# Patient Record
Sex: MALE | Race: WHITE | NOT HISPANIC OR LATINO | Employment: OTHER | URBAN - METROPOLITAN AREA
[De-identification: names, ages, dates, MRNs, and addresses within clinical notes are randomized per-mention and may not be internally consistent; named-entity substitution may affect disease eponyms.]

---

## 2017-07-31 DIAGNOSIS — N40.1 ENLARGED PROSTATE WITH LOWER URINARY TRACT SYMPTOMS (LUTS): ICD-10-CM

## 2017-08-03 ENCOUNTER — ALLSCRIPTS OFFICE VISIT (OUTPATIENT)
Dept: OTHER | Facility: OTHER | Age: 72
End: 2017-08-03

## 2017-08-03 ENCOUNTER — TRANSCRIBE ORDERS (OUTPATIENT)
Dept: ADMINISTRATIVE | Facility: HOSPITAL | Age: 72
End: 2017-08-03

## 2017-08-03 DIAGNOSIS — N40.1 ENLARGED PROSTATE WITH URINARY OBSTRUCTION: Primary | ICD-10-CM

## 2017-08-03 DIAGNOSIS — N13.8 ENLARGED PROSTATE WITH URINARY OBSTRUCTION: Primary | ICD-10-CM

## 2017-08-03 LAB
BILIRUB UR QL STRIP: NEGATIVE
CLARITY UR: NORMAL
COLOR UR: YELLOW
GLUCOSE (HISTORICAL): NEGATIVE
HGB UR QL STRIP.AUTO: NORMAL
KETONES UR STRIP-MCNC: NEGATIVE MG/DL
LEUKOCYTE ESTERASE UR QL STRIP: NEGATIVE
NITRITE UR QL STRIP: NEGATIVE
PH UR STRIP.AUTO: 7.5 [PH]
PROT UR STRIP-MCNC: NEGATIVE MG/DL
SP GR UR STRIP.AUTO: 1.01
UROBILINOGEN UR QL STRIP.AUTO: 0.2

## 2017-12-15 ENCOUNTER — ALLSCRIPTS OFFICE VISIT (OUTPATIENT)
Dept: OTHER | Facility: OTHER | Age: 72
End: 2017-12-15

## 2017-12-15 ENCOUNTER — TRANSCRIBE ORDERS (OUTPATIENT)
Dept: ADMINISTRATIVE | Facility: HOSPITAL | Age: 72
End: 2017-12-15

## 2017-12-15 DIAGNOSIS — N20.0 KIDNEY STONE: Primary | ICD-10-CM

## 2017-12-15 LAB
BILIRUB UR QL STRIP: NORMAL
CLARITY UR: NORMAL
COLOR UR: YELLOW
GLUCOSE (HISTORICAL): NORMAL
HGB UR QL STRIP.AUTO: NORMAL
KETONES UR STRIP-MCNC: NORMAL MG/DL
LEUKOCYTE ESTERASE UR QL STRIP: NORMAL
NITRITE UR QL STRIP: NORMAL
PH UR STRIP.AUTO: 7 [PH]
PROT UR STRIP-MCNC: NORMAL MG/DL
SP GR UR STRIP.AUTO: 1.01
UROBILINOGEN UR QL STRIP.AUTO: 0.2

## 2017-12-16 ENCOUNTER — HOSPITAL ENCOUNTER (OUTPATIENT)
Dept: CT IMAGING | Facility: HOSPITAL | Age: 72
Discharge: HOME | End: 2017-12-16
Attending: UROLOGY
Payer: MEDICARE

## 2017-12-16 ENCOUNTER — GENERIC CONVERSION - ENCOUNTER (OUTPATIENT)
Dept: OTHER | Facility: OTHER | Age: 72
End: 2017-12-16

## 2017-12-16 DIAGNOSIS — N20.0 KIDNEY STONE: ICD-10-CM

## 2017-12-16 PROCEDURE — 74176 CT ABD & PELVIS W/O CONTRAST: CPT

## 2017-12-18 ENCOUNTER — GENERIC CONVERSION - ENCOUNTER (OUTPATIENT)
Dept: OTHER | Facility: OTHER | Age: 72
End: 2017-12-18

## 2017-12-21 ENCOUNTER — ALLSCRIPTS OFFICE VISIT (OUTPATIENT)
Dept: OTHER | Facility: OTHER | Age: 72
End: 2017-12-21

## 2017-12-21 LAB
BILIRUB UR QL STRIP: NORMAL
GLUCOSE (HISTORICAL): NORMAL
HGB UR QL STRIP.AUTO: NORMAL
KETONES UR STRIP-MCNC: NORMAL MG/DL
LEUKOCYTE ESTERASE UR QL STRIP: NORMAL
NITRITE UR QL STRIP: NORMAL
PH UR STRIP.AUTO: 7 [PH]
PROT UR STRIP-MCNC: NORMAL MG/DL
SP GR UR STRIP.AUTO: 1.01
UROBILINOGEN UR QL STRIP.AUTO: 0.2

## 2017-12-28 ENCOUNTER — ANESTHESIA EVENT (OUTPATIENT)
Dept: PERIOP | Facility: HOSPITAL | Age: 72
End: 2017-12-28
Payer: MEDICARE

## 2017-12-28 RX ORDER — SODIUM CHLORIDE 9 MG/ML
125 INJECTION, SOLUTION INTRAVENOUS CONTINUOUS
Status: CANCELLED | OUTPATIENT
Start: 2018-01-03

## 2017-12-29 ENCOUNTER — APPOINTMENT (OUTPATIENT)
Dept: PREADMISSION TESTING | Facility: HOSPITAL | Age: 72
End: 2017-12-29
Payer: MEDICARE

## 2017-12-29 ENCOUNTER — TRANSCRIBE ORDERS (OUTPATIENT)
Dept: ADMINISTRATIVE | Facility: HOSPITAL | Age: 72
End: 2017-12-29

## 2017-12-29 ENCOUNTER — HOSPITAL ENCOUNTER (OUTPATIENT)
Dept: NON INVASIVE DIAGNOSTICS | Facility: HOSPITAL | Age: 72
Discharge: HOME | End: 2017-12-29
Attending: UROLOGY
Payer: MEDICARE

## 2017-12-29 ENCOUNTER — GENERIC CONVERSION - ENCOUNTER (OUTPATIENT)
Dept: UROLOGY | Facility: MEDICAL CENTER | Age: 72
End: 2017-12-29

## 2017-12-29 ENCOUNTER — APPOINTMENT (OUTPATIENT)
Dept: LAB | Facility: HOSPITAL | Age: 72
End: 2017-12-29
Attending: UROLOGY
Payer: MEDICARE

## 2017-12-29 VITALS
HEART RATE: 97 BPM | SYSTOLIC BLOOD PRESSURE: 135 MMHG | HEIGHT: 72 IN | RESPIRATION RATE: 18 BRPM | DIASTOLIC BLOOD PRESSURE: 91 MMHG | BODY MASS INDEX: 31.15 KG/M2 | TEMPERATURE: 98.9 F | WEIGHT: 230 LBS

## 2017-12-29 DIAGNOSIS — N13.1 HYDRONEPHROSIS WITH URETERAL STRICTURE: Primary | ICD-10-CM

## 2017-12-29 DIAGNOSIS — Z01.818 PREOP EXAMINATION: ICD-10-CM

## 2017-12-29 DIAGNOSIS — N20.1 CALCULUS OF URETER: ICD-10-CM

## 2017-12-29 DIAGNOSIS — N13.1 HYDRONEPHROSIS WITH URETERAL STRICTURE: ICD-10-CM

## 2017-12-29 LAB
ALBUMIN SERPL BCP-MCNC: 3.3 G/DL (ref 3.5–5)
ALP SERPL-CCNC: 67 U/L (ref 46–116)
ALT SERPL W P-5'-P-CCNC: 22 U/L (ref 12–78)
ANION GAP SERPL CALCULATED.3IONS-SCNC: 9 MMOL/L (ref 4–13)
AST SERPL W P-5'-P-CCNC: 17 U/L (ref 5–45)
ATRIAL RATE: 88 BPM
BASOPHILS # BLD AUTO: 0.03 THOUSANDS/ΜL (ref 0–0.1)
BASOPHILS NFR BLD AUTO: 1 % (ref 0–1)
BILIRUB SERPL-MCNC: 0.35 MG/DL (ref 0.2–1)
BUN SERPL-MCNC: 11 MG/DL (ref 5–25)
CALCIUM SERPL-MCNC: 8.8 MG/DL (ref 8.3–10.1)
CHLORIDE SERPL-SCNC: 105 MMOL/L (ref 100–108)
CO2 SERPL-SCNC: 28 MMOL/L (ref 21–32)
CREAT SERPL-MCNC: 1.3 MG/DL (ref 0.6–1.3)
EOSINOPHIL # BLD AUTO: 0.04 THOUSAND/ΜL (ref 0–0.61)
EOSINOPHIL NFR BLD AUTO: 1 % (ref 0–6)
ERYTHROCYTE [DISTWIDTH] IN BLOOD BY AUTOMATED COUNT: 13.7 % (ref 11.6–15.1)
GFR SERPL CREATININE-BSD FRML MDRD: 55 ML/MIN/1.73SQ M
GLUCOSE SERPL-MCNC: 93 MG/DL (ref 65–140)
HCT VFR BLD AUTO: 42.6 % (ref 36.5–49.3)
HGB BLD-MCNC: 14.5 G/DL (ref 12–17)
LYMPHOCYTES # BLD AUTO: 1.48 THOUSANDS/ΜL (ref 0.6–4.47)
LYMPHOCYTES NFR BLD AUTO: 33 % (ref 14–44)
MCH RBC QN AUTO: 30.9 PG (ref 26.8–34.3)
MCHC RBC AUTO-ENTMCNC: 34 G/DL (ref 31.4–37.4)
MCV RBC AUTO: 91 FL (ref 82–98)
MONOCYTES # BLD AUTO: 0.47 THOUSAND/ΜL (ref 0.17–1.22)
MONOCYTES NFR BLD AUTO: 10 % (ref 4–12)
NEUTROPHILS # BLD AUTO: 2.54 THOUSANDS/ΜL (ref 1.85–7.62)
NEUTS SEG NFR BLD AUTO: 55 % (ref 43–75)
NRBC BLD AUTO-RTO: 0 /100 WBCS
P AXIS: 44 DEGREES
PLATELET # BLD AUTO: 194 THOUSANDS/UL (ref 149–390)
PMV BLD AUTO: 9.9 FL (ref 8.9–12.7)
POTASSIUM SERPL-SCNC: 3.7 MMOL/L (ref 3.5–5.3)
PR INTERVAL: 172 MS
PROT SERPL-MCNC: 6.9 G/DL (ref 6.4–8.2)
QRS AXIS: -58 DEGREES
QRSD INTERVAL: 132 MS
QT INTERVAL: 378 MS
QTC INTERVAL: 457 MS
RBC # BLD AUTO: 4.69 MILLION/UL (ref 3.88–5.62)
SODIUM SERPL-SCNC: 142 MMOL/L (ref 136–145)
T WAVE AXIS: 10 DEGREES
VENTRICULAR RATE: 88 BPM
WBC # BLD AUTO: 4.56 THOUSAND/UL (ref 4.31–10.16)

## 2017-12-29 PROCEDURE — 80053 COMPREHEN METABOLIC PANEL: CPT

## 2017-12-29 PROCEDURE — 36415 COLL VENOUS BLD VENIPUNCTURE: CPT

## 2017-12-29 PROCEDURE — 85025 COMPLETE CBC W/AUTO DIFF WBC: CPT

## 2017-12-29 PROCEDURE — 87086 URINE CULTURE/COLONY COUNT: CPT

## 2017-12-29 PROCEDURE — 93005 ELECTROCARDIOGRAM TRACING: CPT

## 2017-12-29 RX ORDER — LOSARTAN POTASSIUM AND HYDROCHLOROTHIAZIDE 25; 100 MG/1; MG/1
1 TABLET ORAL DAILY
COMMUNITY
End: 2018-02-05

## 2017-12-29 RX ORDER — DIVALPROEX SODIUM 500 MG/1
500 TABLET, DELAYED RELEASE ORAL 2 TIMES DAILY
COMMUNITY
End: 2019-09-03 | Stop reason: ALTCHOICE

## 2017-12-29 RX ORDER — MOMETASONE FUROATE 50 UG/1
2 SPRAY, METERED NASAL AS NEEDED
COMMUNITY

## 2017-12-29 RX ORDER — TAMSULOSIN HYDROCHLORIDE 0.4 MG/1
0.4 CAPSULE ORAL
COMMUNITY
End: 2018-03-06 | Stop reason: SDUPTHER

## 2017-12-29 RX ORDER — PENTOXIFYLLINE 400 MG/1
400 TABLET, EXTENDED RELEASE ORAL
COMMUNITY
End: 2018-03-15 | Stop reason: HOSPADM

## 2017-12-29 RX ORDER — RANITIDINE 300 MG/1
300 CAPSULE ORAL EVERY EVENING
COMMUNITY
End: 2018-11-19 | Stop reason: SDUPTHER

## 2017-12-29 RX ORDER — AMLODIPINE BESYLATE 10 MG/1
10 TABLET ORAL EVERY MORNING
COMMUNITY
End: 2018-12-01 | Stop reason: HOSPADM

## 2017-12-29 RX ORDER — IBUPROFEN 200 MG
400 TABLET ORAL EVERY 6 HOURS PRN
COMMUNITY
End: 2018-08-10 | Stop reason: ALTCHOICE

## 2017-12-29 NOTE — PRE-PROCEDURE INSTRUCTIONS
Pre-Surgery Instructions:   Medication Instructions    amLODIPine (NORVASC) 10 mg tablet Patient was instructed by Physician and understands   divalproex sodium (DEPAKOTE) 500 mg EC tablet Patient was instructed by Physician and understands   ibuprofen (MOTRIN) 200 mg tablet Patient was instructed by Physician and understands   losartan-hydrochlorothiazide (HYZAAR) 100-25 MG per tablet Patient was instructed by Physician and understands   mometasone (NASONEX) 50 mcg/act nasal spray Patient was instructed by Physician and understands   pentoxifylline (TRENtal) 400 mg ER tablet Patient was instructed by Physician and understands   ranitidine (ZANTAC) 300 MG capsule Patient was instructed by Physician and understands   tamsulosin (FLOMAX) 0 4 mg Patient was instructed by Physician and understands  Seen by Dr Cecil Parra and was told to call his Dr to see if Trental should be stopped  Dr instructed him to take Depakote on morning of surgery with sip of water and was told to stop NSAIDS and supplements

## 2017-12-29 NOTE — ANESTHESIA PREPROCEDURE EVALUATION
Review of Systems/Medical History  Patient summary reviewed  Chart reviewed  History of anesthetic complications PONV    Cardiovascular  Hyperlipidemia, Hypertension on > 1 medication, PVD,    Pulmonary       GI/Hepatic    GERD well controlled,        Kidney stones, Prostatic disorder, benign prostatic hyperplasia       Endo/Other  Arthritis     GYN       Hematology   Musculoskeletal  Obesity ,        Neurology   Psychology   Anxiety, Depression , being treated for depression,            Physical Exam    Airway    Mallampati score: II  TM Distance: >3 FB  Neck ROM: full     Dental   Comment: partial, upper dentures,     Cardiovascular  Rhythm: regular, Rate: normal, Cardiovascular exam normal    Pulmonary  Pulmonary exam normal Breath sounds clear to auscultation,     Other Findings        Anesthesia Plan  ASA Score- 2     Anesthesia Type- general with ASA Monitors  Additional Monitors:   Airway Plan:         Plan Factors-Patient not instructed to abstain from smoking on day of procedure  Patient did not smoke on day of surgery  Induction- intravenous  Postoperative Plan- Plan for postoperative opioid use  Informed Consent- Anesthetic plan and risks discussed with patient and spouse

## 2017-12-30 LAB — BACTERIA UR CULT: NORMAL

## 2018-01-03 ENCOUNTER — GENERIC CONVERSION - ENCOUNTER (OUTPATIENT)
Dept: OTHER | Facility: OTHER | Age: 73
End: 2018-01-03

## 2018-01-03 ENCOUNTER — HOSPITAL ENCOUNTER (OUTPATIENT)
Facility: HOSPITAL | Age: 73
Setting detail: OUTPATIENT SURGERY
Discharge: HOME | End: 2018-01-03
Attending: UROLOGY | Admitting: UROLOGY
Payer: MEDICARE

## 2018-01-03 ENCOUNTER — ANESTHESIA (OUTPATIENT)
Dept: PERIOP | Facility: HOSPITAL | Age: 73
End: 2018-01-03
Payer: MEDICARE

## 2018-01-03 ENCOUNTER — HOSPITAL ENCOUNTER (OUTPATIENT)
Dept: RADIOLOGY | Facility: HOSPITAL | Age: 73
Setting detail: OUTPATIENT SURGERY
Discharge: HOME | End: 2018-01-03
Payer: MEDICARE

## 2018-01-03 VITALS
HEART RATE: 75 BPM | TEMPERATURE: 97.7 F | BODY MASS INDEX: 31.15 KG/M2 | DIASTOLIC BLOOD PRESSURE: 71 MMHG | RESPIRATION RATE: 20 BRPM | HEIGHT: 72 IN | OXYGEN SATURATION: 96 % | WEIGHT: 230 LBS | SYSTOLIC BLOOD PRESSURE: 131 MMHG

## 2018-01-03 DIAGNOSIS — N20.1 CALCULUS OF URETER: ICD-10-CM

## 2018-01-03 PROCEDURE — C2617 STENT, NON-COR, TEM W/O DEL: HCPCS | Performed by: UROLOGY

## 2018-01-03 PROCEDURE — 74450 X-RAY URETHRA/BLADDER: CPT

## 2018-01-03 PROCEDURE — C1769 GUIDE WIRE: HCPCS | Performed by: UROLOGY

## 2018-01-03 DEVICE — STENT URET DBL PIGTAIL MULTI 7FR 22-32CML SOFT: Type: IMPLANTABLE DEVICE | Site: URETER | Status: FUNCTIONAL

## 2018-01-03 RX ORDER — HYDROCODONE BITARTRATE AND ACETAMINOPHEN 5; 325 MG/1; MG/1
1 TABLET ORAL EVERY 6 HOURS PRN
Qty: 30 TABLET | Refills: 0 | Status: SHIPPED | OUTPATIENT
Start: 2018-01-03 | End: 2018-03-13

## 2018-01-03 RX ORDER — HYDROCODONE BITARTRATE AND ACETAMINOPHEN 5; 325 MG/1; MG/1
1 TABLET ORAL EVERY 6 HOURS PRN
Status: DISCONTINUED | OUTPATIENT
Start: 2018-01-03 | End: 2018-01-03 | Stop reason: HOSPADM

## 2018-01-03 RX ORDER — PROPOFOL 10 MG/ML
INJECTION, EMULSION INTRAVENOUS AS NEEDED
Status: DISCONTINUED | OUTPATIENT
Start: 2018-01-03 | End: 2018-01-03 | Stop reason: SURG

## 2018-01-03 RX ORDER — SODIUM CHLORIDE 9 MG/ML
125 INJECTION, SOLUTION INTRAVENOUS CONTINUOUS
Status: DISCONTINUED | OUTPATIENT
Start: 2018-01-03 | End: 2018-01-03 | Stop reason: HOSPADM

## 2018-01-03 RX ORDER — DEXAMETHASONE SODIUM PHOSPHATE 4 MG/ML
INJECTION, SOLUTION INTRA-ARTICULAR; INTRALESIONAL; INTRAMUSCULAR; INTRAVENOUS; SOFT TISSUE AS NEEDED
Status: DISCONTINUED | OUTPATIENT
Start: 2018-01-03 | End: 2018-01-03 | Stop reason: SURG

## 2018-01-03 RX ORDER — MIDAZOLAM HYDROCHLORIDE 1 MG/ML
INJECTION INTRAMUSCULAR; INTRAVENOUS AS NEEDED
Status: DISCONTINUED | OUTPATIENT
Start: 2018-01-03 | End: 2018-01-03 | Stop reason: SURG

## 2018-01-03 RX ORDER — FENTANYL CITRATE 50 UG/ML
INJECTION, SOLUTION INTRAMUSCULAR; INTRAVENOUS AS NEEDED
Status: DISCONTINUED | OUTPATIENT
Start: 2018-01-03 | End: 2018-01-03 | Stop reason: SURG

## 2018-01-03 RX ORDER — FENTANYL CITRATE/PF 50 MCG/ML
25 SYRINGE (ML) INJECTION
Status: DISCONTINUED | OUTPATIENT
Start: 2018-01-03 | End: 2018-01-03 | Stop reason: HOSPADM

## 2018-01-03 RX ORDER — ONDANSETRON 2 MG/ML
4 INJECTION INTRAMUSCULAR; INTRAVENOUS ONCE AS NEEDED
Status: DISCONTINUED | OUTPATIENT
Start: 2018-01-03 | End: 2018-01-03 | Stop reason: HOSPADM

## 2018-01-03 RX ORDER — ONDANSETRON 2 MG/ML
INJECTION INTRAMUSCULAR; INTRAVENOUS AS NEEDED
Status: DISCONTINUED | OUTPATIENT
Start: 2018-01-03 | End: 2018-01-03 | Stop reason: SURG

## 2018-01-03 RX ORDER — LEVOFLOXACIN 5 MG/ML
750 INJECTION, SOLUTION INTRAVENOUS ONCE
Status: COMPLETED | OUTPATIENT
Start: 2018-01-03 | End: 2018-01-03

## 2018-01-03 RX ORDER — CIPROFLOXACIN 500 MG/1
500 TABLET, FILM COATED ORAL EVERY 12 HOURS SCHEDULED
Qty: 4 TABLET | Refills: 0 | Status: SHIPPED | OUTPATIENT
Start: 2018-01-03 | End: 2018-01-05

## 2018-01-03 RX ORDER — PHENAZOPYRIDINE HYDROCHLORIDE 200 MG/1
200 TABLET, FILM COATED ORAL
Status: DISCONTINUED | OUTPATIENT
Start: 2018-01-03 | End: 2018-01-03 | Stop reason: HOSPADM

## 2018-01-03 RX ADMIN — LEVOFLOXACIN: 5 INJECTION, SOLUTION INTRAVENOUS at 09:39

## 2018-01-03 RX ADMIN — SODIUM CHLORIDE 125 ML/HR: 0.9 INJECTION, SOLUTION INTRAVENOUS at 08:08

## 2018-01-03 RX ADMIN — SODIUM CHLORIDE: 0.9 INJECTION, SOLUTION INTRAVENOUS at 10:06

## 2018-01-03 RX ADMIN — PROPOFOL 200 MG: 10 INJECTION, EMULSION INTRAVENOUS at 09:46

## 2018-01-03 RX ADMIN — ONDANSETRON HYDROCHLORIDE 4 MG: 2 INJECTION, SOLUTION INTRAVENOUS at 10:02

## 2018-01-03 RX ADMIN — LIDOCAINE HYDROCHLORIDE 100 MG: 20 INJECTION, SOLUTION INTRAVENOUS at 09:46

## 2018-01-03 RX ADMIN — FENTANYL CITRATE 50 MCG: 50 INJECTION INTRAMUSCULAR; INTRAVENOUS at 09:57

## 2018-01-03 RX ADMIN — DEXAMETHASONE SODIUM PHOSPHATE 6 MG: 4 INJECTION, SOLUTION INTRAMUSCULAR; INTRAVENOUS at 10:06

## 2018-01-03 RX ADMIN — SODIUM CHLORIDE: 0.9 INJECTION, SOLUTION INTRAVENOUS at 10:10

## 2018-01-03 RX ADMIN — PHENAZOPYRIDINE HYDROCHLORIDE 200 MG: 200 TABLET ORAL at 11:42

## 2018-01-03 RX ADMIN — MIDAZOLAM HYDROCHLORIDE 2 MG: 1 INJECTION, SOLUTION INTRAMUSCULAR; INTRAVENOUS at 09:41

## 2018-01-03 RX ADMIN — SODIUM CHLORIDE 125 ML/HR: 0.9 INJECTION, SOLUTION INTRAVENOUS at 12:44

## 2018-01-03 RX ADMIN — FENTANYL CITRATE 50 MCG: 50 INJECTION INTRAMUSCULAR; INTRAVENOUS at 09:45

## 2018-01-03 NOTE — DISCHARGE INSTRUCTIONS
Expect to see blood in the urine, and to experience urgency/frequency/burning with urination and dribbling  Call for fever greater that 101 5, inability to urinate, or severe pain not relieved by pain meds    No driving/operating machinery for 24 hours, and while taking narcotics  Take over the counter remedy of choice to avoid constipation  Drink plenty of fluids  Hydrocodone/Acetaminophen (By mouth)   Acetaminophen (k-zhou-e-MIN-oh-fen), Hydrocodone Bitartrate (qfj-pabx-IGR-done bye-TAR-trate)  Treats pain  This medicine contains a narcotic pain reliever  Brand Name(s): Hycet, Lorcet, Lorcet HD, Lorcet Plus, Lortab 10/325, Lortab 5/325, Lortab 7 5/325, Lortab Elixir, Norco, Verdrocet, Vicodin, Vicodin ES, Vicodin HP, Xodol, Xodol 5/300   There may be other brand names for this medicine  When This Medicine Should Not Be Used: This medicine is not right for everyone  Do not use it if you had an allergic reaction to acetaminophen, hydrocodone, or other narcotic medicines, or stomach or bowel blockage (including paralytic ileus)  How to Use This Medicine:   Capsule, Liquid, Tablet  · Your doctor will tell you how much medicine to use  Do not use more than directed  · An overdose can be dangerous  Follow directions carefully so you do not get too much medicine at one time  · Oral liquid: Measure the oral liquid medicine with a marked measuring spoon, oral syringe, or medicine cup  · Drink plenty of liquids to help avoid constipation  · This medicine should come with a Medication Guide  Ask your pharmacist for a copy if you do not have one  · Missed dose: Take a dose as soon as you remember  If it is almost time for your next dose, wait until then and take a regular dose  Do not take extra medicine to make up for a missed dose  · Store the medicine in a closed container at room temperature, away from heat, moisture, and direct light  Flush any unused Norco® tablets down the toilet    Drugs and Foods to Avoid:   Ask your doctor or pharmacist before using any other medicine, including over-the-counter medicines, vitamins, and herbal products  · Do not use this medicine if you are using or have used an MAO inhibitor within the past 14 days  · Some medicines can affect how hydrocodone/acetaminophen works  Tell your doctor if you are using any of the following:   ¨ Carbamazepine, erythromycin, ketoconazole, mirtazapine, phenytoin, rifampin, ritonavir, tramadol, trazodone  ¨ Diuretic (water pill)  ¨ Medicine to treat depression or mental health problems  ¨ Medicine to treat migraine headaches  ¨ Phenothiazine medicine  · Tell your doctor if you use anything else that makes you sleepy  Some examples are allergy medicine, narcotic pain medicine, and alcohol  Tell your doctor if you are using buprenorphine, butorphanol, nalbuphine, pentazocine, or a muscle relaxer  · Do not drink alcohol while you are using this medicine  Acetaminophen can damage your liver, and your risk is higher if you also drink alcohol  Warnings While Using This Medicine:   · Tell your doctor if you are pregnant or breastfeeding, or if you have kidney disease, liver disease, lung or breathing problems, gallbladder or pancreas problems, an underactive thyroid, [place] disease, prostate problems, trouble urinating, stomach problems, or a history of head injury or brain tumor, seizures, alcohol or drug addiction  · This medicine may cause the following problems:   ¨ High risk of overdose, which can lead to death  ¨ Respiratory depression (serious breathing problem that can be life-threatening)  ¨ Liver problems  ¨ Serious skin reactions  ¨ Serotonin syndrome (when used with certain medicines)  · This medicine can be habit-forming  Do not use more than your prescribed dose  Call your doctor if you think your medicine is not working  · This medicine may make you dizzy or drowsy   Do not drive or doing anything else that could be dangerous until you know how this medicine affects you  · This medicine contains acetaminophen  Read the labels of all other medicines you are using to see if they also contain acetaminophen, or ask your doctor or pharmacist  Mk Fernández not use more than 4 grams (4,000 milligrams) total of acetaminophen in one day  · Tell any doctor or dentist who treats you that you are using this medicine  This medicine may affect certain medical test results  · This medicine may cause constipation, especially with long-term use  Ask your doctor if you should use a laxative to prevent and treat constipation  · This medicine could cause infertility  Talk with your doctor before using this medicine if you plan to have children  · Keep all medicine out of the reach of children  Never share your medicine with anyone  Possible Side Effects While Using This Medicine:   Call your doctor right away if you notice any of these side effects:  · Allergic reaction: Itching or hives, swelling in your face or hands, swelling or tingling in your mouth or throat, chest tightness, trouble breathing  · Anxiety, restlessness, fast heartbeat, fever, sweating, muscle spasms, twitching, diarrhea, seeing or hearing things that are not there  · Blistering, peeling, red skin rash  · Blue lips, fingernails, or skin  · Dark urine or pale stools, loss of appetite, nausea or vomiting, stomach pain, yellow skin or eyes  · Extreme weakness, shallow breathing, slow heartbeat, sweating, seizures, cold or clammy skin  · Lightheadedness, dizziness, fainting  If you notice these less serious side effects, talk with your doctor:   · Constipation, nausea, vomiting  · Tiredness or sleepiness  If you notice other side effects that you think are caused by this medicine, tell your doctor  Call your doctor for medical advice about side effects   You may report side effects to FDA at 0-158-FDA-9625  © 2017 [employer] Information is for End User's use only and may not be sold, redistributed or otherwise used for commercial purposes  The above information is an  only  It is not intended as medical advice for individual conditions or treatments  Talk to your doctor, nurse or pharmacist before following any medical regimen to see if it is safe and effective for you  Ciprofloxacin (By mouth)   Ciprofloxacin (rnq-kcn-SPBU-a-sin)  Treats infections and plague  This medicine is a quinolone antibiotic  Brand Name(s): Cipro   There may be other brand names for this medicine  When This Medicine Should Not Be Used: This medicine is not right for everyone  Do not use it if you had an allergic reaction to ciprofloxacin or to similar medicines  How to Use This Medicine:   Liquid, Tablet, Long Acting Tablet  · Your doctor will tell you how much medicine to use  Do not use more than directed  Take this medicine at the same time each day  · You may take this medicine with or without food  Do not take this medicine with only a source of calcium, such as milk, yogurt, or juice that contains added calcium  You may have foods or drinks that contain calcium as part of a larger meal   · Swallow the extended-release tablet whole  Do not crush, break, or chew it  · Oral liquid: Shake for at least 15 seconds just before each use  The liquid has small beads floating in it  Do not chew the beads when you drink the liquid  Measure the oral liquid medicine with a marked measuring spoon, oral syringe, or medicine cup  · Tablet: Swallow whole  Do not break, crush, or chew it  · Drink extra fluids so you will urinate more often and help prevent kidney problems  · Take all of the medicine in your prescription to clear up your infection, even if you feel better after the first few doses  · This medicine should come with a Medication Guide  Ask your pharmacist for a copy if you do not have one  · Missed dose: Take a dose as soon as you remember   If it is almost time for your next dose, wait until then and take a regular dose  Do not take extra medicine to make up for a missed dose  · Store the medicine in a closed container at room temperature, away from heat, moisture, and direct light  Throw away any leftover liquid medicine after 14 days  Drugs and Foods to Avoid:   Ask your doctor or pharmacist before using any other medicine, including over-the-counter medicines, vitamins, and herbal products  · Do not use this medicine together with tizanidine  · Some foods and medicines can affect how ciprofloxacin works  Tell your doctor if you are using any of the following:  ¨ Clozapine, cyclosporine, duloxetine, lidocaine, methotrexate, olanzapine, pentoxifylline, phenytoin, probenecid, ropinirole, sildenafil, theophylline  ¨ Antibiotic (including azithromycin, clarithromycin, erythromycin)  ¨ Blood thinner (including warfarin)  ¨ Diabetes medicine (including glimepiride, glyburide)  ¨ Medicine for depression or mental illness  ¨ Medicine for heart rhythm problems (including amiodarone, procainamide, quinidine, sotalol)  ¨ NSAID pain medicine (including aspirin, celecoxib, diclofenac, ibuprofen, naproxen)  ¨ Steroid medicine (including hydrocortisone, methylprednisolone, prednisone)  · Take ciprofloxacin at least 2 hours before or 6 hours after you take antacids containing aluminum or magnesium, calcium, zinc, iron, lanthanum, sevelamer, sucralfate, and didanosine  This includes vitamin/mineral supplements  · This medicine slows the digestion of caffeine, so it might affect you for longer than normal   Warnings While Using This Medicine:   · Tell your doctor if you are pregnant or breastfeeding, or if you have kidney disease, liver disease, diabetes, heart disease, myasthenia gravis, or a history of heart rhythm problems (such as prolonged QT interval) or seizures   Tell your doctor if you have ever had tendon or joint problems, including rheumatoid arthritis, or if you have received a transplant  · This medicine may cause the following problems:  ¨ Tendinitis and tendon rupture (may happen after treatment ends)  ¨ Liver damage  ¨ Nerve damage in the arms or legs  ¨ Heart rhythm changes  ¨ Changes in blood sugar levels  · This medicine may make you dizzy, drowsy, or lightheaded  Do not drive or do anything that could be dangerous until you know how this medicine affects you  · This medicine can cause diarrhea  Call your doctor if the diarrhea becomes severe, does not stop, or is bloody  Do not take any medicine to stop diarrhea until you have talked to your doctor  Diarrhea can occur 2 months or more after you stop taking this medicine  · This medicine may make your skin more sensitive to sunlight  Wear sunscreen  Do not use sunlamps or tanning beds  · Call your doctor if your symptoms do not improve or if they get worse  · Keep all medicine out of the reach of children  Never share your medicine with anyone    Possible Side Effects While Using This Medicine:   Call your doctor right away if you notice any of these side effects:  · Allergic reaction: Itching or hives, swelling in your face or hands, swelling or tingling in your mouth or throat, chest tightness, trouble breathing  · Blistering, peeling, red skin rash  · Dark-colored urine or pale stools, nausea, vomiting, loss of appetite, pain in your upper stomach, yellow skin or eyes  · Diarrhea that may contain blood  · Fainting, dizziness, or lightheadedness  · Fast, slow, or uneven heartbeat  · Numbness, tingling, weakness, or burning pain in your hands, arms, legs, or feet  · Pain, stiffness, swelling, or bruises around your ankle, leg, shoulder, or other joint  · Seizures, severe headache, unusual thoughts or behaviors, trouble sleeping, feeling anxious, confused, or depressed, seeing, hearing, or feeling things that are not there  · Unusual bleeding, bruising, or weakness  If you notice other side effects that you think are caused by this medicine, tell your doctor  Call your doctor for medical advice about side effects  You may report side effects to FDA at 9-379-FDA-0018  © 2017 [address] [employer] Information is for End User's use only and may not be sold, redistributed or otherwise used for commercial purposes  The above information is an  only  It is not intended as medical advice for individual conditions or treatments  Talk to your doctor, nurse or pharmacist before following any medical regimen to see if it is safe and effective for you

## 2018-01-03 NOTE — OP NOTE
OPERATIVE REPORT  PATIENT NAME: Jaspreet Harrison    :  1945  MRN: [id number]  Pt Location: [facility] OR ROOM 07    SURGERY DATE: 1/3/2018    Surgeon(s) and Role:     * Brenda Mendenhall MD - Primary    Preop Diagnosis:  Hydronephrosis with ureteral stricture, not elsewhere classified [N13 1] left  Calculus of ureter [N20 1] left  Bladder diverticulum left posterior wall  Post-Op Diagnosis Codes: * Hydronephrosis with ureteral stricture, not elsewhere classified [N13 1] due to the left bladder wall diverticula   BPH with obstruction  Left posterior wall bladder diverticulum    Procedure(s) (LRB):  CYSTOSCOPY URETEROSCOPY, RETROGRADE PYELOGRAM AND INSERTION STENT URETERAL (Left)    Specimen(s):      Estimated Blood Loss:   Minimal    Drains:  None       Anesthesia Type:   General    Operative Indications:  Hydronephrosis with ureteral stricture, not elsewhere classified [N13 1]  Calculus of ureter [N20 1]      Operative Findings:  Distal deviation of left ureter due to large bladder diverticulum and compression of the ureter due to same  This is causing hydronephrosis of the left kidney  No other form of obstruction found  Highly obstructive prostate  Complications:   None    Procedure and Technique:  The patient is a 80-year-old man with a history of BPH obstruction and a left bladder diverticulum as well as left renal calculi who was found on ultrasound which was done due to worsening of his creatinine to have left hydronephrosis  CT scan was done and this showed an abnormality of his distal left ureter that the radiologist was concerned about and I thought he actually might have dropped 1 of the stones  I offered him cystoscopy, left retrograde pyelography left ureteroscopy to sort this out and place a stent to increase his renal function  Risks of bleeding infection damage to the urinary tract inability to access the proximal ureter were explained and he gives informed consent      Patient was brought to the operating room and identified properly  LMA was induced the patient was prepped and draped in the dorsal lithotomy position in the usual fashion  A time-out was performed and cystoscopy was carried out with a 22 Qatari cystoscopy sheath with 30 and 70 degree lenses  The urethra was normal without stricture  The prostatic urethra showed a highly obstructive prostate with a very high bladder neck  His bladder was very large in capacity, with 3+ trabeculation but I was able see both orifices  He has a large bladder diverticulum, approximately 2-300 cc in the posterior left inferior wall  I shot a retrograde with an open-ended catheter with 50% Conray and this showed no filling defects but a narrowing of the ureter where it curved around the diverticulum, then a dilated and tortuous ureter all the way up to a dilated renal pelvis  I then placed another wire midway up the ureter and attempted to do rigid ureteroscopy  I was only able to get the ureteral scope in a couple of cm and I saw no evidence of malignancy or stone  The ureter simply appeared to be compressed and narrowed from the adjacent diverticulum  I then removed the scope and I placed a 7 Qatari 32 cm stent over the wire and coils were established in the renal pelvis and bladder  This appeared to drain the collecting system quite well as contrast started coming out quite readily  The patient will ultimately need bladder diverticulectomy and open prostatectomy, if not trans urethral resection of the prostate  We will keep the stent in until he undergoes definitive repair    The bladder was drained the patient was transferred recovery after the patient   I was present for the entire procedure and A qualified resident physician was not available    Patient Disposition:  PACU  and extubated and stable    SIGNATURE: Alexis Santoro MD  DATE: January 3, 2018  TIME: 10:22 AM

## 2018-01-09 ENCOUNTER — ALLSCRIPTS OFFICE VISIT (OUTPATIENT)
Dept: OTHER | Facility: OTHER | Age: 73
End: 2018-01-09

## 2018-01-13 VITALS
BODY MASS INDEX: 36.73 KG/M2 | SYSTOLIC BLOOD PRESSURE: 126 MMHG | WEIGHT: 248 LBS | HEIGHT: 69 IN | DIASTOLIC BLOOD PRESSURE: 78 MMHG

## 2018-01-22 VITALS
HEIGHT: 72 IN | BODY MASS INDEX: 30.88 KG/M2 | WEIGHT: 228 LBS | SYSTOLIC BLOOD PRESSURE: 141 MMHG | DIASTOLIC BLOOD PRESSURE: 80 MMHG

## 2018-01-22 VITALS — WEIGHT: 228 LBS | BODY MASS INDEX: 30.88 KG/M2 | HEIGHT: 72 IN | RESPIRATION RATE: 16 BRPM | TEMPERATURE: 98 F

## 2018-01-23 ENCOUNTER — ALLSCRIPTS OFFICE VISIT (OUTPATIENT)
Dept: OTHER | Facility: OTHER | Age: 73
End: 2018-01-23

## 2018-01-23 VITALS — BODY MASS INDEX: 30.88 KG/M2 | RESPIRATION RATE: 16 BRPM | TEMPERATURE: 99 F | WEIGHT: 228 LBS | HEIGHT: 72 IN

## 2018-01-23 NOTE — MISCELLANEOUS
Message  I spoke with patient regarding his CT scan  He has a distal left ureteral obstruction which I think is probably a stone but the radiologist is calling it possibly inflammation or even neoplasm  In any event he has had a decrease in his GFR and he has left hydronephrosis so he needs cystoscopy, left ureteroscopy stone basket extraction or fixing whatever is blocking him and the stent  I told him this and I told him we would contact him to get him in for quick history and physical and then we will get him on the operating schedule as soon as possible        Signatures   Electronically signed by : Eduardo Moya MD; Dec 19 2017  4:51PM EST                       (Author)

## 2018-01-23 NOTE — PROGRESS NOTES
Chief Complaint  kidney stone ,has stent in now  But he is having problems urinating,only small amounts when he goes  He does not have a string for the stent  Will talk with Dr Valentin Bhardwaj now  Active Problems    1  Benign prostatic hyperplasia with lower urinary tract symptoms (600 01) (N40 1)   2  Kidney stone (592 0) (N20 0)    Current Meds   1  AmLODIPine Besylate 10 MG Oral Tablet; TAKE 1 TABLET TWICE DAILY; Therapy: (Recorded:03Aug2017) to Recorded   2  Claritin 10 MG Oral Capsule; Therapy: (Recorded:01Aug2017) to Recorded   3  Depakote 500 MG Oral Tablet Delayed Release; TAKE 1 TABLET TWICE DAILY WITH   MEALS; Therapy: (Hortensia Thibodeaux) to Recorded   4  Losartan Potassium 100 MG Oral Tablet; Therapy: (Recorded:01Aug2017) to Recorded   5  Multivitamins Oral Capsule; Therapy: (Recorded:01Aug2017) to Recorded   6  Nasonex 50 MCG/ACT Nasal Suspension; Therapy: (Recorded:01Aug2017) to Recorded   7  Pantoprazole Sodium 40 MG Oral Tablet Delayed Release; Therapy: (Recorded:01Aug2017) to Recorded   8  Pentoxifylline  MG Oral Tablet Extended Release; Therapy: (Recorded:01Aug2017) to Recorded   9  Tamsulosin HCl - 0 4 MG Oral Capsule; Take 1 capsule by mouth at  bedtime    Requested for: [id number]; Last Rx:[id number] Ordered    Allergies    1  Augmentin   2  Penicillins   3  Iodinated Contrast Media   4  Lidocaine PTCH   5  Mercury POWD    6  IVP Dye   7  Shellfish    Vitals  Signs    Temperature: 99 F  Respiration: 16  Height: 6 ft   Weight: 228 lb   BMI Calculated: 30 92  BSA Calculated: 2 25    Results/Data  Measure Post Void Residual - POC [id number] 11:33AM      Test Name Result Flag Reference   Post Void Residual          Procedure    Procedure:   16 fr coude catheter inserted with 10cc balloon for urine retention per Dr Valentin Bhardwaj  Patient done with a sterile procedure,he did well,instructions given and how to empty bag  He is to call if any problems   900cc of urine was obtained ,dark yellow in color  Assessment    1  Benign prostatic hyperplasia with lower urinary tract symptoms (600 01) (N40 1)    Discussion/Summary    Will talk with Dr Barbi Clay and send him this note today and see what he wants to do next  Patient does not have a string for the stent also  Patient is able to Self-Care        Future Appointments    Date/Time Provider Specialty Site   01/23/2018 09:00 AM Shalonda Nance MD Urology [address]   01/29/2018 01:00 PM Shalonda Nance MD Urology [address]   08/06/2018 09:15 AM Shalonda Nance MD Urology [address]     Signatures   Electronically signed by : Rachel Lepe LPN; Jan 9 7386 47:59ZV EST                       (Author)    Electronically signed by : Sis Mckee MD; Jan 11 2018  4:49PM EST                       (Author)

## 2018-01-26 RX ORDER — LORATADINE 10 MG/1
CAPSULE, LIQUID FILLED ORAL
COMMUNITY
End: 2018-02-05

## 2018-01-26 RX ORDER — LOSARTAN POTASSIUM 100 MG/1
TABLET ORAL
COMMUNITY
End: 2018-02-05

## 2018-01-26 RX ORDER — PANTOPRAZOLE SODIUM 40 MG/1
TABLET, DELAYED RELEASE ORAL
COMMUNITY
End: 2018-02-05

## 2018-01-30 ENCOUNTER — OFFICE VISIT (OUTPATIENT)
Dept: CARDIOLOGY CLINIC | Facility: CLINIC | Age: 73
End: 2018-01-30
Payer: MEDICARE

## 2018-01-30 VITALS
RESPIRATION RATE: 16 BRPM | HEART RATE: 103 BPM | HEIGHT: 69 IN | DIASTOLIC BLOOD PRESSURE: 78 MMHG | WEIGHT: 230 LBS | SYSTOLIC BLOOD PRESSURE: 136 MMHG | BODY MASS INDEX: 34.07 KG/M2

## 2018-01-30 DIAGNOSIS — Z01.818 PRE-OPERATIVE CLEARANCE: Primary | ICD-10-CM

## 2018-01-30 DIAGNOSIS — I35.1 AORTIC VALVE INSUFFICIENCY, ETIOLOGY OF CARDIAC VALVE DISEASE UNSPECIFIED: ICD-10-CM

## 2018-01-30 DIAGNOSIS — I45.3 RIGHT BUNDLE BLANCH BLOCK, ANTERIOR FASCICULAR BLOCK AND INCOMPLETE POSTERIOR FASCICULAR BLOCK: ICD-10-CM

## 2018-01-30 PROCEDURE — 99203 OFFICE O/P NEW LOW 30 MIN: CPT | Performed by: INTERNAL MEDICINE

## 2018-01-30 PROCEDURE — 93000 ELECTROCARDIOGRAM COMPLETE: CPT | Performed by: INTERNAL MEDICINE

## 2018-01-30 RX ORDER — MAGNESIUM HYDROXIDE 400 MG/5ML
SUSPENSION, ORAL (FINAL DOSE FORM) ORAL
COMMUNITY
End: 2018-02-05

## 2018-01-30 RX ORDER — MULTIVIT WITH MINERALS/LUTEIN
TABLET ORAL
COMMUNITY
End: 2018-02-05

## 2018-01-30 RX ORDER — SIMVASTATIN 10 MG
1 TABLET ORAL
COMMUNITY
Start: 2016-06-03 | End: 2018-02-05

## 2018-01-30 RX ORDER — CHLORAL HYDRATE 500 MG
CAPSULE ORAL
COMMUNITY
End: 2018-02-05

## 2018-01-30 RX ORDER — ERGOCALCIFEROL (VITAMIN D2) 10 MCG
TABLET ORAL
COMMUNITY
End: 2018-02-05

## 2018-01-30 RX ORDER — RANITIDINE 300 MG/1
TABLET ORAL
Refills: 5 | COMMUNITY
Start: 2018-01-03 | End: 2018-02-05

## 2018-01-30 RX ORDER — TRAMADOL HYDROCHLORIDE 50 MG/1
50 TABLET ORAL EVERY 6 HOURS
COMMUNITY
Start: 2016-07-21 | End: 2018-02-05

## 2018-01-30 NOTE — LETTER
January 30, 2018     Liane Ny MD  [facility]  [address]  [facility] [address]  [address]  [address]    Patient: Zachariah Elmore   YOB: 1945   Date of Visit: 1/30/2018       Dear Dr Jaja Ovalle: Thank you for referring Gladys Viramontes to me for evaluation  Below are my notes for this consultation  If you have questions, please do not hesitate to call me  I look forward to following your patient along with you  Sincerely,        Renzo Siegel MD        CC: No Recipients  Renzo Siegel MD  1/30/2018 10:48 AM  Sign at close encounter                                             Cardiology Consultation     Zachariah Elmore  [id number]  1945  [address]  [address]  [address]    Mr Matute   A 68-year-old man with past medical history of hypertension, dyslipidemia, rheumatic fever as a child, right bundle branch block, kidney stones and bladder diverticula status post urethral stent placement and currently with catheter,  benign prostatic hypertrophy, and Buerger's disease status post toe amputations in the past presents for preoperative evaluation prior to noncardiac surgery  He notes occasional dyspnea on exertion as been going on for some time - it has proceeded his lung resection for abscess  I understand he had a cardiac catheterization from 2007 that reportedly showed "no blockages"  He notes no chest pain, palpitations, dizziness, or syncope      1  Pre-operative clearance  POCT ECG   2  Right bundle melissa block, 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     3   Aortic valve insufficiency, etiology of cardiac valve disease unspecified  Echo complete with contrast if indicated     Patient Active Problem List   Diagnosis    Right bundle melissa block, 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    Aortic regurgitation     Past Medical History:   Diagnosis Date    Anxiety  Back pain     Ceron disease     BPH (benign prostatic hyperplasia)     Cataract     Right eye    DDD (degenerative disc disease), lumbar     Depression     Diverticulosis     GERD (gastroesophageal reflux disease)     History of cardiac murmur     Past    History of melanoma     Was on back in early 1990's    History of rheumatic fever     Childhood    [employer] (hard of hearing)     Hydronephrosis     Hyperlipidemia     Hypertension     Kidney stone     Multiple times    Neck pain     Nervous breakdown     History of due to reaction to psych med which he was on for anxiety/depression and became suicidal    PONV (postoperative nausea and vomiting)     Scoliosis     Seasonal allergies     Tinnitus     Wears partial dentures     Upper     Social History     Social History    Marital status: Single     Spouse name: N/A    Number of children: N/A    Years of education: N/A     Occupational History    Not on file  Social History Main Topics    Smoking status: Former Smoker     Packs/day: 1 00     Years: 15 00     Types: Cigarettes    Smokeless tobacco: Never Used      Comment: Quit 50 years    Alcohol use Yes      Comment: Very rare   Drug use: No    Sexual activity: Not on file     Other Topics Concern    Not on file     Social History Narrative    No narrative on file      No family history on file    Past Surgical History:   Procedure Laterality Date    ABDOMINAL SURGERY      When young had procedure for improviing circulation to left lower extremety    BACK SURGERY      Lumbar- not sure what was done   Valorie Laura CARDIAC CATHETERIZATION      Approximately 2007- no blockages    CARPAL TUNNEL RELEASE Bilateral     CATARACT EXTRACTION Left     COLONOSCOPY      CYST REMOVAL      Robotic procedure to remove benign cyst from lower left lung    ESOPHAGOGASTRODUODENOSCOPY      MS CYSTO/URETERO W/LITHOTRIPSY &INDWELL STENT INSRT Left 1/3/2018    Procedure: CYSTOSCOPY URETEROSCOPY, RETROGRADE PYELOGRAM AND INSERTION STENT URETERAL;  Surgeon: Sarai Bauman MD;  Location: [facility];  Service: Urology    SKIN CANCER EXCISION      Melanoma removal on back in early 1990's    TOE AMPUTATION Left     All 5 toes left foot were amputated due to poor circulation     WRIST SURGERY Bilateral     Ulnar nerve on right arm and both wrists are fused       Current Outpatient Prescriptions:     amLODIPine (NORVASC) 10 mg tablet, Take 10 mg by mouth daily, Disp: , Rfl:     divalproex sodium (DEPAKOTE) 500 mg EC tablet, Take 250 mg by mouth 2 (two) times a day, Disp: , Rfl:     pentoxifylline (TRENtal) 400 mg ER tablet, Take 400 mg by mouth 3 (three) times a day with meals, Disp: , Rfl:     ranitidine (ZANTAC) 300 MG capsule, Take 300 mg by mouth every evening, Disp: , Rfl:     tamsulosin (FLOMAX) 0 4 mg, Take 0 4 mg by mouth daily with dinner, Disp: , Rfl:     beta carotene 58180 UNIT capsule, 1 TABLET DAILY, Disp: , Rfl:     Glucosamine-Chondroit-Vit C-Mn (GLUCOSAMINE CHONDR 500 COMPLEX) CAPS, 2x daily, Disp: , Rfl:     HYDROcodone-acetaminophen (NORCO) 5-325 mg per tablet, Take 1 tablet by mouth every 6 (six) hours as needed for pain for up to 30 doses Max Daily Amount: 4 tablets, Disp: 30 tablet, Rfl: 0    ibuprofen (MOTRIN) 200 mg tablet, Take 400 mg by mouth every 6 (six) hours as needed for mild pain, Disp: , Rfl:     Loratadine (CLARITIN) 10 MG CAPS, Take by mouth, Disp: , Rfl:     losartan (COZAAR) 100 MG tablet, Take by mouth, Disp: , Rfl:     losartan-hydrochlorothiazide (HYZAAR) 100-25 MG per tablet, Take 1 tablet by mouth daily, Disp: , Rfl:     mometasone (NASONEX) 50 mcg/act nasal spray, 2 sprays into each nostril as needed, Disp: , Rfl:     Multiple Vitamin (MULTIVITAMINS PO), Take by mouth, Disp: , Rfl:     Omega-3 1000 MG CAPS, 1 tablet daily, Disp: , Rfl:     pantoprazole (PROTONIX) 40 mg tablet, Take by mouth, Disp: , Rfl:     ranitidine (ZANTAC) 300 MG tablet, TAKE 1 TABLET BY ORAL ROUTE EVERY DAY AT BEDTIME, Disp: , Rfl: 5    simvastatin (ZOCOR) 10 mg tablet, Take 1 tablet by mouth, Disp: , Rfl:     traMADol (ULTRAM) 50 mg tablet, Take 50 mg by mouth every 6 (six) hours, Disp: , Rfl:     Vitamin D, Cholecalciferol, 400 units TABS, 1 tablet daily, Disp: , Rfl:     vitamin E, tocopherol, 1,000 units capsule, 1 tablet daily, Disp: , Rfl:   Allergies   Allergen Reactions    Iodine Shortness Of Breath, Swelling and Hives     Contrast dye causes respiratory distress   Iodine causes skin rash    Mercury Hives and Swelling    Shellfish-Derived Products Hives, Shortness Of Breath and Anaphylaxis     Anaphylaxis    Augmentin [Amoxicillin-Pot Clavulanate] GI Intolerance, Rash and Diarrhea     Diarrhea    Lidoderm [Lidocaine] Rash     Lidoderm patch caused rash    Penicillins Rash, Swelling and Hives    Shellfish Allergy     Sulfa Antibiotics Hives and Swelling     Vitals:    01/30/18 0936   BP: 136/78   BP Location: Left arm   Patient Position: Sitting   Cuff Size: Large   Pulse: 103   Resp: 16   Weight: 104 kg (230 lb)   Height: 5' 9" (1 753 m)       Labs:  Hospital Outpatient Visit on 12/29/2017   Component Date Value    Ventricular Rate 12/29/2017 88     Atrial Rate 12/29/2017 88     GA Interval 12/29/2017 172     QRSD Interval 12/29/2017 132     QT Interval 12/29/2017 378     QTC Interval 12/29/2017 457     P Axis 12/29/2017 44     QRS Axis 12/29/2017 -62     T Wave [place] 12/29/2017 10    Appointment on 12/29/2017   Component Date Value    WBC 12/29/2017 4 56     RBC 12/29/2017 4 69     Hemoglobin 12/29/2017 14 5     Hematocrit 12/29/2017 42 6     MCV 12/29/2017 91     MCH 12/29/2017 30 9     MCHC 12/29/2017 34 0     RDW 12/29/2017 13 7     MPV 12/29/2017 9 9     Platelets 67/04/7303 194     nRBC 12/29/2017 0     Neutrophils Relative 12/29/2017 55     Lymphocytes Relative 12/29/2017 33     Monocytes Relative 12/29/2017 10     Eosinophils Relative 12/29/2017 1     Basophils Relative 12/29/2017 1     Neutrophils Absolute 12/29/2017 2 54     Lymphocytes Absolute 12/29/2017 1 48     Monocytes Absolute 12/29/2017 0 47     Eosinophils Absolute 12/29/2017 0 04     Basophils Absolute 12/29/2017 0 03     Urine Culture 12/29/2017 No Growth <1000 cfu/mL     Sodium 12/29/2017 142     Potassium 12/29/2017 3 7     Chloride 12/29/2017 105     CO2 12/29/2017 28     Anion Gap 12/29/2017 9     BUN 12/29/2017 11     Creatinine 12/29/2017 1 30     Glucose 12/29/2017 93     Calcium 12/29/2017 8 8     AST 12/29/2017 17     ALT 12/29/2017 22     Alkaline Phosphatase 12/29/2017 67     Total Protein 12/29/2017 6 9     Albumin 12/29/2017 3 3*    Total Bilirubin 12/29/2017 0 35     eGFR 12/29/2017 55    [employer] Office Visit on 12/21/2017   Component Date Value    Leukocytes, UA 12/21/2017 neg     Nitrite, UA 12/21/2017 neg     Blood, UA 12/21/2017 small     Bilirubin, UA 12/21/2017 neg     Urobilinogen, UA 12/21/2017 0 2     Protein, UA 12/21/2017 neg     pH, UA 12/21/2017 7 0     Specific Gravity, UA 12/21/2017 1 015     Ketones, UA 12/21/2017 neg     Glucose 12/21/2017 neg    [employer] Office Visit on 12/15/2017   Component Date Value    Color, UA 12/15/2017 Yellow     Clarity, UA 12/15/2017 Transparent     Leukocytes, UA 12/15/2017 NEG     Nitrite, UA 12/15/2017 NEG     Blood, UA 12/15/2017 SMALL     Bilirubin, UA 12/15/2017 NEG     Urobilinogen, UA 12/15/2017 0 2     Protein, UA 12/15/2017 NEG     pH, UA 12/15/2017 7 0     Specific [place], UA 12/15/2017 1 015     Ketones, UA 12/15/2017 NEG     Glucose 12/15/2017 NEG      Imaging: Xr Retrograde Pyelogram    Result Date: 1/4/2018  Narrative: LEFT RETROGRADE PYELOGRAM INDICATION: Left-sided stent placement  COMPARISON: None IMAGES:  8 FLUOROSCOPY TIME:   32 seconds FINDINGS: Left-sided hydronephrosis is noted  Left ureteral stent was placed   Osseous and soft tissue detail limited by technique  Impression:     Please see procedure report for further details  Workstation performed: [id number]       Review of Systems:  Review of Systems   Constitutional: Positive for appetite change  Negative for chills and fever  HENT: Negative for hearing loss and trouble swallowing  Eyes: Negative for pain  Respiratory: Negative for cough, chest tightness and shortness of breath  Cardiovascular: Negative for chest pain, palpitations and leg swelling  Gastrointestinal: Negative for abdominal pain, blood in stool, nausea and vomiting  Endocrine: Negative for cold intolerance and heat intolerance  Genitourinary: Positive for hematuria  Negative for difficulty urinating and frequency  Musculoskeletal: Negative for arthralgias and neck pain  Skin: Negative for rash  Allergic/Immunologic: Negative for environmental allergies  Neurological: Negative for dizziness, weakness and headaches  Hematological: Does not bruise/bleed easily  Psychiatric/Behavioral: Negative for decreased concentration and sleep disturbance  The patient is not nervous/anxious  Physical Exam:  Physical Exam   Constitutional: He is oriented to person, place, and time  He appears well-developed and well-nourished  HENT:   Head: Normocephalic  Right Ear: External ear normal    Left Ear: External ear normal    Mouth/Throat: Oropharynx is clear and moist    Eyes: Pupils are equal, round, and reactive to light  Neck: No JVD present  Carotid bruit is not present  Cardiovascular: Regular rhythm and intact distal pulses  Exam reveals no gallop and no friction rub  No murmur heard  Tachycardic rate   Pulmonary/Chest: Effort normal and breath sounds normal  No tachypnea  No respiratory distress  He has no wheezes  He has no rales  He exhibits no tenderness  Abdominal: Soft  He exhibits no distension  There is no tenderness  There is no rebound and no guarding  Musculoskeletal: He exhibits no edema  Neurological: He is alert and oriented to person, place, and time  Skin: Skin is warm and dry  Psychiatric: He has a normal mood and affect  His behavior is normal  Judgment and thought content normal    Nursing note and vitals reviewed  Discussion/Summary:     I would like to get a echocardiogram as it has been about 6 years since he has had this- he did have some mild aortic regurgitation and I would like to see that this has not progressed  I suspect it will be perfectly fine for him to undergo his planned bladder surgery as well as prostate surgery  I would like to see him back in about 2 months

## 2018-01-30 NOTE — LETTER
January 30, 2018     Karyn Tilley MD  [facility]  [address]  [place]  [address]  [address]    Patient: Bj Gutierrez   YOB: 1945   Date of Visit: 1/30/2018       Dear Dr Noelle Perry: Thank you for referring Roxie Woo to me for evaluation  Below are my notes for this consultation  If you have questions, please do not hesitate to call me  I look forward to following your patient along with you  Sincerely,        Jesus Ellington MD        CC: No Recipients  Jesus Ellington MD  1/30/2018 10:46 AM  Incomplete                                             Cardiology Consultation     Bj Gutierrez  [id number]  1945  [address]  [address]  [address]    Mr Matute   A 49-year-old man with past medical history of hypertension, dyslipidemia, rheumatic fever as a child, right bundle branch block, kidney stones and bladder diverticula status post urethral stent placement and currently with catheter,  benign prostatic hypertrophy, and Buerger's disease status post toe amputations in the past presents for preoperative evaluation prior to noncardiac surgery  He notes occasional dyspnea on exertion as been going on for some time - it has proceeded his lung resection for abscess  I understand he had a cardiac catheterization from 2007 that reportedly showed "no blockages"  He notes no chest pain, palpitations, dizziness, or syncope      1  Pre-operative clearance  POCT ECG   2  Right bundle melissa block, 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     3   Aortic valve insufficiency, etiology of cardiac valve disease unspecified  Echo complete with contrast if indicated     Patient Active Problem List   Diagnosis    Right bundle melissa block, 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    Aortic regurgitation     Past Medical History:   Diagnosis Date    Anxiety     Back pain     Ceron disease     BPH (benign prostatic hyperplasia)     Cataract     Right eye    DDD (degenerative disc disease), lumbar     Depression     Diverticulosis     GERD (gastroesophageal reflux disease)     History of cardiac murmur     Past    History of melanoma     Was on back in early 1990's    History of rheumatic fever     Childhood    [place] (hard [place]     Hydronephrosis     Hyperlipidemia     Hypertension     Kidney stone     Multiple times    Neck pain     Nervous breakdown     History of due to reaction to psych med which he was on for anxiety/depression and became suicidal    PONV (postoperative nausea and vomiting)     Scoliosis     Seasonal allergies     Tinnitus     Wears partial dentures     Upper     Social History     Social History    Marital status: Single     Spouse name: N/A    Number of children: N/A    Years of education: N/A     Occupational History    Not on file  Social History Main Topics    Smoking status: Former Smoker     Packs/day: 1 00     Years: 15 00     Types: Cigarettes    Smokeless tobacco: Never Used      Comment: Quit 50 years    Alcohol use Yes      Comment: Very rare   Drug use: No    Sexual activity: Not on file     Other Topics Concern    Not on file     Social History Narrative    No narrative on file      No family history on file    Past Surgical History:   Procedure Laterality Date    ABDOMINAL SURGERY      When young had procedure for improviing circulation to left lower extremety    BACK SURGERY      Lumbar- not sure what was done   Darlene Rush CARDIAC CATHETERIZATION      Approximately 2007- no blockages    CARPAL TUNNEL RELEASE Bilateral     CATARACT EXTRACTION Left     COLONOSCOPY      CYST REMOVAL      Robotic procedure to remove benign cyst from lower left lung    ESOPHAGOGASTRODUODENOSCOPY      FL CYSTO/URETERO W/LITHOTRIPSY &INDWELL STENT INSRT Left 1/3/2018    Procedure: CYSTOSCOPY URETEROSCOPY, RETROGRADE PYELOGRAM AND INSERTION STENT URETERAL;  Surgeon: Hong Echevarria MD;  Location[facility];  Service: Urology    SKIN CANCER EXCISION      Melanoma removal on back in early 1990's    TOE AMPUTATION Left     All 5 toes left foot were amputated due to poor circulation     WRIST SURGERY Bilateral     Ulnar nerve on right arm and both wrists are fused       Current Outpatient Prescriptions:     amLODIPine (NORVASC) 10 mg tablet, Take 10 mg by mouth daily, Disp: , Rfl:     divalproex sodium (DEPAKOTE) 500 mg EC tablet, Take 250 mg by mouth 2 (two) times a day, Disp: , Rfl:     pentoxifylline (TRENtal) 400 mg ER tablet, Take 400 mg by mouth 3 (three) times a day with meals, Disp: , Rfl:     ranitidine (ZANTAC) 300 MG capsule, Take 300 mg by mouth every evening, Disp: , Rfl:     tamsulosin (FLOMAX) 0 4 mg, Take 0 4 mg by mouth daily with dinner, Disp: , Rfl:     beta carotene 51537 UNIT capsule, 1 TABLET DAILY, Disp: , Rfl:     Glucosamine-Chondroit-Vit C-Mn (GLUCOSAMINE CHONDR 500 COMPLEX) CAPS, 2x daily, Disp: , Rfl:     HYDROcodone-acetaminophen (NORCO) 5-325 mg per tablet, Take 1 tablet by mouth every 6 (six) hours as needed for pain for up to 30 doses Max Daily Amount: 4 tablets, Disp: 30 tablet, Rfl: 0    ibuprofen (MOTRIN) 200 mg tablet, Take 400 mg by mouth every 6 (six) hours as needed for mild pain, Disp: , Rfl:     Loratadine (CLARITIN) 10 MG CAPS, Take by mouth, Disp: , Rfl:     losartan (COZAAR) 100 MG tablet, Take by mouth, Disp: , Rfl:     losartan-hydrochlorothiazide (HYZAAR) 100-25 MG per tablet, Take 1 tablet by mouth daily, Disp: , Rfl:     mometasone (NASONEX) 50 mcg/act nasal spray, 2 sprays into each nostril as needed, Disp: , Rfl:     Multiple Vitamin (MULTIVITAMINS PO), Take by mouth, Disp: , Rfl:     Omega-3 1000 MG CAPS, 1 tablet daily, Disp: , Rfl:     pantoprazole (PROTONIX) 40 mg tablet, Take by mouth, Disp: , Rfl:     ranitidine (ZANTAC) 300 MG tablet, TAKE 1 TABLET BY ORAL ROUTE EVERY DAY AT BEDTIME, Disp: , Rfl: 5    simvastatin (ZOCOR) 10 mg tablet, Take 1 tablet by mouth, Disp: , Rfl:     traMADol (ULTRAM) 50 mg tablet, Take 50 mg by mouth every 6 (six) hours, Disp: , Rfl:     Vitamin D, Cholecalciferol, 400 units TABS, 1 tablet daily, Disp: , Rfl:     vitamin E, tocopherol, 1,000 units capsule, 1 tablet daily, Disp: , Rfl:   Allergies   Allergen Reactions    Iodine Shortness Of Breath, Swelling and Hives     Contrast dye causes respiratory distress  Iodine causes skin rash    Mercury Hives and Swelling    Shellfish-Derived Products Hives, Shortness Of Breath and Anaphylaxis     Anaphylaxis    Augmentin [Amoxicillin-Pot Clavulanate] GI Intolerance, Rash and Diarrhea     Diarrhea    Lidoderm [Lidocaine] Rash     Lidoderm patch caused rash    Penicillins Rash, Swelling and Hives    Shellfish Allergy     Sulfa Antibiotics Hives and Swelling     Vitals:    01/30/18 0936   BP: 136/78   BP Location: Left arm   Patient Position: Sitting   Cuff Size: Large   Pulse: 103   Resp: 16   Weight: 104 kg (230 lb)   Height: 5' 9" (1 753 m)       Labs:{Recent [id number]::"ENC applicable"}  Imaging: Xr Retrograde Pyelogram    Result Date: 1/4/2018  Narrative: LEFT RETROGRADE PYELOGRAM INDICATION: Left-sided stent placement  COMPARISON: None IMAGES:  8 FLUOROSCOPY TIME:   32 seconds FINDINGS: Left-sided hydronephrosis is noted  Left ureteral stent was placed  Osseous and soft tissue detail limited by technique  Impression:     Please see procedure report for further details  Workstation performed: [id number]       Review of Systems:  Review of Systems   Constitutional: Positive for appetite change  Negative for chills and fever  HENT: Negative for hearing loss and trouble swallowing  Eyes: Negative for pain  Respiratory: Negative for cough, chest tightness and shortness of breath  Cardiovascular: Negative for chest pain, palpitations and leg swelling     Gastrointestinal: Negative for abdominal pain, blood in stool, nausea and vomiting  Endocrine: Negative for cold intolerance and heat intolerance  Genitourinary: Positive for hematuria  Negative for difficulty urinating and frequency  Musculoskeletal: Negative for arthralgias and neck pain  Skin: Negative for rash  Allergic/Immunologic: Negative for environmental allergies  Neurological: Negative for dizziness, weakness and headaches  Hematological: Does not bruise/bleed easily  Psychiatric/Behavioral: Negative for decreased concentration and sleep disturbance  The patient is not nervous/anxious  Physical Exam:  Physical Exam   Constitutional: He is oriented to person, place, and time  He appears well-developed and well-nourished  HENT:   Head: Normocephalic  Right Ear: External ear normal    Left Ear: External ear normal    Mouth/Throat: Oropharynx is clear and moist    Eyes: Pupils are equal, round, and reactive to light  Neck: No JVD present  Carotid bruit is not present  Cardiovascular: Regular rhythm and intact distal pulses  Exam reveals no gallop and no friction rub  No murmur heard  Tachycardic rate   Pulmonary/Chest: Effort normal and breath sounds normal  No tachypnea  No respiratory distress  He has no wheezes  He has no rales  He exhibits no tenderness  Abdominal: Soft  He exhibits no distension  There is no tenderness  There is no rebound and no guarding  Musculoskeletal: He exhibits no edema  Neurological: He is alert and oriented to person, place, and time  Skin: Skin is warm and dry  Psychiatric: He has a normal mood and affect  His behavior is normal  Judgment and thought content normal    Nursing note and vitals reviewed  Discussion/Summary:     I would like to get a echocardiogram as it has been about 6 years since he has had this- he did have some mild aortic regurgitation and I would like to see that this has not progressed    I suspect it will be perfectly fine for him to undergo his planned bladder surgery as well as prostate surgery  I would like to see him back in about 2 months

## 2018-01-30 NOTE — PROGRESS NOTES
Cardiology Consultation     Shalom Collins  [id number]  1945  [address]  [address] [place] [address]    Mr Matute   A 77-year-old man with past medical history of hypertension, dyslipidemia, rheumatic fever as a child, right bundle branch block, kidney stones and bladder diverticula status post urethral stent placement and currently with catheter,  benign prostatic hypertrophy, and Buerger's disease status post toe amputations in the past presents for preoperative evaluation prior to noncardiac surgery  He notes occasional dyspnea on exertion as been going on for some time - it has proceeded his lung resection for abscess  I understand he had a cardiac catheterization from 2007 that reportedly showed "no blockages"  He notes no chest pain, palpitations, dizziness, or syncope      1  Pre-operative clearance  POCT ECG   2  Right bundle melissa block, 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     3   Aortic valve insufficiency, etiology of cardiac valve disease unspecified  Echo complete with contrast if indicated     Patient Active Problem List   Diagnosis    Right bundle melissa block, 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    Aortic regurgitation     Past Medical History:   Diagnosis Date    Anxiety     Back pain     Ceron disease     BPH (benign prostatic hyperplasia)     Cataract     Right eye    DDD (degenerative disc disease), lumbar     Depression     Diverticulosis     GERD (gastroesophageal reflux disease)     History of cardiac murmur     Past    History of melanoma     Was on back in early 1990's    History of rheumatic fever     Childhood    [place] (hard of hearing)     Hydronephrosis     Hyperlipidemia     Hypertension     Kidney stone     Multiple times    Neck pain     Nervous breakdown     History of due to reaction to psych med which he was on for anxiety/depression and became suicidal    PONV (postoperative nausea and vomiting)     Scoliosis     Seasonal allergies     Tinnitus     Wears partial dentures     Upper     Social History     Social History    Marital status: Single     Spouse name: N/A    Number of children: N/A    Years of education: N/A     Occupational History    Not on file  Social History Main Topics    Smoking status: Former Smoker     Packs/day: 1 00     Years: 15 00     Types: Cigarettes    Smokeless tobacco: Never Used      Comment: Quit 50 years    Alcohol use Yes      Comment: Very rare   Drug use: No    Sexual activity: Not on file     Other Topics Concern    Not on file     Social History Narrative    No narrative on file      No family history on file    Past Surgical History:   Procedure Laterality Date    ABDOMINAL SURGERY      When young had procedure for improviing circulation to left lower extremety    BACK SURGERY      Lumbar- not sure what was done   Ardeth Needs CARDIAC CATHETERIZATION      Approximately 2007- no blockages    CARPAL TUNNEL RELEASE Bilateral     CATARACT EXTRACTION Left     COLONOSCOPY      CYST REMOVAL      Robotic procedure to remove benign cyst from lower left lung    ESOPHAGOGASTRODUODENOSCOPY      OK CYSTO/URETERO W/LITHOTRIPSY &INDWELL STENT INSRT Left 1/3/2018    Procedure: CYSTOSCOPY URETEROSCOPY, RETROGRADE PYELOGRAM AND INSERTION STENT URETERAL;  Surgeon: Vamshi Dougherty MD;  Location: [facility];  Service: Urology    SKIN CANCER EXCISION      Melanoma removal on back in early 1990's    TOE AMPUTATION Left     All 5 toes left foot were amputated due to poor circulation     WRIST SURGERY Bilateral     Ulnar nerve on right arm and both wrists are fused       Current Outpatient Prescriptions:     amLODIPine (NORVASC) 10 mg tablet, Take 10 mg by mouth daily, Disp: , Rfl:     divalproex sodium (DEPAKOTE) 500 mg EC tablet, Take 250 mg by mouth 2 (two) times a day, Disp: , Rfl:     pentoxifylline (TRENtal) 400 mg ER tablet, Take 400 mg by mouth 3 (three) times a day with meals, Disp: , Rfl:     ranitidine (ZANTAC) 300 MG capsule, Take 300 mg by mouth every evening, Disp: , Rfl:     tamsulosin (FLOMAX) 0 4 mg, Take 0 4 mg by mouth daily with dinner, Disp: , Rfl:     beta carotene 95107 UNIT capsule, 1 TABLET DAILY, Disp: , Rfl:     Glucosamine-Chondroit-Vit C-Mn (GLUCOSAMINE CHONDR 500 COMPLEX) CAPS, 2x daily, Disp: , Rfl:     HYDROcodone-acetaminophen (NORCO) 5-325 mg per tablet, Take 1 tablet by mouth every 6 (six) hours as needed for pain for up to 30 doses Max Daily Amount: 4 tablets, Disp: 30 tablet, Rfl: 0    ibuprofen (MOTRIN) 200 mg tablet, Take 400 mg by mouth every 6 (six) hours as needed for mild pain, Disp: , Rfl:     Loratadine (CLARITIN) 10 MG CAPS, Take by mouth, Disp: , Rfl:     losartan (COZAAR) 100 MG tablet, Take by mouth, Disp: , Rfl:     losartan-hydrochlorothiazide (HYZAAR) 100-25 MG per tablet, Take 1 tablet by mouth daily, Disp: , Rfl:     mometasone (NASONEX) 50 mcg/act nasal spray, 2 sprays into each nostril as needed, Disp: , Rfl:     Multiple Vitamin (MULTIVITAMINS PO), Take by mouth, Disp: , Rfl:     Omega-3 1000 MG CAPS, 1 tablet daily, Disp: , Rfl:     pantoprazole (PROTONIX) 40 mg tablet, Take by mouth, Disp: , Rfl:     ranitidine (ZANTAC) 300 MG tablet, TAKE 1 TABLET BY ORAL ROUTE EVERY DAY AT BEDTIME, Disp: , Rfl: 5    simvastatin (ZOCOR) 10 mg tablet, Take 1 tablet by mouth, Disp: , Rfl:     traMADol (ULTRAM) 50 mg tablet, Take 50 mg by mouth every 6 (six) hours, Disp: , Rfl:     Vitamin D, Cholecalciferol, 400 units TABS, 1 tablet daily, Disp: , Rfl:     vitamin E, tocopherol, 1,000 units capsule, 1 tablet daily, Disp: , Rfl:   Allergies   Allergen Reactions    Iodine Shortness Of Breath, Swelling and Hives     Contrast dye causes respiratory distress   Iodine causes skin rash    Mercury Hives and Swelling    Shellfish-Derived Products Hives, Shortness Of Breath and Anaphylaxis     Anaphylaxis    Augmentin [Amoxicillin-Pot Clavulanate] GI Intolerance, Rash and Diarrhea     Diarrhea    Lidoderm [Lidocaine] Rash     Lidoderm patch caused rash    Penicillins Rash, Swelling and Hives    Shellfish Allergy     Sulfa Antibiotics Hives and Swelling     Vitals:    01/30/18 0936   BP: 136/78   BP Location: Left arm   Patient Position: Sitting   Cuff Size: Large   Pulse: 103   Resp: 16   Weight: 104 kg (230 lb)   Height: 5' 9" (1 753 m)       Labs:  Hospital Outpatient Visit on 12/29/2017   Component Date Value    Ventricular Rate 12/29/2017 88     Atrial Rate 12/29/2017 88     CA Interval 12/29/2017 172     QRSD Interval 12/29/2017 132     QT Interval 12/29/2017 378     QTC Interval 12/29/2017 457     P Axis 12/29/2017 44     QRS Axis 12/29/2017 -62     T Wave [place] 12/29/2017 10    Appointment on 12/29/2017   Component Date Value    WBC 12/29/2017 4 56     RBC 12/29/2017 4 69     Hemoglobin 12/29/2017 14 5     Hematocrit 12/29/2017 42 6     MCV 12/29/2017 91     MCH 12/29/2017 30 9     MCHC 12/29/2017 34 0     RDW 12/29/2017 13 7     MPV 12/29/2017 9 9     Platelets 26/35/7001 194     nRBC 12/29/2017 0     Neutrophils Relative 12/29/2017 55     Lymphocytes Relative 12/29/2017 33     Monocytes Relative 12/29/2017 10     Eosinophils Relative 12/29/2017 1     Basophils Relative 12/29/2017 1     Neutrophils Absolute 12/29/2017 2 54     Lymphocytes Absolute 12/29/2017 1 48     Monocytes Absolute 12/29/2017 0 47     Eosinophils Absolute 12/29/2017 0 04     Basophils Absolute 12/29/2017 0 03     Urine Culture 12/29/2017 No Growth <1000 cfu/mL     Sodium 12/29/2017 142     Potassium 12/29/2017 3 7     Chloride 12/29/2017 105     CO2 12/29/2017 28     Anion Gap 12/29/2017 9     BUN 12/29/2017 11     Creatinine 12/29/2017 1 30     Glucose 12/29/2017 93     Calcium 12/29/2017 8 8     AST 12/29/2017 17     ALT 12/29/2017 22     Alkaline Phosphatase 12/29/2017 67     Total Protein 12/29/2017 6 9     Albumin 12/29/2017 3 3*    Total Bilirubin 12/29/2017 0 35     eGFR 12/29/2017 55    [facility] Office Visit on 12/21/2017   Component Date Value    Leukocytes, UA 12/21/2017 neg     Nitrite, UA 12/21/2017 neg     Blood, UA 12/21/2017 small     Bilirubin, UA 12/21/2017 neg     Urobilinogen, UA 12/21/2017 0 2     Protein, UA 12/21/2017 neg     pH, UA 12/21/2017 7 0     Specific Gravity, UA 12/21/2017 1 015     Ketones, UA 12/21/2017 neg     Glucose 12/21/2017 neg    [facility] Office Visit on 12/15/2017   Component Date Value    Color, UA 12/15/2017 Yellow     Clarity, UA 12/15/2017 Transparent     Leukocytes, UA 12/15/2017 NEG     Nitrite, UA 12/15/2017 NEG     Blood, UA 12/15/2017 SMALL     Bilirubin, UA 12/15/2017 NEG     Urobilinogen, UA 12/15/2017 0 2     Protein, UA 12/15/2017 NEG     pH, UA 12/15/2017 7 0     Specific [place], UA 12/15/2017 1 015     Ketones, UA 12/15/2017 NEG     Glucose 12/15/2017 NEG      Imaging: Xr Retrograde Pyelogram    Result Date: 1/4/2018  Narrative: LEFT RETROGRADE PYELOGRAM INDICATION: Left-sided stent placement  COMPARISON: None IMAGES:  8 FLUOROSCOPY TIME:   32 seconds FINDINGS: Left-sided hydronephrosis is noted  Left ureteral stent was placed  Osseous and soft tissue detail limited by technique  Impression:     Please see procedure report for further details  Workstation performed: [id number]       Review of Systems:  Review of Systems   Constitutional: Positive for appetite change  Negative for chills and fever  HENT: Negative for hearing loss and trouble swallowing  Eyes: Negative for pain  Respiratory: Negative for cough, chest tightness and shortness of breath  Cardiovascular: Negative for chest pain, palpitations and leg swelling  Gastrointestinal: Negative for abdominal pain, blood in stool, nausea and vomiting  Endocrine: Negative for cold intolerance and heat intolerance  Genitourinary: Positive for hematuria  Negative for difficulty urinating and frequency  Musculoskeletal: Negative for arthralgias and neck pain  Skin: Negative for rash  Allergic/Immunologic: Negative for environmental allergies  Neurological: Negative for dizziness, weakness and headaches  Hematological: Does not bruise/bleed easily  Psychiatric/Behavioral: Negative for decreased concentration and sleep disturbance  The patient is not nervous/anxious  Physical Exam:  Physical Exam   Constitutional: He is oriented to person, place, and time  He appears well-developed and well-nourished  HENT:   Head: Normocephalic  Right Ear: External ear normal    Left Ear: External ear normal    Mouth/Throat: Oropharynx is clear and moist    Eyes: Pupils are equal, round, and reactive to light  Neck: No JVD present  Carotid bruit is not present  Cardiovascular: Regular rhythm and intact distal pulses  Exam reveals no gallop and no friction rub  No murmur heard  Tachycardic rate   Pulmonary/Chest: Effort normal and breath sounds normal  No tachypnea  No respiratory distress  He has no wheezes  He has no rales  He exhibits no tenderness  Abdominal: Soft  He exhibits no distension  There is no tenderness  There is no rebound and no guarding  Musculoskeletal: He exhibits no edema  Neurological: He is alert and oriented to person, place, and time  Skin: Skin is warm and dry  Psychiatric: He has a normal mood and affect  His behavior is normal  Judgment and thought content normal    Nursing note and vitals reviewed  Discussion/Summary:     I would like to get a echocardiogram as it has been about 6 years since he has had this- he did have some mild aortic regurgitation and I would like to see that this has not progressed    I suspect it will be perfectly fine for him to undergo his planned bladder surgery as well as prostate surgery  I would like to see him back in about 2 months

## 2018-02-04 ENCOUNTER — HOSPITAL ENCOUNTER (EMERGENCY)
Facility: HOSPITAL | Age: 73
Discharge: HOME | End: 2018-02-05
Attending: EMERGENCY MEDICINE | Admitting: EMERGENCY MEDICINE
Payer: MEDICARE

## 2018-02-04 ENCOUNTER — APPOINTMENT (EMERGENCY)
Dept: CT IMAGING | Facility: HOSPITAL | Age: 73
End: 2018-02-04
Payer: MEDICARE

## 2018-02-04 VITALS
BODY MASS INDEX: 32.49 KG/M2 | TEMPERATURE: 98.9 F | HEART RATE: 89 BPM | RESPIRATION RATE: 18 BRPM | WEIGHT: 220 LBS | SYSTOLIC BLOOD PRESSURE: 158 MMHG | DIASTOLIC BLOOD PRESSURE: 78 MMHG | OXYGEN SATURATION: 95 %

## 2018-02-04 DIAGNOSIS — N39.0 UTI (URINARY TRACT INFECTION): Primary | ICD-10-CM

## 2018-02-04 DIAGNOSIS — M54.50 LOW BACK PAIN: ICD-10-CM

## 2018-02-04 LAB
BASOPHILS # BLD AUTO: 0.03 THOUSANDS/ΜL (ref 0–0.1)
BASOPHILS NFR BLD AUTO: 1 % (ref 0–1)
EOSINOPHIL # BLD AUTO: 0.11 THOUSAND/ΜL (ref 0–0.61)
EOSINOPHIL NFR BLD AUTO: 2 % (ref 0–6)
ERYTHROCYTE [DISTWIDTH] IN BLOOD BY AUTOMATED COUNT: 13.5 % (ref 11.6–15.1)
HCT VFR BLD AUTO: 42 % (ref 36.5–49.3)
HGB BLD-MCNC: 14.5 G/DL (ref 12–17)
LYMPHOCYTES # BLD AUTO: 2.09 THOUSANDS/ΜL (ref 0.6–4.47)
LYMPHOCYTES NFR BLD AUTO: 36 % (ref 14–44)
MCH RBC QN AUTO: 31.1 PG (ref 26.8–34.3)
MCHC RBC AUTO-ENTMCNC: 34.5 G/DL (ref 31.4–37.4)
MCV RBC AUTO: 90 FL (ref 82–98)
MONOCYTES # BLD AUTO: 0.68 THOUSAND/ΜL (ref 0.17–1.22)
MONOCYTES NFR BLD AUTO: 12 % (ref 4–12)
NEUTROPHILS # BLD AUTO: 2.98 THOUSANDS/ΜL (ref 1.85–7.62)
NEUTS SEG NFR BLD AUTO: 49 % (ref 43–75)
NRBC BLD AUTO-RTO: 0 /100 WBCS
PLATELET # BLD AUTO: 264 THOUSANDS/UL (ref 149–390)
PMV BLD AUTO: 9.6 FL (ref 8.9–12.7)
RBC # BLD AUTO: 4.66 MILLION/UL (ref 3.88–5.62)
WBC # BLD AUTO: 5.89 THOUSAND/UL (ref 4.31–10.16)

## 2018-02-04 PROCEDURE — 80048 BASIC METABOLIC PNL TOTAL CA: CPT | Performed by: EMERGENCY MEDICINE

## 2018-02-04 PROCEDURE — 36415 COLL VENOUS BLD VENIPUNCTURE: CPT | Performed by: EMERGENCY MEDICINE

## 2018-02-04 PROCEDURE — 85025 COMPLETE CBC W/AUTO DIFF WBC: CPT | Performed by: EMERGENCY MEDICINE

## 2018-02-04 PROCEDURE — 74176 CT ABD & PELVIS W/O CONTRAST: CPT

## 2018-02-04 PROCEDURE — 81002 URINALYSIS NONAUTO W/O SCOPE: CPT | Performed by: EMERGENCY MEDICINE

## 2018-02-05 ENCOUNTER — ANESTHESIA EVENT (OUTPATIENT)
Dept: PERIOP | Facility: HOSPITAL | Age: 73
DRG: 654 | End: 2018-02-05
Payer: MEDICARE

## 2018-02-05 ENCOUNTER — TRANSCRIBE ORDERS (OUTPATIENT)
Dept: ADMINISTRATIVE | Facility: HOSPITAL | Age: 73
End: 2018-02-05

## 2018-02-05 ENCOUNTER — APPOINTMENT (OUTPATIENT)
Dept: PREADMISSION TESTING | Facility: HOSPITAL | Age: 73
End: 2018-02-05
Payer: MEDICARE

## 2018-02-05 ENCOUNTER — APPOINTMENT (OUTPATIENT)
Dept: LAB | Facility: HOSPITAL | Age: 73
End: 2018-02-05
Attending: UROLOGY
Payer: MEDICARE

## 2018-02-05 DIAGNOSIS — Z01.818 PREOP EXAMINATION: ICD-10-CM

## 2018-02-05 DIAGNOSIS — N32.3 DIVERTICULUM OF BLADDER: ICD-10-CM

## 2018-02-05 DIAGNOSIS — N32.3 DIVERTICULUM OF BLADDER: Primary | ICD-10-CM

## 2018-02-05 DIAGNOSIS — N13.1 HYDRONEPHROSIS WITH URETERAL STRICTURE: ICD-10-CM

## 2018-02-05 LAB
ABO GROUP BLD: NORMAL
ANION GAP SERPL CALCULATED.3IONS-SCNC: 9 MMOL/L (ref 4–13)
BACTERIA UR QL AUTO: ABNORMAL /HPF
BILIRUB UR QL STRIP: NEGATIVE
BLD GP AB SCN SERPL QL: NEGATIVE
BUN SERPL-MCNC: 14 MG/DL (ref 5–25)
CALCIUM SERPL-MCNC: 8.6 MG/DL (ref 8.3–10.1)
CHLORIDE SERPL-SCNC: 103 MMOL/L (ref 100–108)
CLARITY UR: CLEAR
CO2 SERPL-SCNC: 29 MMOL/L (ref 21–32)
COLOR UR: YELLOW
CREAT SERPL-MCNC: 1.32 MG/DL (ref 0.6–1.3)
GFR SERPL CREATININE-BSD FRML MDRD: 54 ML/MIN/1.73SQ M
GLUCOSE SERPL-MCNC: 102 MG/DL (ref 65–140)
GLUCOSE UR STRIP-MCNC: NEGATIVE MG/DL
HGB UR QL STRIP.AUTO: ABNORMAL
KETONES UR STRIP-MCNC: NEGATIVE MG/DL
LEUKOCYTE ESTERASE UR QL STRIP: ABNORMAL
NITRITE UR QL STRIP: POSITIVE
NON-SQ EPI CELLS URNS QL MICRO: ABNORMAL /HPF
PH UR STRIP.AUTO: 6.5 [PH] (ref 4.5–8)
POTASSIUM SERPL-SCNC: 3.7 MMOL/L (ref 3.5–5.3)
PROT UR STRIP-MCNC: >=300 MG/DL
RBC #/AREA URNS AUTO: ABNORMAL /HPF
RH BLD: POSITIVE
SODIUM SERPL-SCNC: 141 MMOL/L (ref 136–145)
SP GR UR STRIP.AUTO: 1.02 (ref 1–1.03)
SPECIMEN EXPIRATION DATE: NORMAL
UROBILINOGEN UR QL STRIP.AUTO: 0.2 E.U./DL
WBC #/AREA URNS AUTO: ABNORMAL /HPF

## 2018-02-05 PROCEDURE — 36415 COLL VENOUS BLD VENIPUNCTURE: CPT

## 2018-02-05 PROCEDURE — 87077 CULTURE AEROBIC IDENTIFY: CPT

## 2018-02-05 PROCEDURE — 87147 CULTURE TYPE IMMUNOLOGIC: CPT

## 2018-02-05 PROCEDURE — 81001 URINALYSIS AUTO W/SCOPE: CPT

## 2018-02-05 PROCEDURE — 86900 BLOOD TYPING SEROLOGIC ABO: CPT

## 2018-02-05 PROCEDURE — 86901 BLOOD TYPING SEROLOGIC RH(D): CPT

## 2018-02-05 PROCEDURE — 96374 THER/PROPH/DIAG INJ IV PUSH: CPT

## 2018-02-05 PROCEDURE — 99284 EMERGENCY DEPT VISIT MOD MDM: CPT

## 2018-02-05 PROCEDURE — 87186 SC STD MICRODIL/AGAR DIL: CPT

## 2018-02-05 PROCEDURE — 87086 URINE CULTURE/COLONY COUNT: CPT

## 2018-02-05 PROCEDURE — 86850 RBC ANTIBODY SCREEN: CPT

## 2018-02-05 RX ORDER — LEVOFLOXACIN 500 MG/1
500 TABLET, FILM COATED ORAL DAILY
Qty: 7 TABLET | Refills: 0 | Status: SHIPPED | OUTPATIENT
Start: 2018-02-05 | End: 2018-02-12

## 2018-02-05 RX ORDER — SODIUM CHLORIDE 9 MG/ML
125 INJECTION, SOLUTION INTRAVENOUS CONTINUOUS
Status: CANCELLED | OUTPATIENT
Start: 2018-02-14

## 2018-02-05 RX ORDER — KETOROLAC TROMETHAMINE 30 MG/ML
15 INJECTION, SOLUTION INTRAMUSCULAR; INTRAVENOUS ONCE
Status: COMPLETED | OUTPATIENT
Start: 2018-02-05 | End: 2018-02-05

## 2018-02-05 RX ADMIN — LEVOFLOXACIN 750 MG: 500 TABLET, FILM COATED ORAL at 00:38

## 2018-02-05 RX ADMIN — KETOROLAC TROMETHAMINE 15 MG: 30 INJECTION, SOLUTION INTRAMUSCULAR at 00:57

## 2018-02-05 NOTE — ED PROVIDER NOTES
History  Chief Complaint   Patient presents with    Flank Pain     Complaining of right sided back pain, worsening yesterday  Reports hx of "small stones", has both stent and urethral catheter in place  Reports intermittent hematuria  Shares that catheter is draining adequately, but reports that bag is also leaking   Urinary Catheter Problem       History provided by:  Patient   used: No    Flank Pain   Pain location:  R flank (and right lower back)  Pain quality: aching    Pain radiates to:  Does not radiate  Pain severity:  Moderate  Onset quality:  Gradual  Duration:  1 day  Timing:  Intermittent  Progression:  Waxing and waning  Chronicity:  New  Relieved by: Rest   Worsened by: Movement  Ineffective treatments:  None tried  Associated symptoms: hematuria    Associated symptoms: no chest pain, no chills, no constipation, no cough, no diarrhea, no dysuria, no fever, no nausea, no shortness of breath, no sore throat and no vomiting    Urinary Catheter Problem   Associated symptoms: no abdominal pain, no chest pain, no congestion, no cough, no diarrhea, no fever, no headaches, no nausea, no shortness of breath, no sore throat and no vomiting    H/O left ureteral stent secondary to obstruction by bladder diverticulum, enlarged prostate with moreira that has been draining but the bag has been leaking  Prior to Admission Medications   Prescriptions Last Dose Informant Patient Reported? Taking?    HYDROcodone-acetaminophen (NORCO) 5-325 mg per tablet 2/4/2018 at Unknown time Self No Yes   Sig: Take 1 tablet by mouth every 6 (six) hours as needed for pain for up to 30 doses Max Daily Amount: 4 tablets   amLODIPine (NORVASC) 10 mg tablet 2/4/2018 at Unknown time Self Yes Yes   Sig: Take 10 mg by mouth daily   divalproex sodium (DEPAKOTE) 500 mg EC tablet 2/4/2018 at Unknown time Self Yes Yes   Sig: Take 250 mg by mouth 2 (two) times a day   ibuprofen (MOTRIN) 200 mg tablet Past Week at Unknown time Self Yes Yes   Sig: Take 400 mg by mouth every 6 (six) hours as needed for mild pain   mometasone (NASONEX) 50 mcg/act nasal spray Past Month at Unknown time Self Yes Yes   Si sprays into each nostril as needed   pentoxifylline (TRENtal) 400 mg ER tablet Past Week at Unknown time Self Yes Yes   Sig: Take 400 mg by mouth 3 (three) times a day with meals   ranitidine (ZANTAC) 300 MG capsule 2018 at Unknown time Self Yes Yes   Sig: Take 300 mg by mouth every evening   tamsulosin (FLOMAX) 0 4 mg 2018 at Unknown time Self Yes Yes   Sig: Take 0 4 mg by mouth daily with dinner      Facility-Administered Medications: None       Past Medical History:   Diagnosis Date    Anxiety     Back pain     Ceron disease     BPH (benign prostatic hyperplasia)     Cataract     Right eye    DDD (degenerative disc disease), lumbar     Depression     Diverticulosis     GERD (gastroesophageal reflux disease)     History of cardiac murmur     Past    History of melanoma     Was on back in early     History of rheumatic fever     Childhood    Q[place] (hard of hearing)     Hydronephrosis     Hyperlipidemia     Hypertension     Kidney stone     Multiple times    Neck pain     Nervous breakdown     History of due to reaction to psych med which he was on for anxiety/depression and became suicidal    PONV (postoperative nausea and vomiting)     Scoliosis     Seasonal allergies     Tinnitus     Wears partial dentures     Upper       Past Surgical History:   Procedure Laterality Date    ABDOMINAL SURGERY      When young had procedure for improviing circulation to left lower extremety    BACK SURGERY      Lumbar- not sure what was done   Darlene Rush CARDIAC CATHETERIZATION      Approximately - no blockages    CARPAL TUNNEL RELEASE Bilateral     CATARACT EXTRACTION Left     COLONOSCOPY      CYST REMOVAL      Robotic procedure to remove benign cyst from lower left lung    ESOPHAGOGASTRODUODENOSCOPY      IA CYSTO/URETERO W/LITHOTRIPSY &INDWELL STENT INSRT Left 1/3/2018    Procedure: CYSTOSCOPY URETEROSCOPY, RETROGRADE PYELOGRAM AND INSERTION STENT URETERAL;  Surgeon: Hong Echevarria MD;  Location: [facility];  Service: Urology    SKIN CANCER EXCISION      Melanoma removal on back in early 1990's    TOE AMPUTATION Left     All 5 toes left foot were amputated due to poor circulation     WRIST SURGERY Bilateral     Ulnar nerve on right arm and both wrists are fused       History reviewed  No pertinent family history  I have reviewed and agree with the history as documented  Social History   Substance Use Topics    Smoking status: Former Smoker     Packs/day: 1 00     Years: 15 00     Types: Cigarettes    Smokeless tobacco: Never Used      Comment: Quit 50 years    Alcohol use Yes      Comment: Very rare  Review of Systems   Constitutional: Negative for chills and fever  HENT: Negative for congestion and sore throat  Respiratory: Negative for cough, chest tightness and shortness of breath  Cardiovascular: Negative for chest pain  Gastrointestinal: Negative for abdominal pain, constipation, diarrhea, nausea and vomiting  Genitourinary: Positive for flank pain and hematuria  Negative for decreased urine volume, dysuria and testicular pain  Musculoskeletal: Positive for back pain  Negative for arthralgias, gait problem and neck pain  Neurological: Negative for weakness and headaches  All other systems reviewed and are negative        Physical Exam  ED Triage Vitals [02/04/18 2312]   Temperature Pulse Respirations Blood Pressure SpO2   98 9 °F (37 2 °C) 89 18 158/78 95 %      Temp Source Heart Rate Source Patient Position - Orthostatic VS BP Location FiO2 (%)   Temporal Monitor Sitting Right arm --      Pain Score       6           Orthostatic Vital Signs  Vitals:    02/04/18 2312   BP: 158/78   Pulse: 89   Patient Position - Orthostatic VS: Sitting       Physical Exam   Constitutional: He appears well-developed and well-nourished  He is cooperative  Non-toxic appearance  He does not have a sickly appearance  He does not appear ill  No distress  HENT:   Head: Normocephalic and atraumatic  Right Ear: Hearing normal  No drainage or swelling  Left Ear: Hearing normal  No drainage or swelling  Mouth/Throat: Mucous membranes are normal    Eyes: Conjunctivae and lids are normal  Right eye exhibits no discharge  Left eye exhibits no discharge  Neck: Trachea normal and normal range of motion  No JVD present  Cardiovascular: Normal rate, regular rhythm, normal heart sounds, intact distal pulses and normal pulses  Exam reveals no gallop and no friction rub  No murmur heard  Pulmonary/Chest: Effort normal and breath sounds normal  No stridor  No respiratory distress  He has no wheezes  He has no rales  Abdominal: Soft  Normal appearance  He exhibits no distension, no ascites and no mass  There is no hepatosplenomegaly  There is no tenderness  There is no rebound, no guarding, no CVA tenderness, no tenderness at McBurney's point and negative Doty's sign  Musculoskeletal: Normal range of motion  He exhibits no edema or deformity  Lumbar back: He exhibits tenderness and pain  He exhibits normal range of motion, no bony tenderness, no swelling, no edema and no deformity  Back:    Lymphadenopathy:        Right: No inguinal adenopathy present  Left: No inguinal adenopathy present  Neurological: He is alert  He has normal strength  No sensory deficit  He exhibits normal muscle tone  Gait normal  GCS eye subscore is 4  GCS verbal subscore is 5  GCS motor subscore is 6  Skin: Skin is warm, dry and intact  No rash noted  He is not diaphoretic  No pallor  Psychiatric: He has a normal mood and affect  His speech is normal  Cognition and memory are normal    Nursing note and vitals reviewed        ED Medications  Medications   levofloxacin (LEVAQUIN) tablet 750 mg (750 mg Oral Given 2/5/18 0038)   ketorolac (TORADOL) injection 15 mg (15 mg Intravenous Given 2/5/18 0057)       Diagnostic Studies  Results Reviewed     Procedure Component Value Units Date/Time    Urine Microscopic [[id number]]  (Abnormal) Collected:  02/05/18 0019    Lab Status:  Final result Specimen:  Urine from Urine, Clean Catch Updated:  02/05/18 0030     RBC, UA Innumerable (A) /hpf      WBC, UA Innumerable (A) /hpf      Epithelial Cells None Seen /hpf      Bacteria, UA Occasional /hpf     Urine culture [[id number]] Collected:  02/05/18 0019    Lab Status:   In process Specimen:  Urine from Urine, Clean Catch Updated:  02/05/18 0030    POCT urinalysis dipstick [[id number]]  (Abnormal) Resulted:  02/05/18 0019    Lab Status:  Final result Specimen:  Urine Updated:  02/05/18 0019    ED Urine Macroscopic [[id number]]  (Abnormal) Collected:  02/05/18 0019    Lab Status:  Final result Specimen:  Urine Updated:  02/05/18 0019     Color, UA Yellow     Clarity, UA Clear     pH, UA 6 5     Leukocytes, UA Large (A)     Nitrite, UA Positive (A)     Protein, UA >=300 (A) mg/dl      Glucose, UA Negative mg/dl      Ketones, UA Negative mg/dl      Urobilinogen, UA 0 2 E U /dl      Bilirubin, UA Negative     Blood, UA Large (A)     Specific Gilliam, UA 1 025    Narrative:       CLINITEK RESULT    Basic metabolic panel [[id number]]  (Abnormal) Collected:  02/04/18 2350    Lab Status:  Final result Specimen:  Blood from Arm, Right Updated:  02/05/18 0007     Sodium 141 mmol/L      Potassium 3 7 mmol/L      Chloride 103 mmol/L      CO2 29 mmol/L      Anion Gap 9 mmol/L      BUN 14 mg/dL      Creatinine 1 32 (H) mg/dL      Glucose 102 mg/dL      Calcium 8 6 mg/dL      eGFR 54 ml/min/1 73sq m     Narrative:         National Kidney Disease Education Program recommendations are as follows:  GFR calculation is accurate only with a steady state creatinine  Chronic Kidney disease less than 60 ml/min/1 73 sq  meters  Kidney failure less than 15 ml/min/1 73 sq  meters  CBC and differential [[id number]]  (Normal) Collected:  02/04/18 2351    Lab Status:  Final result Specimen:  Blood from Arm, Right Updated:  02/04/18 2358     WBC 5 89 Thousand/uL      RBC 4 66 Million/uL      Hemoglobin 14 5 g/dL      Hematocrit 42 0 %      MCV 90 fL      MCH 31 1 pg      MCHC 34 5 g/dL      RDW 13 5 %      MPV 9 6 fL      Platelets 488 Thousands/uL      nRBC 0 /100 WBCs      Neutrophils Relative 49 %      Lymphocytes Relative 36 %      Monocytes Relative 12 %      Eosinophils Relative 2 %      Basophils Relative 1 %      Neutrophils Absolute 2 98 Thousands/µL      Lymphocytes Absolute 2 09 Thousands/µL      Monocytes Absolute 0 68 Thousand/µL      Eosinophils Absolute 0 11 Thousand/µL      Basophils Absolute 0 03 Thousands/µL                  CT renal stone study abdomen pelvis without contrast    (Results Pending)          CT reading per [facility] Radiology impression:  Urinary bladder wall thickening and surrounding inflammatory changes as well as inflammatory changes surrounding the left ureter, possibly on the basis of cystitis for/urethritis  Correlation with urinalysis for/urine culture recommended as indicated  Nonobstructing right nephrolithiasis  Cholelithiasis  Colonic diverticulosis without radiographic evidence of acute diverticulitis  Left ureteral stent in good position  Procedures  Procedures       Phone Contacts  ED Phone Contact    ED Course  ED Course                                MDM  Number of Diagnoses or Management Options  Low back pain:   UTI (urinary tract infection):   Diagnosis management comments: Nitrite positive urine so will treat with antibiotics  No evidence of pyelonephritis on CT scan any is no fever, normal white count and no CVA tenderness  This may be musculoskeletal pain as hurts more with movement  Antibiotics ordered the patient will follow up with his urologist   Catheter is flowing         Amount and/or Complexity of Data Reviewed  Clinical lab tests: ordered and reviewed  Tests in the radiology section of CPT®: ordered and reviewed    Patient Progress  Patient progress: stable    CritCare Time    Disposition  Final diagnoses:   UTI (urinary tract infection)   Low back pain     Time reflects when diagnosis was documented in both MDM as applicable and the Disposition within this note     Time User Action Codes Description Comment    2/5/2018 12:32 AM Anna Orozco Add [N39 0] UTI (urinary tract infection)     2/5/2018 12:57 AM Anna Hickey [M54 5] Low back pain       ED Disposition     ED Disposition Condition Comment    Discharge  Reta Lombard discharge to home/self care  Condition at discharge: Good        Follow-up Information     Follow up With Specialties Details Why Franklin Pascal MD Urology Call in 1 day  [facility]   [phone]          Patient's Medications   Discharge Prescriptions    LEVOFLOXACIN (LEVAQUIN) 500 MG TABLET    Take 1 tablet (500 mg total) by mouth daily for 7 days       Start Date: 2/5/2018  End Date: 2/12/2018       Order Dose: 500 mg       Quantity: 7 tablet    Refills: 0     No discharge procedures on file      ED Provider  Electronically Signed by           Dipti Chavez MD  02/05/18 7236

## 2018-02-05 NOTE — ANESTHESIA PREPROCEDURE EVALUATION
Review of Systems/Medical History  Patient summary reviewed  Chart reviewed  History of anesthetic complications PONV    Cardiovascular  Exercise tolerance: poor,  Hypertension , Dysrhythmias, ,    Pulmonary  Negative pulmonary ROS        GI/Hepatic    GERD well controlled,        Kidney stones,        Endo/Other  Negative endo/other ROS      GYN  Negative gynecology ROS          Hematology  Negative hematology ROS      Musculoskeletal    Arthritis     Neurology  Negative neurology ROS      Psychology   Depression , depressed and being treated for depression,              Physical Exam    Airway    Mallampati score: II  TM Distance: <3 FB  Neck ROM: full     Dental   upper dentures,     Cardiovascular  Rhythm: regular, Rate: normal,     Pulmonary  Breath sounds clear to auscultation,     Other Findings        Anesthesia Plan  ASA Score- 3     Anesthesia Type- general with ASA Monitors  Additional Monitors:   Airway Plan: ETT  Plan Factors- Patient instructed to abstain from smoking on day of procedure  Patient did not smoke on day of surgery  Induction- intravenous  Postoperative Plan- Plan for postoperative opioid use  Informed Consent- Anesthetic plan and risks discussed with patient

## 2018-02-05 NOTE — DISCHARGE INSTRUCTIONS
Acute Low Back Pain, Ambulatory Care   GENERAL INFORMATION:   Acute low back pain  is discomfort in your lower back area that lasts for less than 12 weeks  The word acute is used to describe pain that starts suddenly, worsens quickly, and lasts for a short time  Common symptoms include the following:   · Back stiffness or spasms    · Pain down the back or side of one leg    · Holding yourself in an unusual position or posture to decrease your back pain    · Not being able to find a sitting position that is comfortable    · Slow increase in your pain for 24 to 48 hours after you stress your back    · Tenderness on your lower back or severe pain when you move your back  Seek immediate care for the following symptoms:   · Severe pain    · Sudden stiffness and heaviness in both buttocks down to both legs    · Numbness or weakness in one leg, or pain in both legs    · Numbness in your genital area or across your lower back    · Unable to control your urine or bowel movements  Treatment for acute low back pain  may include any of the following:  · Medicines:      ¨ NSAIDs  help decrease swelling and pain or fever  This medicine is available with or without a doctor's order  NSAIDs can cause stomach bleeding or kidney problems in certain people  If you take blood thinner medicine, always ask your healthcare provider if NSAIDs are safe for you  Always read the medicine label and follow directions  ¨ Muscle relaxers  help decrease muscle spasms pain  ¨ Prescription pain medicine  may be given  Ask how to take this medicine safely  · Surgery  may be needed if your pain is severe and other treatments do not work  Surgery may be needed for conditions of the lumbar spine, such as herniated disc or spinal stenosis  Manage your symptoms:   · Sleep on a firm mattress  If you do not have a firm mattress, have someone move your mattress to the floor for a few days   A piece of plywood under your mattress can also help make it firmer  · Apply ice  on your lower back for 15 to 20 minutes every hour or as directed  Use an ice pack, or put crushed ice in a plastic bag  Cover it with a towel  Ice helps prevent tissue damage and decreases swelling and pain  You can alternate ice and heat  · Apply heat  on your lower back for 20 to 30 minutes every 2 hours for as many days as directed  Heat helps decrease pain and muscle spasms  · Go to physical therapy  A physical therapist teaches you exercises to help improve movement and strength, and to decrease pain  Prevent acute low back pain:   · Use proper body mechanics  ¨ Bend at the hips and knees when you  objects  Do not bend from the waist  Use your leg muscles as you lift the load  Do not use your back  Keep the object close to your chest as you lift it  Try not to twist or lift anything above your waist     ¨ Change your position often when you stand for long periods of time  Rest one foot on a small box or footrest, and then switch to the other foot often  ¨ Try not to sit for long periods of time  When you do, sit in a straight-backed chair with your feet flat on the floor  Never reach, pull, or push while you are sitting  · Exercise regularly  Warm up before you exercise  Do exercises that strengthen your back muscles  Ask about the best exercise plan for you  · Maintain a healthy weight  Ask your healthcare provider how much you should weigh  Ask him to help you create a weight loss plan if you are overweight  Follow up with your healthcare provider as directed:  Return for a follow-up visit if you still have pain after 1 to 3 weeks of treatment  You may need to visit an orthopedist if your back pain lasts more than 6 to 12 weeks  Write down your questions so you remember to ask them during your visits  CARE AGREEMENT:   You have the right to help plan your care  Learn about your health condition and how it may be treated   Discuss treatment options with your caregivers to decide what care you want to receive  You always have the right to refuse treatment  The above information is an  only  It is not intended as medical advice for individual conditions or treatments  Talk to your doctor, nurse or pharmacist before following any medical regimen to see if it is safe and effective for you  © 2014 [address] is for End User's use only and may not be sold, redistributed or otherwise used for commercial purposes  All illustrations and images included in CareNotes® are the copyrighted property of [employer]  or [employer]  Catheter-associated Urinary Tract Infection   WHAT YOU NEED TO KNOW:   A catheter-associated urinary tract infection (CAUTI) is an infection caused by an indwelling urinary catheter  An indwelling urinary catheter is a thin, flexible tube that is inserted into the bladder  It is left in place to drain urine  The infection may travel along the catheter and into the bladder or kidneys  DISCHARGE INSTRUCTIONS:   Return to the emergency department if:   · You have severe pain in your lower back or abdomen  · You have blood in your urine  · You stop urinating, or you urinate much less than usual   Contact your healthcare provider if:   · You have a fever  · Your symptoms do not improve or get worse  · You have questions or concerns about your condition or care  Medicines: You may need any of the following:  · Antibiotics  help treat an infection caused by bacteria  · Antifungals  help treat an infection caused by fungus  · Acetaminophen  decreases pain and fever  It is available without a doctor's order  Ask how much to take and how often to take it  Follow directions  Read the labels of all other medicines you are using to see if they also contain acetaminophen, or ask your doctor or pharmacist  Acetaminophen can cause liver damage if not taken correctly   Do not use more than 4 grams (4,000 milligrams) total of acetaminophen in one day  · NSAIDs , such as ibuprofen, help decrease swelling, pain, and fever  This medicine is available with or without a doctor's order  NSAIDs can cause stomach bleeding or kidney problems in certain people  If you take blood thinner medicine, always ask your healthcare provider if NSAIDs are safe for you  Always read the medicine label and follow directions  · Take your medicine as directed  Contact your healthcare provider if you think your medicine is not helping or if you have side effects  Tell him of her if you are allergic to any medicine  Keep a list of the medicines, vitamins, and herbs you take  Include the amounts, and when and why you take them  Bring the list or the pill bottles to follow-up visits  Carry your medicine list with you in case of an emergency  Self-care:   · Drink fluids as directed  Fluids may help your kidneys and bladder get rid of the infection  · Keep the catheter area clean  Clean your skin around the catheter as directed  Shower once a day  Do not take baths or go in hot tubs until your infection is gone  · Do not have sex  until your healthcare provider says it is okay  Sex may delay healing or cause another UTI  Prevent another CAUTI:   · Wash your hands before and after you use the bathroom or touch the catheter  Wash your hands to prevent the spread of infection to your urinary tract  · Clean all parts of your catheter as directed  Keep your catheter tubing clean  Do not place the catheter on the ground  Do not allow the drainage spout to touch the toilet  Use an alcohol swab to clean the end of drainage spout as directed  · Keep the drainage bag below your waist   This may prevent urine from moving back into your bladder, which can cause an infection  · Empty the urine bag as directed  This may prevent urine from flowing back into your bladder       · Women should wipe front to back  after a bowel movement  This may prevent germs from getting into the urinary tract  · Keep the catheter secured to your leg as directed  Use tape or a special catheter guzman to prevent your catheter from being pulled  This may also prevent kinks that could cause the urine to move back into the bladder  Follow up with your healthcare provider as directed:  Write down your questions so you remember to ask them during your visits  © 2017 [facility] Information is for End User's use only and may not be sold, redistributed or otherwise used for commercial purposes  All illustrations and images included in CareNotes® are the copyrighted property of [employer]  or [employer]  The above information is an  only  It is not intended as medical advice for individual conditions or treatments  Talk to your doctor, nurse or pharmacist before following any medical regimen to see if it is safe and effective for you

## 2018-02-05 NOTE — PRE-PROCEDURE INSTRUCTIONS
Pre-Surgery Instructions:   Medication Instructions    amLODIPine (NORVASC) 10 mg tablet Patient was instructed by Physician and understands   divalproex sodium (DEPAKOTE) 500 mg EC tablet Patient was instructed by Physician and understands   HYDROcodone-acetaminophen (NORCO) 5-325 mg per tablet Patient was instructed by Physician and understands   ibuprofen (MOTRIN) 200 mg tablet Patient was instructed by Physician and understands   levofloxacin (LEVAQUIN) 500 mg tablet Patient was instructed by Physician and understands   mometasone (NASONEX) 50 mcg/act nasal spray Patient was instructed by Physician and understands   pentoxifylline (TRENtal) 400 mg ER tablet Patient was instructed by Physician and understands   ranitidine (ZANTAC) 300 MG capsule Patient was instructed by Physician and understands   tamsulosin (FLOMAX) 0 4 mg Patient was instructed by Physician and understands  Pt instructed to take amlodipine and depakote with a small sip of water the morning of surgery  Pt given [facility] preop instructions and reviewed with pt  Pt given Chlorhexidine

## 2018-02-07 LAB
BACTERIA UR CULT: ABNORMAL
BACTERIA UR CULT: ABNORMAL

## 2018-02-09 ENCOUNTER — HOSPITAL ENCOUNTER (OUTPATIENT)
Dept: NON INVASIVE DIAGNOSTICS | Facility: CLINIC | Age: 73
Discharge: HOME | End: 2018-02-09
Payer: MEDICARE

## 2018-02-09 DIAGNOSIS — I35.1 AORTIC VALVE INSUFFICIENCY, ETIOLOGY OF CARDIAC VALVE DISEASE UNSPECIFIED: ICD-10-CM

## 2018-02-09 PROCEDURE — 93306 TTE W/DOPPLER COMPLETE: CPT | Performed by: INTERNAL MEDICINE

## 2018-02-09 PROCEDURE — 93306 TTE W/DOPPLER COMPLETE: CPT

## 2018-02-14 ENCOUNTER — HOSPITAL ENCOUNTER (INPATIENT)
Facility: HOSPITAL | Age: 73
LOS: 4 days | Discharge: HOME | DRG: 654 | End: 2018-02-18
Attending: UROLOGY | Admitting: UROLOGY
Payer: MEDICARE

## 2018-02-14 ENCOUNTER — ANESTHESIA (OUTPATIENT)
Dept: PERIOP | Facility: HOSPITAL | Age: 73
DRG: 654 | End: 2018-02-14
Payer: MEDICARE

## 2018-02-14 DIAGNOSIS — N40.1 BPH WITH OBSTRUCTION/LOWER URINARY TRACT SYMPTOMS: ICD-10-CM

## 2018-02-14 DIAGNOSIS — I35.1 AORTIC VALVE INSUFFICIENCY, ETIOLOGY OF CARDIAC VALVE DISEASE UNSPECIFIED: ICD-10-CM

## 2018-02-14 DIAGNOSIS — N13.8 BPH WITH OBSTRUCTION/LOWER URINARY TRACT SYMPTOMS: ICD-10-CM

## 2018-02-14 DIAGNOSIS — N40.0 BPH WITHOUT OBSTRUCTION/LOWER URINARY TRACT SYMPTOMS: ICD-10-CM

## 2018-02-14 DIAGNOSIS — N32.3 BLADDER DIVERTICULUM: ICD-10-CM

## 2018-02-14 DIAGNOSIS — I45.3 RIGHT BUNDLE BLANCH BLOCK, ANTERIOR FASCICULAR BLOCK AND INCOMPLETE POSTERIOR FASCICULAR BLOCK: Primary | ICD-10-CM

## 2018-02-14 LAB
ANION GAP SERPL CALCULATED.3IONS-SCNC: 8 MMOL/L (ref 4–13)
BUN SERPL-MCNC: 17 MG/DL (ref 5–25)
CALCIUM SERPL-MCNC: 8.2 MG/DL (ref 8.3–10.1)
CHLORIDE SERPL-SCNC: 109 MMOL/L (ref 100–108)
CO2 SERPL-SCNC: 25 MMOL/L (ref 21–32)
CREAT SERPL-MCNC: 1.27 MG/DL (ref 0.6–1.3)
ERYTHROCYTE [DISTWIDTH] IN BLOOD BY AUTOMATED COUNT: 13.6 % (ref 11.6–15.1)
GFR SERPL CREATININE-BSD FRML MDRD: 56 ML/MIN/1.73SQ M
GLUCOSE SERPL-MCNC: 123 MG/DL (ref 65–140)
HCT VFR BLD AUTO: 40.7 % (ref 36.5–49.3)
HGB BLD-MCNC: 13.7 G/DL (ref 12–17)
MCH RBC QN AUTO: 30 PG (ref 26.8–34.3)
MCHC RBC AUTO-ENTMCNC: 33.7 G/DL (ref 31.4–37.4)
MCV RBC AUTO: 89 FL (ref 82–98)
PLATELET # BLD AUTO: 303 THOUSANDS/UL (ref 149–390)
PMV BLD AUTO: 8.9 FL (ref 8.9–12.7)
POTASSIUM SERPL-SCNC: 4 MMOL/L (ref 3.5–5.3)
RBC # BLD AUTO: 4.57 MILLION/UL (ref 3.88–5.62)
SODIUM SERPL-SCNC: 142 MMOL/L (ref 136–145)
WBC # BLD AUTO: 10.6 THOUSAND/UL (ref 4.31–10.16)

## 2018-02-14 PROCEDURE — 0TQB0ZZ REPAIR BLADDER, OPEN APPROACH: ICD-10-PCS | Performed by: UROLOGY

## 2018-02-14 PROCEDURE — 0TBC0ZZ EXCISION OF BLADDER NECK, OPEN APPROACH: ICD-10-PCS | Performed by: UROLOGY

## 2018-02-14 PROCEDURE — 0T9B00Z DRAINAGE OF BLADDER WITH DRAINAGE DEVICE, OPEN APPROACH: ICD-10-PCS | Performed by: UROLOGY

## 2018-02-14 PROCEDURE — 80048 BASIC METABOLIC PNL TOTAL CA: CPT | Performed by: PHYSICIAN ASSISTANT

## 2018-02-14 PROCEDURE — 51525 REMOVAL OF BLADDER LESION: CPT | Performed by: UROLOGY

## 2018-02-14 PROCEDURE — 44005 FREEING OF BOWEL ADHESION: CPT | Performed by: PHYSICIAN ASSISTANT

## 2018-02-14 PROCEDURE — 44005 FREEING OF BOWEL ADHESION: CPT | Performed by: UROLOGY

## 2018-02-14 PROCEDURE — 85027 COMPLETE CBC AUTOMATED: CPT | Performed by: PHYSICIAN ASSISTANT

## 2018-02-14 PROCEDURE — 51525 REMOVAL OF BLADDER LESION: CPT | Performed by: PHYSICIAN ASSISTANT

## 2018-02-14 RX ORDER — FENTANYL CITRATE 50 UG/ML
INJECTION, SOLUTION INTRAMUSCULAR; INTRAVENOUS AS NEEDED
Status: DISCONTINUED | OUTPATIENT
Start: 2018-02-14 | End: 2018-02-14 | Stop reason: SURG

## 2018-02-14 RX ORDER — ONDANSETRON 2 MG/ML
INJECTION INTRAMUSCULAR; INTRAVENOUS AS NEEDED
Status: DISCONTINUED | OUTPATIENT
Start: 2018-02-14 | End: 2018-02-14 | Stop reason: SURG

## 2018-02-14 RX ORDER — DEXAMETHASONE SODIUM PHOSPHATE 4 MG/ML
INJECTION, SOLUTION INTRA-ARTICULAR; INTRALESIONAL; INTRAMUSCULAR; INTRAVENOUS; SOFT TISSUE AS NEEDED
Status: DISCONTINUED | OUTPATIENT
Start: 2018-02-14 | End: 2018-02-14 | Stop reason: SURG

## 2018-02-14 RX ORDER — PENTOXIFYLLINE 400 MG/1
400 TABLET, EXTENDED RELEASE ORAL
Status: DISCONTINUED | OUTPATIENT
Start: 2018-02-14 | End: 2018-02-18 | Stop reason: HOSPADM

## 2018-02-14 RX ORDER — AMLODIPINE BESYLATE 10 MG/1
10 TABLET ORAL DAILY
Status: DISCONTINUED | OUTPATIENT
Start: 2018-02-15 | End: 2018-02-18 | Stop reason: HOSPADM

## 2018-02-14 RX ORDER — SODIUM CHLORIDE 9 MG/ML
125 INJECTION, SOLUTION INTRAVENOUS CONTINUOUS
Status: DISCONTINUED | OUTPATIENT
Start: 2018-02-14 | End: 2018-02-16

## 2018-02-14 RX ORDER — TAMSULOSIN HYDROCHLORIDE 0.4 MG/1
0.4 CAPSULE ORAL
Status: DISCONTINUED | OUTPATIENT
Start: 2018-02-14 | End: 2018-02-16

## 2018-02-14 RX ORDER — ONDANSETRON 2 MG/ML
4 INJECTION INTRAMUSCULAR; INTRAVENOUS EVERY 4 HOURS PRN
Status: DISCONTINUED | OUTPATIENT
Start: 2018-02-14 | End: 2018-02-18 | Stop reason: HOSPADM

## 2018-02-14 RX ORDER — HYDROMORPHONE HYDROCHLORIDE 2 MG/ML
INJECTION, SOLUTION INTRAMUSCULAR; INTRAVENOUS; SUBCUTANEOUS AS NEEDED
Status: DISCONTINUED | OUTPATIENT
Start: 2018-02-14 | End: 2018-02-14 | Stop reason: SURG

## 2018-02-14 RX ORDER — FAMOTIDINE 20 MG/1
20 TABLET, FILM COATED ORAL DAILY
Status: DISCONTINUED | OUTPATIENT
Start: 2018-02-15 | End: 2018-02-15

## 2018-02-14 RX ORDER — PROPOFOL 10 MG/ML
INJECTION, EMULSION INTRAVENOUS AS NEEDED
Status: DISCONTINUED | OUTPATIENT
Start: 2018-02-14 | End: 2018-02-14 | Stop reason: SURG

## 2018-02-14 RX ORDER — ACETAMINOPHEN 325 MG/1
650 TABLET ORAL EVERY 4 HOURS PRN
Status: DISCONTINUED | OUTPATIENT
Start: 2018-02-14 | End: 2018-02-18 | Stop reason: HOSPADM

## 2018-02-14 RX ORDER — FENTANYL CITRATE/PF 50 MCG/ML
25 SYRINGE (ML) INJECTION
Status: COMPLETED | OUTPATIENT
Start: 2018-02-14 | End: 2018-02-14

## 2018-02-14 RX ORDER — FLUTICASONE PROPIONATE 50 MCG
2 SPRAY, SUSPENSION (ML) NASAL DAILY
Status: DISCONTINUED | OUTPATIENT
Start: 2018-02-15 | End: 2018-02-18 | Stop reason: HOSPADM

## 2018-02-14 RX ORDER — ROCURONIUM BROMIDE 10 MG/ML
INJECTION, SOLUTION INTRAVENOUS AS NEEDED
Status: DISCONTINUED | OUTPATIENT
Start: 2018-02-14 | End: 2018-02-14 | Stop reason: SURG

## 2018-02-14 RX ORDER — HYDROCODONE BITARTRATE AND ACETAMINOPHEN 5; 325 MG/1; MG/1
1 TABLET ORAL EVERY 4 HOURS PRN
Qty: 30 TABLET | Refills: 0 | Status: SHIPPED | OUTPATIENT
Start: 2018-02-14 | End: 2018-03-13

## 2018-02-14 RX ORDER — DIVALPROEX SODIUM 500 MG/1
500 TABLET, DELAYED RELEASE ORAL 2 TIMES DAILY
Status: DISCONTINUED | OUTPATIENT
Start: 2018-02-14 | End: 2018-02-18 | Stop reason: HOSPADM

## 2018-02-14 RX ORDER — DOCUSATE SODIUM 100 MG/1
100 CAPSULE, LIQUID FILLED ORAL 2 TIMES DAILY
Status: DISCONTINUED | OUTPATIENT
Start: 2018-02-14 | End: 2018-02-15

## 2018-02-14 RX ORDER — BUPIVACAINE HYDROCHLORIDE 5 MG/ML
INJECTION, SOLUTION EPIDURAL; INTRACAUDAL AS NEEDED
Status: DISCONTINUED | OUTPATIENT
Start: 2018-02-14 | End: 2018-02-14 | Stop reason: HOSPADM

## 2018-02-14 RX ORDER — ONDANSETRON 2 MG/ML
4 INJECTION INTRAMUSCULAR; INTRAVENOUS ONCE AS NEEDED
Status: DISCONTINUED | OUTPATIENT
Start: 2018-02-14 | End: 2018-02-14 | Stop reason: HOSPADM

## 2018-02-14 RX ORDER — OXYBUTYNIN CHLORIDE 5 MG/1
10 TABLET, EXTENDED RELEASE ORAL DAILY
Status: DISCONTINUED | OUTPATIENT
Start: 2018-02-15 | End: 2018-02-16

## 2018-02-14 RX ORDER — EPHEDRINE SULFATE 50 MG/ML
INJECTION, SOLUTION INTRAVENOUS AS NEEDED
Status: DISCONTINUED | OUTPATIENT
Start: 2018-02-14 | End: 2018-02-14 | Stop reason: SURG

## 2018-02-14 RX ORDER — HYDROCODONE BITARTRATE AND ACETAMINOPHEN 5; 325 MG/1; MG/1
1 TABLET ORAL EVERY 4 HOURS PRN
Status: DISCONTINUED | OUTPATIENT
Start: 2018-02-14 | End: 2018-02-15

## 2018-02-14 RX ORDER — GLYCOPYRROLATE 0.2 MG/ML
INJECTION INTRAMUSCULAR; INTRAVENOUS AS NEEDED
Status: DISCONTINUED | OUTPATIENT
Start: 2018-02-14 | End: 2018-02-14 | Stop reason: SURG

## 2018-02-14 RX ORDER — MIDAZOLAM HYDROCHLORIDE 1 MG/ML
INJECTION INTRAMUSCULAR; INTRAVENOUS AS NEEDED
Status: DISCONTINUED | OUTPATIENT
Start: 2018-02-14 | End: 2018-02-14 | Stop reason: SURG

## 2018-02-14 RX ADMIN — HYDROMORPHONE HYDROCHLORIDE 0.25 MG: 2 INJECTION, SOLUTION INTRAMUSCULAR; INTRAVENOUS; SUBCUTANEOUS at 09:58

## 2018-02-14 RX ADMIN — DEXAMETHASONE SODIUM PHOSPHATE 4 MG: 4 INJECTION, SOLUTION INTRAMUSCULAR; INTRAVENOUS at 08:00

## 2018-02-14 RX ADMIN — DIVALPROEX SODIUM 500 MG: 500 TABLET, DELAYED RELEASE ORAL at 18:42

## 2018-02-14 RX ADMIN — TAMSULOSIN HYDROCHLORIDE 0.4 MG: 0.4 CAPSULE ORAL at 18:42

## 2018-02-14 RX ADMIN — PENTOXIFYLLINE 400 MG: 400 TABLET, EXTENDED RELEASE ORAL at 20:33

## 2018-02-14 RX ADMIN — CEFTRIAXONE 1000 MG: 1 INJECTION, SOLUTION INTRAVENOUS at 07:48

## 2018-02-14 RX ADMIN — LIDOCAINE HYDROCHLORIDE 60 MG: 20 INJECTION, SOLUTION INTRAVENOUS at 07:32

## 2018-02-14 RX ADMIN — MIDAZOLAM HYDROCHLORIDE 2 MG: 1 INJECTION, SOLUTION INTRAMUSCULAR; INTRAVENOUS at 07:25

## 2018-02-14 RX ADMIN — FENTANYL CITRATE 25 MCG: 50 INJECTION INTRAMUSCULAR; INTRAVENOUS at 11:21

## 2018-02-14 RX ADMIN — ONDANSETRON HYDROCHLORIDE 4 MG: 2 INJECTION, SOLUTION INTRAVENOUS at 07:25

## 2018-02-14 RX ADMIN — CEFTRIAXONE 1000 MG: 1 INJECTION, SOLUTION INTRAVENOUS at 20:35

## 2018-02-14 RX ADMIN — SODIUM CHLORIDE 125 ML/HR: 0.9 INJECTION, SOLUTION INTRAVENOUS at 06:06

## 2018-02-14 RX ADMIN — HYDROMORPHONE HYDROCHLORIDE 1 MG: 1 INJECTION, SOLUTION INTRAMUSCULAR; INTRAVENOUS; SUBCUTANEOUS at 14:32

## 2018-02-14 RX ADMIN — SODIUM CHLORIDE: 0.9 INJECTION, SOLUTION INTRAVENOUS at 08:05

## 2018-02-14 RX ADMIN — HYDROMORPHONE HYDROCHLORIDE 0.5 MG: 1 INJECTION, SOLUTION INTRAMUSCULAR; INTRAVENOUS; SUBCUTANEOUS at 17:07

## 2018-02-14 RX ADMIN — EPHEDRINE SULFATE 5 MG: 50 INJECTION, SOLUTION INTRAMUSCULAR; INTRAVENOUS; SUBCUTANEOUS at 09:47

## 2018-02-14 RX ADMIN — HYDROCODONE BITARTRATE AND ACETAMINOPHEN 1 TABLET: 5; 325 TABLET ORAL at 20:09

## 2018-02-14 RX ADMIN — ROCURONIUM BROMIDE 50 MG: 10 INJECTION INTRAVENOUS at 07:32

## 2018-02-14 RX ADMIN — HYDROMORPHONE HYDROCHLORIDE 0.5 MG: 2 INJECTION, SOLUTION INTRAMUSCULAR; INTRAVENOUS; SUBCUTANEOUS at 08:02

## 2018-02-14 RX ADMIN — HYDROMORPHONE HYDROCHLORIDE 1 MG: 1 INJECTION, SOLUTION INTRAMUSCULAR; INTRAVENOUS; SUBCUTANEOUS at 23:24

## 2018-02-14 RX ADMIN — HYDROMORPHONE HYDROCHLORIDE 0.25 MG: 2 INJECTION, SOLUTION INTRAMUSCULAR; INTRAVENOUS; SUBCUTANEOUS at 10:17

## 2018-02-14 RX ADMIN — SODIUM CHLORIDE 125 ML/HR: 0.9 INJECTION, SOLUTION INTRAVENOUS at 11:56

## 2018-02-14 RX ADMIN — PROPOFOL 180 MG: 10 INJECTION, EMULSION INTRAVENOUS at 07:32

## 2018-02-14 RX ADMIN — FENTANYL CITRATE 25 MCG: 50 INJECTION INTRAMUSCULAR; INTRAVENOUS at 11:26

## 2018-02-14 RX ADMIN — FENTANYL CITRATE 25 MCG: 50 INJECTION INTRAMUSCULAR; INTRAVENOUS at 11:41

## 2018-02-14 RX ADMIN — EPHEDRINE SULFATE 10 MG: 50 INJECTION, SOLUTION INTRAMUSCULAR; INTRAVENOUS; SUBCUTANEOUS at 08:22

## 2018-02-14 RX ADMIN — FENTANYL CITRATE 50 MCG: 50 INJECTION INTRAMUSCULAR; INTRAVENOUS at 08:34

## 2018-02-14 RX ADMIN — FENTANYL CITRATE 100 MCG: 50 INJECTION INTRAMUSCULAR; INTRAVENOUS at 07:32

## 2018-02-14 RX ADMIN — FENTANYL CITRATE 25 MCG: 50 INJECTION INTRAMUSCULAR; INTRAVENOUS at 11:36

## 2018-02-14 RX ADMIN — GLYCOPYRROLATE 0.4 MG: 0.2 INJECTION, SOLUTION INTRAMUSCULAR; INTRAVENOUS at 08:13

## 2018-02-14 NOTE — ASSESSMENT & PLAN NOTE
Procedure(s):  ABDOMINAL EXPLORATION; CLOSURE OF BLADDER DIVERTICULITIS  LYSIS OF ADHESIONS; URETEROLYSIS   OPEN SUPRAPUBIC TUBE PLACEMENT  Surgeon(s):  MD Zackery Babcock PA-C  2/14/2018    Suprapubic tube and Bolden catheter both draining clear pink urine  LENNOX drain with serosanguineous drainage

## 2018-02-14 NOTE — ANESTHESIA POSTPROCEDURE EVALUATION
Post-Op Assessment Note      CV Status:  Stable    Mental Status:  Alert and awake    Hydration Status:  Euvolemic    PONV Controlled:  Controlled    Airway Patency:  Patent    Post Op Vitals Reviewed:  Yes              BP      Temp      Pulse     Resp     SpO2

## 2018-02-14 NOTE — OP NOTE
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]     * Hydronephrosis with ureteral stricture, not elsewhere classified [N13 1]    Procedure:  Abdominal exploration, lysis of adhesions, left ureteral lysis, closure of large bladder wall diverticulum, open suprapubic tube placement    Specimen(s):  None    Estimated Blood Loss:   200 mL    Drains:  Closed/Suction Drain Right RLQ Bulb 10 Fr  (Active)   Site Description Unable to view 2018 11:00 AM   Dressing Status Clean;Dry; Intact 2018 11:00 AM   Drainage Appearance Serosanguineous 2018 11:00 AM   Status To bulb suction 2018 11:00 AM   Number of days: 0       Urethral Catheter Latex 16 Fr  (Active)   Site Assessment Clean;Skin intact 2018 11:00 AM   Collection Container Standard drainage bag 2018 11:00 AM   Securement Method Securing device (Describe) 2018 11:00 AM   Number of days: 0       Suprapubic Catheter Latex 16 Fr  (Active)   Site Assessment LENIN 2018 11:00 AM   Dressing Status Clean;Dry; Intact 2018 11:00 AM   Dressing Type Dry dressing 2018 11:00 AM   Collection Container Standard drainage bag 2018 11:00 AM   Securement Method Taped 2018 11:00 AM   Number of days: 0       Anesthesia Type:   General    Operative Indications:  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]  Hydronephrosis with ureteral stricture, not elsewhere classified [N13 1]      Operative Findings:  Large posterior wall left-sided diverticulum with lot of fibrosis and extended back to sacrum    Mild intestinal adhesions    Complications:   None    Procedure and Technique:  Patient is a 70-year-old man in 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 and has a very large bladder diverticulum that causes large postvoid residuals  He currently has a Bolden catheter in place and will need work done on his prostate a former trans urethral resection of the prostate, but he also needs a bladder diverticulum taking care because it is causing deviation medially of the left ureter and stricturing leading to left hydronephrosis and decreased renal function  A stent has been placed in the left ureter which has normalized his creatinine  I now wish to excise the diverticulum, free up the left ureter from the diverticulum, and then at a later date we will do a trans urethral resection of the prostate  He understands he will need a catheter in for at least several weeks prior to his transurethral section of prostate  The risks of bleeding, infection and damage to the urinary tract and adjacent organs was explained and he gives informed consent  The patient was brought to the operating room and identified properly  General endotracheal anesthesia was induced the patient was prepped and draped in the supine position  His lower abdomen was shaved  This was done prior to prepping  The Bolden catheter was placed say in a sterile fashion on the field, and I made an incision after the time-out was performed from the pubic symphysis to just below the umbilicus  This was carried down through the subcutaneous tissues and through the fascia  I first developed space of Retzius and then I entered the peritoneal cavity  The Omni retractor was used to create excellent exposure  There were some intestinal adhesions to the bladder which were lysed with using a right angle clamp and electrocautery  Once these were freed up by scored the peritoneum to free up as much of the bladder as I could  I then distended the bladder by filling the catheter to define my margins    I then opened the bladder in the center longitudinally and identified the Bolden catheter, the stent in the left ureteral orifice and the diverticulum opening  Placed my finger to the diverticulum opening this actually went back straight posteriorly to probably the sacrum  It was is least 4- 5 inches  I then freed up the bladder more posteriorly until I actually located the diverticulum in the posterior portion of the bladder externally and using a combination of sponges, Kitner clamp, Bolden catheter in the diverticulum and then making an incision in the diverticulum to put vessel loops, I was able to dissect out the diverticulum enough to free up the left ureter from being adhesed to it  I did not free up the ureter from its surrounding tissues  I was able to dissect out approximately 2-3 inches of the diverticulum and then I amputated at the bladder neck  This is all very difficult due to taking care not to damage adjacent structures or cut the ureter  I then left the diverticulum mouth open to the peritoneum, and I closed the defect from the diverticulum in the bladder with running interlocking 2 0 Vicryl sutures  This was done in a 2 layer closure  I then inspected the bladder and the wound was completely closed  The stent and the Bolden catheter in good position and I decided placed suprapubic tube  This is done by placing a stab wound in the left lower quadrant and bring the 16 [place] Bolden catheter through the wound and then into a separate stab wound in the bladder  The outer portion of bladder of the suprapubic tube was cinched with a 2 O Vicryl suture  I then closed the bladder with a running 2 0 Vicryl interlocking suture, then inflated the bladder with 300 cc  I closed any leaks with figure-of-eight 2-0 Vicryl sutures  The posterior wound was dry  Surgicel was placed in the bed were mostly dissection took place and there was only mild oozing    A Lexa-Guerrero drain was placed into the patient's right lower quadrant and then placed down behind the bladder  This was secured with 2 0 nylon stitch as was the Bolden catheter at the level of the skin  Bolden catheter balloon was inflated  I then irrigated, and closed the wound with running 1  PDS suture from bottom to mid and top to mid  Subcutaneous tissue was irrigated and then skin was closed with staples  All sponge and needle counts were correct at the end of procedure  The patient tolerated the procedure well     I was present for the entire procedure and A physician assistant was required during the procedure for retraction tissue handling,dissection and suturing    Patient Disposition:  PACU  and extubated and stable    SIGNATURE: Viona Peabody, MD  DATE: February 14, 2018  TIME: 11:19 AM

## 2018-02-15 ENCOUNTER — APPOINTMENT (INPATIENT)
Dept: RADIOLOGY | Facility: HOSPITAL | Age: 73
DRG: 654 | End: 2018-02-15
Payer: MEDICARE

## 2018-02-15 LAB
ANION GAP SERPL CALCULATED.3IONS-SCNC: 9 MMOL/L (ref 4–13)
BUN SERPL-MCNC: 16 MG/DL (ref 5–25)
CALCIUM SERPL-MCNC: 8.5 MG/DL (ref 8.3–10.1)
CHLORIDE SERPL-SCNC: 104 MMOL/L (ref 100–108)
CO2 SERPL-SCNC: 25 MMOL/L (ref 21–32)
CREAT SERPL-MCNC: 1.21 MG/DL (ref 0.6–1.3)
ERYTHROCYTE [DISTWIDTH] IN BLOOD BY AUTOMATED COUNT: 13.9 % (ref 11.6–15.1)
GFR SERPL CREATININE-BSD FRML MDRD: 59 ML/MIN/1.73SQ M
GLUCOSE SERPL-MCNC: 116 MG/DL (ref 65–140)
HCT VFR BLD AUTO: 38.9 % (ref 36.5–49.3)
HGB BLD-MCNC: 13.1 G/DL (ref 12–17)
MCH RBC QN AUTO: 30.9 PG (ref 26.8–34.3)
MCHC RBC AUTO-ENTMCNC: 33.7 G/DL (ref 31.4–37.4)
MCV RBC AUTO: 92 FL (ref 82–98)
PLATELET # BLD AUTO: 262 THOUSANDS/UL (ref 149–390)
PMV BLD AUTO: 9.9 FL (ref 8.9–12.7)
POTASSIUM SERPL-SCNC: 4 MMOL/L (ref 3.5–5.3)
RBC # BLD AUTO: 4.24 MILLION/UL (ref 3.88–5.62)
SODIUM SERPL-SCNC: 138 MMOL/L (ref 136–145)
WBC # BLD AUTO: 9.77 THOUSAND/UL (ref 4.31–10.16)

## 2018-02-15 PROCEDURE — 71045 X-RAY EXAM CHEST 1 VIEW: CPT

## 2018-02-15 PROCEDURE — 99024 POSTOP FOLLOW-UP VISIT: CPT | Performed by: UROLOGY

## 2018-02-15 PROCEDURE — 80048 BASIC METABOLIC PNL TOTAL CA: CPT | Performed by: PHYSICIAN ASSISTANT

## 2018-02-15 PROCEDURE — 85027 COMPLETE CBC AUTOMATED: CPT | Performed by: PHYSICIAN ASSISTANT

## 2018-02-15 RX ORDER — DEXTROSE AND SODIUM CHLORIDE 5; .45 G/100ML; G/100ML
50 INJECTION, SOLUTION INTRAVENOUS CONTINUOUS
Status: DISCONTINUED | OUTPATIENT
Start: 2018-02-15 | End: 2018-02-18 | Stop reason: HOSPADM

## 2018-02-15 RX ADMIN — HYDROCODONE BITARTRATE AND ACETAMINOPHEN 1 TABLET: 5; 325 TABLET ORAL at 16:11

## 2018-02-15 RX ADMIN — DOCUSATE SODIUM 100 MG: 100 CAPSULE, LIQUID FILLED ORAL at 09:06

## 2018-02-15 RX ADMIN — ONDANSETRON 4 MG: 2 INJECTION INTRAMUSCULAR; INTRAVENOUS at 02:44

## 2018-02-15 RX ADMIN — ENOXAPARIN SODIUM 40 MG: 40 INJECTION SUBCUTANEOUS at 09:06

## 2018-02-15 RX ADMIN — SODIUM CHLORIDE 125 ML/HR: 0.9 INJECTION, SOLUTION INTRAVENOUS at 03:09

## 2018-02-15 RX ADMIN — DIVALPROEX SODIUM 500 MG: 500 TABLET, DELAYED RELEASE ORAL at 09:07

## 2018-02-15 RX ADMIN — CEFTRIAXONE 1000 MG: 1 INJECTION, SOLUTION INTRAVENOUS at 18:03

## 2018-02-15 RX ADMIN — PENTOXIFYLLINE 400 MG: 400 TABLET, EXTENDED RELEASE ORAL at 09:10

## 2018-02-15 RX ADMIN — FAMOTIDINE 20 MG: 20 TABLET, FILM COATED ORAL at 09:07

## 2018-02-15 RX ADMIN — FAMOTIDINE 20 MG: 10 INJECTION, SOLUTION INTRAVENOUS at 20:24

## 2018-02-15 RX ADMIN — FLUTICASONE PROPIONATE 2 SPRAY: 50 SPRAY, METERED NASAL at 09:11

## 2018-02-15 RX ADMIN — HYDROMORPHONE HYDROCHLORIDE 1 MG: 1 INJECTION, SOLUTION INTRAMUSCULAR; INTRAVENOUS; SUBCUTANEOUS at 20:29

## 2018-02-15 RX ADMIN — TAMSULOSIN HYDROCHLORIDE 0.4 MG: 0.4 CAPSULE ORAL at 16:11

## 2018-02-15 RX ADMIN — DEXTROSE AND SODIUM CHLORIDE 100 ML/HR: 5; 450 INJECTION, SOLUTION INTRAVENOUS at 18:01

## 2018-02-15 RX ADMIN — AMLODIPINE BESYLATE 10 MG: 10 TABLET ORAL at 09:06

## 2018-02-15 RX ADMIN — OXYBUTYNIN CHLORIDE 10 MG: 5 TABLET, FILM COATED, EXTENDED RELEASE ORAL at 09:06

## 2018-02-15 NOTE — PROGRESS NOTES
Postop day 1  Status post exploratory laparotomy, lysis of abdominal adhesions ureteral lysis of the left ureter and closure of bladder diverticulum  His labs look good this morning but he had a rough night in terms of nausea and vomiting  His abdomen is distended and he has an apparent ileus with hyperactive bowel sounds and hiccups  He is awake alert and oriented  His abdomen is soft but distended  No tenderness  I will have the nurses place an NG tube to suction to decompress him  He is passing flatus

## 2018-02-15 NOTE — PLAN OF CARE
DISCHARGE PLANNING     Discharge to home or other facility with appropriate resources Progressing        GENITOURINARY - ADULT     Urinary catheter remains patent Progressing        INFECTION - ADULT     Absence or prevention of progression during hospitalization Progressing     Absence of fever/infection during neutropenic period Progressing        Knowledge Deficit     Patient/family/caregiver demonstrates understanding of disease process, treatment plan, medications, and discharge instructions Progressing        PAIN - ADULT     Verbalizes/displays adequate comfort level or baseline comfort level Progressing        SAFETY ADULT     Patient will remain free of falls Progressing     Maintain or return to baseline ADL function Progressing     Maintain or return mobility status to optimal level Progressing

## 2018-02-15 NOTE — CASE MANAGEMENT
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]     * Hydronephrosis with ureteral stricture, not elsewhere classified [N13 1]     Procedure:  Abdominal exploration, lysis of adhesions, left ureteral lysis, closure of large bladder wall diverticulum, open suprapubic tube placement  Operative Findings:  Large posterior wall left-sided diverticulum with lot of fibrosis and extended back to sacrum    Mild intestinal adhesions       Anesthesia:    general    Admission Orders: Date/Time/Statement: 2/14/18 @ 1051     Orders Placed This Encounter   Procedures    Inpatient Admission     Standing Status:   Standing     Number of Occurrences:   1     Order Specific Question:   Admitting Physician     Answer:   Sophia Flanagan [[id number]]     Order Specific Question:   Level of Care     Answer:   Med Surg [16]     Order Specific Question:   Estimated length of stay     Answer:   Inpatient Only Surgery       Vital Signs: /86 (BP Location: Right arm)   Pulse (!) 107   Temp 99 4 °F (37 4 °C) (Temporal)   Resp 18   Ht 6' (1 829 m)   Wt 104 kg (230 lb)   SpO2 94%   BMI 31 19 kg/m²     Diet:        Diet Orders            Start     Ordered    02/15/18 1024  Room Service  Once     Question:  Type of Service  Answer:  Room Service-Appropriate    02/15/18 1023    02/15/18 0000  Diet Clear Liquid  Diet effective midnight     Question:  Diet Type  Answer:  Clear Liquid    02/14/18 1704          Mobility:    As  stephany    DVT Prophylaxis:   SCD'S    Pain Control:   Pain Medications             divalproex sodium (DEPAKOTE) 500 mg EC tablet Take 500 mg by mouth 2 (two) times a day      HYDROcodone-acetaminophen (NORCO) 5-325 mg per tablet Take 1 tablet by mouth every 6 (six) hours as needed for pain for up to 30 doses Max Daily Amount: 4 tablets    ibuprofen (MOTRIN) 200 mg tablet Take 400 mg by mouth every 6 (six) hours as needed for mild pain    HYDROcodone-acetaminophen (NORCO) 5-325 mg per tablet Take 1 tablet by mouth every 4 (four) hours as needed for pain for up to 30 doses Max Daily Amount: 6 tablets        LENNOX drain  Cl liq  Diet -  Now  NPO  Cons  IM    PROGRESS  NOTE   2/15  Postop day 1  Status post exploratory laparotomy, lysis of abdominal adhesions ureteral lysis of the left ureter and closure of bladder diverticulum  His labs look good this morning but he had a rough night in terms of nausea and vomiting  His abdomen is distended and he has an apparent ileus with hyperactive bowel sounds and hiccups  He is awake alert and oriented  His abdomen is soft but distended  No tenderness  I will have the nurses place an NG tube to suction to decompress him  He is passing flatus

## 2018-02-15 NOTE — PROGRESS NOTES
Examined patient and he feels better  Abdomen is less distended  Chest x-ray shows proper placement of nasogastric tube  This mostly just sucking out air  I cannot see the entire abdomen to look for air-fluid levels, but I see a lot of gas in the intestines  He is passing gas from below  Continue with the NG tube drainage for now

## 2018-02-16 ENCOUNTER — APPOINTMENT (INPATIENT)
Dept: RADIOLOGY | Facility: HOSPITAL | Age: 73
DRG: 654 | End: 2018-02-16
Payer: MEDICARE

## 2018-02-16 PROBLEM — K91.89 POSTOPERATIVE ILEUS (HCC): Status: ACTIVE | Noted: 2018-02-16

## 2018-02-16 PROBLEM — K56.7 POSTOPERATIVE ILEUS (HCC): Status: ACTIVE | Noted: 2018-02-16

## 2018-02-16 PROCEDURE — 99024 POSTOP FOLLOW-UP VISIT: CPT | Performed by: UROLOGY

## 2018-02-16 PROCEDURE — 74022 RADEX COMPL AQT ABD SERIES: CPT

## 2018-02-16 RX ORDER — AMOXICILLIN 250 MG
1 CAPSULE ORAL
Status: DISCONTINUED | OUTPATIENT
Start: 2018-02-16 | End: 2018-02-18 | Stop reason: HOSPADM

## 2018-02-16 RX ORDER — HYDROCODONE BITARTRATE AND ACETAMINOPHEN 5; 325 MG/1; MG/1
1 TABLET ORAL EVERY 6 HOURS PRN
Status: DISCONTINUED | OUTPATIENT
Start: 2018-02-16 | End: 2018-02-18 | Stop reason: HOSPADM

## 2018-02-16 RX ADMIN — DEXTROSE AND SODIUM CHLORIDE 100 ML/HR: 5; 450 INJECTION, SOLUTION INTRAVENOUS at 16:03

## 2018-02-16 RX ADMIN — AMLODIPINE BESYLATE 10 MG: 10 TABLET ORAL at 09:16

## 2018-02-16 RX ADMIN — FAMOTIDINE 20 MG: 10 INJECTION, SOLUTION INTRAVENOUS at 09:19

## 2018-02-16 RX ADMIN — HYDROMORPHONE HYDROCHLORIDE 1 MG: 1 INJECTION, SOLUTION INTRAMUSCULAR; INTRAVENOUS; SUBCUTANEOUS at 04:59

## 2018-02-16 RX ADMIN — CEFTRIAXONE 1000 MG: 1 INJECTION, SOLUTION INTRAVENOUS at 17:35

## 2018-02-16 RX ADMIN — HYDROMORPHONE HYDROCHLORIDE 1 MG: 1 INJECTION, SOLUTION INTRAMUSCULAR; INTRAVENOUS; SUBCUTANEOUS at 00:56

## 2018-02-16 RX ADMIN — FAMOTIDINE 20 MG: 10 INJECTION, SOLUTION INTRAVENOUS at 21:58

## 2018-02-16 RX ADMIN — HYDROMORPHONE HYDROCHLORIDE 1 MG: 1 INJECTION, SOLUTION INTRAMUSCULAR; INTRAVENOUS; SUBCUTANEOUS at 21:55

## 2018-02-16 RX ADMIN — HYDROMORPHONE HYDROCHLORIDE 1 MG: 1 INJECTION, SOLUTION INTRAMUSCULAR; INTRAVENOUS; SUBCUTANEOUS at 17:31

## 2018-02-16 RX ADMIN — Medication 1 TABLET: at 21:58

## 2018-02-16 RX ADMIN — ENOXAPARIN SODIUM 40 MG: 40 INJECTION SUBCUTANEOUS at 09:16

## 2018-02-16 RX ADMIN — FLUTICASONE PROPIONATE 2 SPRAY: 50 SPRAY, METERED NASAL at 09:16

## 2018-02-16 NOTE — PROGRESS NOTES
Doing much better  NG tube is been clamped and he is comfortable  I removed his dressing and his wound is clean dry and intact  I removed his NG tube  He he will continue clear liquid diet and advance his diet over the weekend  LENNOX drain can come out if consistently low volume over the weekend  Likely discharge Sunday or Monday  [place] prescription is on the chart  When he goes home, he has to go home with suprapubic tube open to bag drainage, then see me next week  Ambulate  He has not been out of bed yet

## 2018-02-16 NOTE — ASSESSMENT & PLAN NOTE
NG tube with 1300 cc out, but patient consuming the majority of a clear liquid tray x2 during that time frame  Passing flatus  Persistent hyper active bowel sounds on auscultation  NG tube in place with red drainage but patient recently consumed red jello  Plan:  Clamp NG tube  Continue clear liquids

## 2018-02-16 NOTE — CONSULTS
Inpatient Medical Consultation - [facility]    Patient Information: Ginger Almonte 67 y o  male MRN: [id number]  Unit/Bed#: E5 -01 Encounter: [id number]  PCP: Melani Reyes MD  Date of Admission:  2/14/2018  Date of Consultation: 02/16/18  Requesting Physician: Eduardo Fernandez MD    Reason For Consultation:        Medical management    Assessment/Plan:  Very pleasant 80-year-old gentleman with history of hypertension, hyperlipidemia, history of bladder diverticulum and recent acute kidney injury due to obstruction is Status post exploratory laparotomy, lysis of abdominal adhesions ureteral lysis of the left ureter and closure of bladder diverticulum  Postoperatively he has had increasing abdominal distention NG tube was placed  VTE Prophylaxis: Heparin  / sequential compression device     Recommendations for Discharge:    #1History of HTN:       We will continue patient home medications  including Norvasc  Due to recent acute kidney injury would continue with permissive hypertension unless absolutely necessary at BP of 180s  Although initially his blood pressure was higher in emergency department, it later stabilized  Well also initiate IV hydralazine and IV metoprolol for SBP greater than 180 mmHg  We will advise patient to follow-up with next PCP in regards to further better management of ongoing HTN  #2Hyperlipidemia:    Currently on statin therapy for elevated lipids  Previously not at goal of LDL < 100 as indicated in PMHx  Will order fasting lipid panel to assess status of hyperlipidemia  Will restart Zocor 40 mg po QHS  Patient was counseled in regards to Appropriate nutritional and lifestyle modifications  #3 small bowel obstruction?/Ileus?   NG tube placed  Clinically patient appears to be slightly improving  As per primary care    #4 UTI  Continue Rocephin as per primary    #5 DVT prophylaxis  On Lovenox        Counseling / Coordination of Care Time: 39 minutes  Greater than 50% of total time spent on patient counseling and coordination of care  Collaboration of Care: Were Recommendations Directly Discussed with Primary Treatment Team? - No     History of Present Illness:    Kristan Katz is a 67 y o  male who is originally admitted to the Dr Baltazar Stuart  service on 2/14/2018 due to  Hydronephrosis  Patient has extensive urologic history and is Status post day II exploratory laparotomy, lysis of abdominal adhesions ureteral lysis of the left ureter and closure of bladder diverticulum  patient is extremely poor historian and unable to give me a clear details as to why he is currently in hospital, reports right after laparotomy he had rough night in terms of nausea and vomiting  NG tube was placed reports "feeling better"  His abdomen is distended and he has an apparent ileus with hyperactive bowel sounds and hiccups  He is awake alert and oriented  His abdomen is soft but distended  No tenderness  We are consulted for medical management?      Review of Systems:    Review of Systems  Does report abdominal distention and abdominal discomfort no sore vomiting "yesterday but I feel better today since they put the tube and enclosed  Past Medical and Surgical History:     Past Medical History:   Diagnosis Date    Back pain     Ceron disease     BPH (benign prostatic hyperplasia)     Cancer (HCC)     melanoma    Cataract     Right eye    Chronic pain disorder     DDD (degenerative disc disease), lumbar     Depression     Diverticulosis     GERD (gastroesophageal reflux disease)     History of cardiac murmur     Past    History of melanoma     Was on back in early 1990's    History of rheumatic fever     Childhood    [place] (hard of hearing)     Hydronephrosis     Hypertension     Kidney stone     Multiple times    Neck pain     Nervous breakdown     History of due to reaction to psych med which he was on for anxiety/depression and became suicidal  Numbness and tingling in both hands     Numbness and tingling of both feet     PONV (postoperative nausea and vomiting)     PVD (peripheral vascular disease) (HCC)     RBBB     Scoliosis     Seasonal allergies     Tinnitus     Wears glasses     Wears partial dentures     Upper       Past Surgical History:   Procedure Laterality Date    ABDOMINAL SURGERY      When young had procedure for improviing circulation to left lower extremety    BACK SURGERY      Lumbar- not sure what was done   [facility] CARDIAC CATHETERIZATION      Approximately 2007- no blockages    CARPAL TUNNEL RELEASE Bilateral     CATARACT EXTRACTION Left     COLONOSCOPY      CYST REMOVAL      Robotic procedure to remove benign cyst from lower left lung    CYSTOSCOPY      ESOPHAGOGASTRODUODENOSCOPY      NE CYSTO/URETERO W/LITHOTRIPSY &INDWELL STENT INSRT Left 1/3/2018    Procedure: CYSTOSCOPY URETEROSCOPY, RETROGRADE PYELOGRAM AND INSERTION STENT URETERAL;  Surgeon: Noemi Cogan, MD;  [facility];  Service: Urology    NE CYSTOTOMY,EXCIS BLADDER TIC N/A 2/14/2018    Procedure: ABDOMINAL EXPLORATION; CLOSURE OF BLADDER DIVERTICULITIS;  Surgeon: Noemi Cogan, MD;  [facility];  Service: Urology    NE INCISE/DRAIN BLADDER N/A 2/14/2018    Procedure: OPEN SUPRAPUBIC TUBE PLACEMENT;  Surgeon: Noemi Cogan, MD;  [facility];  Service: Urology    NE RELEASE Thomasena Mini FIBROSIS Left 2/14/2018    Procedure: LYSIS OF ADHESIONS; URETEROLYSIS ;  Surgeon: Noemi Cogan, MD;  [facility];  Service: Urology    SKIN CANCER EXCISION      Melanoma removal on back in early 1990's    TOE AMPUTATION Left     All 5 toes left foot were amputated due to poor circulation     WRIST SURGERY Bilateral     Ulnar nerve on right arm and both wrists are fused       Meds/Allergies:    all medications and allergies reviewed    Allergies:    Allergies   Allergen Reactions    Iodine Shortness Of Breath, Swelling and Hives Contrast dye causes respiratory distress  Iodine causes skin rash    Mercury Hives and Swelling    Shellfish-Derived Products Hives, Shortness Of Breath and Anaphylaxis     Anaphylaxis    Augmentin [Amoxicillin-Pot Clavulanate] GI Intolerance, Rash and Diarrhea     Diarrhea    Lidoderm [Lidocaine] Rash     Lidoderm patch caused rash    Penicillins Rash, Swelling and Hives    Sulfa Antibiotics Hives and Swelling       Social History:     Marital Status: Single    Substance Use History:   History   Alcohol Use    Yes     Comment: Very rare  History   Smoking Status    Former Smoker    Packs/day: 1 00    Years: 15 00    Types: Cigarettes    Quit date: 2/5/1970   Smokeless Tobacco    Never Used     Comment: Quit 50 years     History   Drug Use No       Family History:    non-contributory    Physical Exam:     Vitals:   Blood Pressure: 132/63 (02/16/18 0916)  Pulse: 81 (02/16/18 0705)  Temperature: 99 6 °F (37 6 °C) (02/16/18 0705)  Temp Source: Temporal (02/16/18 0705)  Respirations: 20 (02/16/18 0705)  Height: 6' (182 9 cm) (02/14/18 0557)  Weight - Scale: 104 kg (230 lb) (02/14/18 0557)  SpO2: 90 % (02/16/18 0705)    Physical Exam    Constitutional: He is oriented to person, place, and time  He appears well-developed and well-nourished  No distress  Obese, pleasant, supine in bed  NG tube in place  Red drainage in NG suction canister  The patient recently consumed a red jello  Eyes: EOM are normal    Neck: Normal range of motion  Abdominal:   Abdomen distended with tympany to percussion  Scattered for bowel sounds with occasional  Hyperactive bowel sounds present  Midline incision with Mepilex dressing in place, clean, SP tube in place with clear pink urine  Bolden catheter draining similar clear pink urine with no clot in either drainage bag   LENNOX drain with scant serosanguineous output  Expected tenderness around the incision   no guarding is appreciated no rebound tenderness appreciated  Musculoskeletal: He exhibits no edema  Neurological: He is alert and oriented to person, place, and time  Skin: Skin is warm and dry  He is not diaphoretic  Psychiatric: He has a normal mood and affect  His behavior is normal  Judgment and thought content normal    No neurologic deficits identified         Additional Data:     Lab Results: I have personally reviewed pertinent reports  Results from last 7 days  Lab Units 02/15/18  0505   WBC Thousand/uL 9 77   HEMOGLOBIN g/dL 13 1   HEMATOCRIT % 38 9   PLATELETS Thousands/uL 262       Results from last 7 days  Lab Units 02/15/18  0505   SODIUM mmol/L 138   POTASSIUM mmol/L 4 0   CHLORIDE mmol/L 104   CO2 mmol/L 25   BUN mg/dL 16   CREATININE mg/dL 1 21   CALCIUM mg/dL 8 5   GLUCOSE RANDOM mg/dL 116           Imaging: I have personally reviewed pertinent reports  Xr Chest Portable    Result Date: 2/15/2018  Narrative: CHEST INDICATION: confirm ngt placement COMPARISON:  8/14/2010  EXAM PERFORMED/VIEWS:  XR CHEST PORTABLE IMAGES:  1 FINDINGS:  Nasogastric tube tip overlies the stomach  Cardiomediastinal silhouette appears unremarkable  Bibasilar subsegmental atelectasis is present  Visualized osseous structures appear within normal limits for the patient's age  Impression: Bibasilar subsegmental atelectasis  Workstation performed: [id number]     Xr Abdomen Obstruction Series    Result Date: 2/16/2018  Narrative: OBSTRUCTION SERIES INDICATION:  NG tube  Postop ileus  COMPARISON: Chest x-ray from 2/15/2018  VIEWS:  (Supine, erect abdomen and upright chest) IMAGES:  4 FINDINGS: There is a nonobstructive bowel gas pattern  No free air beneath the hemidiaphragms  No pathologic calcifications or soft tissue masses evident  Osseous structures are unremarkable  Examination of the chest reveals a normal cardiomediastinal silhouette  Lungs are clear  Again seen is nasogastric tube in the stomach  A left ureteral stent is again noted   Skin staples are seen in the left pelvis  Impression: Unremarkable bowel gas pattern  Again seen is nasogastric tube in the stomach  Workstation performed: [id number]     Ct Renal Stone Study Abdomen Pelvis Without Contrast    Result Date: 2/5/2018  Narrative: CT ABDOMEN AND PELVIS WITHOUT IV CONTRAST - LOW DOSE RENAL STONE INDICATION: Right flank pain COMPARISON: CT abdomen pelvis 12/16/2017 TECHNIQUE:  Low dose thin section CT examination of the abdomen and pelvis was performed without intravenous or oral contrast according to a protocol specifically designed to evaluate for urinary tract calculus  Reformatted images were created in axial,  sagittal, and coronal planes  Evaluation for pathology in the abdomen and pelvis that is unrelated to urinary tract calculi is limited  Radiation dose length product (DLP) for this visit:  789 mGy-cm   This examination, like all CT scans performed in the [facility], was performed utilizing techniques to minimize radiation dose exposure, including the use of iterative reconstruction and automated exposure control  FINDINGS: RIGHT KIDNEY AND URETER: 1 mm punctate nonobstructing right midpole renal calculus  No hydronephrosis or hydroureter  No perinephric collection  LEFT KIDNEY AND URETER: No urinary tract calculi  There is a left ureteral stent in place  There is soft tissue thickening along the lateral aspect of the left ureteral stent along the mid to distal 3rd of the left ureter (series 601 image 84)  No hydronephrosis or hydroureter  No perinephric collection  URINARY BLADDER: There is a catheter within the bladder  There is diffuse bladder wall thickening and adjacent fat stranding  Redemonstration of a posterior bladder diverticulum  Bibasilar dependent atelectasis  Trace left pleural effusion  Trace pericardial effusion   Limited low radiation dose noncontrast CT evaluation demonstrates no clinically significant abnormality of liver, spleen, pancreas, or adrenal glands  Multiple hepatic cysts are present  There are gallstone(s) within the gallbladder, without pericholecystic inflammatory changes  No ascites or lymphadenopathy  Colonic diverticula are noted, without evidence to suggest acute diverticulitis  Visualized bowel appears otherwise unremarkable  Limited evaluation demonstrates no evidence to suggest acute appendicitis  No acute fracture or destructive osseous lesion is identified  Small fat-containing ventral abdominal wall hernia  Impression: 1  Urinary bladder wall thickening and surrounding inflammatory changes as well as inflammatory changes surrounding the left ureter suggestive of cystitis/ureteritis  Correlation with urinalysis/urine culture recommended  2   Nonobstructing right nephrolithiasis  3   Cholelithiasis  4   Diverticulosis without evidence of diverticulitis  Findings are consistent with the preliminary report from [facility] which was provided shortly after completion of the exam  Workstation performed: [id number]     Radiology Results    Result Date: 2/5/2018  Narrative: Ordered by an unspecified provider  EKG, Pathology, and Other Studies Reviewed on Admission:   · EKG: noncontributory    ** Please Note: This note has been constructed using a voice recognition system   **

## 2018-02-16 NOTE — PROGRESS NOTES
Progress Note - Urology  Matt Basilio 1945, 67 y o  male MRN: [id number]    Unit/Bed#: E5 -01 Encounter: [id number]    Postoperative ileus ([address] )   Assessment & Plan    NG tube with 1300 cc out, but patient consuming the majority of a clear liquid tray x2 during that time frame  Passing flatus  Persistent hyper active bowel sounds on auscultation  NG tube in place with red drainage but patient recently consumed red jello  Plan:  Clamp NG tube  Continue clear liquids  * Bladder diverticulum   Assessment & Plan    Procedure(s):  ABDOMINAL EXPLORATION; CLOSURE OF BLADDER DIVERTICULITIS  LYSIS OF ADHESIONS; URETEROLYSIS   OPEN SUPRAPUBIC TUBE PLACEMENT  Surgeon(s):  MD Latonia Bardales PA-C  2/14/2018    Suprapubic tube and Bolden catheter both draining clear pink urine  LENNOX drain with serosanguineous drainage  Subjective/Objective     Subjective:   Patient reports tolerating clear liquids with NG tube to suction  No nausea or vomiting  Reports pain medications are helping to control his abdominal soreness surrounding his incision  No acute events overnight  Patient with his wife at the bedside  Objective:  Vitals: Blood pressure 132/63, pulse 81, temperature 99 6 °F (37 6 °C), temperature source Temporal, resp  rate 20, height 6' (1 829 m), weight 104 kg (230 lb), SpO2 90 %  ,Body mass index is 31 19 kg/m²        Intake/Output Summary (Last 24 hours) at 02/16/18 1441  Last data filed at 02/16/18 1038   Gross per 24 hour   Intake                0 ml   Output             4475 ml   Net            -4475 ml       Invasive Devices     Peripheral Intravenous Line            Peripheral IV 02/14/18 Left Hand 2 days          Drain            Closed/Suction Drain Right RLQ Bulb 10 Fr  2 days    Suprapubic Catheter Latex 16 Fr  2 days    Urethral Catheter Latex 16 Fr  2 days    NG/OG/Enteral Tube Nasogastric 12 Fr Right nares 1 day                Physical Exam Constitutional: He is oriented to person, place, and time  He appears well-developed and well-nourished  No distress  Obese, pleasant, supine in bed  NG tube in place  Red drainage in NG suction canister  The patient recently consumed a red jello  Eyes: EOM are normal    Neck: Normal range of motion  Abdominal:   Abdomen distended with tympany to percussion  Hyperactive bowel sounds present  Midline incision with Mepilex dressing in place, clean, SP tube in place with clear pink urine  Bolden catheter draining similar clear pink urine with no clot in either drainage bag   LENNOX drain with scant serosanguineous output  Expected tenderness around the incision  Musculoskeletal: He exhibits no edema  Neurological: He is alert and oriented to person, place, and time  Skin: Skin is warm and dry  He is not diaphoretic  Psychiatric: He has a normal mood and affect   His behavior is normal  Judgment and thought content normal            Labs:  Recent Labs      02/14/18   1131  02/15/18   0505   WBC  10 60*  9 77     Recent Labs      02/14/18   1131  02/15/18   0505   HGB  13 7  13 1       Recent Labs      02/14/18   1131  02/15/18   0505   CREATININE  1 27  1 21   Eleanor Najjar, PA-C  Date: 2/16/2018 Time: 2:41 PM

## 2018-02-17 PROBLEM — M25.511 ACUTE PAIN OF RIGHT SHOULDER: Status: ACTIVE | Noted: 2018-02-17

## 2018-02-17 PROBLEM — N20.0 KIDNEY STONE: Status: ACTIVE | Noted: 2017-12-15

## 2018-02-17 PROBLEM — I10 ESSENTIAL HYPERTENSION: Status: ACTIVE | Noted: 2018-02-17

## 2018-02-17 PROCEDURE — 99222 1ST HOSP IP/OBS MODERATE 55: CPT | Performed by: INTERNAL MEDICINE

## 2018-02-17 PROCEDURE — 99024 POSTOP FOLLOW-UP VISIT: CPT | Performed by: PHYSICAL MEDICINE & REHABILITATION

## 2018-02-17 RX ORDER — CYCLOBENZAPRINE HCL 10 MG
10 TABLET ORAL 3 TIMES DAILY PRN
Status: DISCONTINUED | OUTPATIENT
Start: 2018-02-17 | End: 2018-02-18 | Stop reason: HOSPADM

## 2018-02-17 RX ADMIN — AMLODIPINE BESYLATE 10 MG: 10 TABLET ORAL at 08:15

## 2018-02-17 RX ADMIN — PENTOXIFYLLINE 400 MG: 400 TABLET, EXTENDED RELEASE ORAL at 11:20

## 2018-02-17 RX ADMIN — HYDROCODONE BITARTRATE AND ACETAMINOPHEN 1 TABLET: 5; 325 TABLET ORAL at 08:32

## 2018-02-17 RX ADMIN — DIVALPROEX SODIUM 500 MG: 500 TABLET, DELAYED RELEASE ORAL at 17:00

## 2018-02-17 RX ADMIN — CYCLOBENZAPRINE HYDROCHLORIDE 10 MG: 10 TABLET, FILM COATED ORAL at 11:02

## 2018-02-17 RX ADMIN — HYDROMORPHONE HYDROCHLORIDE 1 MG: 1 INJECTION, SOLUTION INTRAMUSCULAR; INTRAVENOUS; SUBCUTANEOUS at 18:18

## 2018-02-17 RX ADMIN — FAMOTIDINE 20 MG: 10 INJECTION, SOLUTION INTRAVENOUS at 21:18

## 2018-02-17 RX ADMIN — DEXTROSE AND SODIUM CHLORIDE 50 ML/HR: 5; 450 INJECTION, SOLUTION INTRAVENOUS at 14:09

## 2018-02-17 RX ADMIN — PENTOXIFYLLINE 400 MG: 400 TABLET, EXTENDED RELEASE ORAL at 08:15

## 2018-02-17 RX ADMIN — DIVALPROEX SODIUM 500 MG: 500 TABLET, DELAYED RELEASE ORAL at 08:15

## 2018-02-17 RX ADMIN — PENTOXIFYLLINE 400 MG: 400 TABLET, EXTENDED RELEASE ORAL at 17:00

## 2018-02-17 RX ADMIN — ENOXAPARIN SODIUM 40 MG: 40 INJECTION SUBCUTANEOUS at 08:15

## 2018-02-17 RX ADMIN — CEFTRIAXONE 1000 MG: 1 INJECTION, SOLUTION INTRAVENOUS at 19:34

## 2018-02-17 RX ADMIN — DEXTROSE AND SODIUM CHLORIDE 100 ML/HR: 5; 450 INJECTION, SOLUTION INTRAVENOUS at 02:42

## 2018-02-17 RX ADMIN — HYDROMORPHONE HYDROCHLORIDE 0.5 MG: 1 INJECTION, SOLUTION INTRAMUSCULAR; INTRAVENOUS; SUBCUTANEOUS at 13:11

## 2018-02-17 RX ADMIN — Medication 1 TABLET: at 21:18

## 2018-02-17 RX ADMIN — HYDROMORPHONE HYDROCHLORIDE 1 MG: 1 INJECTION, SOLUTION INTRAMUSCULAR; INTRAVENOUS; SUBCUTANEOUS at 23:14

## 2018-02-17 RX ADMIN — FLUTICASONE PROPIONATE 2 SPRAY: 50 SPRAY, METERED NASAL at 08:15

## 2018-02-17 RX ADMIN — HYDROMORPHONE HYDROCHLORIDE 1 MG: 1 INJECTION, SOLUTION INTRAMUSCULAR; INTRAVENOUS; SUBCUTANEOUS at 06:08

## 2018-02-17 RX ADMIN — HYDROMORPHONE HYDROCHLORIDE 1 MG: 1 INJECTION, SOLUTION INTRAMUSCULAR; INTRAVENOUS; SUBCUTANEOUS at 02:12

## 2018-02-17 RX ADMIN — FAMOTIDINE 20 MG: 10 INJECTION, SOLUTION INTRAVENOUS at 08:15

## 2018-02-17 RX ADMIN — ACETAMINOPHEN 650 MG: 325 TABLET, FILM COATED ORAL at 17:00

## 2018-02-17 NOTE — PROGRESS NOTES
Mild abd pain  Rt shoulder sore, chronic  Afeb, VSS    Abd soft  Urine clear   Minimal LENNOX output     Plan - OOB walking  Probably home tomorrow, SP x 2 wk

## 2018-02-17 NOTE — ASSESSMENT & PLAN NOTE
- continue with amlodipine, blood pressure is in fair control, no further recommendation from medicine  Will sign off  Feel free to call with any questions or concerns

## 2018-02-17 NOTE — PROGRESS NOTES
Progress Note - Tres Butts 1945, 67 y o  male MRN: [id number]    Unit/Bed#: E5 -01 Encounter: [id number]    Primary Care Provider: Ramona Lance MD   Date and time admitted to hospital: 2018  5:31 AM        Acute pain of right shoulder   Assessment & Plan    - muscular Skeletal in nature, patient has pain with positioning in bed and deep breath  - continue with opiates as done so far, added low dose of Flexeril p r n   - patient allergic to lidocaine patch  - apply warm compress        [address]operative ileus [address] [address] )   Assessment & Plan    - NG tube has been removed, diet has been advanced, care as per primary team        Bladder diverticulum   Assessment & Plan    - care as per primary        * Essential hypertension   Assessment & Plan    - continue with amlodipine, blood pressure is in fair control, no further recommendation from medicine  Will sign off  Feel free to call with any questions or concerns  VTE Pharmacologic Prophylaxis: yes  Pharmacologic: Enoxaparin (Lovenox)  Mechanical: Mechanical VTE prophylaxis in place  Was care plan discussed with the Primary service today?: Yes    Education and Discussions with Family / Patient:  Patient and wife    Time Spent for Care: 45 minutes  More than 50% of total time spent on counseling and coordination of care as described above  Is patient acceptable for discharge from medicine standpoint?: Yes  Discharge Recommendations:  Continue Norvasc as per home dose, discharged with Flexeril if patient continues to have shoulder pain  If shoulder pain continues might benefit for outpatient PT    Subjective:   Patient complaining of right shoulder pain, he would like to be out of the bed  No other complaints    Objective:     Vitals:   Temp (24hrs), Av 2 °F (37 3 °C), Min:98 °F (36 7 °C), Max:100 3 °F (37 9 °C)    HR:  [76-88] 76  Resp:  [18-20] 20  BP: (128-158)/(68-80) 158/71  SpO2:  [92 %-93 %] 93 %  Body mass index is 31 19 kg/m²  Input and Output Summary (last 24 hours): Intake/Output Summary (Last 24 hours) at 02/17/18 1535  Last data filed at 02/17/18 1408   Gross per 24 hour   Intake             3065 ml   Output             4125 ml   Net            -1060 ml       Physical Exam:     Physical Exam   Constitutional: He is oriented to person, place, and time  He appears well-developed  Cardiovascular: Normal rate, regular rhythm and normal heart sounds  Exam reveals no friction rub  No murmur heard  Pulmonary/Chest: Effort normal  No respiratory distress  He has no wheezes  He has no rales  Abdominal: Soft  He exhibits no distension  There is no tenderness  There is no rebound  Musculoskeletal: He exhibits no edema  Left TMA   Neurological: He is alert and oriented to person, place, and time  He exhibits normal muscle tone  Skin: Skin is warm  Psychiatric: He has a normal mood and affect  Additional Data:     Labs:      Results from last 7 days  Lab Units 02/15/18  0505   WBC Thousand/uL 9 77   HEMOGLOBIN g/dL 13 1   HEMATOCRIT % 38 9   PLATELETS Thousands/uL 262       Results from last 7 days  Lab Units 02/15/18  0505   SODIUM mmol/L 138   POTASSIUM mmol/L 4 0   CHLORIDE mmol/L 104   CO2 mmol/L 25   BUN mg/dL 16   CREATININE mg/dL 1 21   CALCIUM mg/dL 8 5   GLUCOSE RANDOM mg/dL 116           * I Have Reviewed All Lab Data Listed Above  * Additional Pertinent Lab Tests Reviewed: All Labs Within Last 24 Hours Reviewed    Imaging:    Imaging Reports Reviewed Today Include:  None  Imaging Personally Reviewed by Myself Includes:  None    Cultures:   Blood Culture: No results found for: BLOODCX  Urine Culture:   Lab Results   Component Value Date    URINECX (A) 02/05/2018     70,000-79,000 cfu/ml Staphylococcus coagulase negative    URINECX 20,000-29,000 cfu/ml Morganella morganii (A) 02/05/2018    URINECX No Growth <1000 cfu/mL 12/29/2017     Sputum Culture: No components found for: SPUTUMCX  Wound Culture:  No results found for: WOUNDCULT    Last 24 Hours Medication List:     Current Facility-Administered Medications:  acetaminophen 650 mg Oral Q4H PRN Lorrie England, RUBENS    amLODIPine 10 mg Oral Daily Lorrie England, PA-C    cefTRIAXone 1,000 mg Intravenous Q24H Lorrie England, PA-WERNER Last Rate: 1,000 mg (02/16/18 3935)   cyclobenzaprine 10 mg Oral TID PRN Elayne Nguyen MD    dextrose 5 % and sodium chloride 0 45 % 50 mL/hr Intravenous Continuous Elayne Nguyen MD Last Rate: 50 mL/hr (02/17/18 1409)   divalproex sodium 500 mg Oral BID Lorrie England, PA-WERNER    enoxaparin 40 mg Subcutaneous Daily Lorrie Tomás, PA-WERNER    famotidine 20 mg Intravenous Q12H [facility] Chery Dumont MD    fluticasone 2 spray Each Nare Daily Lorrie England, PA-C    HYDROcodone-acetaminophen 1 tablet Oral Q6H PRN Chery Dumont MD    HYDROmorphone 0 5 mg Intravenous Q1H PRN Lorrie England, PA-C    HYDROmorphone 1 mg Intravenous Q4H PRN Lorrie England, PA-C    ondansetron 4 mg Intravenous Q4H PRN Lorrie England, PA-C    pentoxifylline 400 mg Oral TID With Meals Lorrie England, PA-C    senna-docusate sodium 1 tablet Oral HS Chery Dumont MD         Today, Patient Was Seen By: Elayne Nguyen MD    ** Please Note: Dragon 360 Dictation voice to text software may have been used in the creation of this document   **

## 2018-02-17 NOTE — ASSESSMENT & PLAN NOTE
- muscular Skeletal in nature, patient has pain with positioning in bed and deep breath  - continue with opiates as done so far, added low dose of Flexeril p r n   - patient allergic to lidocaine patch  - apply warm compress

## 2018-02-18 VITALS
DIASTOLIC BLOOD PRESSURE: 72 MMHG | OXYGEN SATURATION: 94 % | HEART RATE: 82 BPM | BODY MASS INDEX: 31.15 KG/M2 | SYSTOLIC BLOOD PRESSURE: 134 MMHG | WEIGHT: 230 LBS | HEIGHT: 72 IN | RESPIRATION RATE: 20 BRPM | TEMPERATURE: 99.5 F

## 2018-02-18 PROCEDURE — 99024 POSTOP FOLLOW-UP VISIT: CPT | Performed by: UROLOGY

## 2018-02-18 RX ORDER — HYDROCODONE BITARTRATE AND ACETAMINOPHEN 5; 325 MG/1; MG/1
1 TABLET ORAL EVERY 4 HOURS PRN
Qty: 25 TABLET | Refills: 0 | Status: SHIPPED | OUTPATIENT
Start: 2018-02-18 | End: 2018-02-28

## 2018-02-18 RX ADMIN — AMLODIPINE BESYLATE 10 MG: 10 TABLET ORAL at 09:30

## 2018-02-18 RX ADMIN — HYDROMORPHONE HYDROCHLORIDE 1 MG: 1 INJECTION, SOLUTION INTRAMUSCULAR; INTRAVENOUS; SUBCUTANEOUS at 05:22

## 2018-02-18 RX ADMIN — CYCLOBENZAPRINE HYDROCHLORIDE 10 MG: 10 TABLET, FILM COATED ORAL at 00:21

## 2018-02-18 RX ADMIN — FLUTICASONE PROPIONATE 2 SPRAY: 50 SPRAY, METERED NASAL at 09:31

## 2018-02-18 RX ADMIN — DEXTROSE AND SODIUM CHLORIDE 50 ML/HR: 5; 450 INJECTION, SOLUTION INTRAVENOUS at 00:15

## 2018-02-18 RX ADMIN — PENTOXIFYLLINE 400 MG: 400 TABLET, EXTENDED RELEASE ORAL at 09:30

## 2018-02-18 RX ADMIN — FAMOTIDINE 20 MG: 10 INJECTION, SOLUTION INTRAVENOUS at 09:31

## 2018-02-18 RX ADMIN — DIVALPROEX SODIUM 500 MG: 500 TABLET, DELAYED RELEASE ORAL at 09:31

## 2018-02-18 RX ADMIN — ENOXAPARIN SODIUM 40 MG: 40 INJECTION SUBCUTANEOUS at 09:30

## 2018-02-18 RX ADMIN — HYDROCODONE BITARTRATE AND ACETAMINOPHEN 1 TABLET: 5; 325 TABLET ORAL at 09:31

## 2018-02-18 NOTE — NURSING NOTE
Went over d/c instructions with patient and Wife, they also showed evidence of learning when taught how to switch out large moreira bag to leg bag  Prescription given to patient/wife

## 2018-02-27 ENCOUNTER — APPOINTMENT (OUTPATIENT)
Dept: LAB | Facility: MEDICAL CENTER | Age: 73
End: 2018-02-27
Attending: UROLOGY
Payer: MEDICARE

## 2018-02-27 ENCOUNTER — OFFICE VISIT (OUTPATIENT)
Dept: UROLOGY | Facility: MEDICAL CENTER | Age: 73
End: 2018-02-27
Payer: MEDICARE

## 2018-02-27 VITALS
WEIGHT: 227 LBS | HEIGHT: 72 IN | DIASTOLIC BLOOD PRESSURE: 76 MMHG | BODY MASS INDEX: 30.75 KG/M2 | SYSTOLIC BLOOD PRESSURE: 146 MMHG

## 2018-02-27 DIAGNOSIS — N40.1 URINARY RETENTION DUE TO BENIGN PROSTATIC HYPERPLASIA: ICD-10-CM

## 2018-02-27 DIAGNOSIS — R33.8 URINARY RETENTION DUE TO BENIGN PROSTATIC HYPERPLASIA: ICD-10-CM

## 2018-02-27 DIAGNOSIS — N40.0 BENIGN LOCALIZED HYPERPLASIA OF PROSTATE WITHOUT URINARY OBSTRUCTION: Primary | ICD-10-CM

## 2018-02-27 LAB
ALBUMIN SERPL BCP-MCNC: 2.7 G/DL (ref 3.5–5)
ALP SERPL-CCNC: 64 U/L (ref 46–116)
ALT SERPL W P-5'-P-CCNC: 12 U/L (ref 12–78)
ANION GAP SERPL CALCULATED.3IONS-SCNC: 9 MMOL/L (ref 4–13)
AST SERPL W P-5'-P-CCNC: 11 U/L (ref 5–45)
BILIRUB SERPL-MCNC: 0.65 MG/DL (ref 0.2–1)
BUN SERPL-MCNC: 8 MG/DL (ref 5–25)
CALCIUM SERPL-MCNC: 8.6 MG/DL (ref 8.3–10.1)
CHLORIDE SERPL-SCNC: 106 MMOL/L (ref 100–108)
CO2 SERPL-SCNC: 26 MMOL/L (ref 21–32)
CREAT SERPL-MCNC: 1.18 MG/DL (ref 0.6–1.3)
GFR SERPL CREATININE-BSD FRML MDRD: 61 ML/MIN/1.73SQ M
GLUCOSE P FAST SERPL-MCNC: 92 MG/DL (ref 65–99)
POTASSIUM SERPL-SCNC: 3.6 MMOL/L (ref 3.5–5.3)
PROT SERPL-MCNC: 6.8 G/DL (ref 6.4–8.2)
SODIUM SERPL-SCNC: 141 MMOL/L (ref 136–145)

## 2018-02-27 PROCEDURE — 87086 URINE CULTURE/COLONY COUNT: CPT

## 2018-02-27 PROCEDURE — 36415 COLL VENOUS BLD VENIPUNCTURE: CPT

## 2018-02-27 PROCEDURE — 80053 COMPREHEN METABOLIC PANEL: CPT

## 2018-02-27 PROCEDURE — 99024 POSTOP FOLLOW-UP VISIT: CPT | Performed by: UROLOGY

## 2018-02-27 RX ORDER — LEVOFLOXACIN 5 MG/ML
750 INJECTION, SOLUTION INTRAVENOUS ONCE
Status: CANCELLED | OUTPATIENT
Start: 2018-03-14 | End: 2018-03-14

## 2018-02-27 NOTE — H&P
H&P Exam - Urology   Shalom Collins 67 y o  male MRN: [id number]  Unit/Bed#:  Encounter: [id number]    Assessment/Plan     Assessment:  Urinary retention secondary to BPH with obstruction, status post open bladder diverticulectomy  He has suprapubic tube in is now ready for trans urethral resection of the prostate as a staged procedure along with removal of left ureteral stent   Plan:  Cystoscopy trans urethral resection of the prostate, left ureteral stent removal    History of Present Illness   HPI:  Shalom Collins is a 67 y o  male who underwent exploratory laparotomy with open bladder diverticulectomy 2/14/2018  He has done well with this and his staples are being removed today and he has a suprapubic tube  For the second part of this stage procedure, he will undergo trans urethral resection of the prostate to relieve his outlet obstruction  I have explained procedure to him, along with the risks of bleeding infection retrograde ejaculation and incontinence  He understands and wishes to proceed      Review of Systems    Historical Information   Past Medical History:   Diagnosis Date    Back pain     Ceron disease     BPH (benign prostatic hyperplasia)     Cancer (HCC)     melanoma    Cataract     Right eye    Chronic pain disorder     DDD (degenerative disc disease), lumbar     Depression     Diverticulosis     GERD (gastroesophageal reflux disease)     History of cardiac murmur     Past    History of melanoma     Was on back in early 1990's    History of rheumatic fever     Childhood    [place] (hard of hearing)     Hydronephrosis     Hypertension     Kidney stone     Multiple times    Neck pain     Nervous breakdown     History of due to reaction to psych med which he was on for anxiety/depression and became suicidal    Numbness and tingling in both hands     Numbness and tingling of both feet     PONV (postoperative nausea and vomiting)     PVD (peripheral vascular disease) ([facility] [address] )  RBBB     Scoliosis     Seasonal allergies     Tinnitus     Wears glasses     Wears partial dentures     Upper     Past Surgical History:   Procedure Laterality Date    ABDOMINAL SURGERY      When young had procedure for improviing circulation to left lower extremety    BACK SURGERY      Lumbar- not sure what was done   Rickey Dupont CARDIAC CATHETERIZATION      Approximately 2007- no blockages    CARPAL TUNNEL RELEASE Bilateral     CATARACT EXTRACTION Left     COLONOSCOPY      CYST REMOVAL      Robotic procedure to remove benign cyst from lower left lung    CYSTOSCOPY      ESOPHAGOGASTRODUODENOSCOPY      SD CYSTO/URETERO W/LITHOTRIPSY &INDWELL STENT INSRT Left 1/3/2018    Procedure: CYSTOSCOPY URETEROSCOPY, RETROGRADE PYELOGRAM AND INSERTION STENT URETERAL;  Surgeon: Wood Patel MD;  [facility];  Service: Urology    SD CYSTOTOMY,EXCIS BLADDER TIC N/A 2/14/2018    Procedure: ABDOMINAL EXPLORATION; CLOSURE OF BLADDER DIVERTICULITIS;  Surgeon: Wood Patel MD;  [facility];  Service: Urology    SD INCISE/DRAIN BLADDER N/A 2/14/2018    Procedure: OPEN SUPRAPUBIC TUBE PLACEMENT;  Surgeon: Wood Patel MD;  [facility];  Service: Urology    SD RELEASE Eris Furl FIBROSIS Left 2/14/2018    Procedure: LYSIS OF ADHESIONS; URETEROLYSIS ;  Surgeon: Wood Patel MD;  [facility];  Service: Urology    SKIN CANCER EXCISION      Melanoma removal on back in early 1990's    TOE AMPUTATION Left     All 5 toes left foot were amputated due to poor circulation     WRIST SURGERY Bilateral     Ulnar nerve on right arm and both wrists are fused     Social History   History   Alcohol Use    Yes     Comment: Very rare       History   Drug Use No     History   Smoking Status    Former Smoker    Packs/day: 1 00    Years: 15 00    Types: Cigarettes    Quit date: 2/5/1970   Smokeless Tobacco    Never Used     Comment: Quit 50 years     Family History: non-contributory    Meds/Allergies all medications and allergies reviewed  Allergies   Allergen Reactions    Iodine Shortness Of Breath, Swelling and Hives     Contrast dye causes respiratory distress  Iodine causes skin rash    Mercury Hives and Swelling    Shellfish-Derived Products Hives, Shortness Of Breath and Anaphylaxis     Anaphylaxis    Augmentin [Amoxicillin-Pot Clavulanate] GI Intolerance, Rash and Diarrhea     Diarrhea    Lidoderm [Lidocaine] Rash     Lidoderm patch caused rash    Penicillins Rash, Swelling and Hives    Sulfa Antibiotics Hives and Swelling       Objective   Vitals: Blood pressure 146/76, height 6' (1 829 m), weight 103 kg (227 lb)  Invasive Devices     Drain            Closed/Suction Drain Right RLQ Bulb 10 Fr  12 days    Suprapubic Catheter Latex 16 Fr  12 days                Physical Exam HEENT:  Awake alert in no apparent distress  Lungs:  Clear to auscultation bilaterally  CVS:  Regular rhythm, S1-S2 are audible no murmurs rubs or gallops  Abdomen:  Soft nontender nondistended  Wound is healing well  Suprapubic tube is in place  Extremities:  No cyanosis or edema, normal range of motion    Lab Results: CBC: No results found for: WBC, HGB, HCT, MCV, PLT, ADJUSTEDWBC, MCH, MCHC, RDW, MPV, NRBC  CMP: No results found for: NA, CL, CO2, ANIONGAP, BUN, CREATININE, GLUCOSE, CALCIUM, AST, ALT, ALKPHOS, PROT, ALBUMIN, BILITOT, EGFR  Imaging: I have personally reviewed pertinent reports  EKG, Pathology, and Other Studies: I have personally reviewed pertinent reports      VTE Prophylaxis: Sequential compression device Juancho Chirinos)     Code Status:  Full

## 2018-02-27 NOTE — PROGRESS NOTES
[facility]  [facility]  [address]  [place], [address] [facility]  [phone]  www  [facility]  org      NAME: Falguni Wells  AGE: 67 y o  SEX: male  : 1945   MRN: [id number]    DATE: 2018  TIME: 8:23 AM    Assessment and Plan               Chief Complaint   No chief complaint on file  History of Present Illness     Status post exploratory laparotomy, with lysis of adhesions and excision of large posterior bladder diverticulum 2018     This is now been closed off and the diverticulum was causing left ureteral constriction and de formation  The left ureter is stented  He is now to be set up for trans urethral resection of the prostate  He has a suprapubic tube    See history and physical     The following portions of the patient's history were reviewed and updated as appropriate: allergies, current medications, past family history, past medical history, past social history, past surgical history and problem list     Review of Systems   Review of Systems    Active Problem List     Patient Active Problem List   Diagnosis    Right bundle melissa block, 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    Aortic regurgitation    BPH with obstruction/lower urinary tract symptoms    Bladder diverticulum    Postoperative ileus ([address] )    Kidney stone    Major depressive disorder, single episode, moderate ([address] )    Peripheral vascular disease ([address] )    Essential hypertension    Acute pain of right shoulder       Objective   /76 (BP Location: Right arm, Patient Position: Sitting)   Ht 6' (1 829 m)   Wt 103 kg (227 lb)   BMI 30 79 kg/m²     Physical Exam    Pertinent Laboratory/Diagnostic Studies:  CBC:   Lab Results   Component Value Date/Time    WBC 9 77 02/15/2018 05:05 AM    RBC 4 24 02/15/2018 05:05 AM    HGB 13 1 02/15/2018 05:05 AM    HCT 38 9 02/15/2018 05:05 AM    MCV 92 02/15/2018 05:05 AM    MCH 30 9 02/15/2018 05:05 AM    MCHC 33 7 02/15/2018 05:05 AM    RDW 13 9 02/15/2018 05:05 AM    MPV 9 9 02/15/2018 05:05 AM     02/15/2018 05:05 AM    NRBC 0 02/04/2018 11:51 PM    NEUTOPHILPCT 49 02/04/2018 11:51 PM    LYMPHOPCT 36 02/04/2018 11:51 PM    MONOPCT 12 02/04/2018 11:51 PM    EOSPCT 2 02/04/2018 11:51 PM    BASOPCT 1 02/04/2018 11:51 PM    NEUTROABS 2 98 02/04/2018 11:51 PM    LYMPHSABS 2 09 02/04/2018 11:51 PM    MONOSABS 0 68 02/04/2018 11:51 PM    EOSABS 0 11 02/04/2018 11:51 PM     Chemistry Profile:   Lab Results   Component Value Date/Time     02/15/2018 05:05 AM    K 4 0 02/15/2018 05:05 AM     02/15/2018 05:05 AM    CO2 25 02/15/2018 05:05 AM    ANIONGAP 9 02/15/2018 05:05 AM    BUN 16 02/15/2018 05:05 AM    CREATININE 1 21 02/15/2018 05:05 AM    GLUCOSE 116 02/15/2018 05:05 AM    CALCIUM 8 5 02/15/2018 05:05 AM    AST 17 12/29/2017 02:57 PM    ALT 22 12/29/2017 02:57 PM    ALKPHOS 67 12/29/2017 02:57 PM    PROT 6 9 12/29/2017 02:57 PM    BILITOT 0 35 12/29/2017 02:57 PM    EGFR 59 02/15/2018 05:05 AM       Current Medications     Current Outpatient Prescriptions:     amLODIPine (NORVASC) 10 mg tablet, Take 10 mg by mouth daily, Disp: , Rfl:     divalproex sodium (DEPAKOTE) 500 mg EC tablet, Take 500 mg by mouth 2 (two) times a day  , Disp: , Rfl:     HYDROcodone-acetaminophen (NORCO) 5-325 mg per tablet, Take 1 tablet by mouth every 4 (four) hours as needed for pain for up to 10 days Max Daily Amount: 6 tablets, Disp: 25 tablet, Rfl: 0    ibuprofen (MOTRIN) 200 mg tablet, Take 400 mg by mouth every 6 (six) hours as needed for mild pain, Disp: , Rfl:     mometasone (NASONEX) 50 mcg/act nasal spray, 2 sprays into each nostril as needed, Disp: , Rfl:     pentoxifylline (TRENtal) 400 mg ER tablet, Take 400 mg by mouth 3 (three) times a day with meals, Disp: , Rfl:     ranitidine (ZANTAC) 300 MG capsule, Take 300 mg by mouth every evening, Disp: , Rfl:     tamsulosin (FLOMAX) 0 4 mg, Take 0 4 mg by mouth daily with dinner, Disp: , Rfl:     HYDROcodone-acetaminophen (NORCO) 5-325 mg per tablet, Take 1 tablet by mouth every 6 (six) hours as needed for pain for up to 30 doses Max Daily Amount: 4 tablets, Disp: 30 tablet, Rfl: 0    HYDROcodone-acetaminophen (NORCO) 5-325 mg per tablet, Take 1 tablet by mouth every 4 (four) hours as needed for pain for up to 30 doses Max Daily Amount: 6 tablets, Disp: 30 tablet, Rfl: 0        Terry Mane MD          Subjective

## 2018-02-28 LAB — BACTERIA UR CULT: NORMAL

## 2018-03-06 DIAGNOSIS — N13.9 BENIGN LOCALIZED HYPERPLASIA OF PROSTATE WITH URINARY OBSTRUCTION AND LOWER URINARY TRACT SYMPTOMS: Primary | ICD-10-CM

## 2018-03-06 DIAGNOSIS — N40.1 BENIGN LOCALIZED HYPERPLASIA OF PROSTATE WITH URINARY OBSTRUCTION AND LOWER URINARY TRACT SYMPTOMS: Primary | ICD-10-CM

## 2018-03-06 RX ORDER — TAMSULOSIN HYDROCHLORIDE 0.4 MG/1
0.4 CAPSULE ORAL
Qty: 90 CAPSULE | Refills: 3 | Status: SHIPPED | OUTPATIENT
Start: 2018-03-06 | End: 2018-06-29 | Stop reason: SDUPTHER

## 2018-03-06 NOTE — TELEPHONE ENCOUNTER
An Auto-fax Refill Request for Tamsulosin was received from [facility]/pharmacy #[id number] Patient was last seen in January, 2018 by Dr Tracy Posdaa; continuation of the medication was approved at that time  Please refill/E-scribe script  Thank you!

## 2018-03-08 ENCOUNTER — ANESTHESIA EVENT (OUTPATIENT)
Dept: PERIOP | Facility: HOSPITAL | Age: 73
End: 2018-03-08
Payer: MEDICARE

## 2018-03-13 NOTE — PRE-PROCEDURE INSTRUCTIONS
Pre-Surgery Instructions:   Medication Instructions    amLODIPine (NORVASC) 10 mg tablet Instructed patient per Anesthesia Guidelines   divalproex sodium (DEPAKOTE) 500 mg EC tablet Instructed patient per Anesthesia Guidelines   ibuprofen (MOTRIN) 200 mg tablet Instructed patient per Anesthesia Guidelines   mometasone (NASONEX) 50 mcg/act nasal spray Instructed patient per Anesthesia Guidelines   pentoxifylline (TRENtal) 400 mg ER tablet Instructed patient per Anesthesia Guidelines   ranitidine (ZANTAC) 300 MG capsule Instructed patient per Anesthesia Guidelines   tamsulosin (FLOMAX) 0 4 mg Instructed patient per Anesthesia Guidelines  Spoke to patient via telephone  Patient was instructed to take amlodipine and depakote am of surgery with a sip of water as per anesthesia guidelines  Patient was instructed to avoid NSAIDS, Aspirin, Vitamins, and supplements today and prior to surgery tomorrow  [facility] pre-op instructions reviewed  Pre-op bathing reviewed with patient

## 2018-03-14 ENCOUNTER — HOSPITAL ENCOUNTER (OUTPATIENT)
Facility: HOSPITAL | Age: 73
Setting detail: OUTPATIENT SURGERY
Discharge: HOME | End: 2018-03-15
Attending: UROLOGY | Admitting: STUDENT IN AN ORGANIZED HEALTH CARE EDUCATION/TRAINING PROGRAM
Payer: MEDICARE

## 2018-03-14 ENCOUNTER — ANESTHESIA (OUTPATIENT)
Dept: PERIOP | Facility: HOSPITAL | Age: 73
End: 2018-03-14
Payer: MEDICARE

## 2018-03-14 ENCOUNTER — HOSPITAL ENCOUNTER (OUTPATIENT)
Dept: RADIOLOGY | Facility: HOSPITAL | Age: 73
Setting detail: OUTPATIENT SURGERY
Discharge: HOME | End: 2018-03-14
Payer: MEDICARE

## 2018-03-14 DIAGNOSIS — N40.1 URINARY RETENTION DUE TO BENIGN PROSTATIC HYPERPLASIA: ICD-10-CM

## 2018-03-14 DIAGNOSIS — R33.8 URINARY RETENTION DUE TO BENIGN PROSTATIC HYPERPLASIA: ICD-10-CM

## 2018-03-14 DIAGNOSIS — N40.0 BENIGN LOCALIZED HYPERPLASIA OF PROSTATE WITHOUT URINARY OBSTRUCTION: ICD-10-CM

## 2018-03-14 PROCEDURE — 51600 INJECTION FOR BLADDER X-RAY: CPT | Performed by: UROLOGY

## 2018-03-14 PROCEDURE — 52601 PROSTATECTOMY (TURP): CPT | Performed by: UROLOGY

## 2018-03-14 PROCEDURE — 88342 IMHCHEM/IMCYTCHM 1ST ANTB: CPT | Performed by: PATHOLOGY

## 2018-03-14 PROCEDURE — 88305 TISSUE EXAM BY PATHOLOGIST: CPT | Performed by: PATHOLOGY

## 2018-03-14 PROCEDURE — C1769 GUIDE WIRE: HCPCS | Performed by: UROLOGY

## 2018-03-14 PROCEDURE — 88341 IMHCHEM/IMCYTCHM EA ADD ANTB: CPT | Performed by: PATHOLOGY

## 2018-03-14 PROCEDURE — 74430 CONTRAST X-RAY BLADDER: CPT

## 2018-03-14 RX ORDER — EPHEDRINE SULFATE 50 MG/ML
INJECTION, SOLUTION INTRAVENOUS AS NEEDED
Status: DISCONTINUED | OUTPATIENT
Start: 2018-03-14 | End: 2018-03-14 | Stop reason: SURG

## 2018-03-14 RX ORDER — LEVOFLOXACIN 5 MG/ML
750 INJECTION, SOLUTION INTRAVENOUS ONCE
Status: COMPLETED | OUTPATIENT
Start: 2018-03-14 | End: 2018-03-14

## 2018-03-14 RX ORDER — MAGNESIUM HYDROXIDE 1200 MG/15ML
LIQUID ORAL AS NEEDED
Status: DISCONTINUED | OUTPATIENT
Start: 2018-03-14 | End: 2018-03-14 | Stop reason: HOSPADM

## 2018-03-14 RX ORDER — AMOXICILLIN 250 MG
1 CAPSULE ORAL
Status: DISCONTINUED | OUTPATIENT
Start: 2018-03-14 | End: 2018-03-15 | Stop reason: HOSPADM

## 2018-03-14 RX ORDER — SENNOSIDES 8.6 MG
650 CAPSULE ORAL EVERY 8 HOURS PRN
COMMUNITY
End: 2018-09-14

## 2018-03-14 RX ORDER — SODIUM CHLORIDE 450 MG/100ML
100 INJECTION, SOLUTION INTRAVENOUS CONTINUOUS
Status: DISCONTINUED | OUTPATIENT
Start: 2018-03-14 | End: 2018-03-15 | Stop reason: HOSPADM

## 2018-03-14 RX ORDER — MORPHINE SULFATE 4 MG/ML
4 INJECTION, SOLUTION INTRAMUSCULAR; INTRAVENOUS
Status: DISCONTINUED | OUTPATIENT
Start: 2018-03-14 | End: 2018-03-15 | Stop reason: HOSPADM

## 2018-03-14 RX ORDER — SODIUM CHLORIDE 9 MG/ML
125 INJECTION, SOLUTION INTRAVENOUS CONTINUOUS
Status: DISCONTINUED | OUTPATIENT
Start: 2018-03-14 | End: 2018-03-15 | Stop reason: HOSPADM

## 2018-03-14 RX ORDER — SORBITOL 30 G/1000ML
IRRIGANT IRRIGATION AS NEEDED
Status: DISCONTINUED | OUTPATIENT
Start: 2018-03-14 | End: 2018-03-14 | Stop reason: HOSPADM

## 2018-03-14 RX ORDER — FENTANYL CITRATE 50 UG/ML
INJECTION, SOLUTION INTRAMUSCULAR; INTRAVENOUS AS NEEDED
Status: DISCONTINUED | OUTPATIENT
Start: 2018-03-14 | End: 2018-03-14 | Stop reason: SURG

## 2018-03-14 RX ORDER — PROPOFOL 10 MG/ML
INJECTION, EMULSION INTRAVENOUS AS NEEDED
Status: DISCONTINUED | OUTPATIENT
Start: 2018-03-14 | End: 2018-03-14 | Stop reason: SURG

## 2018-03-14 RX ORDER — ALBUTEROL SULFATE 90 UG/1
AEROSOL, METERED RESPIRATORY (INHALATION) AS NEEDED
Status: DISCONTINUED | OUTPATIENT
Start: 2018-03-14 | End: 2018-03-14 | Stop reason: SURG

## 2018-03-14 RX ORDER — ONDANSETRON 2 MG/ML
INJECTION INTRAMUSCULAR; INTRAVENOUS AS NEEDED
Status: DISCONTINUED | OUTPATIENT
Start: 2018-03-14 | End: 2018-03-14 | Stop reason: SURG

## 2018-03-14 RX ORDER — MIDAZOLAM HYDROCHLORIDE 1 MG/ML
INJECTION INTRAMUSCULAR; INTRAVENOUS AS NEEDED
Status: DISCONTINUED | OUTPATIENT
Start: 2018-03-14 | End: 2018-03-14 | Stop reason: SURG

## 2018-03-14 RX ORDER — ROCURONIUM BROMIDE 10 MG/ML
INJECTION, SOLUTION INTRAVENOUS AS NEEDED
Status: DISCONTINUED | OUTPATIENT
Start: 2018-03-14 | End: 2018-03-14 | Stop reason: SURG

## 2018-03-14 RX ORDER — ONDANSETRON 2 MG/ML
4 INJECTION INTRAMUSCULAR; INTRAVENOUS EVERY 6 HOURS PRN
Status: DISCONTINUED | OUTPATIENT
Start: 2018-03-14 | End: 2018-03-14 | Stop reason: HOSPADM

## 2018-03-14 RX ORDER — ATROPA BELLADONNA AND OPIUM 16.2; 6 MG/1; MG/1
1 SUPPOSITORY RECTAL EVERY 6 HOURS PRN
Status: DISCONTINUED | OUTPATIENT
Start: 2018-03-14 | End: 2018-03-15 | Stop reason: HOSPADM

## 2018-03-14 RX ORDER — LEVOFLOXACIN 500 MG/1
500 TABLET, FILM COATED ORAL EVERY 24 HOURS
Qty: 4 TABLET | Refills: 0 | Status: SHIPPED | OUTPATIENT
Start: 2018-03-14 | End: 2018-03-18

## 2018-03-14 RX ORDER — LEVOFLOXACIN 5 MG/ML
500 INJECTION, SOLUTION INTRAVENOUS EVERY 24 HOURS
Status: DISCONTINUED | OUTPATIENT
Start: 2018-03-15 | End: 2018-03-15 | Stop reason: HOSPADM

## 2018-03-14 RX ORDER — GLYCOPYRROLATE 0.2 MG/ML
INJECTION INTRAMUSCULAR; INTRAVENOUS AS NEEDED
Status: DISCONTINUED | OUTPATIENT
Start: 2018-03-14 | End: 2018-03-14 | Stop reason: SURG

## 2018-03-14 RX ORDER — HYDROCODONE BITARTRATE AND ACETAMINOPHEN 5; 325 MG/1; MG/1
1 TABLET ORAL EVERY 6 HOURS PRN
Status: DISCONTINUED | OUTPATIENT
Start: 2018-03-14 | End: 2018-03-15 | Stop reason: HOSPADM

## 2018-03-14 RX ORDER — DIVALPROEX SODIUM 500 MG/1
500 TABLET, DELAYED RELEASE ORAL EVERY 12 HOURS SCHEDULED
Status: DISCONTINUED | OUTPATIENT
Start: 2018-03-14 | End: 2018-03-15 | Stop reason: HOSPADM

## 2018-03-14 RX ORDER — HYDROCODONE BITARTRATE AND ACETAMINOPHEN 5; 325 MG/1; MG/1
1-2 TABLET ORAL EVERY 6 HOURS PRN
Qty: 20 TABLET | Refills: 0 | Status: SHIPPED | OUTPATIENT
Start: 2018-03-14 | End: 2018-03-24

## 2018-03-14 RX ORDER — ACETAMINOPHEN 325 MG/1
650 TABLET ORAL EVERY 6 HOURS PRN
Status: DISCONTINUED | OUTPATIENT
Start: 2018-03-14 | End: 2018-03-15 | Stop reason: HOSPADM

## 2018-03-14 RX ORDER — OXYBUTYNIN CHLORIDE 5 MG/1
5 TABLET ORAL 3 TIMES DAILY PRN
Status: DISCONTINUED | OUTPATIENT
Start: 2018-03-14 | End: 2018-03-15 | Stop reason: HOSPADM

## 2018-03-14 RX ORDER — FENTANYL CITRATE 50 UG/ML
25 INJECTION, SOLUTION INTRAMUSCULAR; INTRAVENOUS
Status: DISCONTINUED | OUTPATIENT
Start: 2018-03-14 | End: 2018-03-14 | Stop reason: HOSPADM

## 2018-03-14 RX ADMIN — SODIUM CHLORIDE: 0.9 INJECTION, SOLUTION INTRAVENOUS at 12:27

## 2018-03-14 RX ADMIN — GLYCOPYRROLATE 0.4 MG: 0.2 INJECTION, SOLUTION INTRAMUSCULAR; INTRAVENOUS at 12:15

## 2018-03-14 RX ADMIN — ACETAMINOPHEN 650 MG: 325 TABLET, FILM COATED ORAL at 21:39

## 2018-03-14 RX ADMIN — EPHEDRINE SULFATE 10 MG: 50 INJECTION, SOLUTION INTRAMUSCULAR; INTRAVENOUS; SUBCUTANEOUS at 12:07

## 2018-03-14 RX ADMIN — ONDANSETRON HYDROCHLORIDE 4 MG: 2 INJECTION, SOLUTION INTRAVENOUS at 12:03

## 2018-03-14 RX ADMIN — NEOSTIGMINE METHYLSULFATE 3 MG: 1 INJECTION, SOLUTION INTRAMUSCULAR; INTRAVENOUS; SUBCUTANEOUS at 12:15

## 2018-03-14 RX ADMIN — PROPOFOL 160 MG: 10 INJECTION, EMULSION INTRAVENOUS at 11:25

## 2018-03-14 RX ADMIN — Medication 1 TABLET: at 21:39

## 2018-03-14 RX ADMIN — DIVALPROEX SODIUM 500 MG: 500 TABLET, DELAYED RELEASE ORAL at 21:39

## 2018-03-14 RX ADMIN — PROPOFOL 40 MG: 10 INJECTION, EMULSION INTRAVENOUS at 11:26

## 2018-03-14 RX ADMIN — IOTHALAMATE MEGLUMINE 50 ML: 172 INJECTION URETERAL at 12:55

## 2018-03-14 RX ADMIN — SODIUM CHLORIDE 100 ML/HR: 0.45 INJECTION, SOLUTION INTRAVENOUS at 14:13

## 2018-03-14 RX ADMIN — ALBUTEROL SULFATE 3 PUFF: 90 AEROSOL, METERED RESPIRATORY (INHALATION) at 12:35

## 2018-03-14 RX ADMIN — FENTANYL CITRATE 50 MCG: 50 INJECTION, SOLUTION INTRAMUSCULAR; INTRAVENOUS at 12:30

## 2018-03-14 RX ADMIN — SODIUM CHLORIDE 125 ML/HR: 0.9 INJECTION, SOLUTION INTRAVENOUS at 09:37

## 2018-03-14 RX ADMIN — MIDAZOLAM HYDROCHLORIDE 2 MG: 1 INJECTION, SOLUTION INTRAMUSCULAR; INTRAVENOUS at 11:15

## 2018-03-14 RX ADMIN — DEXAMETHASONE SODIUM PHOSPHATE 8 MG: 10 INJECTION INTRAMUSCULAR; INTRAVENOUS at 11:38

## 2018-03-14 RX ADMIN — FENTANYL CITRATE 100 MCG: 50 INJECTION, SOLUTION INTRAMUSCULAR; INTRAVENOUS at 11:25

## 2018-03-14 RX ADMIN — FENTANYL CITRATE 50 MCG: 50 INJECTION, SOLUTION INTRAMUSCULAR; INTRAVENOUS at 12:15

## 2018-03-14 RX ADMIN — LEVOFLOXACIN: 5 INJECTION, SOLUTION INTRAVENOUS at 11:28

## 2018-03-14 RX ADMIN — EPHEDRINE SULFATE 10 MG: 50 INJECTION, SOLUTION INTRAMUSCULAR; INTRAVENOUS; SUBCUTANEOUS at 12:10

## 2018-03-14 RX ADMIN — LIDOCAINE HYDROCHLORIDE 50 MG: 20 INJECTION, SOLUTION INTRAVENOUS at 11:25

## 2018-03-14 RX ADMIN — ROCURONIUM BROMIDE 50 MG: 10 INJECTION INTRAVENOUS at 11:26

## 2018-03-14 NOTE — ANESTHESIA PREPROCEDURE EVALUATION
Review of Systems/Medical History  Patient summary reviewed  Chart reviewed  History of anesthetic complications PONV    Cardiovascular  Hyperlipidemia, Hypertension on > 1 medication, Valvular heart disease , aortic insufficiency, Dysrhythmias, , PVD,    Pulmonary       GI/Hepatic    GERD well controlled,        Kidney stones, Prostatic disorder, benign prostatic hyperplasia       Endo/Other  Negative endo/other ROS   Obesity    GYN       Hematology   Musculoskeletal    Arthritis     Neurology  Negative neurology ROS      Psychology   Anxiety, Depression , being treated for depression,              Physical Exam    Airway    Mallampati score: II  TM Distance: >3 FB  Neck ROM: full     Dental   Comment: partial, upper dentures,     Cardiovascular  Rhythm: regular, Rate: normal, Cardiovascular exam normal    Pulmonary  Pulmonary exam normal Breath sounds clear to auscultation,     Other Findings        Anesthesia Plan  ASA Score- 2     Anesthesia Type- general with ASA Monitors  Additional Monitors:   Airway Plan:         Plan Factors-Patient not instructed to abstain from smoking on day of procedure  Patient did not smoke on day of surgery  Induction- intravenous  Postoperative Plan- Plan for postoperative opioid use  Informed Consent- Anesthetic plan and risks discussed with patient and spouse 
3

## 2018-03-14 NOTE — ANESTHESIA POSTPROCEDURE EVALUATION
Post-Op Assessment Note      CV Status:  Stable    Mental Status:  Alert and awake    Hydration Status:  Euvolemic    PONV Controlled:  Controlled    Airway Patency:  Patent  Airway: intubated    Post Op Vitals Reviewed: Yes          Staff: Anesthesiologist, CRNA           BP      Temp     Pulse     Resp      SpO2

## 2018-03-14 NOTE — H&P (VIEW-ONLY)
H&P Exam - Urology   Katarina Vaughn 67 y o  male MRN: [id number]  Unit/Bed#:  Encounter: [id number]    Assessment/Plan     Assessment:  Urinary retention secondary to BPH with obstruction, status post open bladder diverticulectomy  He has suprapubic tube in is now ready for trans urethral resection of the prostate as a staged procedure along with removal of left ureteral stent   Plan:  Cystoscopy trans urethral resection of the prostate, left ureteral stent removal    History of Present Illness   HPI:  Katarina Vaughn is a 67 y o  male who underwent exploratory laparotomy with open bladder diverticulectomy 2/14/2018  He has done well with this and his staples are being removed today and he has a suprapubic tube  For the second part of this stage procedure, he will undergo trans urethral resection of the prostate to relieve his outlet obstruction  I have explained procedure to him, along with the risks of bleeding infection retrograde ejaculation and incontinence  He understands and wishes to proceed      Review of Systems    Historical Information   Past Medical History:   Diagnosis Date    Back pain     Ceron disease     BPH (benign prostatic hyperplasia)     Cancer (HCC)     melanoma    Cataract     Right eye    Chronic pain disorder     DDD (degenerative disc disease), lumbar     Depression     Diverticulosis     GERD (gastroesophageal reflux disease)     History of cardiac murmur     Past    History of melanoma     Was on back in early 1990's    History of rheumatic fever     Childhood    [place] (hard of hearing)     Hydronephrosis     Hypertension     Kidney stone     Multiple times    Neck pain     Nervous breakdown     History of due to reaction to psych med which he was on for anxiety/depression and became suicidal    Numbness and tingling in both hands     Numbness and tingling of both feet     PONV (postoperative nausea and vomiting)     PVD (peripheral vascular disease) ([facility] [address] )  RBBB     Scoliosis     Seasonal allergies     Tinnitus     Wears glasses     Wears partial dentures     Upper     Past Surgical History:   Procedure Laterality Date    ABDOMINAL SURGERY      When young had procedure for improviing circulation to left lower extremety    BACK SURGERY      Lumbar- not sure what was done   Corey De La Cruz CARDIAC CATHETERIZATION      Approximately 2007- no blockages    CARPAL TUNNEL RELEASE Bilateral     CATARACT EXTRACTION Left     COLONOSCOPY      CYST REMOVAL      Robotic procedure to remove benign cyst from lower left lung    CYSTOSCOPY      ESOPHAGOGASTRODUODENOSCOPY      FL CYSTO/URETERO W/LITHOTRIPSY &INDWELL STENT INSRT Left 1/3/2018    Procedure: CYSTOSCOPY URETEROSCOPY, RETROGRADE PYELOGRAM AND INSERTION STENT URETERAL;  Surgeon: Graham Elkins MD;  [facility];  Service: Urology    FL CYSTOTOMY,EXCIS BLADDER TIC N/A 2/14/2018    Procedure: ABDOMINAL EXPLORATION; CLOSURE OF BLADDER DIVERTICULITIS;  Surgeon: Graham Elkins MD;  [facility];  Service: Urology    FL INCISE/DRAIN BLADDER N/A 2/14/2018    Procedure: OPEN SUPRAPUBIC TUBE PLACEMENT;  Surgeon: Graham Elkins MD;  [facility];  Service: Urology    FL RELEASE Stana Bandar FIBROSIS Left 2/14/2018    Procedure: LYSIS OF ADHESIONS; URETEROLYSIS ;  Surgeon: Graham Elkins MD;  [facility];  Service: Urology    SKIN CANCER EXCISION      Melanoma removal on back in early 1990's    TOE AMPUTATION Left     All 5 toes left foot were amputated due to poor circulation     WRIST SURGERY Bilateral     Ulnar nerve on right arm and both wrists are fused     Social History   History   Alcohol Use    Yes     Comment: Very rare       History   Drug Use No     History   Smoking Status    Former Smoker    Packs/day: 1 00    Years: 15 00    Types: Cigarettes    Quit date: 2/5/1970   Smokeless Tobacco    Never Used     Comment: Quit 50 years     Family History: non-contributory    Meds/Allergies all medications and allergies reviewed  Allergies   Allergen Reactions    Iodine Shortness Of Breath, Swelling and Hives     Contrast dye causes respiratory distress  Iodine causes skin rash    Mercury Hives and Swelling    Shellfish-Derived Products Hives, Shortness Of Breath and Anaphylaxis     Anaphylaxis    Augmentin [Amoxicillin-Pot Clavulanate] GI Intolerance, Rash and Diarrhea     Diarrhea    Lidoderm [Lidocaine] Rash     Lidoderm patch caused rash    Penicillins Rash, Swelling and Hives    Sulfa Antibiotics Hives and Swelling       Objective   Vitals: Blood pressure 146/76, height 6' (1 829 m), weight 103 kg (227 lb)  Invasive Devices     Drain            Closed/Suction Drain Right RLQ Bulb 10 Fr  12 days    Suprapubic Catheter Latex 16 Fr  12 days                Physical Exam HEENT:  Awake alert in no apparent distress  Lungs:  Clear to auscultation bilaterally  CVS:  Regular rhythm, S1-S2 are audible no murmurs rubs or gallops  Abdomen:  Soft nontender nondistended  Wound is healing well  Suprapubic tube is in place  Extremities:  No cyanosis or edema, normal range of motion    Lab Results: CBC: No results found for: WBC, HGB, HCT, MCV, PLT, ADJUSTEDWBC, MCH, MCHC, RDW, MPV, NRBC  CMP: No results found for: NA, CL, CO2, ANIONGAP, BUN, CREATININE, GLUCOSE, CALCIUM, AST, ALT, ALKPHOS, PROT, ALBUMIN, BILITOT, EGFR  Imaging: I have personally reviewed pertinent reports  EKG, Pathology, and Other Studies: I have personally reviewed pertinent reports      VTE Prophylaxis: Sequential compression device Noemi Woo)     Code Status:  Full

## 2018-03-14 NOTE — OP NOTE
OPERATIVE REPORT  PATIENT NAME: Jaspreet Harrison    :  1945  MRN: [id number]  Pt [facility]    SURGERY DATE: 3/14/2018    Surgeon(s) and Role:     Mira Bauer MD - Primary    Preop Diagnosis:  Benign localized hyperplasia of prostate without urinary obstruction [N40 0]  Urinary retention due to benign prostatic hyperplasia [N40 1, R33 8]    Post-Op Diagnosis Codes: * Benign localized hyperplasia of prostate without urinary obstruction [N40 0]     * Urinary retention due to benign prostatic hyperplasia [N40 1, R33 8]    Procedure(s) (LRB):  TRANSURETHRAL RESECTION OF PROSTATE (TURP), LEFT URETERAL STENT REMOVAL (N/A)  CYSTOGRAM (N/A)    Specimen(s):  ID Type Source Tests Collected by Time Destination   1 : prostate chips Tissue Prostate TISSUE EXAM Brenda Mendenhall MD 3/14/2018 1211        Estimated Blood Loss:   Minimal    Drains:  Suprapubic Catheter Latex 16 Fr  (Active)   Dressing Status Clean;Dry; Intact 3/14/2018 10:54 AM   Number of days: 28       Anesthesia Type:   General    Operative Indications:  Benign localized hyperplasia of prostate without urinary obstruction [N40 0]  Urinary retention due to benign prostatic hyperplasia [N40 1, R33 8]      Operative Findings:  Bladder healing well  Previous diverticulum closed off  False passage created when placing original Bolden catheter under the bladder neck  Cystogram had to be performed and I placed a 20 [place] Jocelyne [place] tip over a wire into the bladder and confirmed position in the bladder  Complications:   None    Procedure and Technique:  Patient is a 44-year-old man with BPH with obstruction, urinary retention and previous history of a large posterior bladder diverticulum which I excised almost a month ago  He has a suprapubic tube in place, and I decided to perform the relief of obstruction via trans urethral resection of the prostate in a staged fashion   Now that the bladder diverticulum is resolved, we can relieve the obstruction with TURP  The risks of bleeding infection damage to adjacent organs retrograde ejaculation were explained he gives informed consent  Also incontinence  The patient was brought to the operating room and identified properly  General endotracheal anesthesia was induced the patient was prepped and draped in the dorsal lithotomy position in the usual fashion  A time-out was performed, and cystoscopy was carried out with a 22 [place] cystoscopy sheath with 30 degree lens  The urethra was normal without stricture  The prostate was highly obstructive with trilobar hypertrophy and a large median lobe and high bladder neck  The bladder showed signs of healing from his previous diverticulectomy and the stent was in place  I grasped the stent with grasping forceps and removed intact  This was a left ureteral stent  I then inspected the bladder and saw no evidence of a diverticulum or other abnormalities  I then placed the 26 [place]  resectoscope sheath under direct vision with the resectoscope itself and 30 degree lens into the bladder  I performed trans urethral resection of the prostate starting at 6 o'clock and I performed resection circumferentially with my distal margins being the verumontanum  He had BPH going up underneath the bladder neck itself  Prostatic calculi were and countered during the resection indicating that I had reached the capsule  Hemostasis was achieved with electrocautery and all chips were evacuated  I then attempted to place a Bolden catheter which was a 25 Western Jocelyne hematuria catheter into the bladder  This did not seem to go all the way in and so I tried a stylet which also was unsuccessful  I then performed cystoscopy and saw false passage underneath the bladder neck  I then placed the cystoscope up into the bladder and placed a wire through this and then over the wire placed a 20 Western Jocelyne [place] tip catheter up into the bladder  The balloon was inflated with 20 cc    It must be noted that I was given sorbitol to inflate the balloon with and I had to irrigate out the balloon itself with sterile water and then refill it with 20 cc sterile water made I could not irrigate 20 Western Jocelyne [place] tip catheter very well but I could irrigate the suprapubic tube very well  This may be suspicious for I perform cystogram via the Bolden catheter in the urethra  50 percent [place] was used and this showed that the catheter was in good position in the bladder and  showed no extravasation  I then re-irrigated with a Ting syringe after draining the bladder this time I was able irrigate the [place] tip catheter without problems this time the procedure was terminated     I was present for the entire procedure and A qualified resident physician was not available    Patient Disposition:  PACU  and extubated and stable    SIGNATURE: Michael Esparza MD  DATE: March 14, 2018  TIME: 12:27 PM

## 2018-03-15 VITALS
OXYGEN SATURATION: 95 % | TEMPERATURE: 99.4 F | BODY MASS INDEX: 31.15 KG/M2 | RESPIRATION RATE: 19 BRPM | DIASTOLIC BLOOD PRESSURE: 73 MMHG | SYSTOLIC BLOOD PRESSURE: 157 MMHG | HEIGHT: 72 IN | HEART RATE: 86 BPM | WEIGHT: 230 LBS

## 2018-03-15 PROCEDURE — 99024 POSTOP FOLLOW-UP VISIT: CPT | Performed by: PHYSICIAN ASSISTANT

## 2018-03-15 RX ADMIN — DIVALPROEX SODIUM 500 MG: 500 TABLET, DELAYED RELEASE ORAL at 08:19

## 2018-03-15 RX ADMIN — SODIUM CHLORIDE 100 ML/HR: 0.45 INJECTION, SOLUTION INTRAVENOUS at 01:02

## 2018-03-15 RX ADMIN — ACETAMINOPHEN 650 MG: 325 TABLET, FILM COATED ORAL at 04:36

## 2018-03-15 NOTE — DISCHARGE SUMMARY
Discharge Summary - Storm Seek 67 y o  male MRN: [id number]    Unit/Bed#: [facility] 2 -01 Encounter: [id number]    Admission Date: 3/14/2018     Discharge Date:  03/15/18    HPI:  77-year-old male who recently when open resection of bladder diverticulum who presents for the 2nd stage of his operation, elective outpatient TURP with left ureteral stent removal and cystogram   He had suprapubic tube placed during his original operation  Procedure(s):  TRANSURETHRAL RESECTION OF PROSTATE (TURP), LEFT URETERAL STENT REMOVAL  CYSTOGRAM  Surgeon(s):  Atiya Villa MD  3/14/2018    Hospital Course:  Postoperatively he recovered from anesthesia without event  He was transferred to the medical-surgical floor  He began tolerating regular diet  His suprapubic tube and Bolden catheter were both draining well without issues  Bolden catheter urine was clear and pink  SP tube urine was clear yellow  He denies any pain or issues  He was eager to be discharged home on postoperative day 1  Postoperative restrictions were reviewed  All questions answered  He understands he will see Dr Jeyson Doyle in the office in 1 week  Discharge Diagnosis:  Urinary retention secondary to benign prostatic hypertrophy  Condition at Discharge: good     Discharge Medications:  See after visit summary for reconciled discharge medications provided to patient and family  Discharge instructions/Information to patient and family:   See after visit summary for information provided to patient and family  Provisions for Follow-Up Care:  See after visit summary for information related to follow-up care and any pertinent home health orders  Disposition: Home    Planned Readmission: No    Discharge Statement   I spent 20 minutes discharging the patient  This time was spent on the day of discharge  I had direct contact with the patient on the day of discharge   Additional documentation is required if more than 30 minutes were spent on discharge       Signature:   Madina Rose PA-C  Date: 3/15/2018 Time: 8:33 AM

## 2018-03-15 NOTE — PROGRESS NOTES
Progress Note - Urology  Colette Dacosta 1945, 67 y o  male MRN: [id number]    [facility] [address] [facility]-01 Encounter: [id number]    Urinary retention due to benign prostatic hyperplasia   Assessment & Plan    1 Day Post-Op  Procedure(s):  TRANSURETHRAL RESECTION OF PROSTATE (TURP), LEFT URETERAL STENT REMOVAL  CYSTOGRAM  Surgeon(s):  Vamshi Dougherty MD  3/14/2018    Doing great  Plan:  Discharge home today with suprapubic tube open to drainage bag and Bolden catheter open to leg bag  Outpatient follow-up with Dr Jarred Escudero in the office in 1 week  Bedside rounds performed with Carrillo Chacon RN  Subjective/Objective     Subjective:   Patient reports a restful night with no acute events  He had minimal abdominal discomfort overnight  He continues to have free drainage from his suprapubic tube and Bolden catheter  He tolerated his diet this morning without nausea or vomiting  No chest pain or shortness of breath is reported  He is eager to be discharged home  Objective:  Vitals: Blood pressure 157/73, pulse 86, temperature 99 4 °F (37 4 °C), temperature source Tympanic, resp  rate 19, height 6' (1 829 m), weight 104 kg (230 lb), SpO2 95 %  ,Body mass index is 31 19 kg/m²  Intake/Output Summary (Last 24 hours) at 03/15/18 0830  Last data filed at 03/15/18 6426   Gross per 24 hour   Intake          3213 34 ml   Output             3190 ml   Net            23 34 ml       Invasive Devices     Peripheral Intravenous Line            Peripheral IV 03/14/18 Left Arm less than 1 day          Drain            Suprapubic Catheter Latex 16 Fr  28 days    Urethral Catheter Non-latex; Other (Comment) 20 Fr  less than 1 day                Physical Exam   Constitutional: He is oriented to person, place, and time  He appears well-developed and well-nourished  Non-toxic appearance  He does not appear ill  HENT:   Head: Normocephalic and atraumatic  Eyes: EOM are normal    Neck: Neck supple   No tracheal deviation present  Cardiovascular: Normal rate and normal heart sounds  No murmur heard  Pulmonary/Chest: Effort normal and breath sounds normal  No respiratory distress  Abdominal: Soft  Bowel sounds are normal  He exhibits no distension  There is no hepatosplenomegaly  There is no tenderness  No hernia  Lower midline abdominal incision from several weeks ago healing well  Suprapubic tube in place with some redness and irritation around the site but no cellulitis noted  Genitourinary:   Genitourinary Comments: Bolden catheter in place draining clear pink urine  Musculoskeletal: Normal range of motion  Neurological: He is alert and oriented to person, place, and time  Skin: Skin is warm and dry  No lesion noted  He is not diaphoretic  No cyanosis or erythema  Nails show no clubbing  Psychiatric: He has a normal mood and affect  His behavior is normal  Judgment and thought content normal  His mood appears not anxious  He does not exhibit a depressed mood         Akira Nicholson PA-C  Date: 3/15/2018 Time: 8:30 AM

## 2018-03-15 NOTE — ASSESSMENT & PLAN NOTE
1 Day Post-Op  Procedure(s):  TRANSURETHRAL RESECTION OF PROSTATE (TURP), LEFT URETERAL STENT REMOVAL  CYSTOGRAM  Surgeon(s):  Terry Mane MD  3/14/2018    Doing great  Plan:  Discharge home today with suprapubic tube open to drainage bag and Bolden catheter open to leg bag  Outpatient follow-up with Dr Bettina Tomas in the office in 1 week  Bedside rounds performed with Serafin Mensah RN

## 2018-03-22 ENCOUNTER — OFFICE VISIT (OUTPATIENT)
Dept: UROLOGY | Facility: MEDICAL CENTER | Age: 73
End: 2018-03-22
Payer: MEDICARE

## 2018-03-22 ENCOUNTER — TELEPHONE (OUTPATIENT)
Dept: UROLOGY | Facility: AMBULATORY SURGERY CENTER | Age: 73
End: 2018-03-22

## 2018-03-22 VITALS
BODY MASS INDEX: 30.07 KG/M2 | SYSTOLIC BLOOD PRESSURE: 154 MMHG | HEIGHT: 72 IN | DIASTOLIC BLOOD PRESSURE: 76 MMHG | WEIGHT: 222 LBS

## 2018-03-22 DIAGNOSIS — R33.8 BENIGN PROSTATIC HYPERPLASIA WITH URINARY RETENTION: Primary | ICD-10-CM

## 2018-03-22 DIAGNOSIS — N13.5 URETERAL STRICTURE: ICD-10-CM

## 2018-03-22 DIAGNOSIS — C61 PROSTATE CANCER (HCC): ICD-10-CM

## 2018-03-22 DIAGNOSIS — N32.3 BLADDER DIVERTICULUM: ICD-10-CM

## 2018-03-22 DIAGNOSIS — N40.0 BENIGN LOCALIZED HYPERPLASIA OF PROSTATE WITHOUT URINARY OBSTRUCTION: ICD-10-CM

## 2018-03-22 DIAGNOSIS — N40.1 BENIGN PROSTATIC HYPERPLASIA WITH URINARY RETENTION: Primary | ICD-10-CM

## 2018-03-22 PROCEDURE — 99024 POSTOP FOLLOW-UP VISIT: CPT | Performed by: UROLOGY

## 2018-03-22 NOTE — PATIENT INSTRUCTIONS
un plug suprapubic tube as needed if unable to urinate, and drain the suprapubic tube into a cup after urinating to measure the residuals  When the residuals are  cc or less consistently, call us and we will remove the suprapubic tube in the office

## 2018-03-22 NOTE — TELEPHONE ENCOUNTER
Spoke with Venecia Sutton, patient is barely getting any urine out   Can you please call her back ASAP

## 2018-03-22 NOTE — PROGRESS NOTES
[address] [facility]  [facility]  [address] [facility], [address] [place] [address]  [phone]  www  [facility]  org      NAME: Shoaib Downs  AGE: 67 y o  SEX: male  : 1945   MRN: [id number]    DATE: 3/22/2018  TIME: 9:21 AM    Assessment and Plan:  Prostate cancer:  Incidentally found during trans urethral resection of the prostate, only a small percentage of the chips  We will check a PSA in 3 months  We will eventually consider peripheral zone biopsy transrectally, but right now he needs to heal   Urinary retention and BPH with obstruction:  Urethral catheter removed today, and capped suprapubic tube  Open the suprapubic tube to urinate as needed and to measure postvoid residuals  When the postvoid residuals are  cc or less, we can remove the suprapubic tube  Follow-up in 1 month to review that  Bladder diverticulum:  Status post open removal, is closed off and verified by cystogram   Left ureteral stricture--hopefully, the ureterolysis I performed and resection of bladder tic will help this  Will need reimaging in future  His stent was removed during TURP  Chief Complaint     Chief Complaint   Patient presents with    Post-op     Catheter check       History of Present Illness   Urinary retention and BPH with obstruction:  Status post trans urethral resection of the prostate by me 3/14/2018  However pathology shows adenocarcinoma of the prostate Angel 7-in  chips  There was only 6 8% involvement of the specimen by tumor  History of bladder diverticulum:  Status post open excision by me with suprapubic tube placement 1 month ago  This has healed nicely  Has a Bolden catheter and a suprapubic tube in        The following portions of the patient's history were reviewed and updated as appropriate: allergies, current medications, past family history, past medical history, past social history, past surgical history and problem list     Review of Systems   Review of Systems    Active Problem List     Patient Active Problem List   Diagnosis    Right bundle melissa block, 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    Aortic regurgitation    BPH with obstruction/lower urinary tract symptoms    Bladder diverticulum    [address] ileus ([facility] [address] )    Kidney stone    Major depressive disorder, single episode, moderate (HCC)    Peripheral vascular disease ([facility] [address] )    Essential hypertension    Acute pain of right shoulder    Benign localized hyperplasia of prostate without urinary obstruction    Urinary retention due to benign prostatic hyperplasia       Objective   /76 (BP Location: Right arm, Patient Position: Sitting)   Ht 6' (1 829 m)   Wt 101 kg (222 lb)   BMI 30 11 kg/m²     Physical Exam        Current Medications     Current Outpatient Prescriptions:     acetaminophen (TYLENOL) 650 mg CR tablet, Take 650 mg by mouth every 8 (eight) hours as needed for mild pain, Disp: , Rfl:     amLODIPine (NORVASC) 10 mg tablet, Take 10 mg by mouth daily, Disp: , Rfl:     divalproex sodium (DEPAKOTE) 500 mg EC tablet, Take 500 mg by mouth 2 (two) times a day  , Disp: , Rfl:     HYDROcodone-acetaminophen (NORCO) 5-325 mg per tablet, Take 1-2 tablets by mouth every 6 (six) hours as needed for pain for up to 10 days Max Daily Amount: 8 tablets, Disp: 20 tablet, Rfl: 0    ibuprofen (MOTRIN) 200 mg tablet, Take 400 mg by mouth every 6 (six) hours as needed for mild pain, Disp: , Rfl:     mometasone (NASONEX) 50 mcg/act nasal spray, 2 sprays into each nostril as needed, Disp: , Rfl:     ranitidine (ZANTAC) 300 MG capsule, Take 300 mg by mouth every evening, Disp: , Rfl:     tamsulosin (FLOMAX) 0 4 mg, Take 1 capsule (0 4 mg total) by mouth daily at bedtime, Disp: 90 capsule, Rfl: 3        Meg Roman MD

## 2018-03-27 ENCOUNTER — TELEPHONE (OUTPATIENT)
Dept: UROLOGY | Facility: MEDICAL CENTER | Age: 73
End: 2018-03-27

## 2018-03-27 NOTE — TELEPHONE ENCOUNTER
Keep the suprapubic tube for now, follow-up as scheduled  Think should get better over time  I I am encouraged by the numbers that I am hearing

## 2018-03-29 ENCOUNTER — TELEPHONE (OUTPATIENT)
Dept: UROLOGY | Facility: AMBULATORY SURGERY CENTER | Age: 73
End: 2018-03-29

## 2018-03-29 NOTE — TELEPHONE ENCOUNTER
Patient would like a call back from one of the nurses regarding super pubic tube  Please call   Thank you

## 2018-04-10 NOTE — TELEPHONE ENCOUNTER
Patient c/o burning all the time at the opening site,some time he has leaking around the tube also  He has no fever,he is moving his bowels  He has no other complaints  Per [place] he small amts of urine from his tube and seems to be urinating good amts also  Need orders  He is coming in on April 27 to remove tube possibly

## 2018-04-10 NOTE — TELEPHONE ENCOUNTER
If he has only having small amounts coming out of his suprapubic tube, the tube can be removed earlier by the nurses in our office before April 27th

## 2018-04-10 NOTE — TELEPHONE ENCOUNTER
Spoke with patient, he said the urine is cloudy from the penis but the urine from the catheter is "stingy"  No blood or fever  What would you to do for the cloudy urine? Would you like to see him sooner?

## 2018-04-27 ENCOUNTER — OFFICE VISIT (OUTPATIENT)
Dept: UROLOGY | Facility: MEDICAL CENTER | Age: 73
End: 2018-04-27
Payer: MEDICARE

## 2018-04-27 VITALS
HEIGHT: 72 IN | BODY MASS INDEX: 30.88 KG/M2 | WEIGHT: 228 LBS | DIASTOLIC BLOOD PRESSURE: 70 MMHG | SYSTOLIC BLOOD PRESSURE: 120 MMHG

## 2018-04-27 DIAGNOSIS — N13.30 HYDRONEPHROSIS OF LEFT KIDNEY: ICD-10-CM

## 2018-04-27 DIAGNOSIS — N40.0 BENIGN PROSTATIC HYPERPLASIA, UNSPECIFIED WHETHER LOWER URINARY TRACT SYMPTOMS PRESENT: Primary | ICD-10-CM

## 2018-04-27 DIAGNOSIS — C61 PROSTATE CANCER (HCC): ICD-10-CM

## 2018-04-27 DIAGNOSIS — N32.3 BLADDER DIVERTICULUM: ICD-10-CM

## 2018-04-27 LAB
SL AMB  POCT GLUCOSE, UA: ABNORMAL
SL AMB LEUKOCYTE ESTERASE,UA: ABNORMAL
SL AMB POCT BILIRUBIN,UA: ABNORMAL
SL AMB POCT BLOOD,UA: ABNORMAL
SL AMB POCT CLARITY,UA: ABNORMAL
SL AMB POCT COLOR,UA: ABNORMAL
SL AMB POCT KETONES,UA: ABNORMAL
SL AMB POCT NITRITE,UA: ABNORMAL
SL AMB POCT PH,UA: 7
SL AMB POCT SPECIFIC GRAVITY,UA: 1.02
SL AMB POCT URINE PROTEIN: 300
SL AMB POCT UROBILINOGEN: 0.2

## 2018-04-27 PROCEDURE — 99024 POSTOP FOLLOW-UP VISIT: CPT | Performed by: UROLOGY

## 2018-04-27 PROCEDURE — 87086 URINE CULTURE/COLONY COUNT: CPT | Performed by: UROLOGY

## 2018-04-27 PROCEDURE — 87077 CULTURE AEROBIC IDENTIFY: CPT | Performed by: UROLOGY

## 2018-04-27 PROCEDURE — 81003 URINALYSIS AUTO W/O SCOPE: CPT | Performed by: UROLOGY

## 2018-04-27 PROCEDURE — 87186 SC STD MICRODIL/AGAR DIL: CPT | Performed by: UROLOGY

## 2018-04-27 RX ORDER — PENTOXIFYLLINE 400 MG/1
400 TABLET, EXTENDED RELEASE ORAL
COMMUNITY
End: 2019-10-17 | Stop reason: HOSPADM

## 2018-04-27 RX ORDER — LEVOFLOXACIN 500 MG/1
500 TABLET, FILM COATED ORAL EVERY 24 HOURS
Qty: 7 TABLET | Refills: 0 | Status: SHIPPED | OUTPATIENT
Start: 2018-04-27 | End: 2018-05-04

## 2018-04-27 NOTE — PROGRESS NOTES
[address] [facility]  [facility]  [address] [facility]  [place], [address] [place] [address]  [phone]  www  [facility]  org      NAME: Tejinder Colón  AGE: 67 y o  SEX: male  : 1945   MRN: [id number]    DATE: 2018  TIME: 2:58 PM    Assessment and Plan:  Doing very well after open bladder diverticulectomy and ureteral lysis of the left ureter, and then trans urethral resection of the prostate in a staged fashion  Suprapubic tube has been removed  Urine culture has been sent, and we will start him empirically on Levaquin 500 mg daily for 7 days  I will see him in 2 months with a renal ultrasound and a PSA to follow-up on his previous left hydronephrosis and prostate cancer  Chief Complaint   No chief complaint on file  History of Present Illness   Urinary retention and BPH with obstruction:  Status post trans urethral resection of prostate by me 3/14/2018  His left ureteral stent was removed at that time  He is mostly voiding through his urethra, and his residuals are only 25-50 cc  Therefore it is time for us to remove the tube  Prostate cancer:  Angel 7 found in 4 out of 59  chips during TURP  We are observing this  History of bladder diverticulum causing left hydronephrosis:  Status post open excision of bladder diverticulum with suprapubic tube placement 2018 by me        The following portions of the patient's history were reviewed and updated as appropriate: allergies, current medications, past family history, past medical history, past social history, past surgical history and problem list     Review of Systems   Review of Systems    Active Problem List     Patient Active Problem List   Diagnosis    Right bundle melissa block, 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    Aortic regurgitation    BPH with obstruction/lower urinary tract symptoms    Bladder diverticulum    Postoperative ileus ([address] )  Kidney stone    Major depressive disorder, single episode, moderate (HCC)    Peripheral vascular disease (HCC)    Essential hypertension    Acute pain of right shoulder    Benign localized hyperplasia of prostate without urinary obstruction    Urinary retention due to benign prostatic hyperplasia       Objective   /70   Ht 6' (1 829 m)   Wt 103 kg (228 lb)   BMI 30 92 kg/m²     Physical Exam   Constitutional: He is oriented to person, place, and time  He appears well-developed and well-nourished  HENT:   Head: Normocephalic and atraumatic  Eyes: EOM are normal    Neck: Normal range of motion  Pulmonary/Chest: Effort normal    Abdominal: Soft  He exhibits no distension and no mass  There is no tenderness  There is no rebound and no guarding  The wound is clean dry and intact and well healing  The suprapubic tube site shows no sign of infection  The suprapubic tube was drained approximately 30 cc, and this was sent for culture  The tube was removed intact  Dressing was applied  Neurological: He is alert and oriented to person, place, and time  Skin: Skin is warm and dry  Psychiatric: He has a normal mood and affect   His behavior is normal  Judgment and thought content normal            Current Medications     Current Outpatient Prescriptions:     amLODIPine (NORVASC) 10 mg tablet, Take 10 mg by mouth daily, Disp: , Rfl:     divalproex sodium (DEPAKOTE) 500 mg EC tablet, Take 500 mg by mouth 2 (two) times a day  , Disp: , Rfl:     ibuprofen (MOTRIN) 200 mg tablet, Take 400 mg by mouth every 6 (six) hours as needed for mild pain, Disp: , Rfl:     mometasone (NASONEX) 50 mcg/act nasal spray, 2 sprays into each nostril as needed, Disp: , Rfl:     pentoxifylline (TRENtal) 400 mg ER tablet, Take 400 mg by mouth 3 (three) times a day with meals, Disp: , Rfl:     ranitidine (ZANTAC) 300 MG capsule, Take 300 mg by mouth every evening, Disp: , Rfl:     tamsulosin (FLOMAX) 0 4 mg, Take 1 capsule (0 4 mg total) by mouth daily at bedtime, Disp: 90 capsule, Rfl: 3    acetaminophen (TYLENOL) 650 mg CR tablet, Take 650 mg by mouth every 8 (eight) hours as needed for mild pain, Disp: , Rfl:         Tony Quesada MD

## 2018-04-27 NOTE — LETTER
2018     Gabriel Draper MD  [id number] [facility] [address]    Patient: Stephanie Lucas   YOB: 1945   Date of Visit: 2018       Dear Dr Tj Shipley: Thank you for referring Yoselyn Ch to me for evaluation  Below are my notes for this consultation  If you have questions, please do not hesitate to call me  I look forward to following your patient along with you  Sincerely,        Isaak Joyner MD        CC: No Recipients  Isaak Joyner MD  2018  3:18 PM  Sign at close encounter  [address] [facility]  [address], [address] [place] [phone]  www  [facility]  org      NAME: Stephanie Lucas  AGE: 67 y o  SEX: male  : 1945   MRN: [id number]    DATE: 2018  TIME: 2:58 PM    Assessment and Plan:  Doing very well after open bladder diverticulectomy and ureteral lysis of the left ureter, and then trans urethral resection of the prostate in a staged fashion  Suprapubic tube has been removed  Urine culture has been sent, and we will start him empirically on Levaquin 500 mg daily for 7 days  I will see him in 2 months with a renal ultrasound and a PSA to follow-up on his previous left hydronephrosis and prostate cancer  Chief Complaint   No chief complaint on file  History of Present Illness   Urinary retention and BPH with obstruction:  Status post trans urethral resection of prostate by me 3/14/2018  His left ureteral stent was removed at that time  He is mostly voiding through his urethra, and his residuals are only 25-50 cc  Therefore it is time for us to remove the tube  Prostate cancer:  [place] 7 found in 4 out of 59  chips during TURP  We are observing this  History of bladder diverticulum causing left hydronephrosis:  Status post open excision of bladder diverticulum with suprapubic tube placement 2018 by me        The following portions of the patient's history were reviewed and updated as appropriate: allergies, current medications, past family history, past medical history, past social history, past surgical history and problem list     Review of Systems   Review of Systems    Active Problem List     Patient Active Problem List   Diagnosis    Right bundle melissa block, 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    Aortic regurgitation    BPH with obstruction/lower urinary tract symptoms    Bladder diverticulum    Postoperative ileus ([address] )    Kidney stone    Major depressive disorder, single episode, moderate ([address] )    Peripheral vascular disease ([facility] )    Essential hypertension    Acute pain of right shoulder    Benign localized hyperplasia of prostate without urinary obstruction    Urinary retention due to benign prostatic hyperplasia       Objective   /70   Ht 6' (1 829 m)   Wt 103 kg (228 lb)   BMI 30 92 kg/m²      Physical Exam   Constitutional: He is oriented to person, place, and time  He appears well-developed and well-nourished  HENT:   Head: Normocephalic and atraumatic  Eyes: EOM are normal    Neck: Normal range of motion  Pulmonary/Chest: Effort normal    Abdominal: Soft  He exhibits no distension and no mass  There is no tenderness  There is no rebound and no guarding  The wound is clean dry and intact and well healing  The suprapubic tube site shows no sign of infection  The suprapubic tube was drained approximately 30 cc, and this was sent for culture  The tube was removed intact  Dressing was applied  Neurological: He is alert and oriented to person, place, and time  Skin: Skin is warm and dry  Psychiatric: He has a normal mood and affect   His behavior is normal  Judgment and thought content normal            Current Medications     Current Outpatient Prescriptions:     amLODIPine (NORVASC) 10 mg tablet, Take 10 mg by mouth daily, Disp: , Rfl:     divalproex sodium (DEPAKOTE) 500 mg EC tablet, Take 500 mg by mouth 2 (two) times a day  , Disp: , Rfl:     ibuprofen (MOTRIN) 200 mg tablet, Take 400 mg by mouth every 6 (six) hours as needed for mild pain, Disp: , Rfl:     mometasone (NASONEX) 50 mcg/act nasal spray, 2 sprays into each nostril as needed, Disp: , Rfl:     pentoxifylline (TRENtal) 400 mg ER tablet, Take 400 mg by mouth 3 (three) times a day with meals, Disp: , Rfl:     ranitidine (ZANTAC) 300 MG capsule, Take 300 mg by mouth every evening, Disp: , Rfl:     tamsulosin (FLOMAX) 0 4 mg, Take 1 capsule (0 4 mg total) by mouth daily at bedtime, Disp: 90 capsule, Rfl: 3    acetaminophen (TYLENOL) 650 mg CR tablet, Take 650 mg by mouth every 8 (eight) hours as needed for mild pain, Disp: , Rfl:         Tee Patel MD

## 2018-04-30 ENCOUNTER — TELEPHONE (OUTPATIENT)
Dept: UROLOGY | Facility: AMBULATORY SURGERY CENTER | Age: 73
End: 2018-04-30

## 2018-04-30 DIAGNOSIS — N30.00 ACUTE CYSTITIS WITHOUT HEMATURIA: Primary | ICD-10-CM

## 2018-04-30 LAB
BACTERIA UR CULT: ABNORMAL

## 2018-04-30 RX ORDER — NITROFURANTOIN 25; 75 MG/1; MG/1
100 CAPSULE ORAL 2 TIMES DAILY
Qty: 14 CAPSULE | Refills: 0 | Status: SHIPPED | OUTPATIENT
Start: 2018-04-30 | End: 2018-05-07

## 2018-05-09 ENCOUNTER — TELEPHONE (OUTPATIENT)
Dept: UROLOGY | Facility: MEDICAL CENTER | Age: 73
End: 2018-05-09

## 2018-05-09 ENCOUNTER — TELEPHONE (OUTPATIENT)
Dept: UROLOGY | Facility: AMBULATORY SURGERY CENTER | Age: 73
End: 2018-05-09

## 2018-05-09 DIAGNOSIS — N30.00 ACUTE CYSTITIS WITHOUT HEMATURIA: Primary | ICD-10-CM

## 2018-05-09 RX ORDER — NITROFURANTOIN 25; 75 MG/1; MG/1
100 CAPSULE ORAL 2 TIMES DAILY
Qty: 20 CAPSULE | Refills: 0 | Status: SHIPPED | OUTPATIENT
Start: 2018-05-09 | End: 2018-07-27 | Stop reason: ALTCHOICE

## 2018-05-09 NOTE — PROGRESS NOTES
Patient called complaining of urine still being cloudy and not feeling well  He was treated with Levaquin when I some in the office and I thought at switched to [facility] but I cannot find documentation of that  I therefore prescribed Macrobid to be sent to his pharmacy

## 2018-05-09 NOTE — TELEPHONE ENCOUNTER
Spoke with patient  Patient complains of cloudy urine, no odor or blood, no fevers or chills, nausea no vomiting  Has some discomfort in bladder and penis area  Patient stopped antibiotic on Monday  Told patient I would forward to Dr Leonie Black if he would like another culture done or antibiotic  I will call patient back after I hear back from Dr Leonie Black

## 2018-06-04 ENCOUNTER — TELEPHONE (OUTPATIENT)
Dept: UROLOGY | Facility: AMBULATORY SURGERY CENTER | Age: 73
End: 2018-06-04

## 2018-06-04 NOTE — TELEPHONE ENCOUNTER
Spoke with Felipe Garg  Patient is still having symptoms from bladder infection  Please call Felipe Garg back (patient is hard of hearing)   Thank you

## 2018-06-06 ENCOUNTER — OFFICE VISIT (OUTPATIENT)
Dept: UROLOGY | Facility: MEDICAL CENTER | Age: 73
End: 2018-06-06
Payer: MEDICARE

## 2018-06-06 VITALS
DIASTOLIC BLOOD PRESSURE: 80 MMHG | BODY MASS INDEX: 30.61 KG/M2 | SYSTOLIC BLOOD PRESSURE: 136 MMHG | WEIGHT: 226 LBS | HEIGHT: 72 IN

## 2018-06-06 DIAGNOSIS — N40.1 BPH WITH OBSTRUCTION/LOWER URINARY TRACT SYMPTOMS: ICD-10-CM

## 2018-06-06 DIAGNOSIS — N39.0 URINARY TRACT INFECTION WITHOUT HEMATURIA, SITE UNSPECIFIED: Primary | ICD-10-CM

## 2018-06-06 DIAGNOSIS — N13.8 BPH WITH OBSTRUCTION/LOWER URINARY TRACT SYMPTOMS: ICD-10-CM

## 2018-06-06 DIAGNOSIS — C61 PROSTATE CANCER (HCC): ICD-10-CM

## 2018-06-06 DIAGNOSIS — N32.3 BLADDER DIVERTICULUM: ICD-10-CM

## 2018-06-06 LAB
SL AMB  POCT GLUCOSE, UA: ABNORMAL
SL AMB LEUKOCYTE ESTERASE,UA: ABNORMAL
SL AMB POCT BILIRUBIN,UA: ABNORMAL
SL AMB POCT BLOOD,UA: ABNORMAL
SL AMB POCT CLARITY,UA: CLEAR
SL AMB POCT COLOR,UA: YELLOW
SL AMB POCT KETONES,UA: ABNORMAL
SL AMB POCT NITRITE,UA: ABNORMAL
SL AMB POCT PH,UA: 6
SL AMB POCT SPECIFIC GRAVITY,UA: 1.02
SL AMB POCT URINE PROTEIN: 300
SL AMB POCT UROBILINOGEN: 0.2

## 2018-06-06 PROCEDURE — 81003 URINALYSIS AUTO W/O SCOPE: CPT | Performed by: UROLOGY

## 2018-06-06 PROCEDURE — 99024 POSTOP FOLLOW-UP VISIT: CPT | Performed by: UROLOGY

## 2018-06-06 RX ORDER — TAMSULOSIN HYDROCHLORIDE 0.4 MG/1
0.4 CAPSULE ORAL
Qty: 90 CAPSULE | Refills: 3 | Status: SHIPPED | OUTPATIENT
Start: 2018-06-06 | End: 2019-03-08 | Stop reason: HOSPADM

## 2018-06-06 NOTE — LETTER
2018     Breanna Lanza MD  [address] [address]    Patient: Flavio Chavez   YOB: 1945   Date of Visit: 2018       Dear Dr Miles Hugo: Thank you for referring Rita Roberts to me for evaluation  Below are my notes for this consultation  If you have questions, please do not hesitate to call me  I look forward to following your patient along with you  Sincerely,        Janell Abebe MD        CC: No Recipients  Janell Abebe MD  2018 11:06 AM  Sign at close encounter  [address] [facility]  [address], [address] [place] [address]  [phone]  www  [facility]  org      NAME: Flavio Chavez  AGE: 67 y o  SEX: male  : 1945   MRN: [id number]    DATE: 2018  TIME: 10:40 AM    Assessment and Plan: Get CRUZ as ordered and PSA, and see me next month to go over results  Continue with Flomax  Chief Complaint   No chief complaint on file  History of Present Illness   Since the operation, he complains of passing white stringy material that occasionally blocks his stream, and then he has pain across his lower back  It was getting better, then on Saturday he had a more severe episode  U/Atoday is essentially negative  Is drinking a lot of water  Chronic cystitis:  Had Staph epidermidis UTI 2018 which was treated appropriately with Macrobid  Prostate cancer:  [place] 7 found in  TURP chips 2018  Bladder diverticulum with incomplete bladder emptying and left ureteral obstruction due to same:  Status post exploratory laparotomy, lysis of abdominal he has ends and ureteral lysis of the left ureter and closure bladder diverticulum 2018        The following portions of the patient's history were reviewed and updated as appropriate: allergies, current medications, past family history, past medical history, past social history, past surgical history and problem list     Review of Systems   Review of Systems   Constitutional: Positive for appetite change  Genitourinary: Positive for difficulty urinating and flank pain  Negative for decreased urine volume, discharge, dysuria, enuresis, frequency, genital sores, hematuria, penile pain, penile swelling, scrotal swelling, testicular pain and urgency         Active Problem List     Patient Active Problem List   Diagnosis    Right bundle melissa block, 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    Aortic regurgitation    BPH with obstruction/lower urinary tract symptoms    Bladder diverticulum    Postoperative ileus (HCC)    Kidney stone    Major depressive disorder, single episode, moderate (HCC)    Peripheral vascular disease ([facility] [address] )    Essential hypertension    Acute pain of right shoulder    Benign localized hyperplasia of prostate without urinary obstruction    Urinary retention due to benign prostatic hyperplasia       Objective   /80   Ht 6' (1 829 m)   Wt 103 kg (226 lb)   BMI 30 65 kg/m²      Physical Exam        Current Medications     Current Outpatient Prescriptions:     acetaminophen (TYLENOL) 650 mg CR tablet, Take 650 mg by mouth every 8 (eight) hours as needed for mild pain, Disp: , Rfl:     amLODIPine (NORVASC) 10 mg tablet, Take 10 mg by mouth daily, Disp: , Rfl:     divalproex sodium (DEPAKOTE) 500 mg EC tablet, Take 500 mg by mouth 2 (two) times a day  , Disp: , Rfl:     ibuprofen (MOTRIN) 200 mg tablet, Take 400 mg by mouth every 6 (six) hours as needed for mild pain, Disp: , Rfl:     mometasone (NASONEX) 50 mcg/act nasal spray, 2 sprays into each nostril as needed, Disp: , Rfl:     nitrofurantoin (MACROBID) 100 mg capsule, Take 1 capsule (100 mg total) by mouth 2 (two) times a day, Disp: 20 capsule, Rfl: 0    pentoxifylline (TRENtal) 400 mg ER tablet, Take 400 mg by mouth 3 (three) times a day with meals, Disp: , Rfl:     ranitidine (ZANTAC) 300 MG capsule, Take 300 mg by mouth every evening, Disp: , Rfl:     tamsulosin (FLOMAX) 0 4 mg, Take 1 capsule (0 4 mg total) by mouth daily at bedtime, Disp: 90 capsule, Rfl: 3        Conner Mtz MD

## 2018-06-06 NOTE — PROGRESS NOTES
[address] [facility]  [facility]  [address] [facility]  [address] [facility], [address] [place] [address]  [phone]  www  [facility]  org      NAME: Anahy Nickerson  AGE: 67 y o  SEX: male  : 1945   MRN: [id number]    DATE: 2018  TIME: 10:40 AM    Assessment and Plan: Get CRUZ as ordered and PSA, and see me next month to go over results  Continue with Flomax  Chief Complaint   No chief complaint on file  History of Present Illness   Since the operation, he complains of passing white stringy material that occasionally blocks his stream, and then he has pain across his lower back  It was getting better, then on Saturday he had a more severe episode  U/Atoday is essentially negative  Is drinking a lot of water  Chronic cystitis:  Had Staph epidermidis UTI 2018 which was treated appropriately with Macrobid  Prostate cancer:  [place] 7 found in  TURP chips 2018  Bladder diverticulum with incomplete bladder emptying and left ureteral obstruction due to same:  Status post exploratory laparotomy, lysis of abdominal he has ends and ureteral lysis of the left ureter and closure bladder diverticulum 2018  The following portions of the patient's history were reviewed and updated as appropriate: allergies, current medications, past family history, past medical history, past social history, past surgical history and problem list     Review of Systems   Review of Systems   Constitutional: Positive for appetite change  Genitourinary: Positive for difficulty urinating and flank pain  Negative for decreased urine volume, discharge, dysuria, enuresis, frequency, genital sores, hematuria, penile pain, penile swelling, scrotal swelling, testicular pain and urgency         Active Problem List     Patient Active Problem List   Diagnosis    Right bundle melissa block, 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 [address] )    Essential hypertension    Acute pain of right shoulder    Benign localized hyperplasia of prostate without urinary obstruction    Urinary retention due to benign prostatic hyperplasia       Objective   /80   Ht 6' (1 829 m)   Wt 103 kg (226 lb)   BMI 30 65 kg/m²     Physical Exam        Current Medications     Current Outpatient Prescriptions:     acetaminophen (TYLENOL) 650 mg CR tablet, Take 650 mg by mouth every 8 (eight) hours as needed for mild pain, Disp: , Rfl:     amLODIPine (NORVASC) 10 mg tablet, Take 10 mg by mouth daily, Disp: , Rfl:     divalproex sodium (DEPAKOTE) 500 mg EC tablet, Take 500 mg by mouth 2 (two) times a day  , Disp: , Rfl:     ibuprofen (MOTRIN) 200 mg tablet, Take 400 mg by mouth every 6 (six) hours as needed for mild pain, Disp: , Rfl:     mometasone (NASONEX) 50 mcg/act nasal spray, 2 sprays into each nostril as needed, Disp: , Rfl:     nitrofurantoin (MACROBID) 100 mg capsule, Take 1 capsule (100 mg total) by mouth 2 (two) times a day, Disp: 20 capsule, Rfl: 0    pentoxifylline (TRENtal) 400 mg ER tablet, Take 400 mg by mouth 3 (three) times a day with meals, Disp: , Rfl:     ranitidine (ZANTAC) 300 MG capsule, Take 300 mg by mouth every evening, Disp: , Rfl:     tamsulosin (FLOMAX) 0 4 mg, Take 1 capsule (0 4 mg total) by mouth daily at bedtime, Disp: 90 capsule, Rfl: 3        Javier Spear MD

## 2018-06-22 ENCOUNTER — APPOINTMENT (OUTPATIENT)
Dept: LAB | Facility: MEDICAL CENTER | Age: 73
End: 2018-06-22
Attending: UROLOGY
Payer: MEDICARE

## 2018-06-22 DIAGNOSIS — C61 PROSTATE CANCER (HCC): ICD-10-CM

## 2018-06-22 LAB — PSA SERPL-MCNC: 0.5 NG/ML (ref 0–4)

## 2018-06-22 PROCEDURE — 84153 ASSAY OF PSA TOTAL: CPT

## 2018-06-27 ENCOUNTER — HOSPITAL ENCOUNTER (OUTPATIENT)
Dept: ULTRASOUND IMAGING | Facility: MEDICAL CENTER | Age: 73
Discharge: HOME | End: 2018-06-27
Attending: UROLOGY
Payer: MEDICARE

## 2018-06-27 DIAGNOSIS — N13.30 HYDRONEPHROSIS OF LEFT KIDNEY: ICD-10-CM

## 2018-06-27 DIAGNOSIS — N32.3 BLADDER DIVERTICULUM: ICD-10-CM

## 2018-06-27 PROCEDURE — 76770 US EXAM ABDO BACK WALL COMP: CPT

## 2018-07-03 ENCOUNTER — OFFICE VISIT (OUTPATIENT)
Dept: UROLOGY | Facility: MEDICAL CENTER | Age: 73
End: 2018-07-03
Payer: MEDICARE

## 2018-07-03 VITALS
WEIGHT: 220.6 LBS | DIASTOLIC BLOOD PRESSURE: 84 MMHG | BODY MASS INDEX: 29.88 KG/M2 | HEIGHT: 72 IN | SYSTOLIC BLOOD PRESSURE: 138 MMHG

## 2018-07-03 DIAGNOSIS — N30.91 CYSTITIS WITH HEMATURIA: Primary | ICD-10-CM

## 2018-07-03 LAB
SL AMB  POCT GLUCOSE, UA: NEGATIVE
SL AMB LEUKOCYTE ESTERASE,UA: ABNORMAL
SL AMB POCT BILIRUBIN,UA: ABNORMAL
SL AMB POCT BLOOD,UA: ABNORMAL
SL AMB POCT CLARITY,UA: ABNORMAL
SL AMB POCT COLOR,UA: YELLOW
SL AMB POCT KETONES,UA: ABNORMAL
SL AMB POCT NITRITE,UA: NEGATIVE
SL AMB POCT PH,UA: 6.5
SL AMB POCT SPECIFIC GRAVITY,UA: 1.02
SL AMB POCT URINE PROTEIN: ABNORMAL
SL AMB POCT UROBILINOGEN: 0.2

## 2018-07-03 PROCEDURE — 81003 URINALYSIS AUTO W/O SCOPE: CPT | Performed by: UROLOGY

## 2018-07-03 PROCEDURE — 87086 URINE CULTURE/COLONY COUNT: CPT | Performed by: UROLOGY

## 2018-07-03 PROCEDURE — 99214 OFFICE O/P EST MOD 30 MIN: CPT | Performed by: UROLOGY

## 2018-07-03 NOTE — PROGRESS NOTES
[address] [facility]  [facility]  [address] [facility]  [place], [address] [place] [address]  [phone]  www  [facility]  org      NAME: Richard Chairez  AGE: 67 y o  SEX: male  : 1945   MRN: [id number]    DATE: 7/3/2018  TIME: 3:15 PM    Assessment and Plan: Will get a CT scan the abdomen and pelvis to assess for fluid collection in the old diverticulum remnants, I was physically unable to excise the entire diverticulum and had the base of it still left in the area near the sacrum  Because he is not feeling well overall, we are going to get a complete metabolic panel and because of the recurring stringy proteinaceous material in his bladder will check a urine culture today and treat any affect infection that we might see and do cystoscopy next office visit in about 3-4 weeks  Chief Complaint   No chief complaint on file  History of Present Illness   Here for follow-up of chronic cystitis, prostate cancer bladder diverticulum and BPH with obstruction  Also left hydronephrosis due to stenosis of the distal left ureter due to the bladder diverticulum which I perform lysis of and removed the diverticulum 2018     The stent was removed and he is here for follow-up of his renal ultrasound  This shows severe left hydronephrosis with no stones on either kidney, and bilateral ureteral jets are seen  Left hydronephrosis is chronic, as it has been seen for the past 2 years in his images  Prostate cancer: PSA 0 5 2018     It was 0 32 2017  Has [place] 7 prostate cancer found in  TURP chips 2018   "I'm just not urinating right(has to double void at night and day)"has poor appetite, and back pain bilaterally  Feels no better than he did last OV        The following portions of the patient's history were reviewed and updated as appropriate: allergies, current medications, past family history, past medical history, past social history, past surgical history and problem list     Review of Systems   Review of Systems   Genitourinary: Positive for difficulty urinating  Active Problem List     Patient Active Problem List   Diagnosis    Right bundle melissa block, 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    Aortic regurgitation    BPH with obstruction/lower urinary tract symptoms    Bladder diverticulum    Postoperative ileus (HCC)    Kidney stone    Major depressive disorder, single episode, moderate (HCC)    Peripheral vascular disease ([facility] [address] )    Essential hypertension    Acute pain of right shoulder    Benign localized hyperplasia of prostate without urinary obstruction    Urinary retention due to benign prostatic hyperplasia       Objective   There were no vitals taken for this visit      Physical Exam        Current Medications     Current Outpatient Prescriptions:     acetaminophen (TYLENOL) 650 mg CR tablet, Take 650 mg by mouth every 8 (eight) hours as needed for mild pain, Disp: , Rfl:     amLODIPine (NORVASC) 10 mg tablet, Take 10 mg by mouth daily, Disp: , Rfl:     divalproex sodium (DEPAKOTE) 500 mg EC tablet, Take 500 mg by mouth 2 (two) times a day  , Disp: , Rfl:     ibuprofen (MOTRIN) 200 mg tablet, Take 400 mg by mouth every 6 (six) hours as needed for mild pain, Disp: , Rfl:     mometasone (NASONEX) 50 mcg/act nasal spray, 2 sprays into each nostril as needed, Disp: , Rfl:     nitrofurantoin (MACROBID) 100 mg capsule, Take 1 capsule (100 mg total) by mouth 2 (two) times a day, Disp: 20 capsule, Rfl: 0    pentoxifylline (TRENtal) 400 mg ER tablet, Take 400 mg by mouth 3 (three) times a day with meals, Disp: , Rfl:     ranitidine (ZANTAC) 300 MG capsule, Take 300 mg by mouth every evening, Disp: , Rfl:     tamsulosin (FLOMAX) 0 4 mg, Take 1 capsule (0 4 mg total) by mouth daily with dinner, Disp: 90 capsule, Rfl: 3        Angelica Matthews MD

## 2018-07-04 LAB — BACTERIA UR CULT: NORMAL

## 2018-07-07 ENCOUNTER — HOSPITAL ENCOUNTER (OUTPATIENT)
Dept: CT IMAGING | Facility: HOSPITAL | Age: 73
Discharge: HOME | End: 2018-07-07
Attending: UROLOGY
Payer: MEDICARE

## 2018-07-07 DIAGNOSIS — N30.91 CYSTITIS WITH HEMATURIA: ICD-10-CM

## 2018-07-07 PROCEDURE — 74176 CT ABD & PELVIS W/O CONTRAST: CPT

## 2018-07-10 ENCOUNTER — TELEPHONE (OUTPATIENT)
Dept: UROLOGY | Facility: MEDICAL CENTER | Age: 73
End: 2018-07-10

## 2018-07-10 ENCOUNTER — APPOINTMENT (OUTPATIENT)
Dept: LAB | Facility: MEDICAL CENTER | Age: 73
End: 2018-07-10
Attending: UROLOGY
Payer: MEDICARE

## 2018-07-10 DIAGNOSIS — N30.91 CYSTITIS WITH HEMATURIA: ICD-10-CM

## 2018-07-10 DIAGNOSIS — R18.8 PELVIC FLUID COLLECTION: Primary | ICD-10-CM

## 2018-07-10 LAB
ALBUMIN SERPL BCP-MCNC: 2.9 G/DL (ref 3.5–5)
ALP SERPL-CCNC: 61 U/L (ref 46–116)
ALT SERPL W P-5'-P-CCNC: 18 U/L (ref 12–78)
ANION GAP SERPL CALCULATED.3IONS-SCNC: 7 MMOL/L (ref 4–13)
AST SERPL W P-5'-P-CCNC: 18 U/L (ref 5–45)
BILIRUB SERPL-MCNC: 0.55 MG/DL (ref 0.2–1)
BUN SERPL-MCNC: 12 MG/DL (ref 5–25)
CALCIUM SERPL-MCNC: 9.2 MG/DL (ref 8.3–10.1)
CHLORIDE SERPL-SCNC: 106 MMOL/L (ref 100–108)
CO2 SERPL-SCNC: 27 MMOL/L (ref 21–32)
CREAT SERPL-MCNC: 1.44 MG/DL (ref 0.6–1.3)
GFR SERPL CREATININE-BSD FRML MDRD: 48 ML/MIN/1.73SQ M
GLUCOSE P FAST SERPL-MCNC: 79 MG/DL (ref 65–99)
POTASSIUM SERPL-SCNC: 3.8 MMOL/L (ref 3.5–5.3)
PROT SERPL-MCNC: 7.3 G/DL (ref 6.4–8.2)
SODIUM SERPL-SCNC: 140 MMOL/L (ref 136–145)

## 2018-07-10 PROCEDURE — 36415 COLL VENOUS BLD VENIPUNCTURE: CPT

## 2018-07-10 PROCEDURE — 80053 COMPREHEN METABOLIC PANEL: CPT

## 2018-07-10 NOTE — PROGRESS NOTES
I spoke with patient's wife Raleigh Bryson  I reviewed his CT scan personally and shows a fluid collection behind the bladder  This is where I excised the diverticulum  What is probably happening is this is causing inflammatory reaction in the posterior wall the bladder and that would explain the stringy elements he is passing  Will make referral to interventional Radiology to see if they can drain that under CT guidance

## 2018-07-10 NOTE — TELEPHONE ENCOUNTER
Please call Jose Roberto Alvarado patient wife about the CT scan report  ,please  She is also cancelling the other US that was scheduled for next month,because it was just done also

## 2018-07-11 ENCOUNTER — TELEPHONE (OUTPATIENT)
Dept: UROLOGY | Facility: MEDICAL CENTER | Age: 73
End: 2018-07-11

## 2018-07-11 NOTE — TELEPHONE ENCOUNTER
I CALLED AND LEFT MESSAGE FOR INTERVENTIONAL RADIOLOGY TO CALL ME BACK TO SCHEDULE PATIENT  I CALLED PATIENTS WIFE YINKA AND LET HER KNOW THAT AS SOON AS I HEAR FROM IR I WILL CALL HER WITH ALL THE DETAILS

## 2018-07-12 NOTE — TELEPHONE ENCOUNTER
Voice mail message from patients wife 7/12/18 @ 3:45PM asking if there has been any progress scheduling appointment

## 2018-07-13 ENCOUNTER — TELEPHONE (OUTPATIENT)
Dept: UROLOGY | Facility: MEDICAL CENTER | Age: 73
End: 2018-07-13

## 2018-07-13 DIAGNOSIS — R11.0 NAUSEA: Primary | ICD-10-CM

## 2018-07-13 RX ORDER — ONDANSETRON 4 MG/1
4 TABLET, FILM COATED ORAL EVERY 8 HOURS PRN
Qty: 20 TABLET | Refills: 0 | Status: SHIPPED | OUTPATIENT
Start: 2018-07-13 | End: 2018-08-10 | Stop reason: ALTCHOICE

## 2018-07-13 NOTE — TELEPHONE ENCOUNTER
This patient had pelvic fluid,now is nauseated  Per his wife he is drinking ok,keeping fluids down,No appetite with the nausea  She also stated that he is not scheduled for IR yet

## 2018-07-13 NOTE — TELEPHONE ENCOUNTER
Spoke with IR and gave all the information for patient and they will be calling me back  I called wife Severa Crow and gave her an update and told her that I will call her back as soon as I hear from IR

## 2018-07-13 NOTE — PROGRESS NOTES
Sim Dietz called saying he is feeling nauseous  He is able to keep fluids down  His labs show a creatinine of 1 44 which is raised since the stent was removed  He has a fluid collection in his posterior pelvis and left hydronephrosis as before with the fluid collection actually deviating the left ureter medially  The nausea may be from obstruction  If he becomes worse, he will need percutaneous drainage of the kidney as well as the fluid collection  I left a message on the machine for them to call me back

## 2018-07-13 NOTE — TELEPHONE ENCOUNTER
Spoke with Royer Cutler have him see me early next week-work him in-so I can get him set up for cystoscopy left ureteral stent placement left retrograde pyelography in the OR soon as possible-please call patient to schedule office visit

## 2018-07-20 ENCOUNTER — HOSPITAL ENCOUNTER (OUTPATIENT)
Dept: RADIOLOGY | Facility: HOSPITAL | Age: 73
Discharge: HOME | End: 2018-07-20
Attending: UROLOGY | Admitting: INTERNAL MEDICINE
Payer: MEDICARE

## 2018-07-20 VITALS
HEART RATE: 76 BPM | BODY MASS INDEX: 29.8 KG/M2 | SYSTOLIC BLOOD PRESSURE: 138 MMHG | RESPIRATION RATE: 18 BRPM | TEMPERATURE: 98.9 F | DIASTOLIC BLOOD PRESSURE: 74 MMHG | HEIGHT: 72 IN | OXYGEN SATURATION: 93 % | WEIGHT: 220 LBS

## 2018-07-20 DIAGNOSIS — R18.8 PELVIC FLUID COLLECTION: ICD-10-CM

## 2018-07-20 PROCEDURE — 87185 SC STD ENZYME DETCJ PER NZM: CPT | Performed by: UROLOGY

## 2018-07-20 PROCEDURE — 49406 IMAGE CATH FLUID PERI/RETRO: CPT | Performed by: RADIOLOGY

## 2018-07-20 PROCEDURE — C1769 GUIDE WIRE: HCPCS

## 2018-07-20 PROCEDURE — 87070 CULTURE OTHR SPECIMN AEROBIC: CPT | Performed by: UROLOGY

## 2018-07-20 PROCEDURE — 99152 MOD SED SAME PHYS/QHP 5/>YRS: CPT | Performed by: RADIOLOGY

## 2018-07-20 PROCEDURE — 87076 CULTURE ANAEROBE IDENT EACH: CPT | Performed by: UROLOGY

## 2018-07-20 PROCEDURE — 10030 IMG GID FLU COLL DRG SFT TIS: CPT

## 2018-07-20 PROCEDURE — 99152 MOD SED SAME PHYS/QHP 5/>YRS: CPT

## 2018-07-20 PROCEDURE — 87205 SMEAR GRAM STAIN: CPT | Performed by: UROLOGY

## 2018-07-20 PROCEDURE — C1729 CATH, DRAINAGE: HCPCS

## 2018-07-20 RX ORDER — OXYCODONE HYDROCHLORIDE AND ACETAMINOPHEN 5; 325 MG/1; MG/1
1 TABLET ORAL EVERY 4 HOURS PRN
Status: DISCONTINUED | OUTPATIENT
Start: 2018-07-20 | End: 2018-07-20 | Stop reason: HOSPADM

## 2018-07-20 RX ORDER — FENTANYL CITRATE 50 UG/ML
INJECTION, SOLUTION INTRAMUSCULAR; INTRAVENOUS CODE/TRAUMA/SEDATION MEDICATION
Status: COMPLETED | OUTPATIENT
Start: 2018-07-20 | End: 2018-07-20

## 2018-07-20 RX ORDER — SODIUM CHLORIDE 9 MG/ML
75 INJECTION, SOLUTION INTRAVENOUS CONTINUOUS
Status: DISCONTINUED | OUTPATIENT
Start: 2018-07-20 | End: 2018-07-20 | Stop reason: HOSPADM

## 2018-07-20 RX ORDER — MIDAZOLAM HYDROCHLORIDE 1 MG/ML
INJECTION INTRAMUSCULAR; INTRAVENOUS CODE/TRAUMA/SEDATION MEDICATION
Status: COMPLETED | OUTPATIENT
Start: 2018-07-20 | End: 2018-07-20

## 2018-07-20 RX ADMIN — MIDAZOLAM 1 MG: 1 INJECTION INTRAMUSCULAR; INTRAVENOUS at 11:45

## 2018-07-20 RX ADMIN — SODIUM CHLORIDE 75 ML/HR: 0.9 INJECTION, SOLUTION INTRAVENOUS at 09:20

## 2018-07-20 RX ADMIN — FENTANYL CITRATE 50 MCG: 50 INJECTION, SOLUTION INTRAMUSCULAR; INTRAVENOUS at 11:45

## 2018-07-20 RX ADMIN — FENTANYL CITRATE 25 MCG: 50 INJECTION, SOLUTION INTRAMUSCULAR; INTRAVENOUS at 11:53

## 2018-07-20 RX ADMIN — FENTANYL CITRATE 50 MCG: 50 INJECTION, SOLUTION INTRAMUSCULAR; INTRAVENOUS at 11:39

## 2018-07-20 RX ADMIN — MIDAZOLAM 1 MG: 1 INJECTION INTRAMUSCULAR; INTRAVENOUS at 11:39

## 2018-07-20 RX ADMIN — MIDAZOLAM 0.5 MG: 1 INJECTION INTRAMUSCULAR; INTRAVENOUS at 11:53

## 2018-07-20 NOTE — BRIEF OP NOTE (RAD/CATH)
CT GUIDED [facility] DRAINAGE CATHETER PLACEMENT  Procedure Note    PATIENT NAME: Rula Minaya  : 1945  MRN: [id number]     Pre-op Diagnosis:   1  Pelvic fluid collection      Post-op Diagnosis:   1  Pelvic fluid collection        Surgeon:   Linnea Durbin MD    Estimated Blood Loss:  Minimal  Findings:  Pelvic collection drained with CT guidance  8 [place] drain was placed  60 cc of purulent appearing fluid was aspirated  He has multiple antibiotic allergies and was given script for clindamycin empirically and flushes  He is to follow-up with Dr Yao Morrison on Monday      Specimens:  Culture    Complications:  none    Anesthesia: Conscious sedation    Linnea Durbin MD     Date: 2018  Time: 12:41 PM

## 2018-07-20 NOTE — DISCHARGE INSTRUCTIONS
TUBE CARE INSTRUCTIONS    Care after your procedure:    Resume your normal diet  Small sips of flat soda will help with nausea  1  The properly functioning catheter should be forward flushed once (1x) daily with 10ml of normal saline using clean technique  You will be given a prescription for flushes  To flush the tube, clean both connections with alcohol swab  Twist off the drainage bag/ bulb  tubing and twist the saline syringe into the drainage tube and flush  Remove the syringe and twist the drainage bag / bulb tubing tubing back on     2  The drainage bag/bulb may be emptied as necessary  Keep a record of the amount of fluid you drain from your tube  This should be done with clean technique as well  3  A fresh dressing should be applied daily over the tube insertion site  4  As the tube is secured to the skin with only a suture,try not to pull on your tube  Tub baths are not permitted  Showers are permitted if the patient's skin entry site is prevented from getting wet  Similarly, washcloth "baths" are acceptable  Contact Interventional Radiology at [phone] [place] PATIENTS: Contact Interventional Radiology at [phone]) [place] PATIENTS: Contact Interventional Radiology at [phone]) if:    1  Leakage or large amounts of liquid around the catheter  2  Fever of 101 degrees lasting several hours without other obvious cause (such as sore throat, flu, etc)  3  Persistent nausea or vomiting  4  Diminished drainage, which may be associated with pressure or pain  Or when the     drainage from your tube is less than 10mls for 48 hours  5  Catheter pulled back or falls out  The following pharmacies carry the flush syringes         [facility]                     [facility]  [address] [facility]                         [address] [facility]  Phone [phone]            Phone [phone]   [place] [address]                                [phone]  [address] [facility] [address]                      [address] [facility]  Phone [phone]            Phone [phone]                      Carri Mares                                                                                                          [phone]  [facility]  [address]  Phone [phone]

## 2018-07-23 ENCOUNTER — OFFICE VISIT (OUTPATIENT)
Dept: UROLOGY | Facility: MEDICAL CENTER | Age: 73
End: 2018-07-23
Payer: MEDICARE

## 2018-07-23 VITALS
WEIGHT: 215 LBS | DIASTOLIC BLOOD PRESSURE: 88 MMHG | HEIGHT: 72 IN | BODY MASS INDEX: 29.12 KG/M2 | SYSTOLIC BLOOD PRESSURE: 158 MMHG

## 2018-07-23 DIAGNOSIS — K65.1 PELVIC ABSCESS IN MALE (HCC): Primary | ICD-10-CM

## 2018-07-23 DIAGNOSIS — N13.5 URETERAL STRICTURE, LEFT: ICD-10-CM

## 2018-07-23 DIAGNOSIS — N13.30 HYDRONEPHROSIS OF LEFT KIDNEY: ICD-10-CM

## 2018-07-23 LAB
SL AMB  POCT GLUCOSE, UA: NEGATIVE
SL AMB LEUKOCYTE ESTERASE,UA: ABNORMAL
SL AMB POCT BILIRUBIN,UA: NEGATIVE
SL AMB POCT BLOOD,UA: ABNORMAL
SL AMB POCT CLARITY,UA: ABNORMAL
SL AMB POCT COLOR,UA: YELLOW
SL AMB POCT KETONES,UA: NEGATIVE
SL AMB POCT NITRITE,UA: NEGATIVE
SL AMB POCT PH,UA: 7
SL AMB POCT SPECIFIC GRAVITY,UA: 1.01
SL AMB POCT URINE PROTEIN: ABNORMAL
SL AMB POCT UROBILINOGEN: 1

## 2018-07-23 PROCEDURE — 81003 URINALYSIS AUTO W/O SCOPE: CPT | Performed by: UROLOGY

## 2018-07-23 PROCEDURE — 99214 OFFICE O/P EST MOD 30 MIN: CPT | Performed by: UROLOGY

## 2018-07-23 RX ORDER — METRONIDAZOLE 500 MG/1
500 TABLET ORAL EVERY 8 HOURS SCHEDULED
Qty: 21 TABLET | Refills: 0 | Status: SHIPPED | OUTPATIENT
Start: 2018-07-23 | End: 2018-07-27 | Stop reason: ALTCHOICE

## 2018-07-23 NOTE — PROGRESS NOTES
[address] [facility]  [facility]  [address] [facility]  [place], [address] [place] [address]  [phone]  www  [facility]  org      NAME: Latonia Blum  AGE: 67 y o  SEX: male  : 1945   MRN: [id number]    DATE: 2018  TIME: 12:56 PM    Assessment and Plan-  Pelvic fluid collection, containing Bacteroides fragilis  He has been started on the clindamycin and will see how he does with this and he is not draining much from the drain that he has in presently  In the meantime, we will set him up for cystoscopy, cystogram and left retrograde pyelography with consideration of left ureteral stent because he does have hydronephrosis of the left kidney despite ureteral lysis during the original operation  The risks of bleeding infection and inability to place a stent were explained and he gives informed consent  Chief Complaint     Chief Complaint   Patient presents with    Post-op       History of Present Illness   CT scan showed a collection of fluid near the sacrum posterior to the bladder  Interventional Radiology placed a drain in this and a culture was sent which grew Bacteroides fragilis  I have started him on Flagyl today, but that I find out he has already been started on clindamycin  He has left hydronephrosis due to deviation of the ureter on the left with possible stricturing where I performed previous lysis of the ureter and bladder diverticulectomy  Since he had a collection drained, he initially had a better appetite but now he is returning back to his chronic anorexia  He has been afebrile but he is feeling some chills  He just took his first dose of Flagyl  He initially felt better when the collection was drained in terms of his pelvis  The drain is not putting out much for the past 48 hr   I told him to keep the Flagyl available in case he gets Clostridium difficile from clindamycin        The following portions of the patient's history were reviewed and updated as appropriate: allergies, current medications, past family history, past medical history, past social history, past surgical history and problem list     Review of Systems   Review of Systems   Constitutional: Positive for appetite change  Respiratory: Negative  Cardiovascular: Negative  Gastrointestinal: Positive for constipation  Active Problem List     Patient Active Problem List   Diagnosis    Right bundle melissa block, 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    Aortic regurgitation    BPH with obstruction/lower urinary tract symptoms    Bladder diverticulum    [facility] ([facility])    Kidney stone    Major depressive disorder, single episode, moderate (HCC)    Peripheral vascular disease ([facility] [address] )    Essential hypertension    Acute pain of right shoulder    Benign localized hyperplasia of prostate without urinary obstruction    Urinary retention due to benign prostatic hyperplasia       Objective   /88   Ht 6' (1 829 m)   Wt 97 5 kg (215 lb)   BMI 29 16 kg/m²     Physical Exam   Constitutional: He is oriented to person, place, and time  He appears well-developed and well-nourished  HENT:   Head: Normocephalic and atraumatic  Eyes: EOM are normal    Neck: Normal range of motion  Cardiovascular: Normal rate and regular rhythm  Pulmonary/Chest: Effort normal and breath sounds normal    Abdominal: He exhibits no distension  There is no tenderness  There is no rebound and no guarding  Musculoskeletal: Normal range of motion  Neurological: He is alert and oriented to person, place, and time  Skin: Skin is warm and dry  Psychiatric: He has a normal mood and affect   His behavior is normal  Judgment and thought content normal            Current Medications     Current Outpatient Prescriptions:     acetaminophen (TYLENOL) 650 mg CR tablet, Take 650 mg by mouth every 8 (eight) hours as needed for mild pain, Disp: , Rfl:     amLODIPine (NORVASC) 10 mg tablet, Take 10 mg by mouth daily, Disp: , Rfl:     divalproex sodium (DEPAKOTE) 500 mg EC tablet, Take 500 mg by mouth 2 (two) times a day  , Disp: , Rfl:     ibuprofen (MOTRIN) 200 mg tablet, Take 400 mg by mouth every 6 (six) hours as needed for mild pain, Disp: , Rfl:     metroNIDAZOLE (FLAGYL) 500 mg tablet, Take 1 tablet (500 mg total) by mouth every 8 (eight) hours for 21 days, Disp: 21 tablet, Rfl: 0    mometasone (NASONEX) 50 mcg/act nasal spray, 2 sprays into each nostril as needed, Disp: , Rfl:     nitrofurantoin (MACROBID) 100 mg capsule, Take 1 capsule (100 mg total) by mouth 2 (two) times a day, Disp: 20 capsule, Rfl: 0    ondansetron (ZOFRAN) 4 mg tablet, Take 1 tablet (4 mg total) by mouth every 8 (eight) hours as needed for nausea or vomiting, Disp: 20 tablet, Rfl: 0    pentoxifylline (TRENtal) 400 mg ER tablet, Take 400 mg by mouth 3 (three) times a day with meals, Disp: , Rfl:     ranitidine (ZANTAC) 300 MG capsule, Take 300 mg by mouth every evening, Disp: , Rfl:     tamsulosin (FLOMAX) 0 4 mg, Take 1 capsule (0 4 mg total) by mouth daily with dinner, Disp: 90 capsule, Rfl: 3        Gordy Hinojosa MD

## 2018-07-23 NOTE — H&P
[address] [facility]  [facility]  [address] [facility]  [place], [address] [place] [address]  [phone]  www  [facility]  org      NAME: Calvin Siemens  AGE: 67 y o  SEX: male  : 1945   MRN: [id number]    DATE: 2018  TIME: 12:56 PM    Assessment and Plan-  Pelvic fluid collection, containing Bacteroides fragilis  He has been started on the clindamycin and will see how he does with this and he is not draining much from the drain that he has in presently  In the meantime, we will set him up for cystoscopy, cystogram and left retrograde pyelography with consideration of left ureteral stent because he does have hydronephrosis of the left kidney despite ureteral lysis during the original operation  The risks of bleeding infection and inability to place a stent were explained and he gives informed consent  Chief Complaint     Chief Complaint   Patient presents with    Post-op       History of Present Illness   CT scan showed a collection of fluid near the sacrum posterior to the bladder  Interventional Radiology placed a drain in this and a culture was sent which grew Bacteroides fragilis  I have started him on Flagyl today, but that I find out he has already been started on clindamycin  He has left hydronephrosis due to deviation of the ureter on the left with possible stricturing where I performed previous lysis of the ureter and bladder diverticulectomy  Since he had a collection drained, he initially had a better appetite but now he is returning back to his chronic anorexia  He has been afebrile but he is feeling some chills  He just took his first dose of Flagyl  He initially felt better when the collection was drained in terms of his pelvis  The drain is not putting out much for the past 48 hr   I told him to keep the Flagyl available in case he gets Clostridium difficile from clindamycin        The following portions of the patient's history were reviewed and updated as appropriate: allergies, current medications, past family history, past medical history, past social history, past surgical history and problem list     Review of Systems   Review of Systems   Constitutional: Positive for appetite change  Respiratory: Negative  Cardiovascular: Negative  Gastrointestinal: Positive for constipation  Active Problem List     Patient Active Problem List   Diagnosis    Right bundle melissa block, 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    Aortic regurgitation    BPH with obstruction/lower urinary tract symptoms    Bladder diverticulum    [facility]operative ileus ([facility])    Kidney stone    Major depressive disorder, single episode, moderate (HCC)    Peripheral vascular disease ([facility] [address] )    Essential hypertension    Acute pain of right shoulder    Benign localized hyperplasia of prostate without urinary obstruction    Urinary retention due to benign prostatic hyperplasia       Objective   /88   Ht 6' (1 829 m)   Wt 97 5 kg (215 lb)   BMI 29 16 kg/m²      Physical Exam   Constitutional: He is oriented to person, place, and time  He appears well-developed and well-nourished  HENT:   Head: Normocephalic and atraumatic  Eyes: EOM are normal    Neck: Normal range of motion  Cardiovascular: Normal rate and regular rhythm  Pulmonary/Chest: Effort normal and breath sounds normal    Abdominal: He exhibits no distension  There is no tenderness  There is no rebound and no guarding  Musculoskeletal: Normal range of motion  Neurological: He is alert and oriented to person, place, and time  Skin: Skin is warm and dry  Psychiatric: He has a normal mood and affect   His behavior is normal  Judgment and thought content normal            Current Medications     Current Outpatient Prescriptions:     acetaminophen (TYLENOL) 650 mg CR tablet, Take 650 mg by mouth every 8 (eight) hours as needed for mild pain, Disp: , Rfl:     amLODIPine (NORVASC) 10 mg tablet, Take 10 mg by mouth daily, Disp: , Rfl:     divalproex sodium (DEPAKOTE) 500 mg EC tablet, Take 500 mg by mouth 2 (two) times a day  , Disp: , Rfl:     ibuprofen (MOTRIN) 200 mg tablet, Take 400 mg by mouth every 6 (six) hours as needed for mild pain, Disp: , Rfl:     metroNIDAZOLE (FLAGYL) 500 mg tablet, Take 1 tablet (500 mg total) by mouth every 8 (eight) hours for 21 days, Disp: 21 tablet, Rfl: 0    mometasone (NASONEX) 50 mcg/act nasal spray, 2 sprays into each nostril as needed, Disp: , Rfl:     nitrofurantoin (MACROBID) 100 mg capsule, Take 1 capsule (100 mg total) by mouth 2 (two) times a day, Disp: 20 capsule, Rfl: 0    ondansetron (ZOFRAN) 4 mg tablet, Take 1 tablet (4 mg total) by mouth every 8 (eight) hours as needed for nausea or vomiting, Disp: 20 tablet, Rfl: 0    pentoxifylline (TRENtal) 400 mg ER tablet, Take 400 mg by mouth 3 (three) times a day with meals, Disp: , Rfl:     ranitidine (ZANTAC) 300 MG capsule, Take 300 mg by mouth every evening, Disp: , Rfl:     tamsulosin (FLOMAX) 0 4 mg, Take 1 capsule (0 4 mg total) by mouth daily with dinner, Disp: 90 capsule, Rfl: 3        Conner Mtz MD

## 2018-07-24 ENCOUNTER — TELEPHONE (OUTPATIENT)
Dept: UROLOGY | Facility: AMBULATORY SURGERY CENTER | Age: 73
End: 2018-07-24

## 2018-07-24 LAB
BACTERIA SPEC BFLD CULT: ABNORMAL
BACTERIA SPEC BFLD CULT: ABNORMAL
GRAM STN SPEC: ABNORMAL

## 2018-07-24 NOTE — TELEPHONE ENCOUNTER
Spoke with Ramirez Rodriguez  Patient is in a lot of pain and needs a call back from the nurse  Please advise and call

## 2018-07-24 NOTE — TELEPHONE ENCOUNTER
Patient c/o pain at drainage site,on a scale of 10 it is a 6  There is a red [place] around the tube area and a little swollen per his girlfriend  The drain is only draining 20ml about every 12hrs she said  The drainage is pink with some sediment  Mikey Nolasco He is not sleeping well  The Advil and Vicodin is not working  He is urinating ok and no fever either  His eating and drinking is ok,but it had been better  Need Orders

## 2018-07-24 NOTE — PROGRESS NOTES
I spoke with Bladimir Pasha is having more pain at the drain site and internally  There is not a lot of drainage but there is some  No fevers and he is voiding well  I told her to have him come to the emergency room if the pain is so bad that the Vicodin is not relieving it  We will then have to reimage him and determine if there is another fluid collection or if the drain itself is causing pain  The drain is placed into fluid collection as posterior to the bladder where his previous giant diverticulum existed  He is on clindamycin for a Bacteroides infection of this

## 2018-07-25 ENCOUNTER — TELEPHONE (OUTPATIENT)
Dept: UROLOGY | Facility: MEDICAL CENTER | Age: 73
End: 2018-07-25

## 2018-07-25 DIAGNOSIS — K65.1 PELVIC ABSCESS IN MALE (HCC): Primary | ICD-10-CM

## 2018-07-25 RX ORDER — HYDROCODONE BITARTRATE AND ACETAMINOPHEN 5; 325 MG/1; MG/1
1-2 TABLET ORAL EVERY 4 HOURS PRN
Qty: 40 TABLET | Refills: 0 | Status: SHIPPED | OUTPATIENT
Start: 2018-07-25 | End: 2018-08-09 | Stop reason: SDUPTHER

## 2018-07-25 NOTE — TELEPHONE ENCOUNTER
Please let them know that I called in [address] [facility] to the pharmacy

## 2018-07-25 NOTE — TELEPHONE ENCOUNTER
Spoke with girlfriend who states pt is taking Vicodin since "he had so much pain he was shaking"  This is helping him sleep somewhat  They are asking for more Vicodin to take him up until surgery next week  He had chills on the weekend, not now  She saw some purulent discharge around tube and some blood in tube  Told her to have him drink qs water  Take temperature q 4 hours and report any changes  Sending to Dr Unruly Poe for orders

## 2018-07-25 NOTE — PROGRESS NOTES
Patient calling for pain medications to bridge him until I perform surgery next week and put a stent in  I electronically prescribed [place] to his pharmacy  He has been told to come to the emergency room this pain is not relieved by the Norco or he has fevers

## 2018-07-27 ENCOUNTER — TELEPHONE (OUTPATIENT)
Dept: UROLOGY | Facility: MEDICAL CENTER | Age: 73
End: 2018-07-27

## 2018-07-27 NOTE — PRE-PROCEDURE INSTRUCTIONS
Pre-Surgery Instructions:   Medication Instructions    acetaminophen (TYLENOL) 650 mg CR tablet Patient was instructed by Physician and understands   amLODIPine (NORVASC) 10 mg tablet Patient was instructed by Physician and understands   divalproex sodium (DEPAKOTE) 500 mg EC tablet Patient was instructed by Physician and understands   HYDROcodone-acetaminophen (NORCO) 5-325 mg per tablet Patient was instructed by Physician and understands   ibuprofen (MOTRIN) 200 mg tablet Patient was instructed by Physician and understands   mometasone (NASONEX) 50 mcg/act nasal spray Patient was instructed by Physician and understands   ondansetron (ZOFRAN) 4 mg tablet Patient was instructed by Physician and understands   pentoxifylline (TRENtal) 400 mg ER tablet Patient was instructed by Physician and understands   ranitidine (ZANTAC) 300 MG capsule Patient was instructed by Physician and understands   tamsulosin (FLOMAX) 0 4 mg Patient was instructed by Physician and understands  Assessment completed with pt's girlfriend, Aurelio Elkins  Girlfriend instructed to have pt take norvasc and depakote with a small sip of water the morning of surgery (and pain medicine and zofran if needed)  [facility] preop instructions reviewed with girlfriend  Pt will use dial antibacterial soap

## 2018-07-27 NOTE — TELEPHONE ENCOUNTER
Noelle Jimenez notified,his girlfriend ,she is taking care of him  She will call if worse or any questions

## 2018-07-27 NOTE — TELEPHONE ENCOUNTER
Mr Allyson Kingwood had some yellow drainage about the size of a quarter yesterday around his tube and area also looked yellow with a  Ring around it,they are using hot and cold soaks for this  No fever,eating and drinking a little bowels are moving ok with Miralax  His drain amt of 40cc looked yellow yesterday only not today  Need Orders

## 2018-07-31 ENCOUNTER — ANESTHESIA EVENT (OUTPATIENT)
Dept: PERIOP | Facility: HOSPITAL | Age: 73
End: 2018-07-31
Payer: MEDICARE

## 2018-08-01 ENCOUNTER — HOSPITAL ENCOUNTER (OUTPATIENT)
Facility: HOSPITAL | Age: 73
Setting detail: OUTPATIENT SURGERY
Discharge: HOME | End: 2018-08-01
Attending: UROLOGY | Admitting: UROLOGY
Payer: MEDICARE

## 2018-08-01 ENCOUNTER — ANESTHESIA (OUTPATIENT)
Dept: PERIOP | Facility: HOSPITAL | Age: 73
End: 2018-08-01
Payer: MEDICARE

## 2018-08-01 ENCOUNTER — HOSPITAL ENCOUNTER (OUTPATIENT)
Dept: RADIOLOGY | Facility: HOSPITAL | Age: 73
Setting detail: OUTPATIENT SURGERY
Discharge: HOME | End: 2018-08-01
Payer: MEDICARE

## 2018-08-01 VITALS
HEART RATE: 72 BPM | RESPIRATION RATE: 18 BRPM | HEIGHT: 72 IN | BODY MASS INDEX: 29.12 KG/M2 | WEIGHT: 215 LBS | DIASTOLIC BLOOD PRESSURE: 70 MMHG | OXYGEN SATURATION: 98 % | TEMPERATURE: 97.9 F | SYSTOLIC BLOOD PRESSURE: 110 MMHG

## 2018-08-01 DIAGNOSIS — N13.5 URETERAL STRICTURE, LEFT: ICD-10-CM

## 2018-08-01 DIAGNOSIS — K65.1 PELVIC ABSCESS IN MALE (HCC): ICD-10-CM

## 2018-08-01 DIAGNOSIS — N13.30 HYDRONEPHROSIS OF LEFT KIDNEY: ICD-10-CM

## 2018-08-01 PROCEDURE — 88342 IMHCHEM/IMCYTCHM 1ST ANTB: CPT | Performed by: PATHOLOGY

## 2018-08-01 PROCEDURE — 74450 X-RAY URETHRA/BLADDER: CPT

## 2018-08-01 PROCEDURE — 88305 TISSUE EXAM BY PATHOLOGIST: CPT | Performed by: PATHOLOGY

## 2018-08-01 PROCEDURE — 51600 INJECTION FOR BLADDER X-RAY: CPT | Performed by: UROLOGY

## 2018-08-01 PROCEDURE — C1769 GUIDE WIRE: HCPCS | Performed by: UROLOGY

## 2018-08-01 PROCEDURE — 88341 IMHCHEM/IMCYTCHM EA ADD ANTB: CPT | Performed by: PATHOLOGY

## 2018-08-01 PROCEDURE — 52214 CYSTOSCOPY AND TREATMENT: CPT | Performed by: UROLOGY

## 2018-08-01 RX ORDER — FENTANYL CITRATE/PF 50 MCG/ML
25 SYRINGE (ML) INJECTION
Status: DISCONTINUED | OUTPATIENT
Start: 2018-08-01 | End: 2018-08-01 | Stop reason: HOSPADM

## 2018-08-01 RX ORDER — MAGNESIUM HYDROXIDE 1200 MG/15ML
LIQUID ORAL AS NEEDED
Status: DISCONTINUED | OUTPATIENT
Start: 2018-08-01 | End: 2018-08-01 | Stop reason: HOSPADM

## 2018-08-01 RX ORDER — PROPOFOL 10 MG/ML
INJECTION, EMULSION INTRAVENOUS AS NEEDED
Status: DISCONTINUED | OUTPATIENT
Start: 2018-08-01 | End: 2018-08-01 | Stop reason: SURG

## 2018-08-01 RX ORDER — GLYCOPYRROLATE 0.2 MG/ML
INJECTION INTRAMUSCULAR; INTRAVENOUS AS NEEDED
Status: DISCONTINUED | OUTPATIENT
Start: 2018-08-01 | End: 2018-08-01 | Stop reason: SURG

## 2018-08-01 RX ORDER — CLINDAMYCIN PHOSPHATE 900 MG/50ML
900 INJECTION INTRAVENOUS ONCE
Status: COMPLETED | OUTPATIENT
Start: 2018-08-01 | End: 2018-08-01

## 2018-08-01 RX ORDER — GLYCINE 1.5 G/100ML
SOLUTION IRRIGATION AS NEEDED
Status: DISCONTINUED | OUTPATIENT
Start: 2018-08-01 | End: 2018-08-01 | Stop reason: HOSPADM

## 2018-08-01 RX ORDER — CLINDAMYCIN HYDROCHLORIDE 300 MG/1
300 CAPSULE ORAL 4 TIMES DAILY
Qty: 12 CAPSULE | Refills: 0 | Status: SHIPPED | OUTPATIENT
Start: 2018-08-01 | End: 2018-08-10 | Stop reason: ALTCHOICE

## 2018-08-01 RX ORDER — SODIUM CHLORIDE 9 MG/ML
125 INJECTION, SOLUTION INTRAVENOUS CONTINUOUS
Status: DISCONTINUED | OUTPATIENT
Start: 2018-08-01 | End: 2018-08-01 | Stop reason: HOSPADM

## 2018-08-01 RX ORDER — HYDROCODONE BITARTRATE AND ACETAMINOPHEN 5; 325 MG/1; MG/1
1 TABLET ORAL EVERY 4 HOURS PRN
Status: DISCONTINUED | OUTPATIENT
Start: 2018-08-01 | End: 2018-08-01 | Stop reason: HOSPADM

## 2018-08-01 RX ORDER — LIDOCAINE HYDROCHLORIDE 10 MG/ML
INJECTION, SOLUTION INFILTRATION; PERINEURAL AS NEEDED
Status: DISCONTINUED | OUTPATIENT
Start: 2018-08-01 | End: 2018-08-01 | Stop reason: SURG

## 2018-08-01 RX ORDER — HYDROCODONE BITARTRATE AND ACETAMINOPHEN 5; 325 MG/1; MG/1
1-2 TABLET ORAL EVERY 4 HOURS PRN
Qty: 20 TABLET | Refills: 0 | Status: SHIPPED | OUTPATIENT
Start: 2018-08-01 | End: 2018-08-11

## 2018-08-01 RX ORDER — ONDANSETRON 2 MG/ML
INJECTION INTRAMUSCULAR; INTRAVENOUS AS NEEDED
Status: DISCONTINUED | OUTPATIENT
Start: 2018-08-01 | End: 2018-08-01 | Stop reason: SURG

## 2018-08-01 RX ORDER — PROPOFOL 10 MG/ML
INJECTION, EMULSION INTRAVENOUS CONTINUOUS PRN
Status: DISCONTINUED | OUTPATIENT
Start: 2018-08-01 | End: 2018-08-01 | Stop reason: SURG

## 2018-08-01 RX ADMIN — HYDROCODONE BITARTRATE AND ACETAMINOPHEN 1 TABLET: 5; 325 TABLET ORAL at 14:57

## 2018-08-01 RX ADMIN — CLINDAMYCIN PHOSPHATE 900 MG: 900 INJECTION, SOLUTION INTRAVENOUS at 12:04

## 2018-08-01 RX ADMIN — AZTREONAM 1000 MG: 1 INJECTION, POWDER, LYOPHILIZED, FOR SOLUTION INTRAMUSCULAR; INTRAVENOUS at 12:18

## 2018-08-01 RX ADMIN — PROPOFOL 130 MCG/KG/MIN: 10 INJECTION, EMULSION INTRAVENOUS at 12:15

## 2018-08-01 RX ADMIN — PROPOFOL 100 MG: 10 INJECTION, EMULSION INTRAVENOUS at 12:08

## 2018-08-01 RX ADMIN — LIDOCAINE HYDROCHLORIDE 100 MG: 10 INJECTION, SOLUTION INFILTRATION; PERINEURAL at 12:08

## 2018-08-01 RX ADMIN — GLYCOPYRROLATE 0.1 MG: 0.2 INJECTION, SOLUTION INTRAMUSCULAR; INTRAVENOUS at 12:38

## 2018-08-01 RX ADMIN — SODIUM CHLORIDE 125 ML/HR: 0.9 INJECTION, SOLUTION INTRAVENOUS at 10:56

## 2018-08-01 RX ADMIN — ONDANSETRON HYDROCHLORIDE 4 MG: 2 INJECTION, SOLUTION INTRAVENOUS at 12:36

## 2018-08-01 RX ADMIN — CLINDAMYCIN PHOSPHATE 900 MG: 900 INJECTION, SOLUTION INTRAVENOUS at 12:18

## 2018-08-01 RX ADMIN — DEXAMETHASONE SODIUM PHOSPHATE 4 MG: 10 INJECTION INTRAMUSCULAR; INTRAVENOUS at 12:36

## 2018-08-01 RX ADMIN — SODIUM CHLORIDE 125 ML/HR: 0.9 INJECTION, SOLUTION INTRAVENOUS at 13:16

## 2018-08-01 NOTE — ANESTHESIA PREPROCEDURE EVALUATION
Review of Systems/Medical History  Patient summary reviewed  Chart reviewed  History of anesthetic complications PONV    Cardiovascular  Hypertension , Dysrhythmias ,   Comment: RBBB,  Pulmonary  Smoker ex-smoker  ,   Comment: S/p lung cyst removal       GI/Hepatic    GERD well controlled,        Kidney stones,   Comment: S/p L nephrostomy tube, stent     Endo/Other     GYN       Hematology   Musculoskeletal    Arthritis     Neurology   Psychology   Depression , depressed,              Physical Exam    Airway    Mallampati score: II  TM Distance: >3 FB  Neck ROM: full     Dental       Cardiovascular  Rhythm: regular, Rate: normal,     Pulmonary  Breath sounds clear to auscultation,     Other Findings  Upper partial plate  Anesthesia Plan  ASA Score- 2     Anesthesia Type- general with ASA Monitors  Additional Monitors:   Airway Plan: LMA  Plan Factors- Patient instructed to abstain from smoking on day of procedure  Patient did not smoke on day of surgery  Induction- intravenous  Postoperative Plan-     Informed Consent- Anesthetic plan and risks discussed with patient

## 2018-08-01 NOTE — DISCHARGE INSTRUCTIONS
Expect to have burning urgency and frequency of urination  Expect to see blood in the urine  Also expect to have left flank pain  Call for fever greater than 101 5°, inability to urinate or severe pain not relieved by pain medications  There antibiotic and pain medicine prescriptions waiting at your pharmacy  you may resume driving tomorrow if not taking narcotics  Use soap/ water to wash the drain site  The drain was removed  I was unable to find your left ureteral orifice  Therefore I was unable to place a stent  I took 2 biopsies of the inflamed areas in your prostate and at your bladder neck  We will need to schedule you for a left percutaneous nephrostomy tube in  interventional Radiology and they will have to try to get a stent in to your bladder from there  It is too inflamed to do anything from below

## 2018-08-01 NOTE — H&P (VIEW-ONLY)
[address] [facility]  [facility]  [address] [facility]  [place], [address] [place] [address]  [phone]  www  [facility]  org      NAME: Kaiden Jackson  AGE: 67 y o  SEX: male  : 1945   MRN: [id number]    DATE: 2018  TIME: 12:56 PM    Assessment and Plan-  Pelvic fluid collection, containing Bacteroides fragilis  He has been started on the clindamycin and will see how he does with this and he is not draining much from the drain that he has in presently  In the meantime, we will set him up for cystoscopy, cystogram and left retrograde pyelography with consideration of left ureteral stent because he does have hydronephrosis of the left kidney despite ureteral lysis during the original operation  The risks of bleeding infection and inability to place a stent were explained and he gives informed consent  Chief Complaint     Chief Complaint   Patient presents with    Post-op       History of Present Illness   CT scan showed a collection of fluid near the sacrum posterior to the bladder  Interventional Radiology placed a drain in this and a culture was sent which grew Bacteroides fragilis  I have started him on Flagyl today, but that I find out he has already been started on clindamycin  He has left hydronephrosis due to deviation of the ureter on the left with possible stricturing where I performed previous lysis of the ureter and bladder diverticulectomy  Since he had a collection drained, he initially had a better appetite but now he is returning back to his chronic anorexia  He has been afebrile but he is feeling some chills  He just took his first dose of Flagyl  He initially felt better when the collection was drained in terms of his pelvis  The drain is not putting out much for the past 48 hr   I told him to keep the Flagyl available in case he gets Clostridium difficile from clindamycin        The following portions of the patient's history were reviewed and updated as appropriate: allergies, current medications, past family history, past medical history, past social history, past surgical history and problem list     Review of Systems   Review of Systems   Constitutional: Positive for appetite change  Respiratory: Negative  Cardiovascular: Negative  Gastrointestinal: Positive for constipation  Active Problem List     Patient Active Problem List   Diagnosis    Right bundle melissa block, 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    Aortic regurgitation    BPH with obstruction/lower urinary tract symptoms    Bladder diverticulum    [facility]operative ileus ([facility])    Kidney stone    Major depressive disorder, single episode, moderate (HCC)    Peripheral vascular disease ([facility] [address] )    Essential hypertension    Acute pain of right shoulder    Benign localized hyperplasia of prostate without urinary obstruction    Urinary retention due to benign prostatic hyperplasia       Objective   /88   Ht 6' (1 829 m)   Wt 97 5 kg (215 lb)   BMI 29 16 kg/m²      Physical Exam   Constitutional: He is oriented to person, place, and time  He appears well-developed and well-nourished  HENT:   Head: Normocephalic and atraumatic  Eyes: EOM are normal    Neck: Normal range of motion  Cardiovascular: Normal rate and regular rhythm  Pulmonary/Chest: Effort normal and breath sounds normal    Abdominal: He exhibits no distension  There is no tenderness  There is no rebound and no guarding  Musculoskeletal: Normal range of motion  Neurological: He is alert and oriented to person, place, and time  Skin: Skin is warm and dry  Psychiatric: He has a normal mood and affect   His behavior is normal  Judgment and thought content normal            Current Medications     Current Outpatient Prescriptions:     acetaminophen (TYLENOL) 650 mg CR tablet, Take 650 mg by mouth every 8 (eight) hours as needed for mild pain, Disp: , Rfl:     amLODIPine (NORVASC) 10 mg tablet, Take 10 mg by mouth daily, Disp: , Rfl:     divalproex sodium (DEPAKOTE) 500 mg EC tablet, Take 500 mg by mouth 2 (two) times a day  , Disp: , Rfl:     ibuprofen (MOTRIN) 200 mg tablet, Take 400 mg by mouth every 6 (six) hours as needed for mild pain, Disp: , Rfl:     metroNIDAZOLE (FLAGYL) 500 mg tablet, Take 1 tablet (500 mg total) by mouth every 8 (eight) hours for 21 days, Disp: 21 tablet, Rfl: 0    mometasone (NASONEX) 50 mcg/act nasal spray, 2 sprays into each nostril as needed, Disp: , Rfl:     nitrofurantoin (MACROBID) 100 mg capsule, Take 1 capsule (100 mg total) by mouth 2 (two) times a day, Disp: 20 capsule, Rfl: 0    ondansetron (ZOFRAN) 4 mg tablet, Take 1 tablet (4 mg total) by mouth every 8 (eight) hours as needed for nausea or vomiting, Disp: 20 tablet, Rfl: 0    pentoxifylline (TRENtal) 400 mg ER tablet, Take 400 mg by mouth 3 (three) times a day with meals, Disp: , Rfl:     ranitidine (ZANTAC) 300 MG capsule, Take 300 mg by mouth every evening, Disp: , Rfl:     tamsulosin (FLOMAX) 0 4 mg, Take 1 capsule (0 4 mg total) by mouth daily with dinner, Disp: 90 capsule, Rfl: 3        Angelica Matthews MD

## 2018-08-01 NOTE — ANESTHESIA POSTPROCEDURE EVALUATION
Post-Op Assessment Note      CV Status:  Stable    Mental Status:  Alert and awake    Hydration Status:  Euvolemic    PONV Controlled:  Controlled    Airway Patency:  Patent    Post Op Vitals Reviewed: Yes          Staff: Anesthesiologist           BP 93/59 (08/01/18 1315)    Temp      Pulse 86 (08/01/18 1315)   Resp 22 (08/01/18 1315)    SpO2 94 % (08/01/18 1324)

## 2018-08-02 ENCOUNTER — TELEPHONE (OUTPATIENT)
Dept: UROLOGY | Facility: AMBULATORY SURGERY CENTER | Age: 73
End: 2018-08-02

## 2018-08-02 DIAGNOSIS — N13.1 HYDRONEPHROSIS WITH URETERAL STRICTURE, NOT ELSEWHERE CLASSIFIED: Primary | ICD-10-CM

## 2018-08-02 NOTE — TELEPHONE ENCOUNTER
As per Dr Elizabeth Kaufman, told dgtr pt is to keep appt for next week in office  In the meantime, he is to have the procedure in IR  Order in computer  Sending to surgery scheduler to schedule

## 2018-08-02 NOTE — TELEPHONE ENCOUNTER
Patient's daughter Doug Vincent called and wants a call back from the nurse  Patient just had a cysto done yesterday and she was wondering if patient needs to come back in on 8/10? Also patient needs to schedule an IR appointment  Please advise and call back  Thank you

## 2018-08-03 DIAGNOSIS — N40.1 ENLARGED PROSTATE WITH LOWER URINARY TRACT SYMPTOMS (LUTS): ICD-10-CM

## 2018-08-06 ENCOUNTER — TELEPHONE (OUTPATIENT)
Dept: UROLOGY | Facility: MEDICAL CENTER | Age: 73
End: 2018-08-06

## 2018-08-06 DIAGNOSIS — R21 RASH OF GENITAL AREA: Primary | ICD-10-CM

## 2018-08-06 RX ORDER — CLOTRIMAZOLE AND BETAMETHASONE DIPROPIONATE 10; .64 MG/G; MG/G
CREAM TOPICAL 2 TIMES DAILY
Qty: 30 G | Refills: 0 | Status: SHIPPED | OUTPATIENT
Start: 2018-08-06 | End: 2018-08-28 | Stop reason: ALTCHOICE

## 2018-08-06 NOTE — TELEPHONE ENCOUNTER
----- Message from Kiersten Fitzpatrick MD sent at 8/6/2018  1:45 PM EDT -----  Please let patient know that I sent ointment into his pharmacy and I will call his daughter back when I get a chance  Spoke with Linnea Otoole and gave her Dr Iraida Dover message as written

## 2018-08-06 NOTE — TELEPHONE ENCOUNTER
Spoke with dgtr and told her Dr Apolinar Mendez will send RX in  She has multiple concerns and asking Dr Apolinar Mendez to call when he has time  Thanks

## 2018-08-08 ENCOUNTER — TELEPHONE (OUTPATIENT)
Dept: RADIOLOGY | Facility: HOSPITAL | Age: 73
End: 2018-08-08

## 2018-08-08 RX ORDER — SODIUM CHLORIDE 9 MG/ML
75 INJECTION, SOLUTION INTRAVENOUS CONTINUOUS
Status: CANCELLED | OUTPATIENT
Start: 2018-08-08 | End: 2019-08-08

## 2018-08-10 ENCOUNTER — HOSPITAL ENCOUNTER (OUTPATIENT)
Dept: RADIOLOGY | Facility: HOSPITAL | Age: 73
Discharge: HOME | End: 2018-08-10
Attending: UROLOGY | Admitting: RADIOLOGY
Payer: MEDICARE

## 2018-08-10 ENCOUNTER — ANESTHESIA (OUTPATIENT)
Dept: RADIOLOGY | Facility: HOSPITAL | Age: 73
End: 2018-08-10

## 2018-08-10 ENCOUNTER — ANESTHESIA EVENT (OUTPATIENT)
Dept: RADIOLOGY | Facility: HOSPITAL | Age: 73
End: 2018-08-10

## 2018-08-10 VITALS
HEART RATE: 84 BPM | OXYGEN SATURATION: 96 % | TEMPERATURE: 97.3 F | DIASTOLIC BLOOD PRESSURE: 56 MMHG | SYSTOLIC BLOOD PRESSURE: 118 MMHG | HEIGHT: 72 IN | WEIGHT: 215 LBS | BODY MASS INDEX: 29.12 KG/M2 | RESPIRATION RATE: 18 BRPM

## 2018-08-10 DIAGNOSIS — N13.1 HYDRONEPHROSIS WITH URETERAL STRICTURE, NOT ELSEWHERE CLASSIFIED: ICD-10-CM

## 2018-08-10 LAB
ERYTHROCYTE [DISTWIDTH] IN BLOOD BY AUTOMATED COUNT: 13.5 % (ref 11.6–15.1)
HCT VFR BLD AUTO: 42.3 % (ref 36.5–49.3)
HGB BLD-MCNC: 13.8 G/DL (ref 12–17)
INR PPP: 0.94 (ref 0.86–1.17)
MCH RBC QN AUTO: 28.9 PG (ref 26.8–34.3)
MCHC RBC AUTO-ENTMCNC: 32.6 G/DL (ref 31.4–37.4)
MCV RBC AUTO: 89 FL (ref 82–98)
PLATELET # BLD AUTO: 496 THOUSANDS/UL (ref 149–390)
PMV BLD AUTO: 8.6 FL (ref 8.9–12.7)
PROTHROMBIN TIME: 12.3 SECONDS (ref 11.8–14.2)
RBC # BLD AUTO: 4.77 MILLION/UL (ref 3.88–5.62)
WBC # BLD AUTO: 8.11 THOUSAND/UL (ref 4.31–10.16)

## 2018-08-10 PROCEDURE — 50433 PLMT NEPHROURETERAL CATHETER: CPT

## 2018-08-10 PROCEDURE — C2625 STENT, NON-COR, TEM W/DEL SY: HCPCS

## 2018-08-10 PROCEDURE — C1894 INTRO/SHEATH, NON-LASER: HCPCS

## 2018-08-10 PROCEDURE — 50433 PLMT NEPHROURETERAL CATHETER: CPT | Performed by: RADIOLOGY

## 2018-08-10 PROCEDURE — C1769 GUIDE WIRE: HCPCS

## 2018-08-10 PROCEDURE — 87070 CULTURE OTHR SPECIMN AEROBIC: CPT | Performed by: UROLOGY

## 2018-08-10 PROCEDURE — 85027 COMPLETE CBC AUTOMATED: CPT | Performed by: RADIOLOGY

## 2018-08-10 PROCEDURE — 85610 PROTHROMBIN TIME: CPT | Performed by: RADIOLOGY

## 2018-08-10 PROCEDURE — 87205 SMEAR GRAM STAIN: CPT | Performed by: UROLOGY

## 2018-08-10 PROCEDURE — C1892 INTRO/SHEATH,FIXED,PEEL-AWAY: HCPCS

## 2018-08-10 RX ORDER — OXYCODONE HYDROCHLORIDE AND ACETAMINOPHEN 5; 325 MG/1; MG/1
2 TABLET ORAL EVERY 4 HOURS PRN
Status: DISCONTINUED | OUTPATIENT
Start: 2018-08-10 | End: 2018-08-11 | Stop reason: HOSPADM

## 2018-08-10 RX ORDER — LEVOFLOXACIN 5 MG/ML
750 INJECTION, SOLUTION INTRAVENOUS ONCE
Status: COMPLETED | OUTPATIENT
Start: 2018-08-10 | End: 2018-08-10

## 2018-08-10 RX ORDER — ONDANSETRON 2 MG/ML
4 INJECTION INTRAMUSCULAR; INTRAVENOUS ONCE AS NEEDED
Status: DISCONTINUED | OUTPATIENT
Start: 2018-08-10 | End: 2018-08-11 | Stop reason: HOSPADM

## 2018-08-10 RX ORDER — FENTANYL CITRATE/PF 50 MCG/ML
25 SYRINGE (ML) INJECTION
Status: DISCONTINUED | OUTPATIENT
Start: 2018-08-10 | End: 2018-08-11 | Stop reason: HOSPADM

## 2018-08-10 RX ORDER — LIDOCAINE HYDROCHLORIDE 10 MG/ML
INJECTION, SOLUTION INFILTRATION; PERINEURAL AS NEEDED
Status: DISCONTINUED | OUTPATIENT
Start: 2018-08-10 | End: 2018-08-10 | Stop reason: SURG

## 2018-08-10 RX ORDER — ONDANSETRON 2 MG/ML
INJECTION INTRAMUSCULAR; INTRAVENOUS AS NEEDED
Status: DISCONTINUED | OUTPATIENT
Start: 2018-08-10 | End: 2018-08-10 | Stop reason: SURG

## 2018-08-10 RX ORDER — SODIUM CHLORIDE 9 MG/ML
75 INJECTION, SOLUTION INTRAVENOUS CONTINUOUS
Status: DISCONTINUED | OUTPATIENT
Start: 2018-08-10 | End: 2018-08-11 | Stop reason: HOSPADM

## 2018-08-10 RX ORDER — PROPOFOL 10 MG/ML
INJECTION, EMULSION INTRAVENOUS AS NEEDED
Status: DISCONTINUED | OUTPATIENT
Start: 2018-08-10 | End: 2018-08-10 | Stop reason: SURG

## 2018-08-10 RX ORDER — PROPOFOL 10 MG/ML
INJECTION, EMULSION INTRAVENOUS CONTINUOUS PRN
Status: DISCONTINUED | OUTPATIENT
Start: 2018-08-10 | End: 2018-08-10 | Stop reason: SURG

## 2018-08-10 RX ORDER — ROCURONIUM BROMIDE 10 MG/ML
INJECTION, SOLUTION INTRAVENOUS AS NEEDED
Status: DISCONTINUED | OUTPATIENT
Start: 2018-08-10 | End: 2018-08-10 | Stop reason: SURG

## 2018-08-10 RX ORDER — DIPHENHYDRAMINE HYDROCHLORIDE 50 MG/ML
INJECTION INTRAMUSCULAR; INTRAVENOUS AS NEEDED
Status: DISCONTINUED | OUTPATIENT
Start: 2018-08-10 | End: 2018-08-10 | Stop reason: SURG

## 2018-08-10 RX ORDER — GLYCOPYRROLATE 0.2 MG/ML
INJECTION INTRAMUSCULAR; INTRAVENOUS AS NEEDED
Status: DISCONTINUED | OUTPATIENT
Start: 2018-08-10 | End: 2018-08-10 | Stop reason: SURG

## 2018-08-10 RX ORDER — METOCLOPRAMIDE HYDROCHLORIDE 5 MG/ML
10 INJECTION INTRAMUSCULAR; INTRAVENOUS ONCE AS NEEDED
Status: DISCONTINUED | OUTPATIENT
Start: 2018-08-10 | End: 2018-08-11 | Stop reason: HOSPADM

## 2018-08-10 RX ADMIN — PROPOFOL 120 MCG/KG/MIN: 10 INJECTION, EMULSION INTRAVENOUS at 13:20

## 2018-08-10 RX ADMIN — DIPHENHYDRAMINE HYDROCHLORIDE 25 MG: 50 INJECTION, SOLUTION INTRAMUSCULAR; INTRAVENOUS at 13:08

## 2018-08-10 RX ADMIN — SODIUM CHLORIDE: 0.9 INJECTION, SOLUTION INTRAVENOUS at 12:25

## 2018-08-10 RX ADMIN — LIDOCAINE HYDROCHLORIDE 100 MG: 10 INJECTION, SOLUTION INFILTRATION; PERINEURAL at 13:08

## 2018-08-10 RX ADMIN — NEOSTIGMINE METHYLSULFATE 3 MG: 1 INJECTION, SOLUTION INTRAMUSCULAR; INTRAVENOUS; SUBCUTANEOUS at 14:18

## 2018-08-10 RX ADMIN — ONDANSETRON 4 MG: 2 INJECTION INTRAMUSCULAR; INTRAVENOUS at 14:16

## 2018-08-10 RX ADMIN — IODIXANOL 18 ML: 320 INJECTION, SOLUTION INTRAVASCULAR at 14:29

## 2018-08-10 RX ADMIN — HYDROCORTISONE SODIUM SUCCINATE 100 MG: 100 INJECTION, POWDER, FOR SOLUTION INTRAMUSCULAR; INTRAVENOUS at 13:08

## 2018-08-10 RX ADMIN — GLYCOPYRROLATE 0.4 MG: 0.2 INJECTION, SOLUTION INTRAMUSCULAR; INTRAVENOUS at 14:18

## 2018-08-10 RX ADMIN — OXYCODONE HYDROCHLORIDE AND ACETAMINOPHEN 1 TABLET: 5; 325 TABLET ORAL at 15:44

## 2018-08-10 RX ADMIN — ROCURONIUM BROMIDE 40 MG: 10 INJECTION INTRAVENOUS at 13:08

## 2018-08-10 RX ADMIN — LEVOFLOXACIN: 5 INJECTION, SOLUTION INTRAVENOUS at 13:29

## 2018-08-10 RX ADMIN — PROPOFOL 200 MG: 10 INJECTION, EMULSION INTRAVENOUS at 13:08

## 2018-08-10 NOTE — PROGRESS NOTES
75-year-old male patient with a recent history of bladder diverticulum resection  He has left hydronephrosis and ureteral stricture  H&P reviewed, labs checked  Stable to proceed with left percutaneous nephrostomy

## 2018-08-10 NOTE — ANESTHESIA PREPROCEDURE EVALUATION
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),    Psychology   Depression ,              Physical Exam    Airway    Mallampati score: II  TM Distance: >3 FB  Neck ROM: limited     Dental   upper dentures,     Cardiovascular  Cardiovascular exam normal    Pulmonary  Pulmonary exam normal     Other Findings        Anesthesia Plan  ASA Score- 3     Anesthesia Type- general with ASA Monitors  Additional Monitors:   Airway Plan: ETT  Plan Factors-    Induction- intravenous  Postoperative Plan- Plan for postoperative opioid use  Planned trial extubation    Informed Consent- Anesthetic plan and risks discussed with patient and spouse  I personally reviewed this patient with the CRNA  Discussed and agreed on the Anesthesia Plan with the CRNA  Shantal Nguyễn

## 2018-08-10 NOTE — BRIEF OP NOTE (RAD/CATH)
IR TUBE PLACEMENT NEPHROSTOMY  Procedure Note    PATIENT NAME: Keke Griggs  : 1945  MRN: [id number]     Pre-op Diagnosis:   1  Hydronephrosis with ureteral stricture, not elsewhere classified      Post-op Diagnosis:   1  Hydronephrosis with ureteral stricture, not elsewhere classified        Surgeon:   Papi Erickson MD  Assistants:     Estimated Blood Loss:  None  Findings:  Severe left hydronephrosis and hydroureter  Markedly tortuous and redundant left ureter  8 [place] x 28 cm PCNU placed and connected to external gravity drainage  Specimens:  Left PCN urine specimen for culture  Complications:  None      Anesthesia: Leo Oden MD     Date: 8/10/2018  Time: 2:25 PM

## 2018-08-10 NOTE — NURSING NOTE
Pain medication dosage discussed with patient and he chose to take 1 tablet for moderate pain at this time

## 2018-08-10 NOTE — ANESTHESIA POSTPROCEDURE EVALUATION
Post-Op Assessment Note      CV Status:  Stable    Mental Status:  Alert and awake    Hydration Status:  Euvolemic    PONV Controlled:  Controlled    Airway Patency:  Patent    Post Op Vitals Reviewed: Yes          Staff: CRNA       Comments: vss sv nonobstructed uneventful          /64 (08/10/18 1431)    Temp (!) 97 3 °F (36 3 °C) (08/10/18 1431)    Pulse 96 (08/10/18 1431)   Resp (!) 25 (08/10/18 1431)    SpO2 96 % (08/10/18 1431)

## 2018-08-10 NOTE — DISCHARGE INSTRUCTIONS
Nephrostomy Tube Care     WHAT YOU NEED TO KNOW:   A nephrostomy tube is a catheter (thin plastic tube) that is inserted through your skin and into your kidney  The nephrostomy tube drains urine from your kidney into a collecting bag outside your body  You may need a nephrostomy tube when something is blocking the normal flow of urine  A nephrostomy tube may be used for a short or a long period of time  The nephrostomy tube comes out of your back, so you will need someone to help care for your nephrostomy tube  DISCHARGE INSTRUCTIONS:      How to clean the skin around the nephrostomy tube and change the bandage:  Since the nephrostomy tube comes out of your back, you will not be able to care for it by yourself  Ask someone to follow the general directions below to check and care for your nephrostomy tube  Gather the items you will need  Disposable (single use) under-pad, and a clean washcloth  ¨ Plain soap, warm water, and new medical gloves  ¨ Sterile gauze bandages  ¨ Clear adhesive dressing or medical tape  ¨ Skin barrier  ¨ Protective skin film  ¨ Trash bag  · Remove the old bandage, and check the tube entry site  ¨ Have the patient lie on his side with the nephrostomy tube entry site facing up  Place the under-pad where it will catch drainage as you are working with the nephrostomy tube  ¨ Wash your hands with soap and water  Put on new medical gloves  ¨ Gently remove the old bandage, without pulling on the tube  Do this by holding the skin beside the tube with one hand  With the other hand, gently remove sticky tape and the skin barrier by pulling in the same direction as hair growth  Do not touch the side of the bandage that is placed over or around the tube  Throw the bandage and skin barrier away in a trash bag  ¨ Look for signs of infection, such as skin redness and swelling  Report any skin changes to healthcare providers  ¨ Clean the tube entry site      ¨ Hold the tube in place to keep it from being pulled out while you are cleaning around it  ¨ You will need to clean the area twice  For the first cleaning, wet a new gauze bandage with soap and water  Begin at the entry site of the tube  Wipe the skin in circles, moving away from the entry site  Remove blood and any other material with the gauze  Do this as often as needed  Use a new gauze bandage each time you clean the area, moving away from the entry site  ¨ For the second cleaning, wet a new gauze bandage with water  Begin at the entry site of the tube  Wipe the skin in circles, moving away from the entry site  Use a new gauze bandage each time you clean the area, moving away from the entry site  ¨ Gently pat the skin with a clean washcloth to dry it  · Apply the skin barrier and bandages  ¨ Roll up a bandage to make it thick, and place it under  the place where the tube enters the skin  Place it to support the tube, and stop it from kinking or bending  Tape the bandage in place, and apply more bandages if directed by a healthcare provider  ¨ Bring the tubing forward to the front and tape it to the skin  Do not stretch the tube tight, because this may pull the nephrostomy tube out  How often to change the bandage  Change the bandage around the tube, every other day  If your bandages  get dirty or wet, change them right away, and as often as needed  If your nephrostomy tube is to be used for a long period of time, the tube needs to be changed every 2 to 3 months  Healthcare providers will tell you when you need to make an appointment to have your tube changed  How to care for the urine drainage bag:   · Ask if you need to measure and write down how much urine is in the bag before you empty it  Drain urine out of the drainage bag when it is ½ to ? full  Open the spout at the bottom of the bag to empty the urine into the toilet  · You may need to detach the drainage bag from the nephrostomy tube to change it    If so, attach a new drainage bag tightly to the nephrostomy tube  ·   How to prevent problems with your nephrostomy tube:   · Change bandages, directed  This helps to prevent infection  Throw away or clean your drainage bag as directed by your healthcare provider  · Wipe the connecting ends of the drainage bag with alcohol before you reconnect the bag to the tube  This helps prevent infection  Keep the tube taped to your skin and connected to a drainage bag placed below the level of your kidneys  This helps prevent urine from backing up into your kidneys  You may wear a small drainage bag strapped to your leg to let you move around more easily  · Check the catheter to be sure it is in place after you change your clothes or do other activities  Do not wear tight clothing over the tube  Place the tubing over your thigh rather than under it when you are sitting down  Be sure that nothing is pulling on the nephrostomy tube when you move around  · Change positions if you see little or no urine in your drainage bag  Check to see if the urine tube is twisted or bent  Be sure that you are not sitting or lying on the tube  If there are no kinks and there is little or no urine in the drainage bag, tell your healthcare provider  · Flush out the tube as directed  Some tubes get flushed one time a day with 10 mls of NSS You will be given a prescription for the flushes  To flush the nephrostomy tube, clean both connections with alcohol swap  Twist off the drainage bag tube and twist the saline syringe into the nephrostomy tube and flush briskly  Remove the syringe and twist the drainage bag tube back into the nephrostomy tube  · Keep the site covered while you shower  Tape a piece of clear adhesive plastic over the dressing to keep it dry while you shower  Do not take tub baths      Contact Interventional Radiology at [phone] [facility] PATIENTS: Contact Interventional Radiology at [phone]) [facility] PATIENTS: Contact Interventional Radiology at [phone]) if:  · The skin around the nephrostomy tube is red, swollen, itches, or has a rash  · You have a fever greater than 101 or chills  · You have lower back or hip pain  · There are changes in how your urine looks or smells  · You have little or no urine draining from the nephrostomy tube  · You have nausea and are vomiting  · The black julia on your tube has moved, or the tube is longer than when it was put in    · You have questions or concerns about your condition or care  · The nephrostomy tube comes out completely  · There is blood, pus, or a bad smell coming from the place where the tube enters your skin  · Urine is leaking around the tube  The following pharmacies carry the flush syringes  [facility]                     [facility], [place]       [address]  Phone [phone]            Phone [phone]  [address] & [facility]                      [address]                                 [phone]  Phone [phone]            Phone [phone]    [facility]                                                                         [facility] [phone]  [address]   Phone [phone]

## 2018-08-13 ENCOUNTER — OFFICE VISIT (OUTPATIENT)
Dept: UROLOGY | Facility: MEDICAL CENTER | Age: 73
End: 2018-08-13
Payer: MEDICARE

## 2018-08-13 VITALS
BODY MASS INDEX: 28.31 KG/M2 | WEIGHT: 209 LBS | SYSTOLIC BLOOD PRESSURE: 120 MMHG | DIASTOLIC BLOOD PRESSURE: 72 MMHG | HEIGHT: 72 IN

## 2018-08-13 DIAGNOSIS — R10.2 PELVIC PAIN: ICD-10-CM

## 2018-08-13 DIAGNOSIS — C68.9 UROTHELIAL CARCINOMA (HCC): ICD-10-CM

## 2018-08-13 DIAGNOSIS — N13.1 HYDRONEPHROSIS WITH URETERAL STRICTURE, NOT ELSEWHERE CLASSIFIED: Primary | ICD-10-CM

## 2018-08-13 LAB
BACTERIA SPEC BFLD CULT: NO GROWTH
GRAM STN SPEC: NORMAL
SL AMB  POCT GLUCOSE, UA: ABNORMAL
SL AMB LEUKOCYTE ESTERASE,UA: ABNORMAL
SL AMB POCT BILIRUBIN,UA: ABNORMAL
SL AMB POCT BLOOD,UA: ABNORMAL
SL AMB POCT CLARITY,UA: CLEAR
SL AMB POCT COLOR,UA: ABNORMAL
SL AMB POCT KETONES,UA: ABNORMAL
SL AMB POCT NITRITE,UA: ABNORMAL
SL AMB POCT PH,UA: 7
SL AMB POCT SPECIFIC GRAVITY,UA: 1.02
SL AMB POCT URINE PROTEIN: ABNORMAL
SL AMB POCT UROBILINOGEN: 0.2

## 2018-08-13 PROCEDURE — 99214 OFFICE O/P EST MOD 30 MIN: CPT | Performed by: UROLOGY

## 2018-08-13 PROCEDURE — 81003 URINALYSIS AUTO W/O SCOPE: CPT | Performed by: UROLOGY

## 2018-08-13 RX ORDER — HYDROCODONE BITARTRATE AND ACETAMINOPHEN 5; 325 MG/1; MG/1
1 TABLET ORAL EVERY 6 HOURS PRN
Qty: 40 TABLET | Refills: 0 | Status: SHIPPED | OUTPATIENT
Start: 2018-08-13 | End: 2018-09-14

## 2018-08-13 NOTE — LETTER
2018     My Ramesh MD  [address] [facility] [address]    Patient: Vero Quan   YOB: 1945   Date of Visit: 2018       Dear Dr Nilsa Lee: Thank you for referring Ritesh Espinal to me for evaluation  Below are my notes for this consultation  If you have questions, please do not hesitate to call me  I look forward to following your patient along with you  Sincerely,        Kiersten Fitzpatrick MD        CC: No Recipients  Kiersten Fitzpatrick MD  2018 10:28 AM  Sign at close encounter  [address] [facility]  [address], [address] [place] [phone]  www  [facility]  org      NAME: Vero Quan  AGE: 67 y o  SEX: male  : 1945   MRN: [id number]    DATE: 2018  TIME: 10:01 AM    Assessment and Plan:  Urothelial carcinoma of the prostatic fossa-as below, we will obtain PET scan and referred to Oncology  Options to consider are radical cystoprostatectomy, repeat cystoscopy with trans urethral resection of the affected area, radiation, etc but will see what Oncology and the PET scan has to say  I will also obtain other opinions within the group and discuss him at tumor Board  Prostate cancer:  Incidental finding, [place] 7 at TURP  Pelvic pain:  Due to this inflammatory process, and there may be a reaccumulation of some the fluid that he had posterior to the bladder due to the inflammation  Norco prescribed  Plan:  Oncology consultation, Norco, CMP, PET scan  Follow-up 1-2 weeks to go over results  Chief Complaint   No chief complaint on file  History of Present Illness   High-grade urothelial carcinoma with sarcomatous features was found in the biopsy of his prostatic urethra 2018  The bladder biopsy  was negative    In the operating room, I saw a well-resected prostate with fluffy yellow white debris that appeared to be a nonhealing prostatic fossa after TURP, consistent with somebody who has received radiation but he has not had radiation  The yellow white material was the transitional cell carcinoma  He has no history of TCC  Cystogram showed no extravasation  He also has left hydronephrosis and has had a nephrostomy tube placed in the interim  I could not see the orifices in order to perform retrograde pyelography or place a stent  He has [place] 7 prostate cancer that was found incidentally while doing trans urethral resection of the prostate  With all the cystoscopies and imaging he has had, I have never seen anything to indicate any form of urothelial carcinoma  The following portions of the patient's history were reviewed and updated as appropriate: allergies, current medications, past family history, past medical history, past social history, past surgical history and problem list     Review of Systems   Review of Systems    Active Problem List     Patient Active Problem List   Diagnosis    Right bundle melissa block, 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    Aortic regurgitation    BPH with obstruction/lower urinary tract symptoms    Bladder diverticulum    Postoperative ileus ([address] )    Kidney stone    Major depressive disorder, single episode, moderate ([address] )    Peripheral vascular disease ([address] )    Essential hypertension    Acute pain of right shoulder    Benign localized hyperplasia of prostate without urinary obstruction    Urinary retention due to benign prostatic hyperplasia    Hydronephrosis of left kidney    Ureteral stricture, left    Pelvic abscess in [facility])       Objective   There were no vitals taken for this visit      Physical Exam        Current Medications     Current Outpatient Prescriptions:     acetaminophen (TYLENOL) 650 mg CR tablet, Take 650 mg by mouth every 8 (eight) hours as needed for mild pain, Disp: , Rfl:     amLODIPine (NORVASC) 10 mg tablet, Take 10 mg by mouth daily, Disp: , Rfl:     clotrimazole-betamethasone (LOTRISONE) 1-0 05 % cream, Apply topically 2 (two) times a day Apply to affected areas, Disp: 30 g, Rfl: 0    divalproex sodium (DEPAKOTE) 500 mg EC tablet, Take 500 mg by mouth 2 (two) times a day  , Disp: , Rfl:     mometasone (NASONEX) 50 mcg/act nasal spray, 2 sprays into each nostril as needed, Disp: , Rfl:     pentoxifylline (TRENtal) 400 mg ER tablet, Take 400 mg by mouth 3 (three) times a day with meals, Disp: , Rfl:     ranitidine (ZANTAC) 300 MG capsule, Take 300 mg by mouth every evening, Disp: , Rfl:     tamsulosin (FLOMAX) 0 4 mg, Take 1 capsule (0 4 mg total) by mouth daily with dinner, Disp: 90 capsule, Rfl: 3  Total time- 25 minutes      Janell Abebe MD

## 2018-08-13 NOTE — PROGRESS NOTES
[address] [facility]  [facility]  [address] [facility], [address] [place] [address]  [phone]  www  [facility]  org      NAME: Analy Zaragoza  AGE: 67 y o  SEX: male  : 1945   MRN: [id number]    DATE: 2018  TIME: 10:01 AM    Assessment and Plan:  Urothelial carcinoma of the prostatic fossa-as below, we will obtain PET scan and referred to Oncology  Options to consider are radical cystoprostatectomy, repeat cystoscopy with trans urethral resection of the affected area, radiation, etc but will see what Oncology and the PET scan has to say  I will also obtain other opinions within the group and discuss him at tumor Board  Prostate cancer:  Incidental finding, [place] 7 at TURP  Pelvic pain:  Due to this inflammatory process, and there may be a reaccumulation of some the fluid that he had posterior to the bladder due to the inflammation  Norco prescribed  Plan:  Oncology consultation, Norco, CMP, PET scan  Follow-up 1-2 weeks to go over results  Chief Complaint   No chief complaint on file  History of Present Illness   High-grade urothelial carcinoma with sarcomatous features was found in the biopsy of his prostatic urethra 2018  The bladder biopsy  was negative  In the operating room, I saw a well-resected prostate with fluffy yellow white debris that appeared to be a nonhealing prostatic fossa after TURP, consistent with somebody who has received radiation but he has not had radiation  The yellow white material was the transitional cell carcinoma  He has no history of TCC  Cystogram showed no extravasation  He also has left hydronephrosis and has had a nephrostomy tube placed in the interim  I could not see the orifices in order to perform retrograde pyelography or place a stent  He has [place] 7 prostate cancer that was found incidentally while doing trans urethral resection of the prostate    With all the cystoscopies and imaging he has had, I have never seen anything to indicate any form of urothelial carcinoma  The following portions of the patient's history were reviewed and updated as appropriate: allergies, current medications, past family history, past medical history, past social history, past surgical history and problem list     Review of Systems   Review of Systems    Active Problem List     Patient Active Problem List   Diagnosis    Right bundle melissa block, 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    Aortic regurgitation    BPH with obstruction/lower urinary tract symptoms    Bladder diverticulum    Post[facility]us ([facility] )    Kidney stone    Major depressive disorder, single episode, moderate ([facility] )    Peripheral vascular disease ([facility] )    Essential hypertension    Acute pain of right shoulder    Benign localized hyperplasia of prostate without urinary obstruction    Urinary retention due to benign prostatic hyperplasia    Hydronephrosis of left kidney    Ureteral stricture, left    Pelvic abscess in [facility])       Objective   There were no vitals taken for this visit      Physical Exam        Current Medications     Current Outpatient Prescriptions:     acetaminophen (TYLENOL) 650 mg CR tablet, Take 650 mg by mouth every 8 (eight) hours as needed for mild pain, Disp: , Rfl:     amLODIPine (NORVASC) 10 mg tablet, Take 10 mg by mouth daily, Disp: , Rfl:     clotrimazole-betamethasone (LOTRISONE) 1-0 05 % cream, Apply topically 2 (two) times a day Apply to affected areas, Disp: 30 g, Rfl: 0    divalproex sodium (DEPAKOTE) 500 mg EC tablet, Take 500 mg by mouth 2 (two) times a day  , Disp: , Rfl:     mometasone (NASONEX) 50 mcg/act nasal spray, 2 sprays into each nostril as needed, Disp: , Rfl:     pentoxifylline (TRENtal) 400 mg ER tablet, Take 400 mg by mouth 3 (three) times a day with meals, Disp: , Rfl:     ranitidine (ZANTAC) 300 MG capsule, Take 300 mg by mouth every evening, Disp: , Rfl:     tamsulosin (FLOMAX) 0 4 mg, Take 1 capsule (0 4 mg total) by mouth daily with dinner, Disp: 90 capsule, Rfl: 3  Total time- 25 minutes      Conner Mtz MD

## 2018-08-15 ENCOUNTER — TELEPHONE (OUTPATIENT)
Dept: UROLOGY | Facility: AMBULATORY SURGERY CENTER | Age: 73
End: 2018-08-15

## 2018-08-15 ENCOUNTER — TELEPHONE (OUTPATIENT)
Dept: UROLOGY | Facility: MEDICAL CENTER | Age: 73
End: 2018-08-15

## 2018-08-15 NOTE — TELEPHONE ENCOUNTER
Bryan Vo from [facility] pet scan dept is wondering if patient has a history of melanoma?  Can you please call back [phone] for

## 2018-08-15 NOTE — TELEPHONE ENCOUNTER
Spoke with Starla Pope and told her the referral is in computer for an oncology consult  That office will call her to schedule  She would like to know if she should schedule pt's colonoscopy now or wait until the other test results are in  He is overdue for it

## 2018-08-20 ENCOUNTER — HOSPITAL ENCOUNTER (OUTPATIENT)
Dept: NUCLEAR MEDICINE | Facility: HOSPITAL | Age: 73
Discharge: HOME | End: 2018-08-20
Payer: MEDICARE

## 2018-08-20 DIAGNOSIS — C68.9 UROTHELIAL CARCINOMA (HCC): ICD-10-CM

## 2018-08-20 PROCEDURE — A9552 F18 FDG: HCPCS

## 2018-08-20 PROCEDURE — 78816 PET IMAGE W/CT FULL BODY: CPT

## 2018-08-21 ENCOUNTER — TELEPHONE (OUTPATIENT)
Dept: UROLOGY | Facility: AMBULATORY SURGERY CENTER | Age: 73
End: 2018-08-21

## 2018-08-22 ENCOUNTER — APPOINTMENT (OUTPATIENT)
Dept: LAB | Facility: MEDICAL CENTER | Age: 73
End: 2018-08-22
Attending: UROLOGY
Payer: MEDICARE

## 2018-08-22 DIAGNOSIS — N13.1 HYDRONEPHROSIS WITH URETERAL STRICTURE, NOT ELSEWHERE CLASSIFIED: ICD-10-CM

## 2018-08-22 DIAGNOSIS — C68.9 UROTHELIAL CARCINOMA (HCC): ICD-10-CM

## 2018-08-22 DIAGNOSIS — C68.9 UROTHELIAL CANCER (HCC): Primary | ICD-10-CM

## 2018-08-22 LAB
ALBUMIN SERPL BCP-MCNC: 2.7 G/DL (ref 3.5–5)
ALP SERPL-CCNC: 58 U/L (ref 46–116)
ALT SERPL W P-5'-P-CCNC: 10 U/L (ref 12–78)
ANION GAP SERPL CALCULATED.3IONS-SCNC: 8 MMOL/L (ref 4–13)
AST SERPL W P-5'-P-CCNC: 11 U/L (ref 5–45)
BILIRUB SERPL-MCNC: 0.46 MG/DL (ref 0.2–1)
BUN SERPL-MCNC: 13 MG/DL (ref 5–25)
CALCIUM SERPL-MCNC: 9.2 MG/DL (ref 8.3–10.1)
CHLORIDE SERPL-SCNC: 104 MMOL/L (ref 100–108)
CO2 SERPL-SCNC: 27 MMOL/L (ref 21–32)
CREAT SERPL-MCNC: 1.19 MG/DL (ref 0.6–1.3)
GFR SERPL CREATININE-BSD FRML MDRD: 61 ML/MIN/1.73SQ M
GLUCOSE P FAST SERPL-MCNC: 84 MG/DL (ref 65–99)
POTASSIUM SERPL-SCNC: 3.9 MMOL/L (ref 3.5–5.3)
PROT SERPL-MCNC: 7.3 G/DL (ref 6.4–8.2)
SODIUM SERPL-SCNC: 139 MMOL/L (ref 136–145)

## 2018-08-22 PROCEDURE — 80053 COMPREHEN METABOLIC PANEL: CPT

## 2018-08-22 PROCEDURE — 36415 COLL VENOUS BLD VENIPUNCTURE: CPT

## 2018-08-22 NOTE — TELEPHONE ENCOUNTER
I informed [place] that Dr Rosa Pettit is in the OR with cases today, but that he often answers messages in between  I'll relay this to him and request that he also reviews the patient's recent PET results as well  Either the doctor or a member of our staff will get back to her

## 2018-08-22 NOTE — TELEPHONE ENCOUNTER
Pt's wife Patt Pendleton calling for direction pt complaining of knee pain since pet scan,states there is lump which is tender to touch did not injure self    [phone]

## 2018-08-23 NOTE — TELEPHONE ENCOUNTER
Patient needs appointment with Dr Cadence Malone MD with [facility] per Dr Lanre Medina

## 2018-08-28 ENCOUNTER — OFFICE VISIT (OUTPATIENT)
Dept: UROLOGY | Facility: MEDICAL CENTER | Age: 73
End: 2018-08-28
Payer: MEDICARE

## 2018-08-28 VITALS
BODY MASS INDEX: 28.31 KG/M2 | WEIGHT: 209 LBS | HEIGHT: 72 IN | SYSTOLIC BLOOD PRESSURE: 118 MMHG | DIASTOLIC BLOOD PRESSURE: 70 MMHG

## 2018-08-28 DIAGNOSIS — N13.5 URETERAL STRICTURE, LEFT: Primary | ICD-10-CM

## 2018-08-28 DIAGNOSIS — I72.4 POPLITEAL ANEURYSM (HCC): ICD-10-CM

## 2018-08-28 PROBLEM — C68.9 UROTHELIAL CARCINOMA (HCC): Status: ACTIVE | Noted: 2018-08-28

## 2018-08-28 PROCEDURE — 99213 OFFICE O/P EST LOW 20 MIN: CPT | Performed by: UROLOGY

## 2018-08-28 RX ORDER — METHYLPREDNISOLONE 32 MG/1
32 TABLET ORAL
Qty: 2 TABLET | Refills: 0 | Status: CANCELLED | OUTPATIENT
Start: 2018-08-28 | End: 2018-08-29

## 2018-08-28 RX ORDER — DIPHENHYDRAMINE HCL 50 MG
50 CAPSULE ORAL
Qty: 1 CAPSULE | Refills: 0 | Status: CANCELLED | OUTPATIENT
Start: 2018-08-28

## 2018-08-28 NOTE — LETTER
2018     Yesi Mcgrath MD  [address] [facility] [address]    Patient: Penny Bradford   YOB: 1945   Date of Visit: 2018       Dear Dr Agatha Rodriguez: Thank you for referring Mandeep Dewitt to me for evaluation  Below are my notes for this consultation  If you have questions, please do not hesitate to call me  I look forward to following your patient along with you  Sincerely,        Barrett Mosquera MD        CC: No Recipients  Barrett Mosquera MD  2018  9:25 AM  Sign at close encounter  [address] [facility]  [address], [address] [place] [phone]  www  [facility]  org      NAME: Penny Bradford  AGE: 67 y o  SEX: male  : 1945   MRN: [id number]    DATE: 2018  TIME: 9:08 AM    Assessment and Plan:  Urothelial carcinoma the prostatic urethra: They have an appointment  with Hematology Oncology, and I will see what their plans are  We have discussed radical cystoprostatectomy, repeat trans urethral resection  Cholelithiasis:  Incidental finding  Right nephrolithiasis:  Only 2 mm, no treatment needed  Left popliteal artery aneurysm-2 5 cm, refer to vascular surgery  Left ureteral stricture:  Has nephrostomy tube, we will ask interventional Radiology to converted to an internalized stent  Follow-up with me in 4 weeks  Chief Complaint   No chief complaint on file  History of Present Illness   Follow-up for high-grade urothelial carcinoma of the prostatic urethra  He also has a fluid collection posterior to the bladder  PET scan shows no evidence of metastases  There is focal uptake in the prostatic area itself  There is also focal uptake in the rim of the fluid collection but this may be inflammatory  He has [place] 7 prostate cancer as well that was an incidental finding at trans urethral resection of the prostate    I have spoken to the pathologist and she reviewed his TUR P specimen and found no evidence of urothelial carcinoma  He also has pelvic pain due to the inflammatory process  Oncology consultation is pending  Creatinine has normalized  A left popliteal artery aneurysm was found incidentally on this PET scan  This will need evaluation by vascular surgery  He also has a 2 mm stone found incidentally in the right kidney  There was also cholelithiasis  The following portions of the patient's history were reviewed and updated as appropriate: allergies, current medications, past family history, past medical history, past social history, past surgical history and problem list     Review of Systems   Review of Systems    Active Problem List     Patient Active Problem List   Diagnosis    Right bundle melissa block, 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    Aortic regurgitation    BPH with obstruction/lower urinary tract symptoms    Bladder diverticulum    Postoperative [facility]us ([facility] )    Kidney stone    Major depressive disorder, single episode, moderate ([address] )    Peripheral vascular disease ([address] )    Essential hypertension    Acute pain of right shoulder    Benign localized hyperplasia of prostate without urinary obstruction    Urinary retention due to benign prostatic hyperplasia    Hydronephrosis of left kidney    Ureteral stricture, left    Pelvic abscess in male [facility])       Objective   There were no vitals taken for this visit      Physical Exam        Current Medications     Current Outpatient Prescriptions:     acetaminophen (TYLENOL) 650 mg CR tablet, Take 650 mg by mouth every 8 (eight) hours as needed for mild pain, Disp: , Rfl:     amLODIPine (NORVASC) 10 mg tablet, Take 10 mg by mouth daily, Disp: , Rfl:     clotrimazole-betamethasone (LOTRISONE) 1-0 05 % cream, Apply topically 2 (two) times a day Apply to affected areas, Disp: 30 g, Rfl: 0    divalproex sodium (DEPAKOTE) 500 mg EC tablet, Take 500 mg by mouth 2 (two) times a day  , Disp: , Rfl:     HYDROcodone-acetaminophen (NORCO) 5-325 mg per tablet, Take 1 tablet by mouth every 6 (six) hours as needed for pain Max Daily Amount: 4 tablets, Disp: 40 tablet, Rfl: 0    mometasone (NASONEX) 50 mcg/act nasal spray, 2 sprays into each nostril as needed, Disp: , Rfl:     pentoxifylline (TRENtal) 400 mg ER tablet, Take 400 mg by mouth 3 (three) times a day with meals, Disp: , Rfl:     ranitidine (ZANTAC) 300 MG capsule, Take 300 mg by mouth every evening, Disp: , Rfl:     tamsulosin (FLOMAX) 0 4 mg, Take 1 capsule (0 4 mg total) by mouth daily with dinner, Disp: 90 capsule, Rfl: 3        Oliverio Gottlieb MD

## 2018-08-29 ENCOUNTER — OFFICE VISIT (OUTPATIENT)
Dept: VASCULAR SURGERY | Facility: CLINIC | Age: 73
End: 2018-08-29
Payer: MEDICARE

## 2018-08-29 VITALS
TEMPERATURE: 99.3 F | WEIGHT: 211 LBS | DIASTOLIC BLOOD PRESSURE: 66 MMHG | BODY MASS INDEX: 28.58 KG/M2 | SYSTOLIC BLOOD PRESSURE: 128 MMHG | HEIGHT: 72 IN

## 2018-08-29 DIAGNOSIS — I72.4 POPLITEAL ANEURYSM (HCC): ICD-10-CM

## 2018-08-29 DIAGNOSIS — I72.4 ANEURYSM OF LEFT POPLITEAL ARTERY (HCC): Primary | ICD-10-CM

## 2018-08-29 PROCEDURE — 99204 OFFICE O/P NEW MOD 45 MIN: CPT | Performed by: SURGERY

## 2018-08-29 NOTE — PATIENT INSTRUCTIONS
Will obtain an ultrasound of both legs to further evaluate the popliteal artery aneurysm  At this time since your asymptomatic favor proceeding forward with treatment of bladder cancer  Should you develop any acute changes of the left lower leg to include but not limited to weakness, numbness, changing color/temperature please call our office immediately

## 2018-08-29 NOTE — ASSESSMENT & PLAN NOTE
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

## 2018-08-29 NOTE — ASSESSMENT & PLAN NOTE
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

## 2018-08-29 NOTE — PROGRESS NOTES
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 with incidental finding of 2 5 cm left popliteal artery aneurysm on PET/CT 8/20/2018   -will obtain bilateral lower extremity arterial duplex to further evaluate the popliteal aneurysm and   -of note patient does have significant dye allergy (chest tightness/wheeziing, heart rate irregularities    Natural history and pathophysiology of popliteal aneurysmal disease discussed in detail   -instructed patient to contact the office immediately with new LLE symptoms of pain, rest pain, tissue loss or blue toes  -of note patient does have history 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   -no evidence of aortic aneurysmal disease noted on PET-CT scan  Urothelial carcinoma (HCC)  High-grade urothelial carcinoma of the prostatic urethra w/L hydronephrosis and ureteral stricture  -s/p L nephroureterostomy tube 8/10/2018  -last creatinine improved to 1 19 8/22/18  -continue follow-up with Urology  -oncology referral pending      Assessment/Plan   Diagnoses and all orders for this visit:    Aneurysm of left popliteal artery (HCC)  -     VAS lower limb arterial duplex, complete bilateral; Future    Popliteal aneurysm ([facility] [address] )  -     Ambulatory referral to Vascular Surgery    Other orders  -     Cancel: CTA abdominal w run off wo contrast; Future  -     Cancel: diphenhydrAMINE (BENADRYL) 50 mg capsule; Take 1 capsule (50 mg total) by mouth 60 minutes pre-procedure for 1 dose  -     Cancel: methylPREDNISolone (MEDROL) 32 MG tablet; Take 1 tablet (32 mg total) by mouth every 10 hours for 2 doses Take 1 tablet po 12 hr and 2 hr prior to CTA        No chief complaint on file  Subjective   Patient ID: Kaiden Jackson is a 67 y o  male    Chief complaint: Pt is new to our practice Ref By Dr Jaron Cuevas MD DX:Popliteal aneurysm on NM Pet CT scan 8/20  Pt admits to back pain  Pt is on [place]  Mr  Lawrence Long is a pleasant 19-year-old gentleman who was referred to our office by Dr Lawanda Pizarro for incidental finding of left popliteal artery aneurysm measuring approximately 2 5 centimeters on PET-CT scan  He is undergoing workup for bladder cancer with plans for possible surgical intervention in the near future  He has history of left transmetatarsal amputation while he was in his 25s secondary to Buerger's disease  Denies any claudication, and rest pain  The following portions of the patient's history were reviewed and updated as appropriate: allergies, current medications, past family history, past medical history, past social history, past surgical history and problem list     Review of Systems   Constitutional: Negative  HENT: Positive for hearing loss  Eyes: Negative  Respiratory: Negative  Cardiovascular: Negative  Gastrointestinal: Negative  Endocrine: Negative  Genitourinary: Positive for difficulty urinating  Musculoskeletal: Positive for back pain  Skin: Negative  Allergic/Immunologic: Negative  Neurological: Negative  Hematological: Negative  Psychiatric/Behavioral: Negative          Patient Active Problem List   Diagnosis    Right bundle melissa block, 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    Aortic regurgitation    BPH with obstruction/lower urinary tract symptoms    Bladder diverticulum    Postoperative ileus ([address] )    Kidney stone    Major depressive disorder, single episode, moderate (HCC)    Peripheral vascular disease ([address] )    Essential hypertension    Acute pain of right shoulder    Benign localized hyperplasia of prostate without urinary obstruction    Urinary retention due to benign prostatic hyperplasia    Hydronephrosis of left kidney    Ureteral stricture, left    Pelvic abscess in [facility])    Aneurysm of left popliteal artery ([address] )  Urothelial carcinoma ([facility] [address] )       Past Surgical History:   Procedure Laterality Date    ABDOMINAL SURGERY      When young had procedure for improviing circulation to left lower extremety    BACK SURGERY      Lumbar- not sure what was done   [employer]ATION      Approximately 2007- no blockages    CARPAL TUNNEL RELEASE Bilateral     CATARACT EXTRACTION Left     COLONOSCOPY      CYST REMOVAL      Robotic procedure to remove benign cyst from lower left lung    CYSTOGRAM N/A 3/14/2018    Procedure: CYSTOGRAM;  Surgeon: Kiersten Fitzpatrick MD;  [facility];  Service: Urology    CYSTOSCOPY      ESOPHAGOGASTRODUODENOSCOPY      IR TUBE PLACEMENT NEPHROSTOMY  8/10/2018    LUNG SURGERY      cyst removed left lung    OTHER SURGICAL HISTORY Left     fistula drain in left buttock    WA CYSTO/URETERO W/LITHOTRIPSY &INDWELL STENT INSRT Left 1/3/2018    Procedure: CYSTOSCOPY URETEROSCOPY, RETROGRADE PYELOGRAM AND INSERTION STENT URETERAL;  Surgeon: Kiersten Fitzpatrick MD;  [facility];  Service: Urology    WA CYSTOTOMY,EXCIS BLADDER TIC N/A 2/14/2018    Procedure: ABDOMINAL EXPLORATION; CLOSURE OF BLADDER DIVERTICULITIS;  Surgeon: Kiersten Fitzpatrick MD;  [facility];  Service: Urology    WA CYSTOURETHROSCOPY,URETER CATHETER Left 8/1/2018    Procedure: Cystoscopy, cystogram, bladder biopsies, removal of pelvic drain;  Surgeon: Kiersten Fitzpatrick MD;  [facility];  Service: Urology    WA INCISE/DRAIN BLADDER N/A 2/14/2018    Procedure: OPEN SUPRAPUBIC TUBE PLACEMENT;  Surgeon: Kiersten Fitzpatrick MD;  [facility];  Service: Urology    WA RELEASE Nathaly Him FIBROSIS Left 2/14/2018    Procedure: LYSIS OF ADHESIONS; URETEROLYSIS ;  Surgeon: Kiersten Fitzpatrick MD;  [facility];  Service: Urology    SKIN CANCER EXCISION      Melanoma removal on back in early 1990's    TOE AMPUTATION Left     All 5 toes left foot were amputated due to poor circulation     TRANSURETHRAL RESECTION OF PROSTATE N/A 3/14/2018    Procedure: TRANSURETHRAL RESECTION OF PROSTATE (TURP), LEFT URETERAL STENT REMOVAL;  Surgeon: Harvey Gregory MD;  [facility];  Service: Urology    WRIST SURGERY Bilateral     Ulnar nerve on right arm and both wrists are fused       Family History   Problem Relation Age of Onset    Heart disease Father     Heart disease Mother     Cancer Paternal Grandfather        Social History     Social History    Marital status: Common Law     Spouse name: N/A    Number of children: N/A    Years of education: N/A     Occupational History    Not on file  Social History Main Topics    Smoking status: Former Smoker     Packs/day: 1 00     Years: 15 00     Types: Cigarettes     Quit date: 2/5/1970    Smokeless tobacco: Never Used      Comment: Quit 50 years    Alcohol use Yes      Comment: Very rare    Drug use: No    Sexual activity: Not on file     Other Topics Concern    Not on file     Social History Narrative    No narrative on file       Allergies   Allergen Reactions    Iodine Shortness Of Breath, Swelling and Hives     Contrast dye causes respiratory distress   Iodine causes skin rash    Mercury Hives and Swelling    Shellfish-Derived Products Hives, Shortness Of Breath and Anaphylaxis     Anaphylaxis  Anaphylaxis    Augmentin [Amoxicillin-Pot Clavulanate] GI Intolerance, Rash and Diarrhea     Diarrhea    Lidoderm [Lidocaine] Rash     Lidoderm patch caused rash    Penicillins Rash, Swelling and Hives    Sulfa Antibiotics Hives, Swelling and Rash         Current Outpatient Prescriptions:     acetaminophen (TYLENOL) 650 mg CR tablet, Take 650 mg by mouth every 8 (eight) hours as needed for mild pain, Disp: , Rfl:     amLODIPine (NORVASC) 10 mg tablet, Take 10 mg by mouth daily, Disp: , Rfl:     divalproex sodium (DEPAKOTE) 500 mg EC tablet, Take 500 mg by mouth 2 (two) times a day  , Disp: , Rfl:     HYDROcodone-acetaminophen (NORCO) 5-325 mg per tablet, Take 1 tablet by mouth every 6 (six) hours as needed for pain Max Daily Amount: 4 tablets, Disp: 40 tablet, Rfl: 0    mometasone (NASONEX) 50 mcg/act nasal spray, 2 sprays into each nostril as needed, Disp: , Rfl:     pentoxifylline (TRENtal) 400 mg ER tablet, Take 400 mg by mouth 3 (three) times a day with meals, Disp: , Rfl:     ranitidine (ZANTAC) 300 MG capsule, Take 300 mg by mouth every evening, Disp: , Rfl:     tamsulosin (FLOMAX) 0 4 mg, Take 1 capsule (0 4 mg total) by mouth daily with dinner, Disp: 90 capsule, Rfl: 3    Objective     Physical Exam:    General appearance: alert and oriented, in no acute distress  Skin: Skin color, texture, turgor normal  No rashes or lesions  Neurologic: Grossly normal  Head: Normocephalic, without obvious abnormality, atraumatic  Eyes: conjunctivae/corneas clear    Neck: no adenopathy, no carotid bruit, no JVD, supple, symmetrical, trachea midline and thyroid not enlarged, symmetric, no tenderness/mass/nodules  Back: negative  Lungs: clear to auscultation bilaterally  Chest wall: no tenderness  Heart: regular rate and rhythm, S1, S2 normal, no murmur, click, rub or gallop  Abdomen: soft, non-tender; bowel sounds normal; no masses,  no organomegaly  Extremities: extremities normal, warm and well-perfused; no cyanosis, clubbing, or edema    Pulse exam:  Radial: Right: 2+ Left[de-identified] 2+    Femoral: Right: 2+ Left: 2+  Popliteal: Right: 1+ Left: 1+  DP: Right: non-palpable Left: non-palpable  PT: Right: non-palpable Left: non-palpable

## 2018-09-06 ENCOUNTER — TELEPHONE (OUTPATIENT)
Dept: RADIOLOGY | Facility: HOSPITAL | Age: 73
End: 2018-09-06

## 2018-09-06 RX ORDER — SODIUM CHLORIDE 9 MG/ML
75 INJECTION, SOLUTION INTRAVENOUS CONTINUOUS
Status: CANCELLED | OUTPATIENT
Start: 2018-09-06

## 2018-09-06 RX ORDER — METHYLPREDNISOLONE 16 MG/1
32 TABLET ORAL
Status: DISCONTINUED | OUTPATIENT
Start: 2018-09-06 | End: 2018-09-06

## 2018-09-06 RX ORDER — DIPHENHYDRAMINE HCL 25 MG
50 TABLET ORAL
Status: CANCELLED | OUTPATIENT
Start: 2018-09-06

## 2018-09-12 ENCOUNTER — HOSPITAL ENCOUNTER (OUTPATIENT)
Dept: RADIOLOGY | Facility: HOSPITAL | Age: 73
Discharge: HOME | End: 2018-09-12
Attending: UROLOGY | Admitting: RADIOLOGY
Payer: MEDICARE

## 2018-09-12 ENCOUNTER — TRANSCRIBE ORDERS (OUTPATIENT)
Dept: RADIOLOGY | Facility: HOSPITAL | Age: 73
End: 2018-09-12

## 2018-09-12 VITALS
HEIGHT: 72 IN | HEART RATE: 78 BPM | BODY MASS INDEX: 28.58 KG/M2 | SYSTOLIC BLOOD PRESSURE: 126 MMHG | OXYGEN SATURATION: 95 % | RESPIRATION RATE: 16 BRPM | WEIGHT: 211 LBS | TEMPERATURE: 97.6 F | DIASTOLIC BLOOD PRESSURE: 66 MMHG

## 2018-09-12 DIAGNOSIS — N13.5 URETERAL STRICTURE, LEFT: ICD-10-CM

## 2018-09-12 DIAGNOSIS — N13.5 URETERAL STRICTURE, LEFT: Primary | ICD-10-CM

## 2018-09-12 PROCEDURE — 50693 PLMT URETERAL STENT PRQ: CPT | Performed by: RADIOLOGY

## 2018-09-12 PROCEDURE — 99153 MOD SED SAME PHYS/QHP EA: CPT

## 2018-09-12 PROCEDURE — C1729 CATH, DRAINAGE: HCPCS

## 2018-09-12 PROCEDURE — 50434 CONVERT NEPHROSTOMY CATHETER: CPT

## 2018-09-12 PROCEDURE — C1769 GUIDE WIRE: HCPCS

## 2018-09-12 PROCEDURE — C2617 STENT, NON-COR, TEM W/O DEL: HCPCS

## 2018-09-12 PROCEDURE — 99152 MOD SED SAME PHYS/QHP 5/>YRS: CPT

## 2018-09-12 RX ORDER — FENTANYL CITRATE 50 UG/ML
INJECTION, SOLUTION INTRAMUSCULAR; INTRAVENOUS CODE/TRAUMA/SEDATION MEDICATION
Status: COMPLETED | OUTPATIENT
Start: 2018-09-12 | End: 2018-09-12

## 2018-09-12 RX ORDER — MIDAZOLAM HYDROCHLORIDE 1 MG/ML
INJECTION INTRAMUSCULAR; INTRAVENOUS CODE/TRAUMA/SEDATION MEDICATION
Status: COMPLETED | OUTPATIENT
Start: 2018-09-12 | End: 2018-09-12

## 2018-09-12 RX ORDER — SODIUM CHLORIDE 9 MG/ML
75 INJECTION, SOLUTION INTRAVENOUS CONTINUOUS
Status: DISCONTINUED | OUTPATIENT
Start: 2018-09-12 | End: 2018-09-13 | Stop reason: HOSPADM

## 2018-09-12 RX ADMIN — SODIUM CHLORIDE 75 ML/HR: 0.9 INJECTION, SOLUTION INTRAVENOUS at 07:09

## 2018-09-12 RX ADMIN — IOHEXOL 20 ML: 300 INJECTION, SOLUTION INTRAVENOUS at 09:39

## 2018-09-12 RX ADMIN — FENTANYL CITRATE 50 MCG: 50 INJECTION INTRAMUSCULAR; INTRAVENOUS at 09:16

## 2018-09-12 RX ADMIN — MIDAZOLAM 1 MG: 1 INJECTION INTRAMUSCULAR; INTRAVENOUS at 09:23

## 2018-09-12 RX ADMIN — MIDAZOLAM 1 MG: 1 INJECTION INTRAMUSCULAR; INTRAVENOUS at 09:16

## 2018-09-12 NOTE — BRIEF OP NOTE (RAD/CATH)
IR PCN TUBE CHANGE  Procedure Note    PATIENT NAME: Michelle Worthington  : 1945  MRN: [id number]     Pre-op Diagnosis:   1  Ureteral stricture, left      Post-op Diagnosis:   1  Ureteral stricture, left        Surgeon:   Humaira Melo MD    Estimated Blood Loss:  Minimal  Findings:  Left nephroureteral catheter converted to 8 Albanian double-J and 8 Albanian CT nephrostomy tube  1 week capping trial with follow-up nephrostogram next week and possible removal of safety catheter      Specimens: none    Complications:  none    Anesthesia: Conscious sedation    Humaira Melo MD     Date: 2018  Time: 9:34 AM

## 2018-09-12 NOTE — DISCHARGE INSTRUCTIONS
Nephrostomy Tube Care     WHAT YOU NEED TO KNOW:   A nephrostomy tube is a catheter (thin plastic tube) that is inserted through your skin and into your kidney  The nephrostomy tube drains urine from your kidney into a collecting bag outside your body  You may need a nephrostomy tube when something is blocking the normal flow of urine  A nephrostomy tube may be used for a short or a long period of time  The nephrostomy tube comes out of your back, so you will need someone to help care for your nephrostomy tube  DISCHARGE INSTRUCTIONS:     Resume your normal diet  Small sips of flat soda will help with mild nausea  How to clean the skin around the nephrostomy tube and change the bandage:  Since the nephrostomy tube comes out of your back, you will not be able to care for it by yourself  Ask someone to follow the general directions below to check and care for your nephrostomy tube  Gather the items you will need  Disposable (single use) under-pad, and a clean washcloth  ¨ Plain soap, warm water, and new medical gloves  ¨ Sterile gauze bandages  ¨ Clear adhesive dressing or medical tape  ¨ Skin barrier  ¨ Protective skin film  ¨ Trash bag  · Remove the old bandage, and check the tube entry site  ¨ Have the patient lie on his side with the nephrostomy tube entry site facing up  Place the under-pad where it will catch drainage as you are working with the nephrostomy tube  ¨ Wash your hands with soap and water  Put on new medical gloves  ¨ Gently remove the old bandage, without pulling on the tube  Do this by holding the skin beside the tube with one hand  With the other hand, gently remove sticky tape and the skin barrier by pulling in the same direction as hair growth  Do not touch the side of the bandage that is placed over or around the tube  Throw the bandage and skin barrier away in a trash bag  ¨ Look for signs of infection, such as skin redness and swelling   Report any skin changes to healthcare providers  ¨ Clean the tube entry site  ¨ Hold the tube in place to keep it from being pulled out while you are cleaning around it  ¨ You will need to clean the area twice  For the first cleaning, wet a new gauze bandage with soap and water  Begin at the entry site of the tube  Wipe the skin in circles, moving away from the entry site  Remove blood and any other material with the gauze  Do this as often as needed  Use a new gauze bandage each time you clean the area, moving away from the entry site  ¨ For the second cleaning, wet a new gauze bandage with water  Begin at the entry site of the tube  Wipe the skin in circles, moving away from the entry site  Use a new gauze bandage each time you clean the area, moving away from the entry site  ¨ Gently pat the skin with a clean washcloth to dry it  · Apply the skin barrier and bandages  ¨ Roll up a bandage to make it thick, and place it under  the place where the tube enters the skin  Place it to support the tube, and stop it from kinking or bending  Tape the bandage in place, and apply more bandages if directed by a healthcare provider  ¨ Bring the tubing forward to the front and tape it to the skin  Do not stretch the tube tight, because this may pull the nephrostomy tube out  How often to change the bandage  Change the bandage around the tube, every other day  If your bandages  get dirty or wet, change them right away, and as often as needed  If your nephrostomy tube is to be used for a long period of time, the tube needs to be changed every 2 to 3 months  Healthcare providers will tell you when you need to make an appointment to have your tube changed  How to care for the urine drainage bag:   · Ask if you need to measure and write down how much urine is in the bag before you empty it  Drain urine out of the drainage bag when it is ½ to ? full  Open the spout at the bottom of the bag to empty the urine into the toilet     · You may need to detach the drainage bag from the nephrostomy tube to change it    If so, attach a new drainage bag tightly to the nephrostomy tube  ·   How to prevent problems with your nephrostomy tube:   · Change bandages, directed  This helps to prevent infection  Throw away or clean your drainage bag as directed by your healthcare provider  · Wipe the connecting ends of the drainage bag with alcohol before you reconnect the bag to the tube  This helps prevent infection  Keep the tube taped to your skin and connected to a drainage bag placed below the level of your kidneys  This helps prevent urine from backing up into your kidneys  You may wear a small drainage bag strapped to your leg to let you move around more easily  · Check the catheter to be sure it is in place after you change your clothes or do other activities  Do not wear tight clothing over the tube  Place the tubing over your thigh rather than under it when you are sitting down  Be sure that nothing is pulling on the nephrostomy tube when you move around  · Change positions if you see little or no urine in your drainage bag  Check to see if the urine tube is twisted or bent  Be sure that you are not sitting or lying on the tube  If there are no kinks and there is little or no urine in the drainage bag, tell your healthcare provider  · Flush out the tube as directed  Some tubes get flushed one time a day with 10 mls of NSS You will be given a prescription for the flushes  To flush the nephrostomy tube, clean both connections with alcohol swap  Twist off the drainage bag tube and twist the saline syringe into the nephrostomy tube and flush briskly  Remove the syringe and twist the drainage bag tube back into the nephrostomy tube  · Keep the site covered while you shower  Tape a piece of clear adhesive plastic over the dressing to keep it dry while you shower  Do not take tub baths      Contact Interventional Radiology at [phone] [place] PATIENTS: Contact Interventional Radiology at [phone]) Candice Chau PATIENTS: Contact Interventional Radiology at [phone]) if:  · The skin around the nephrostomy tube is red, swollen, itches, or has a rash  · You have a fever  · You have lower back or hip pain  · There are changes in how your urine looks or smells  · You have little or no urine draining from the nephrostomy tube  · You have nausea and are vomiting  · The black julia on your tube has moved, or the tube is longer than when it was put in    · You have questions or concerns about your condition or care  · The nephrostomy tube comes out completely  · There is blood, pus, or a bad smell coming from the place where the tube enters your skin  · Urine is leaking around the tube  Tube Capping Trial        If your tube is capped and has no drainage bag, the urine will flow through the ureter         and into the bladder for you to urinate normally  This is called a capping trial                    Continue to flush your tube one time per day  You will have an extra drainage bag to       keep at home in case the capping trial fails  Call the IR department if you experience        any of the following: Pain, fever greater than 101  Persistent nausea or vomiting  The following pharmacies carry the flush syringes  Home [facility]                     [facility]       [address]  Phone [phone]            Phone [phone] [address]                                 [phone]  Phone [phone]            Phone [phone]    [facility]                                                                         [facility] [phone]  [address]   Phone [phone]

## 2018-09-14 ENCOUNTER — TELEPHONE (OUTPATIENT)
Dept: UROLOGY | Facility: MEDICAL CENTER | Age: 73
End: 2018-09-14

## 2018-09-14 ENCOUNTER — TELEPHONE (OUTPATIENT)
Dept: HEMATOLOGY ONCOLOGY | Facility: CLINIC | Age: 73
End: 2018-09-14

## 2018-09-14 ENCOUNTER — OFFICE VISIT (OUTPATIENT)
Dept: HEMATOLOGY ONCOLOGY | Facility: CLINIC | Age: 73
End: 2018-09-14
Payer: MEDICARE

## 2018-09-14 VITALS
WEIGHT: 212 LBS | OXYGEN SATURATION: 96 % | HEIGHT: 68 IN | BODY MASS INDEX: 32.13 KG/M2 | RESPIRATION RATE: 18 BRPM | DIASTOLIC BLOOD PRESSURE: 84 MMHG | TEMPERATURE: 99.2 F | HEART RATE: 100 BPM | SYSTOLIC BLOOD PRESSURE: 120 MMHG

## 2018-09-14 DIAGNOSIS — C68.9 UROTHELIAL CANCER (HCC): ICD-10-CM

## 2018-09-14 DIAGNOSIS — C68.9 UROTHELIAL CARCINOMA (HCC): Primary | ICD-10-CM

## 2018-09-14 PROCEDURE — 99205 OFFICE O/P NEW HI 60 MIN: CPT | Performed by: INTERNAL MEDICINE

## 2018-09-14 RX ORDER — METOCLOPRAMIDE 10 MG/1
10 TABLET ORAL 4 TIMES DAILY PRN
Qty: 30 TABLET | Refills: 1 | Status: SHIPPED | OUTPATIENT
Start: 2018-09-14 | End: 2018-10-31 | Stop reason: ALTCHOICE

## 2018-09-14 NOTE — LETTER
September 14, 2018     Isaak Joyner MD  [address] [address]    Patient: Stephanie Lucas   YOB: 1945   Date of Visit: 9/14/2018       Dear Dr Henna Hills: Thank you for referring Yoselyn Ch to me for evaluation  Below are my notes for this consultation  If you have questions, please do not hesitate to call me  I look forward to following your patient along with you  Sincerely,        Lux Martell MD        CC: No Recipients  Lux Martell MD  9/14/2018  4:46 PM  Sign at exiting of workspace  I spoke with Dr Henna Hills regarding his condition  His biopsy which showed high-grade urothelial carcinoma is from prostatic urethra  This may not be in prostate gland  If this is the case, his tumor staging could be T1  If he had prostatic gland involvement, this would be T4 for which neoadjuvant chemotherapy followed by cystectomy and prostatectomy would be standard care  However, if he had only T1 disease, surgery alone may be indicated  Dr Henna Hills will do another cystoscopy to have more biopsy to clarify this matter  I had long conversation with Ariel Mehta regarding this complexity and need more information regarding staging of urothelial carcinoma  Therefore, I am going to cancel neoadjuvant chemotherapy, for now  I will wait for Dr Shadia leonard and another biopsy to make further recommendations

## 2018-09-14 NOTE — PROGRESS NOTES
Hematology / Oncology Outpatient Consult Note    Anay Gold 67 y o  male [id number]         Date:  9/14/2018    Assessment / Plan:  A 75-year-old gentleman who was diagnosed in limited volume of prostate cancer with Angel score 7 in March 2018  He has not had any treatment for this  He was recently diagnosed with high-grade urothelial carcinoma with sarcomatoid feature, based on the prostate biopsy  He does not appear to have any distant metastasis, based on PET-CT scan  He has some comorbidity with performance status 1/4 on the ECOG scale  He presents today to discuss systemic treatment for newly diagnosed urothelial carcinoma  We had extensive discussion regarding the treatment for urothelial carcinoma  Since his urothelial carcinoma was diagnosed based on the prostate biopsy, this is considered to be T4 disease  Therefore, neoadjuvant chemotherapy is considered  Although, he has some comorbidity with less than perfect performance status, I think that he is able to tolerate cisplatin and gemcitabine  Side effects of this regimen was thoroughly discussed, including but not limited to minimal alopecia, nausea, vomiting, neutropenia, risk of infection, neutropenia, renal damage, neuropathy  After the 3 cycle of cisplatin and gemcitabine, radical cystectomy and prostatectomy should be considered  We also discussed bladder preservation approach with concurrent chemo radiation  However, curability with this approach is highly questionable  After the lengthy discussion, he is agreeable to proceed with neoadjuvant chemotherapy  I scheduled 1st cycle of cisplatin and gemcitabine in September 20, 2018  He was instructed to give us a call immediately if he has fever  He was also instructed to have vigorous oral hydration for 3-4 days after the cisplatin infusion  He is in agreement with my recommendations            Subjective:     HPI:  A 75-year-old gentleman who has history of Buerger disease, 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32  He was briefly a smoker until 25years old  He was 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, when he underwent lung cyst surgery  Subsequently, he had difficulty of urination, for which he has been under the care of Dr Glo Zaragoza  He had multiple procedure including left ureter placement for hydronephrosis as well as prostate biopsy in March 2018 which showed small amount of adenocarcinoma with [place] score 7  He has not had any treatment for his prostate cancer  He underwent cystoscopy and biopsy of bladder  Bladder biopsy was negative for malignancy  However, repeated prostate biopsy showed high-grade urothelial carcinoma with sarcomatoid feature  Therefore, he was referred to me to discuss systemic therapy  He continued to have some discomfort in the pelvis  He does not see any gross hematuria  He has mild exertional shortness of breath  His weight has been stable  He denied fever, chills or night sweats  His recent creatinine was 1 19  He underwent PET-CT scan which showed highly hypermetabolic prostate with SUV 41 3  There was the rim hypermetabolism in the pelvis which was read as prior abscess  There is no evidence of distant metastasis based on the PET-CT scan  His performance status is probably 1/4 on the ECOG scale  Interval History:          Objective:     Primary Diagnosis:    1  High-grade urothelial carcinoma with sarcomatoid feature, presumably T4 disease  Diagnosed in August 2018  2    Localized prostate cancer with [place] score 7, diagnosed in March 2018  Cancer Staging:  Cancer Staging  No matching staging information was found for the patient  Previous Hematologic/ Oncologic Treatment:         Current Hematologic/ Oncologic Treatment:      Neoadjuvant chemotherapy with cisplatin and gemcitabine x3 cycle, 1st cycle to be started in September 20, 2018      Disease Status:     Not evaluated at this time     Test Results:    Pathology:    Prostate biopsy in March 2018 showed adenocarcinoma, Angel score 7  Repeated prostate biopsy in August 1, 2018 showed high-grade urothelial carcinoma with sarcomatoid feature  Radiology:    PET-CT scan showed no evidence of distant metastasis  Hypermetabolic prostate with SUV 41  In the pelvis, there with rim hypermetabolism which was read as abscess  Laboratory:    See below  Physical Exam:      General Appearance:    Alert, oriented        Eyes:    PERRL   Ears:    Normal external ear canals, both ears   Nose:   Nares normal, septum midline   Throat:   Mucosa moist  Pharynx without injection  Neck:   Supple       Lungs:     Clear to auscultation bilaterally   Chest Wall:    No tenderness or deformity    Heart:    Regular rate and rhythm       Abdomen:     Soft, non-tender, bowel sounds +, no organomegaly           Extremities:   Extremities no cyanosis or edema       Skin:   no rash or icterus  Lymph nodes:   Cervical, supraclavicular, and axillary nodes normal   Neurologic:   CNII-XII intact, normal strength, sensation and reflexes     Throughout          Breast exam:   Not applicable  ROS: Review of Systems        Imaging: Nm Pet Ct Tumor Imaging Whole Body    Result Date: 8/21/2018  Narrative: WHOLE-BODY PET/CT SCAN INDICATION: History of melanoma  Newly diagnosed prostate cancer, [place] score of 7 and bladder cancer  C68 9: Malignant neoplasm of urinary organ, unspecified MODIFIER: PI COMPARISON: CT abdomen and pelvis of 7/7/2018 CELL TYPE:  high grade urothelial carcinoma with sarcomatous features TECHNIQUE:   11 9 mCi F-18-FD administered IV  Multiplanar attenuation corrected and non attenuation corrected PET images are available for interpretation, and contiguous, low dose, axial CT sections were obtained from the skull vertex through the feet following the administration of oral contrast material (7 5 cc Omnipaque-240 in 300 cc water)  Intravenous contrast material was not utilized  This examination, like all CT scans performed in the [address] [place] [address], was performed utilizing techniques to minimize radiation dose exposure, including the use of iterative reconstruction and automated exposure control  Fasting serum glucose: 82 mg/dl FINDINGS: VISUALIZED BRAIN:   Focal encephalomalacia noted in the right frontal temporal lobe with associated decreased FDG uptake  Increased dural calcification in the right cerebral hemisphere  HEAD/NECK:   There is a physiologic distribution of FDG  No FDG avid cervical adenopathy is seen  CT images: Unremarkable CHEST:   No FDG avid soft tissue lesions are seen  CT images: There are scattered coronary artery calcifications  ABDOMEN:   No suspicious FDG avid soft tissue lesions are seen  Scattered patchy FDG uptake along the anterior abdominal wall where there are focal infiltrative changes likely postoperative  CT images: There is a 1 9 cm cyst in the left lobe of the liver posteriorly  Cholelithiasis is noted  There is a left nephroureteral stent  No hydronephrosis  Tiny 2 mm calculus in the right kidney midpole  There is colonic diverticulosis  PELVIS: Focal FDG uptake at the prostate centrally, SUV max of 41 3  No obvious findings here on the limited CT  The left pelvic cystic mass demonstrates a rim of FDG uptake, maximum of 12 3  This measures 5 4 x 4 1 cm, image 160 series 4, slightly smaller previously 6 2 x 4 8 cm  This is noted to be just lateral to the ureteral stent  Internal linear calcification measures up to 1 4 cm  No FDG avid lymph nodes  CT images: Bladder is diffusely thick walled without focal FDG uptake  Associated fat stranding around the bladder  OSSEOUS STRUCTURES/EXTREMITIES: No FDG avid lesions are seen  CT images:  Multilevel degenerative spurring noted of the spine  There is curvature of the lumbar spine to the left    Baker's cyst is noted in the left popliteal fossa   Rim calcified structure adjacent to the left popliteal artery measures up to 2 5 cm, 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  Impression: 1  Focal FDG uptake at the prostate centrally which is suspicious for underlying malignancy given the history  This activity may be partially affected by normal FDG activity related to normal excretion of  FDG  2  Rim of FDG uptake at the left pelvic cystic mass  By history this is suspicious for abscess  This is slightly smaller from the prior CT  3  No FDG avid lymphadenopathy in the neck, chest, abdomen or pelvis  4   Rim calcified structure adjacent to the left popliteal artery measures up to 2 5 cm, 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  Recommend further evaluation with Doppler ultrasound  The study was marked in EPIC for significant notification  Workstation performed: [id number]         Labs:   Lab Results   Component Value Date    WBC 8 11 08/10/2018    HGB 13 8 08/10/2018    HCT 42 3 08/10/2018    MCV 89 08/10/2018     (H) 08/10/2018     Lab Results   Component Value Date     08/22/2018    K 3 9 08/22/2018     08/22/2018    CO2 27 08/22/2018    BUN 13 08/22/2018    CREATININE 1 19 08/22/2018    GLUF 84 08/22/2018    CALCIUM 9 2 08/22/2018    AST 11 08/22/2018    ALT 10 (L) 08/22/2018    ALKPHOS 58 08/22/2018    EGFR 61 08/22/2018         Lab Results   Component Value Date    PSA 0 5 06/22/2018         Vital Sign:    Body surface area is 2 1 meters squared      Wt Readings from Last 3 Encounters:   09/14/18 96 2 kg (212 lb)   09/12/18 95 7 kg (211 lb)   08/29/18 95 7 kg (211 lb)        Temp Readings from Last 3 Encounters:   09/14/18 99 2 °F (37 3 °C) (Tympanic)   09/12/18 97 6 °F (36 4 °C) (Tympanic)   08/29/18 99 3 °F (37 4 °C) (Tympanic)        BP Readings from Last 3 Encounters:   09/14/18 120/84   09/12/18 126/66   08/29/18 128/66         Pulse Readings from Last 3 Encounters:   09/14/18 100   09/12/18 78   08/10/18 84 @OZGIEAC0(4)@    Active Problems:   Patient Active Problem List   Diagnosis    Right bundle melissa block, 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    Aortic regurgitation    BPH with obstruction/lower urinary tract symptoms    Bladder diverticulum    Postoperative ileus (HCC)    Kidney stone    Major depressive disorder, single episode, moderate (HCC)    Peripheral vascular disease (HCC)    Essential hypertension    Acute pain of right shoulder    Benign localized hyperplasia of prostate without urinary obstruction    Urinary retention due to benign prostatic hyperplasia    Hydronephrosis of left kidney    Ureteral stricture, left    Pelvic abscess in male [facility])    Aneurysm of left popliteal artery (HCC)    Urothelial carcinoma ([facility] [address] [address] )       Past Medical History:   Past Medical History:   Diagnosis Date    Back pain     Ceron disease     BPH (benign prostatic hyperplasia)     Cancer ([facility] )     melanoma    Cataract     Right eye    Cataract     right eye    DDD (degenerative disc disease), lumbar     Depression     Diverticulosis     GERD (gastroesophageal reflux disease)     History of cardiac murmur     Past    History of melanoma     Was on back in early 1990's    History of rheumatic fever     Childhood    [place] (hard of hearing)     Hydronephrosis     Hypertension     Kidney stone     Multiple times    Neck pain     Nervous breakdown     History of due to reaction to psych med which he was on for anxiety/depression and became suicidal    Numbness and tingling in both hands     Numbness and tingling of both feet     PONV (postoperative nausea and vomiting)     PVD (peripheral vascular disease) ([facility] )     RBBB     Scoliosis     Seasonal allergies     Tinnitus     Wears partial dentures     Upper       Surgical History:   Past Surgical History:   Procedure Laterality Date    ABDOMINAL SURGERY      When young had procedure for improviing circulation to left lower extremety    BACK SURGERY      Lumbar- not sure what was done   Elwyn Serge CARDIAC CATHETERIZATION      Approximately 2007- no blockages    CARPAL TUNNEL RELEASE Bilateral     CATARACT EXTRACTION Left     COLONOSCOPY      CYST REMOVAL      Robotic procedure to remove benign cyst from lower left lung    CYSTOGRAM N/A 3/14/2018    Procedure: CYSTOGRAM;  Surgeon: Maciej Abad MD;  [facility];  Service: Urology    CYSTOSCOPY      ESOPHAGOGASTRODUODENOSCOPY      IR TUBE PLACEMENT NEPHROSTOMY  8/10/2018    LUNG SURGERY      cyst removed left lung    OTHER SURGICAL HISTORY Left     fistula drain in left buttock    UT CYSTO/URETERO W/LITHOTRIPSY &INDWELL STENT INSRT Left 1/3/2018    Procedure: CYSTOSCOPY URETEROSCOPY, RETROGRADE PYELOGRAM AND INSERTION STENT URETERAL;  Surgeon: Maciej Abad MD;  [facility];  Service: Urology    UT CYSTOTOMY,EXCIS BLADDER TIC N/A 2/14/2018    Procedure: ABDOMINAL EXPLORATION; CLOSURE OF BLADDER DIVERTICULITIS;  Surgeon: Maciej Abad MD;  [facility];  Service: Urology    UT CYSTOURETHROSCOPY,URETER CATHETER Left 8/1/2018    Procedure: Cystoscopy, cystogram, bladder biopsies, removal of pelvic drain;  Surgeon: Maciej Abad MD;  [facility];  Service: Urology    UT INCISE/DRAIN BLADDER N/A 2/14/2018    Procedure: OPEN SUPRAPUBIC TUBE PLACEMENT;  Surgeon: Maciej Abad MD;  [facility];  Service: Urology    UT RELEASE Luana Andrei FIBROSIS Left 2/14/2018    Procedure: LYSIS OF ADHESIONS; URETEROLYSIS ;  Surgeon: Maciej Abad MD;  [facility];  Service: Urology    SKIN CANCER EXCISION      Melanoma removal on back in early 1990's    TOE AMPUTATION Left     All 5 toes left foot were amputated due to poor circulation     TRANSURETHRAL RESECTION OF PROSTATE N/A 3/14/2018    Procedure: TRANSURETHRAL RESECTION OF PROSTATE (TURP), LEFT URETERAL STENT REMOVAL;  Surgeon: Maciej Abad MD;  [facility];  Service: Urology    WRIST SURGERY Bilateral     Ulnar nerve on right arm and both wrists are fused       Family History:    Family History   Problem Relation Age of Onset    Heart disease Father     Heart disease Mother     Cancer Paternal Grandfather        Cancer-related family history includes Cancer in his paternal grandfather  Social History:   Social History     Social History    Marital status: Common Law     Spouse name: N/A    Number of children: N/A    Years of education: N/A     Occupational History    Not on file  Social History Main Topics    Smoking status: Former Smoker     Packs/day: 1 00     Years: 15 00     Types: Cigarettes     Quit date: 2/5/1970    Smokeless tobacco: Never Used      Comment: Quit 50 years    Alcohol use Yes      Comment: Very rare    Drug use: No    Sexual activity: Not on file     Other Topics Concern    Not on file     Social History Narrative    No narrative on file       Current Medications:   Current Outpatient Prescriptions   Medication Sig Dispense Refill    amLODIPine (NORVASC) 10 mg tablet Take 10 mg by mouth daily      divalproex sodium (DEPAKOTE) 500 mg EC tablet Take 500 mg by mouth 2 (two) times a day        mometasone (NASONEX) 50 mcg/act nasal spray 2 sprays into each nostril as needed      pentoxifylline (TRENtal) 400 mg ER tablet Take 400 mg by mouth 3 (three) times a day with meals      ranitidine (ZANTAC) 300 MG capsule Take 300 mg by mouth every evening      tamsulosin (FLOMAX) 0 4 mg Take 1 capsule (0 4 mg total) by mouth daily with dinner 90 capsule 3    metoclopramide (REGLAN) 10 mg tablet Take 1 tablet (10 mg total) by mouth 4 (four) times a day as needed (Nausea) 30 tablet 1     No current facility-administered medications for this visit  Allergies: Allergies   Allergen Reactions    Iodine Shortness Of Breath, Swelling and Hives     Contrast dye causes respiratory distress   Iodine causes skin rash    Mercury Hives and Swelling    Shellfish-Derived Products Hives, Shortness Of Breath and Anaphylaxis     Anaphylaxis  Anaphylaxis    Augmentin [Amoxicillin-Pot Clavulanate] GI Intolerance, Rash and Diarrhea     Diarrhea    Lidoderm [Lidocaine] Rash     Lidoderm patch caused rash    Penicillins Rash, Swelling and Hives    Sulfa Antibiotics Hives, Swelling and Rash

## 2018-09-14 NOTE — TELEPHONE ENCOUNTER
I spoke with Dr Glenroy Taylor regarding his condition  His biopsy which showed high-grade urothelial carcinoma is from prostatic urethra  This may not be in prostate gland  If this is the case, his tumor staging could be T1  If he had prostatic gland involvement, this would be T4 for which neoadjuvant chemotherapy followed by cystectomy and prostatectomy would be standard care  However, if he had only T1 disease, surgery alone may be indicated  Dr Glenroy Taylor will do another cystoscopy to have more biopsy to clarify this matter  I had long conversation with Marni Willis regarding this complexity and need more information regarding staging of urothelial carcinoma  Therefore, I am going to cancel neoadjuvant chemotherapy, for now  I will wait for Dr Jacque Sandoval procedure and another biopsy to make further recommendations

## 2018-09-14 NOTE — TELEPHONE ENCOUNTER
Dr Jorje Lake from Oncology would like Dr Kavitha Saha to return his call regarding this patient  It's not an emergency, so when he gets a chance, please call at [phone]

## 2018-09-14 NOTE — TELEPHONE ENCOUNTER
----- Message from Unknown MD Kristina sent at 9/14/2018  4:02 PM EDT -----  Please call patient with an appointment to see me sometime next week so I can set him up for procedure in operating room

## 2018-09-14 NOTE — PROGRESS NOTES
I spoke with Dr Sherine Hebert of Oncology-he already has set him up for chemotherapy, but there are questions as to whether this is just a T1 lesion  For this reason, I will see him back in the office next week and set him up for repeat trans urethral resection for staging of this urothelial carcinoma of the prostatic urethra

## 2018-09-14 NOTE — LETTER
September 14, 2018     Gordy Hinojosa MD  [address] [place] [id number]    Patient: Arely Nieto   YOB: 1945   Date of Visit: 9/14/2018       Dear Dr Nick Blizzard: Thank you for referring Veryl Beat to me for evaluation  Below are my notes for this consultation  If you have questions, please do not hesitate to call me  I look forward to following your patient along with you  Sincerely,        Eulas Romberg, MD        CC: Tyler Peppers, MD Eulas Romberg, MD  9/14/2018  9:46 AM  Sign at close encounter  Hematology / Oncology Outpatient Consult Note    Arely Nieto 67 y o  male [id number]         Date:  9/14/2018    Assessment / Plan:  A 75-year-old gentleman who was diagnosed in limited volume of prostate cancer with Angel score 7 in March 2018  He has not had any treatment for this  He was recently diagnosed with high-grade urothelial carcinoma with sarcomatoid feature, based on the prostate biopsy  He does not appear to have any distant metastasis, based on PET-CT scan  He has some comorbidity with performance status 1/4 on the ECOG scale  He presents today to discuss systemic treatment for newly diagnosed urothelial carcinoma  We had extensive discussion regarding the treatment for urothelial carcinoma  Since his urothelial carcinoma was diagnosed based on the prostate biopsy, this is considered to be T4 disease  Therefore, neoadjuvant chemotherapy is considered  Although, he has some comorbidity with less than perfect performance status, I think that he is able to tolerate cisplatin and gemcitabine  Side effects of this regimen was thoroughly discussed, including but not limited to minimal alopecia, nausea, vomiting, neutropenia, risk of infection, neutropenia, renal damage, neuropathy  After the 3 cycle of cisplatin and gemcitabine, radical cystectomy and prostatectomy should be considered    We also discussed bladder preservation approach with concurrent chemo radiation  However, curability with this approach is highly questionable  After the lengthy discussion, he is agreeable to proceed with neoadjuvant chemotherapy  I scheduled 1st cycle of cisplatin and gemcitabine in September 20, 2018  He was instructed to give us a call immediately if he has fever  He was also instructed to have vigorous oral hydration for 3-4 days after the cisplatin infusion  He is in agreement with my recommendations  Subjective:     HPI:  A 31-year-old gentleman who has history of Buerger disease, 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26  He was briefly a smoker until 25years old  He was 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, when he underwent lung cyst surgery  Subsequently, he had difficulty of urination, for which he has been under the care of Dr Elizabeth Kaufman  He had multiple procedure including left ureter placement for hydronephrosis as well as prostate biopsy in March 2018 which showed small amount of adenocarcinoma with Angel score 7  He has not had any treatment for his prostate cancer  He underwent cystoscopy and biopsy of bladder  Bladder biopsy was negative for malignancy  However, repeated prostate biopsy showed high-grade urothelial carcinoma with sarcomatoid feature  Therefore, he was referred to me to discuss systemic therapy  He continued to have some discomfort in the pelvis  He does not see any gross hematuria  He has mild exertional shortness of breath  His weight has been stable  He denied fever, chills or night sweats  His recent creatinine was 1 19  He underwent PET-CT scan which showed highly hypermetabolic prostate with SUV 41 3  There was the rim hypermetabolism in the pelvis which was read as prior abscess  There is no evidence of distant metastasis based on the PET-CT scan  His performance status is probably 1/4 on the ECOG scale          Interval History:          Objective: Primary Diagnosis:    1  High-grade urothelial carcinoma with sarcomatoid feature, presumably T4 disease  Diagnosed in August 2018  2    Localized prostate cancer with Angel score 7, diagnosed in March 2018  Cancer Staging:  Cancer Staging  No matching staging information was found for the patient  Previous Hematologic/ Oncologic Treatment:         Current Hematologic/ Oncologic Treatment:      Neoadjuvant chemotherapy with cisplatin and gemcitabine x3 cycle, 1st cycle to be started in September 20, 2018  Disease Status:     Not evaluated at this time  Test Results:    Pathology:    Prostate biopsy in March 2018 showed adenocarcinoma, Angel score 7  Repeated prostate biopsy in August 1, 2018 showed high-grade urothelial carcinoma with sarcomatoid feature  Radiology:    PET-CT scan showed no evidence of distant metastasis  Hypermetabolic prostate with SUV 41  In the pelvis, there with rim hypermetabolism which was read as abscess  Laboratory:    See below  Physical Exam:      General Appearance:    Alert, oriented        Eyes:    PERRL   Ears:    Normal external ear canals, both ears   Nose:   Nares normal, septum midline   Throat:   Mucosa moist  Pharynx without injection  Neck:   Supple       Lungs:     Clear to auscultation bilaterally   Chest Wall:    No tenderness or deformity    Heart:    Regular rate and rhythm       Abdomen:     Soft, non-tender, bowel sounds +, no organomegaly           Extremities:   Extremities no cyanosis or edema       Skin:   no rash or icterus  Lymph nodes:   Cervical, supraclavicular, and axillary nodes normal   Neurologic:   CNII-XII intact, normal strength, sensation and reflexes     Throughout          Breast exam:   Not applicable  ROS: Review of Systems        Imaging: Nm Pet Ct Tumor Imaging Whole Body    Result Date: 8/21/2018  Narrative: WHOLE-BODY PET/CT SCAN INDICATION: History of melanoma    Newly diagnosed prostate cancer, Angel score of 7 and bladder cancer  C68 9: Malignant neoplasm of urinary organ, unspecified MODIFIER: PI COMPARISON: CT abdomen and pelvis of 7/7/2018 CELL TYPE:  high grade urothelial carcinoma with sarcomatous features TECHNIQUE:   11 9 mCi F-18-FD administered IV  Multiplanar attenuation corrected and non attenuation corrected PET images are available for interpretation, and contiguous, low dose, axial CT sections were obtained from the skull vertex through the feet following the administration of oral contrast material (7 5 cc Omnipaque-240 in 300 cc water)  Intravenous contrast material was not utilized  This examination, like all CT scans performed in the [facility], was performed utilizing techniques to minimize radiation dose exposure, including the use of iterative reconstruction and automated exposure control  Fasting serum glucose: 82 mg/dl FINDINGS: VISUALIZED BRAIN:   Focal encephalomalacia noted in the right frontal temporal lobe with associated decreased FDG uptake  Increased dural calcification in the right cerebral hemisphere  HEAD/NECK:   There is a physiologic distribution of FDG  No FDG avid cervical adenopathy is seen  CT images: Unremarkable CHEST:   No FDG avid soft tissue lesions are seen  CT images: There are scattered coronary artery calcifications  ABDOMEN:   No suspicious FDG avid soft tissue lesions are seen  Scattered patchy FDG uptake along the anterior abdominal wall where there are focal infiltrative changes likely postoperative  CT images: There is a 1 9 cm cyst in the left lobe of the liver posteriorly  Cholelithiasis is noted  There is a left nephroureteral stent  No hydronephrosis  Tiny 2 mm calculus in the right kidney midpole  There is colonic diverticulosis  PELVIS: Focal FDG uptake at the prostate centrally, SUV max of 41 3  No obvious findings here on the limited CT   The left pelvic cystic mass demonstrates a rim of FDG uptake, maximum of 12 3   This measures 5 4 x 4 1 cm, image 160 series 4, slightly smaller previously 6 2 x 4 8 cm  This is noted to be just lateral to the ureteral stent  Internal linear calcification measures up to 1 4 cm  No FDG avid lymph nodes  CT images: Bladder is diffusely thick walled without focal FDG uptake  Associated fat stranding around the bladder  OSSEOUS STRUCTURES/EXTREMITIES: No FDG avid lesions are seen  CT images:  Multilevel degenerative spurring noted of the spine  There is curvature of the lumbar spine to the left  Baker's cyst is noted in the left popliteal fossa  Rim calcified structure adjacent to the left popliteal artery measures up to 2 5 cm, 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  Impression: 1  Focal FDG uptake at the prostate centrally which is suspicious for underlying malignancy given the history  This activity may be partially affected by normal FDG activity related to normal excretion of  FDG  2  Rim of FDG uptake at the left pelvic cystic mass  By history this is suspicious for abscess  This is slightly smaller from the prior CT  3  No FDG avid lymphadenopathy in the neck, chest, abdomen or pelvis  4   Rim calcified structure adjacent to the left popliteal artery measures up to 2 5 cm, 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  Recommend further evaluation with Doppler ultrasound  The study was marked in EPIC for significant notification   Workstation performed: [id number]         Labs:   Lab Results   Component Value Date    WBC 8 11 08/10/2018    HGB 13 8 08/10/2018    HCT 42 3 08/10/2018    MCV 89 08/10/2018     (H) 08/10/2018     Lab Results   Component Value Date     08/22/2018    K 3 9 08/22/2018     08/22/2018    CO2 27 08/22/2018    BUN 13 08/22/2018    CREATININE 1 19 08/22/2018    GLUF 84 08/22/2018    CALCIUM 9 2 08/22/2018    AST 11 08/22/2018    ALT 10 (L) 08/22/2018    ALKPHOS 58 08/22/2018    EGFR 61 08/22/2018         Lab Results   Component Value Date    PSA 0 5 06/22/2018         Vital Sign:    Body surface area is 2 1 meters squared      Wt Readings from Last 3 Encounters:   09/14/18 96 2 kg (212 lb)   09/12/18 95 7 kg (211 lb)   08/29/18 95 7 kg (211 lb)        Temp Readings from Last 3 Encounters:   09/14/18 99 2 °F (37 3 °C) (Tympanic)   09/12/18 97 6 °F (36 4 °C) (Tympanic)   08/29/18 99 3 °F (37 4 °C) (Tympanic)        BP Readings from Last 3 Encounters:   09/14/18 120/84   09/12/18 126/66   08/29/18 128/66         Pulse Readings from Last 3 Encounters:   09/14/18 100   09/12/18 78   08/10/18 84     @LASTSAO2(3)@    Active Problems:   Patient Active Problem List   Diagnosis    Right bundle melissa block, 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    Aortic regurgitation    BPH with obstruction/lower urinary tract symptoms    Bladder diverticulum    Postoperative ileus ([address] )    Kidney stone    Major depressive disorder, single episode, moderate (HCC)    Peripheral vascular disease ([address] )    Essential hypertension    Acute pain of right shoulder    Benign localized hyperplasia of prostate without urinary obstruction    Urinary retention due to benign prostatic hyperplasia    Hydronephrosis of left kidney    Ureteral stricture, left    Pelvic abscess in [facility])    Aneurysm of left popliteal artery ([address] )    Urothelial carcinoma ([address] )       Past Medical History:   Past Medical History:   Diagnosis Date    Back pain     Ceron disease     BPH (benign prostatic hyperplasia)     Cancer ([address] )     melanoma    Cataract     Right eye    Cataract     right eye    DDD (degenerative disc disease), lumbar     Depression     Diverticulosis     GERD (gastroesophageal reflux disease)     History of cardiac murmur     Past    History of melanoma     Was on back in early 1990's    History of rheumatic fever     Childhood    [place] (hard [place] hearing)     Hydronephrosis     Hypertension     Kidney stone     Multiple times    Neck pain     Nervous breakdown     History of due to reaction to psych med which he was on for anxiety/depression and became suicidal    Numbness and tingling in both hands     Numbness and tingling of both feet     PONV (postoperative nausea and vomiting)     PVD (peripheral vascular disease) ([address] )     RBBB     Scoliosis     Seasonal allergies     Tinnitus     Wears partial dentures     Upper       Surgical History:   Past Surgical History:   Procedure Laterality Date    ABDOMINAL SURGERY      When young had procedure for improviing circulation to left lower extremety    BACK SURGERY      Lumbar- not sure what was done   [facility] CARDIAC CATHETERIZATION      Approximately 2007- no blockages    CARPAL TUNNEL RELEASE Bilateral     CATARACT EXTRACTION Left     COLONOSCOPY      CYST REMOVAL      Robotic procedure to remove benign cyst from lower left lung    CYSTOGRAM N/A 3/14/2018    Procedure: CYSTOGRAM;  Surgeon: Gordy Hinojosa MD;  [facility];  Service: Urology    CYSTOSCOPY      ESOPHAGOGASTRODUODENOSCOPY      IR TUBE PLACEMENT NEPHROSTOMY  8/10/2018    LUNG SURGERY      cyst removed left lung    OTHER SURGICAL HISTORY Left     fistula drain in left buttock    WY CYSTO/URETERO W/LITHOTRIPSY &INDWELL STENT INSRT Left 1/3/2018    Procedure: CYSTOSCOPY URETEROSCOPY, RETROGRADE PYELOGRAM AND INSERTION STENT URETERAL;  Surgeon: Gordy Hinojosa MD;  [facility];  Service: Urology    WY CYSTOTOMY,EXCIS BLADDER TIC N/A 2/14/2018    Procedure: ABDOMINAL EXPLORATION; CLOSURE OF BLADDER DIVERTICULITIS;  Surgeon: Gordy Hinojosa MD;  [facility];  Service: Urology    WY CYSTOURETHROSCOPY,URETER CATHETER Left 8/1/2018    Procedure: Cystoscopy, cystogram, bladder biopsies, removal of pelvic drain;  Surgeon: Gordy Hinojosa MD;  [facility];  Service: Urology    WY INCISE/DRAIN BLADDER N/A 2/14/2018    Procedure: OPEN SUPRAPUBIC TUBE PLACEMENT;  Surgeon: Gordy Hinojosa MD;  [facility];  Service: Urology    MN RELEASE URETER,RETROPER FIBROSIS Left 2/14/2018    Procedure: LYSIS OF ADHESIONS; URETEROLYSIS ;  Surgeon: Yanick Alberts MD;  [facility];  Service: Urology    SKIN CANCER EXCISION      Melanoma removal on back in early 1990's    TOE AMPUTATION Left     All 5 toes left foot were amputated due to poor circulation     TRANSURETHRAL RESECTION OF PROSTATE N/A 3/14/2018    Procedure: TRANSURETHRAL RESECTION OF PROSTATE (TURP), LEFT URETERAL STENT REMOVAL;  Surgeon: Yanick Alberts MD;  [facility];  Service: Urology    WRIST SURGERY Bilateral     Ulnar nerve on right arm and both wrists are fused       Family History:    Family History   Problem Relation Age of Onset    Heart disease Father     Heart disease Mother     Cancer Paternal Grandfather        Cancer-related family history includes Cancer in his paternal grandfather  Social History:   Social History     Social History    Marital status: Common Law     Spouse name: N/A    Number of children: N/A    Years of education: N/A     Occupational History    Not on file       Social History Main Topics    Smoking status: Former Smoker     Packs/day: 1 00     Years: 15 00     Types: Cigarettes     Quit date: 2/5/1970    Smokeless tobacco: Never Used      Comment: Quit 50 years    Alcohol use Yes      Comment: Very rare    Drug use: No    Sexual activity: Not on file     Other Topics Concern    Not on file     Social History Narrative    No narrative on file       Current Medications:   Current Outpatient Prescriptions   Medication Sig Dispense Refill    amLODIPine (NORVASC) 10 mg tablet Take 10 mg by mouth daily      divalproex sodium (DEPAKOTE) 500 mg EC tablet Take 500 mg by mouth 2 (two) times a day        mometasone (NASONEX) 50 mcg/act nasal spray 2 sprays into each nostril as needed      pentoxifylline (TRENtal) 400 mg ER tablet Take 400 mg by mouth 3 (three) times a day with meals      ranitidine (ZANTAC) 300 MG capsule Take 300 mg by mouth every evening      tamsulosin (FLOMAX) 0 4 mg Take 1 capsule (0 4 mg total) by mouth daily with dinner 90 capsule 3    metoclopramide (REGLAN) 10 mg tablet Take 1 tablet (10 mg total) by mouth 4 (four) times a day as needed (Nausea) 30 tablet 1     No current facility-administered medications for this visit  Allergies: Allergies   Allergen Reactions    Iodine Shortness Of Breath, Swelling and Hives     Contrast dye causes respiratory distress   Iodine causes skin rash    Mercury Hives and Swelling    Shellfish-Derived Products Hives, Shortness Of Breath and Anaphylaxis     Anaphylaxis  Anaphylaxis    Augmentin [Amoxicillin-Pot Clavulanate] GI Intolerance, Rash and Diarrhea     Diarrhea    Lidoderm [Lidocaine] Rash     Lidoderm patch caused rash    Penicillins Rash, Swelling and Hives    Sulfa Antibiotics Hives, Swelling and Rash

## 2018-09-14 NOTE — TELEPHONE ENCOUNTER
----- Message from Srinivasan Worley MD sent at 9/14/2018  4:02 PM EDT -----  Please call patient with an appointment to see me sometime next week so I can set him up for procedure in operating room

## 2018-09-17 ENCOUNTER — OFFICE VISIT (OUTPATIENT)
Dept: UROLOGY | Facility: MEDICAL CENTER | Age: 73
End: 2018-09-17
Payer: MEDICARE

## 2018-09-17 DIAGNOSIS — C61 PROSTATE CANCER (HCC): Primary | ICD-10-CM

## 2018-09-17 DIAGNOSIS — C68.9 UROTHELIAL CARCINOMA (HCC): ICD-10-CM

## 2018-09-17 LAB
SL AMB  POCT GLUCOSE, UA: ABNORMAL
SL AMB LEUKOCYTE ESTERASE,UA: ABNORMAL
SL AMB POCT BILIRUBIN,UA: ABNORMAL
SL AMB POCT BLOOD,UA: ABNORMAL
SL AMB POCT CLARITY,UA: ABNORMAL
SL AMB POCT COLOR,UA: ABNORMAL
SL AMB POCT KETONES,UA: ABNORMAL
SL AMB POCT NITRITE,UA: ABNORMAL
SL AMB POCT PH,UA: 7
SL AMB POCT SPECIFIC GRAVITY,UA: 1.01
SL AMB POCT URINE PROTEIN: 100
SL AMB POCT UROBILINOGEN: 0.2

## 2018-09-17 PROCEDURE — 81003 URINALYSIS AUTO W/O SCOPE: CPT | Performed by: UROLOGY

## 2018-09-17 PROCEDURE — 99214 OFFICE O/P EST MOD 30 MIN: CPT | Performed by: UROLOGY

## 2018-09-17 PROCEDURE — 87086 URINE CULTURE/COLONY COUNT: CPT | Performed by: UROLOGY

## 2018-09-17 RX ORDER — LEVOFLOXACIN 5 MG/ML
750 INJECTION, SOLUTION INTRAVENOUS ONCE
Status: CANCELLED | OUTPATIENT
Start: 2018-09-17 | End: 2018-09-17

## 2018-09-17 NOTE — PROGRESS NOTES
[address] [facility]  [facility]  [address] [facility]  [place], [address] [place] [address]  [phone]  www  [facility]  org      NAME: Vero Quan  AGE: 67 y o  SEX: male  : 1945   MRN: [id number]    DATE: 2018  TIME: 11:09 AM    Assessment and Plan:  High-grade urothelial carcinoma the prostatic urethra-plan repeat trans urethral resection of this-TURP -for staging  Chief Complaint   No chief complaint on file  History of Present Illness   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-he has seen Oncology, and I discussed the situation with them  My plan is to re-stage him with repeat trans urethral resection in the operating room  This will determine whether he needs chemotherapy or not in a neoadjuvant fashion prior to cystectomy  PET scan shows localization to the prostate  No sign of metastases  The risks of bleeding infection and urinary incontinence have been explained he gives informed consent  He also has had a left percutaneous nephrostomy tube placed in the meantime with internalization of a double-J ureteral stent  This was done on the , and the nephrostomy tube was capped and IR plans to possibly remove the nephrostomy tube is long as there is antegrade drainage in the next 2 weeks        The following portions of the patient's history were reviewed and updated as appropriate: allergies, current medications, past family history, past medical history, past social history, past surgical history and problem list     Review of Systems   Review of Systems    Active Problem List     Patient Active Problem List   Diagnosis    Right bundle melissa block, 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    Aortic regurgitation    BPH with obstruction/lower urinary tract symptoms    Bladder diverticulum    Postoperative ileus ([address] )    Kidney stone    Major depressive disorder, single episode, moderate (HCC)    Peripheral vascular disease ([facility] )    Essential hypertension    Acute pain of right shoulder    Benign localized hyperplasia of prostate without urinary obstruction    Urinary retention due to benign prostatic hyperplasia    Hydronephrosis of left kidney    Ureteral stricture, left    Pelvic abscess in male [facility])    Aneurysm of left popliteal artery (HCC)    Urothelial carcinoma ([facility] )       Objective   There were no vitals taken for this visit  Physical Exam   Constitutional: He is oriented to person, place, and time  He appears well-developed and well-nourished  HENT:   Head: Normocephalic and atraumatic  Eyes: EOM are normal    Neck: Normal range of motion  Cardiovascular: Normal rate, regular rhythm and normal heart sounds  Pulmonary/Chest: Effort normal and breath sounds normal    Abdominal: Soft  Musculoskeletal: Normal range of motion  Neurological: He is alert and oriented to person, place, and time  Skin: Skin is warm and dry  Psychiatric: He has a normal mood and affect   His behavior is normal  Judgment and thought content normal            Current Medications     Current Outpatient Prescriptions:     amLODIPine (NORVASC) 10 mg tablet, Take 10 mg by mouth daily, Disp: , Rfl:     divalproex sodium (DEPAKOTE) 500 mg EC tablet, Take 500 mg by mouth 2 (two) times a day  , Disp: , Rfl:     metoclopramide (REGLAN) 10 mg tablet, Take 1 tablet (10 mg total) by mouth 4 (four) times a day as needed (Nausea), Disp: 30 tablet, Rfl: 1    mometasone (NASONEX) 50 mcg/act nasal spray, 2 sprays into each nostril as needed, Disp: , Rfl:     pentoxifylline (TRENtal) 400 mg ER tablet, Take 400 mg by mouth 3 (three) times a day with meals, Disp: , Rfl:     ranitidine (ZANTAC) 300 MG capsule, Take 300 mg by mouth every evening, Disp: , Rfl:     tamsulosin (FLOMAX) 0 4 mg, Take 1 capsule (0 4 mg total) by mouth daily with dinner, Disp: 90 capsule, Rfl: 3        Angelica Matthews MD

## 2018-09-17 NOTE — LETTER
2018     Toni Espinosa MD  [address] [facility] [address]    Patient: Steven Hernandez   YOB: 1945   Date of Visit: 2018       Dear Dr Mei Mills: Thank you for referring Silvia Nam to me for evaluation  Below are my notes for this consultation  If you have questions, please do not hesitate to call me  I look forward to following your patient along with you  Sincerely,        Javier Spear MD        CC: No Recipients  Javier Spear MD  2018 11:15 AM  Sign at close encounter  [address] [facility]  [address], [address] [place] [address]  [phone]  www  [facility]  org      NAME: Steven Hernandez  AGE: 67 y o  SEX: male  : 1945   MRN: [id number]    DATE: 2018  TIME: 11:09 AM    Assessment and Plan:  High-grade urothelial carcinoma the prostatic urethra-plan repeat trans urethral resection of this-TURP -for staging  Chief Complaint   No chief complaint on file  History of Present Illness   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-he has seen Oncology, and I discussed the situation with them  My plan is to re-stage him with repeat trans urethral resection in the operating room  This will determine whether he needs chemotherapy or not in a neoadjuvant fashion prior to cystectomy  PET scan shows localization to the prostate  No sign of metastases  The risks of bleeding infection and urinary incontinence have been explained he gives informed consent  He also has had a left percutaneous nephrostomy tube placed in the meantime with internalization of a double-J ureteral stent  This was done on the , and the nephrostomy tube was capped and IR plans to possibly remove the nephrostomy tube is long as there is antegrade drainage in the next 2 weeks        The following portions of the patient's history were reviewed and updated as appropriate: allergies, current medications, past family history, past medical history, past social history, past surgical history and problem list     Review of Systems   Review of Systems    Active Problem List     Patient Active Problem List   Diagnosis    Right bundle melissa block, 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    Aortic regurgitation    BPH with obstruction/lower urinary tract symptoms    Bladder diverticulum    Postoperative ileus ([facility] )    Kidney stone    Major depressive disorder, single episode, moderate ([facility] )    Peripheral vascular disease ([facility] )    Essential hypertension    Acute pain of right shoulder    Benign localized hyperplasia of prostate without urinary obstruction    Urinary retention due to benign prostatic hyperplasia    Hydronephrosis of left kidney    Ureteral stricture, left    Pelvic abscess in [facility])    Aneurysm of left popliteal artery ([facility] )    Urothelial carcinoma ([facility] )       Objective   There were no vitals taken for this visit  Physical Exam   Constitutional: He is oriented to person, place, and time  He appears well-developed and well-nourished  HENT:   Head: Normocephalic and atraumatic  Eyes: EOM are normal    Neck: Normal range of motion  Cardiovascular: Normal rate, regular rhythm and normal heart sounds  Pulmonary/Chest: Effort normal and breath sounds normal    Abdominal: Soft  Musculoskeletal: Normal range of motion  Neurological: He is alert and oriented to person, place, and time  Skin: Skin is warm and dry  Psychiatric: He has a normal mood and affect   His behavior is normal  Judgment and thought content normal            Current Medications     Current Outpatient Prescriptions:     amLODIPine (NORVASC) 10 mg tablet, Take 10 mg by mouth daily, Disp: , Rfl:     divalproex sodium (DEPAKOTE) 500 mg EC tablet, Take 500 mg by mouth 2 (two) times a day  , Disp: , Rfl:    metoclopramide (REGLAN) 10 mg tablet, Take 1 tablet (10 mg total) by mouth 4 (four) times a day as needed (Nausea), Disp: 30 tablet, Rfl: 1    mometasone (NASONEX) 50 mcg/act nasal spray, 2 sprays into each nostril as needed, Disp: , Rfl:     pentoxifylline (TRENtal) 400 mg ER tablet, Take 400 mg by mouth 3 (three) times a day with meals, Disp: , Rfl:     ranitidine (ZANTAC) 300 MG capsule, Take 300 mg by mouth every evening, Disp: , Rfl:     tamsulosin (FLOMAX) 0 4 mg, Take 1 capsule (0 4 mg total) by mouth daily with dinner, Disp: 90 capsule, Rfl: 3        Enrique Cruz MD

## 2018-09-17 NOTE — H&P
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-he has seen Oncology, and I discussed the situation with them  My plan is to re-stage him with repeat trans urethral resection in the operating room  This will determine whether he needs chemotherapy or not in a neoadjuvant fashion prior to cystectomy  PET scan shows localization to the prostate  No sign of metastases  The risks of bleeding infection and urinary incontinence have been explained he gives informed consent  He also has had a left percutaneous nephrostomy tube placed in the meantime with internalization of a double-J ureteral stent  This was done on the , and the nephrostomy tube was capped and IR plans to possibly remove the nephrostomy tube is long as there is antegrade drainage in the next 2 weeks        The following portions of the patient's history were reviewed and updated as appropriate: allergies, current medications, past family history, past medical history, past social history, past surgical history and problem list     Review of Systems   Review of Systems    Active Problem List     Patient Active Problem List   Diagnosis    Right bundle melissa block, 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)    Aneurysm of left popliteal artery (HCC)    Urothelial carcinoma ([facility] )       Objective   There were no vitals taken for this visit  Physical Exam   Constitutional: He is oriented to person, place, and time  He appears well-developed and well-nourished  HENT:   Head: Normocephalic and atraumatic  Eyes: EOM are normal    Neck: Normal range of motion  Cardiovascular: Normal rate, regular rhythm and normal heart sounds  Pulmonary/Chest: Effort normal and breath sounds normal    Abdominal: Soft  Musculoskeletal: Normal range of motion  Neurological: He is alert and oriented to person, place, and time  Skin: Skin is warm and dry  Psychiatric: He has a normal mood and affect   His behavior is normal  Judgment and thought content normal            Current Medications     Current Outpatient Prescriptions:     amLODIPine (NORVASC) 10 mg tablet, Take 10 mg by mouth daily, Disp: , Rfl:     divalproex sodium (DEPAKOTE) 500 mg EC tablet, Take 500 mg by mouth 2 (two) times a day  , Disp: , Rfl:     metoclopramide (REGLAN) 10 mg tablet, Take 1 tablet (10 mg total) by mouth 4 (four) times a day as needed (Nausea), Disp: 30 tablet, Rfl: 1    mometasone (NASONEX) 50 mcg/act nasal spray, 2 sprays into each nostril as needed, Disp: , Rfl:     pentoxifylline (TRENtal) 400 mg ER tablet, Take 400 mg by mouth 3 (three) times a day with meals, Disp: , Rfl:     ranitidine (ZANTAC) 300 MG capsule, Take 300 mg by mouth every evening, Disp: , Rfl:     tamsulosin (FLOMAX) 0 4 mg, Take 1 capsule (0 4 mg total) by mouth daily with dinner, Disp: 90 capsule, Rfl: 3        Yanci Rajput MD

## 2018-09-18 LAB — BACTERIA UR CULT: NORMAL

## 2018-09-19 ENCOUNTER — HOSPITAL ENCOUNTER (OUTPATIENT)
Dept: RADIOLOGY | Facility: HOSPITAL | Age: 73
Discharge: HOME | End: 2018-09-19
Attending: UROLOGY | Admitting: UROLOGY
Payer: MEDICARE

## 2018-09-19 DIAGNOSIS — N13.5 URETERAL STRICTURE, LEFT: ICD-10-CM

## 2018-09-19 PROCEDURE — 50431 NJX PX NFROSGRM &/URTRGRM: CPT

## 2018-09-19 PROCEDURE — 50389 REMOVE RENAL TUBE W/FLUORO: CPT | Performed by: RADIOLOGY

## 2018-09-19 RX ADMIN — IOHEXOL 15 ML: 300 INJECTION, SOLUTION INTRAVENOUS at 12:56

## 2018-09-19 NOTE — SEDATION DOCUMENTATION
Left PCN checked and removed in IR by Dr Sally Aleman without complication  Clinical references given to patient  Discharged ambulatory without complaints/questions

## 2018-09-21 ENCOUNTER — ANESTHESIA EVENT (OUTPATIENT)
Dept: PERIOP | Facility: HOSPITAL | Age: 73
End: 2018-09-21
Payer: MEDICARE

## 2018-09-24 RX ORDER — ACETAMINOPHEN 325 MG/1
650 TABLET ORAL EVERY 6 HOURS PRN
COMMUNITY

## 2018-09-24 RX ORDER — IBUPROFEN 400 MG/1
TABLET ORAL EVERY 6 HOURS PRN
COMMUNITY
End: 2018-11-28

## 2018-09-24 NOTE — PRE-PROCEDURE INSTRUCTIONS
Pre-Surgery Instructions:   Medication Instructions    acetaminophen (TYLENOL) 500 mg tablet Instructed patient per Anesthesia Guidelines   amLODIPine (NORVASC) 10 mg tablet Instructed patient per Anesthesia Guidelines   divalproex sodium (DEPAKOTE) 500 mg EC tablet Instructed patient per Anesthesia Guidelines   ibuprofen (MOTRIN) 400 mg tablet Instructed patient per Anesthesia Guidelines   mometasone (NASONEX) 50 mcg/act nasal spray Instructed patient per Anesthesia Guidelines   pentoxifylline (TRENtal) 400 mg ER tablet Patient was instructed by Physician and understands   ranitidine (ZANTAC) 300 MG capsule Instructed patient per Anesthesia Guidelines   tamsulosin (FLOMAX) 0 4 mg Instructed patient per Anesthesia Guidelines  Patient was instructed by MD regarding when to stop [place]  Per anesthesia patient was told to stop NSAIDS and supplements one week preop and on DOS with sip of water may take Depakote and Amlodipine  Instructed on use of Chlorhexidine for preoperative bathing per hospital protocol

## 2018-09-26 ENCOUNTER — HOSPITAL ENCOUNTER (OUTPATIENT)
Facility: HOSPITAL | Age: 73
Setting detail: OUTPATIENT SURGERY
Discharge: HOME | End: 2018-09-27
Attending: UROLOGY | Admitting: UROLOGY
Payer: MEDICARE

## 2018-09-26 ENCOUNTER — HOSPITAL ENCOUNTER (OUTPATIENT)
Dept: RADIOLOGY | Facility: HOSPITAL | Age: 73
Setting detail: OUTPATIENT SURGERY
Discharge: HOME | End: 2018-09-26
Payer: MEDICARE

## 2018-09-26 ENCOUNTER — ANESTHESIA (OUTPATIENT)
Dept: PERIOP | Facility: HOSPITAL | Age: 73
End: 2018-09-26
Payer: MEDICARE

## 2018-09-26 ENCOUNTER — DOCUMENTATION (OUTPATIENT)
Dept: UROLOGY | Facility: AMBULATORY SURGERY CENTER | Age: 73
End: 2018-09-26

## 2018-09-26 DIAGNOSIS — C61 PROSTATE CANCER (HCC): ICD-10-CM

## 2018-09-26 DIAGNOSIS — C68.9 UROTHELIAL CARCINOMA (HCC): ICD-10-CM

## 2018-09-26 LAB
ABO GROUP BLD: NORMAL
BLD GP AB SCN SERPL QL: NEGATIVE
RH BLD: POSITIVE
SPECIMEN EXPIRATION DATE: NORMAL

## 2018-09-26 PROCEDURE — 86901 BLOOD TYPING SEROLOGIC RH(D): CPT | Performed by: UROLOGY

## 2018-09-26 PROCEDURE — 88305 TISSUE EXAM BY PATHOLOGIST: CPT | Performed by: PATHOLOGY

## 2018-09-26 PROCEDURE — 86850 RBC ANTIBODY SCREEN: CPT | Performed by: UROLOGY

## 2018-09-26 PROCEDURE — 86900 BLOOD TYPING SEROLOGIC ABO: CPT | Performed by: UROLOGY

## 2018-09-26 PROCEDURE — 52630 REMOVE PROSTATE REGROWTH: CPT | Performed by: UROLOGY

## 2018-09-26 RX ORDER — MAGNESIUM HYDROXIDE 1200 MG/15ML
LIQUID ORAL AS NEEDED
Status: DISCONTINUED | OUTPATIENT
Start: 2018-09-26 | End: 2018-09-26 | Stop reason: HOSPADM

## 2018-09-26 RX ORDER — LEVOFLOXACIN 5 MG/ML
750 INJECTION, SOLUTION INTRAVENOUS ONCE
Status: COMPLETED | OUTPATIENT
Start: 2018-09-26 | End: 2018-09-26

## 2018-09-26 RX ORDER — MIDAZOLAM HYDROCHLORIDE 1 MG/ML
INJECTION INTRAMUSCULAR; INTRAVENOUS AS NEEDED
Status: DISCONTINUED | OUTPATIENT
Start: 2018-09-26 | End: 2018-09-26 | Stop reason: SURG

## 2018-09-26 RX ORDER — DOCUSATE SODIUM 100 MG/1
100 CAPSULE, LIQUID FILLED ORAL 2 TIMES DAILY
Status: DISCONTINUED | OUTPATIENT
Start: 2018-09-26 | End: 2018-09-27 | Stop reason: HOSPADM

## 2018-09-26 RX ORDER — FENTANYL CITRATE 50 UG/ML
INJECTION, SOLUTION INTRAMUSCULAR; INTRAVENOUS AS NEEDED
Status: DISCONTINUED | OUTPATIENT
Start: 2018-09-26 | End: 2018-09-26 | Stop reason: SURG

## 2018-09-26 RX ORDER — ONDANSETRON 2 MG/ML
INJECTION INTRAMUSCULAR; INTRAVENOUS AS NEEDED
Status: DISCONTINUED | OUTPATIENT
Start: 2018-09-26 | End: 2018-09-26 | Stop reason: SURG

## 2018-09-26 RX ORDER — SODIUM CHLORIDE 450 MG/100ML
125 INJECTION, SOLUTION INTRAVENOUS CONTINUOUS
Status: DISCONTINUED | OUTPATIENT
Start: 2018-09-26 | End: 2018-09-27 | Stop reason: HOSPADM

## 2018-09-26 RX ORDER — DEXAMETHASONE SODIUM PHOSPHATE 4 MG/ML
INJECTION, SOLUTION INTRA-ARTICULAR; INTRALESIONAL; INTRAMUSCULAR; INTRAVENOUS; SOFT TISSUE AS NEEDED
Status: DISCONTINUED | OUTPATIENT
Start: 2018-09-26 | End: 2018-09-26 | Stop reason: SURG

## 2018-09-26 RX ORDER — SORBITOL 30 G/1000ML
IRRIGANT IRRIGATION AS NEEDED
Status: DISCONTINUED | OUTPATIENT
Start: 2018-09-26 | End: 2018-09-26 | Stop reason: HOSPADM

## 2018-09-26 RX ORDER — SODIUM CHLORIDE 9 MG/ML
125 INJECTION, SOLUTION INTRAVENOUS CONTINUOUS
Status: DISCONTINUED | OUTPATIENT
Start: 2018-09-26 | End: 2018-09-27 | Stop reason: HOSPADM

## 2018-09-26 RX ORDER — ONDANSETRON 2 MG/ML
4 INJECTION INTRAMUSCULAR; INTRAVENOUS ONCE AS NEEDED
Status: DISCONTINUED | OUTPATIENT
Start: 2018-09-26 | End: 2018-09-26 | Stop reason: HOSPADM

## 2018-09-26 RX ORDER — FENTANYL CITRATE/PF 50 MCG/ML
25 SYRINGE (ML) INJECTION
Status: DISCONTINUED | OUTPATIENT
Start: 2018-09-26 | End: 2018-09-26 | Stop reason: HOSPADM

## 2018-09-26 RX ORDER — MAGNESIUM HYDROXIDE 1200 MG/15ML
3000 LIQUID ORAL CONTINUOUS
Status: DISCONTINUED | OUTPATIENT
Start: 2018-09-26 | End: 2018-09-27 | Stop reason: HOSPADM

## 2018-09-26 RX ORDER — PROPOFOL 10 MG/ML
INJECTION, EMULSION INTRAVENOUS AS NEEDED
Status: DISCONTINUED | OUTPATIENT
Start: 2018-09-26 | End: 2018-09-26 | Stop reason: SURG

## 2018-09-26 RX ORDER — HYDROCODONE BITARTRATE AND ACETAMINOPHEN 5; 325 MG/1; MG/1
1 TABLET ORAL EVERY 6 HOURS PRN
Status: DISCONTINUED | OUTPATIENT
Start: 2018-09-26 | End: 2018-09-27 | Stop reason: HOSPADM

## 2018-09-26 RX ADMIN — HYDROCODONE BITARTRATE AND ACETAMINOPHEN 1 TABLET: 5; 325 TABLET ORAL at 22:18

## 2018-09-26 RX ADMIN — FENTANYL CITRATE 25 MCG: 50 INJECTION, SOLUTION INTRAMUSCULAR; INTRAVENOUS at 10:58

## 2018-09-26 RX ADMIN — SODIUM CHLORIDE 125 ML/HR: 0.45 INJECTION, SOLUTION INTRAVENOUS at 12:00

## 2018-09-26 RX ADMIN — CEFAZOLIN SODIUM 2000 MG: 2 SOLUTION INTRAVENOUS at 19:32

## 2018-09-26 RX ADMIN — FENTANYL CITRATE 25 MCG: 50 INJECTION, SOLUTION INTRAMUSCULAR; INTRAVENOUS at 10:52

## 2018-09-26 RX ADMIN — SODIUM CHLORIDE 125 ML/HR: 0.45 INJECTION, SOLUTION INTRAVENOUS at 19:34

## 2018-09-26 RX ADMIN — FENTANYL CITRATE 25 MCG: 50 INJECTION INTRAMUSCULAR; INTRAVENOUS at 11:54

## 2018-09-26 RX ADMIN — ONDANSETRON HYDROCHLORIDE 4 MG: 2 INJECTION, SOLUTION INTRAVENOUS at 11:08

## 2018-09-26 RX ADMIN — DOCUSATE SODIUM 100 MG: 100 CAPSULE, LIQUID FILLED ORAL at 12:58

## 2018-09-26 RX ADMIN — HYDROCODONE BITARTRATE AND ACETAMINOPHEN 1 TABLET: 5; 325 TABLET ORAL at 14:14

## 2018-09-26 RX ADMIN — PROPOFOL 200 MG: 10 INJECTION, EMULSION INTRAVENOUS at 10:48

## 2018-09-26 RX ADMIN — DEXAMETHASONE SODIUM PHOSPHATE 4 MG: 4 INJECTION, SOLUTION INTRAMUSCULAR; INTRAVENOUS at 10:52

## 2018-09-26 RX ADMIN — DOCUSATE SODIUM 100 MG: 100 CAPSULE, LIQUID FILLED ORAL at 17:12

## 2018-09-26 RX ADMIN — MIDAZOLAM 1 MG: 1 INJECTION INTRAMUSCULAR; INTRAVENOUS at 10:39

## 2018-09-26 RX ADMIN — SODIUM CHLORIDE FOR IRRIGATION 3000 ML: 0.9 SOLUTION IRRIGATION at 12:58

## 2018-09-26 RX ADMIN — LEVOFLOXACIN: 5 INJECTION, SOLUTION INTRAVENOUS at 10:50

## 2018-09-26 RX ADMIN — CEFAZOLIN SODIUM 2000 MG: 2 SOLUTION INTRAVENOUS at 12:57

## 2018-09-26 RX ADMIN — FENTANYL CITRATE 50 MCG: 50 INJECTION, SOLUTION INTRAMUSCULAR; INTRAVENOUS at 11:01

## 2018-09-26 RX ADMIN — SODIUM CHLORIDE 125 ML/HR: 0.9 INJECTION, SOLUTION INTRAVENOUS at 10:17

## 2018-09-26 NOTE — DISCHARGE INSTRUCTIONS
Expect to see blood in the urine and have urgency frequency and dribbling  Call for fever greater than 101 5°, or severe pain not relieved by pain medications  See me in a week to go over pathology results  You may resume usual activities with the exception of heavy lifting  May walk shower and take stairs

## 2018-09-26 NOTE — OP NOTE
OPERATIVE REPORT  PATIENT NAME: Tejinder Colón    :  1945  MRN: [id number]  Pt Location: [facility] OR ROOM 04    SURGERY DATE: 2018    Surgeon(s) and Role:     * Carmela Quiles MD - Primary    Preop Diagnosis:  Prostate cancer ([address] ) [place]  Urothelial carcinoma ([address] ) [C68 9]    Post-Op Diagnosis Codes:     * Prostate cancer ([address] ) [C61]     * Urothelial carcinoma ([address] ) [C68 9]    Procedure(s) (LRB):  TRANSURETHRAL RESECTION OF PROSTATE (TURP) (N/A)    Specimen(s):  TUR chips    Estimated Blood Loss:   Minimal    Drains:  24 [address] catheter 3 way       Anesthesia Type:   General/LMA    Operative Indications:  Prostate cancer ([address] ) [C61]  Urothelial carcinoma ([address] ) [C68 9]      Operative Findings:  Increase in size of tumor in prostatic urethra  No bladder cancer  Inflammatory changes at the trigone  Complications:   None    Procedure and Technique:  The patient is a 51-year-old man who underwent a prostatic urethral biopsy by me which showed high-grade urothelial carcinoma with sarcomatoid features  This is very unusual finding, and that he had undergone previous trans urethral resection of the prostate which showed [place] adenocarcinoma and otherwise benign chips  He had persistent pelvic pain and dysuria after the procedure and I perform cystoscopy which showed intense inflammatory response at the bladder neck and the trigone and basically looked like the prostatic urethra was not healing after the TURP  I took the biopsy from this and showed the above findings  He has been to Hematology Oncology and we are planning a restaging trans urethral resection of the prostate to see if he has T1 or T4 disease  If he has T4 disease, he will receive neoadjuvant chemotherapy prior to cystectomy  If he has T1 disease, we will proceed with cystectomy  The risks of bleeding, infection urinary incontinence have been explained he gives informed consent    The PET scan shows localization to the prostate only, and some uptake in the rim of the fluid collection that he chronically has in the posterior pelvis  The patient was brought to the operating room and identified properly  LMA was induced the patient was prepped and draped in the dorsal lithotomy position usual fashion  A time-out was performed, and the 24 [place] resectoscope sheath was introduced with the obturator and then direct vision into the bladder  The prostatic urethra was visualized and this showed an increase in the fluffy white tissue that I had seen previously  The bladder was normal with the stent in place and there remained the intense inflammatory reaction with edema at the bladder neck and trigone  Using the resectoscope loop, I resected circumferentially any excess tissue  Hemostasis was achieved with electrocautery  The veru Namon Hunger was intact and I did not resect distal to this  All the chips were evacuated and sent to pathology  There is no bleeding at the end of procedure and I placed a 24 [address] 3 way catheter and inflated the balloon to 30 cc      I was present for the entire procedure and A qualified resident physician was not available    Patient Disposition:  PACU  and hemodynamically stable    SIGNATURE: Apolinar Cancino MD  DATE: September 26, 2018  TIME: 11:24 AM

## 2018-09-26 NOTE — PROGRESS NOTES
Oncology Navigator Note: Urology Tumor Conference 9/25/18  Physician Recommended Plan    Annia Severe  Male, 68 y o , 1945    Diagnosis: Urothelial Carcinoma of the Prostatic Urethra    Patient was discussed in Urology Tumor Conference on 9/25/18  Discussed Medical Oncology referral for Neoadjuvant Chemo then cystoprostatectomy with creation of ileal conduit  Consensus was to proceed right to cystoprostatectomy/ ileal conduit due to high grade urothelial carcinoma with sarcomatous features and to not consider neoadjuvant chemo or returning to the OR for cystoscopic evaluation

## 2018-09-26 NOTE — H&P (VIEW-ONLY)
[address] [facility]  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-he has seen Oncology, and I discussed the situation with them  My plan is to re-stage him with repeat trans urethral resection in the operating room  This will determine whether he needs chemotherapy or not in a neoadjuvant fashion prior to cystectomy  PET scan shows localization to the prostate  No sign of metastases  The risks of bleeding infection and urinary incontinence have been explained he gives informed consent  He also has had a left percutaneous nephrostomy tube placed in the meantime with internalization of a double-J ureteral stent  This was done on the , and the nephrostomy tube was capped and IR plans to possibly remove the nephrostomy tube is long as there is antegrade drainage in the next 2 weeks        The following portions of the patient's history were reviewed and updated as appropriate: allergies, current medications, past family history, past medical history, past social history, past surgical history and problem list     Review of Systems   Review of Systems    Active Problem List     Patient Active Problem List   Diagnosis    Right bundle melissa block, 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 )    Essential hypertension    Acute pain of right shoulder    Benign localized hyperplasia of prostate without urinary obstruction    Urinary retention due to benign prostatic hyperplasia    Hydronephrosis of left kidney    Ureteral stricture, left    Pelvic abscess in male [facility])    Aneurysm of left popliteal artery (HCC)    Urothelial carcinoma ([facility] )       Objective   There were no vitals taken for this visit  Physical Exam   Constitutional: He is oriented to person, place, and time  He appears well-developed and well-nourished  HENT:   Head: Normocephalic and atraumatic  Eyes: EOM are normal    Neck: Normal range of motion  Cardiovascular: Normal rate, regular rhythm and normal heart sounds  Pulmonary/Chest: Effort normal and breath sounds normal    Abdominal: Soft  Musculoskeletal: Normal range of motion  Neurological: He is alert and oriented to person, place, and time  Skin: Skin is warm and dry  Psychiatric: He has a normal mood and affect   His behavior is normal  Judgment and thought content normal            Current Medications     Current Outpatient Prescriptions:     amLODIPine (NORVASC) 10 mg tablet, Take 10 mg by mouth daily, Disp: , Rfl:     divalproex sodium (DEPAKOTE) 500 mg EC tablet, Take 500 mg by mouth 2 (two) times a day  , Disp: , Rfl:     metoclopramide (REGLAN) 10 mg tablet, Take 1 tablet (10 mg total) by mouth 4 (four) times a day as needed (Nausea), Disp: 30 tablet, Rfl: 1    mometasone (NASONEX) 50 mcg/act nasal spray, 2 sprays into each nostril as needed, Disp: , Rfl:     pentoxifylline (TRENtal) 400 mg ER tablet, Take 400 mg by mouth 3 (three) times a day with meals, Disp: , Rfl:     ranitidine (ZANTAC) 300 MG capsule, Take 300 mg by mouth every evening, Disp: , Rfl:     tamsulosin (FLOMAX) 0 4 mg, Take 1 capsule (0 4 mg total) by mouth daily with dinner, Disp: 90 capsule, Rfl: 3        Oliverio Gottlieb MD

## 2018-09-26 NOTE — PLAN OF CARE
DISCHARGE PLANNING     Discharge to home or other facility with appropriate resources Progressing        GENITOURINARY - ADULT     Maintains or returns to baseline urinary function Progressing     Absence of urinary retention Progressing     Urinary catheter remains patent Progressing        INFECTION - ADULT     Absence or prevention of progression during hospitalization Progressing        Knowledge Deficit     Patient/family/caregiver demonstrates understanding of disease process, treatment plan, medications, and discharge instructions Progressing        PAIN - ADULT     Verbalizes/displays adequate comfort level or baseline comfort level Progressing        SAFETY ADULT     Patient will remain free of falls Progressing

## 2018-09-26 NOTE — ANESTHESIA PREPROCEDURE EVALUATION
Review of Systems/Medical History  Patient summary reviewed  Chart reviewed  History of anesthetic complications PONV    Cardiovascular  Hypertension controlled, Dysrhythmias ,   Comment: RBBB,  Pulmonary  Smoker ex-smoker  ,   Comment: S/p lung cyst removal       GI/Hepatic    GERD well controlled,        Kidney stones,   Comment: S/p L nephrostomy tube, stent     Endo/Other     GYN       Hematology   Musculoskeletal    Arthritis     Neurology   Psychology   Depression , depressed,              Physical Exam    Airway    Mallampati score: II  TM Distance: >3 FB  Neck ROM: full     Dental       Cardiovascular  Rhythm: regular, Rate: normal,     Pulmonary  Breath sounds clear to auscultation,     Other Findings  Upper partial plate  Anesthesia Plan  ASA Score- 2     Anesthesia Type- general with ASA Monitors  Additional Monitors:   Airway Plan: LMA  Plan Factors- Patient instructed to abstain from smoking on day of procedure  Patient did not smoke on day of surgery  Induction- intravenous  Postoperative Plan- Plan for postoperative opioid use  Informed Consent- Anesthetic plan and risks discussed with patient

## 2018-09-27 VITALS
DIASTOLIC BLOOD PRESSURE: 79 MMHG | SYSTOLIC BLOOD PRESSURE: 145 MMHG | WEIGHT: 210 LBS | OXYGEN SATURATION: 98 % | RESPIRATION RATE: 18 BRPM | BODY MASS INDEX: 31.1 KG/M2 | HEART RATE: 65 BPM | TEMPERATURE: 98 F | HEIGHT: 69 IN

## 2018-09-27 PROCEDURE — 99024 POSTOP FOLLOW-UP VISIT: CPT | Performed by: PHYSICIAN ASSISTANT

## 2018-09-27 RX ADMIN — SODIUM CHLORIDE 125 ML/HR: 0.45 INJECTION, SOLUTION INTRAVENOUS at 03:52

## 2018-09-27 RX ADMIN — HYDROCODONE BITARTRATE AND ACETAMINOPHEN 1 TABLET: 5; 325 TABLET ORAL at 07:24

## 2018-09-27 RX ADMIN — CEFAZOLIN SODIUM 2000 MG: 2 SOLUTION INTRAVENOUS at 03:53

## 2018-09-27 RX ADMIN — DOCUSATE SODIUM 100 MG: 100 CAPSULE, LIQUID FILLED ORAL at 08:16

## 2018-09-27 NOTE — ASSESSMENT & PLAN NOTE
1 Day Post-Op  Procedure(s):  TRANSURETHRAL RESECTION OF PROSTATE (TURP)  Surgeon(s):  Harvey Gregory MD  9/26/2018    Doing great with light pink urine on CBI  Plan: D/C CBI  D/C moreira after 1 hour  Void trial  D/C home

## 2018-09-27 NOTE — NURSING NOTE
Reviewed discharge instructions with patient including medications and follow-up appointments  IV and catheter was removed and patient was walked out with wife and an RN

## 2018-09-27 NOTE — PROGRESS NOTES
Progress Note - Urology  Tamika Elizabeth 1945, 68 y o  male MRN: [id number]    Unit/Bed#: E5 -01 Encounter: [id number]    Prostate cancer [facility])   Assessment & Plan    1 Day Post-Op  Procedure(s):  TRANSURETHRAL RESECTION OF PROSTATE (TURP)  Surgeon(s):  Lyle Baugh MD  9/26/2018    Doing great with light pink urine on CBI  Plan: D/C CBI  D/C moreira after 1 hour  Void trial  D/C home  Bedside rounds performed with Justyn Longo RN  Discussed with Dr Elizabeth Kaufman  Subjective/Objective     Subjective:   Complaining of mild back discomfort  No abdominal pain, bladder spasm, difficulty tolerating a Moreira catheter overnight  Reports he slumped moderately well  No nausea or vomiting  Good appetite this morning  No chest pain or shortness of breath  No lightheaded or dizziness  Review of Systems   Constitutional: Negative for activity change and appetite change  HENT: Negative for congestion and ear pain  Eyes: Negative for pain  Respiratory: Negative for cough and shortness of breath  Cardiovascular: Negative for chest pain and palpitations  Gastrointestinal: Negative for abdominal distention, abdominal pain, blood in stool, constipation, diarrhea and nausea  Genitourinary: Positive for hematuria (Slight pink hematuria, overnight on CBI status post TURP)  Negative for difficulty urinating, dysuria, flank pain, penile pain, penile swelling and scrotal swelling  Musculoskeletal: Negative for arthralgias and myalgias  Skin: Negative for rash  Allergic/Immunologic: Negative for immunocompromised state  Neurological: Negative for dizziness and headaches  Hematological: Negative for adenopathy  Does not bruise/bleed easily  Psychiatric/Behavioral: Negative for agitation  The patient is not nervous/anxious  Objective:  Vitals: Blood pressure 145/79, pulse 65, temperature 98 °F (36 7 °C), temperature source Temporal, resp   rate 18, height 5' 9" (1 753 m), weight 95 3 kg (210 lb), SpO2 98 %  ,Body mass index is 31 01 kg/m²  Intake/Output Summary (Last 24 hours) at 09/27/18 0743  Last data filed at 09/27/18 2855   Gross per 24 hour   Intake             1250 ml   Output              350 ml   Net              900 ml     Invasive Devices     Peripheral Intravenous Line            Peripheral IV 09/26/18 Left Wrist less than 1 day          Drain            Continuous Bladder Irrigation Three-way less than 1 day                Physical Exam   Constitutional: He is oriented to person, place, and time  He appears well-developed and well-nourished  He is cooperative  He does not appear ill  No distress  Pleasant well-appearing 70-year-old obese gentleman, sitting upright in bed  No acute distress  HENT:   Head: Normocephalic and atraumatic  Moist mucous membranes  Eyes: Conjunctivae and EOM are normal    Neck: Normal range of motion  Neck supple  No tracheal deviation present  Cardiovascular: Normal rate, regular rhythm and normal heart sounds  No murmur heard  Pulmonary/Chest: Effort normal and breath sounds normal  No respiratory distress  He has no wheezes  Good airflow bilaterally on deep inspiration  Abdominal: Soft  Bowel sounds are normal  He exhibits no distension and no mass  There is no tenderness  Abdomen soft without significant suprapubic fullness  Genitourinary:   Genitourinary Comments: Bolden catheter in place on low level CBI with light pink drainage  No significant clots  Musculoskeletal: Normal range of motion  He exhibits no edema  Neurological: He is alert and oriented to person, place, and time  Skin: Skin is warm and dry  No rash noted  He is not diaphoretic  No erythema  No pallor  Psychiatric: He has a normal mood and affect  His behavior is normal  Judgment and thought content normal    Nursing note and vitals reviewed        History:    Past Medical History:   Diagnosis Date    Aneurysm ([address] )     Behind left knee recently diagnosed    Back pain     Ceron disease     BPH (benign prostatic hyperplasia)     Cancer (HCC)     melanoma    Cataract     right eye    Cataract     right eye    Confusion     DDD (degenerative disc disease), lumbar     Depression     Diverticulosis     Gallstones     GERD (gastroesophageal reflux disease)     History of cardiac murmur     Past    History of melanoma     Was on back in early 1990's    History of rheumatic fever     Childhood    [place] (hard of hearing)     Hydronephrosis     Hypertension     Kidney stone     Multiple times    Neck pain     Nervous breakdown     History of due to reaction to psych med which he was on for anxiety/depression and became suicidal    Numbness and tingling in both hands     Numbness and tingling of both feet     PONV (postoperative nausea and vomiting)     PVD (peripheral vascular disease) ([address] )     RBBB     Scoliosis     Seasonal allergies     Tinnitus     Wears partial dentures     Upper    Wears partial dentures     Upper     Past Surgical History:   Procedure Laterality Date    ABDOMINAL SURGERY      When young had procedure for improviing circulation to left lower extremety    BACK SURGERY      Lumbar- not sure what was done   Dead[facility] Angelica CARDIAC CATHETERIZATION      Approximately 2007- no blockages    CARPAL TUNNEL RELEASE Bilateral     CATARACT EXTRACTION Left     COLONOSCOPY      CYST REMOVAL      Robotic procedure to remove benign cyst from lower left lung    CYSTOGRAM N/A 3/14/2018    Procedure: CYSTOGRAM;  Surgeon: Barrett Mosquera MD;  [facility];  Service: Urology    CYSTOSCOPY      ESOPHAGOGASTRODUODENOSCOPY      IR TUBE PLACEMENT NEPHROSTOMY  8/10/2018    LUNG SURGERY      cyst removed left lung    OTHER SURGICAL HISTORY Left     fistula drain in left buttock    NJ CYSTO/URETERO W/LITHOTRIPSY &INDWELL STENT INSRT Left 1/3/2018    Procedure: CYSTOSCOPY URETEROSCOPY, RETROGRADE PYELOGRAM AND INSERTION STENT URETERAL;  Surgeon: Kuldeep Enamorado Barbara Yoo MD;  [facility];  Service: Urology    MS CYSTOTOMY,EXCIS BLADDER TIC N/A 2/14/2018    Procedure: ABDOMINAL EXPLORATION; CLOSURE OF BLADDER DIVERTICULITIS;  Surgeon: Jakob Perez MD;  [facility];  Service: Urology    MS CYSTOURETHROSCOPY,URETER CATHETER Left 8/1/2018    Procedure: Cystoscopy, cystogram, bladder biopsies, removal of pelvic drain;  Surgeon: Jakob Perez MD;  [facility];  Service: Urology    MS INCISE/DRAIN BLADDER N/A 2/14/2018    Procedure: OPEN SUPRAPUBIC TUBE PLACEMENT;  Surgeon: Jakob Perez MD;  [facility];  Service: Urology    MS RELEASE [place] FIBROSIS Left 2/14/2018    Procedure: LYSIS OF ADHESIONS; URETEROLYSIS ;  Surgeon: Jakob Perez MD;  [facility];  Service: Urology    SKIN CANCER EXCISION      Melanoma removal on back in early 1990's    TOE AMPUTATION Left     All 5 toes left foot were amputated due to poor circulation     TRANSURETHRAL RESECTION OF PROSTATE N/A 3/14/2018    Procedure: TRANSURETHRAL RESECTION OF PROSTATE (TURP), LEFT URETERAL STENT REMOVAL;  Surgeon: Jakob Perez MD;  [facility];  Service: Urology    TRANSURETHRAL RESECTION OF PROSTATE N/A 9/26/2018    Procedure: TRANSURETHRAL RESECTION OF PROSTATE (TURP);   Surgeon: Jakob Perez MD;  [facility];  Service: Urology    WRIST SURGERY Bilateral     Ulnar nerve on right arm and both wrists are fused     Family History   Problem Relation Age of Onset    Heart disease Father     Heart disease Mother     Cancer Paternal Grandfather      Social History     Social History    Marital status: Common Law     Spouse name: N/A    Number of children: N/A    Years of education: N/A     Social History Main Topics    Smoking status: Former Smoker     Packs/day: 1 00     Years: 15 00     Types: Cigarettes     Quit date: 2/5/1970    Smokeless tobacco: Never Used      Comment: Quit 50 years    Alcohol use Yes      Comment: Very rare    Drug use: No    Sexual activity: Not Asked     Other Topics Concern    None     Social History Narrative    None     Jaguar Caballero [place]  Date: 9/27/2018 Time: 7:43 AM

## 2018-09-27 NOTE — DISCHARGE SUMMARY
Discharge Summary - Anahy Nickerson 68 y o  male MRN: [id number]    Unit/Bed#: E5 -01 Encounter: [id number]    Admission Date: 9/26/2018     Discharge Date: 09/27/18    HPI: Anahy Nickerson is a 68 y o  male who presented for TURP by Dr Pedro Luis Loco  Procedure(s):  TRANSURETHRAL RESECTION OF PROSTATE (TURP)  Surgeon(s):  Tony Quesada MD  9/26/2018    Hospital Course:  Postoperatively he recovered from anesthesia without event  He was transferred to the medical-surgical floor and his bladder was irrigated overnight  On postoperative day 1 , his bladder irrigation was running light pink  This was discontinued in his urine output was monitored  It remained clear  Bolden catheter was removed and voiding trial was undergone  Patient was able to void without issue and was discharged home on postoperative day 1  Discharge Diagnosis: Prostatomegaly     Condition at Discharge: good     Discharge Medications:  See after visit summary for reconciled discharge medications provided to patient and family  Patient was discharged home on home medications  Discharge instructions/Information to patient and family:   See after visit summary for information provided to patient and family  Provisions for Follow-Up Care:  See after visit summary for information related to follow-up care and any pertinent home health orders  Disposition: Home    Planned Readmission: No    Discharge Statement   I spent 20 minutes discharging the patient  This time was spent on the day of discharge  I had direct contact with the patient on the day of discharge  Additional documentation is required if more than 30 minutes were spent on discharge       Signature:   Eden Fitzgerald PA-C  Date: 9/27/2018 Time: 7:43 AM

## 2018-09-27 NOTE — PLAN OF CARE
DISCHARGE PLANNING     Discharge to home or other facility with appropriate resources Progressing        GENITOURINARY - ADULT     Maintains or returns to baseline urinary function Progressing     Absence of urinary retention Progressing     Urinary catheter remains patent Progressing        INFECTION - ADULT     Absence or prevention of progression during hospitalization Progressing        Knowledge Deficit     Patient/family/caregiver demonstrates understanding of disease process, treatment plan, medications, and discharge instructions Progressing        PAIN - ADULT     Verbalizes/displays adequate comfort level or baseline comfort level Progressing        Potential for Falls     Patient will remain free of falls Progressing        SAFETY ADULT     Patient will remain free of falls Progressing

## 2018-10-05 ENCOUNTER — HOSPITAL ENCOUNTER (OUTPATIENT)
Dept: NON INVASIVE DIAGNOSTICS | Facility: CLINIC | Age: 73
Discharge: HOME | End: 2018-10-05
Payer: MEDICARE

## 2018-10-05 DIAGNOSIS — I72.4 ANEURYSM OF LEFT POPLITEAL ARTERY (HCC): ICD-10-CM

## 2018-10-05 PROCEDURE — 93925 LOWER EXTREMITY STUDY: CPT

## 2018-10-05 PROCEDURE — 93925 LOWER EXTREMITY STUDY: CPT | Performed by: SURGERY

## 2018-10-05 PROCEDURE — 93923 UPR/LXTR ART STDY 3+ LVLS: CPT

## 2018-10-05 PROCEDURE — 93922 UPR/L XTREMITY ART 2 LEVELS: CPT | Performed by: SURGERY

## 2018-10-09 ENCOUNTER — OFFICE VISIT (OUTPATIENT)
Dept: UROLOGY | Facility: MEDICAL CENTER | Age: 73
End: 2018-10-09
Payer: MEDICARE

## 2018-10-09 VITALS
BODY MASS INDEX: 30.66 KG/M2 | WEIGHT: 207 LBS | HEIGHT: 69 IN | SYSTOLIC BLOOD PRESSURE: 124 MMHG | DIASTOLIC BLOOD PRESSURE: 64 MMHG

## 2018-10-09 DIAGNOSIS — N30.91 CYSTITIS WITH HEMATURIA: Primary | ICD-10-CM

## 2018-10-09 DIAGNOSIS — R10.2 PELVIC PAIN: ICD-10-CM

## 2018-10-09 LAB
SL AMB  POCT GLUCOSE, UA: NEGATIVE
SL AMB LEUKOCYTE ESTERASE,UA: ABNORMAL
SL AMB POCT BILIRUBIN,UA: NEGATIVE
SL AMB POCT BLOOD,UA: ABNORMAL
SL AMB POCT CLARITY,UA: ABNORMAL
SL AMB POCT COLOR,UA: ABNORMAL
SL AMB POCT KETONES,UA: NEGATIVE
SL AMB POCT NITRITE,UA: NEGATIVE
SL AMB POCT PH,UA: 7
SL AMB POCT SPECIFIC GRAVITY,UA: 1.02
SL AMB POCT URINE PROTEIN: ABNORMAL
SL AMB POCT UROBILINOGEN: 0.2

## 2018-10-09 PROCEDURE — 99024 POSTOP FOLLOW-UP VISIT: CPT | Performed by: UROLOGY

## 2018-10-09 PROCEDURE — 81003 URINALYSIS AUTO W/O SCOPE: CPT | Performed by: UROLOGY

## 2018-10-09 PROCEDURE — 87086 URINE CULTURE/COLONY COUNT: CPT | Performed by: UROLOGY

## 2018-10-09 RX ORDER — LEVOFLOXACIN 500 MG/1
500 TABLET, FILM COATED ORAL EVERY 24 HOURS
Qty: 7 TABLET | Refills: 0 | Status: SHIPPED | OUTPATIENT
Start: 2018-10-09 | End: 2018-10-17 | Stop reason: ALTCHOICE

## 2018-10-09 RX ORDER — HYDROCODONE BITARTRATE AND ACETAMINOPHEN 5; 325 MG/1; MG/1
1-2 TABLET ORAL EVERY 6 HOURS PRN
Qty: 30 TABLET | Refills: 0 | Status: SHIPPED | OUTPATIENT
Start: 2018-10-09 | End: 2018-11-28

## 2018-10-09 NOTE — PROGRESS NOTES
[address] [facility]  [facility]  [address] [facility]  [place], [address] [place] [address]  [phone]  www  [facility]  org      NAME: Jostin Amaro  AGE: 68 y o  SEX: male  : 1945   MRN: [id number]    DATE: 10/9/2018  TIME: 3:22 PM    Assessment and Plan:  Urothelial carcinoma of the prostate  This is T4 disease, so he needs neoadjuvant chemotherapy  He has an appointment this Monday with Oncology to get that started  Possible urinary tract infection:  Send urine culture, start Levaquin 500 mg p o  q day for 7 days empirically  With time, some of these symptoms should resolve somewhat in terms of the incontinence  Pelvic pain due to multiple procedures and fluid collection behind the bladder  He is taking Motrin for this but he still pretty uncomfortable  The [place] helps him sleep at night  Will prescribe him more of this  Follow-up 6 weeks  Chief Complaint   No chief complaint on file  History of Present Illness   Status post trans urethral resection of the prostate for staging 2018-pathology showed urothelial carcinoma, stage T4 with sarcomatoid features  I have discussed this with Dr Eren Purcell, who will start him on neoadjuvant chemo  He complains of increased incontinence since the procedure, and he has had an episode of gross hematuria which is to be expected  Postvoid residual done by me shows only 27 cc  He is wetting mostly into the diaper        The following portions of the patient's history were reviewed and updated as appropriate: allergies, current medications, past family history, past medical history, past social history, past surgical history and problem list     Review of Systems   Review of Systems    Active Problem List     Patient Active Problem List   Diagnosis    Right bundle melissa block, 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    Aortic regurgitation    BPH with obstruction/lower urinary tract symptoms    Bladder diverticulum    Postoperative ileus (HCC)    Kidney stone    Major depressive disorder, single episode, moderate (HCC)    Peripheral vascular disease [facility] )    Essential hypertension    Acute pain of right shoulder    Benign localized hyperplasia of prostate without urinary obstruction    Urinary retention due to benign prostatic hyperplasia    Hydronephrosis of left kidney    Ureteral stricture, left    Pelvic abscess in male [facility])    Aneurysm of left popliteal artery (HCC)    Urothelial carcinoma ([facility] )    Prostate cancer ([facility] )       Objective   There were no vitals taken for this visit  Physical Exam   Abdominal: He exhibits no distension  There is no tenderness  There is no rebound             Current Medications     Current Outpatient Prescriptions:     acetaminophen (TYLENOL) 500 mg tablet, Take 500 mg by mouth every 6 (six) hours as needed for mild pain, Disp: , Rfl:     amLODIPine (NORVASC) 10 mg tablet, Take 10 mg by mouth every morning  , Disp: , Rfl:     divalproex sodium (DEPAKOTE) 500 mg EC tablet, Take 500 mg by mouth 2 (two) times a day  , Disp: , Rfl:     ibuprofen (MOTRIN) 400 mg tablet, Take by mouth every 6 (six) hours as needed for mild pain, Disp: , Rfl:     metoclopramide (REGLAN) 10 mg tablet, Take 1 tablet (10 mg total) by mouth 4 (four) times a day as needed (Nausea) (Patient taking differently: Take 10 mg by mouth 4 (four) times a day as needed (Nausea) For chemo after post op ), Disp: 30 tablet, Rfl: 1    mometasone (NASONEX) 50 mcg/act nasal spray, 2 sprays into each nostril as needed, Disp: , Rfl:     pentoxifylline (TRENtal) 400 mg ER tablet, Take 400 mg by mouth 3 (three) times a day with meals, Disp: , Rfl:     ranitidine (ZANTAC) 300 MG capsule, Take 300 mg by mouth every evening, Disp: , Rfl:     tamsulosin (FLOMAX) 0 4 mg, Take 1 capsule (0 4 mg total) by mouth daily with dinner, Disp: 90 capsule, Rfl: [address], [place]

## 2018-10-09 NOTE — LETTER
2018     Pradeep Pendleton MD  [address] [facility] [address]    Patient: Marcelo Toledo   YOB: 1945   Date of Visit: 10/9/2018       Dear Dr Dnenys Marcelo: Thank you for referring Bert Corral to me for evaluation  Below are my notes for this consultation  If you have questions, please do not hesitate to call me  I look forward to following your patient along with you  Sincerely,        Severa Chol, MD        CC: Gari Hall, MD Severa Chol, MD  10/9/2018  4:35 PM  Sign at close encounter  [address] [facility]  [address], [address] [place] [id number]  www  [facility]  org      NAME: Marcelo Toledo  AGE: 68 y o  SEX: male  : 1945   MRN: [id number]    DATE: 10/9/2018  TIME: 3:22 PM    Assessment and Plan:  Urothelial carcinoma of the prostate  This is T4 disease, so he needs neoadjuvant chemotherapy  He has an appointment this Monday with Oncology to get that started  Possible urinary tract infection:  Send urine culture, start Levaquin 500 mg p o  q day for 7 days empirically  With time, some of these symptoms should resolve somewhat in terms of the incontinence  Pelvic pain due to multiple procedures and fluid collection behind the bladder  He is taking Motrin for this but he still pretty uncomfortable  The [place] helps him sleep at night  Will prescribe him more of this  Follow-up 6 weeks  Chief Complaint   No chief complaint on file  History of Present Illness   Status post trans urethral resection of the prostate for staging 2018-pathology showed urothelial carcinoma, stage T4 with sarcomatoid features  I have discussed this with Dr Guillermina Ordaz, who will start him on neoadjuvant chemo  He complains of increased incontinence since the procedure, and he has had an episode of gross hematuria which is to be expected    Postvoid residual done by me shows only 27 cc  He is wetting mostly into the diaper  The following portions of the patient's history were reviewed and updated as appropriate: allergies, current medications, past family history, past medical history, past social history, past surgical history and problem list     Review of Systems   Review of Systems    Active Problem List     Patient Active Problem List   Diagnosis    Right bundle melissa block, 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    Aortic regurgitation    BPH with obstruction/lower urinary tract symptoms    Bladder diverticulum    Postoperative ileus ([facility] )    Kidney stone    Major depressive disorder, single episode, moderate ([facility] )    Peripheral vascular disease ([facility] )    Essential hypertension    Acute pain of right shoulder    Benign localized hyperplasia of prostate without urinary obstruction    Urinary retention due to benign prostatic hyperplasia    Hydronephrosis of left kidney    Ureteral stricture, left    Pelvic abscess in [facility])    Aneurysm of left popliteal artery ([facility] )    [facility]    Prostate cancer ([facility] )       Objective   There were no vitals taken for this visit  Physical Exam   Abdominal: He exhibits no distension  There is no tenderness  There is no rebound             Current Medications     Current Outpatient Prescriptions:     acetaminophen (TYLENOL) 500 mg tablet, Take 500 mg by mouth every 6 (six) hours as needed for mild pain, Disp: , Rfl:     amLODIPine (NORVASC) 10 mg tablet, Take 10 mg by mouth every morning  , Disp: , Rfl:     divalproex sodium (DEPAKOTE) 500 mg EC tablet, Take 500 mg by mouth 2 (two) times a day  , Disp: , Rfl:     ibuprofen (MOTRIN) 400 mg tablet, Take by mouth every 6 (six) hours as needed for mild pain, Disp: , Rfl:     metoclopramide (REGLAN) 10 mg tablet, Take 1 tablet (10 mg total) by mouth 4 (four) times a day as needed (Nausea) (Patient taking differently: Take 10 mg by mouth 4 (four) times a day as needed (Nausea) For chemo after post op ), Disp: 30 tablet, Rfl: 1    mometasone (NASONEX) 50 mcg/act nasal spray, 2 sprays into each nostril as needed, Disp: , Rfl:     pentoxifylline (TRENtal) 400 mg ER tablet, Take 400 mg by mouth 3 (three) times a day with meals, Disp: , Rfl:     ranitidine (ZANTAC) 300 MG capsule, Take 300 mg by mouth every evening, Disp: , Rfl:     tamsulosin (FLOMAX) 0 4 mg, Take 1 capsule (0 4 mg total) by mouth daily with dinner, Disp: 90 capsule, Rfl: 3        Khushbu Salazar MD

## 2018-10-10 LAB — BACTERIA UR CULT: NORMAL

## 2018-10-15 ENCOUNTER — OFFICE VISIT (OUTPATIENT)
Dept: HEMATOLOGY ONCOLOGY | Facility: CLINIC | Age: 73
End: 2018-10-15
Payer: MEDICARE

## 2018-10-15 VITALS
DIASTOLIC BLOOD PRESSURE: 80 MMHG | BODY MASS INDEX: 31.1 KG/M2 | OXYGEN SATURATION: 97 % | HEART RATE: 108 BPM | TEMPERATURE: 100.1 F | RESPIRATION RATE: 18 BRPM | HEIGHT: 69 IN | SYSTOLIC BLOOD PRESSURE: 130 MMHG | WEIGHT: 210 LBS

## 2018-10-15 DIAGNOSIS — C68.9 UROTHELIAL CARCINOMA (HCC): Primary | ICD-10-CM

## 2018-10-15 PROCEDURE — 99215 OFFICE O/P EST HI 40 MIN: CPT | Performed by: INTERNAL MEDICINE

## 2018-10-15 RX ORDER — SODIUM CHLORIDE 9 MG/ML
20 INJECTION, SOLUTION INTRAVENOUS CONTINUOUS
Status: DISCONTINUED | OUTPATIENT
Start: 2018-10-17 | End: 2018-10-20 | Stop reason: HOSPADM

## 2018-10-15 NOTE — PROGRESS NOTES
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  Therefore, neoadjuvant chemotherapy is indicated  As we previously discussed, I recommended him to have cisplatin and gemcitabine for 3 cycle followed by cystectomy and radical prostatectomy  Side effects of neoadjuvant chemotherapy was thoroughly discussed, including but not limited to nausea, vomiting, fatigue, renal insufficiency, small chance of neuropathy, neutropenia and risk of infection  He was instructed to have vigorous oral hydration after the cisplatin infusion  He should contact us if he has fever immediately  He is aware of this  He is going to start 1st cycle chemotherapy in October 18, 2018  I will see him again on day 15 of cycle 1 to monitor the toxicity  All the patient questions were answered to his satisfaction                                                                         Subjective:      HPI:  A 27-year-old gentleman who has history of Buerger disease, 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26  He was briefly a smoker until 25years old  He was 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, when he underwent lung cyst surgery  Subsequently, he had difficulty of urination, for which he has been under the care of Dr Lazara Cherry  He had multiple procedure including left ureter placement for hydronephrosis as well as prostate biopsy in March 2018 which showed small amount of adenocarcinoma with [place] score 7  He has not had any treatment for his prostate cancer  He underwent cystoscopy and biopsy of bladder    Bladder biopsy was negative for malignancy  However, repeated prostate biopsy showed high-grade urothelial carcinoma with sarcomatoid feature  Therefore, he was referred to me to discuss systemic therapy  He continued to have some discomfort in the pelvis  He does not see any gross hematuria  He has mild exertional shortness of breath  His weight has been stable  He denied fever, chills or night sweats  His recent creatinine was 1 19  He underwent PET-CT scan which showed highly hypermetabolic prostate with SUV 41 3  There was the rim hypermetabolism in the pelvis which was read as prior abscess  There is no evidence of distant metastasis based on the PET-CT scan  His performance status is probably 1/4 on the ECOG scale            Interval History:  A 66-year-old gentleman who was diagnosed in limited volume of prostate cancer with [place] score 7 in March 2018  He has not had any treatment for this  He was recently diagnosed with high-grade urothelial carcinoma with sarcomatoid feature, based on the prostate biopsy  However, it was not clear if tumor existed in the prostatic urethra or into the prostate gland  Therefore, he underwent rebiopsy in early October 2018 which showed same histology of urothelial carcinoma invading to the prostate gland  Therefore, this is T4 disease  He presents today to discuss neoadjuvant chemotherapy  He continued to have polyuria and some dysuria  He has no hematuria  He has no weight loss  He denied any respiratory symptoms  He has some fatigue  He has no complaint of pain but having some discomfort in the lower abdomen  His performance status is 1/4 on the ECOG scale            Objective:      Primary Diagnosis:     1    High-grade urothelial carcinoma with sarcomatoid feature, presumably T4 disease  Diagnosed in August 2018    2    Localized prostate cancer with Angel score 7, diagnosed in March 2018      Cancer Staging:  Cancer Staging  No matching staging information was found for the patient         Previous Hematologic/ Oncologic Treatment:            Current Hematologic/ Oncologic Treatment:       Neoadjuvant chemotherapy with cisplatin and gemcitabine x3 cycle, 1st cycle to be started in October 18, 2018        Disease Status:      Not evaluated at this time      Test Results:     Pathology:     Prostate biopsy in March 2018 showed adenocarcinoma, Angel score 7      Repeated prostate biopsy in August 1, 2018 showed high-grade urothelial carcinoma with sarcomatoid feature  Re-biopsy in October 2018 showed high-grade urothelial carcinoma in the prostate gland as well as prostatic urethra      Radiology:     PET-CT scan showed no evidence of distant metastasis  Hypermetabolic prostate with SUV 41  In the pelvis, there with rim hypermetabolism which was read as abscess      Laboratory:     See below      Physical Exam:        General Appearance:    Alert, oriented          Eyes:    PERRL   Ears:    Normal external ear canals, both ears   Nose:   Nares normal, septum midline   Throat:   Mucosa moist  Pharynx without injection  Neck:   Supple         Lungs:     Clear to auscultation bilaterally   Chest Wall:    No tenderness or deformity    Heart:    Regular rate and rhythm         Abdomen:     Soft, non-tender, bowel sounds +, no organomegaly               Extremities:   Extremities no cyanosis or edema         Skin:   no rash or icterus  Lymph nodes:   Cervical, supraclavicular, and axillary nodes normal   Neurologic:   CNII-XII intact, normal strength, sensation and reflexes     Throughout             Breast exam:   Not applicable               ROS: Review of Systems   Genitourinary:        Polyuria and dysuria  All other systems reviewed and are negative            Imaging: Ir Tube Check    Result Date: 9/20/2018  Narrative: Nephrostogram and removal of nephrostomy tube under fluoroscopy Indication: Ureteral stricture status post conversion to internal double-J ureteral stent and safety nephrostomy tube  Undergoing capping trial   He experienced a dull back pain during capping trial though stated that he has been experiencing this for a  some time and he has not had any new symptoms over the past week  Procedure and findings: The left nephrostomy catheter was hand injected with contrast in the prone position and fluoroscopic spot images were obtained  There was no immediate drainage internally identified fluoroscopically  The patient was asked to sit  up for about 10 minutes and repeat fluoroscopic spot images were obtained which demonstrated good opacification of the bladder indicative of internal drainage  The renal pelvis is dilated which may be chronic  The nephrostomy tube was transected and removed under direct fluoroscopic visualization without disruption of the double-J  Fluoroscopy time: 2 1 minutes Images: 549 Contrast: 15 cc Omnipaque-300     Impression: Impression: Patent left double-J ureteral stent with confirmed internal drainage  Safety nephrostomy tube was removed  Workstation performed: [id number]     Vas Lower Limb Arterial Duplex, Complete Bilateral    Result Date: 10/5/2018  Narrative:  [facility] REPORT CLINICAL: Indications:  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]  Patient presents with diminished/absent pedal pulses on physical examination  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  Findings from whole body PET CT scan dated 8/20/2018 demonstrated as follows: Rim calcified structure adjacent to the left popliteal artery measures up to 2 5 cm, 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  Recommend further evaluation with Doppler ultrasound  Operative History: Left TMA Risk Factors The patient has history of HTN , remote smoking, and HLD  Clinical Right Pressure:  128/ mm Hg, Left Pressure:  117/ mm Hg    FINDINGS:  Segment                Rig                Left                                          PSV  EDV  AP (cm)  Impression  PSV  EDV AP (cm)  Common Femoral Artery   93   10      1 1               71    9      1 1  Prox Profunda           50    0      0 9               79    0      0 8  Prox SFA                78    0      1 0               72   12      0 8  Mid SFA                 79    0      0 8               53    9      0 6  Dist SFA                59   10      0 7  Occluded      0    0           Proximal Pop            57   16      0 7  Occluded      4    0      1 9  Distal Pop              50   16           Occluded      0    0           Dist Post Tibial        38   13                        28   14           Dist  Ant  Tibial       79   17                        25   12              CONCLUSION: Impression: RIGHT LOWER LIMB: This resting evaluation shows no evidence of significant lower extremity arterial occlusive disease  Findings suggests tibioperoneal disease  Ankle/Brachial index: 1 28, normal range  PVR/ PPG tracings are normal  Metatarsal pressure of 99 mm Hg Great toe pressure of  50 mm Hg, within the healing range  LEFT LOWER LIMB: An occlusion of the distal superficial femoral artery and proximal and distal popliteal artery with a dilatation noted at the level of distal superficial femoral/ above knee popliteal artery measuring approximately 1 90 cm  An anechoic non vascularized fluid collection is noted in the popliteal fossa consistent with a Baker's cyst  Ankle/Brachial index: 0 57, severe claudication range  There is a TMA    SIGNATURE: Electronically Signed by: Florian Godwin on 2018-10-05 06:45:30 PM        Labs:   Lab Results   Component Value Date    WBC 8 11 08/10/2018    HGB 13 8 08/10/2018    HCT 42 3 08/10/2018    MCV 89 08/10/2018     (H) 08/10/2018     Lab Results   Component Value Date     08/22/2018    K 3 9 08/22/2018     08/22/2018    CO2 27 08/22/2018    BUN 13 08/22/2018    CREATININE 1 19 08/22/2018    GLUF 84 08/22/2018    CALCIUM 9 2 08/22/2018    AST 11 08/22/2018    ALT 10 (L) 08/22/2018    ALKPHOS 58 08/22/2018    EGFR 61 08/22/2018         Lab Results   Component Value Date    PSA 0 5 06/22/2018         Current Medications: Reviewed  Allergies: Reviewed  PMH/FH/SH:  Reviewed      Vital Sign:    Body surface area is 2 11 meters squared      Wt Readings from Last 3 Encounters:   10/15/18 95 3 kg (210 lb)   10/09/18 93 9 kg (207 lb)   09/26/18 95 3 kg (210 lb)        Temp Readings from Last 3 Encounters:   10/15/18 100 1 °F (37 8 °C) (Tympanic)   09/27/18 98 °F (36 7 °C) (Temporal)   09/14/18 99 2 °F (37 3 °C) (Tympanic)        BP Readings from Last 3 Encounters:   10/15/18 130/80   10/09/18 124/64   09/27/18 145/79         Pulse Readings from Last 3 Encounters:   10/15/18 (!) 108   09/27/18 65   09/14/18 100     @LASTSAO2(3)@

## 2018-10-15 NOTE — LETTER
October 15, 2018     Jordin Dimas MD  [address] [facility] [id number]    Patient: Robert Kapoor   YOB: 1945   Date of Visit: 10/15/2018       Dear Dr Paxton Hoskins: Thank you for referring Grady Marquez to me for evaluation  Below are my notes for this consultation  If you have questions, please do not hesitate to call me  I look forward to following your patient along with you  Sincerely,        Sangeeta Tucker MD        CC: MD Sangeeta Noland MD  10/15/2018  8:52 AM  Sign at close encounter  Hematology / Oncology Outpatient Follow Up Note    Robert Kapoor 68 y o  male [facility]:[birth year] TYP:[id number]         Date:  10/15/2018    Assessment / Plan:  A 54-year-old gentleman who was diagnosed in limited volume of prostate cancer with [place] score 7 in March 2018  He has not had any treatment for this  He  has 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  Therefore, neoadjuvant chemotherapy is indicated  As we previously discussed, I recommended him to have cisplatin and gemcitabine for 3 cycle followed by cystectomy and radical prostatectomy  Side effects of neoadjuvant chemotherapy was thoroughly discussed, including but not limited to nausea, vomiting, fatigue, renal insufficiency, small chance of neuropathy, neutropenia and risk of infection  He was instructed to have vigorous oral hydration after the cisplatin infusion  He should contact us if he has fever immediately  He is aware of this  He is going to start 1st cycle chemotherapy in October 18, 2018  I will see him again on day 15 of cycle 1 to monitor the toxicity    All the patient questions were answered to his satisfaction                                                                         Subjective:      HPI:  A 54-year-old gentleman who has history of Buerger disease, 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26  He was briefly a smoker until 25years old  He was 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, when he underwent lung cyst surgery  Subsequently, he had difficulty of urination, for which he has been under the care of Dr Barbi Clay  He had multiple procedure including left ureter placement for hydronephrosis as well as prostate biopsy in March 2018 which showed small amount of adenocarcinoma with [place] score 7  He has not had any treatment for his prostate cancer  He underwent cystoscopy and biopsy of bladder  Bladder biopsy was negative for malignancy  However, repeated prostate biopsy showed high-grade urothelial carcinoma with sarcomatoid feature  Therefore, he was referred to me to discuss systemic therapy  He continued to have some discomfort in the pelvis  He does not see any gross hematuria  He has mild exertional shortness of breath  His weight has been stable  He denied fever, chills or night sweats  His recent creatinine was 1 19  He underwent PET-CT scan which showed highly hypermetabolic prostate with SUV 41 3  There was the rim hypermetabolism in the pelvis which was read as prior abscess  There is no evidence of distant metastasis based on the PET-CT scan  His performance status is probably 1/4 on the ECOG scale            Interval History:  A 78-year-old gentleman who was diagnosed in limited volume of prostate cancer with Angel score 7 in March 2018  He has not had any treatment for this  He was recently diagnosed with high-grade urothelial carcinoma with sarcomatoid feature, based on the prostate biopsy  However, it was not clear if tumor existed in the prostatic urethra or into the prostate gland  Therefore, he underwent rebiopsy in early October 2018 which showed same histology of urothelial carcinoma invading to the prostate gland  Therefore, this is T4 disease  He presents today to discuss neoadjuvant chemotherapy  He continued to have polyuria and some dysuria    He has no hematuria  He has no weight loss  He denied any respiratory symptoms  He has some fatigue  He has no complaint of pain but having some discomfort in the lower abdomen  His performance status is 1/4 on the ECOG scale            Objective:      Primary Diagnosis:     1    High-grade urothelial carcinoma with sarcomatoid feature, presumably T4 disease  Diagnosed in August 2018  2    Localized prostate cancer with [place] score 7, diagnosed in March 2018      Cancer Staging:  Cancer Staging  No matching staging information was found for the patient         Previous Hematologic/ Oncologic Treatment:            Current Hematologic/ Oncologic Treatment:       Neoadjuvant chemotherapy with cisplatin and gemcitabine x3 cycle, 1st cycle to be started in October 18, 2018        Disease Status:      Not evaluated at this time      Test Results:     Pathology:     Prostate biopsy in March 2018 showed adenocarcinoma, Angel score 7      Repeated prostate biopsy in August 1, 2018 showed high-grade urothelial carcinoma with sarcomatoid feature  Re-biopsy in October 2018 showed high-grade urothelial carcinoma in the prostate gland as well as prostatic urethra      Radiology:     PET-CT scan showed no evidence of distant metastasis  Hypermetabolic prostate with SUV 41  In the pelvis, there with rim hypermetabolism which was read as abscess      Laboratory:     See below      Physical Exam:        General Appearance:    Alert, oriented          Eyes:    PERRL   Ears:    Normal external ear canals, both ears   Nose:   Nares normal, septum midline   Throat:   Mucosa moist  Pharynx without injection  Neck:   Supple         Lungs:     Clear to auscultation bilaterally   Chest Wall:    No tenderness or deformity    Heart:    Regular rate and rhythm         Abdomen:     Soft, non-tender, bowel sounds +, no organomegaly               Extremities:   Extremities no cyanosis or edema         Skin:   no rash or icterus  Lymph nodes:   Cervical, supraclavicular, and axillary nodes normal   Neurologic:   CNII-XII intact, normal strength, sensation and reflexes     Throughout             Breast exam:   Not applicable               ROS: Review of Systems   Genitourinary:        Polyuria and dysuria  All other systems reviewed and are negative  Imaging: Ir Tube Check    Result Date: 9/20/2018  Narrative: Nephrostogram and removal of nephrostomy tube under fluoroscopy Indication: Ureteral stricture status post conversion to internal double-J ureteral stent and safety nephrostomy tube  Undergoing capping trial   He experienced a dull back pain during capping trial though stated that he has been experiencing this for a  some time and he has not had any new symptoms over the past week  Procedure and findings: The left nephrostomy catheter was hand injected with contrast in the prone position and fluoroscopic spot images were obtained  There was no immediate drainage internally identified fluoroscopically  The patient was asked to sit  up for about 10 minutes and repeat fluoroscopic spot images were obtained which demonstrated good opacification of the bladder indicative of internal drainage  The renal pelvis is dilated which may be chronic  The nephrostomy tube was transected and removed under direct fluoroscopic visualization without disruption of the double-J  Fluoroscopy time: 2 1 minutes Images: 549 Contrast: 15 cc Omnipaque-300     Impression: Impression: Patent left double-J ureteral stent with confirmed internal drainage  Safety nephrostomy tube was removed  Workstation performed: [id number]     Vas Lower Limb Arterial Duplex, Complete Bilateral    Result Date: 10/5/2018  Narrative:  [facility] REPORT CLINICAL: Indications:  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]  Patient presents with diminished/absent pedal pulses on physical examination  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  Findings from whole body PET CT scan dated 8/20/2018 demonstrated as follows: Rim calcified structure adjacent to the left popliteal artery measures up to 2 5 cm, 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  Recommend further evaluation with Doppler ultrasound  Operative History: Left TMA Risk Factors The patient has history of HTN , remote smoking, and HLD  Clinical Right Pressure:  128/ mm Hg, Left Pressure:  117/ mm Hg  FINDINGS:  Segment                Rig                Left                                          PSV  EDV  AP (cm)  Impression  PSV  EDV  AP (cm)  Common Femoral Artery   93   10      1 1               71    9      1 1  Prox Profunda           50    0      0 9               79    0      0 8  Prox SFA                78    0      1 0               72   12      0 8  Mid SFA                 79    0      0 8               53    9      0 6  Dist SFA                59   10      0 7  Occluded      0    0           Proximal Pop            57   16      0 7  Occluded      4    0      1 9  Distal Pop              50   16           Occluded      0    0           Dist Post Tibial        38   13                        28   14           Dist  Ant  Tibial       79   17                        25   12              CONCLUSION: Impression: RIGHT LOWER LIMB: This resting evaluation shows no evidence of significant lower extremity arterial occlusive disease  Findings suggests tibioperoneal disease  Ankle/Brachial index: 1 28, normal range  PVR/ PPG tracings are normal  Metatarsal pressure of 99 mm Hg Great toe pressure of  50 mm Hg, within the healing range  LEFT LOWER LIMB: An occlusion of the distal superficial femoral artery and proximal and distal popliteal artery with a dilatation noted at the level of distal superficial femoral/ above knee popliteal artery measuring approximately 1 90 cm   An anechoic non vascularized fluid collection is noted in the popliteal fossa consistent with a Baker's cyst  Ankle/Brachial index: 0 57, severe claudication range  There is a TMA  SIGNATURE: Electronically Signed by: Orin Wallace on 2018-10-05 06:45:30 PM        Labs:   Lab Results   Component Value Date    WBC 8 11 08/10/2018    HGB 13 8 08/10/2018    HCT 42 3 08/10/2018    MCV 89 08/10/2018     (H) 08/10/2018     Lab Results   Component Value Date     08/22/2018    K 3 9 08/22/2018     08/22/2018    CO2 27 08/22/2018    BUN 13 08/22/2018    CREATININE 1 19 08/22/2018    GLUF 84 08/22/2018    CALCIUM 9 2 08/22/2018    AST 11 08/22/2018    ALT 10 (L) 08/22/2018    ALKPHOS 58 08/22/2018    EGFR 61 08/22/2018         Lab Results   Component Value Date    PSA 0 5 06/22/2018         Current Medications: Reviewed  Allergies: Reviewed  PMH/FH/SH:  Reviewed      Vital Sign:    Body surface area is 2 11 meters squared      Wt Readings from Last 3 Encounters:   10/15/18 95 3 kg (210 lb)   10/09/18 93 9 kg (207 lb)   09/26/18 95 3 kg (210 lb)        Temp Readings from Last 3 Encounters:   10/15/18 100 1 °F (37 8 °C) (Tympanic)   09/27/18 98 °F (36 7 °C) (Temporal)   09/14/18 99 2 °F (37 3 °C) (Tympanic)        BP Readings from Last 3 Encounters:   10/15/18 130/80   10/09/18 124/64   09/27/18 145/79         Pulse Readings from Last 3 Encounters:   10/15/18 (!) 108   09/27/18 65   09/14/18 100     @LASTSAO2(3)@

## 2018-10-16 ENCOUNTER — APPOINTMENT (OUTPATIENT)
Dept: LAB | Facility: MEDICAL CENTER | Age: 73
End: 2018-10-16
Payer: MEDICARE

## 2018-10-16 ENCOUNTER — TELEPHONE (OUTPATIENT)
Dept: UROLOGY | Facility: MEDICAL CENTER | Age: 73
End: 2018-10-16

## 2018-10-16 DIAGNOSIS — C68.9 UROTHELIAL CARCINOMA (HCC): ICD-10-CM

## 2018-10-16 LAB
ALBUMIN SERPL BCP-MCNC: 2.3 G/DL (ref 3.5–5)
ALP SERPL-CCNC: 63 U/L (ref 46–116)
ALT SERPL W P-5'-P-CCNC: 10 U/L (ref 12–78)
ANION GAP SERPL CALCULATED.3IONS-SCNC: 8 MMOL/L (ref 4–13)
AST SERPL W P-5'-P-CCNC: 11 U/L (ref 5–45)
BASOPHILS # BLD AUTO: 0.05 THOUSANDS/ΜL (ref 0–0.1)
BASOPHILS NFR BLD AUTO: 1 % (ref 0–1)
BILIRUB SERPL-MCNC: 0.47 MG/DL (ref 0.2–1)
BUN SERPL-MCNC: 12 MG/DL (ref 5–25)
CALCIUM SERPL-MCNC: 9.3 MG/DL (ref 8.3–10.1)
CHLORIDE SERPL-SCNC: 101 MMOL/L (ref 100–108)
CO2 SERPL-SCNC: 26 MMOL/L (ref 21–32)
CREAT SERPL-MCNC: 1.22 MG/DL (ref 0.6–1.3)
EOSINOPHIL # BLD AUTO: 0.16 THOUSAND/ΜL (ref 0–0.61)
EOSINOPHIL NFR BLD AUTO: 2 % (ref 0–6)
ERYTHROCYTE [DISTWIDTH] IN BLOOD BY AUTOMATED COUNT: 13.5 % (ref 11.6–15.1)
GFR SERPL CREATININE-BSD FRML MDRD: 58 ML/MIN/1.73SQ M
GLUCOSE SERPL-MCNC: 87 MG/DL (ref 65–140)
HCT VFR BLD AUTO: 40.1 % (ref 36.5–49.3)
HGB BLD-MCNC: 12.7 G/DL (ref 12–17)
IMM GRANULOCYTES # BLD AUTO: 0.14 THOUSAND/UL (ref 0–0.2)
IMM GRANULOCYTES NFR BLD AUTO: 2 % (ref 0–2)
LYMPHOCYTES # BLD AUTO: 0.98 THOUSANDS/ΜL (ref 0.6–4.47)
LYMPHOCYTES NFR BLD AUTO: 11 % (ref 14–44)
MCH RBC QN AUTO: 28.7 PG (ref 26.8–34.3)
MCHC RBC AUTO-ENTMCNC: 31.7 G/DL (ref 31.4–37.4)
MCV RBC AUTO: 91 FL (ref 82–98)
MONOCYTES # BLD AUTO: 1.19 THOUSAND/ΜL (ref 0.17–1.22)
MONOCYTES NFR BLD AUTO: 13 % (ref 4–12)
NEUTROPHILS # BLD AUTO: 6.37 THOUSANDS/ΜL (ref 1.85–7.62)
NEUTS SEG NFR BLD AUTO: 71 % (ref 43–75)
NRBC BLD AUTO-RTO: 0 /100 WBCS
PLATELET # BLD AUTO: 447 THOUSANDS/UL (ref 149–390)
PMV BLD AUTO: 9.1 FL (ref 8.9–12.7)
POTASSIUM SERPL-SCNC: 4 MMOL/L (ref 3.5–5.3)
PROT SERPL-MCNC: 7.4 G/DL (ref 6.4–8.2)
RBC # BLD AUTO: 4.43 MILLION/UL (ref 3.88–5.62)
SODIUM SERPL-SCNC: 135 MMOL/L (ref 136–145)
WBC # BLD AUTO: 8.89 THOUSAND/UL (ref 4.31–10.16)

## 2018-10-16 PROCEDURE — 36415 COLL VENOUS BLD VENIPUNCTURE: CPT

## 2018-10-16 PROCEDURE — 80053 COMPREHEN METABOLIC PANEL: CPT

## 2018-10-16 PROCEDURE — 85025 COMPLETE CBC W/AUTO DIFF WBC: CPT

## 2018-10-16 NOTE — TELEPHONE ENCOUNTER
Spoke with Venecia Sutton who said pt is constantly cold and wakes up wet from night sweats  He is afebrile  Supposed to start chemotherapy tomorrow  Sending to Dr Barbi Clay for orders

## 2018-10-17 ENCOUNTER — HOSPITAL ENCOUNTER (OUTPATIENT)
Dept: INFUSION CENTER | Facility: CLINIC | Age: 73
Discharge: HOME | End: 2018-10-17
Payer: MEDICARE

## 2018-10-17 VITALS
BODY MASS INDEX: 31.22 KG/M2 | DIASTOLIC BLOOD PRESSURE: 76 MMHG | HEART RATE: 104 BPM | RESPIRATION RATE: 16 BRPM | TEMPERATURE: 98.9 F | WEIGHT: 210.76 LBS | SYSTOLIC BLOOD PRESSURE: 115 MMHG | HEIGHT: 69 IN

## 2018-10-17 PROCEDURE — 96361 HYDRATE IV INFUSION ADD-ON: CPT

## 2018-10-17 PROCEDURE — 96413 CHEMO IV INFUSION 1 HR: CPT

## 2018-10-17 PROCEDURE — 96367 TX/PROPH/DG ADDL SEQ IV INF: CPT

## 2018-10-17 PROCEDURE — 96375 TX/PRO/DX INJ NEW DRUG ADDON: CPT

## 2018-10-17 PROCEDURE — 96417 CHEMO IV INFUS EACH ADDL SEQ: CPT

## 2018-10-17 RX ADMIN — SODIUM CHLORIDE 150 MG: 0.9 INJECTION, SOLUTION INTRAVENOUS at 10:08

## 2018-10-17 RX ADMIN — SODIUM CHLORIDE 20 ML/HR: 0.9 INJECTION, SOLUTION INTRAVENOUS at 08:40

## 2018-10-17 RX ADMIN — SODIUM CHLORIDE 2000 MG: 0.9 INJECTION, SOLUTION INTRAVENOUS at 10:55

## 2018-10-17 RX ADMIN — DEXAMETHASONE SODIUM PHOSPHATE: 10 INJECTION, SOLUTION INTRAMUSCULAR; INTRAVENOUS at 09:50

## 2018-10-17 RX ADMIN — SODIUM CHLORIDE 1000 ML: 0.9 INJECTION, SOLUTION INTRAVENOUS at 12:40

## 2018-10-17 RX ADMIN — CISPLATIN 125 MG: 1 INJECTION INTRAVENOUS at 11:34

## 2018-10-17 RX ADMIN — SODIUM CHLORIDE 1000 ML: 0.9 INJECTION, SOLUTION INTRAVENOUS at 08:45

## 2018-10-17 NOTE — PROGRESS NOTES
Pt  Tolerated Gemzar, Cisplatin and Hydration as ordered  Post Infusion instructions given  Pt  And significant other, Jean-Claude Bacon verbalized understanding of instructions given  Written instructions provided per AVS   Future appointments reviewed including labs required prior to next infusion

## 2018-10-17 NOTE — PLAN OF CARE
Problem: PAIN - ADULT  Goal: Verbalizes/displays adequate comfort level or baseline comfort level  Interventions:  - Encourage patient to monitor pain and request assistance  - Assess pain using appropriate pain scale  - Administer analgesics based on type and severity of pain and evaluate response  - Implement non-pharmacological measures as appropriate and evaluate response  - Consider cultural and social influences on pain and pain management  - Notify physician/advanced practitioner if interventions unsuccessful or patient reports new pain  Outcome: Progressing      Problem: INFECTION - ADULT  Goal: Absence or prevention of progression during hospitalization  INTERVENTIONS:  - Assess and monitor for signs and symptoms of infection  - Monitor lab/diagnostic results  - Monitor all insertion sites, i e  indwelling lines, tubes, and drains  - Monitor endotracheal (as able) and nasal secretions for changes in amount and color  - [place] appropriate cooling/warming therapies per order  - Administer medications as ordered  - Instruct and encourage patient and family to use good hand hygiene technique  - Identify and instruct in appropriate isolation precautions for identified infection/condition  Outcome: Progressing    Goal: Absence of fever/infection during neutropenic period  INTERVENTIONS:  - Monitor WBC  - Implement neutropenic guidelines  Outcome: Progressing      Problem: Knowledge Deficit  Goal: Patient/family/caregiver demonstrates understanding of disease process, treatment plan, medications, and discharge instructions  Complete learning assessment and assess knowledge base    Interventions:  - Provide teaching at level of understanding  - Provide teaching via preferred learning methods  Outcome: Progressing

## 2018-10-23 ENCOUNTER — APPOINTMENT (OUTPATIENT)
Dept: LAB | Facility: MEDICAL CENTER | Age: 73
End: 2018-10-23
Payer: MEDICARE

## 2018-10-23 DIAGNOSIS — C68.9 UROTHELIAL CARCINOMA (HCC): ICD-10-CM

## 2018-10-23 LAB
ALBUMIN SERPL BCP-MCNC: 2.5 G/DL (ref 3.5–5)
ALP SERPL-CCNC: 58 U/L (ref 46–116)
ALT SERPL W P-5'-P-CCNC: 19 U/L (ref 12–78)
ANION GAP SERPL CALCULATED.3IONS-SCNC: 7 MMOL/L (ref 4–13)
AST SERPL W P-5'-P-CCNC: 17 U/L (ref 5–45)
BASOPHILS # BLD AUTO: 0.03 THOUSANDS/ΜL (ref 0–0.1)
BASOPHILS NFR BLD AUTO: 1 % (ref 0–1)
BILIRUB SERPL-MCNC: 0.4 MG/DL (ref 0.2–1)
BUN SERPL-MCNC: 17 MG/DL (ref 5–25)
CALCIUM SERPL-MCNC: 8.8 MG/DL (ref 8.3–10.1)
CHLORIDE SERPL-SCNC: 100 MMOL/L (ref 100–108)
CO2 SERPL-SCNC: 28 MMOL/L (ref 21–32)
CREAT SERPL-MCNC: 1.79 MG/DL (ref 0.6–1.3)
EOSINOPHIL # BLD AUTO: 0.15 THOUSAND/ΜL (ref 0–0.61)
EOSINOPHIL NFR BLD AUTO: 5 % (ref 0–6)
ERYTHROCYTE [DISTWIDTH] IN BLOOD BY AUTOMATED COUNT: 13 % (ref 11.6–15.1)
GFR SERPL CREATININE-BSD FRML MDRD: 37 ML/MIN/1.73SQ M
GLUCOSE SERPL-MCNC: 122 MG/DL (ref 65–140)
HCT VFR BLD AUTO: 38.8 % (ref 36.5–49.3)
HGB BLD-MCNC: 12.5 G/DL (ref 12–17)
IMM GRANULOCYTES # BLD AUTO: 0.02 THOUSAND/UL (ref 0–0.2)
IMM GRANULOCYTES NFR BLD AUTO: 1 % (ref 0–2)
LYMPHOCYTES # BLD AUTO: 1.29 THOUSANDS/ΜL (ref 0.6–4.47)
LYMPHOCYTES NFR BLD AUTO: 40 % (ref 14–44)
MCH RBC QN AUTO: 28.7 PG (ref 26.8–34.3)
MCHC RBC AUTO-ENTMCNC: 32.2 G/DL (ref 31.4–37.4)
MCV RBC AUTO: 89 FL (ref 82–98)
MONOCYTES # BLD AUTO: 0.17 THOUSAND/ΜL (ref 0.17–1.22)
MONOCYTES NFR BLD AUTO: 5 % (ref 4–12)
NEUTROPHILS # BLD AUTO: 1.57 THOUSANDS/ΜL (ref 1.85–7.62)
NEUTS SEG NFR BLD AUTO: 48 % (ref 43–75)
NRBC BLD AUTO-RTO: 0 /100 WBCS
PLATELET # BLD AUTO: 422 THOUSANDS/UL (ref 149–390)
PMV BLD AUTO: 8.7 FL (ref 8.9–12.7)
POTASSIUM SERPL-SCNC: 3.1 MMOL/L (ref 3.5–5.3)
PROT SERPL-MCNC: 7.1 G/DL (ref 6.4–8.2)
RBC # BLD AUTO: 4.35 MILLION/UL (ref 3.88–5.62)
SODIUM SERPL-SCNC: 135 MMOL/L (ref 136–145)
WBC # BLD AUTO: 3.23 THOUSAND/UL (ref 4.31–10.16)

## 2018-10-23 PROCEDURE — 80053 COMPREHEN METABOLIC PANEL: CPT

## 2018-10-23 PROCEDURE — 36415 COLL VENOUS BLD VENIPUNCTURE: CPT

## 2018-10-23 PROCEDURE — 85025 COMPLETE CBC W/AUTO DIFF WBC: CPT

## 2018-10-23 RX ORDER — SODIUM CHLORIDE 9 MG/ML
20 INJECTION, SOLUTION INTRAVENOUS CONTINUOUS
Status: DISCONTINUED | OUTPATIENT
Start: 2018-10-24 | End: 2018-10-27 | Stop reason: HOSPADM

## 2018-10-24 ENCOUNTER — HOSPITAL ENCOUNTER (OUTPATIENT)
Dept: INFUSION CENTER | Facility: CLINIC | Age: 73
Discharge: HOME | End: 2018-10-24
Payer: MEDICARE

## 2018-10-24 VITALS
HEART RATE: 96 BPM | SYSTOLIC BLOOD PRESSURE: 139 MMHG | TEMPERATURE: 99 F | WEIGHT: 198.85 LBS | RESPIRATION RATE: 18 BRPM | DIASTOLIC BLOOD PRESSURE: 78 MMHG | BODY MASS INDEX: 29.45 KG/M2

## 2018-10-24 DIAGNOSIS — R11.0 NAUSEA: Primary | ICD-10-CM

## 2018-10-24 PROCEDURE — 96367 TX/PROPH/DG ADDL SEQ IV INF: CPT

## 2018-10-24 PROCEDURE — 96413 CHEMO IV INFUSION 1 HR: CPT

## 2018-10-24 RX ORDER — ONDANSETRON HYDROCHLORIDE 8 MG/1
8 TABLET, FILM COATED ORAL EVERY 8 HOURS PRN
Qty: 90 TABLET | Refills: 2 | Status: SHIPPED | OUTPATIENT
Start: 2018-10-24 | End: 2019-02-13

## 2018-10-24 RX ADMIN — SODIUM CHLORIDE 20 ML/HR: 0.9 INJECTION, SOLUTION INTRAVENOUS at 11:09

## 2018-10-24 RX ADMIN — ONDANSETRON 8 MG: 2 INJECTION INTRAMUSCULAR; INTRAVENOUS at 11:08

## 2018-10-24 RX ADMIN — GEMCITABINE 2000 MG: 38 INJECTION INTRAVENOUS at 11:29

## 2018-10-24 NOTE — PLAN OF CARE
Problem: Potential for Falls  Goal: Patient will remain free of falls  INTERVENTIONS:  - Assess patient frequently for physical needs  -  Identify cognitive and physical deficits and behaviors that affect risk of falls    -  [place] fall precautions as indicated by assessment   - Educate patient/family on patient safety including physical limitations  - Instruct patient to call for assistance with activity based on assessment  - Modify environment to reduce risk of injury  - Consider OT/PT consult to assist with strengthening/mobility   Outcome: Progressing

## 2018-10-24 NOTE — PROGRESS NOTES
Pt arrived to unit with multiple symptoms to report  Pt states that he has been having significant nausea over the past week, unrelieved with prescribed Reglan  Pt therefore has had limited intake of food and/or fluids  Pt denies vomiting  Pt also reports occasional episodes of diarrhea (3-4X in any given day) for approx 3 of the past 7 days  Pt has not taken any meds for diarrhea  Pt denies diarrhea yesterday or today  Pt has not reported any of these symptoms to MD  Additionally, pt has had  5 4kg weight loss in past week  Pt's serum creat=1 79, serum Lt=628, serum K=3 1  All of pt's complaints and lab results have been reported to Dr Carlos Eduardo Higuera via [facility]  New prescription for Zofran has been called into pt's pharmacy  Dr Carlos Eduardo Higuera has instructed that pt increase intake of fluids  No additional treatments ordered for today  Pt given literature re: foods high in K  Pt given education re: proper usage of prescribe antinauseants  Pt encouraged to drink at least 3-4 quarts of fluids/day  Pt encouraged to notify Dr Carlos Eduardo Higuera if nausea persists even after taking newly prescribed med  and if he continues to be unable to push po fluids  Pt and daughter verbalized understanding of all education  Pt's CBC reviewed and results within approved parameters for chemotherapy today  Pt's chemo dose recalculated in light of weight loss and dose remains within 10% of ordered dose

## 2018-10-26 ENCOUNTER — TELEPHONE (OUTPATIENT)
Dept: HEMATOLOGY ONCOLOGY | Facility: CLINIC | Age: 73
End: 2018-10-26

## 2018-10-26 NOTE — TELEPHONE ENCOUNTER
Spouse calling-Per Dr Carl Mtz use Skelaxin 800mg sparingly  Pt has an appt on Oct  29, 2018 w/Dr Alpa Mtz  Spouse notes medication Rx'd by PCP-Dr Jasper lawson

## 2018-10-29 ENCOUNTER — APPOINTMENT (OUTPATIENT)
Dept: LAB | Facility: MEDICAL CENTER | Age: 73
End: 2018-10-29
Payer: MEDICARE

## 2018-10-29 ENCOUNTER — OFFICE VISIT (OUTPATIENT)
Dept: HEMATOLOGY ONCOLOGY | Facility: CLINIC | Age: 73
End: 2018-10-29
Payer: MEDICARE

## 2018-10-29 ENCOUNTER — TRANSCRIBE ORDERS (OUTPATIENT)
Dept: ADMINISTRATIVE | Facility: HOSPITAL | Age: 73
End: 2018-10-29

## 2018-10-29 VITALS
BODY MASS INDEX: 30.07 KG/M2 | TEMPERATURE: 97.7 F | HEIGHT: 69 IN | DIASTOLIC BLOOD PRESSURE: 72 MMHG | WEIGHT: 203 LBS | RESPIRATION RATE: 18 BRPM | SYSTOLIC BLOOD PRESSURE: 122 MMHG | HEART RATE: 101 BPM | OXYGEN SATURATION: 95 %

## 2018-10-29 DIAGNOSIS — C68.9 MALIGNANT NEOPLASM OF URINARY ORGAN (HCC): ICD-10-CM

## 2018-10-29 DIAGNOSIS — C68.9 UROTHELIAL CARCINOMA (HCC): Primary | ICD-10-CM

## 2018-10-29 DIAGNOSIS — C68.9 MALIGNANT NEOPLASM OF URINARY ORGAN (HCC): Primary | ICD-10-CM

## 2018-10-29 DIAGNOSIS — C61 PROSTATE CANCER (HCC): ICD-10-CM

## 2018-10-29 LAB
ALBUMIN SERPL BCP-MCNC: 2.6 G/DL (ref 3.5–5)
ALP SERPL-CCNC: 59 U/L (ref 46–116)
ALT SERPL W P-5'-P-CCNC: 14 U/L (ref 12–78)
ANION GAP SERPL CALCULATED.3IONS-SCNC: 6 MMOL/L (ref 4–13)
AST SERPL W P-5'-P-CCNC: 12 U/L (ref 5–45)
BASOPHILS # BLD AUTO: 0.02 THOUSANDS/ΜL (ref 0–0.1)
BASOPHILS NFR BLD AUTO: 1 % (ref 0–1)
BILIRUB SERPL-MCNC: 0.39 MG/DL (ref 0.2–1)
BUN SERPL-MCNC: 22 MG/DL (ref 5–25)
CALCIUM SERPL-MCNC: 9 MG/DL (ref 8.3–10.1)
CHLORIDE SERPL-SCNC: 96 MMOL/L (ref 100–108)
CO2 SERPL-SCNC: 29 MMOL/L (ref 21–32)
CREAT SERPL-MCNC: 1.47 MG/DL (ref 0.6–1.3)
EOSINOPHIL # BLD AUTO: 0.03 THOUSAND/ΜL (ref 0–0.61)
EOSINOPHIL NFR BLD AUTO: 1 % (ref 0–6)
ERYTHROCYTE [DISTWIDTH] IN BLOOD BY AUTOMATED COUNT: 12.6 % (ref 11.6–15.1)
GFR SERPL CREATININE-BSD FRML MDRD: 47 ML/MIN/1.73SQ M
GLUCOSE SERPL-MCNC: 104 MG/DL (ref 65–140)
HCT VFR BLD AUTO: 31.7 % (ref 36.5–49.3)
HGB BLD-MCNC: 10 G/DL (ref 12–17)
IMM GRANULOCYTES # BLD AUTO: 0.01 THOUSAND/UL (ref 0–0.2)
IMM GRANULOCYTES NFR BLD AUTO: 0 % (ref 0–2)
LYMPHOCYTES # BLD AUTO: 0.67 THOUSANDS/ΜL (ref 0.6–4.47)
LYMPHOCYTES NFR BLD AUTO: 22 % (ref 14–44)
MCH RBC QN AUTO: 27.9 PG (ref 26.8–34.3)
MCHC RBC AUTO-ENTMCNC: 31.5 G/DL (ref 31.4–37.4)
MCV RBC AUTO: 89 FL (ref 82–98)
MONOCYTES # BLD AUTO: 0.04 THOUSAND/ΜL (ref 0.17–1.22)
MONOCYTES NFR BLD AUTO: 1 % (ref 4–12)
NEUTROPHILS # BLD AUTO: 2.35 THOUSANDS/ΜL (ref 1.85–7.62)
NEUTS SEG NFR BLD AUTO: 75 % (ref 43–75)
NRBC BLD AUTO-RTO: 0 /100 WBCS
PLATELET # BLD AUTO: 162 THOUSANDS/UL (ref 149–390)
PMV BLD AUTO: 8.4 FL (ref 8.9–12.7)
POTASSIUM SERPL-SCNC: 3.9 MMOL/L (ref 3.5–5.3)
PROT SERPL-MCNC: 7.1 G/DL (ref 6.4–8.2)
RBC # BLD AUTO: 3.58 MILLION/UL (ref 3.88–5.62)
SODIUM SERPL-SCNC: 131 MMOL/L (ref 136–145)
WBC # BLD AUTO: 3.12 THOUSAND/UL (ref 4.31–10.16)

## 2018-10-29 PROCEDURE — 36415 COLL VENOUS BLD VENIPUNCTURE: CPT

## 2018-10-29 PROCEDURE — 99214 OFFICE O/P EST MOD 30 MIN: CPT | Performed by: INTERNAL MEDICINE

## 2018-10-29 PROCEDURE — 85025 COMPLETE CBC W/AUTO DIFF WBC: CPT

## 2018-10-29 PROCEDURE — 80053 COMPREHEN METABOLIC PANEL: CPT

## 2018-10-29 NOTE — PROGRESS NOTES
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  His currently on neoadjuvant chemotherapy with cisplatin and gemcitabine  He is tolerating treatment well  He is going to have day 15 of gemcitabine, assuming that he had adequate ANC in 2 days  His 2nd cycle will start on November 14, 2018  I recommended him to continue with current chemotherapy  I will see him again in 4 weeks for routine follow-up  I instructed him to have vigorous oral hydration for at least 3-4 days after the cisplatin infusion  He is in agreement with my recommendations         Subjective:      HPI:  A 26-year-old gentleman who has history of Buerger disease, 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32  [facility] was briefly a smoker until 25years old  [facility] was 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, when he underwent lung cyst surgery   Subsequently, he had difficulty of urination, for which he has been under the care of Dr Jason Cost had multiple procedure including left ureter placement for hydronephrosis as well as prostate biopsy in March 2018 which showed small amount of adenocarcinoma with Many score 7   He has not had any treatment for his prostate cancer   He underwent cystoscopy and biopsy of bladder   Bladder biopsy was negative for malignancy   However, repeated prostate biopsy showed high-grade urothelial carcinoma with sarcomatoid feature   Therefore, he was referred to me to discuss systemic therapy  [facility] continued to have some discomfort in the pelvis   He does not see any gross hematuria   He has mild exertional shortness of breath   His weight has been stable   He denied fever, chills or night sweats   His recent creatinine was 1  23   He underwent PET-CT scan which showed highly hypermetabolic prostate with SUV 41 3   There was the rim hypermetabolism in the pelvis which was read as prior abscess  Ruthell Shows is no evidence of distant metastasis based on the PET-CT scan   His performance status is probably 1/4 on the ECOG scale            Interval History:  A 70-year-old gentleman who was diagnosed in limited volume of prostate cancer with Montgomery score 7 in March 2018  Mony Norman has not had any treatment for this  Mony Norman was recently diagnosed with high-grade urothelial carcinoma with sarcomatoid feature, based on the prostate biopsy  This was T4 disease with prostate invasion  Therefore, he started neoadjuvant chemotherapy with cisplatin and gemcitabine  This is day 13 of 4 cycle  He tolerated treatment very well  He had very minimal nausea without any vomiting  However, he was somewhat anorexic  He also noticed some fatigue  He has no fever  He denied any mouth sore or diarrhea  His performance status is unchanged with 1/4 on the ECOG scale                                               Objective:      Primary Diagnosis:     1    High-grade urothelial carcinoma with sarcomatoid feature, presumably T4 disease   Diagnosed in August 2018  2    Localized prostate cancer with Montgomery score 7, diagnosed in March 2018      Cancer Staging:  Cancer Staging  No matching staging information was found for the patient         Previous Hematologic/ Oncologic Treatment:            Current Hematologic/ Oncologic Treatment:       Neoadjuvant chemotherapy with cisplatin and gemcitabine x3 cycle     This is day 13 of cycle 1         Disease Status:      Not evaluated at this time      Test Results:     Pathology:     Prostate biopsy in March 2018 showed adenocarcinoma, Montgomery score 7      Repeated prostate biopsy in August 1, 2018 showed high-grade urothelial carcinoma with sarcomatoid feature      Re-biopsy in October 2018 showed high-grade urothelial carcinoma in the prostate gland as well as prostatic urethra      Radiology:     PET-CT scan showed no evidence of distant metastasis   Hypermetabolic prostate with SUV 41   In the pelvis, there with rim hypermetabolism which was read as abscess      Laboratory:     See below      Physical Exam:        General Appearance:    Alert, oriented          Eyes:    PERRL   Ears:    Normal external ear canals, both ears   Nose:   Nares normal, septum midline   Throat:   Mucosa moist  Pharynx without injection  Neck:   Supple         Lungs:     Clear to auscultation bilaterally   Chest Wall:    No tenderness or deformity    Heart:    Regular rate and rhythm         Abdomen:     Soft, non-tender, bowel sounds +, no organomegaly               Extremities:   Extremities no cyanosis or edema         Skin:   no rash or icterus  Lymph nodes:   Cervical, supraclavicular, and axillary nodes normal   Neurologic:   CNII-XII intact, normal strength, sensation and reflexes     Throughout             Breast exam:   Not applicable               ROS: Review of Systems   Constitutional:        Fatigue   All other systems reviewed and are negative  Imaging: Vas Lower Limb Arterial Duplex, Complete Bilateral    Result Date: 10/5/2018  Narrative:  [facility] REPORT CLINICAL: Indications:  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]  Patient presents with diminished/absent pedal pulses on physical examination  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  Findings from whole body PET CT scan dated 8/20/2018 demonstrated as follows: Rim calcified structure adjacent to the left popliteal artery measures up to 2 5 cm, 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  Recommend further evaluation with Doppler ultrasound  Operative History: Left TMA Risk Factors The patient has history of HTN , remote smoking, and HLD   Clinical Right Pressure:  128/ mm Hg, Left Pressure:  117/ mm Hg  FINDINGS:  Segment                Rig                Left                                          PSV  EDV  AP (cm)  Impression  PSV  EDV  AP (cm)  Common Femoral Artery   93   10      1 1               71    9      1 1  Prox Profunda           50    0      0 9               79    0      0 8  Prox SFA                78    0      1 0               72   12      0 8  Mid SFA                 79    0      0 8               53    9      0 6  Dist SFA                59   10      0 7  Occluded      0    0           Proximal Pop            57   16      0 7  Occluded      4    0      1 9  Distal Pop              50   16           Occluded      0    0           Dist Post Tibial        38   13                        28   14           Dist  Ant  Tibial       79   17                        25   12              CONCLUSION: Impression: RIGHT LOWER LIMB: This resting evaluation shows no evidence of significant lower extremity arterial occlusive disease  Findings suggests tibioperoneal disease  Ankle/Brachial index: 1 28, normal range  PVR/ PPG tracings are normal  Metatarsal pressure of 99 mm Hg Great toe pressure of  50 mm Hg, within the healing range  LEFT LOWER LIMB: An occlusion of the distal superficial femoral artery and proximal and distal popliteal artery with a dilatation noted at the level of distal superficial femoral/ above knee popliteal artery measuring approximately 1 90 cm  An anechoic non vascularized fluid collection is noted in the popliteal fossa consistent with a Baker's cyst  Ankle/Brachial index: 0 57, severe claudication range  There is a TMA    SIGNATURE: Electronically Signed by: Karon Mejias on 2018-10-05 06:45:30 PM        Labs:   Lab Results   Component Value Date    WBC 3 23 (L) 10/23/2018    HGB 12 5 10/23/2018    HCT 38 8 10/23/2018    MCV 89 10/23/2018     (H) 10/23/2018     Lab Results   Component Value Date     (L) 10/23/2018    K 3 1 (L) 10/23/2018     10/23/2018    CO2 28 10/23/2018    BUN 17 10/23/2018    CREATININE 1 79 (H) 10/23/2018    GLUF 84 08/22/2018    CALCIUM 8 8 10/23/2018    AST 17 10/23/2018    ALT 19 10/23/2018    ALKPHOS 58 10/23/2018    EGFR 37 10/23/2018         Lab Results   Component Value Date    PSA 0 5 06/22/2018         Current Medications: Reviewed  Allergies: Reviewed  PMH/FH/SH:  Reviewed      Vital Sign:    Body surface area is 2 08 meters squared      Wt Readings from Last 3 Encounters:   10/29/18 92 1 kg (203 lb)   10/24/18 90 2 kg (198 lb 13 7 oz)   10/17/18 95 6 kg (210 lb 12 2 oz)        Temp Readings from Last 3 Encounters:   10/29/18 97 7 °F (36 5 °C) (Tympanic)   10/24/18 99 °F (37 2 °C) (Tympanic)   10/17/18 98 9 °F (37 2 °C) (Tympanic)        BP Readings from Last 3 Encounters:   10/29/18 122/72   10/24/18 139/78   10/17/18 115/76         Pulse Readings from Last 3 Encounters:   10/29/18 101   10/24/18 96   10/17/18 104     @LASTSAO2(3)@

## 2018-10-30 RX ORDER — SODIUM CHLORIDE 9 MG/ML
20 INJECTION, SOLUTION INTRAVENOUS CONTINUOUS
Status: DISCONTINUED | OUTPATIENT
Start: 2018-10-31 | End: 2018-11-03 | Stop reason: HOSPADM

## 2018-10-31 ENCOUNTER — HOSPITAL ENCOUNTER (OUTPATIENT)
Dept: INFUSION CENTER | Facility: CLINIC | Age: 73
Discharge: HOME | End: 2018-10-31
Payer: MEDICARE

## 2018-10-31 VITALS
RESPIRATION RATE: 20 BRPM | HEART RATE: 98 BPM | BODY MASS INDEX: 30.38 KG/M2 | DIASTOLIC BLOOD PRESSURE: 75 MMHG | WEIGHT: 205.14 LBS | TEMPERATURE: 99 F | HEIGHT: 69 IN | SYSTOLIC BLOOD PRESSURE: 131 MMHG

## 2018-10-31 PROCEDURE — 96413 CHEMO IV INFUSION 1 HR: CPT

## 2018-10-31 PROCEDURE — 96367 TX/PROPH/DG ADDL SEQ IV INF: CPT

## 2018-10-31 RX ADMIN — SODIUM CHLORIDE 20 ML/HR: 0.9 INJECTION, SOLUTION INTRAVENOUS at 08:39

## 2018-10-31 RX ADMIN — GEMCITABINE 2000 MG: 38 INJECTION INTRAVENOUS at 09:26

## 2018-10-31 RX ADMIN — ONDANSETRON 8 MG: 2 INJECTION INTRAMUSCULAR; INTRAVENOUS at 08:39

## 2018-10-31 NOTE — PLAN OF CARE
Problem: Potential for Falls  Goal: Patient will remain free of falls  INTERVENTIONS:  - Assess patient frequently for physical needs  -  Identify cognitive and physical deficits and behaviors that affect risk of falls    -  [place] fall precautions as indicated by assessment   - Educate patient/family on patient safety including physical limitations  - Instruct patient to call for assistance with activity based on assessment  - Modify environment to reduce risk of injury  - Consider OT/PT consult to assist with strengthening/mobility   Outcome: Progressing

## 2018-10-31 NOTE — PROGRESS NOTES
Pt's only complaint today is that he has a "pinched nerve" in his neck  Pt states he was given muscle relaxers by his family doctor  Pt states that Dr Jed Dickinson office is aware of this and OK'd the muscle relaxers while he is getting treatment  Pt states since switching to the zofran his nausea is better  Pt states the diarrhea has also resolved

## 2018-11-02 ENCOUNTER — TELEPHONE (OUTPATIENT)
Dept: HEMATOLOGY ONCOLOGY | Facility: CLINIC | Age: 73
End: 2018-11-02

## 2018-11-02 NOTE — TELEPHONE ENCOUNTER
Received call regarding swollen ankles  Pt has swelling to bilateral feet, per caller  No shortness of breath, productive cough or weight gain reported  I stated to have the patient elevate feet to see if that helps  I stated if at all he develops any SOB, cough or weight gain to take him directly to the emergency department      Verbalized understanding

## 2018-11-07 DIAGNOSIS — R11.0 NAUSEA: Primary | ICD-10-CM

## 2018-11-07 RX ORDER — LORAZEPAM 0.5 MG/1
0.5 TABLET ORAL EVERY 6 HOURS PRN
Qty: 30 TABLET | Refills: 0 | Status: SHIPPED | OUTPATIENT
Start: 2018-11-07 | End: 2019-02-13

## 2018-11-08 ENCOUNTER — TELEPHONE (OUTPATIENT)
Dept: HEMATOLOGY ONCOLOGY | Facility: CLINIC | Age: 73
End: 2018-11-08

## 2018-11-08 NOTE — TELEPHONE ENCOUNTER
Gaviota Fosneca called and wanted to know if there was a way that we can order Supriya Lundborg a sit down walker or does she have to order it thru some place else  Rita Lundborg is feeling shaky and thinks getting a walker would help him  [phone]  Please advise

## 2018-11-12 ENCOUNTER — APPOINTMENT (OUTPATIENT)
Dept: LAB | Facility: MEDICAL CENTER | Age: 73
End: 2018-11-12
Payer: MEDICARE

## 2018-11-13 RX ORDER — SODIUM CHLORIDE 9 MG/ML
20 INJECTION, SOLUTION INTRAVENOUS CONTINUOUS
Status: DISCONTINUED | OUTPATIENT
Start: 2018-11-14 | End: 2018-11-17 | Stop reason: HOSPADM

## 2018-11-14 ENCOUNTER — HOSPITAL ENCOUNTER (OUTPATIENT)
Dept: INFUSION CENTER | Facility: CLINIC | Age: 73
Discharge: HOME | End: 2018-11-14
Payer: MEDICARE

## 2018-11-14 VITALS
HEIGHT: 69 IN | BODY MASS INDEX: 30.4 KG/M2 | DIASTOLIC BLOOD PRESSURE: 66 MMHG | TEMPERATURE: 99.1 F | SYSTOLIC BLOOD PRESSURE: 103 MMHG | RESPIRATION RATE: 18 BRPM | WEIGHT: 205.25 LBS | HEART RATE: 85 BPM

## 2018-11-14 PROCEDURE — 96413 CHEMO IV INFUSION 1 HR: CPT

## 2018-11-14 PROCEDURE — 96417 CHEMO IV INFUS EACH ADDL SEQ: CPT

## 2018-11-14 PROCEDURE — 96361 HYDRATE IV INFUSION ADD-ON: CPT

## 2018-11-14 PROCEDURE — 96367 TX/PROPH/DG ADDL SEQ IV INF: CPT

## 2018-11-14 RX ADMIN — Medication: at 09:51

## 2018-11-14 RX ADMIN — SODIUM CHLORIDE 20 ML/HR: 0.9 INJECTION, SOLUTION INTRAVENOUS at 12:02

## 2018-11-14 RX ADMIN — CISPLATIN 125 MG: 1 INJECTION, SOLUTION INTRAVENOUS at 12:02

## 2018-11-14 RX ADMIN — SODIUM CHLORIDE 150 MG: 0.9 INJECTION, SOLUTION INTRAVENOUS at 10:13

## 2018-11-14 RX ADMIN — SODIUM CHLORIDE 1000 ML: 0.9 INJECTION, SOLUTION INTRAVENOUS at 13:21

## 2018-11-14 RX ADMIN — GEMCITABINE 2000 MG: 38 INJECTION INTRAVENOUS at 10:58

## 2018-11-14 RX ADMIN — SODIUM CHLORIDE 1000 ML: 0.9 INJECTION, SOLUTION INTRAVENOUS at 09:51

## 2018-11-14 NOTE — PLAN OF CARE
Problem: Potential for Falls  Goal: Patient will remain free of falls  INTERVENTIONS:  - Assess patient frequently for physical needs  -  Identify cognitive and physical deficits and behaviors that affect risk of falls    -  [place] fall precautions as indicated by assessment   - Educate patient/family on patient safety including physical limitations  - Instruct patient to call for assistance with activity based on assessment  - Modify environment to reduce risk of injury  - Consider OT/PT consult to assist with strengthening/mobility   Outcome: Progressing

## 2018-11-19 ENCOUNTER — APPOINTMENT (OUTPATIENT)
Dept: LAB | Facility: MEDICAL CENTER | Age: 73
End: 2018-11-19
Payer: MEDICARE

## 2018-11-19 ENCOUNTER — DOCUMENTATION (OUTPATIENT)
Dept: UROLOGY | Facility: MEDICAL CENTER | Age: 73
End: 2018-11-19

## 2018-11-19 ENCOUNTER — TELEPHONE (OUTPATIENT)
Dept: HEMATOLOGY ONCOLOGY | Facility: CLINIC | Age: 73
End: 2018-11-19

## 2018-11-19 ENCOUNTER — OFFICE VISIT (OUTPATIENT)
Dept: UROLOGY | Facility: MEDICAL CENTER | Age: 73
End: 2018-11-19
Payer: MEDICARE

## 2018-11-19 VITALS
DIASTOLIC BLOOD PRESSURE: 78 MMHG | SYSTOLIC BLOOD PRESSURE: 118 MMHG | HEIGHT: 68 IN | WEIGHT: 198 LBS | BODY MASS INDEX: 30.01 KG/M2

## 2018-11-19 DIAGNOSIS — C61 PROSTATE CANCER (HCC): Primary | ICD-10-CM

## 2018-11-19 LAB
SL AMB  POCT GLUCOSE, UA: ABNORMAL
SL AMB LEUKOCYTE ESTERASE,UA: ABNORMAL
SL AMB POCT BILIRUBIN,UA: ABNORMAL
SL AMB POCT BLOOD,UA: ABNORMAL
SL AMB POCT CLARITY,UA: CLEAR
SL AMB POCT COLOR,UA: YELLOW
SL AMB POCT KETONES,UA: ABNORMAL
SL AMB POCT NITRITE,UA: ABNORMAL
SL AMB POCT PH,UA: 7
SL AMB POCT SPECIFIC GRAVITY,UA: 1.02
SL AMB POCT URINE PROTEIN: ABNORMAL
SL AMB POCT UROBILINOGEN: 1

## 2018-11-19 PROCEDURE — 81003 URINALYSIS AUTO W/O SCOPE: CPT | Performed by: UROLOGY

## 2018-11-19 PROCEDURE — 99024 POSTOP FOLLOW-UP VISIT: CPT | Performed by: UROLOGY

## 2018-11-19 RX ORDER — RANITIDINE 300 MG/1
300 TABLET ORAL
Refills: 5 | COMMUNITY
Start: 2018-11-13 | End: 2019-10-17 | Stop reason: HOSPADM

## 2018-11-19 NOTE — PROGRESS NOTES
Met patient and Miguelina Schmidt (significant other) at patient's visit with Dr Torres Perez today  Patient lives with Miguelina Schmidt  He recently got a walker with a seat due to intermittent dizziness  He has fatigue  He is currently receiving neoadjuvant chemo  Reviewed with patient and Miguelina Schmidt what to expect after cystectomy including appearance of incision, LENNOX drain, and Urostomy  Urostomy demonstration completed and patient knows to expect a call from the Ostomy nurse at the hospital about 1 week prior to surgery for stoma markings  Patient was counseled about adequate hydration, having a good diet and managing fatigue  Post op restrictions reviewed and patient made aware that he will receive additional urostomy teaching from the ostomy nurse while in the hospital after surgery and from [facility] when he goes home after surgery  Written instructions about everything that was reviewed today was given to patient  Patient was given my direct extension to call with any questions

## 2018-11-19 NOTE — PROGRESS NOTES
[address] [facility]  [facility]  [address] [facility]  [place], [address] [place] [address]  [phone]  www  [facility]  org      NAME: Matt Richmond  AGE: 68 y o  SEX: male  : 1945   MRN: [id number]    DATE: 2018  TIME: 8:55 AM    Assessment and Plan:    T4 urothelial carcinoma the prostate  Also has adenocarcinoma of prostate  Plan cystectomy late January after completion of chemotherapy on the  as long as his counts recover  Follow-up early January to do history and physical   The patient was seen by  clinical urology nurse coordinator Chaz Canales to discuss appliances, urostomy, etc                Chief Complaint   No chief complaint on file  History of Present Illness   Urothelial carcinoma prostate, T4 disease  He is currently undergoing chemotherapy, which is neoadjuvant prior to radical cystoprostatectomy and ileal conduit urinary diversion  He also has prostate cancer  He has an internalized left ureteral stent as the nephrostomy tube was removed  Last creatinine was 1 43  He has experienced some hearing loss  No flank pain  His incontinence has decreased since I last saw him  Chemotherapy will be completed on the   The following portions of the patient's history were reviewed and updated as appropriate: allergies, current medications, past family history, past medical history, past social history, past surgical history and problem list     Review of Systems   Review of Systems   Neurological: Positive for dizziness and light-headedness         Active Problem List     Patient Active Problem List   Diagnosis    Right bundle melissa block, 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    Aortic regurgitation    BPH with obstruction/lower urinary tract symptoms    Bladder diverticulum    Postoperative ileus ([address] )    Kidney stone    Major depressive disorder, single episode, moderate (HCC)    Peripheral vascular disease ([address] )    Essential hypertension    Acute pain of right shoulder    Benign localized hyperplasia of prostate without urinary obstruction    Urinary retention due to benign prostatic hyperplasia    Hydronephrosis of left kidney    Ureteral stricture, left    Pelvic abscess in male [facility])    Aneurysm of left popliteal artery (HCC)    Urothelial carcinoma (HCC)    Prostate cancer (HCC)       Objective   /78 (BP Location: Left arm, Patient Position: Sitting)   Ht 5' 8" (1 727 m)   Wt 89 8 kg (198 lb)   BMI 30 11 kg/m²     Physical Exam   Constitutional: He is oriented to person, place, and time  He appears well-developed and well-nourished  HENT:   Head: Normocephalic and atraumatic  Eyes: EOM are normal    Neck: Normal range of motion  Pulmonary/Chest: Effort normal    Musculoskeletal: Normal range of motion  Neurological: He is alert and oriented to person, place, and time  Skin: Skin is warm and dry  Psychiatric: He has a normal mood and affect   His behavior is normal  Judgment and thought content normal            Current Medications     Current Outpatient Prescriptions:     acetaminophen (TYLENOL) 500 mg tablet, Take 500 mg by mouth every 6 (six) hours as needed for mild pain, Disp: , Rfl:     amLODIPine (NORVASC) 10 mg tablet, Take 10 mg by mouth every morning  , Disp: , Rfl:     divalproex sodium (DEPAKOTE) 500 mg EC tablet, Take 500 mg by mouth 2 (two) times a day  , Disp: , Rfl:     LORazepam (ATIVAN) 0 5 mg tablet, Take 1 tablet (0 5 mg total) by mouth every 6 (six) hours as needed for anxiety, Disp: 30 tablet, Rfl: 0    mometasone (NASONEX) 50 mcg/act nasal spray, 2 sprays into each nostril as needed, Disp: , Rfl:     ondansetron (ZOFRAN) 8 mg tablet, Take 1 tablet (8 mg total) by mouth every 8 (eight) hours as needed for nausea or vomiting, Disp: 90 tablet, Rfl: 2    pentoxifylline (TRENtal) 400 mg ER tablet, Take 400 mg by mouth 3 (three) times a day with meals, Disp: , Rfl:     ranitidine (ZANTAC) 300 MG tablet, Take 300 mg by mouth daily at bedtime, Disp: , Rfl: 5    tamsulosin (FLOMAX) 0 4 mg, Take 1 capsule (0 4 mg total) by mouth daily with dinner, Disp: 90 capsule, Rfl: 3    HYDROcodone-acetaminophen (NORCO) 5-325 mg per tablet, Take 1-2 tablets by mouth every 6 (six) hours as needed for pain for up to 30 doses Max Daily Amount: 8 tablets (Patient not taking: Reported on 10/31/2018 ), Disp: 30 tablet, Rfl: 0    ibuprofen (MOTRIN) 400 mg tablet, Take by mouth every 6 (six) hours as needed for mild pain, Disp: , Rfl:         Alexis Santoro MD

## 2018-11-19 NOTE — LETTER
2018     Pradeep Pendleton MD  [address] [facility] [address]    Patient: Marcelo Toledo   YOB: 1945   Date of Visit: 2018       Dear Dr Dennys Marcelo: Thank you for referring Bert Corral to me for evaluation  Below are my notes for this consultation  If you have questions, please do not hesitate to call me  I look forward to following your patient along with you  Sincerely,        Severa Chol, MD        CC: No Recipients  Severa Chol, MD  2018  9:08 AM  Sign at close encounter  [address] [facility]  [place] [phone]  www  [facility]  org      NAME: Marcelo Toledo  AGE: 68 y o  SEX: male  : 1945   MRN: [id number]    DATE: 2018  TIME: 8:55 AM    Assessment and Plan:    T4 urothelial carcinoma the prostate  Also has adenocarcinoma of prostate  Plan cystectomy late January after completion of chemotherapy on the  as long as his counts recover  Follow-up early January to do history and physical   The patient was seen by  clinical urology nurse coordinator Yayo Hannah to discuss appliances, urostomy, etc                Chief Complaint   No chief complaint on file  History of Present Illness   Urothelial carcinoma prostate, T4 disease  He is currently undergoing chemotherapy, which is neoadjuvant prior to radical cystoprostatectomy and ileal conduit urinary diversion  He also has prostate cancer  He has an internalized left ureteral stent as the nephrostomy tube was removed  Last creatinine was 1 43  He has experienced some hearing loss  No flank pain  His incontinence has decreased since I last saw him  Chemotherapy will be completed on the         The following portions of the patient's history were reviewed and updated as appropriate: allergies, current medications, past family history, past medical history, past social history, past surgical history and problem list     Review of Systems   Review of Systems   Neurological: Positive for dizziness and light-headedness  Active Problem List     Patient Active Problem List   Diagnosis    Right bundle melissa block, 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    Aortic regurgitation    BPH with obstruction/lower urinary tract symptoms    Bladder diverticulum    Postoperative ileus (HCC)    Kidney stone    Major depressive disorder, single episode, moderate ([facility])    Peripheral vascular disease ([facility] )    Essential hypertension    Acute pain of right shoulder    Benign localized hyperplasia of prostate without urinary obstruction    Urinary retention due to benign prostatic hyperplasia    Hydronephrosis of left kidney    Ureteral stricture, left    Pelvic abscess in [facility])    Aneurysm of left popliteal artery ([facility])    Urothelial carcinoma ([facility])    Prostate cancer ([facility] )       Objective   /78 (BP Location: Left arm, Patient Position: Sitting)   Ht 5' 8" (1 727 m)   Wt 89 8 kg (198 lb)   BMI 30 11 kg/m²      Physical Exam   Constitutional: He is oriented to person, place, and time  He appears well-developed and well-nourished  HENT:   Head: Normocephalic and atraumatic  Eyes: EOM are normal    Neck: Normal range of motion  Pulmonary/Chest: Effort normal    Musculoskeletal: Normal range of motion  Neurological: He is alert and oriented to person, place, and time  Skin: Skin is warm and dry  Psychiatric: He has a normal mood and affect   His behavior is normal  Judgment and thought content normal            Current Medications     Current Outpatient Prescriptions:     acetaminophen (TYLENOL) 500 mg tablet, Take 500 mg by mouth every 6 (six) hours as needed for mild pain, Disp: , Rfl:     amLODIPine (NORVASC) 10 mg tablet, Take 10 mg by mouth every morning  , Disp: , Rfl:     divalproex sodium (DEPAKOTE) 500 mg EC tablet, Take 500 mg by mouth 2 (two) times a day  , Disp: , Rfl:     LORazepam (ATIVAN) 0 5 mg tablet, Take 1 tablet (0 5 mg total) by mouth every 6 (six) hours as needed for anxiety, Disp: 30 tablet, Rfl: 0    mometasone (NASONEX) 50 mcg/act nasal spray, 2 sprays into each nostril as needed, Disp: , Rfl:     ondansetron (ZOFRAN) 8 mg tablet, Take 1 tablet (8 mg total) by mouth every 8 (eight) hours as needed for nausea or vomiting, Disp: 90 tablet, Rfl: 2    pentoxifylline (TRENtal) 400 mg ER tablet, Take 400 mg by mouth 3 (three) times a day with meals, Disp: , Rfl:     ranitidine (ZANTAC) 300 MG tablet, Take 300 mg by mouth daily at bedtime, Disp: , Rfl: 5    tamsulosin (FLOMAX) 0 4 mg, Take 1 capsule (0 4 mg total) by mouth daily with dinner, Disp: 90 capsule, Rfl: 3    HYDROcodone-acetaminophen (NORCO) 5-325 mg per tablet, Take 1-2 tablets by mouth every 6 (six) hours as needed for pain for up to 30 doses Max Daily Amount: 8 tablets (Patient not taking: Reported on 10/31/2018 ), Disp: 30 tablet, Rfl: 0    ibuprofen (MOTRIN) 400 mg tablet, Take by mouth every 6 (six) hours as needed for mild pain, Disp: , Rfl:         Brenda Mendenhall MD

## 2018-11-20 ENCOUNTER — HOSPITAL ENCOUNTER (OUTPATIENT)
Dept: INFUSION CENTER | Facility: CLINIC | Age: 73
Discharge: HOME | End: 2018-11-20
Payer: MEDICARE

## 2018-11-20 ENCOUNTER — TELEPHONE (OUTPATIENT)
Dept: HEMATOLOGY ONCOLOGY | Facility: CLINIC | Age: 73
End: 2018-11-20

## 2018-11-20 VITALS
SYSTOLIC BLOOD PRESSURE: 121 MMHG | HEART RATE: 94 BPM | RESPIRATION RATE: 18 BRPM | DIASTOLIC BLOOD PRESSURE: 81 MMHG | TEMPERATURE: 97.7 F | OXYGEN SATURATION: 94 %

## 2018-11-20 PROCEDURE — 96361 HYDRATE IV INFUSION ADD-ON: CPT

## 2018-11-20 PROCEDURE — 96360 HYDRATION IV INFUSION INIT: CPT

## 2018-11-20 RX ORDER — SODIUM CHLORIDE 9 MG/ML
20 INJECTION, SOLUTION INTRAVENOUS CONTINUOUS
Status: DISCONTINUED | OUTPATIENT
Start: 2018-11-21 | End: 2018-11-24 | Stop reason: HOSPADM

## 2018-11-20 RX ADMIN — SODIUM CHLORIDE 1000 ML: 0.9 INJECTION, SOLUTION INTRAVENOUS at 14:10

## 2018-11-20 NOTE — TELEPHONE ENCOUNTER
Pt's girlfriend is calling asking to speak with the nurse regarding symptoms the pt is complaining about, dizziness and weakness  Please address/ advise   Thanks

## 2018-11-20 NOTE — TELEPHONE ENCOUNTER
Spoke to Mehnaz Kennedy, patients girlfriend    Per Mehnaz Kennedy, patient is drinking normally, taking nausea medication and urinating normally  Per Dr Annie Wright, okay to schedule patient for IV hydration at [facility] infusion  Pt scheduled for 1L NSS over 2 hours today      Mehnaz Kennedy verbalized understanding

## 2018-11-21 ENCOUNTER — HOSPITAL ENCOUNTER (OUTPATIENT)
Dept: INFUSION CENTER | Facility: CLINIC | Age: 73
Discharge: HOME | End: 2018-11-21
Payer: MEDICARE

## 2018-11-21 VITALS
HEART RATE: 105 BPM | SYSTOLIC BLOOD PRESSURE: 125 MMHG | DIASTOLIC BLOOD PRESSURE: 75 MMHG | RESPIRATION RATE: 18 BRPM | TEMPERATURE: 97.8 F

## 2018-11-21 PROCEDURE — 96367 TX/PROPH/DG ADDL SEQ IV INF: CPT

## 2018-11-21 PROCEDURE — 96413 CHEMO IV INFUSION 1 HR: CPT

## 2018-11-21 RX ADMIN — ONDANSETRON 8 MG: 2 INJECTION INTRAMUSCULAR; INTRAVENOUS at 08:34

## 2018-11-21 RX ADMIN — SODIUM CHLORIDE 20 ML/HR: 0.9 INJECTION, SOLUTION INTRAVENOUS at 08:34

## 2018-11-21 RX ADMIN — GEMCITABINE 2000 MG: 38 INJECTION INTRAVENOUS at 09:18

## 2018-11-21 NOTE — PLAN OF CARE
Problem: Potential for Falls  Goal: Patient will remain free of falls  INTERVENTIONS:  - Assess patient frequently for physical needs  -  Identify cognitive and physical deficits and behaviors that affect risk of falls    -  [place] fall precautions as indicated by assessment   - Educate patient/family on patient safety including physical limitations  - Instruct patient to call for assistance with activity based on assessment  - Modify environment to reduce risk of injury  - Consider OT/PT consult to assist with strengthening/mobility   Outcome: Progressing

## 2018-11-23 ENCOUNTER — HOSPITAL ENCOUNTER (EMERGENCY)
Facility: HOSPITAL | Age: 73
Discharge: HOME | End: 2018-11-23
Attending: EMERGENCY MEDICINE | Admitting: EMERGENCY MEDICINE
Payer: MEDICARE

## 2018-11-23 VITALS
BODY MASS INDEX: 30.41 KG/M2 | SYSTOLIC BLOOD PRESSURE: 122 MMHG | RESPIRATION RATE: 16 BRPM | HEART RATE: 118 BPM | TEMPERATURE: 98.1 F | OXYGEN SATURATION: 99 % | WEIGHT: 200 LBS | DIASTOLIC BLOOD PRESSURE: 56 MMHG

## 2018-11-23 DIAGNOSIS — M54.2 CERVICAL MUSCLE PAIN: Primary | ICD-10-CM

## 2018-11-23 PROCEDURE — 99283 EMERGENCY DEPT VISIT LOW MDM: CPT

## 2018-11-23 RX ORDER — ACETAMINOPHEN 325 MG/1
650 TABLET ORAL ONCE
Status: COMPLETED | OUTPATIENT
Start: 2018-11-23 | End: 2018-11-23

## 2018-11-23 RX ORDER — IBUPROFEN 600 MG/1
600 TABLET ORAL EVERY 8 HOURS PRN
Qty: 30 TABLET | Refills: 0 | Status: SHIPPED | OUTPATIENT
Start: 2018-11-23 | End: 2018-12-01 | Stop reason: HOSPADM

## 2018-11-23 RX ORDER — METHOCARBAMOL 500 MG/1
500 TABLET, FILM COATED ORAL 3 TIMES DAILY PRN
Qty: 21 TABLET | Refills: 0 | Status: SHIPPED | OUTPATIENT
Start: 2018-11-23 | End: 2018-12-01 | Stop reason: HOSPADM

## 2018-11-23 RX ADMIN — ACETAMINOPHEN 650 MG: 325 TABLET, FILM COATED ORAL at 16:19

## 2018-11-23 NOTE — ED PROVIDER NOTES
History  Chief Complaint   Patient presents with    Neck Pain     Started approximately 3 weeks ago, has pain in the right side of the neck  60-year-old male presents the ER with right-sided neck pain that started a few weeks ago  Patient denies any known injury to the area  Patient states he is currently getting chemotherapy and has an appointment on Monday for chemo  Patient states that he has a history of getting neck and back pain  Patient denies any midline tenderness to the area and states they had thinks he slept wrong but pain is not improved  Prior to Admission Medications   Prescriptions Last Dose Informant Patient Reported? Taking?    LORazepam (ATIVAN) 0 5 mg tablet  Self No No   Sig: Take 1 tablet (0 5 mg total) by mouth every 6 (six) hours as needed for anxiety   acetaminophen (TYLENOL) 500 mg tablet  Self Yes No   Sig: Take 500 mg by mouth every 6 (six) hours as needed for mild pain   divalproex sodium (DEPAKOTE) 500 mg EC tablet  Self Yes No   Sig: Take 500 mg by mouth 2 (two) times a day     mometasone (NASONEX) 50 mcg/act nasal spray  Self Yes No   Si sprays into each nostril as needed   ondansetron (ZOFRAN) 8 mg tablet  Self No No   Sig: Take 1 tablet (8 mg total) by mouth every 8 (eight) hours as needed for nausea or vomiting   pentoxifylline (TRENtal) 400 mg ER tablet  Self Yes No   Sig: Take 400 mg by mouth 3 (three) times a day with meals   ranitidine (ZANTAC) 300 MG tablet  Self Yes No   Sig: Take 300 mg by mouth daily at bedtime   tamsulosin (FLOMAX) 0 4 mg  Self No No   Sig: Take 1 capsule (0 4 mg total) by mouth daily with dinner      Facility-Administered Medications: None       Past Medical History:   Diagnosis Date    Aneurysm ([facility] )     Behind left knee recently diagnosed    Back pain     Ceron disease     BPH (benign prostatic hyperplasia)     Cancer ([facility] )     melanoma    Cataract     right eye    Cataract     right eye    Confusion     DDD (degenerative disc disease), lumbar     Depression     Diverticulosis     Gallstones     GERD (gastroesophageal reflux disease)     History of cardiac murmur     Past    History of melanoma     Was on back in early 1990's    History of rheumatic fever     Childhood    [place] (hard of hearing)     Hydronephrosis     Hypertension     Kidney stone     Multiple times    Neck pain     Nervous breakdown     History of due to reaction to psych med which he was on for anxiety/depression and became suicidal    Numbness and tingling in both hands     Numbness and tingling of both feet     PONV (postoperative nausea and vomiting)     PVD (peripheral vascular disease) ([address] )     RBBB     Scoliosis     Seasonal allergies     Tinnitus     Wears partial dentures     Upper    Wears partial dentures     Upper       Past Surgical History:   Procedure Laterality Date    ABDOMINAL SURGERY      When young had procedure for improviing circulation to left lower extremety    BACK SURGERY      Lumbar- not sure what was done   [facility] CARDIAC CATHETERIZATION      Approximately 2007- no blockages    CARPAL TUNNEL RELEASE Bilateral     CATARACT EXTRACTION Left     COLONOSCOPY      CYST REMOVAL      Robotic procedure to remove benign cyst from lower left lung    CYSTOGRAM N/A 3/14/2018    Procedure: CYSTOGRAM;  Surgeon: Brenda Mendenhall MD;  [facility];  Service: Urology    CYSTOSCOPY      ESOPHAGOGASTRODUODENOSCOPY      IR TUBE PLACEMENT NEPHROSTOMY  8/10/2018    LUNG SURGERY      cyst removed left lung    OTHER SURGICAL HISTORY Left     fistula drain in left buttock    AL CYSTO/URETERO W/LITHOTRIPSY &INDWELL STENT INSRT Left 1/3/2018    Procedure: CYSTOSCOPY URETEROSCOPY, RETROGRADE PYELOGRAM AND INSERTION STENT URETERAL;  Surgeon: Brenda Mendenhall MD;  [facility];  Service: Urology    AL CYSTOTOMY,EXCIS BLADDER TIC N/A 2/14/2018    Procedure: ABDOMINAL EXPLORATION; CLOSURE OF BLADDER DIVERTICULITIS; Surgeon: Diana Hicks MD;  [facility];  Service: Urology    [place] CYSTOURETHROSCOPY,URETER CATHETER Left 8/1/2018    Procedure: Cystoscopy, cystogram, bladder biopsies, removal of pelvic drain;  Surgeon: Diana Hicks MD;  [facility];  Service: Urology    [place] INCISE/DRAIN BLADDER N/A 2/14/2018    Procedure: OPEN SUPRAPUBIC TUBE PLACEMENT;  Surgeon: Diana Hicks MD;  [facility];  Service: Urology    [place] RELEASE [place] FIBROSIS Left 2/14/2018    Procedure: LYSIS OF ADHESIONS; URETEROLYSIS ;  Surgeon: Diana Hicks MD;  [facility];  Service: Urology    SKIN CANCER EXCISION      Melanoma removal on back in early 1990's    TOE AMPUTATION Left     All 5 toes left foot were amputated due to poor circulation     TRANSURETHRAL RESECTION OF PROSTATE N/A 3/14/2018    Procedure: TRANSURETHRAL RESECTION OF PROSTATE (TURP), LEFT URETERAL STENT REMOVAL;  Surgeon: Diana Hicks MD;  [facility];  Service: Urology    TRANSURETHRAL RESECTION OF PROSTATE N/A 9/26/2018    Procedure: TRANSURETHRAL RESECTION OF PROSTATE (TURP); Surgeon: Diana Hicks MD;  [facility];  Service: Urology    WRIST SURGERY Bilateral     Ulnar nerve on right arm and both wrists are fused       Family History   Problem Relation Age of Onset    Heart disease Father     Heart disease Mother     Cancer Paternal Grandfather      I have reviewed and agree with the history as documented  Social History   Substance Use Topics    Smoking status: Former Smoker     Packs/day: 1 00     Years: 15 00     Types: Cigarettes     Quit date: 2/5/1970    Smokeless tobacco: Never Used      Comment: Quit 50 years    Alcohol use Yes      Comment: Very rare        Review of Systems   Constitutional: Negative for chills and fever  Respiratory: Negative for chest tightness, shortness of breath and wheezing  Cardiovascular: Negative for chest pain and palpitations     Gastrointestinal: Negative for abdominal pain, constipation, diarrhea, nausea and vomiting  Musculoskeletal: Positive for neck pain  Skin: Negative for rash  Neurological: Negative for dizziness, weakness, light-headedness, numbness and headaches  All other systems reviewed and are negative  Physical Exam  Physical Exam   Constitutional: He is oriented to person, place, and time  He appears well-developed and well-nourished  No distress  HENT:   Head: Normocephalic and atraumatic  Eyes: Conjunctivae are normal    Neck: Muscular tenderness (To right-sided lateral neck) present  No spinous process tenderness present  Cardiovascular: Normal rate and intact distal pulses  Pulmonary/Chest: Effort normal    Musculoskeletal: Normal range of motion  Neurological: He is alert and oriented to person, place, and time  Skin: Skin is warm, dry and intact  Capillary refill takes less than 2 seconds  He is not diaphoretic  Nursing note and vitals reviewed        Vital Signs  ED Triage Vitals [11/23/18 1340]   Temperature Pulse Respirations Blood Pressure SpO2   98 1 °F (36 7 °C) (!) 118 16 122/56 99 %      Temp Source Heart Rate Source Patient Position - Orthostatic VS BP Location FiO2 (%)   Oral -- Sitting Right arm --      Pain Score       8           Vitals:    11/23/18 1340   BP: 122/56   Pulse: (!) 118   Patient Position - Orthostatic VS: Sitting       Visual Acuity      ED Medications  Medications   acetaminophen (TYLENOL) tablet 650 mg (650 mg Oral Given 11/23/18 1619)       Diagnostic Studies  Results Reviewed     None                 No orders to display              Procedures  Procedures       Phone Contacts  ED Phone Contact    ED Course                               MDM  Number of Diagnoses or Management Options  Cervical muscle pain: new and does not require workup  Patient Progress  Patient progress: stable    CritCare Time    Disposition  Final diagnoses:   Cervical muscle pain     Time reflects when diagnosis was documented in both MDM as applicable and the Disposition within this note     Time User Action Codes Description Comment    11/23/2018  4:12 PM Adrián Muller Add [M54 2] Cervical muscle pain       ED Disposition     ED Disposition Condition Comment    Discharge  Daun Quiver discharge to home/self care  Condition at discharge: Stable        Follow-up Information     Follow up With Specialties Details Why Elizabeth Fink MD Family Medicine Call For Recheck, If symptoms worsen [address] [place] [address]  [phone]            Discharge Medication List as of 11/23/2018  4:14 PM      START taking these medications    Details   !! ibuprofen (MOTRIN) 600 mg tablet Take 1 tablet (600 mg total) by mouth every 8 (eight) hours as needed for mild pain or moderate pain for up to 10 days, Starting Fri 11/23/2018, Until Mon 12/3/2018, Normal      methocarbamol (ROBAXIN) 500 mg tablet Take 1 tablet (500 mg total) by mouth 3 (three) times a day as needed for muscle spasms for up to 7 days, Starting Fri 11/23/2018, Until Fri 11/30/2018, Normal       !! - Potential duplicate medications found  Please discuss with provider        CONTINUE these medications which have NOT CHANGED    Details   acetaminophen (TYLENOL) 500 mg tablet Take 500 mg by mouth every 6 (six) hours as needed for mild pain, Historical Med      divalproex sodium (DEPAKOTE) 500 mg EC tablet Take 500 mg by mouth 2 (two) times a day  , Historical Med      LORazepam (ATIVAN) 0 5 mg tablet Take 1 tablet (0 5 mg total) by mouth every 6 (six) hours as needed for anxiety, Starting Wed 11/7/2018, Normal      mometasone (NASONEX) 50 mcg/act nasal spray 2 sprays into each nostril as needed, Historical Med      ondansetron (ZOFRAN) 8 mg tablet Take 1 tablet (8 mg total) by mouth every 8 (eight) hours as needed for nausea or vomiting, Starting Wed 10/24/2018, Normal      pentoxifylline (TRENtal) 400 mg ER tablet Take 400 mg by mouth 3 (three) times a day with meals, Historical Med      ranitidine (ZANTAC) 300 MG tablet Take 300 mg by mouth daily at bedtime, Starting Tue 11/13/2018, Historical Med      tamsulosin (FLOMAX) 0 4 mg Take 1 capsule (0 4 mg total) by mouth daily with dinner, Starting Wed 6/6/2018, Normal      amLODIPine (NORVASC) 10 mg tablet Take 10 mg by mouth every morning  , Historical Med      HYDROcodone-acetaminophen (NORCO) 5-325 mg per tablet Take 1-2 tablets by mouth every 6 (six) hours as needed for pain for up to 30 doses Max Daily Amount: 8 tablets, Starting Tue 10/9/2018, Normal      !! ibuprofen (MOTRIN) 400 mg tablet Take by mouth every 6 (six) hours as needed for mild pain, Historical Med       !! - Potential duplicate medications found  Please discuss with provider  No discharge procedures on file      ED Provider  Electronically Signed by           Cristhian Santiago PA-C  12/06/18 4541

## 2018-11-23 NOTE — DISCHARGE INSTRUCTIONS
Neck Pain   WHAT YOU NEED TO KNOW:   You may have sudden neck pain that increases quickly  You may instead feel pain build slowly over time  Neck pain may go away in a few days or weeks, or it may continue for months  The pain may come and go, or be worse with certain movements  The pain may be only in your neck, or it may move to your arms, back, or shoulders  You may also have pain that starts in another body area and moves to your neck  Some types of neck pain are permanent  DISCHARGE INSTRUCTIONS:   Return to the emergency department if:   · You have an injury that causes neck pain and shooting pain down your arms or legs  · Your neck pain suddenly becomes severe  · You have neck pain along with numbness, tingling, or weakness in your arms or legs  · You have a stiff neck, a headache, and a fever  Contact your healthcare provider if:   · You have new or worsening symptoms  · Your symptoms continue even after treatment  · You have questions or concerns about your condition or care  Medicines: You may need any of the following:  · NSAIDs  , such as ibuprofen, help decrease swelling, pain, and fever  This medicine is available without a doctor's order  Ask your healthcare provider which medicine to take and how often to take it  Follow directions  NSAIDs can cause stomach bleeding or kidney problems if not taken correctly  If you take blood thinner medicine, always ask if NSAIDs are safe for you  · Acetaminophen  helps decrease pain and fever  Ask your healthcare provider how much to take and how often to take it  Follow directions  Acetaminophen can cause liver damage if not taken correctly  · Steroid medicine  may be used to reduce inflammation  This can help relieve pain caused by swelling  · Take your medicine as directed  Contact your healthcare provider if you think your medicine is not helping or if you have side effects  Tell him or her if you are allergic to any medicine  Keep a list of the medicines, vitamins, and herbs you take  Include the amounts, and when and why you take them  Bring the list or the pill bottles to follow-up visits  Carry your medicine list with you in case of an emergency  Manage or prevent neck pain:   · Rest your neck as directed  Do not make sudden movements, such as turning your head quickly  Your healthcare provider may recommend you wear a cervical collar for a short time  The collar will prevent you from moving your head  This will give your neck time to heal if an injury is causing your neck pain  Ask your healthcare provider when you can return to sports or other normal daily activities  · Apply heat as directed  Heat helps relieve pain and swelling  Use a heat wrap, or soak a small towel in warm water  Wring out the extra water  Apply the heat wrap or towel for 20 minutes every hour, or as directed  · Apply ice as directed  Ice helps relieve pain and swelling, and can help prevent tissue damage  Use an ice pack, or put ice in a bag  Cover the ice pack or back with a towel before you apply it to your neck  Apply the ice pack or ice for 15 minutes every hour, or as directed  Your healthcare provider can tell you how often to apply ice  · Do neck exercises as directed  Neck exercises help strengthen the muscles and increase range of motion  Your healthcare provider will tell you which exercises are right for you  He may give you instructions, or he may recommend that you work with a physical therapist  Your healthcare provider or therapist can make sure you are doing the exercises correctly  · Maintain good posture  Try to keep your head and shoulders lifted when you sit  If you work in front of a computer, make sure the monitor is at the right level  You should not need to look up down to see the screen  You should also not have to lean forward to be able to read what is on the screen   Make sure your keyboard, mouse, and other computer items are placed where you do not have to extend your shoulder to reach them  Get up often if you work in front of a computer or sit for long periods of time  Stretch or walk around to keep your neck muscles loose  Follow up with your healthcare provider as directed: Your healthcare provider may refer you to a specialist if your pain does not get better with treatment  Write down your questions so you remember to ask them during your visits  © 2017 [address] [employer] Information is for End User's use only and may not be sold, redistributed or otherwise used for commercial purposes  All illustrations and images included in CareNotes® are the copyrighted property of A [employer] M [employer]  or [employer]  The above information is an  only  It is not intended as medical advice for individual conditions or treatments  Talk to your doctor, nurse or pharmacist before following any medical regimen to see if it is safe and effective for you

## 2018-11-23 NOTE — ED NOTES
Pt reports taking Ibuprofen and Percocet at home for the pain over the past few days  Reports Ibuprofen was more effective at pain relief that the Percocet        Eston LLOYD Martínez  11/23/18 4584

## 2018-11-26 ENCOUNTER — OFFICE VISIT (OUTPATIENT)
Dept: HEMATOLOGY ONCOLOGY | Facility: CLINIC | Age: 73
End: 2018-11-26
Payer: MEDICARE

## 2018-11-26 ENCOUNTER — APPOINTMENT (OUTPATIENT)
Dept: RADIOLOGY | Facility: MEDICAL CENTER | Age: 73
DRG: 812 | End: 2018-11-26
Payer: MEDICARE

## 2018-11-26 ENCOUNTER — APPOINTMENT (OUTPATIENT)
Dept: LAB | Facility: MEDICAL CENTER | Age: 73
DRG: 812 | End: 2018-11-26
Payer: MEDICARE

## 2018-11-26 VITALS
TEMPERATURE: 98 F | SYSTOLIC BLOOD PRESSURE: 118 MMHG | DIASTOLIC BLOOD PRESSURE: 62 MMHG | RESPIRATION RATE: 18 BRPM | HEART RATE: 100 BPM | HEIGHT: 68 IN | WEIGHT: 199 LBS | BODY MASS INDEX: 30.16 KG/M2 | OXYGEN SATURATION: 92 %

## 2018-11-26 DIAGNOSIS — M54.2 NECK PAIN: ICD-10-CM

## 2018-11-26 DIAGNOSIS — C68.9 UROTHELIAL CARCINOMA (HCC): Primary | ICD-10-CM

## 2018-11-26 DIAGNOSIS — C68.9 UROTHELIAL CARCINOMA (HCC): ICD-10-CM

## 2018-11-26 LAB
ALBUMIN SERPL BCP-MCNC: 2.6 G/DL (ref 3.5–5)
ALP SERPL-CCNC: 58 U/L (ref 46–116)
ALT SERPL W P-5'-P-CCNC: 12 U/L (ref 12–78)
ANION GAP SERPL CALCULATED.3IONS-SCNC: 6 MMOL/L (ref 4–13)
ANISOCYTOSIS BLD QL SMEAR: PRESENT
AST SERPL W P-5'-P-CCNC: 15 U/L (ref 5–45)
BASOPHILS # BLD MANUAL: 0 THOUSAND/UL (ref 0–0.1)
BASOPHILS NFR MAR MANUAL: 0 % (ref 0–1)
BILIRUB SERPL-MCNC: 0.4 MG/DL (ref 0.2–1)
BUN SERPL-MCNC: 16 MG/DL (ref 5–25)
BURR CELLS BLD QL SMEAR: PRESENT
CALCIUM SERPL-MCNC: 9.3 MG/DL (ref 8.3–10.1)
CHLORIDE SERPL-SCNC: 99 MMOL/L (ref 100–108)
CO2 SERPL-SCNC: 28 MMOL/L (ref 21–32)
CREAT SERPL-MCNC: 1.32 MG/DL (ref 0.6–1.3)
EOSINOPHIL # BLD MANUAL: 0 THOUSAND/UL (ref 0–0.4)
EOSINOPHIL NFR BLD MANUAL: 0 % (ref 0–6)
ERYTHROCYTE [DISTWIDTH] IN BLOOD BY AUTOMATED COUNT: 14.5 % (ref 11.6–15.1)
GFR SERPL CREATININE-BSD FRML MDRD: 53 ML/MIN/1.73SQ M
GLUCOSE SERPL-MCNC: 86 MG/DL (ref 65–140)
HCT VFR BLD AUTO: 23.5 % (ref 36.5–49.3)
HGB BLD-MCNC: 7.7 G/DL (ref 12–17)
LYMPHOCYTES # BLD AUTO: 1.09 THOUSAND/UL (ref 0.6–4.47)
LYMPHOCYTES # BLD AUTO: 26 % (ref 14–44)
MCH RBC QN AUTO: 28.6 PG (ref 26.8–34.3)
MCHC RBC AUTO-ENTMCNC: 32.8 G/DL (ref 31.4–37.4)
MCV RBC AUTO: 87 FL (ref 82–98)
MICROCYTES BLD QL AUTO: PRESENT
MONOCYTES # BLD AUTO: 0.04 THOUSAND/UL (ref 0–1.22)
MONOCYTES NFR BLD: 1 % (ref 4–12)
NEUTROPHILS # BLD MANUAL: 3.05 THOUSAND/UL (ref 1.85–7.62)
NEUTS SEG NFR BLD AUTO: 73 % (ref 43–75)
NRBC BLD AUTO-RTO: 0 /100 WBCS
PLATELET # BLD AUTO: 280 THOUSANDS/UL (ref 149–390)
PLATELET BLD QL SMEAR: ADEQUATE
PMV BLD AUTO: 9.1 FL (ref 8.9–12.7)
POIKILOCYTOSIS BLD QL SMEAR: PRESENT
POTASSIUM SERPL-SCNC: 3.6 MMOL/L (ref 3.5–5.3)
PROT SERPL-MCNC: 6.9 G/DL (ref 6.4–8.2)
RBC # BLD AUTO: 2.69 MILLION/UL (ref 3.88–5.62)
RBC MORPH BLD: PRESENT
ROULEAUX BLD QL SMEAR: PRESENT
SODIUM SERPL-SCNC: 133 MMOL/L (ref 136–145)
WBC # BLD AUTO: 4.18 THOUSAND/UL (ref 4.31–10.16)

## 2018-11-26 PROCEDURE — 36415 COLL VENOUS BLD VENIPUNCTURE: CPT | Performed by: INTERNAL MEDICINE

## 2018-11-26 PROCEDURE — 85027 COMPLETE CBC AUTOMATED: CPT | Performed by: INTERNAL MEDICINE

## 2018-11-26 PROCEDURE — 85007 BL SMEAR W/DIFF WBC COUNT: CPT | Performed by: INTERNAL MEDICINE

## 2018-11-26 PROCEDURE — 99214 OFFICE O/P EST MOD 30 MIN: CPT | Performed by: INTERNAL MEDICINE

## 2018-11-26 PROCEDURE — 72050 X-RAY EXAM NECK SPINE 4/5VWS: CPT

## 2018-11-26 PROCEDURE — 80053 COMPREHEN METABOLIC PANEL: CPT | Performed by: INTERNAL MEDICINE

## 2018-11-26 RX ORDER — OXYCODONE HYDROCHLORIDE 5 MG/1
5 TABLET ORAL EVERY 4 HOURS PRN
Qty: 60 TABLET | Refills: 0 | Status: SHIPPED | OUTPATIENT
Start: 2018-11-26 | End: 2019-02-13

## 2018-11-26 NOTE — PROGRESS NOTES
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  His currently on neoadjuvant chemotherapy with cisplatin and gemcitabine  This is day 13 of cycle 2  He is tolerating neoadjuvant chemotherapy relatively well  However, he has new symptom with posterior neck pain  It is unlikely that he has bone metastasis, since recent PET-CT scan showed no evidence of hypermetabolic lesions  This could be arthritic pain  I prescribed oxycodone 5 mg p r n     I recommended him to see primary care physician  He is going to have day 15 of gemcitabine in 2 days  His 3rd cycle of neoadjuvant chemotherapy will start in December 12, 2018  I would obtain next x-ray  He is in agreement with my recommendation    I will see him again in a month for routine follow-up                                                      Subjective:      HPI:  A 25-year-old gentleman who has history of Buerger disease, 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32  [facility] was briefly a smoker until 25years old  [facility] was 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, when he underwent lung cyst surgery   Subsequently, he had difficulty of urination, for which he has been under the care of Dr Austen Do had multiple procedure including left ureter placement for hydronephrosis as well as prostate biopsy in March 2018 which showed small amount of adenocarcinoma with [place] score 7   He has not had any treatment for his prostate cancer   He underwent cystoscopy and biopsy of bladder   Bladder biopsy was negative for malignancy   However, repeated prostate biopsy showed high-grade urothelial carcinoma with sarcomatoid feature   Therefore, he was referred to me to discuss systemic therapy  [facility] continued to have some discomfort in the pelvis   He does not see any gross hematuria   He has mild exertional shortness of breath   His weight has been stable   He denied fever, chills or night sweats   His recent creatinine was 1  23   He underwent PET-CT scan which showed highly hypermetabolic prostate with SUV 41 3   There was the rim hypermetabolism in the pelvis which was read as prior abscess  Reina Carlin is no evidence of distant metastasis based on the PET-CT scan   His performance status is probably 1/4 on the ECOG scale            Interval History:  A 80-year-old gentleman who was diagnosed in limited volume of prostate cancer with [place] score 7 in March 2018  [facility] has not had any treatment for this  [facility] was recently diagnosed with high-grade urothelial carcinoma with sarcomatoid feature, based on the prostate biopsy  This was T4 disease with prostate invasion  Therefore, he started neoadjuvant chemotherapy with cisplatin and gemcitabine  This is day 13 of 2nd cycle  He has some nausea but no history of vomiting  He has no history of neutropenic fever  He is maintaining his weight  However, since months ago, he started to have posterior neck pain which radiated to the occipital   He has no respiratory symptoms  However, he has chronic fatigue  Prior to the neoadjuvant chemotherapy, PET-CT scan showed no evidence of bone metastasis  He went to the emergency room for the neck pain where he did not have any scan  He was given ibuprofen 600 mg  His creatinine has been stable with most recent one being 1 4                                   Objective:      Primary Diagnosis:     1    High-grade urothelial carcinoma with sarcomatoid feature, T4 disease is prostate invasion   Diagnosed in August 2018    2    Localized prostate cancer with Angel score 7, diagnosed in March 2018      Cancer Staging:  Cancer Staging  No matching staging information was found for the patient         Previous Hematologic/ Oncologic Treatment:            Current Hematologic/ Oncologic Treatment:       Neoadjuvant chemotherapy with cisplatin and gemcitabine x3 cycle  This is day 13 of cycle 2           Disease Status:      Not evaluated at this time      Test Results:     Pathology:     Prostate biopsy in March 2018 showed adenocarcinoma, Angel score 7      Repeated prostate biopsy in August 1, 2018 showed high-grade urothelial carcinoma with sarcomatoid feature      Re-biopsy in October 2018 showed high-grade urothelial carcinoma in the prostate gland as well as prostatic urethra      Radiology:     PET-CT scan showed no evidence of distant metastasis   Hypermetabolic prostate with SUV 41   In the pelvis, there with rim hypermetabolism which was read as abscess      Laboratory:     See below      Physical Exam:        General Appearance:    Alert, oriented          Eyes:    PERRL   Ears:    Normal external ear canals, both ears   Nose:   Nares normal, septum midline   Throat:   Mucosa moist  Pharynx without injection  Neck:   Supple         Lungs:     Clear to auscultation bilaterally   Chest Wall:    No tenderness or deformity    Heart:    Regular rate and rhythm         Abdomen:     Soft, non-tender, bowel sounds +, no organomegaly               Extremities:   Extremities no cyanosis or edema         Skin:   no rash or icterus  Lymph nodes:   Cervical, supraclavicular, and axillary nodes normal   Neurologic:   CNII-XII intact, normal strength, sensation and reflexes     Throughout             Breast exam:   Not applicable  ROS: Review of Systems   Constitutional:        Fatigue   Musculoskeletal:        Posterior neck pain   All other systems reviewed and are negative  Imaging: No results found        Labs:   Lab Results   Component Value Date    WBC 2 23 (L) 11/19/2018    HGB 9 7 (L) 11/19/2018    HCT 31 1 (L) 11/19/2018    MCV 90 11/19/2018    PLT 1,160 (HH) 11/19/2018     Lab Results   Component Value Date    K 3 6 11/19/2018    CL 98 (L) 11/19/2018    CO2 26 11/19/2018    BUN 17 11/19/2018    CREATININE 1 42 (H) 11/19/2018    GLUF 82 11/19/2018    CALCIUM 8 7 11/19/2018    AST 19 11/19/2018    ALT 14 11/19/2018    ALKPHOS 62 11/19/2018    EGFR 49 11/19/2018         Lab Results   Component Value Date    PSA 0 5 06/22/2018         Current Medications: Reviewed  Allergies: Reviewed  PMH/FH/SH:  Reviewed      Vital Sign:    Body surface area is 2 04 meters squared      Wt Readings from Last 3 Encounters:   11/26/18 90 3 kg (199 lb)   11/23/18 90 7 kg (200 lb)   11/19/18 89 8 kg (198 lb)        Temp Readings from Last 3 Encounters:   11/26/18 98 °F (36 7 °C) (Tympanic)   11/23/18 98 1 °F (36 7 °C) (Oral)   11/21/18 97 8 °F (36 6 °C) (Tympanic)        BP Readings from Last 3 Encounters:   11/26/18 118/62   11/23/18 122/56   11/21/18 125/75         Pulse Readings from Last 3 Encounters:   11/26/18 100   11/23/18 (!) 118   11/21/18 105     @LASTSAO2(3)@

## 2018-11-27 RX ORDER — SODIUM CHLORIDE 9 MG/ML
20 INJECTION, SOLUTION INTRAVENOUS CONTINUOUS
Status: DISCONTINUED | OUTPATIENT
Start: 2018-11-28 | End: 2018-12-01 | Stop reason: HOSPADM

## 2018-11-28 ENCOUNTER — HOSPITAL ENCOUNTER (INPATIENT)
Facility: HOSPITAL | Age: 73
LOS: 3 days | Discharge: HOME | DRG: 812 | End: 2018-12-01
Attending: EMERGENCY MEDICINE | Admitting: FAMILY MEDICINE
Payer: MEDICARE

## 2018-11-28 ENCOUNTER — TELEPHONE (OUTPATIENT)
Dept: HEMATOLOGY ONCOLOGY | Facility: CLINIC | Age: 73
End: 2018-11-28

## 2018-11-28 ENCOUNTER — APPOINTMENT (INPATIENT)
Dept: CT IMAGING | Facility: HOSPITAL | Age: 73
DRG: 812 | End: 2018-11-28
Payer: MEDICARE

## 2018-11-28 ENCOUNTER — HOSPITAL ENCOUNTER (OUTPATIENT)
Dept: INFUSION CENTER | Facility: CLINIC | Age: 73
Discharge: HOME | End: 2018-11-28

## 2018-11-28 DIAGNOSIS — D64.9 ANEMIA: ICD-10-CM

## 2018-11-28 DIAGNOSIS — E86.0 DEHYDRATION: ICD-10-CM

## 2018-11-28 DIAGNOSIS — N39.0 UTI (URINARY TRACT INFECTION): Primary | ICD-10-CM

## 2018-11-28 DIAGNOSIS — D70.9 NEUTROPENIA (HCC): ICD-10-CM

## 2018-11-28 PROBLEM — R42 ORTHOSTATIC LIGHTHEADEDNESS: Status: ACTIVE | Noted: 2018-11-28

## 2018-11-28 PROBLEM — N18.30 CKD (CHRONIC KIDNEY DISEASE) STAGE 3, GFR 30-59 ML/MIN (HCC): Status: ACTIVE | Noted: 2018-11-28

## 2018-11-28 PROBLEM — D70.1 CHEMOTHERAPY-INDUCED NEUTROPENIA (HCC): Status: ACTIVE | Noted: 2018-11-28

## 2018-11-28 PROBLEM — E87.6 HYPOKALEMIA: Status: ACTIVE | Noted: 2018-11-28

## 2018-11-28 PROBLEM — T45.1X5A CHEMOTHERAPY-INDUCED NEUTROPENIA (HCC): Status: ACTIVE | Noted: 2018-11-28

## 2018-11-28 PROBLEM — N30.00 ACUTE CYSTITIS WITHOUT HEMATURIA: Status: ACTIVE | Noted: 2018-11-28

## 2018-11-28 PROBLEM — R65.10 SIRS (SYSTEMIC INFLAMMATORY RESPONSE SYNDROME) (HCC): Status: ACTIVE | Noted: 2018-11-28

## 2018-11-28 LAB
ALBUMIN SERPL BCP-MCNC: 2.4 G/DL (ref 3.5–5)
ALP SERPL-CCNC: 58 U/L (ref 46–116)
ALT SERPL W P-5'-P-CCNC: 10 U/L (ref 12–78)
ANION GAP SERPL CALCULATED.3IONS-SCNC: 12 MMOL/L (ref 4–13)
AST SERPL W P-5'-P-CCNC: 13 U/L (ref 5–45)
BACTERIA UR QL AUTO: ABNORMAL /HPF
BASOPHILS # BLD MANUAL: 0 THOUSAND/UL (ref 0–0.1)
BASOPHILS NFR MAR MANUAL: 0 % (ref 0–1)
BILIRUB SERPL-MCNC: 0.39 MG/DL (ref 0.2–1)
BILIRUB UR QL STRIP: NEGATIVE
BUN SERPL-MCNC: 12 MG/DL (ref 5–25)
CALCIUM SERPL-MCNC: 8.6 MG/DL (ref 8.3–10.1)
CHLORIDE SERPL-SCNC: 100 MMOL/L (ref 100–108)
CLARITY UR: ABNORMAL
CO2 SERPL-SCNC: 26 MMOL/L (ref 21–32)
COLOR UR: YELLOW
COLOR, POC: YELLOW
CREAT SERPL-MCNC: 1.38 MG/DL (ref 0.6–1.3)
EOSINOPHIL # BLD MANUAL: 0 THOUSAND/UL (ref 0–0.4)
EOSINOPHIL NFR BLD MANUAL: 0 % (ref 0–6)
ERYTHROCYTE [DISTWIDTH] IN BLOOD BY AUTOMATED COUNT: 14.3 % (ref 11.6–15.1)
FERRITIN SERPL-MCNC: 526 NG/ML (ref 8–388)
FERRITIN SERPL-MCNC: 534 NG/ML (ref 8–388)
FOLATE SERPL-MCNC: 6.3 NG/ML (ref 3.1–17.5)
GFR SERPL CREATININE-BSD FRML MDRD: 50 ML/MIN/1.73SQ M
GLUCOSE SERPL-MCNC: 101 MG/DL (ref 65–140)
GLUCOSE UR STRIP-MCNC: ABNORMAL MG/DL
HCT VFR BLD AUTO: 24 % (ref 36.5–49.3)
HGB BLD-MCNC: 7.9 G/DL (ref 12–17)
HGB UR QL STRIP.AUTO: ABNORMAL
IRON SATN MFR SERPL: 20 %
IRON SERPL-MCNC: 38 UG/DL (ref 65–175)
KETONES UR STRIP-MCNC: ABNORMAL MG/DL
LACTATE SERPL-SCNC: 1.3 MMOL/L (ref 0.5–2)
LEUKOCYTE ESTERASE UR QL STRIP: ABNORMAL
LYMPHOCYTES # BLD AUTO: 0.4 THOUSAND/UL (ref 0.6–4.47)
LYMPHOCYTES # BLD AUTO: 21 % (ref 14–44)
MCH RBC QN AUTO: 28.5 PG (ref 26.8–34.3)
MCHC RBC AUTO-ENTMCNC: 32.9 G/DL (ref 31.4–37.4)
MCV RBC AUTO: 87 FL (ref 82–98)
MONOCYTES # BLD AUTO: 0.21 THOUSAND/UL (ref 0–1.22)
MONOCYTES NFR BLD: 11 % (ref 4–12)
NEUTROPHILS # BLD MANUAL: 1.29 THOUSAND/UL (ref 1.85–7.62)
NEUTS BAND NFR BLD MANUAL: 2 % (ref 0–8)
NEUTS SEG NFR BLD AUTO: 66 % (ref 43–75)
NITRITE UR QL STRIP: NEGATIVE
NON-SQ EPI CELLS URNS QL MICRO: ABNORMAL /HPF
NRBC BLD AUTO-RTO: 0 /100 WBCS
PH UR STRIP.AUTO: 7.5 [PH] (ref 4.5–8)
PLATELET # BLD AUTO: 166 THOUSANDS/UL (ref 149–390)
PLATELET BLD QL SMEAR: ADEQUATE
PMV BLD AUTO: 8.8 FL (ref 8.9–12.7)
POTASSIUM SERPL-SCNC: 3.4 MMOL/L (ref 3.5–5.3)
PROT SERPL-MCNC: 6.4 G/DL (ref 6.4–8.2)
PROT UR STRIP-MCNC: >=300 MG/DL
RBC # BLD AUTO: 2.77 MILLION/UL (ref 3.88–5.62)
RBC #/AREA URNS AUTO: ABNORMAL /HPF
SODIUM SERPL-SCNC: 138 MMOL/L (ref 136–145)
SP GR UR STRIP.AUTO: 1.02 (ref 1–1.03)
TIBC SERPL-MCNC: 192 UG/DL (ref 250–450)
TOTAL CELLS COUNTED SPEC: 100
TSH SERPL DL<=0.05 MIU/L-ACNC: 0.77 UIU/ML (ref 0.36–3.74)
UROBILINOGEN UR QL STRIP.AUTO: 0.2 E.U./DL
VALPROATE SERPL-MCNC: 40 UG/ML (ref 50–100)
VIT B12 SERPL-MCNC: 442 PG/ML (ref 100–900)
WBC # BLD AUTO: 1.9 THOUSAND/UL (ref 4.31–10.16)
WBC #/AREA URNS AUTO: ABNORMAL /HPF

## 2018-11-28 PROCEDURE — 96360 HYDRATION IV INFUSION INIT: CPT

## 2018-11-28 PROCEDURE — 83540 ASSAY OF IRON: CPT | Performed by: FAMILY MEDICINE

## 2018-11-28 PROCEDURE — 82607 VITAMIN B-12: CPT | Performed by: FAMILY MEDICINE

## 2018-11-28 PROCEDURE — 99285 EMERGENCY DEPT VISIT HI MDM: CPT

## 2018-11-28 PROCEDURE — 85007 BL SMEAR W/DIFF WBC COUNT: CPT | Performed by: EMERGENCY MEDICINE

## 2018-11-28 PROCEDURE — 84443 ASSAY THYROID STIM HORMONE: CPT | Performed by: FAMILY MEDICINE

## 2018-11-28 PROCEDURE — 81001 URINALYSIS AUTO W/SCOPE: CPT

## 2018-11-28 PROCEDURE — 80053 COMPREHEN METABOLIC PANEL: CPT | Performed by: EMERGENCY MEDICINE

## 2018-11-28 PROCEDURE — 93005 ELECTROCARDIOGRAM TRACING: CPT

## 2018-11-28 PROCEDURE — 99223 1ST HOSP IP/OBS HIGH 75: CPT | Performed by: FAMILY MEDICINE

## 2018-11-28 PROCEDURE — 83550 IRON BINDING TEST: CPT | Performed by: FAMILY MEDICINE

## 2018-11-28 PROCEDURE — 82746 ASSAY OF FOLIC ACID SERUM: CPT | Performed by: FAMILY MEDICINE

## 2018-11-28 PROCEDURE — 87040 BLOOD CULTURE FOR BACTERIA: CPT | Performed by: EMERGENCY MEDICINE

## 2018-11-28 PROCEDURE — 87086 URINE CULTURE/COLONY COUNT: CPT

## 2018-11-28 PROCEDURE — 36415 COLL VENOUS BLD VENIPUNCTURE: CPT | Performed by: EMERGENCY MEDICINE

## 2018-11-28 PROCEDURE — 80164 ASSAY DIPROPYLACETIC ACD TOT: CPT | Performed by: EMERGENCY MEDICINE

## 2018-11-28 PROCEDURE — 82728 ASSAY OF FERRITIN: CPT | Performed by: FAMILY MEDICINE

## 2018-11-28 PROCEDURE — 70450 CT HEAD/BRAIN W/O DYE: CPT

## 2018-11-28 PROCEDURE — 85027 COMPLETE CBC AUTOMATED: CPT | Performed by: EMERGENCY MEDICINE

## 2018-11-28 PROCEDURE — 83605 ASSAY OF LACTIC ACID: CPT | Performed by: EMERGENCY MEDICINE

## 2018-11-28 RX ORDER — FLUTICASONE PROPIONATE 50 MCG
1 SPRAY, SUSPENSION (ML) NASAL 2 TIMES DAILY
Status: DISCONTINUED | OUTPATIENT
Start: 2018-11-28 | End: 2018-12-01 | Stop reason: HOSPADM

## 2018-11-28 RX ORDER — POTASSIUM CHLORIDE 20 MEQ/1
20 TABLET, EXTENDED RELEASE ORAL 2 TIMES DAILY
Status: DISCONTINUED | OUTPATIENT
Start: 2018-11-28 | End: 2018-11-29

## 2018-11-28 RX ORDER — LORAZEPAM 0.5 MG/1
0.5 TABLET ORAL EVERY 6 HOURS PRN
Status: DISCONTINUED | OUTPATIENT
Start: 2018-11-28 | End: 2018-12-01 | Stop reason: HOSPADM

## 2018-11-28 RX ORDER — ACETAMINOPHEN 325 MG/1
650 TABLET ORAL EVERY 6 HOURS PRN
Status: DISCONTINUED | OUTPATIENT
Start: 2018-11-28 | End: 2018-12-01 | Stop reason: HOSPADM

## 2018-11-28 RX ORDER — DIVALPROEX SODIUM 500 MG/1
500 TABLET, DELAYED RELEASE ORAL 2 TIMES DAILY
Status: DISCONTINUED | OUTPATIENT
Start: 2018-11-28 | End: 2018-12-01 | Stop reason: HOSPADM

## 2018-11-28 RX ORDER — SODIUM CHLORIDE 9 MG/ML
100 INJECTION, SOLUTION INTRAVENOUS CONTINUOUS
Status: DISPENSED | OUTPATIENT
Start: 2018-11-28 | End: 2018-11-29

## 2018-11-28 RX ORDER — PENTOXIFYLLINE 400 MG/1
400 TABLET, EXTENDED RELEASE ORAL
Status: DISCONTINUED | OUTPATIENT
Start: 2018-11-28 | End: 2018-12-01 | Stop reason: HOSPADM

## 2018-11-28 RX ORDER — LEVOFLOXACIN 5 MG/ML
750 INJECTION, SOLUTION INTRAVENOUS
Status: DISCONTINUED | OUTPATIENT
Start: 2018-11-30 | End: 2018-11-29

## 2018-11-28 RX ORDER — OXYCODONE HYDROCHLORIDE 5 MG/1
5 TABLET ORAL EVERY 4 HOURS PRN
Status: DISCONTINUED | OUTPATIENT
Start: 2018-11-28 | End: 2018-12-01 | Stop reason: HOSPADM

## 2018-11-28 RX ORDER — LEVOFLOXACIN 5 MG/ML
750 INJECTION, SOLUTION INTRAVENOUS ONCE
Status: COMPLETED | OUTPATIENT
Start: 2018-11-28 | End: 2018-11-28

## 2018-11-28 RX ORDER — ONDANSETRON 2 MG/ML
4 INJECTION INTRAMUSCULAR; INTRAVENOUS EVERY 6 HOURS PRN
Status: DISCONTINUED | OUTPATIENT
Start: 2018-11-28 | End: 2018-12-01 | Stop reason: HOSPADM

## 2018-11-28 RX ORDER — TAMSULOSIN HYDROCHLORIDE 0.4 MG/1
0.4 CAPSULE ORAL
Status: DISCONTINUED | OUTPATIENT
Start: 2018-11-28 | End: 2018-12-01 | Stop reason: HOSPADM

## 2018-11-28 RX ORDER — FAMOTIDINE 20 MG/1
20 TABLET, FILM COATED ORAL DAILY
Status: DISCONTINUED | OUTPATIENT
Start: 2018-11-28 | End: 2018-12-01 | Stop reason: HOSPADM

## 2018-11-28 RX ADMIN — FAMOTIDINE 20 MG: 20 TABLET ORAL at 16:51

## 2018-11-28 RX ADMIN — SODIUM CHLORIDE 1000 ML: 0.9 INJECTION, SOLUTION INTRAVENOUS at 09:07

## 2018-11-28 RX ADMIN — FLUTICASONE PROPIONATE 1 SPRAY: 50 SPRAY, METERED NASAL at 17:47

## 2018-11-28 RX ADMIN — TAMSULOSIN HYDROCHLORIDE 0.4 MG: 0.4 CAPSULE ORAL at 16:51

## 2018-11-28 RX ADMIN — ENOXAPARIN SODIUM 40 MG: 40 INJECTION SUBCUTANEOUS at 16:51

## 2018-11-28 RX ADMIN — SODIUM CHLORIDE 100 ML/HR: 0.9 INJECTION, SOLUTION INTRAVENOUS at 16:51

## 2018-11-28 RX ADMIN — PENTOXIFYLLINE 400 MG: 400 TABLET, FILM COATED, EXTENDED RELEASE ORAL at 17:48

## 2018-11-28 RX ADMIN — LEVOFLOXACIN 750 MG: 5 INJECTION, SOLUTION INTRAVENOUS at 11:07

## 2018-11-28 RX ADMIN — POTASSIUM CHLORIDE 20 MEQ: 1500 TABLET, EXTENDED RELEASE ORAL at 17:48

## 2018-11-28 RX ADMIN — DIVALPROEX SODIUM 500 MG: 500 TABLET, DELAYED RELEASE ORAL at 17:47

## 2018-11-28 NOTE — MEDICAL STUDENT
H&P Exam - Storm Seek 68 y o  male MRN: [id number]    Unit/Bed#: ED 26 Encounter: [id number]      Assessment/Plan    Dizziness  He has had dizziness in the past and this was normally associated with his longer chemo infusion per the patient and his wife  He was recently given a 1 liter bolus of normal saline for dizziness on 11/20/2018  He states that the IVF helped at that time  His dizziness appears to get worse with positional changes  The ER obtained orthostatic BPs which were negative for orthostasis  He is able to ambulate without assistance  He has received 1 liter of normal saline in the ER which has not alleviated his dizziness  He is on a regular regimen of zofran which can cause QTc prolongation  -fall precautions  -Repeat EKG and monitor QTc  -telemetry monitoring   -may consider CTA if symptomology worsens  -IVF hydration with isolyte at 80mL/hour for 1 liter    Mild Hypokalemia  This is likely due to poor po intake related to nausea  -start regular diet  -IVF as above  Chronic kidney disease stage 3  His baseline creatinine appears to be 1 2-1 4  His creatinine is currently 1 38   -monitor kidney function and electrolytes, BMP in am    Posterior neck pain  This may be related to arthritis changes  There is no evidence of nuchal rigidity     -continue methocarbamol as needed  -Continue acetaminophen as needed    Urothelial carcinoma  Prostate cancer  He is currently being seen by [facility] as an outpatient and receiving chemotherapy  Last chemo infusion 11/21/2018  He was due for chemotherapy infusion today, this has been cancelled due to admission to the ER   -consult oncology re: leukopenia in patient on chemotherapy  Hypertension  His blood pressure has been controlled on amlodipine     -Hold amlodipine due to dizziness, will consider restarting if BP becomes hypertensive        Chemotherapy associated nausea and vomiting    Urinary retention  This is likely due to his prostate cancer  He received a dose of levaquin in the ER   -continue tamsulosin  -follow urine culture    History of left hydronephrosis  He had a PCN which was removed following placement of a left ureteral stent  Peripheral vascular disease  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  In August of 2018 he had a PET scan which revealed an aneurysm in the LLE, which prompted TRENTON's  These were performed 10/05/2018 which showed RLE TRENTON 1 28, and LLE TRENTON 0 57  History of Present Illness    HPI:  Toña Duke is a 68 y o  male who presents with complaint of increased dizziness which began several weeks ago  He states that this is the worst his dizziness has been  He has had dehydration with an associated 10 pound weight loss and dizziness in the past that was related to his chemotherapy treatment  He most recently received IV hydration at the infusion center 11/20/2018 for his dizziness prior to he last chemo treatment  He was recently in the ER (11/23/18) with complaint of cervical pain for which he was started on methocarbamol, which he states helps but does not alleviate his neck pain, but seems to have increased his dizziness  He states that his neck pain is currently 5/10  He was also recently prescribed oxycodone for his neck pain, but his wife states that he has not taken much of this  Review of Systems   Constitutional: Positive for activity change, appetite change, chills and fatigue  Negative for fever  HENT: Negative  Eyes: Negative  Respiratory: Negative for shortness of breath  Cardiovascular: Negative for chest pain, palpitations and leg swelling  Gastrointestinal: Positive for nausea and vomiting  Negative for abdominal pain, constipation and diarrhea  Endocrine: Positive for cold intolerance  Negative for heat intolerance  Genitourinary: Positive for difficulty urinating   Negative for decreased urine volume and hematuria  Musculoskeletal: Positive for neck pain  Negative for neck stiffness  Skin: Negative  Allergic/Immunologic: Negative  Neurological: Positive for dizziness and light-headedness  Negative for seizures, syncope, speech difficulty, weakness and headaches  Hematological: Negative  Psychiatric/Behavioral: Negative          Historical Information   Past Medical History:   Diagnosis Date    Aneurysm ([facility] )     Behind left knee recently diagnosed    Back pain     Ceron disease     BPH (benign prostatic hyperplasia)     Cancer [facility] )     melanoma    Cataract     right eye    Cataract     right eye    Confusion     DDD (degenerative disc disease), lumbar     Depression     Diverticulosis     Gallstones     GERD (gastroesophageal reflux disease)     History of cardiac murmur     Past    History of melanoma     Was on back in early 1990's    History of rheumatic fever     Childhood    [place] (hard of hearing)     Hydronephrosis     Hypertension     Kidney stone     Multiple times    Neck pain     Nervous breakdown     History of due to reaction to psych med which he was on for anxiety/depression and became suicidal    Numbness and tingling in both hands     Numbness and tingling of both feet     PONV (postoperative nausea and vomiting)     PVD (peripheral vascular disease) ([facility] )     RBBB     Scoliosis     Seasonal allergies     Tinnitus     Wears partial dentures     Upper    Wears partial dentures     Upper     Past Surgical History:   Procedure Laterality Date    ABDOMINAL SURGERY      When young had procedure for improviing circulation to left lower extremety    BACK SURGERY      Lumbar- not sure what was done   Ardeth Needs CARDIAC CATHETERIZATION      Approximately 2007- no blockages    CARPAL TUNNEL RELEASE Bilateral     CATARACT EXTRACTION Left     COLONOSCOPY      CYST REMOVAL      Robotic procedure to remove benign cyst from lower left lung    CYSTOGRAM N/A 3/14/2018    Procedure: CYSTOGRAM;  Surgeon: Alex Freitas MD;  [facility];  Service: Urology    CYSTOSCOPY      ESOPHAGOGASTRODUODENOSCOPY      IR TUBE PLACEMENT NEPHROSTOMY  8/10/2018    LUNG SURGERY      cyst removed left lung    OTHER SURGICAL HISTORY Left     fistula drain in left buttock    MN CYSTO/URETERO W/LITHOTRIPSY &INDWELL STENT INSRT Left 1/3/2018    Procedure: CYSTOSCOPY URETEROSCOPY, RETROGRADE PYELOGRAM AND INSERTION STENT URETERAL;  Surgeon: Alex Frietas MD;  [facility];  Service: Urology    MN CYSTOTOMY,EXCIS BLADDER TIC N/A 2/14/2018    Procedure: ABDOMINAL EXPLORATION; CLOSURE OF BLADDER DIVERTICULITIS;  Surgeon: Alex Freitas MD;  [facility];  Service: Urology    MN CYSTOURETHROSCOPY,URETER CATHETER Left 8/1/2018    Procedure: Cystoscopy, cystogram, bladder biopsies, removal of pelvic drain;  Surgeon: Alex Freitas MD;  [facility];  Service: Urology    MN INCISE/DRAIN BLADDER N/A 2/14/2018    Procedure: OPEN SUPRAPUBIC TUBE PLACEMENT;  Surgeon: Alex Freitas MD;  [facility];  Service: Urology    MN RELEASE Karlie Rosa FIBROSIS Left 2/14/2018    Procedure: LYSIS OF ADHESIONS; URETEROLYSIS ;  Surgeon: Alex Freitas MD;  [facility];  Service: Urology    SKIN CANCER EXCISION      Melanoma removal on back in early 1990's    TOE AMPUTATION Left     All 5 toes left foot were amputated due to poor circulation     TRANSURETHRAL RESECTION OF PROSTATE N/A 3/14/2018    Procedure: TRANSURETHRAL RESECTION OF PROSTATE (TURP), LEFT URETERAL STENT REMOVAL;  Surgeon: Alex Freitas MD;  [facility];  Service: Urology    TRANSURETHRAL RESECTION OF PROSTATE N/A 9/26/2018    Procedure: TRANSURETHRAL RESECTION OF PROSTATE (TURP);   Surgeon: Alex Freitas MD;  [facility];  Service: Urology    WRIST SURGERY Bilateral     Ulnar nerve on right arm and both wrists are fused     Social History   History   Alcohol Use    Yes Comment: Very rare     History   Drug Use No     History   Smoking Status    Former Smoker    Packs/day: 1 00    Years: 15 00    Types: Cigarettes    Quit date: 1970   Smokeless Tobacco    Never Used     Comment: Quit 48 years     Family History:   Family History   Problem Relation Age of Onset    Heart disease Father     Heart disease Mother     Cancer Paternal Grandfather        Meds/Allergies   PTA meds:   Prior to Admission Medications   Prescriptions Last Dose Informant Patient Reported? Taking?    LORazepam (ATIVAN) 0 5 mg tablet  Self No Yes   Sig: Take 1 tablet (0 5 mg total) by mouth every 6 (six) hours as needed for anxiety   acetaminophen (TYLENOL) 500 mg tablet  Self Yes Yes   Sig: Take 500 mg by mouth every 6 (six) hours as needed for mild pain   amLODIPine (NORVASC) 10 mg tablet  Self Yes Yes   Sig: Take 10 mg by mouth every morning     divalproex sodium (DEPAKOTE) 500 mg EC tablet  Self Yes Yes   Sig: Take 500 mg by mouth 2 (two) times a day     ibuprofen (MOTRIN) 600 mg tablet   No Yes   Sig: Take 1 tablet (600 mg total) by mouth every 8 (eight) hours as needed for mild pain or moderate pain for up to 10 days   methocarbamol (ROBAXIN) 500 mg tablet 2018 at Unknown time  No Yes   Sig: Take 1 tablet (500 mg total) by mouth 3 (three) times a day as needed for muscle spasms for up to 7 days   mometasone (NASONEX) 50 mcg/act nasal spray  Self Yes Yes   Si sprays into each nostril as needed   ondansetron (ZOFRAN) 8 mg tablet  Self No Yes   Sig: Take 1 tablet (8 mg total) by mouth every 8 (eight) hours as needed for nausea or vomiting   oxyCODONE (ROXICODONE) 5 mg immediate release tablet Past Week at Unknown time  No Yes   Sig: Take 1 tablet (5 mg total) by mouth every 4 (four) hours as needed for moderate pain Max Daily Amount: 30 mg   pentoxifylline (TRENtal) 400 mg ER tablet  Self Yes Yes   Sig: Take 400 mg by mouth 3 (three) times a day with meals   ranitidine (ZANTAC) 300 MG tablet  Self Yes Yes   Sig: Take 300 mg by mouth daily at bedtime   tamsulosin (FLOMAX) 0 4 mg  Self No Yes   Sig: Take 1 capsule (0 4 mg total) by mouth daily with dinner      Facility-Administered Medications: None     Allergies   Allergen Reactions    Iodine Shortness Of Breath, Swelling and Hives     Contrast dye causes respiratory distress  Iodine causes skin rash    Mercury Hives and Swelling    Shellfish-Derived Products Anaphylaxis, Hives and Shortness Of Breath    Augmentin [Amoxicillin-Pot Clavulanate] GI Intolerance, Rash and Diarrhea     Diarrhea    Lidoderm [Lidocaine] Rash     Lidoderm patch caused rash    Penicillins Rash, Swelling and Hives    Sulfa Antibiotics Hives, Swelling and Rash       Objective   Vitals: Blood pressure 127/69, pulse 78, temperature 98 2 °F (36 8 °C), temperature source Oral, resp  rate 18, weight 90 3 kg (199 lb), SpO2 98 %  Intake/Output Summary (Last 24 hours) at 11/28/18 1259  Last data filed at 11/28/18 1007   Gross per 24 hour   Intake             1000 ml   Output                0 ml   Net             1000 ml       Invasive Devices          No matching active lines, drains, or airways          Physical Exam   Constitutional: He is oriented to person, place, and time  He appears well-developed  No distress  HENT:   Head: Normocephalic and atraumatic  Eyes: Pupils are equal, round, and reactive to light  Conjunctivae and EOM are normal    Neck: Normal range of motion  Neck supple  Cardiovascular: Normal rate, regular rhythm and intact distal pulses  Exam reveals friction rub  Exam reveals no gallop  No murmur heard  Pulmonary/Chest: Effort normal and breath sounds normal  No respiratory distress  Abdominal: Soft  Bowel sounds are normal  He exhibits no distension  There is no tenderness  Musculoskeletal: Normal range of motion  Lymphadenopathy:     He has no cervical adenopathy     Neurological: He is alert and oriented to person, place, and time  He has normal strength  He displays no atrophy and no tremor  No cranial nerve deficit or sensory deficit  He exhibits normal muscle tone  He displays a negative Romberg sign  He displays no seizure activity  Coordination and gait normal  GCS eye subscore is 4  GCS verbal subscore is 5  GCS motor subscore is 6  Skin: Skin is warm and dry  No pallor  Psychiatric: He has a normal mood and affect  His behavior is normal  Judgment and thought content normal        Lab Results: I have personally reviewed pertinent reports  Imaging: I have personally reviewed pertinent reports  EKG, Pathology, and Other Studies: I have personally reviewed pertinent reports        Code Status: No Order  Advance Directive and Living Will:      Power of :    POLST:      Hakeem Mora, RN  Student Nurse Practitioner

## 2018-11-28 NOTE — ED NOTES
Per Dr Lizet Malone, patient can take morning dose of Depakote brought by family        Alpa Hill RN  11/28/18 5914

## 2018-11-28 NOTE — ASSESSMENT & PLAN NOTE
Patient is orthostatic as evident with SBP positional drop of >10 and elevation of HR  Received IV NS bolus in ED  Continue on gentle hydration  Hold home amlodipine  Monitor orthostatic VS  PT/OT

## 2018-11-28 NOTE — TELEPHONE ENCOUNTER
PT WENT TO ER LAST NIGHT BECAUSE HE WAS DIZZY AND WHEN THEY SPOKE TO DR Jonathan Dsouza (ON CALL) HE ADVISED THEM TO GO TO THE ER  THEY ARE STILL THERE AT [facility] ER - PT IS SCHEDULED FOR CHEMO AT [facility] AT 10:00  PLEASE CANCEL HIS CHEMO FOR TODAY AND ADVISE OF SCHEDULE CHANGE    TXS

## 2018-11-28 NOTE — ED PROCEDURE NOTE
PROCEDURE  ECG 12 Lead Documentation  Date/Time: 11/28/2018 8:53 AM  Performed by: Allie Medina  Authorized by: Allie Medina     Indications / Diagnosis:  Lightheaded  ECG reviewed by me, the ED Provider: yes    Patient location:  ED  Previous ECG:     Previous ECG:  Compared to current    Similarity:  No change  Interpretation:     Interpretation: abnormal    Rate:     ECG rate assessment: normal    Rhythm:     Rhythm: sinus rhythm    Ectopy:     Ectopy: none    Conduction:     Conduction: abnormal      Abnormal conduction: complete RBBB    ST segments:     ST segments:  Normal  T waves:     T waves: normal           Cuong Thompson DO  11/28/18 [id number]

## 2018-11-28 NOTE — ASSESSMENT & PLAN NOTE
With history of Buerger's disease s/p left TMA  Has findings of left popliteal aneurysm  Has LLE claudication pain with LLE TRENTON 10/5 0 57  Will need outpatient Vascular Surgery monitoring

## 2018-11-28 NOTE — ASSESSMENT & PLAN NOTE
SIRS vs possible sepsis with mild neutropenia, leukopenia of 1 9, tachycardia, UTI as possible source  Started on Levaquin   Continue with IVF  Check procalcitonin  F/u urine and blood cultures

## 2018-11-28 NOTE — ASSESSMENT & PLAN NOTE
Patient follows with Urology  He is to have surgery for bladder and prostate removal after chemotherapy  Outpatient Urology follow up

## 2018-11-28 NOTE — ED PROVIDER NOTES
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 presents to the emergency department with a 2 day history of episodes of feeling lightheaded to the point of feeling like he may pass out, dysuria and frequency of urination  No fevers or chills  No nausea or vomiting  No chest pain, palpitations or shortness of breath  Patient's last chemotherapy treatment was a week ago  History provided by:  Patient   used: No    Dizziness   Quality:  Lightheadedness  Severity:  Unable to specify  Onset quality:  Gradual  Duration:  2 days  Timing:  Intermittent  Progression:  Unchanged  Chronicity:  New  Context: standing up    Relieved by:  Lying down  Worsened by:  Standing up  Associated symptoms: no blood in stool, no chest pain, no diarrhea, no headaches, no nausea, no palpitations, no shortness of breath, no syncope, no tinnitus, no vision changes, no vomiting and no weakness    Risk factors: multiple medications    Risk factors: no anemia, no heart disease, no hx of stroke, no hx of vertigo, no Meniere's disease and no new medications        Prior to Admission Medications   Prescriptions Last Dose Informant Patient Reported? Taking?    LORazepam (ATIVAN) 0 5 mg tablet  Self No Yes   Sig: Take 1 tablet (0 5 mg total) by mouth every 6 (six) hours as needed for anxiety   acetaminophen (TYLENOL) 500 mg tablet  Self Yes Yes   Sig: Take 500 mg by mouth every 6 (six) hours as needed for mild pain   amLODIPine (NORVASC) 10 mg tablet  Self Yes Yes   Sig: Take 10 mg by mouth every morning     divalproex sodium (DEPAKOTE) 500 mg EC tablet  Self Yes Yes   Sig: Take 500 mg by mouth 2 (two) times a day     ibuprofen (MOTRIN) 600 mg tablet   No Yes   Sig: Take 1 tablet (600 mg total) by mouth every 8 (eight) hours as needed for mild pain or moderate pain for up to 10 days   methocarbamol (ROBAXIN) 500 mg tablet 2018 at Unknown time  No Yes   Sig: Take 1 tablet (500 mg total) by mouth 3 (three) times a day as needed for muscle spasms for up to 7 days   mometasone (NASONEX) 50 mcg/act nasal spray  Self Yes Yes   Si sprays into each nostril as needed   ondansetron (ZOFRAN) 8 mg tablet  Self No Yes   Sig: Take 1 tablet (8 mg total) by mouth every 8 (eight) hours as needed for nausea or vomiting   oxyCODONE (ROXICODONE) 5 mg immediate release tablet Past Week at Unknown time  No Yes   Sig: Take 1 tablet (5 mg total) by mouth every 4 (four) hours as needed for moderate pain Max Daily Amount: 30 mg   pentoxifylline (TRENtal) 400 mg ER tablet  Self Yes Yes   Sig: Take 400 mg by mouth 3 (three) times a day with meals   ranitidine (ZANTAC) 300 MG tablet  Self Yes Yes   Sig: Take 300 mg by mouth daily at bedtime   tamsulosin (FLOMAX) 0 4 mg  Self No Yes   Sig: Take 1 capsule (0 4 mg total) by mouth daily with dinner      Facility-Administered Medications: None       Past Medical History:   Diagnosis Date    Aneurysm ([address] )     Behind left knee recently diagnosed    Back pain     Ceron disease     BPH (benign prostatic hyperplasia)     Cancer (HCC)     melanoma    Cataract     right eye    Cataract     right eye    Confusion     DDD (degenerative disc disease), lumbar     Depression     Diverticulosis     Gallstones     GERD (gastroesophageal reflux disease)     History of cardiac murmur     Past    History of melanoma     Was on back in early 's    History of rheumatic fever     Childhood    [place]     Hydronephrosis     Hypertension     Kidney stone     Multiple times    Neck pain     Nervous breakdown     History of due to reaction to psych med which he was on for anxiety/depression and became suicidal    Numbness and tingling in both hands     Numbness and tingling of both feet     PONV (postoperative nausea and vomiting)     PVD (peripheral vascular disease) ([address] )     RBBB     Scoliosis     Seasonal allergies     Tinnitus     Wears partial dentures     Upper    Wears partial dentures     Upper       Past Surgical History:   Procedure Laterality Date    ABDOMINAL SURGERY      When young had procedure for improviing circulation to left lower extremety    BACK SURGERY      Lumbar- not sure what was done   [facility] CARDIAC CATHETERIZATION      Approximately 2007- no blockages    CARPAL TUNNEL RELEASE Bilateral     CATARACT EXTRACTION Left     COLONOSCOPY      CYST REMOVAL      Robotic procedure to remove benign cyst from lower left lung    CYSTOGRAM N/A 3/14/2018    Procedure: CYSTOGRAM;  Surgeon: Dasia Chua MD;  [facility];  Service: Urology    CYSTOSCOPY      ESOPHAGOGASTRODUODENOSCOPY      IR TUBE PLACEMENT NEPHROSTOMY  8/10/2018    LUNG SURGERY      cyst removed left lung    OTHER SURGICAL HISTORY Left     fistula drain in left buttock    [place] CYSTO/URETERO W/LITHOTRIPSY &INDWELL STENT INSRT Left 1/3/2018    Procedure: CYSTOSCOPY URETEROSCOPY, RETROGRADE PYELOGRAM AND INSERTION STENT URETERAL;  Surgeon: Dasia Chua MD;  [facility];  Service: Urology    [place] CYSTOTOMY,EXCIS BLADDER TIC N/A 2/14/2018    Procedure: ABDOMINAL EXPLORATION; CLOSURE OF BLADDER DIVERTICULITIS;  Surgeon: Dasia Chua MD;  [facility];  Service: Urology    [place] CYSTOURETHROSCOPY,URETER CATHETER Left 8/1/2018    Procedure: Cystoscopy, cystogram, bladder biopsies, removal of pelvic drain;  Surgeon: Dasia Chua MD;  [facility];  Service: Urology    [place] INCISE/DRAIN BLADDER N/A 2/14/2018    Procedure: OPEN SUPRAPUBIC TUBE PLACEMENT;  Surgeon: Dasia Chua MD;  [facility];  Service: Urology    [place] RELEASE URETER,RETROPER FIBROSIS Left 2/14/2018    Procedure: LYSIS OF ADHESIONS; URETEROLYSIS ;  Surgeon: Megan Ram MD;  [facility];  Service: Urology    SKIN CANCER EXCISION      Melanoma removal on back in early 1990's    TOE AMPUTATION Left     All 5 toes left foot were amputated due to poor circulation     TRANSURETHRAL RESECTION OF PROSTATE N/A 3/14/2018    Procedure: TRANSURETHRAL RESECTION OF PROSTATE (TURP), LEFT URETERAL STENT REMOVAL;  Surgeon: Megan Ram MD;  [facility];  Service: Urology    TRANSURETHRAL RESECTION OF PROSTATE N/A 9/26/2018    Procedure: TRANSURETHRAL RESECTION OF PROSTATE (TURP); Surgeon: Megan Ram MD;  [facility];  Service: Urology    WRIST SURGERY Bilateral     Ulnar nerve on right arm and both wrists are fused       Family History   Problem Relation Age of Onset    Heart disease Father     Heart disease Mother     Cancer Paternal Grandfather      I have reviewed and agree with the history as documented  Social History   Substance Use Topics    Smoking status: Former Smoker     Packs/day: 1 00     Years: 15 00     Types: Cigarettes     Quit date: 2/5/1970    Smokeless tobacco: Never Used      Comment: Quit 50 years    Alcohol use Yes      Comment: Very rare        Review of Systems   Constitutional: Negative  HENT: Negative  Negative for tinnitus  Eyes: Negative  Respiratory: Negative  Negative for shortness of breath  Cardiovascular: Negative  Negative for chest pain, palpitations and syncope  Gastrointestinal: Negative  Negative for blood in stool, diarrhea, nausea and vomiting  Genitourinary: Positive for dysuria and frequency  Negative for decreased urine volume, difficulty urinating, discharge, enuresis, flank pain, genital sores, hematuria, penile pain, penile swelling, scrotal swelling, testicular pain and urgency  Musculoskeletal: Negative for neck pain  Skin: Negative      Allergic/Immunologic: Negative  Neurological: Positive for light-headedness  Negative for dizziness, tremors, seizures, syncope, facial asymmetry, speech difficulty, weakness, numbness and headaches  Hematological: Negative  Psychiatric/Behavioral: Negative  All other systems reviewed and are negative  Physical Exam  Physical Exam   Constitutional: He is oriented to person, place, and time  He appears well-developed and well-nourished  Non-toxic appearance  He does not have a sickly appearance  He does not appear ill  No distress  HENT:   Head: Normocephalic and atraumatic  Right Ear: External ear normal    Left Ear: External ear normal    Mouth/Throat: Oropharynx is clear and moist    Eyes: Pupils are equal, round, and reactive to light  Conjunctivae and EOM are normal  No scleral icterus  Neck: Normal range of motion  Neck supple  Cardiovascular: Regular rhythm and normal heart sounds  Tachycardia present  Pulmonary/Chest: Effort normal and breath sounds normal    Abdominal: Soft  Normal appearance and bowel sounds are normal  He exhibits no distension and no mass  There is tenderness in the suprapubic area  There is no rebound and no guarding  No hernia  Musculoskeletal: Normal range of motion  He exhibits no edema, tenderness or deformity  Lymphadenopathy:     He has no cervical adenopathy  Neurological: He is alert and oriented to person, place, and time  He has normal strength and normal reflexes  He displays normal reflexes  No cranial nerve deficit  He exhibits normal muscle tone  Coordination normal    Skin: Skin is warm and dry  No rash noted  He is not diaphoretic  No erythema  No pallor  Psychiatric: He has a normal mood and affect  Nursing note and vitals reviewed        Vital Signs  ED Triage Vitals [11/28/18 0808]   Temperature Pulse Respirations Blood Pressure SpO2   98 2 °F (36 8 °C) (!) 108 16 126/70 99 %      Temp Source Heart Rate Source Patient Position - Orthostatic VS BP Location FiO2 (%)   Oral Monitor Sitting Left arm --      Pain Score       4           Vitals:    11/28/18 1004 11/28/18 1006 11/28/18 1007 11/28/18 1026   BP: 141/64 133/65 127/57 127/69   Pulse: 77 97 (!) 111 78   Patient Position - Orthostatic VS: Lying - Orthostatic VS Sitting - Orthostatic VS Standing for 3 minutes - Orthostatic VS Lying       Visual Acuity      ED Medications  Medications   levofloxacin (LEVAQUIN) IVPB (premix) 750 mg (not administered)   sodium chloride 0 9 % bolus 1,000 mL (1,000 mL Intravenous New Bag 11/28/18 0907)       Diagnostic Studies  Results Reviewed     Procedure Component Value Units Date/Time    Blood culture #1 [[id number]] Collected:  11/28/18 1058    Lab Status:  No result Specimen:  Blood from Hand, Left     Blood culture #2 [[id number]] Collected:  11/28/18 1054    Lab Status:  No result Specimen:  Blood from Arm, Right     CBC and differential [[id number]]  (Abnormal) Collected:  11/28/18 0903    Lab Status:  Final result Specimen:  Blood from Arm, Right Updated:  11/28/18 1040     WBC 1 90 (LL) Thousand/uL      RBC 2 77 (L) Million/uL      Hemoglobin 7 9 (L) g/dL      Hematocrit 24 0 (L) %      MCV 87 fL      MCH 28 5 pg      MCHC 32 9 g/dL      RDW 14 3 %      MPV 8 8 (L) fL      Platelets 307 Thousands/uL      nRBC 0 /100 WBCs     Urine Microscopic [[id number]]  (Abnormal) Collected:  11/28/18 0926    Lab Status:  Final result Specimen:  Urine from Urine, Clean Catch Updated:  11/28/18 0942     RBC, UA 4-10 (A) /hpf      WBC, UA Innumerable (A) /hpf      Epithelial Cells Occasional /hpf      Bacteria, UA Occasional /hpf     Urine culture [[id number]] Collected:  11/28/18 0926    Lab Status:   In process Specimen:  Urine from Urine, Clean Catch Updated:  11/28/18 0942    Lactic acid, plasma [[id number]]  (Normal) Collected:  11/28/18 0903    Lab Status:  Final result Specimen:  Blood from Arm, Right Updated:  11/28/18 0932     LACTIC ACID 1 3 mmol/L     Narrative:         Result may be elevated if tourniquet was used during collection  Valproic acid level, total [[id number]]  (Abnormal) Collected:  11/28/18 0903    Lab Status:  Final result Specimen:  Blood from Arm, Right Updated:  11/28/18 0931     Valproic Acid, Total 40 (L) ug/mL     Comprehensive metabolic panel [[id number]]  (Abnormal) Collected:  11/28/18 0903    Lab Status:  Final result Specimen:  Blood from Arm, Right Updated:  11/28/18 0931     Sodium 138 mmol/L      Potassium 3 4 (L) mmol/L      Chloride 100 mmol/L      CO2 26 mmol/L      ANION GAP 12 mmol/L      BUN 12 mg/dL      Creatinine 1 38 (H) mg/dL      Glucose 101 mg/dL      Calcium 8 6 mg/dL      AST 13 U/L      ALT 10 (L) U/L      Alkaline Phosphatase 58 U/L      Total Protein 6 4 g/dL      Albumin 2 4 (L) g/dL      Total Bilirubin 0 39 mg/dL      eGFR 50 ml/min/1 73sq m     Narrative:         National Kidney Disease Education Program recommendations are as follows:  GFR calculation is accurate only with a steady state creatinine  Chronic Kidney disease less than 60 ml/min/1 73 sq  meters  Kidney failure less than 15 ml/min/1 73 sq  meters      POCT urinalysis dipstick [[id number]]  (Normal) Resulted:  11/28/18 0915    Lab Status:  Final result Specimen:  Urine Updated:  11/28/18 0916     Color, UA yellow    ED Urine Macroscopic [[id number]]  (Abnormal) Collected:  11/28/18 0926    Lab Status:  Final result Specimen:  Urine Updated:  11/28/18 0912     Color, UA Yellow     Clarity, UA Cloudy     pH, UA 7 5     Leukocytes, UA Trace (A)     Nitrite, UA Negative     Protein, UA >=300 (A) mg/dl      Glucose,  (1/10%) (A) mg/dl      Ketones, UA 15 (1+) (A) mg/dl      Urobilinogen, UA 0 2 E U /dl      Bilirubin, UA Negative     Blood, UA Moderate (A)     Specific [place], UA 1 025    Narrative:       CLINITEK RESULT                 No orders to display              Procedures  Procedures       Phone Contacts  ED Phone Contact    ED Course  ED Course as of Nov 28 1100 Wed Nov 28, 2018   0933 baseline Creatinine: (!) 1 38                               [facility]  Number of Diagnoses or Management Options  Diagnosis management comments: 79-year-old male with a history of urethral cancer currently on chemotherapy presents with a 2 day history of lightheadedness versus near syncopal episodes, frequency and dysuria  No fevers or chills  No nausea, vomiting or diarrhea no chest pain or shortness of breath  On exam he appears well in no distress  He does have some mild suprapubic tenderness on exam   He is slightly tachycardic  Will check basic labs to rule out anemia/infection/electrolyte abnormalities/UTI  Will give IV fluids and reassess  Amount and/or Complexity of Data Reviewed  Clinical lab tests: ordered and reviewed  Independent visualization of images, tracings, or specimens: yes      CritCare Time    Disposition  Final diagnoses:   UTI (urinary tract infection)   Neutropenia ([facility] )   Dehydration   Anemia     Time reflects when diagnosis was documented in both MDM as applicable and the Disposition within this note     Time User Action Codes Description Comment    11/28/2018 10:59 AM [place] A Add [N39 0] UTI (urinary tract infection)     11/28/2018 10:59 AM [place] A Add [D70 9] Neutropenia ([facility] )     11/28/2018 10:59 AM [place] A Add [E86 0] Dehydration     11/28/2018 10:59 AM Tab [place] A Add [D64 9] Anemia       ED Disposition     ED Disposition Condition Comment    Admit  Case was discussed with dr Sanaz Lord and the patient's admission status was agreed to be Admission Status: inpatient status to the service of Dr Sanaz Lord   Follow-up Information    None         Patient's Medications   Discharge Prescriptions    No medications on file     No discharge procedures on file      ED Provider  Electronically Signed by           Anusha Morgan DO  11/28/18 1100

## 2018-11-28 NOTE — TELEPHONE ENCOUNTER
Spoke with Stephen Tarango in [place] infusion pt's apt for chemo was cx [facility] 11[facility]

## 2018-11-28 NOTE — ASSESSMENT & PLAN NOTE
Possibly related to lightheadedness and orthostasis  Likely related to chemo  Will check heme stool occult  Anemia workup

## 2018-11-28 NOTE — ASSESSMENT & PLAN NOTE
Reported gross pyuria, dysuria, suprapubic tenderness, UA suggestive of UTI  F/u urine cultures  Continue with Levaquin  Monitor Cbc/VS

## 2018-11-28 NOTE — ASSESSMENT & PLAN NOTE
Patient follows with Urology Dr Lilly Boles and Hem/Onc Dr Claude Junes  He is on combination chemo with cisplatin and gemcitabine, last treatment Wed 11/21 - receives Rx weekly x 3 wks then 4th week holiday  Consider evaluation from Hem/Onc based on clinical course

## 2018-11-29 LAB
ALBUMIN SERPL BCP-MCNC: 2 G/DL (ref 3.5–5)
ALP SERPL-CCNC: 49 U/L (ref 46–116)
ALT SERPL W P-5'-P-CCNC: 7 U/L (ref 12–78)
ANION GAP SERPL CALCULATED.3IONS-SCNC: 7 MMOL/L (ref 4–13)
ANISOCYTOSIS BLD QL SMEAR: PRESENT
AST SERPL W P-5'-P-CCNC: 10 U/L (ref 5–45)
BACTERIA UR CULT: NORMAL
BASOPHILS # BLD MANUAL: 0.03 THOUSAND/UL (ref 0–0.1)
BASOPHILS NFR MAR MANUAL: 1 % (ref 0–1)
BILIRUB SERPL-MCNC: 0.3 MG/DL (ref 0.2–1)
BUN SERPL-MCNC: 11 MG/DL (ref 5–25)
CALCIUM SERPL-MCNC: 8.2 MG/DL (ref 8.3–10.1)
CHLORIDE SERPL-SCNC: 103 MMOL/L (ref 100–108)
CO2 SERPL-SCNC: 26 MMOL/L (ref 21–32)
CREAT SERPL-MCNC: 1.23 MG/DL (ref 0.6–1.3)
EOSINOPHIL # BLD MANUAL: 0 THOUSAND/UL (ref 0–0.4)
EOSINOPHIL NFR BLD MANUAL: 0 % (ref 0–6)
ERYTHROCYTE [DISTWIDTH] IN BLOOD BY AUTOMATED COUNT: 14.6 % (ref 11.6–15.1)
GFR SERPL CREATININE-BSD FRML MDRD: 58 ML/MIN/1.73SQ M
GLUCOSE SERPL-MCNC: 89 MG/DL (ref 65–140)
HCT VFR BLD AUTO: 21.4 % (ref 36.5–49.3)
HGB BLD-MCNC: 7 G/DL (ref 12–17)
LYMPHOCYTES # BLD AUTO: 0.99 THOUSAND/UL (ref 0.6–4.47)
LYMPHOCYTES # BLD AUTO: 37 % (ref 14–44)
MAGNESIUM SERPL-MCNC: 1.1 MG/DL (ref 1.6–2.6)
MCH RBC QN AUTO: 28.7 PG (ref 26.8–34.3)
MCHC RBC AUTO-ENTMCNC: 32.7 G/DL (ref 31.4–37.4)
MCV RBC AUTO: 88 FL (ref 82–98)
MONOCYTES # BLD AUTO: 0.27 THOUSAND/UL (ref 0–1.22)
MONOCYTES NFR BLD: 10 % (ref 4–12)
MYELOCYTES NFR BLD MANUAL: 1 % (ref 0–1)
NEUTROPHILS # BLD MANUAL: 1.34 THOUSAND/UL (ref 1.85–7.62)
NEUTS BAND NFR BLD MANUAL: 1 % (ref 0–8)
NEUTS SEG NFR BLD AUTO: 49 % (ref 43–75)
NRBC BLD AUTO-RTO: 1 /100 WBCS
PLATELET # BLD AUTO: 129 THOUSANDS/UL (ref 149–390)
PLATELET BLD QL SMEAR: ABNORMAL
PMV BLD AUTO: 8.5 FL (ref 8.9–12.7)
POLYCHROMASIA BLD QL SMEAR: PRESENT
POTASSIUM SERPL-SCNC: 3.2 MMOL/L (ref 3.5–5.3)
PROCALCITONIN SERPL-MCNC: <0.05 NG/ML
PROT SERPL-MCNC: 5.6 G/DL (ref 6.4–8.2)
RBC # BLD AUTO: 2.44 MILLION/UL (ref 3.88–5.62)
RBC MORPH BLD: PRESENT
SODIUM SERPL-SCNC: 136 MMOL/L (ref 136–145)
TOTAL CELLS COUNTED SPEC: 100
VARIANT LYMPHS # BLD AUTO: 1 %
WBC # BLD AUTO: 2.68 THOUSAND/UL (ref 4.31–10.16)

## 2018-11-29 PROCEDURE — 83735 ASSAY OF MAGNESIUM: CPT | Performed by: FAMILY MEDICINE

## 2018-11-29 PROCEDURE — 90662 IIV NO PRSV INCREASED AG IM: CPT | Performed by: FAMILY MEDICINE

## 2018-11-29 PROCEDURE — 85007 BL SMEAR W/DIFF WBC COUNT: CPT | Performed by: FAMILY MEDICINE

## 2018-11-29 PROCEDURE — 97163 PT EVAL HIGH COMPLEX 45 MIN: CPT

## 2018-11-29 PROCEDURE — 99232 SBSQ HOSP IP/OBS MODERATE 35: CPT | Performed by: FAMILY MEDICINE

## 2018-11-29 PROCEDURE — 97116 GAIT TRAINING THERAPY: CPT

## 2018-11-29 PROCEDURE — 97166 OT EVAL MOD COMPLEX 45 MIN: CPT

## 2018-11-29 PROCEDURE — 84145 PROCALCITONIN (PCT): CPT | Performed by: FAMILY MEDICINE

## 2018-11-29 PROCEDURE — G8987 SELF CARE CURRENT STATUS: HCPCS

## 2018-11-29 PROCEDURE — G8988 SELF CARE GOAL STATUS: HCPCS

## 2018-11-29 PROCEDURE — G8979 MOBILITY GOAL STATUS: HCPCS

## 2018-11-29 PROCEDURE — 85027 COMPLETE CBC AUTOMATED: CPT | Performed by: FAMILY MEDICINE

## 2018-11-29 PROCEDURE — 80053 COMPREHEN METABOLIC PANEL: CPT | Performed by: FAMILY MEDICINE

## 2018-11-29 PROCEDURE — G0008 ADMIN INFLUENZA VIRUS VAC: HCPCS | Performed by: FAMILY MEDICINE

## 2018-11-29 PROCEDURE — G8978 MOBILITY CURRENT STATUS: HCPCS

## 2018-11-29 RX ORDER — POTASSIUM CHLORIDE 20 MEQ/1
20 TABLET, EXTENDED RELEASE ORAL
Status: DISCONTINUED | OUTPATIENT
Start: 2018-11-29 | End: 2018-12-01 | Stop reason: HOSPADM

## 2018-11-29 RX ORDER — MAGNESIUM SULFATE HEPTAHYDRATE 40 MG/ML
2 INJECTION, SOLUTION INTRAVENOUS ONCE
Status: COMPLETED | OUTPATIENT
Start: 2018-11-29 | End: 2018-11-29

## 2018-11-29 RX ADMIN — POTASSIUM CHLORIDE 20 MEQ: 1500 TABLET, EXTENDED RELEASE ORAL at 11:38

## 2018-11-29 RX ADMIN — FLUTICASONE PROPIONATE 1 SPRAY: 50 SPRAY, METERED NASAL at 08:31

## 2018-11-29 RX ADMIN — DIVALPROEX SODIUM 500 MG: 500 TABLET, DELAYED RELEASE ORAL at 08:33

## 2018-11-29 RX ADMIN — INFLUENZA A VIRUS A/MICHIGAN/45/2015 X-275 (H1N1) ANTIGEN (FORMALDEHYDE INACTIVATED), INFLUENZA A VIRUS A/SINGAPORE/INFIMH-16-0019/2016 IVR-186 (H3N2) ANTIGEN (FORMALDEHYDE INACTIVATED), AND INFLUENZA B VIRUS B/MARYLAND/15/2016 BX-69A (A B/COLORADO/6/2017-LIKE VIRUS) ANTIGEN (FORMALDEHYDE INACTIVATED) 0.5 ML: 60; 60; 60 INJECTION, SUSPENSION INTRAMUSCULAR at 12:37

## 2018-11-29 RX ADMIN — ONDANSETRON 4 MG: 2 INJECTION INTRAMUSCULAR; INTRAVENOUS at 18:32

## 2018-11-29 RX ADMIN — LORAZEPAM 0.5 MG: 0.5 TABLET ORAL at 07:52

## 2018-11-29 RX ADMIN — PENTOXIFYLLINE 400 MG: 400 TABLET, FILM COATED, EXTENDED RELEASE ORAL at 07:52

## 2018-11-29 RX ADMIN — DIVALPROEX SODIUM 500 MG: 500 TABLET, DELAYED RELEASE ORAL at 17:00

## 2018-11-29 RX ADMIN — OXYCODONE HYDROCHLORIDE 5 MG: 5 TABLET ORAL at 12:37

## 2018-11-29 RX ADMIN — PENTOXIFYLLINE 400 MG: 400 TABLET, FILM COATED, EXTENDED RELEASE ORAL at 11:38

## 2018-11-29 RX ADMIN — PENTOXIFYLLINE 400 MG: 400 TABLET, FILM COATED, EXTENDED RELEASE ORAL at 15:45

## 2018-11-29 RX ADMIN — OXYCODONE HYDROCHLORIDE 5 MG: 5 TABLET ORAL at 02:51

## 2018-11-29 RX ADMIN — MAGNESIUM SULFATE HEPTAHYDRATE 2 G: 40 INJECTION, SOLUTION INTRAVENOUS at 16:57

## 2018-11-29 RX ADMIN — LORAZEPAM 0.5 MG: 0.5 TABLET ORAL at 00:00

## 2018-11-29 RX ADMIN — POTASSIUM CHLORIDE 20 MEQ: 1500 TABLET, EXTENDED RELEASE ORAL at 15:45

## 2018-11-29 RX ADMIN — TAMSULOSIN HYDROCHLORIDE 0.4 MG: 0.4 CAPSULE ORAL at 15:45

## 2018-11-29 RX ADMIN — ENOXAPARIN SODIUM 40 MG: 40 INJECTION SUBCUTANEOUS at 08:32

## 2018-11-29 RX ADMIN — OXYCODONE HYDROCHLORIDE 5 MG: 5 TABLET ORAL at 23:53

## 2018-11-29 RX ADMIN — FAMOTIDINE 20 MG: 20 TABLET ORAL at 08:32

## 2018-11-29 RX ADMIN — FLUTICASONE PROPIONATE 1 SPRAY: 50 SPRAY, METERED NASAL at 17:00

## 2018-11-29 NOTE — PLAN OF CARE
Problem: OCCUPATIONAL THERAPY ADULT  Goal: Performs self-care activities at highest level of function for planned discharge setting  See evaluation for individualized goals  Treatment Interventions: ADL retraining, Functional transfer training, Endurance training, Patient/family training, Equipment evaluation/education, Compensatory technique education, Energy conservation, Activityengagement          See flowsheet documentation for full assessment, interventions and recommendations  Limitation: Decreased ADL status, Decreased Safe judgement during ADL, Decreased endurance, Decreased self-care trans, Decreased high-level ADLs (Simultaneous filing  User may not have seen previous data )  Prognosis: Good  Assessment: Pt is 69 y/o male seen for OT evaluation admit [facility] on 11/28/18 with lightheadedness  Comorbidities include: acute cystitis without hematuria, acute on chronic anemia, systemic inflammatory response syndrome 2* possible UTI, CKD III, hypokalemia, L TMA, prostate cancer and urothelial carcinoma, pt currently on chemotherapy since October, h/o peripheral vascular disease  Personal factors include pt lives at home with significant other who works during the day but is physically able to assist if needed  Pt reports local daughter nearby as well who also works during the day  Prior to arrival pt reports independent with ADLS, IADLS, functional transfers and mobility with recent use of RW 2* lightheadness, prior to that no use AD  Pt reports SPC available if needed  Pt seated EOB upon arrival with BP at 133/71  Upon evaluation, pt with independent self-feeding, supervision grooming, supervision UB/LB ADLS, supervision toileting with use of grab bars and clothing management, supervision sit<>stand functional transfers with use of armrests, BP at 113/60 upon stance, pt returned EOB with cues to pump ankles  Pt with supervision functional mobility with no use AD   Pt seated bedside chair after toileting, BP 128/76  Pt assessed for UE ROM/MMT while seated bedside chair, /79 with cues to pump ankles BP at 125/74 at end of session with call bell within reach  Pt presents with the following deficits impacting occupational performance: increased dizziness, orthostatic hypotension, decreased balance, decreased endurance, decreased activity tolerance (FAIR)  These deficits impact participation with [place], functional transfers and mobility, leisure participation  Occupational performance areas to address include: dressing, grooming, bathing, toileting, functional transfers and mobility with DME PRN, light meal prep, pt education on energy conservation strategies  Pt reports no concerns with going home  Recommendation at time of d/c is home with family support       OT Discharge Recommendation: Home with family support  OT - OK to Discharge:  (home with family support when medically stable)      Comments: Harley Ledbetter, OTS

## 2018-11-29 NOTE — PHYSICAL THERAPY NOTE
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    Aortic regurgitation    BPH with obstruction/lower urinary tract symptoms    Bladder diverticulum    Postoperative ileus (HCC)    Kidney stone    Major depressive disorder, single episode, moderate (HCC)    Peripheral vascular disease ([address] )    Essential hypertension    Acute pain of right shoulder    Benign localized hyperplasia of prostate without urinary obstruction    Urinary retention due to benign prostatic hyperplasia    Hydronephrosis of left kidney    Ureteral stricture, left    Pelvic abscess in [facility])    Aneurysm of left popliteal artery ([facility])    Urothelial carcinoma (HCC)    Prostate cancer ([facility] )    Hypokalemia    Orthostatic lightheadedness    Chemotherapy-induced neutropenia (HCC)    SIRS (systemic inflammatory response syndrome) ([facility])    CKD (chronic kidney disease) stage 3, GFR 30-59 ml/min ([facility])    Acute cystitis without hematuria    Acute on chronic anemia       Admitting Diagnoses:   Dehydration [E86 0]  UTI (urinary tract infection) [N39 0]  Anemia [D64 9]  Dizzy [R42]  Neutropenia ([address] ) [D70 9]    Past Medical History:   Diagnosis Date    Aneurysm ([facility] )     Behind left knee recently diagnosed    Back pain     Ceron disease     BPH (benign prostatic hyperplasia)     Cancer ([address] )     melanoma    Cataract     right eye    Cataract     right eye    Confusion     DDD (degenerative disc disease), lumbar     Depression     Diverticulosis     Gallstones     GERD (gastroesophageal reflux disease)     History of cardiac murmur     Past    History of melanoma     Was on back in early 1990's    History of rheumatic fever     Childhood    [place] (hard of hearing)     Hydronephrosis     Hypertension  Kidney stone     Multiple times    Neck pain     Nervous breakdown     History of due to reaction to psych med which he was on for anxiety/depression and became suicidal    Numbness and tingling in both hands     Numbness and tingling of both feet     PONV (postoperative nausea and vomiting)     PVD (peripheral vascular disease) ([address] )     RBBB     Scoliosis     Seasonal allergies     Tinnitus     Wears partial dentures     Upper    Wears partial dentures     Upper       Past Surgical History:   Procedure Laterality Date    ABDOMINAL SURGERY      When young had procedure for improviing circulation to left lower extremety    BACK SURGERY      Lumbar- not sure what was done   [employer] CARDIAC CATHETERIZATION      Approximately 2007- no blockages    CARPAL TUNNEL RELEASE Bilateral     CATARACT EXTRACTION Left     COLONOSCOPY      CYST REMOVAL      Robotic procedure to remove benign cyst from lower left lung    CYSTOGRAM N/A 3/14/2018    Procedure: CYSTOGRAM;  Surgeon: Carla Nguyen MD;  [facility];  Service: Urology    CYSTOSCOPY      ESOPHAGOGASTRODUODENOSCOPY      IR TUBE PLACEMENT NEPHROSTOMY  8/10/2018    LUNG SURGERY      cyst removed left lung    OTHER SURGICAL HISTORY Left     fistula drain in left buttock    AK CYSTO/URETERO W/LITHOTRIPSY &INDWELL STENT INSRT Left 1/3/2018    Procedure: CYSTOSCOPY URETEROSCOPY, RETROGRADE PYELOGRAM AND INSERTION STENT URETERAL;  Surgeon: Carla Nguyen MD;  [facility];  Service: Urology    AK CYSTOTOMY,EXCIS BLADDER TIC N/A 2/14/2018    Procedure: ABDOMINAL EXPLORATION; CLOSURE OF BLADDER DIVERTICULITIS;  Surgeon: Carla Nguyen MD;  [facility];  Service: Urology    AK CYSTOURETHROSCOPY,URETER CATHETER Left 8/1/2018    Procedure: Cystoscopy, cystogram, bladder biopsies, removal of pelvic drain;  Surgeon: Carla Nguyen MD;  [facility];  Service: Urology    AK INCISE/DRAIN BLADDER N/A 2/14/2018    Procedure: OPEN SUPRAPUBIC TUBE PLACEMENT;  Surgeon: Katerina Hankins MD;  [facility];  Service: Urology    WY RELEASE Paola Zoya FIBROSIS Left 2/14/2018    Procedure: LYSIS OF ADHESIONS; URETEROLYSIS ;  Surgeon: Katerina Hankins MD;  [facility];  Service: Urology    SKIN CANCER EXCISION      Melanoma removal on back in early 1990's    TOE AMPUTATION Left     All 5 toes left foot were amputated due to poor circulation     TRANSURETHRAL RESECTION OF PROSTATE N/A 3/14/2018    Procedure: TRANSURETHRAL RESECTION OF PROSTATE (TURP), LEFT URETERAL STENT REMOVAL;  Surgeon: Katerina Hankins MD;  [facility];  Service: Urology    TRANSURETHRAL RESECTION OF PROSTATE N/A 9/26/2018    Procedure: TRANSURETHRAL RESECTION OF PROSTATE (TURP); Surgeon: Katerina Hankins MD;  [facility];  Service: Urology    WRIST SURGERY Bilateral     Ulnar nerve on right arm and both wrists are fused       Imaging Studies:  CT head wo contrast   Final Result by Zhang Noel DO (11/28 1632)      No acute intracranial abnormality  Workstation performed: [id number]            11/29/18 0849   Note Type   Note type Eval only   Pain Assessment   Pain Score 5   Pain Type Chronic pain   Pain Location Neck   Pain Orientation Bilateral   Hospital Pain Intervention(s) Emotional support; Rest   Home Living   Type of [address] One level;Stairs to enter without rails  (1STE)   Home Equipment Walker;Cane   Prior Function   Level of [place] Independent with ADLs and functional mobility  (w/ RW PRN)   Lives With Significant other   [place] Help From   [address] in the last 6 months 0   Restrictions/Precautions   Weight Bearing Precautions Per Order No   Other Precautions Fall Risk   General   Additional Pertinent History remote h/o L TMA   Family/Caregiver Present No   Cognition   Overall Cognitive Status WFL   Arousal/Participation Alert   Orientation Level Oriented to person;Oriented to place;Oriented to time   Following Commands Follows one step commands without difficulty   RUE Assessment   RUE Assessment (refer to OT)   LUE Assessment   LUE Assessment (refer to OT)   RLE Assessment   RLE Assessment WFL   Strength RLE   RLE Overall Strength 4/5   LLE Assessment   LLE Assessment WFL   Strength LLE   LLE Overall Strength 4/5   Coordination   Movements are Fluid and Coordinated 1   Sensation WFL   Bed Mobility   Supine to Sit Unable to assess   Additional Comments pt already sitting EOB upon my arrival & OOB in chair post session   Transfers   Sit to Stand 5  Supervision   Additional items Increased time required;Verbal cues   Stand to Sit 5  Supervision   Additional items Increased time required;Verbal cues   Toilet transfer 5  Supervision   Additional items Increased time required;Verbal cues;Standard toilet   Additional Comments cues for techniques   Ambulation/Elevation   Gait pattern Decreased foot clearance; Wide BELLA; Short stride; Excessively slow   Gait Assistance 4  Minimal assist   Additional items Assist x 1;Verbal cues; Tactile cues   Assistive Device None   Distance 15'x1    Balance   Static Sitting Good   Static Standing Fair   Ambulatory Fair -   Endurance Deficit   Endurance Deficit Yes   Endurance Deficit Description lightheadedness   Activity Tolerance   Activity Tolerance Other (Comment)  (lightheadedness)   Medical Staff Made Aware OT   Nurse Made Aware Emy   Assessment   Prognosis Good   Problem List Decreased strength;Decreased endurance; Impaired balance;Decreased mobility   Assessment Pt  73 y o male recently diagnosed prostate and bladder cancer, on chemo since October; presents with progressively worsening lightheadedness in the past 2 weeks  Pt admitted for Orthostatic lightheadedness, SIRS, chemo induced neutropenia, acute cystitis & acute on chronic anemia (hgb 7 0)  Pt referred to PT for mobility assessment & D/C planning w/ orders of up w/ assistance  PTA, pt reports being I w/ occasional use of RW (PRN)  On eval, pt demonstrate dec mobility, balance, endurance & amb  Pt require S for transfers however require CGA/minAx1 for amb w/o AD for most functional mobility + cues for techniques  Gait deviations as above, slow w/ dec foot clearance but no gross LOB noted  (+) remote h/o L TMA, pt reports not using any shoe orthotics but admits to "stuffing" shoe w/ something to accommodate TMA  (+) lightheadedness reported t/o session  Still noted a significant drop of systolic BP from sitting to standing  BP during session as follows: 133/71  Sitting; 113/60  Standing; 128/76 chair/after amb  Lightheadedness constant regardless of position &/or activity  Rn notified  Nsg staff most recent vital signs as follows: /62 (BP Location: Left arm)   Pulse 74   Temp 98 9 °F (37 2 °C) (Temporal)   Resp 18   Ht 5' 9" (1 753 m)   Wt 90 1 kg (198 lb 10 2 oz)   SpO2 95%   BMI 29 33 kg/m²   At end of session, pt tolerated OOB in chair w/o issues  Call bell & phone in reach  Fall precautions reinforced w/ good understanding  Pt functioning below baseline hence will continue skilled PT to improve function & safety  At this time, will anticipate good progress in PT for safe D/C to home  Will recommend HHPT & family support at D/C, pending progress  Pt will need to achieve PT goals prior to D/C for safe return to home  CM to follow  Nsg staff to continue to mobilized pt (OOB in chair for all meals & ambulate in room/unit) as tolerated to prevent further decline in function  Nsg notified      Barriers to Discharge Decreased caregiver support   Barriers to Discharge Comments pt's girlfriend works during the day   Goals   Patient Goals go home   STG Expiration Date 12/06/18   Short Term Goal #1 Goals to be met in 7 days; pt will be able to: 1) inc strength & balance by 1/2 grade to improve overall functional mobility & dec fall risk; 2) inc bed mobility to I for pt to be able to get in/OOB safely w/ proper techniques 100% of the time, to dec caregiver assistance & safely function at home; 3) inc transfers to modified I for pt to transition safely from one surface to another w/o % of the time, to dec caregiver assistance & safely function at home; 4) inc amb w/ RW approx  >250' w/ modified I for pt to ambulate as tolerated w/o any % of the time, to dec caregiver assistance & safely function at home; 5) inc barthel score to 90 to decrease overall risk for falls; 6) negotiate stairs w/ modified I for inc safety during stair mgt inside/outside of home & dec caregiver assistance; 7) pt/caregiver ed   Treatment Day 1   Plan   Treatment/Interventions Functional transfer training;LE strengthening/ROM; Elevations; Therapeutic exercise; Endurance training;Patient/family training;Bed mobility;Gait training;Spoke to nursing;OT   PT Frequency Other (Comment)  (4-5x/wk)   Recommendation   Recommendation Home PT; Home with family support  (pending progress)   Equipment Recommended Walker  (PRN)   Barthel Index   Feeding 10   Bathing 0   Grooming Score 5   Dressing Score 5   Bladder Score 10   Bowels Score 10   Toilet Use Score 10   Transfers (Bed/Chair) Score 10   Mobility (Level Surface) Score 0   Stairs Score 0   Barthel Index Score 60   Hx/personal factors: co-morbidities, inaccessible home, dec caregiver support, advanced age, use of AD and fall risk  Examination: dec mobility, dec balance, dec endurance, dec amb, moderate fall risk  Clinical: unpredictable (ongoing medical status, abnormal lab values and moderate fall risk)  Complexity: high     PT TREATMENT NOTE:    TIME IN: 08:35  TIME OUT: 08:49  TOTAL TIME: 14mins    S: Pt agreeable to further gait training  O: Sit<>stand: S; Amb w/o AD approx  40'x1 w/ CGA/minAx1; Gait deviations: slow, dec foot clearance, WBOS, inc lateral displacement to the L   A: Pt tolerated farther amb as mentioned above   Gait deviations as above but no gross [place] lateral displacement to the L 2* to L foot TMA  (+) fatigue & lightheadedness but no LOC  /76 after amb  P: Will continue PT per POC  Will continue to monitor progress  Pt will need to achieve PT goals prior to D/C for safe D/C to home  Nsg notified  CM to follow         Lindsay Waldrop, PT

## 2018-11-29 NOTE — PLAN OF CARE
Problem: DISCHARGE PLANNING - CARE MANAGEMENT  Goal: Discharge to post-acute care or home with appropriate resources  INTERVENTIONS:  - Conduct assessment to determine patient/family and health care team treatment goals, and need for post-acute services based on payer coverage, community resources, and patient preferences, and barriers to discharge  - Address psychosocial, clinical, and financial barriers to discharge as identified in assessment in conjunction with the patient/family and health care team  - Arrange appropriate level of post-acute services according to patients   needs and preference and payer coverage in collaboration with the physician and health care team  - Communicate with and update the patient/family, physician, and health care team regarding progress on the discharge plan  - Arrange appropriate transportation to post-acute venues  Outcome: Progressing  Patient to discharge home with appropriate services when medically cleared  Cm following for discharge needs

## 2018-11-29 NOTE — ASSESSMENT & PLAN NOTE
Ruled out  Urine cultures no growth  Patient's urinary symptoms most likely related to his urothelial cancer

## 2018-11-29 NOTE — ASSESSMENT & PLAN NOTE
POA and resolved  No evidence of an infection, urine cultures negative  Tachycardia was likely related to orthostasis and leukopenia related to chemothearpy  Monitor VS/CBC

## 2018-11-29 NOTE — ASSESSMENT & PLAN NOTE
Possibly related to lightheadedness and orthostasis  Likely related to chemo  Will check heme stool occult  Anemia workup suggestive of anemia of chronic disease  Transfuse for Hb < 7

## 2018-11-29 NOTE — PLAN OF CARE
Problem: PHYSICAL THERAPY ADULT  Goal: Performs mobility at highest level of function for planned discharge setting  See evaluation for individualized goals  Treatment/Interventions: Functional transfer training, LE strengthening/ROM, Elevations, Therapeutic exercise, Endurance training, Patient/family training, Bed mobility, Gait training, Spoke to nursing, OT  Equipment Recommended: Walker (PRN)       See flowsheet documentation for full assessment, interventions and recommendations  Outcome: Progressing  Prognosis: Good  Problem List: Decreased strength, Decreased endurance, Impaired balance, Decreased mobility  Assessment: Pt  73 y o male recently diagnosed prostate and bladder cancer, on chemo since October; presents with progressively worsening lightheadedness in the past 2 weeks  Pt admitted for Orthostatic lightheadedness, SIRS, chemo induced neutropenia, acute cystitis & acute on chronic anemia (hgb 7 0)  Pt referred to PT for mobility assessment & D/C planning w/ orders of up w/ assistance  PTA, pt reports being I w/ occasional use of RW (PRN)  On eval, pt demonstrate dec mobility, balance, endurance & amb  Pt require S for transfers however require CGA/minAx1 for amb w/o AD for most functional mobility + cues for techniques  Gait deviations as above, slow w/ dec foot clearance but no gross LOB noted  (+) remote h/o L TMA, pt reports not using any shoe orthotics but admits to "stuffing" shoe w/ something to accommodate TMA  (+) lightheadedness reported t/o session  Still noted a significant drop of systolic BP from sitting to standing  BP during session as follows: 133/71  Sitting; 113/60  Standing; 128/76 chair/after amb  Lightheadedness constant regardless of position &/or activity  Rn notified   [facility] [place] most recent vital signs as follows: /62 (BP Location: Left arm)   Pulse 74   Temp 98 9 °F (37 2 °C) (Temporal)   Resp 18   Ht 5' 9" (1 753 m)   Wt 90 1 kg (198 lb 10 2 oz)   SpO2 95% BMI 29 33 kg/m²   At end of session, pt tolerated OOB in chair w/o issues  Call bell & phone in reach  Fall precautions reinforced w/ good understanding  Pt functioning below baseline hence will continue skilled PT to improve function & safety  At this time, will anticipate good progress in PT for safe D/C to home  Will recommend HHPT & family support at D/C, pending progress  Pt will need to achieve PT goals prior to D/C for safe return to home  CM to follow  Nsg staff to continue to mobilized pt (OOB in chair for all meals & ambulate in room/unit) as tolerated to prevent further decline in function  Nsg notified  Barriers to Discharge: Decreased caregiver support  Barriers to Discharge Comments: pt's girlfriend works during the day  Recommendation: Home PT, Home with family support (pending progress)       PT TREATMENT NOTE:    TIME IN: 08:35  TIME OUT: 08:49  TOTAL TIME: 14mins    S: Pt agreeable to further gait training  O: Sit<>stand: S; Amb w/o AD approx  40'x1 w/ CGA/minAx1; Gait deviations: slow, dec foot clearance, WBOS, inc lateral displacement to the L   A: Pt tolerated farther amb as mentioned above  Gait deviations as above but no gross LOB  Inc lateral displacement to the L 2* to L foot TMA  (+) fatigue & lightheadedness but no LOC  /76 after amb  P: Will continue PT per POC  Will continue to monitor progress  Pt will need to achieve PT goals prior to D/C for safe D/C to home  Nsg notified  CM to follow  See flowsheet documentation for full assessment

## 2018-11-29 NOTE — UTILIZATION REVIEW
Initial Clinical Review    Admission: Date/Time/Statement: 11/28/18 @ 1100     Orders Placed This Encounter   Procedures    Inpatient Admission (expected length of stay for this patient is greater than two midnights)     Standing Status:   Standing     Number of Occurrences:   1     Order Specific Question:   Admitting Physician     Answer:   Melanie Garcia [[id number]]     Order Specific Question:   Level of Care     Answer:   Med Surg [16]     Order Specific Question:   Estimated length of stay     Answer:   More than 2 Midnights     Order Specific Question:   Certification     Answer:   I certify that inpatient services are medically necessary for this patient for a duration of greater than two midnights  See H&P and MD Progress Notes for additional information about the patient's course of treatment  ED: Date/Time/Mode of Arrival:   ED Arrival Information     Expected Arrival Acuity Means of Arrival Escorted By Service Admission Type    - 11/28/2018 08:03 Urgent Wheelchair Family Member General Medicine Urgent    Arrival Complaint    dizziness          Chief Complaint:   Chief Complaint   Patient presents with    Dizziness     Patient reports dizziness, has been on a muscle relaxant for a stiff neck and muscle pain, also reports being on chemo with hx of dehydration  Denies chest pain or SOB, reports the dizziness makes him feel syncopal when standing  History of Illness:     68year old male  Presents to ed with progressively worsening lightheadedness over a two week period  He has prostate and blader cancer and has been on chemotherapy since October             ED Triage Vitals [11/28/18 0808]   98 2 °F (36 8 °C) (!) 108 16 126/70 99 %         Pain Score       4        Wt Readings from Last 1 Encounters:   11/29/18 90 1 kg (198 lb 10 2 oz)       Vital Signs (abnormal):    Temp max   99 7    Heart rate   108,   97  111      11/28/18 1007 127/57  Standing for 3 minutes - Orthostatic VS 11/28/18 1006 133/65  Sitting - Orthostatic VS   11/28/18 1004 141/64  Lying - Orthostatic VS     11/28/18 1902  109/71  Standing - Orthostatic VS   11/28/18 1901  137/73  Sitting - Orthostatic VS   11/28/18 1900  121/67  Lying - Orthostatic          Abnormal Labs/Diagnostic Test Results:     Urine  Moderate blood  + ketones   Innumerable wbc      Creatinine  1 38 H  Valproic acid level  40 L   Wbc  1 9 LL   Hb 7 9 L  Hct  24 0 L   Blood culture x 2___  Stool occult blood____  Urine culture ____    Ct head  No acute finding       ED Treatment:   Medication Administration from 11/28/2018 0803 to 11/28/2018 1601       Date/Time Order Dose Route     11/28/2018 0907 sodium chloride 0 9 % bolus 1,000 mL 1,000 mL Intravenous     11/28/2018 1107 levofloxacin (LEVAQUIN) IVPB (premix) 750 mg 750 mg Intravenous           Past Medical History:    [facility]    Back pain    Ceron disease    BPH (benign prostatic hyperplasia)    Cancer (HCC)    Cataract    Cataract    Confusion    DDD (degenerative disc disease), lumbar    Depression    Diverticulosis    Gallstones    GERD (gastroesophageal reflux disease)    History of cardiac murmur    History of melanoma    History of rheumatic fever    [place] (hard of hearing)    Hydronephrosis    Hypertension    Kidney stone    Neck pain    Nervous breakdown    Numbness and tingling in both hands    Numbness and tingling of both feet    PONV (postoperative nausea and vomiting)    [facility]    RBBB    Scoliosis    Seasonal allergies    Tinnitus    Wears partial dentures    Wears partial dentures       Admitting Diagnosis: Dehydration [E86 0]  UTI (urinary tract infection) [N39 0]  Anemia [D64 9]  Dizzy [R42]  Neutropenia ([address] ) [D70 9]    Age/Sex: 68 y o  male  Continues to require  Orthostatic evaluation,  Iv fluids  Telemetry   PT and OT evaluations pending  Evaluating for  Anemia, and gi bleed        Assessment/Plan: Orthostatic lightheadedness   Assessment & Plan     Patient is orthostatic as evident with SBP positional drop of >10 and elevation of HR  Received IV NS bolus in ED  Continue on gentle hydration  Hold home amlodipine  Monitor orthostatic VS  PT/OT      SIRS (systemic inflammatory response syndrome) (HCC)   Assessment & Plan     SIRS vs possible sepsis with mild neutropenia, leukopenia of 1 9, tachycardia, UTI as possible source  Started on Levaquin   Continue with IVF  Check procalcitonin  F/u urine and blood cultures      Chemotherapy-induced neutropenia (HCC)   Assessment & Plan     Mild with ANC 1 2  Monitor CBC  Consi  Acute cystitis without hematuria   Assessment & Plan     Reported gross pyuria, dysuria, suprapubic tenderness, UA suggestive of UTI  F/u urine cultures  Continue with Levaquin  Monitor Cbc/VS      Acute on chronic anemia   Assessment & Plan     Possibly related to lightheadedness and orthostasis  Likely related to chemo  Will check heme stool occult  Anemia workup  Consider  CSF such as Neulasta          Prostate cancer [facility])   Assessment & Plan     Patient follows with Urology Dr Marylen Herbert and Hem/Onc Dr Cheyenne Rodriguez  He is on combination chemo with cisplatin and gemcitabine, last treatment Wed 11/21 - receives Rx weekly x 3 wks then 4th week holiday  Consider evaluation from Hem/Onc based on clinical course      Urothelial carcinoma [facility])   Assessment & Plan     Patient follows with Urology  He is to have surgery for bladder and prostate removal after chemotherapy  Outpatient Urology follow up            Admission Orders:    Telemetry    PT and OT eval and treat   Repeat orthostatic blood pressure   Sequential compression device  Stool for occult blood    Blood cultures x 2  Urine culture   Ferritin   Folate   Vitamin b 12         Scheduled Meds:     acetaminophen 650 mg Oral Q6H PRN   divalproex sodium 500 mg Oral BID   enoxaparin 40 mg Subcutaneous Daily   famotidine 20 mg Oral Daily fluticasone 1 spray Each Nare BID   influenza vaccine 0 5 mL Intramuscular Prior to discharge   LORazepam 0 5 mg Oral Q6H PRN   ondansetron 4 mg Intravenous Q6H PRN   oxyCODONE 5 mg Oral Q4H PRN   pentoxifylline 400 mg Oral TID With Meals   potassium chloride 20 mEq Oral TID With Meals   tamsulosin 0 4 mg Oral Daily With [facility]

## 2018-11-29 NOTE — PLAN OF CARE
Problem: Potential for Falls  Goal: Patient will remain free of falls  INTERVENTIONS:  - Assess patient frequently for physical needs  -  Identify cognitive and physical deficits and behaviors that affect risk of falls  -  [place] fall precautions as indicated by assessment   - Educate patient/family on patient safety including physical limitations  - Instruct patient to call for assistance with activity based on assessment  - Modify environment to reduce risk of injury  - Consider OT/PT consult to assist with strengthening/mobility   Outcome: Progressing  No falls or injuries this shift  Fall precaution interventions maintained  Pt uses call bell appropriately  Problem: Prexisting or High Potential for Compromised Skin Integrity  Goal: Skin integrity is maintained or improved  INTERVENTIONS:  - Identify patients at risk for skin breakdown  - Assess and monitor skin integrity  - Assess and monitor nutrition and hydration status  - Monitor labs (i e  albumin)  - Assess for incontinence   - Turn and reposition patient  - Assist with mobility/ambulation  - Relieve pressure over bony prominences  - Avoid friction and shearing  - Provide appropriate hygiene as needed including keeping skin clean and dry  - Evaluate need for skin moisturizer/barrier cream  - Collaborate with interdisciplinary team (i e  Nutrition, Rehabilitation, etc )   - Patient/family teaching   Outcome: Progressing  No pressure wounds seen  Pt able to reposition self for comfort  Problem: Nutrition/Hydration-ADULT  Goal: Nutrient/Hydration intake appropriate for improving, restoring or maintaining nutritional needs  Monitor and assess patient's nutrition/hydration status for malnutrition (ex- brittle hair, bruises, dry skin, pale skin and conjunctiva, muscle wasting, smooth red tongue, and disorientation)  Collaborate with interdisciplinary team and initiate plan and interventions as ordered    Monitor patient's weight and dietary intake as ordered or per policy  Utilize nutrition screening tool and intervene per policy  Determine patient's food preferences and provide high-protein, high-caloric foods as appropriate  INTERVENTIONS:  - Monitor oral intake, urinary output, labs, and treatment plans  - Assess nutrition and hydration status and recommend course of action  - Evaluate amount of meals eaten  - Assist patient with eating if necessary   - Allow adequate time for meals  - Recommend/ encourage appropriate diets, oral nutritional supplements, and vitamin/mineral supplements  - Order, calculate, and assess calorie counts as needed  - Recommend, monitor, and adjust tube feedings and TPN/PPN based on assessed needs  - Assess need for intravenous fluids  - Provide specific nutrition/hydration education as appropriate  - Include patient/family/caregiver in decisions related to nutrition   Outcome: Progressing      Problem: PAIN - ADULT  Goal: Verbalizes/displays adequate comfort level or baseline comfort level  Interventions:  - Encourage patient to monitor pain and request assistance  - Assess pain using appropriate pain scale  - Administer analgesics based on type and severity of pain and evaluate response  - Implement non-pharmacological measures as appropriate and evaluate response  - Consider cultural and social influences on pain and pain management  - Notify physician/advanced practitioner if interventions unsuccessful or patient reports new pain  Outcome: Progressing  Medicated x 1 w lorazepam for anxiety/neck spasms, and oxycodone x 1 for neck pain with partial relief obtained       Problem: INFECTION - ADULT  Goal: Absence or prevention of progression during hospitalization  INTERVENTIONS:  - Assess and monitor for signs and symptoms of infection  - Monitor lab/diagnostic results  - Monitor all insertion sites, i e  indwelling lines, tubes, and drains  - Monitor endotracheal (as able) and nasal secretions for changes in amount and color  - [place] appropriate cooling/warming therapies per order  - Administer medications as ordered  - Instruct and encourage patient and family to use good hand hygiene technique  - Identify and instruct in appropriate isolation precautions for identified infection/condition  Outcome: Progressing  Pt is neutropenic, related to chemo  Urine and blood culture results are pending  Problem: DISCHARGE PLANNING  Goal: Discharge to home or other facility with appropriate resources  INTERVENTIONS:  - Identify barriers to discharge w/patient and caregiver  - Arrange for needed discharge resources and transportation as appropriate  - Identify discharge learning needs (meds, wound care, etc )  - Arrange for interpretive services to assist at discharge as needed  - Refer to Case Management Department for coordinating discharge planning if the patient needs post-hospital services based on physician/advanced practitioner order or complex needs related to functional status, cognitive ability, or social support system  Outcome: Progressing  Pending hospital course  Problem: Knowledge Deficit  Goal: Patient/family/caregiver demonstrates understanding of disease process, treatment plan, medications, and discharge instructions  Complete learning assessment and assess knowledge base    Interventions:  - Provide teaching at level of understanding  - Provide teaching via preferred learning methods  Outcome: Progressing

## 2018-11-29 NOTE — ASSESSMENT & PLAN NOTE
Patient follows with Urology Dr Madhuri Almonte and Hem/Onc Dr Rambo Downey  He is on combination chemo with cisplatin and gemcitabine, last treatment Wed 11/21 - receives Rx weekly x 3 wks then 4th week holiday  Consider evaluation from Hem/Onc based on clinical course

## 2018-11-29 NOTE — MALNUTRITION/BMI
This medical record reflects one or more clinical indicators suggestive of malnutrition and/or morbid obesity  Malnutrition Findings:   Malnutrition type: Chronic illness  Degree of Malnutrition: Malnutrition of moderate degree (related to prolonged decreased po intake d/t effects from chemotherapy as evidenced by 10% weight decrease from 7/3/18 to present (5 months), mild fat loss around orbitals, mild muscle loss around temples)  Malnutrition Characteristics: Weight loss, Muscle loss, Fat loss (recommending nutrition supplements and/or snacks between meals  Pt currently refusing both  Continuing to monitor )    BMI Findings: Body mass index is 29 33 kg/m²  See Nutrition note dated 11/29/18 for additional details  Completed nutrition assessment is viewable in flowsheets

## 2018-11-29 NOTE — OCCUPATIONAL THERAPY NOTE
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    Aortic regurgitation    BPH with obstruction/lower urinary tract symptoms    Bladder diverticulum    Postoperative ileus ([address] )    Kidney stone    Major depressive disorder, single episode, moderate (HCC)    Peripheral vascular disease ([address] )    Essential hypertension    Acute pain of right shoulder    Benign localized hyperplasia of prostate without urinary obstruction    Urinary retention due to benign prostatic hyperplasia    Hydronephrosis of left kidney    Ureteral stricture, left    Pelvic abscess in [facility])    Aneurysm of left popliteal artery (HCC)    Urothelial carcinoma (HCC)    Prostate cancer ([address] )    Hypokalemia    Orthostatic lightheadedness    Chemotherapy-induced neutropenia (HCC)    SIRS (systemic inflammatory response syndrome) (HCC)    CKD (chronic kidney disease) stage 3, GFR 30-59 ml/min (HCC)    Acute cystitis without hematuria    Acute on chronic anemia     Past Medical History  Past Medical History:   Diagnosis Date    Aneurysm ([address] )     Behind left knee recently diagnosed    Back pain     Ceron disease     BPH (benign prostatic hyperplasia)     Cancer ([address] )     melanoma    Cataract     right eye    Cataract     right eye    Confusion     DDD (degenerative disc disease), lumbar     Depression     Diverticulosis     Gallstones     GERD (gastroesophageal reflux disease)     History of cardiac murmur     Past    History of melanoma     Was on back in early 1990's    History of rheumatic fever     Childhood    [place] (hard of hearing)     Hydronephrosis     Hypertension     Kidney stone     Multiple times    Neck pain     Nervous breakdown     History of due to reaction to psych med which he was on for anxiety/depression and became suicidal    Numbness and tingling in both hands     Numbness and tingling of both feet     PONV (postoperative nausea and vomiting)     PVD (peripheral vascular disease) (HCC)     RBBB     Scoliosis     Seasonal allergies     Tinnitus     Wears partial dentures     Upper    Wears partial dentures     Upper     Past Surgical History  Past Surgical History:   Procedure Laterality Date    ABDOMINAL SURGERY      When young had procedure for improviing circulation to left lower extremety    BACK SURGERY      Lumbar- not sure what was done   [facility] CARDIAC CATHETERIZATION      Approximately 2007- no blockages    CARPAL TUNNEL RELEASE Bilateral     CATARACT EXTRACTION Left     COLONOSCOPY      CYST REMOVAL      Robotic procedure to remove benign cyst from lower left lung    CYSTOGRAM N/A 3/14/2018    Procedure: CYSTOGRAM;  Surgeon: Sintia Ruggiero MD;  [facility];  Service: Urology    CYSTOSCOPY      ESOPHAGOGASTRODUODENOSCOPY      IR TUBE PLACEMENT NEPHROSTOMY  8/10/2018    LUNG SURGERY      cyst removed left lung    OTHER SURGICAL HISTORY Left     fistula drain in left buttock    ND CYSTO/URETERO W/LITHOTRIPSY &INDWELL STENT INSRT Left 1/3/2018    Procedure: CYSTOSCOPY URETEROSCOPY, RETROGRADE PYELOGRAM AND INSERTION STENT URETERAL;  Surgeon: Sintia Ruggiero MD;  [facility];  Service: Urology    ND CYSTOTOMY,EXCIS BLADDER TIC N/A 2/14/2018    Procedure: ABDOMINAL EXPLORATION; CLOSURE OF BLADDER DIVERTICULITIS;  Surgeon: Sintia Ruggiero MD;  [facility];  Service: Urology    ND CYSTOURETHROSCOPY,URETER CATHETER Left 8/1/2018    Procedure: Cystoscopy, cystogram, bladder biopsies, removal of pelvic drain;  Surgeon: Sintia Ruggiero MD;  [facility];  Service: Urology    ND INCISE/DRAIN BLADDER N/A 2/14/2018    Procedure: OPEN SUPRAPUBIC TUBE PLACEMENT;  Surgeon: Sintia Ruggiero MD;  [facility];  Service: Urology    ND RELEASE Shruthi Clear FIBROSIS Left 2/14/2018    Procedure: LYSIS OF ADHESIONS; URETEROLYSIS ;  Surgeon: Laine Ny MD;  [facility];  Service: Urology    SKIN CANCER EXCISION      Melanoma removal on back in early 1990's    TOE AMPUTATION Left     All 5 toes left foot were amputated due to poor circulation     TRANSURETHRAL RESECTION OF PROSTATE N/A 3/14/2018    Procedure: TRANSURETHRAL RESECTION OF PROSTATE (TURP), LEFT URETERAL STENT REMOVAL;  Surgeon: Liane Ny MD;  [facility];  Service: Urology    TRANSURETHRAL RESECTION OF PROSTATE N/A 9/26/2018    Procedure: TRANSURETHRAL RESECTION OF PROSTATE (TURP); Surgeon: Liane Ny MD;  [facility];  Service: Urology    WRIST SURGERY Bilateral     Ulnar nerve on right arm and both wrists are fused      11/29/18 0857   Note Type   Note type Eval/Treat   Restrictions/Precautions   Weight Bearing Precautions Per Order No   Other Precautions Fall Risk  (neutropenic precautions)   Pain Assessment   Pain Assessment 0-10   Pain Score 5   Pain Type Chronic pain   Pain Location Neck   Pain Orientation Bilateral   Hospital Pain Intervention(s) Repositioned; Emotional support; Ambulation/increased activity  (NSG notified)   Response to Interventions tolerated   Home Living   Type of [address] [place] [address] One level;Stairs to enter without rails  (Ranch style, 1STE)   Bathroom Shower/Tub Walk-in shower   Bathroom Toilet Standard   Bathroom Equipment Grab bars in shower;Grab bars around toilet   Bathroom Accessibility Accessible   Home Equipment Walker;Cane   Additional Comments Pt lives at home with significant other who works during the day, pt reports significant other is physically able to assist if needed, pt reports local daughter nearby    Prior Function   Level of [place] Independent with ADLs and functional mobility   Lives With Significant other   Receives Help From Family   ADL Assistance Independent   IADLs Independent   Falls in the last 6 months 0   Vocational Retired   Comments Pt reports independent with ADLS and IADLS, pt reports recent use of RW for functional mobility 2* dizziness, driving   Lifestyle   Autonomy Per pt independent with ADLS and IADLS, independent with functional transfers and mobility with RW, prior to dizziness pt with no use of AD for mobility, driving   Reciprocal Relationships significant other   Service to Others retired    Intrinsic Gratification pt reports mostly [facility] since october with cancer diagnosis   ADL   Where Assessed Chair   Eating Assistance 7  Independent   Grooming Assistance 5  [address] [facility] 5  Supervision/Setup   LB [address] 5  [address]  5  [address]  5  [address]  5  [address]; Increased time to complete;Grab bar use;Clothing management up;Clothing management down   Functional Assistance 5  Supervision/Setup   Bed Mobility   Additional Comments Pt seated EOB upon arrival and returned to chair at end of session with call bell within reach   (/71 EOB)   Transfers   Sit to Stand 5  Supervision   Additional items Increased time required;Assist x 1; Armrests   Stand to Sit 5  Supervision   Additional items Increased time required;Assist x 1; Armrests   Toilet transfer 5  Supervision   Additional items Assist x 1; Increased time required;Standard toilet  (grab bar)   Additional Comments Pt with supervision for safety and controlled descent, /60 upon stance, pt returned seated EOB with cues to pump ankles   Functional Mobility   Functional Mobility 5  Supervision   Additional Comments assist x1 safety, /76 seated bedside chair   Additional items (no AD)   Balance   Static Sitting Good   Dynamic Sitting Fair +   Static Standing Fair   Dynamic Standing Fair -   Ambulatory Fair -   Activity Tolerance   Activity Tolerance Patient limited by pain  (orthostatic hypotension)   Nurse Made Aware appropriate to see per RNAylin   RUE Assessment   RUE Assessment WFL  (4/5 grossly, 4/5 )   LUE Assessment   LUE Assessment WFL  (4/5 grossly, 4/5 )   Hand Function   Gross Motor Coordination Functional   Fine Motor Coordination Functional   Sensation   Light Touch No apparent deficits   Sharp/Dull No apparent deficits   Additional Comments noted t/o session   Proprioception   Proprioception No apparent deficits  (noted t/o session)   Vision-Basic Assessment   Current Vision Wears glasses only for reading   Visual History Cataracts  (pt reports upcoming surgery soon)   Vision - Complex Assessment   Ocular Range of Motion [facility]   Acuity Able to read normal print without difficulty   Perception   Inattention/Neglect Appears intact   Cognition   Overall Cognitive Status [facility]   Arousal/Participation Alert; Cooperative   Attention Within functional limits   Orientation Level Oriented to person;Oriented to place;Oriented to time  (environmental cues for date)   Memory Within functional limits;Decreased short term memory  (questionable STM)   Following Commands Follows one step commands without difficulty   Comments Pt engaged in appropriate conversation t/o session   Assessment   Limitation Decreased ADL status; Decreased Safe judgement during ADL;Decreased endurance;Decreased self-care trans;Decreased high-level ADLs; Decreased cognition   Prognosis Good   Assessment Pt is 67 y/o male seen for OT evaluation admit [facility] on 11/28/18 with lightheadedness  Comorbidities include: acute cystitis without hematuria, acute on chronic anemia, systemic inflammatory response syndrome 2* possible UTI, CKD III, hypokalemia, L TMA, prostate cancer and urothelial carcinoma, pt currently on chemotherapy since October, h/o peripheral vascular disease   Personal factors include pt lives at home with significant other who works during the day but is physically able to assist if needed  Pt reports local daughter nearby as well who also works during the day  Prior to arrival pt reports independent with ADLS, IADLS, functional transfers and mobility with recent use of RW 2* lightheadness, prior to that no use AD  Pt reports SPC available if needed  Pt seated EOB upon arrival with BP at 133/71  Upon evaluation, pt with independent self-feeding, supervision grooming, supervision UB/LB ADLS, supervision toileting with use of grab bars and clothing management, supervision sit<>stand functional transfers with use of armrests, BP at 113/60 upon stance, pt returned EOB with cues to pump ankles  Pt with supervision functional mobility with no use AD  Pt seated bedside chair after toileting, /76  Pt assessed for UE ROM/MMT while seated bedside chair, /79 with cues to pump ankles BP at 125/74 at end of session with call bell within reach  Pt presents with the following deficits impacting occupational performance: increased dizziness, orthostatic hypotension, decreased balance, decreased endurance, decreased activity tolerance (FAIR)  These deficits impact participation with [place], functional transfers and mobility, leisure participation  Occupational performance areas to address include: dressing, grooming, bathing, toileting, functional transfers and mobility with DME PRN, light meal prep, pt education on energy conservation strategies  Pt reports no concerns with going home  Recommendation at time of d/c is home with family support  Goals   Patient Goals "to go home"   LTG Time Frame 7-10   Long Term Goal please see goals listed below   Plan   Treatment Interventions ADL retraining;Functional transfer training; Endurance training;Patient/family training;Equipment evaluation/education; Compensatory technique education; Energy conservation; Activityengagement   Goal Expiration Date 12/09/18   Treatment Day 0   OT Frequency 2-3x/wk   Recommendation   OT Discharge Recommendation Home with family support   OT - OK to Discharge (home with family support when medically stable)   Barthel Index   Feeding 10   Bathing 0   Grooming Score 5   Dressing Score 5   Bladder Score 10   Bowels Score 10   Toilet Use Score 10   Transfers (Bed/Chair) Score 10   Mobility (Level Surface) Score 0   Stairs Score 0   Barthel Index Score 60   Modified Taliaferro Scale   Modified Jamey Scale 4     OT goals to be met in 7-10 days:    1  Pt will complete UB/LB ADLS with MOD I to increase participation with self-care  2  Pt will complete functional transfers with MOD I to enhance independence with ADLS and IADLS  3  Pt will complete functional mobility with MOD I to increase participation with self-care  4  Pt will demonstrate bed mobility with MOD I to enhance participation with ADLS  5  Pt will demonstrate toileting with MOD I  6  Pt will demonstrate improved activity tolerance to G (30 minutes) to enhance participation with functional tasks  7  Pt will demonstrate improved dynamic standing tolerance to 5-7 minutes with G balance to increase independence with ADLS and IADLS  8   Pt will demonstrate 100% carryover of EC techniques to enhance safety at home    [facility], [facility]

## 2018-11-29 NOTE — ASSESSMENT & PLAN NOTE
Patient is orthostatic as evident with SBP positional drop of >10 and elevation of HR  Received IV NS bolus in ED  Continue on gentle hydration  Hold home amlodipine  Monitor orthostatic VS  PT/OT  Consider blood transfusion

## 2018-11-30 LAB
ABO GROUP BLD: NORMAL
ANION GAP SERPL CALCULATED.3IONS-SCNC: 10 MMOL/L (ref 4–13)
ANISOCYTOSIS BLD QL SMEAR: PRESENT
BASOPHILS # BLD MANUAL: 0 THOUSAND/UL (ref 0–0.1)
BASOPHILS NFR MAR MANUAL: 0 % (ref 0–1)
BLD GP AB SCN SERPL QL: NEGATIVE
BUN SERPL-MCNC: 9 MG/DL (ref 5–25)
CALCIUM SERPL-MCNC: 8.5 MG/DL (ref 8.3–10.1)
CHLORIDE SERPL-SCNC: 101 MMOL/L (ref 100–108)
CO2 SERPL-SCNC: 24 MMOL/L (ref 21–32)
CREAT SERPL-MCNC: 1.26 MG/DL (ref 0.6–1.3)
EOSINOPHIL # BLD MANUAL: 0.06 THOUSAND/UL (ref 0–0.4)
EOSINOPHIL NFR BLD MANUAL: 1 % (ref 0–6)
ERYTHROCYTE [DISTWIDTH] IN BLOOD BY AUTOMATED COUNT: 14.6 % (ref 11.6–15.1)
GFR SERPL CREATININE-BSD FRML MDRD: 56 ML/MIN/1.73SQ M
GLUCOSE SERPL-MCNC: 82 MG/DL (ref 65–140)
HCT VFR BLD AUTO: 23.7 % (ref 36.5–49.3)
HGB BLD-MCNC: 7.3 G/DL (ref 12–17)
LYMPHOCYTES # BLD AUTO: 1.7 THOUSAND/UL (ref 0.6–4.47)
LYMPHOCYTES # BLD AUTO: 27 % (ref 14–44)
MAGNESIUM SERPL-MCNC: 1.6 MG/DL (ref 1.6–2.6)
MCH RBC QN AUTO: 28.4 PG (ref 26.8–34.3)
MCHC RBC AUTO-ENTMCNC: 30.8 G/DL (ref 31.4–37.4)
MCV RBC AUTO: 92 FL (ref 82–98)
MONOCYTES # BLD AUTO: 1.01 THOUSAND/UL (ref 0–1.22)
MONOCYTES NFR BLD: 16 % (ref 4–12)
MYELOCYTES NFR BLD MANUAL: 1 % (ref 0–1)
NEUTROPHILS # BLD MANUAL: 3.4 THOUSAND/UL (ref 1.85–7.62)
NEUTS BAND NFR BLD MANUAL: 2 % (ref 0–8)
NEUTS SEG NFR BLD AUTO: 52 % (ref 43–75)
NRBC BLD AUTO-RTO: 1 /100 WBCS
NRBC BLD AUTO-RTO: 2 /100 WBC (ref 0–2)
PLATELET # BLD AUTO: 147 THOUSANDS/UL (ref 149–390)
PLATELET BLD QL SMEAR: ABNORMAL
PMV BLD AUTO: 9.2 FL (ref 8.9–12.7)
POLYCHROMASIA BLD QL SMEAR: PRESENT
POTASSIUM SERPL-SCNC: 4.1 MMOL/L (ref 3.5–5.3)
RBC # BLD AUTO: 2.57 MILLION/UL (ref 3.88–5.62)
RBC MORPH BLD: PRESENT
RH BLD: POSITIVE
SODIUM SERPL-SCNC: 135 MMOL/L (ref 136–145)
SPECIMEN EXPIRATION DATE: NORMAL
TOTAL CELLS COUNTED SPEC: 100
VARIANT LYMPHS # BLD AUTO: 1 %
WBC # BLD AUTO: 6.29 THOUSAND/UL (ref 4.31–10.16)

## 2018-11-30 PROCEDURE — P9016 RBC LEUKOCYTES REDUCED: HCPCS

## 2018-11-30 PROCEDURE — 86901 BLOOD TYPING SEROLOGIC RH(D): CPT | Performed by: FAMILY MEDICINE

## 2018-11-30 PROCEDURE — 97116 GAIT TRAINING THERAPY: CPT

## 2018-11-30 PROCEDURE — 97530 THERAPEUTIC ACTIVITIES: CPT

## 2018-11-30 PROCEDURE — 99233 SBSQ HOSP IP/OBS HIGH 50: CPT | Performed by: FAMILY MEDICINE

## 2018-11-30 PROCEDURE — 30233N1 TRANSFUSION OF NONAUTOLOGOUS RED BLOOD CELLS INTO PERIPHERAL VEIN, PERCUTANEOUS APPROACH: ICD-10-PCS | Performed by: FAMILY MEDICINE

## 2018-11-30 PROCEDURE — 86850 RBC ANTIBODY SCREEN: CPT | Performed by: FAMILY MEDICINE

## 2018-11-30 PROCEDURE — 85007 BL SMEAR W/DIFF WBC COUNT: CPT | Performed by: FAMILY MEDICINE

## 2018-11-30 PROCEDURE — 85027 COMPLETE CBC AUTOMATED: CPT | Performed by: FAMILY MEDICINE

## 2018-11-30 PROCEDURE — 97110 THERAPEUTIC EXERCISES: CPT

## 2018-11-30 PROCEDURE — 83735 ASSAY OF MAGNESIUM: CPT | Performed by: FAMILY MEDICINE

## 2018-11-30 PROCEDURE — 86923 COMPATIBILITY TEST ELECTRIC: CPT

## 2018-11-30 PROCEDURE — 86900 BLOOD TYPING SEROLOGIC ABO: CPT | Performed by: FAMILY MEDICINE

## 2018-11-30 PROCEDURE — 80048 BASIC METABOLIC PNL TOTAL CA: CPT | Performed by: FAMILY MEDICINE

## 2018-11-30 RX ADMIN — POTASSIUM CHLORIDE 20 MEQ: 1500 TABLET, EXTENDED RELEASE ORAL at 15:49

## 2018-11-30 RX ADMIN — PENTOXIFYLLINE 400 MG: 400 TABLET, FILM COATED, EXTENDED RELEASE ORAL at 15:49

## 2018-11-30 RX ADMIN — POTASSIUM CHLORIDE 20 MEQ: 1500 TABLET, EXTENDED RELEASE ORAL at 08:30

## 2018-11-30 RX ADMIN — TAMSULOSIN HYDROCHLORIDE 0.4 MG: 0.4 CAPSULE ORAL at 15:49

## 2018-11-30 RX ADMIN — PENTOXIFYLLINE 400 MG: 400 TABLET, FILM COATED, EXTENDED RELEASE ORAL at 08:30

## 2018-11-30 RX ADMIN — FAMOTIDINE 20 MG: 20 TABLET ORAL at 08:30

## 2018-11-30 RX ADMIN — FLUTICASONE PROPIONATE 1 SPRAY: 50 SPRAY, METERED NASAL at 18:28

## 2018-11-30 RX ADMIN — LORAZEPAM 0.5 MG: 0.5 TABLET ORAL at 02:36

## 2018-11-30 RX ADMIN — POTASSIUM CHLORIDE 20 MEQ: 1500 TABLET, EXTENDED RELEASE ORAL at 12:44

## 2018-11-30 RX ADMIN — ENOXAPARIN SODIUM 40 MG: 40 INJECTION SUBCUTANEOUS at 08:30

## 2018-11-30 RX ADMIN — DIVALPROEX SODIUM 500 MG: 500 TABLET, DELAYED RELEASE ORAL at 18:28

## 2018-11-30 RX ADMIN — OXYCODONE HYDROCHLORIDE 5 MG: 5 TABLET ORAL at 23:29

## 2018-11-30 RX ADMIN — FLUTICASONE PROPIONATE 1 SPRAY: 50 SPRAY, METERED NASAL at 08:30

## 2018-11-30 RX ADMIN — DIVALPROEX SODIUM 500 MG: 500 TABLET, DELAYED RELEASE ORAL at 08:30

## 2018-11-30 RX ADMIN — PENTOXIFYLLINE 400 MG: 400 TABLET, FILM COATED, EXTENDED RELEASE ORAL at 12:44

## 2018-11-30 NOTE — PHYSICIAN ADVISOR
Current patient class: Inpatient  The patient is currently on Hospital Day: 2 at [facility]      The patient was admitted to the hospital at 1100 on 11/28/18 for the following diagnosis:  Dehydration [E86 0]  UTI (urinary tract infection) [N39 0]  Anemia [D64 9]  Dizzy [R42]  Neutropenia ([address] ) [D70 9]       There is documentation in the medical record of an expected length of stay of at least 2 midnights  The patient is therefore expected to satisfy the 2 midnight benchmark and given the 2 midnight presumption is appropriate for INPATIENT ADMISSION  Given this expectation of a satisfying stay, CMS instructs us that the patient is most often appropriate for inpatient admission under part A provided medical necessity is documented in the chart  After review of the relevant documentation, labs, vital signs and test results, the patient is appropriate for INPATIENT ADMISSION  Admission to the hospital as an inpatient is a complex decision making process which requires the practitioner to consider the patients presenting complaint, history and physical examination and all relevant testing  With this in mind, in this case, the patient was deemed appropriate for INPATIENT ADMISSION  After review of the documentation and testing available at the time of the admission I concur with this clinical determination of medical necessity  Rationale is as follows: The patient is a 68 yrs old Male who presented to the ED at 11/28/2018  8:26 AM with a chief complaint of Dizziness (Patient reports dizziness, has been on a muscle relaxant for a stiff neck and muscle pain, also reports being on chemo with hx of dehydration  Denies chest pain or SOB, reports the dizziness makes him feel syncopal when standing )     Given the need for further hospitalization, and along with the documentation of medical necessity present in the chart, the patient is appropriate for inpatient admission    The patient is expected to satisfy the 2 midnight benchmark, and will require further acute medical care  The patient does have comorbid conditions which increases the risk for significant adverse outcome  Given this the patient is appropriate for inpatient admission        The patients vitals on arrival were ED Triage Vitals [11/28/18 0808]   Temperature Pulse Respirations Blood Pressure SpO2   98 2 °F (36 8 °C) (!) 108 16 126/70 99 %      Temp Source Heart Rate Source Patient Position - Orthostatic VS BP Location FiO2 (%)   Oral Monitor Sitting Left arm --      Pain Score       4           Past Medical History:   Diagnosis Date    Aneurysm ([address] )     Behind left knee recently diagnosed    Back pain     Ceron disease     BPH (benign prostatic hyperplasia)     Cancer (HCC)     melanoma    Cataract     right eye    Cataract     right eye    Confusion     DDD (degenerative disc disease), lumbar     Depression     Diverticulosis     Gallstones     GERD (gastroesophageal reflux disease)     History of cardiac murmur     Past    History of melanoma     Was on back in early 1990's    History of rheumatic fever     Childhood    [place] (hard of hearing)     Hydronephrosis     Hypertension     Kidney stone     Multiple times    Neck pain     Nervous breakdown     History of due to reaction to psych med which he was on for anxiety/depression and became suicidal    Numbness and tingling in both hands     Numbness and tingling of both feet     PONV (postoperative nausea and vomiting)     PVD (peripheral vascular disease) ([address] )     RBBB     Scoliosis     Seasonal allergies     Tinnitus     Wears partial dentures     Upper    Wears partial dentures     Upper     Past Surgical History:   Procedure Laterality Date    ABDOMINAL SURGERY      When young had procedure for improviing circulation to left lower extremety    BACK SURGERY      Lumbar- not sure what was done   [facility] CARDIAC CATHETERIZATION Approximately 2007- no blockages    CARPAL TUNNEL RELEASE Bilateral     CATARACT EXTRACTION Left     COLONOSCOPY      CYST REMOVAL      Robotic procedure to remove benign cyst from lower left lung    CYSTOGRAM N/A 3/14/2018    Procedure: CYSTOGRAM;  Surgeon: Viona Peabody, MD;  [facility];  Service: Urology    CYSTOSCOPY      ESOPHAGOGASTRODUODENOSCOPY      IR TUBE PLACEMENT NEPHROSTOMY  8/10/2018    LUNG SURGERY      cyst removed left lung    OTHER SURGICAL HISTORY Left     fistula drain in left buttock    UT CYSTO/URETERO W/LITHOTRIPSY &INDWELL STENT INSRT Left 1/3/2018    Procedure: CYSTOSCOPY URETEROSCOPY, RETROGRADE PYELOGRAM AND INSERTION STENT URETERAL;  Surgeon: Viona Peabody, MD;  [facility];  Service: Urology    UT CYSTOTOMY,EXCIS BLADDER TIC N/A 2/14/2018    Procedure: ABDOMINAL EXPLORATION; CLOSURE OF BLADDER DIVERTICULITIS;  Surgeon: Viona Peabody, MD;  [facility];  Service: Urology    UT CYSTOURETHROSCOPY,URETER CATHETER Left 8/1/2018    Procedure: Cystoscopy, cystogram, bladder biopsies, removal of pelvic drain;  Surgeon: Viona Peabody, MD;  [facility];  Service: Urology    UT INCISE/DRAIN BLADDER N/A 2/14/2018    Procedure: OPEN SUPRAPUBIC TUBE PLACEMENT;  Surgeon: Viona Peabody, MD;  [facility];  Service: Urology    UT RELEASE [place] FIBROSIS Left 2/14/2018    Procedure: LYSIS OF ADHESIONS; URETEROLYSIS ;  Surgeon: Viona Peabody, MD;  [facility];  Service: Urology    SKIN CANCER EXCISION      Melanoma removal on back in early 1990's    TOE AMPUTATION Left     All 5 toes left foot were amputated due to poor circulation     TRANSURETHRAL RESECTION OF PROSTATE N/A 3/14/2018    Procedure: TRANSURETHRAL RESECTION OF PROSTATE (TURP), LEFT URETERAL STENT REMOVAL;  Surgeon: Viona Peabody, MD;  [facility];  Service: Urology    TRANSURETHRAL RESECTION OF PROSTATE N/A 9/26/2018    Procedure: TRANSURETHRAL RESECTION OF PROSTATE (TURP);   Surgeon: Vamshi Dougherty MD;  [facility];  Service: Urology    WRIST SURGERY Bilateral     Ulnar nerve on right arm and both wrists are fused           Consults have been placed to:   IP CONSULT TO CASE MANAGEMENT    Vitals:    11/29/18 1621 11/29/18 1826 11/29/18 1827 11/29/18 1828   BP: 122/60 129/59 128/59 100/52   BP Location: Left arm Left arm Left arm Left arm   Pulse:       Resp:       Temp:       TempSrc:       SpO2:       Weight:       Height:           Most recent labs:    Recent Labs      11/29/18   0426   WBC  2 68*   HGB  7 0*   HCT  21 4*   PLT  129*   K  3 2*   CALCIUM  8 2*   BUN  11   CREATININE  1 23   AST  10   ALT  7*   ALKPHOS  49       Scheduled Meds:  Current Facility-Administered Medications:  acetaminophen 650 mg Oral Q6H PRN Cathy Kimbrough MD   divalproex sodium 500 mg Oral BID Melina Flannery MD   enoxaparin 40 mg Subcutaneous Daily Cathy Kimbrough MD   famotidine 20 mg Oral Daily Cathy Kimbrough MD   fluticasone 1 spray Each Nare BID Cathy Kimbrough MD   LORazepam 0 5 mg Oral Q6H PRN Cathy Kimbrough MD   ondansetron 4 mg Intravenous Q6H PRN Cathy Kimbrough MD   oxyCODONE 5 mg Oral Q4H PRN Melina Flannery MD   pentoxifylline 400 mg Oral TID With Meals Melina Flannery MD   potassium chloride 20 mEq Oral TID With Meals Melina Flannery MD   tamsulosin 0 4 mg Oral Daily With Jm Rubio MD     Facility-Administered Medications Ordered in Other Encounters:  gemcitabine (GEMZAR) chemo infusion 2,000 mg Intravenous Once Jossue Vance MD   ondansetron (ZOFRAN) IVPB (ONC use only) 8 mg Intravenous Once Jossue Vance MD   sodium chloride 20 mL/hr Intravenous Continuous Jossue Vance MD     Continuous Infusions:   PRN Meds:   acetaminophen    LORazepam    ondansetron    oxyCODONE    Surgical procedures (if appropriate):

## 2018-11-30 NOTE — PHYSICAL THERAPY NOTE
Physical Therapy Progress Note     11/30/18 2650   Pain Assessment   Pain Assessment 0-10   Pain Score 6   Pain Type Chronic pain   Pain Location Neck;Back   Pain Orientation Bilateral   Hospital Pain Intervention(s) Ambulation/increased activity;Repositioned   Response to Interventions Tolerated  Restrictions/Precautions   Weight Bearing Precautions Per Order No   Other Precautions Fall Risk;Pain  (neutropenic precautions)   General   Chart Reviewed Yes   Response to Previous Treatment Patient reporting fatigue but able to participate  Family/Caregiver Present No   Subjective   Subjective Willing to participate in therapy this PM    Bed Mobility   Supine to Sit 5  Supervision   Additional items Assist x 1;HOB elevated; Bedrails;Leg ; Increased time required;Verbal cues;LE management   Sit to Supine 5  Supervision   Additional items Assist x 1;Bedrails; Increased time required;Verbal cues;LE management;Leg    Transfers   Sit to Stand 5  Supervision   Additional items Assist x 1;Bedrails; Increased time required;Verbal cues   Stand to Sit 5  Supervision   Additional items Assist x 1;Bedrails; Increased time required;Verbal cues   Toilet transfer 6  Modified independent   Additional items Assist x 1; Armrests; Increased time required;Verbal cues;Standard toilet; Other  (use of grab bar)   Ambulation/Elevation   Gait pattern Decreased foot clearance; Short stride; Excessively slow   Gait Assistance 5  Supervision   Additional items Assist x 1;Verbal cues; Tactile cues   Assistive Device None   Distance 20' x 2 with seated toileting break in between   Balance   Static Sitting Good   Dynamic Sitting Fair +   Static Standing Fair   Dynamic Standing Fair   Ambulatory Fair   Endurance Deficit   Endurance Deficit Yes   Endurance Deficit Description medical   Activity Tolerance   Activity Tolerance Treatment limited secondary to medical complications (Comment)   Nurse Made Aware Yes   Exercises   TKR Supine;10 reps;AAROM; Bilateral   Assessment   Prognosis Good   Problem List Decreased strength;Decreased endurance;Decreased range of motion; Impaired balance;Decreased mobility; Decreased safety awareness;Decreased skin integrity   Assessment Pt  supine in bed upon my arrival  Pt  reporting increased fatigue, report from RN pt  due for blood transfusion this PM  Performance of HEP supine in bed with A of therapist provided for proper completion  BP measured supine at 134/77  Progressed with transfers being able to complete practicing proper technique with no noted LOB  BP measured seated at EOB at 108/78, reporting he feels okay  Progressed with OOB mobility BP measured standing at 119/71  Pt  requested to use br, progressed with a limited amb  trial with use of no AD and standbyA of therapist  Pt  able to complete self pericare  Continue to recommend use of RW for PRN as needed  Pt  requested to return to bed to due increased fatigue  Repositioned supine in bed  Pt  remained supine in bed at end of treatment session  Pt  will continue progression of PT goals with intent of d/c home with HHPT and family support as needed when medically stable  Barriers to Discharge Decreased caregiver support   Barriers to Discharge Comments pt's girlfriend works during the day   Goals   Patient Goals To go home  STG Expiration Date 12/06/18   Treatment Day 2   Plan   Treatment/Interventions Functional transfer training;LE strengthening/ROM; Therapeutic exercise; Endurance training;Bed mobility;Gait training;Spoke to nursing;Spoke to case management   Progress Progressing toward goals   PT Frequency Other (Comment)  (4-5x/wk)   Recommendation   Recommendation Home PT; Home with family support   Equipment Recommended Walker  (PRN)   PT - OK to Discharge No  (Continued progression of PT goals )     [place] Clas, PTA

## 2018-11-30 NOTE — PLAN OF CARE
Problem: PHYSICAL THERAPY ADULT  Goal: Performs mobility at highest level of function for planned discharge setting  See evaluation for individualized goals  Treatment/Interventions: Functional transfer training, LE strengthening/ROM, Elevations, Therapeutic exercise, Endurance training, Patient/family training, Bed mobility, Gait training, Spoke to nursing, OT  Equipment Recommended: Walker (PRN)       See flowsheet documentation for full assessment, interventions and recommendations  Outcome: Progressing  Prognosis: Good  Problem List: Decreased strength, Decreased endurance, Decreased range of motion, Impaired balance, Decreased mobility, Decreased safety awareness, Decreased skin integrity  Assessment: Pt  supine in bed upon my arrival  Pt  reporting increased fatigue, report from RN pt  due for blood transfusion this PM  Performance of HEP supine in bed with A of therapist provided for proper completion  BP measured supine at 134/77  Progressed with transfers being able to complete practicing proper technique with no noted LOB  BP measured seated at EOB at 108/78, reporting he feels okay  Progressed with OOB mobility BP measured standing at 119/71  Pt  requested to use br, progressed with a limited amb  trial with use of no AD and standbyA of therapist  Pt  able to complete self pericare  Continue to recommend use of RW for PRN as needed  Pt  requested to return to bed to due increased fatigue  Repositioned supine in bed  Pt  remained supine in bed at end of treatment session  Pt  will continue progression of PT goals with intent of d/c home with HHPT and family support as needed when medically stable  Barriers to Discharge: Decreased caregiver support  Barriers to Discharge Comments: pt's girlfriend works during the day  Recommendation: Home PT, Home with family support     PT - OK to Discharge: No (Continued progression of PT goals )    See flowsheet documentation for full assessment

## 2018-11-30 NOTE — ASSESSMENT & PLAN NOTE
Possibly related to lightheadedness and orthostasis  Likely related to chemo  Due to symptomatic anemia and ongoing orthostatic hypotension will transfuse leukoreduced PRBCs - discussed with Hematology  CBC in am

## 2018-11-30 NOTE — ASSESSMENT & PLAN NOTE
Patient follows with Urology Dr Bettina Tomas and Hem/Onc Dr Sherine Hebert  He is on combination chemo with cisplatin and gemcitabine, last treatment Wed 11/21 - receives Rx weekly x 3 wks then 4th week holiday  Consider evaluation from Hem/Onc based on clinical course

## 2018-11-30 NOTE — ASSESSMENT & PLAN NOTE
Patient is orthostatic as evident with SBP positional drop of >10 and elevation of HR  Received IV NS bolus in ED, persists  Continue to hold home amlodipine  Monitor orthostatic VS  PT/OT  Will proceed with transfusion of 2 units PRBCs

## 2018-11-30 NOTE — PROGRESS NOTES
Progress Note - Daun Quiver 1945, 68 y o  male MRN: [id number]    Unit/Bed#: E2 -01 Encounter: [id number]    Primary Care Provider: Ivan Greene MD   Date and time admitted to hospital: 11/28/2018  8:26 AM        * Orthostatic lightheadedness   Assessment & Plan    Patient is orthostatic as evident with SBP positional drop of >10 and elevation of HR  Received IV NS bolus in ED, persists  Continue to hold home amlodipine  Monitor orthostatic VS  PT/OT  Will proceed with transfusion of 2 units PRBCs     SIRS (systemic inflammatory response syndrome) (HCC)   Assessment & Plan    POA and resolved  No evidence of an infection, urine cultures negative  Tachycardia was likely related to orthostasis and leukopenia related to chemothearpy  Monitor VS/CBC     Chemotherapy-induced neutropenia (HCC)   Assessment & Plan    Mild with ANC 1 2 on admission, WBC count improved  Monitor CBC       Acute cystitis without hematuria   Assessment & Plan    Ruled out  Urine cultures no growth  Patient's urinary symptoms most likely related to his urothelial cancer     Acute on chronic anemia   Assessment & Plan    Possibly related to lightheadedness and orthostasis  Likely related to chemo  Due to symptomatic anemia and ongoing orthostatic hypotension will transfuse leukoreduced PRBCs - discussed with Hematology  CBC in am     CKD (chronic kidney disease) stage 3, GFR 30-59 ml/min ([facility])   Assessment & Plan    Baseline Cr 1 3-1 4  Cr within baseline  Monitor BMP  Avoid nephrotoxins/renally dose meds     Hypokalemia   Assessment & Plan    Continue oral replacement  Low Mg s/p 2 mg IV Mg Sulfate  Check BMP/Mg in am     Prostate cancer [facility])   Assessment & Plan    Patient follows with Urology Dr Kassie Bautista and Hem/Onc Dr Jose Alejandro Toscano  He is on combination chemo with cisplatin and gemcitabine, last treatment Wed 11/21 - receives Rx weekly x 3 wks then 4th week holiday  Consider evaluation from Hem/Onc based on clinical course Urothelial carcinoma [facility])   Assessment & Plan    Patient follows with Urology  He is to have surgery for bladder and prostate removal after chemotherapy  Outpatient Urology follow up     Peripheral vascular disease ([address] )   Assessment & Plan    With history of Buerger's disease s/p left TMA  Has findings of left popliteal aneurysm  Has LLE claudication pain with LLE [place]ON 10/5 0 57  Will need outpatient Vascular Surgery monitoring         VTE Pharmacologic Prophylaxis:   Pharmacologic: Enoxaparin (Lovenox)  Mechanical VTE Prophylaxis in Place: Yes    Patient Centered Rounds: I have performed bedside rounds with nursing staff today  Discussions with Specialists or Other Care Team Provider: Hem-Onc    Education and Discussions with Family / Patient: Patient's wife    Time Spent for Care: 45 minutes  More than 50% of total time spent on counseling and coordination of care as described above  Current Length of Stay: 2 day(s)    Current Patient Status: Inpatient   Certification Statement: The patient will continue to require additional inpatient hospital stay due to need for blood transfusion and close monitoring    Discharge Plan: possibly tomorrow    Code Status: Level 1 - Full Code      Subjective:   Patient seen and examined  He states that he feels generally improved and less lightheaded  He is noted to have persistent orthostatic hypotension and lightheadedness with systolic blood pressure dropping by 30 points on standing  He states the pain on the right side of the neck is improved  He denies shortness of breath, chest pain palpitations  Denies nausea and reports good appetite  States that his dysuria has also resolved and denies current ongoing urinary symptoms      Objective:     Vitals:   Temp (24hrs), Av 6 °F (36 4 °C), Min:97 °F (36 1 °C), Max:98 °F (36 7 °C)    Temp:  [97 °F (36 1 °C)-98 °F (36 7 °C)] 97 9 °F (36 6 °C)  HR:  [62-93] 93  Resp:  [18-20] 18  BP: (100-146)/(52-82) 146/82  SpO2: [96 %-98 %] 96 %  Body mass index is 29 53 kg/m²  Input and Output Summary (last 24 hours): Intake/Output Summary (Last 24 hours) at 11/30/18 1302  Last data filed at 11/30/18 0846   Gross per 24 hour   Intake                0 ml   Output             1150 ml   Net            -1150 ml       Physical Exam:     Physical Exam   Constitutional: He is oriented to person, place, and time  No distress  HENT:   Head: Normocephalic and atraumatic  Eyes: Conjunctivae are normal    Neck: No JVD present  Cardiovascular: Normal rate and regular rhythm  No murmur heard  Pulmonary/Chest: Effort normal  No respiratory distress  He has no wheezes  He has no rales  Abdominal: Soft  He exhibits no distension  There is no tenderness  There is no guarding  Musculoskeletal: He exhibits no edema  Neurological: He is alert and oriented to person, place, and time  Skin: Skin is warm and dry  Psychiatric: He has a normal mood and affect  Additional Data:     Labs:      Results from last 7 days  Lab Units 11/30/18  0427   WBC Thousand/uL 6 29   HEMOGLOBIN g/dL 7 3*   HEMATOCRIT % 23 7*   PLATELETS Thousands/uL 147*   BANDS PCT % 2   LYMPHO PCT % 27   MONO PCT % 16*   EOS PCT % 1       Results from last 7 days  Lab Units 11/30/18  0427 11/29/18  0426   SODIUM mmol/L 135* 136   POTASSIUM mmol/L 4 1 3 2*   CHLORIDE mmol/L 101 103   CO2 mmol/L 24 26   BUN mg/dL 9 11   CREATININE mg/dL 1 26 1 23   ANION GAP mmol/L 10 7   CALCIUM mg/dL 8 5 8 2*   ALBUMIN g/dL  --  2 0*   TOTAL BILIRUBIN mg/dL  --  0 30   ALK PHOS U/L  --  49   ALT U/L  --  7*   AST U/L  --  10   GLUCOSE RANDOM mg/dL 82 89                   Results from last 7 days  Lab Units 11/29/18  0426 11/28/18  0903   LACTIC ACID mmol/L  --  1 3   PROCALCITONIN ng/ml <0 05  --            * I Have Reviewed All Lab Data Listed Above  * Additional Pertinent Lab Tests Reviewed:  [address] Admission Reviewed    Imaging:    Imaging Reports Reviewed Today Include: no new today    Recent Cultures (last 7 days):       Results from last 7 days  Lab Units 11/28/18  1058 11/28/18  1054 11/28/18  0926   BLOOD CULTURE  No Growth at 24 hrs  No Growth at 24 hrs   --    URINE CULTURE   --   --  No Growth <1000 cfu/mL       Last 24 Hours Medication List:     Current Facility-Administered Medications:  acetaminophen 650 mg Oral Q6H PRN Glenys Bob MD   divalproex sodium 500 mg Oral BID Glenys Bob MD   enoxaparin 40 mg Subcutaneous Daily Glenys Bob MD   famotidine 20 mg Oral Daily Cathy Kimbrough MD   fluticasone 1 spray Each Nare BID Cathy Kimbrough MD   LORazepam 0 5 mg Oral Q6H PRN Cathy Kimbrough MD   ondansetron 4 mg Intravenous Q6H PRN lGenys Bob MD   oxyCODONE 5 mg Oral Q4H PRN Glenys Bob MD   pentoxifylline 400 mg Oral TID With Meals Glenys Bob MD   potassium chloride 20 mEq Oral TID With Meals Glenys Bob MD   tamsulosin 0 4 mg Oral Daily With Kurtis Layton MD     Facility-Administered Medications Ordered in Other Encounters:  gemcitabine (GEMZAR) chemo infusion 2,000 mg Intravenous Once Sven Sorto MD   ondansetron (ZOFRAN) IVPB (ONC use only) 8 mg Intravenous Once Sven Sorto MD   sodium chloride 20 mL/hr Intravenous Continuous Sven Sorto MD        Today, Patient Was Seen By: Mey Haney MD    ** Please Note: Dictation voice to text software may have been used in the creation of this document   **

## 2018-12-01 VITALS
WEIGHT: 200.84 LBS | HEIGHT: 69 IN | TEMPERATURE: 98 F | HEART RATE: 80 BPM | DIASTOLIC BLOOD PRESSURE: 68 MMHG | BODY MASS INDEX: 29.75 KG/M2 | RESPIRATION RATE: 18 BRPM | OXYGEN SATURATION: 95 % | SYSTOLIC BLOOD PRESSURE: 125 MMHG

## 2018-12-01 PROBLEM — E44.0 MODERATE PROTEIN-CALORIE MALNUTRITION (HCC): Status: ACTIVE | Noted: 2018-12-01

## 2018-12-01 LAB
ABO GROUP BLD BPU: NORMAL
ABO GROUP BLD BPU: NORMAL
ANION GAP SERPL CALCULATED.3IONS-SCNC: 8 MMOL/L (ref 4–13)
ANISOCYTOSIS BLD QL SMEAR: PRESENT
BASOPHILS # BLD MANUAL: 0.06 THOUSAND/UL (ref 0–0.1)
BASOPHILS NFR MAR MANUAL: 1 % (ref 0–1)
BPU ID: NORMAL
BPU ID: NORMAL
BUN SERPL-MCNC: 7 MG/DL (ref 5–25)
CALCIUM SERPL-MCNC: 8.7 MG/DL (ref 8.3–10.1)
CHLORIDE SERPL-SCNC: 101 MMOL/L (ref 100–108)
CO2 SERPL-SCNC: 26 MMOL/L (ref 21–32)
CREAT SERPL-MCNC: 1.26 MG/DL (ref 0.6–1.3)
EOSINOPHIL # BLD MANUAL: 0.06 THOUSAND/UL (ref 0–0.4)
EOSINOPHIL NFR BLD MANUAL: 1 % (ref 0–6)
ERYTHROCYTE [DISTWIDTH] IN BLOOD BY AUTOMATED COUNT: 15 % (ref 11.6–15.1)
GFR SERPL CREATININE-BSD FRML MDRD: 56 ML/MIN/1.73SQ M
GLUCOSE SERPL-MCNC: 80 MG/DL (ref 65–140)
HCT VFR BLD AUTO: 30.8 % (ref 36.5–49.3)
HGB BLD-MCNC: 9.9 G/DL (ref 12–17)
LYMPHOCYTES # BLD AUTO: 1.35 THOUSAND/UL (ref 0.6–4.47)
LYMPHOCYTES # BLD AUTO: 23 % (ref 14–44)
MCH RBC QN AUTO: 28.2 PG (ref 26.8–34.3)
MCHC RBC AUTO-ENTMCNC: 32.1 G/DL (ref 31.4–37.4)
MCV RBC AUTO: 88 FL (ref 82–98)
METAMYELOCYTES NFR BLD MANUAL: 1 % (ref 0–1)
MONOCYTES # BLD AUTO: 1.18 THOUSAND/UL (ref 0–1.22)
MONOCYTES NFR BLD: 20 % (ref 4–12)
MYELOCYTES NFR BLD MANUAL: 1 % (ref 0–1)
NEUTROPHILS # BLD MANUAL: 3.12 THOUSAND/UL (ref 1.85–7.62)
NEUTS BAND NFR BLD MANUAL: 1 % (ref 0–8)
NEUTS SEG NFR BLD AUTO: 52 % (ref 43–75)
NRBC BLD AUTO-RTO: 2 /100 WBC (ref 0–2)
NRBC BLD AUTO-RTO: 2 /100 WBCS
PLATELET # BLD AUTO: 138 THOUSANDS/UL (ref 149–390)
PLATELET BLD QL SMEAR: ABNORMAL
PMV BLD AUTO: 9.2 FL (ref 8.9–12.7)
POLYCHROMASIA BLD QL SMEAR: PRESENT
POTASSIUM SERPL-SCNC: 4 MMOL/L (ref 3.5–5.3)
RBC # BLD AUTO: 3.51 MILLION/UL (ref 3.88–5.62)
RBC MORPH BLD: PRESENT
SODIUM SERPL-SCNC: 135 MMOL/L (ref 136–145)
TOTAL CELLS COUNTED SPEC: 100
UNIT DISPENSE STATUS: NORMAL
UNIT DISPENSE STATUS: NORMAL
UNIT PRODUCT CODE: NORMAL
UNIT PRODUCT CODE: NORMAL
UNIT RH: NORMAL
UNIT RH: NORMAL
WBC # BLD AUTO: 5.88 THOUSAND/UL (ref 4.31–10.16)

## 2018-12-01 PROCEDURE — 85007 BL SMEAR W/DIFF WBC COUNT: CPT | Performed by: FAMILY MEDICINE

## 2018-12-01 PROCEDURE — 99239 HOSP IP/OBS DSCHRG MGMT >30: CPT | Performed by: FAMILY MEDICINE

## 2018-12-01 PROCEDURE — 85027 COMPLETE CBC AUTOMATED: CPT | Performed by: FAMILY MEDICINE

## 2018-12-01 PROCEDURE — 80048 BASIC METABOLIC PNL TOTAL CA: CPT | Performed by: FAMILY MEDICINE

## 2018-12-01 RX ADMIN — FLUTICASONE PROPIONATE 1 SPRAY: 50 SPRAY, METERED NASAL at 09:36

## 2018-12-01 RX ADMIN — LORAZEPAM 0.5 MG: 0.5 TABLET ORAL at 01:30

## 2018-12-01 RX ADMIN — DIVALPROEX SODIUM 500 MG: 500 TABLET, DELAYED RELEASE ORAL at 09:22

## 2018-12-01 RX ADMIN — FAMOTIDINE 20 MG: 20 TABLET ORAL at 09:22

## 2018-12-01 RX ADMIN — POTASSIUM CHLORIDE 20 MEQ: 1500 TABLET, EXTENDED RELEASE ORAL at 08:30

## 2018-12-01 RX ADMIN — PENTOXIFYLLINE 400 MG: 400 TABLET, FILM COATED, EXTENDED RELEASE ORAL at 08:30

## 2018-12-01 RX ADMIN — ENOXAPARIN SODIUM 40 MG: 40 INJECTION SUBCUTANEOUS at 09:22

## 2018-12-01 RX ADMIN — OXYCODONE HYDROCHLORIDE 5 MG: 5 TABLET ORAL at 09:29

## 2018-12-01 NOTE — ASSESSMENT & PLAN NOTE
Malnutrition Findings:   Malnutrition type: Chronic illness  Degree of Malnutrition: Malnutrition of moderate degree (related to prolonged decreased po intake d/t effects from chemotherapy as evidenced by 10% weight decrease from 7/3/18 to present (5 months), mild fat loss around orbitals, mild muscle loss around temples)    BMI Findings: Body mass index is 29 66 kg/m²     Recommend nutritional supplements

## 2018-12-01 NOTE — ASSESSMENT & PLAN NOTE
related to lightheadedness and orthostasis resolved after administration of 2 units packed red blood cells  Likely related to chemo  Discussed with patient's oncologist, appreciate input  Periodically monitor CBC

## 2018-12-01 NOTE — ASSESSMENT & PLAN NOTE
POA and resolved  No evidence of an infection, urine cultures negative  Tachycardia was likely related to orthostasis and leukopenia related to chemothearpy

## 2018-12-01 NOTE — DISCHARGE SUMMARY
Discharge- Rea Zendejas 1945, 68 y o  male MRN: [id number]    Unit/Bed#: E2 -01 Encounter: [id number]    Primary Care Provider: Glenis Beckham MD   Date and time admitted to hospital: 11/28/2018  8:26 AM        * Orthostatic lightheadedness   Assessment & Plan    Symptoms resolved after 2 units PRBC  There is still a mild drop of systolic blood pressure from lying to sitting by 9 points, there is no drop in blood pressure from sitting to standing position  No additional interventions  And courage adequate oral fluid intake at home  Discharge with discontinuation of amlodipine     SIRS (systemic inflammatory response syndrome) (HCC)   Assessment & Plan    POA and resolved  No evidence of an infection, urine cultures negative  Tachycardia was likely related to orthostasis and leukopenia related to chemothearpy       Chemotherapy-induced neutropenia (HCC)   Assessment & Plan    Mild with ANC 1 2 on admission, WBC count improved  Monitor CBC during chemotherapy       Acute cystitis without hematuria   Assessment & Plan    Ruled out  Urine cultures no growth  Patient's urinary symptoms most likely related to his urothelial cancer     Moderate protein-calorie malnutrition (HCC)   Assessment & Plan    Malnutrition Findings:   Malnutrition type: Chronic illness  Degree of Malnutrition: Malnutrition of moderate degree (related to prolonged decreased po intake d/t effects from chemotherapy as evidenced by 10% weight decrease from 7/3/18 to present (5 months), mild fat loss around orbitals, mild muscle loss around temples)    BMI Findings: Body mass index is 29 66 kg/m²     Recommend nutritional supplements       Acute on chronic anemia   Assessment & Plan    related to lightheadedness and orthostasis resolved after administration of 2 units packed red blood cells  Likely related to chemo  Discussed with patient's oncologist, appreciate input  Periodically monitor CBC     CKD (chronic kidney disease) stage 3, GFR 30-59 ml/min (HCC)   Assessment & Plan    Baseline Cr 1 3-1 4  Cr within baseline  Monitor BMP periodically  Avoid nephrotoxins/renally dose meds     Hypokalemia   Assessment & Plan    POA and resolved with replacement  Was also associated with hypomagnesemia, received 2 mg of IV magnesium sulfate     Prostate cancer [facility])   Assessment & Plan    Patient follows with Urology Dr Juan Duke and Hem/Onc Dr Armida Linares  He is on combination chemo with cisplatin and gemcitabine, last treatment Wed 11/21 - receives Rx weekly x 3 wks then 4th week holiday  Outpatient follow-up with Hem/Onc      Urothelial carcinoma [facility])   Assessment & Plan    Patient follows with Urology  He is to have surgery for bladder and prostate removal after chemotherapy  Outpatient Urology follow up     Peripheral vascular disease ([facility] [address] )   Assessment & Plan    With history of Buerger's disease s/p left TMA  Has findings of left popliteal aneurysm  Has LLE claudication pain with LLE TRENTON 10/5 0 57  Will need outpatient Vascular Surgery monitoring         Discharging Physician / Practitioner: Frank Garcia MD  PCP: Glenis Beckham MD  Admission Date:   Admission Orders     Ordered        11/28/18 1100  Inpatient Admission (expected length of stay for this patient is greater than two midnights)  Once             Discharge Date: 12/01/18    Resolved Problems  Date Reviewed: 12/1/2018    None          Consultations During Hospital Stay:  · PT/OT    Procedures Performed:     CT head wo contrast   Final Result by Brad Lynn DO (11/28 1632)      No acute intracranial abnormality                    Workstation performed: [id number]               Significant Findings / Test Results:       Results from last 7 days  Lab Units 12/01/18  0429   WBC Thousand/uL 5 88   HEMOGLOBIN g/dL 9 9*   HEMATOCRIT % 30 8*   PLATELETS Thousands/uL 138*       Results from last 7 days  Lab Units 12/01/18  0429  11/29/18  0426   POTASSIUM mmol/L 4 0  < > 3 2*   CHLORIDE mmol/L 101  < > 103   CO2 mmol/L 26  < > 26   BUN mg/dL 7  < > 11   CREATININE mg/dL 1 26  < > 1 23   CALCIUM mg/dL 8 7  < > 8 2*   ALK PHOS U/L  --   --  49   ALT U/L  --   --  7*   AST U/L  --   --  10   < > = values in this interval not displayed  Incidental Findings:   · None     Test Results Pending at Discharge (will require follow up): · None     Outpatient Tests Requested:  · None    Complications:  None    Reason for Admission: lightheadedness    Hospital Course:     Felicia Lua is a 68 y o  male patient with urothelial cancer on chemotherapy, history of hypertension, left lower extremity vascular disease who originally presented to the hospital on 11/28/2018 due to persistent lightheadedness that he experienced on changing position from lying to sitting and standing  The patient was found to be markedly orthostatic with systolic blood pressure dropping by 30 points  These findings were attributed to acute on chronic anemia most likely related to patient's recently started chemotherapy  The patient was provided 2 units of PRBC with subsequent improvement of his hemoglobin from around 7 to close to 10 and resolution of his symptoms  The patient is recommended to proceed with his cancer treatments and with monitoring CBC periodically during that time  The patient had also complained of urinary symptoms at the time of admission including dysuria and cloudy appearing urine with whitish particles  Patient was tested for urinary tract infection which was ruled out with negative urine cultures  Patient's urinary symptoms resolved on the day following admission  These were likely related to patient's urologic cancer  He is advised to resume follow-up with Urology  On the day of the discharge the patient was feeling well  His vital signs were stable  He was recommended to follow up with his primary care provider, his urologist and his oncologist within 1 week    Discharge planning was discussed with the patient and his wife    Please see above list of diagnoses and related plan for additional information  Condition at Discharge: good     Discharge Day Visit / Exam:     Subjective:  Patient seen and examined  He states that he feels well and would like to go home  He states that his lightheadedness has resolved, did not recur with changing position from lying to sitting and standing  The patient denies chest pain, shortness of breath or palpitations  He denies dysuria  He denies nausea, vomiting, diarrhea, constipation or abdominal pain  Vitals: Blood Pressure: 125/68 (12/01/18 1005)  Pulse: 80 (12/01/18 0700)  Temperature: 98 °F (36 7 °C) (12/01/18 0700)  Temp Source: Tympanic (12/01/18 0700)  Respirations: 18 (12/01/18 0700)  Height: 5' 9" (175 3 cm) (11/28/18 1900)  Weight - Scale: 91 1 kg (200 lb 13 4 oz) (12/01/18 0532)  SpO2: 95 % (12/01/18 0700)    Exam:     Physical Exam   Constitutional: He is oriented to person, place, and time  No distress  HENT:   Head: Normocephalic and atraumatic  Eyes: Conjunctivae are normal    Neck: No JVD present  Cardiovascular: Normal rate and regular rhythm  No murmur heard  Pulmonary/Chest: Effort normal  No respiratory distress  He has no wheezes  He has no rales  Abdominal: Soft  He exhibits no distension  There is no tenderness  There is no guarding  Musculoskeletal: He exhibits deformity (L TMA)  He exhibits no edema  Neurological: He is alert and oriented to person, place, and time  Skin: Skin is warm and dry  Psychiatric: He has a normal mood and affect  Discussion with Family: wife    Discharge instructions/Information to patient and family:   See after visit summary for information provided to patient and family  Provisions for Follow-Up Care:  See after visit summary for information related to follow-up care and any pertinent home health orders        Disposition:     Home    For Discharges to [facility]:   · Not Applicable to this Patient - Not Applicable to this Patient    Planned Readmission: No     Discharge Statement:  I spent 40 minutes discharging the patient  This time was spent on the day of discharge  I had direct contact with the patient on the day of discharge  Greater than 50% of the total time was spent examining patient, answering all patient questions, arranging and discussing plan of care with patient as well as directly providing post-discharge instructions  Additional time then spent on discharge activities  Discharge Medications:  See after visit summary for reconciled discharge medications provided to patient and family        ** Please Note: This note has been constructed using a voice recognition system **

## 2018-12-01 NOTE — ASSESSMENT & PLAN NOTE
Baseline Cr 1 3-1 4  Cr within baseline  Monitor BMP periodically  Avoid nephrotoxins/renally dose meds

## 2018-12-01 NOTE — ASSESSMENT & PLAN NOTE
POA and resolved with replacement  Was also associated with hypomagnesemia, received 2 mg of IV magnesium sulfate

## 2018-12-01 NOTE — ASSESSMENT & PLAN NOTE
Patient follows with Urology Dr Marina Tripp and Hem/Onc Dr No Sierra  He is on combination chemo with cisplatin and gemcitabine, last treatment Wed 11/21 - receives Rx weekly x 3 wks then 4th week holiday  Outpatient follow-up with Hem/Onc

## 2018-12-01 NOTE — ASSESSMENT & PLAN NOTE
Symptoms resolved after 2 units PRBC  There is still a mild drop of systolic blood pressure from lying to sitting by 9 points, there is no drop in blood pressure from sitting to standing position  No additional interventions  And courage adequate oral fluid intake at home  Discharge with discontinuation of amlodipine

## 2018-12-02 LAB
ATRIAL RATE: 97 BPM
P AXIS: 42 DEGREES
PR INTERVAL: 172 MS
QRS AXIS: -45 DEGREES
QRSD INTERVAL: 136 MS
QT INTERVAL: 374 MS
QTC INTERVAL: 474 MS
T WAVE AXIS: 13 DEGREES
VENTRICULAR RATE: 97 BPM

## 2018-12-02 PROCEDURE — 93010 ELECTROCARDIOGRAM REPORT: CPT | Performed by: INTERNAL MEDICINE

## 2018-12-03 DIAGNOSIS — R11.0 NAUSEA: ICD-10-CM

## 2018-12-03 LAB
BACTERIA BLD CULT: NORMAL
BACTERIA BLD CULT: NORMAL

## 2018-12-03 RX ORDER — LORAZEPAM 0.5 MG/1
0.5 TABLET ORAL EVERY 6 HOURS PRN
Qty: 30 TABLET | Refills: 0 | Status: CANCELLED | OUTPATIENT
Start: 2018-12-03

## 2018-12-04 ENCOUNTER — PATIENT OUTREACH (OUTPATIENT)
Dept: OTHER | Facility: HOSPITAL | Age: 73
End: 2018-12-04

## 2018-12-10 ENCOUNTER — OFFICE VISIT (OUTPATIENT)
Dept: HEMATOLOGY ONCOLOGY | Facility: CLINIC | Age: 73
End: 2018-12-10
Payer: MEDICARE

## 2018-12-10 ENCOUNTER — APPOINTMENT (OUTPATIENT)
Dept: LAB | Facility: MEDICAL CENTER | Age: 73
End: 2018-12-10
Payer: MEDICARE

## 2018-12-10 ENCOUNTER — DOCUMENTATION (OUTPATIENT)
Dept: HEMATOLOGY ONCOLOGY | Facility: CLINIC | Age: 73
End: 2018-12-10

## 2018-12-10 VITALS
OXYGEN SATURATION: 98 % | TEMPERATURE: 99.1 F | WEIGHT: 191 LBS | DIASTOLIC BLOOD PRESSURE: 80 MMHG | HEIGHT: 68 IN | BODY MASS INDEX: 28.95 KG/M2 | HEART RATE: 112 BPM | RESPIRATION RATE: 18 BRPM | SYSTOLIC BLOOD PRESSURE: 148 MMHG

## 2018-12-10 DIAGNOSIS — C68.9 UROTHELIAL CARCINOMA (HCC): Primary | ICD-10-CM

## 2018-12-10 LAB
ALBUMIN SERPL BCP-MCNC: 2.6 G/DL (ref 3.5–5)
ALP SERPL-CCNC: 64 U/L (ref 46–116)
ALT SERPL W P-5'-P-CCNC: 9 U/L (ref 12–78)
ANION GAP SERPL CALCULATED.3IONS-SCNC: 8 MMOL/L (ref 4–13)
ANISOCYTOSIS BLD QL SMEAR: PRESENT
AST SERPL W P-5'-P-CCNC: 13 U/L (ref 5–45)
BACTERIA UR QL AUTO: ABNORMAL /HPF
BASOPHILS # BLD MANUAL: 0.07 THOUSAND/UL (ref 0–0.1)
BASOPHILS NFR MAR MANUAL: 1 % (ref 0–1)
BILIRUB SERPL-MCNC: 0.21 MG/DL (ref 0.2–1)
BILIRUB UR QL STRIP: NEGATIVE
BUN SERPL-MCNC: 11 MG/DL (ref 5–25)
CALCIUM SERPL-MCNC: 8.8 MG/DL (ref 8.3–10.1)
CHLORIDE SERPL-SCNC: 102 MMOL/L (ref 100–108)
CLARITY UR: ABNORMAL
CO2 SERPL-SCNC: 28 MMOL/L (ref 21–32)
COLOR UR: YELLOW
CREAT SERPL-MCNC: 1.3 MG/DL (ref 0.6–1.3)
EOSINOPHIL # BLD MANUAL: 0.21 THOUSAND/UL (ref 0–0.4)
EOSINOPHIL NFR BLD MANUAL: 3 % (ref 0–6)
ERYTHROCYTE [DISTWIDTH] IN BLOOD BY AUTOMATED COUNT: 18.4 % (ref 11.6–15.1)
GFR SERPL CREATININE-BSD FRML MDRD: 54 ML/MIN/1.73SQ M
GLUCOSE SERPL-MCNC: 70 MG/DL (ref 65–140)
GLUCOSE UR STRIP-MCNC: NEGATIVE MG/DL
HCT VFR BLD AUTO: 34.5 % (ref 36.5–49.3)
HGB BLD-MCNC: 10.9 G/DL (ref 12–17)
HGB UR QL STRIP.AUTO: ABNORMAL
KETONES UR STRIP-MCNC: NEGATIVE MG/DL
LEUKOCYTE ESTERASE UR QL STRIP: ABNORMAL
LYMPHOCYTES # BLD AUTO: 0.97 THOUSAND/UL (ref 0.6–4.47)
LYMPHOCYTES # BLD AUTO: 14 % (ref 14–44)
MCH RBC QN AUTO: 28.6 PG (ref 26.8–34.3)
MCHC RBC AUTO-ENTMCNC: 31.6 G/DL (ref 31.4–37.4)
MCV RBC AUTO: 91 FL (ref 82–98)
MONOCYTES # BLD AUTO: 1.25 THOUSAND/UL (ref 0–1.22)
MONOCYTES NFR BLD: 18 % (ref 4–12)
MYELOCYTES NFR BLD MANUAL: 1 % (ref 0–1)
NEUTROPHILS # BLD MANUAL: 4.04 THOUSAND/UL (ref 1.85–7.62)
NEUTS SEG NFR BLD AUTO: 58 % (ref 43–75)
NITRITE UR QL STRIP: NEGATIVE
NON-SQ EPI CELLS URNS QL MICRO: ABNORMAL /HPF
NRBC BLD AUTO-RTO: 0 /100 WBCS
PH UR STRIP.AUTO: 6.5 [PH] (ref 4.5–8)
PLATELET # BLD AUTO: 727 THOUSANDS/UL (ref 149–390)
PLATELET BLD QL SMEAR: ABNORMAL
PMV BLD AUTO: 9.3 FL (ref 8.9–12.7)
POLYCHROMASIA BLD QL SMEAR: PRESENT
POTASSIUM SERPL-SCNC: 3.7 MMOL/L (ref 3.5–5.3)
PROT SERPL-MCNC: 7.1 G/DL (ref 6.4–8.2)
PROT UR STRIP-MCNC: ABNORMAL MG/DL
RBC # BLD AUTO: 3.81 MILLION/UL (ref 3.88–5.62)
RBC #/AREA URNS AUTO: ABNORMAL /HPF
RBC MORPH BLD: PRESENT
SODIUM SERPL-SCNC: 138 MMOL/L (ref 136–145)
SP GR UR STRIP.AUTO: 1.01 (ref 1–1.03)
UROBILINOGEN UR QL STRIP.AUTO: 0.2 E.U./DL
VARIANT LYMPHS # BLD AUTO: 5 %
WBC # BLD AUTO: 6.96 THOUSAND/UL (ref 4.31–10.16)
WBC #/AREA URNS AUTO: ABNORMAL /HPF

## 2018-12-10 PROCEDURE — 81001 URINALYSIS AUTO W/SCOPE: CPT | Performed by: UROLOGY

## 2018-12-10 PROCEDURE — 85027 COMPLETE CBC AUTOMATED: CPT | Performed by: INTERNAL MEDICINE

## 2018-12-10 PROCEDURE — 80053 COMPREHEN METABOLIC PANEL: CPT | Performed by: INTERNAL MEDICINE

## 2018-12-10 PROCEDURE — 85007 BL SMEAR W/DIFF WBC COUNT: CPT | Performed by: INTERNAL MEDICINE

## 2018-12-10 PROCEDURE — 99215 OFFICE O/P EST HI 40 MIN: CPT | Performed by: INTERNAL MEDICINE

## 2018-12-10 PROCEDURE — 36415 COLL VENOUS BLD VENIPUNCTURE: CPT | Performed by: INTERNAL MEDICINE

## 2018-12-10 RX ORDER — CIPROFLOXACIN 500 MG/1
500 TABLET, FILM COATED ORAL EVERY 12 HOURS SCHEDULED
Qty: 14 TABLET | Refills: 0 | Status: SHIPPED | OUTPATIENT
Start: 2018-12-10 | End: 2018-12-17

## 2018-12-10 NOTE — PROGRESS NOTES
Time out  [employer] RN phoned this am at 1005 to report patient will have reduced doses in both Gemzar & Cisplatin  Gemzar will be dosed at 800mg/m2 & Cisplatin will be dosed at 45mg/m2, new orders will follow

## 2018-12-10 NOTE — PROGRESS NOTES
Timeout done on 12/10/18 with Stella Torrez RN  Decreasing Cisplatin dose from 60mg/m2 to 45mg/m2  Decreasing Gemzar dose from 1000mg/m2 to 800mg/m2  [facility] obtained and [facility]

## 2018-12-10 NOTE — PROGRESS NOTES
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   His currently on neoadjuvant chemotherapy with cisplatin and gemcitabine with significant side effects, including progressive fatigue, chemotherapy-induced anemia, anorexia and weight loss  Therefore, treatment adjustment is absolutely necessary  Following 2 options were thoroughly discussed  1    Omit 3rd cycle of neoadjuvant chemotherapy and proceed with surgical treatment  2    Third cycle 2 neoadjuvant chemotherapy with dose reduction of cisplatin and gemcitabine  Pros and cons of above 2 options were thoroughly discussed  After lengthy discussion, we decided to treat him with 3rd cycle of neoadjuvant chemotherapy with dose reduction  I prescribed ciprofloxacin to treat urinary tract infection  I am going to set up PET-CT scan in early January 2019 for disease evaluation  I will see her again in January 11, 2019 to discuss those results    He is in agreement with my recommendations                                                                                                   Subjective:      HPI:  A 79-year-old gentleman who has history of Buerger disease, 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32  Adolfo Marie was briefly a smoker until 25years old  Adolfo Marie was 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, when he underwent lung cyst surgery   Subsequently, he had difficulty of urination, for which he has been under the care of Dr Darnell Whitt had multiple procedure including left ureter placement for hydronephrosis as well as prostate biopsy in March 2018 which showed small amount of adenocarcinoma with Angel score 7   He has not had any treatment for his prostate cancer   He underwent cystoscopy and biopsy of bladder   Bladder biopsy was negative for malignancy   However, repeated prostate biopsy showed high-grade urothelial carcinoma with sarcomatoid feature   Therefore, he was referred to me to discuss systemic therapy  Jameel Varghese continued to have some discomfort in the pelvis   He does not see any gross hematuria   He has mild exertional shortness of breath   His weight has been stable   He denied fever, chills or night sweats   His recent creatinine was 1  23   He underwent PET-CT scan which showed highly hypermetabolic prostate with SUV 41 3   There was the rim hypermetabolism in the pelvis which was read as prior abscess  Cynthia Apt is no evidence of distant metastasis based on the PET-CT scan   His performance status is probably 1/4 on the ECOG scale            Interval History:  A 70-year-old gentleman who was diagnosed in limited volume of prostate cancer with Ane[place] score 7 in March 2018  Jameel Varghese has not had any treatment for this  Jameel Varghese was recently diagnosed with high-grade urothelial carcinoma with sarcomatoid feature, based on the prostate biopsy    This was T4 disease with prostate invasion   Therefore, he started neoadjuvant chemotherapy with cisplatin and gemcitabine  He had 2 cycle treatment, so far with quite poor tolerance  He has been anorexic, resulting in more than 10 lb weight loss  He has progressive fatigue  He has some exertional shortness of breath  He also had chemotherapy-induced anemia  His CKD did not change  He was hospitalized for 2 days with orthostatic hypertension  He was given 2 units of red cell transfusion  He came in today for follow-up    He is scheduled to have 3rd cycle of cisplatin and gemcitabine in December 12, 2018                                                                      Objective:      Primary Diagnosis:     1    High-grade urothelial carcinoma with sarcomatoid feature, T4 disease is prostate invasion   Diagnosed in August 2018  2    Localized prostate cancer with [place] score 7, diagnosed in March 2018      Cancer Staging:  Cancer Staging  No matching staging information was found for the patient         Previous Hematologic/ Oncologic Treatment:            Current Hematologic/ Oncologic Treatment:       Neoadjuvant chemotherapy with cisplatin and gemcitabine x3 cycle  3rd cycle to be started in December 12, 2018      Disease Status:      Not evaluated at this time      Test Results:     Pathology:     Prostate biopsy in March 2018 showed adenocarcinoma, Angel score 7      Repeated prostate biopsy in August 1, 2018 showed high-grade urothelial carcinoma with sarcomatoid feature      Re-biopsy in October 2018 showed high-grade urothelial carcinoma in the prostate gland as well as prostatic urethra      Radiology:     PET-CT scan showed no evidence of distant metastasis   Hypermetabolic prostate with SUV 41   In the pelvis, there with rim hypermetabolism which was read as abscess      Laboratory:     See below      Physical Exam:        General Appearance:    Alert, oriented          Eyes:    PERRL   Ears:    Normal external ear canals, both ears   Nose:   Nares normal, septum midline   Throat:   Mucosa moist  Pharynx without injection  Neck:   Supple         Lungs:     Clear to auscultation bilaterally   Chest Wall:    No tenderness or deformity    Heart:    Regular rate and rhythm         Abdomen:     Soft, non-tender, bowel sounds +, no organomegaly               Extremities:   Extremities no cyanosis or edema         Skin:   no rash or icterus  Lymph nodes:   Cervical, supraclavicular, and axillary nodes normal   Neurologic:   CNII-XII intact, normal strength, sensation and reflexes     Throughout             Breast exam:   Not applicable             ROS: Review of Systems   Constitutional:        Fatigue  Anorexia  Weight loss   Neurological:        Dizziness   All other systems reviewed and are negative  Imaging: Xr Spine Cervical Complete 4 Or 5 Vw Non Injury    Result Date: 11/28/2018  Narrative: CERVICAL SPINE INDICATION:   C68 9: Malignant neoplasm of urinary organ, unspecified M54 2: Cervicalgia  Posterior neck pain radiating to skull  COMPARISON:  PET/CT scan dated August 20, 2018  VIEWS:  XR SPINE CERVICAL COMPLETE 4 OR 5 VW NON INJURY FINDINGS: No evidence of fracture  There is minimal grade 1 anterolisthesis of C4 on C5  No subluxation  There is intervertebral disc space narrowing at C3/C4, C5/C6 and C6/C7  There are small anterior vertebral body osteophytes at C3-C7  There is left neural foraminal narrowing at C3/C4, C4/C5 and C5/C6  There is right neural foraminal narrowing at C3/C4 at C4/C5  The prevertebral soft tissues are within normal limits  The lung apices are clear  Impression: Mild to moderate multilevel spondylotic degenerative changes of the cervical spine with no evidence of acute fracture or subluxation  No lytic bone lesion  Workstation performed: [id number]     Ct Head Wo Contrast    Result Date: 11/28/2018  Narrative: CT BRAIN - WITHOUT CONTRAST INDICATION:   Syncope/fainting  COMPARISON:  PET CT 8/20/2018 TECHNIQUE:  CT examination of the brain was performed  In addition to axial images, coronal 2D reformatted images were created and submitted for interpretation  Radiation dose length product (DLP) for this visit:  1008 mGy-cm   This examination, like all CT scans performed in the [facility], was performed utilizing techniques to minimize radiation dose exposure, including the use of iterative reconstruction and automated exposure control  IMAGE QUALITY:  Diagnostic  FINDINGS: PARENCHYMA:  No intracranial mass, mass effect or midline shift  No CT signs of acute large vessel infarction  No acute parenchymal hemorrhage   There is asymmetric parenchymal volume loss noted within the anterior right temporal lobe  There are ossifications seen along the adjacent dural surface and extending superiorly to the right frontoparietal vertex  Unclear if this is related to old traumatic or infectious insult or potentially congenital   In either case, it is unchanged from the previous exam  Moderate cortical atrophy  Mild periventricular white matter hypodensity which is nonspecific although most compatible with chronic small vessel ischemic disease  VENTRICLES AND EXTRA-AXIAL SPACES:  Stable  VISUALIZED ORBITS AND PARANASAL SINUSES:  No acute abnormality involving the orbits  Mild scattered sinus mucosal thickening is noted  No fluid levels are seen  CALVARIUM AND EXTRACRANIAL SOFT TISSUES:  Normal      Impression: No acute intracranial abnormality  Workstation performed: [id number]         Labs:   Lab Results   Component Value Date    WBC 5 88 12/01/2018    HGB 9 9 (L) 12/01/2018    HCT 30 8 (L) 12/01/2018    MCV 88 12/01/2018     (L) 12/01/2018     Lab Results   Component Value Date    K 4 0 12/01/2018     12/01/2018    CO2 26 12/01/2018    BUN 7 12/01/2018    CREATININE 1 26 12/01/2018    GLUF 82 11/19/2018    CALCIUM 8 7 12/01/2018    AST 10 11/29/2018    ALT 7 (L) 11/29/2018    ALKPHOS 49 11/29/2018    EGFR 56 12/01/2018         Lab Results   Component Value Date    PSA 0 5 06/22/2018       Lab Results   Component Value Date    IRON 38 (L) 11/28/2018    TIBC 192 (L) 11/28/2018    FERRITIN 534 (H) 11/28/2018       Lab Results   Component Value Date    [id number] 442 11/28/2018       Lab Results   Component Value Date    FOLATE 6 3 11/28/2018         Current Medications: Reviewed  Allergies: Reviewed  PMH/FH/SH:  Reviewed      Vital Sign:    There is no height or weight on file to calculate BSA      Wt Readings from Last 3 Encounters:   12/01/18 91 1 kg (200 lb 13 4 oz)   11/26/18 90 3 kg (199 lb)   11/23/18 90 7 kg (200 lb)        Temp Readings from Last 3 Encounters:   12/01/18 98 °F (36 7 °C) (Tympanic)   11/26/18 98 °F (36 7 °C) (Tympanic)   11/23/18 98 1 °F (36 7 °C) (Oral)        BP Readings from Last 3 Encounters:   12/01/18 125/68   11/26/18 118/62   11/23/18 122/56         Pulse Readings from Last 3 Encounters:   12/01/18 80   11/26/18 100   11/23/18 (!) 118     @LASTSAO2(3)@

## 2018-12-11 RX ORDER — SODIUM CHLORIDE 9 MG/ML
20 INJECTION, SOLUTION INTRAVENOUS CONTINUOUS
Status: DISCONTINUED | OUTPATIENT
Start: 2018-12-12 | End: 2018-12-15 | Stop reason: HOSPADM

## 2018-12-12 ENCOUNTER — HOSPITAL ENCOUNTER (OUTPATIENT)
Dept: INFUSION CENTER | Facility: CLINIC | Age: 73
Discharge: HOME | End: 2018-12-12
Payer: MEDICARE

## 2018-12-12 ENCOUNTER — PATIENT OUTREACH (OUTPATIENT)
Dept: OTHER | Facility: HOSPITAL | Age: 73
End: 2018-12-12

## 2018-12-12 VITALS
SYSTOLIC BLOOD PRESSURE: 137 MMHG | TEMPERATURE: 98.5 F | RESPIRATION RATE: 18 BRPM | BODY MASS INDEX: 28.11 KG/M2 | HEART RATE: 73 BPM | WEIGHT: 189.82 LBS | HEIGHT: 69 IN | DIASTOLIC BLOOD PRESSURE: 82 MMHG

## 2018-12-12 PROCEDURE — 96361 HYDRATE IV INFUSION ADD-ON: CPT

## 2018-12-12 PROCEDURE — 96417 CHEMO IV INFUS EACH ADDL SEQ: CPT

## 2018-12-12 PROCEDURE — 96413 CHEMO IV INFUSION 1 HR: CPT

## 2018-12-12 PROCEDURE — 96367 TX/PROPH/DG ADDL SEQ IV INF: CPT

## 2018-12-12 RX ADMIN — SODIUM CHLORIDE 1000 ML: 0.9 INJECTION, SOLUTION INTRAVENOUS at 12:20

## 2018-12-12 RX ADMIN — CISPLATIN 92 MG: 1 INJECTION, SOLUTION INTRAVENOUS at 11:21

## 2018-12-12 RX ADMIN — GEMCITABINE 1600 MG: 38 INJECTION INTRAVENOUS at 10:50

## 2018-12-12 RX ADMIN — SODIUM CHLORIDE 150 MG: 0.9 INJECTION, SOLUTION INTRAVENOUS at 09:47

## 2018-12-12 RX ADMIN — SODIUM CHLORIDE 1000 ML: 0.9 INJECTION, SOLUTION INTRAVENOUS at 08:51

## 2018-12-12 RX ADMIN — SODIUM CHLORIDE 20 ML/HR: 0.9 INJECTION, SOLUTION INTRAVENOUS at 08:51

## 2018-12-12 RX ADMIN — DEXAMETHASONE SODIUM PHOSPHATE: 10 INJECTION, SOLUTION INTRAMUSCULAR; INTRAVENOUS at 08:51

## 2018-12-12 NOTE — PLAN OF CARE
Problem: Potential for Falls  Goal: Patient will remain free of falls  INTERVENTIONS:  - Assess patient frequently for physical needs  -  Identify cognitive and physical deficits and behaviors that affect risk of falls    -  [place] fall precautions as indicated by assessment   - Educate patient/family on patient safety including physical limitations  - Instruct patient to call for assistance with activity based on assessment  - Modify environment to reduce risk of injury  - Consider OT/PT consult to assist with strengthening/mobility   Outcome: Progressing

## 2018-12-12 NOTE — PROGRESS NOTES
Patient arrived to the unit  He has a small bump on his arm  Patient reported that it was from an old iv  It does not hurt and is not warm  Patient encouraged to put a warm moist compress on it and to notify Dr Henri Milian if it is not better in three days

## 2018-12-17 ENCOUNTER — APPOINTMENT (OUTPATIENT)
Dept: LAB | Facility: MEDICAL CENTER | Age: 73
End: 2018-12-17
Payer: MEDICARE

## 2018-12-18 RX ORDER — SODIUM CHLORIDE 9 MG/ML
20 INJECTION, SOLUTION INTRAVENOUS CONTINUOUS
Status: DISCONTINUED | OUTPATIENT
Start: 2018-12-19 | End: 2018-12-22 | Stop reason: HOSPADM

## 2018-12-19 ENCOUNTER — HOSPITAL ENCOUNTER (OUTPATIENT)
Dept: INFUSION CENTER | Facility: CLINIC | Age: 73
Discharge: HOME | End: 2018-12-19
Payer: MEDICARE

## 2018-12-19 VITALS
TEMPERATURE: 98.7 F | HEIGHT: 69 IN | SYSTOLIC BLOOD PRESSURE: 119 MMHG | BODY MASS INDEX: 27.33 KG/M2 | WEIGHT: 184.53 LBS | DIASTOLIC BLOOD PRESSURE: 78 MMHG | RESPIRATION RATE: 18 BRPM | HEART RATE: 106 BPM

## 2018-12-19 PROCEDURE — 96367 TX/PROPH/DG ADDL SEQ IV INF: CPT

## 2018-12-19 PROCEDURE — 96413 CHEMO IV INFUSION 1 HR: CPT

## 2018-12-19 RX ADMIN — SODIUM CHLORIDE 20 ML/HR: 0.9 INJECTION, SOLUTION INTRAVENOUS at 08:25

## 2018-12-19 RX ADMIN — GEMCITABINE 1600 MG: 38 INJECTION INTRAVENOUS at 09:11

## 2018-12-19 RX ADMIN — ONDANSETRON 8 MG: 2 INJECTION, SOLUTION INTRAMUSCULAR; INTRAVENOUS at 08:25

## 2018-12-24 ENCOUNTER — APPOINTMENT (OUTPATIENT)
Dept: LAB | Facility: MEDICAL CENTER | Age: 73
End: 2018-12-24
Payer: MEDICARE

## 2018-12-26 ENCOUNTER — TELEPHONE (OUTPATIENT)
Dept: HEMATOLOGY ONCOLOGY | Facility: CLINIC | Age: 73
End: 2018-12-26

## 2018-12-26 ENCOUNTER — HOSPITAL ENCOUNTER (OUTPATIENT)
Dept: INFUSION CENTER | Facility: CLINIC | Age: 73
Discharge: HOME | End: 2018-12-26
Payer: MEDICARE

## 2018-12-26 VITALS
HEART RATE: 71 BPM | DIASTOLIC BLOOD PRESSURE: 72 MMHG | RESPIRATION RATE: 18 BRPM | TEMPERATURE: 98.4 F | SYSTOLIC BLOOD PRESSURE: 110 MMHG

## 2018-12-26 PROCEDURE — 96361 HYDRATE IV INFUSION ADD-ON: CPT

## 2018-12-26 PROCEDURE — 96365 THER/PROPH/DIAG IV INF INIT: CPT

## 2018-12-26 RX ORDER — SODIUM CHLORIDE 9 MG/ML
20 INJECTION, SOLUTION INTRAVENOUS CONTINUOUS
Status: DISCONTINUED | OUTPATIENT
Start: 2018-12-27 | End: 2018-12-30 | Stop reason: HOSPADM

## 2018-12-26 RX ADMIN — SODIUM CHLORIDE 1000 ML: 0.9 INJECTION, SOLUTION INTRAVENOUS at 13:13

## 2018-12-26 RX ADMIN — ONDANSETRON 8 MG: 2 INJECTION INTRAMUSCULAR; INTRAVENOUS at 13:40

## 2018-12-26 NOTE — TELEPHONE ENCOUNTER
PT COMPLAINING THAT HE IS VERY DIZZY TODAY AND WORRIED BC HE HAS CHEMO TOMORROW   THEY WOULD LIKE A RETURN CALL TO DISCUSS WHAT TO DO

## 2018-12-26 NOTE — PROGRESS NOTES
Pt here for 1 liter of NSS hydration today due to being dizzy at home  Pt is asking for nausea medication in addition to this  Carlitos Blanchard RN notified and will ask Dr Mariangel Fontenot for this  Awaiting callback at this time

## 2018-12-26 NOTE — TELEPHONE ENCOUNTER
Dr Eren Purcell would like pt to receive a liter of fluids in infusion center today 12/26/18  Helen Perez putting order in and will make arrangements for this

## 2018-12-26 NOTE — PROGRESS NOTES
Pt tolerated hydration without incident    Pt was provided with AVS and is aware to return to infusion center tomorrow for chemo

## 2018-12-26 NOTE — PLAN OF CARE
Problem: Potential for Falls  Goal: Patient will remain free of falls  INTERVENTIONS:  - Assess patient frequently for physical needs  -  Identify cognitive and physical deficits and behaviors that affect risk of falls    -  [place] fall precautions as indicated by assessment   - Educate patient/family on patient safety including physical limitations  - Instruct patient to call for assistance with activity based on assessment  - Modify environment to reduce risk of injury  - Consider OT/PT consult to assist with strengthening/mobility   Outcome: Progressing

## 2018-12-27 ENCOUNTER — HOSPITAL ENCOUNTER (OUTPATIENT)
Dept: INFUSION CENTER | Facility: CLINIC | Age: 73
Discharge: HOME | End: 2018-12-27
Payer: MEDICARE

## 2018-12-27 VITALS
TEMPERATURE: 98.6 F | DIASTOLIC BLOOD PRESSURE: 79 MMHG | SYSTOLIC BLOOD PRESSURE: 127 MMHG | HEIGHT: 69 IN | WEIGHT: 188.93 LBS | RESPIRATION RATE: 16 BRPM | BODY MASS INDEX: 27.98 KG/M2 | HEART RATE: 101 BPM

## 2018-12-27 PROCEDURE — 96361 HYDRATE IV INFUSION ADD-ON: CPT

## 2018-12-27 PROCEDURE — 96375 TX/PRO/DX INJ NEW DRUG ADDON: CPT

## 2018-12-27 PROCEDURE — 96413 CHEMO IV INFUSION 1 HR: CPT

## 2018-12-27 RX ADMIN — SODIUM CHLORIDE 20 ML/HR: 0.9 INJECTION, SOLUTION INTRAVENOUS at 08:51

## 2018-12-27 RX ADMIN — GEMCITABINE 1600 MG: 38 INJECTION INTRAVENOUS at 09:17

## 2018-12-27 RX ADMIN — ONDANSETRON 8 MG: 2 INJECTION INTRAMUSCULAR; INTRAVENOUS at 08:51

## 2018-12-27 RX ADMIN — SODIUM CHLORIDE 1000 ML: 0.9 INJECTION, SOLUTION INTRAVENOUS at 09:53

## 2018-12-27 NOTE — PLAN OF CARE
Problem: Potential for Falls  Goal: Patient will remain free of falls  INTERVENTIONS:  - Assess patient frequently for physical needs  -  Identify cognitive and physical deficits and behaviors that affect risk of falls    -  [place] fall precautions as indicated by assessment   - Educate patient/family on patient safety including physical limitations  - Instruct patient to call for assistance with activity based on assessment  - Modify environment to reduce risk of injury  - Consider OT/PT consult to assist with strengthening/mobility   Outcome: Progressing

## 2018-12-27 NOTE — PROGRESS NOTES
Patient arrived on unit for chemotherapy  Patient stated he felt very lightheaded  No other discomforts  VSS  Called and spoke with Mahesh Palomino RN who made Dr Gavino Skelton aware  Orders sent for hydration today  Patient tolerated chemotherapy and hydration  No further infusion appointments scheduled at this time  Patient has f/u with Dr Gavino Skelton  Patient and daughter stated he is suppose to have surgery   AVS given to patient

## 2018-12-28 NOTE — PROGRESS NOTES
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 now status post neoadjuvant chemotherapy  Referred to our office back in August for an incidental finding of 2 5 cm left popliteal artery aneurysm on PET/CT 8/20/2018  He has history of Buerger disease/tobacco abuse with left TMA back in his 25s  He now returns for routine office visit with surveillance duplex  Duplex suggests left distal SFA popliteal occlusion with TRENTON 0 57  Patient is asymptomatic from a vascular standpoint  He has no tissue loss  Denies any claudication  He has completed his neoadjuvant chemotherapy and his scheduled for repeat staging PET-CT scan next Monday with follow-up with Oncology and surgery thereafter for possible surgical resection  I have discussed with Tia Anayaet his non surveillance arterial study  In the face of asymptomatic disease will continue surveillance  Will get a duplex in 1 year and sooner if needed  He will call the office with any acute changes, questions and/or concerns in the interim  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[facility] [address] )  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      Assessment/Plan   Diagnoses and all orders for this visit:    Aneurysm of left popliteal artery (HCC)  -     VAS lower limb arterial duplex, complete bilateral; Future        No chief complaint on file  Subjective   Patient ID: Liliana Cabezas is a 68 y o  male  Chief complaint: Pt is here as a 3 month f/u to review BRIGHT done 10/5/18  Tiaannie Marks is a 61-year-old gentleman who currently completed neoadjuvant chemotherapy for locally advanced urothelial carcinoma  He is scheduled for a repeat staging PET/CT scan next Monday He is scheduled to follow up with Oncology and surgery in the coming weeks  He was initially referred to us for incidental finding of a left popliteal artery aneurysm noted on PET/CT scan during staging workup  Patient has been asymptomatic  He returns to the office for routine office visit and to review his noninvasive arterial study  Denies any claudication symptoms/rest pain  His only complaints are "dizziness" as well as right is distal thigh pain which he attributes to his prior prostate surgery  We discussed that this may be related to positioning in the operating room  He reports that the symptoms are still present however improved  The following portions of the patient's history were reviewed and updated as appropriate: allergies, current medications, past family history, past medical history, past social history, past surgical history and problem list     Review of Systems   Constitutional: Positive for chills  HENT: Negative  Eyes: Negative  Respiratory: Negative  Cardiovascular: Negative  Gastrointestinal: Negative  Endocrine: Negative  Genitourinary: Negative  Musculoskeletal: Positive for back pain  Skin: Negative  Allergic/Immunologic: Positive for food allergies  Neurological: Positive for dizziness  Hematological: Negative  Psychiatric/Behavioral: Negative  I have personally reviewed the ROS entered by MA and agree as documented      Patient Active Problem List   Diagnosis    Right bundle melissa block, 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    Aortic regurgitation    BPH with obstruction/lower urinary tract symptoms    Bladder diverticulum    Postoperative ileus ([address] )    Kidney stone    Major depressive disorder, single episode, moderate (HCC)    Peripheral vascular disease ([address] )    Essential hypertension    Acute pain of right shoulder    Benign localized hyperplasia of prostate without urinary obstruction    Urinary retention due to benign prostatic hyperplasia    Hydronephrosis of left kidney    Ureteral stricture, left    Pelvic abscess in [facility])    Aneurysm of left popliteal artery (HCC)    Urothelial carcinoma (HCC)    Prostate cancer ([facility] [address] )    Hypokalemia    Orthostatic lightheadedness    Chemotherapy-induced neutropenia (HCC)    SIRS (systemic inflammatory response syndrome) (HCC)    CKD (chronic kidney disease) stage 3, GFR 30-59 ml/min (HCC)    Acute cystitis without hematuria    Acute on chronic anemia    Moderate protein-calorie malnutrition (HCC)    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[facility] [address] )       Past Surgical History:   Procedure Laterality Date    ABDOMINAL SURGERY      When young had procedure for improviing circulation to left lower extremety    BACK SURGERY      Lumbar- not sure what was done   Rickey Cresencio CARDIAC CATHETERIZATION      Approximately 2007- no blockages    CARPAL TUNNEL RELEASE Bilateral     CATARACT EXTRACTION Left     COLONOSCOPY      CYST REMOVAL      Robotic procedure to remove benign cyst from lower left lung    CYSTOGRAM N/A 3/14/2018    Procedure: CYSTOGRAM;  Surgeon: Wood Patel MD;  [facility];  Service: Urology    CYSTOSCOPY      ESOPHAGOGASTRODUODENOSCOPY      IR TUBE PLACEMENT NEPHROSTOMY  8/10/2018    LUNG SURGERY      cyst removed left lung    OTHER SURGICAL HISTORY Left     fistula drain in left buttock    ID CYSTO/URETERO W/LITHOTRIPSY &INDWELL STENT INSRT Left 1/3/2018    Procedure: CYSTOSCOPY URETEROSCOPY, RETROGRADE PYELOGRAM AND INSERTION STENT URETERAL;  Surgeon: Wood Patel MD;  [facility];  Service: Urology    ID CYSTOTOMY,EXCIS BLADDER TIC N/A 2/14/2018    Procedure: ABDOMINAL EXPLORATION; CLOSURE OF BLADDER DIVERTICULITIS;  Surgeon: Wood Patel MD;  [facility];  Service: Urology    ID CYSTOURETHROSCOPY,URETER CATHETER Left 8/1/2018    Procedure: Cystoscopy, cystogram, bladder biopsies, removal of pelvic drain;  Surgeon: Wood Patel MD;  [facility];  Service: Urology    ID INCISE/DRAIN BLADDER N/A 2/14/2018    Procedure: OPEN SUPRAPUBIC TUBE PLACEMENT;  Surgeon: Wood Patel MD;  [facility];  Service: Urology    WY RELEASE URETER,RETROPER FIBROSIS Left 2/14/2018    Procedure: LYSIS OF ADHESIONS; URETEROLYSIS ;  Surgeon: Wood Patel MD;  [facility];  Service: Urology    SKIN CANCER EXCISION      Melanoma removal on back in early 1990's    TOE AMPUTATION Left     All 5 toes left foot were amputated due to poor circulation     TRANSURETHRAL RESECTION OF PROSTATE N/A 3/14/2018    Procedure: TRANSURETHRAL RESECTION OF PROSTATE (TURP), LEFT URETERAL STENT REMOVAL;  Surgeon: Wood Patel MD;  [facility];  Service: Urology    TRANSURETHRAL RESECTION OF PROSTATE N/A 9/26/2018    Procedure: TRANSURETHRAL RESECTION OF PROSTATE (TURP); Surgeon: Wood Patel MD;  [facility];  Service: Urology    WRIST SURGERY Bilateral     Ulnar nerve on right arm and both wrists are fused       Family History   Problem Relation Age of Onset    Heart disease Father     Heart disease Mother     Cancer Paternal Grandfather        Social History     Social History    Marital status: Common Law     Spouse name: N/A    Number of children: N/A    Years of education: N/A     Occupational History    Not on file  Social History Main Topics    Smoking status: Former Smoker     Packs/day: 1 00     Years: 15 00     Types: Cigarettes     Quit date: 2/5/1970    Smokeless tobacco: Never Used      Comment: Quit 50 years    Alcohol use Yes      Comment: Very rare    Drug use: No    Sexual activity: Not on file     Other Topics Concern    Not on file     Social History Narrative    No narrative on file       Allergies   Allergen Reactions    Iodine Shortness Of Breath, Swelling and Hives     Contrast dye causes respiratory distress   Iodine causes skin rash    Mercury Hives and Swelling    Shellfish-Derived Products Anaphylaxis, Hives and Shortness Of Breath    Augmentin [Amoxicillin-Pot Clavulanate] GI Intolerance, Rash and Diarrhea     Diarrhea    Lidoderm [Lidocaine] Rash     Lidoderm patch caused rash    Penicillins Rash, Swelling and Hives    Sulfa Antibiotics Hives, Swelling and Rash         Current Outpatient Prescriptions:     acetaminophen (TYLENOL) 500 mg tablet, Take 500 mg by mouth every 6 (six) hours as needed for mild pain, Disp: , Rfl:     ciprofloxacin (CIPRO) 500 mg tablet, Take 1 tablet (500 mg total) by mouth every 12 (twelve) hours for 7 days, Disp: 14 tablet, Rfl: 0    divalproex sodium (DEPAKOTE) 500 mg EC tablet, Take 500 mg by mouth 2 (two) times a day  , Disp: , Rfl:     LORazepam (ATIVAN) 0 5 mg tablet, Take 1 tablet (0 5 mg total) by mouth every 6 (six) hours as needed for anxiety, Disp: 30 tablet, Rfl: 0    mometasone (NASONEX) 50 mcg/act nasal spray, 2 sprays into each nostril as needed, Disp: , Rfl:     ondansetron (ZOFRAN) 8 mg tablet, Take 1 tablet (8 mg total) by mouth every 8 (eight) hours as needed for nausea or vomiting, Disp: 90 tablet, Rfl: 2    oxyCODONE (ROXICODONE) 5 mg immediate release tablet, Take 1 tablet (5 mg total) by mouth every 4 (four) hours as needed for moderate pain Max Daily Amount: 30 mg, Disp: 60 tablet, Rfl: 0    pentoxifylline (TRENtal) 400 mg ER tablet, Take 400 mg by mouth 3 (three) times a day with meals, Disp: , Rfl:     ranitidine (ZANTAC) 300 MG tablet, Take 300 mg by mouth daily at bedtime, Disp: , Rfl: 5    tamsulosin (FLOMAX) 0 4 mg, Take 1 capsule (0 4 mg total) by mouth daily with dinner, Disp: 90 capsule, Rfl: 3    Objective     Physical Exam:    General appearance: alert and oriented, in no acute distress  Skin: Skin color, texture, turgor normal  No rashes or lesions  Neurologic: Grossly normal  Head: Normocephalic, without obvious abnormality, atraumatic  Eyes: negative  Throat: normal findings: tongue midline and normal  Neck: no adenopathy, no carotid bruit, no JVD and supple, symmetrical, trachea midline  Back: negative  Lungs: clear to auscultation bilaterally  Chest wall: no tenderness  Heart: regular rate and rhythm, S1, S2 normal, no murmur, click, rub or gallop  Abdomen: soft, non-tender; bowel sounds normal; no masses,  no organomegaly  Extremities: extremities normal, warm and well-perfused; no cyanosis, clubbing, or edema   Left TMA site (remote) well-healed with no tissue loss  There is no erythema of the dorsum of the foot  Pulse exam:  Radial: Right: 2+ Left[de-identified] 2+  Femoral: Right: 2+ Left: 2+  Popliteal: Right: non-palpable Left: non-palpable  DP: Right: 1+ Left: doppler signal  PT: Right: 1+ Left: doppler signal      Imaging:  I have reviewed the imaging and the findings are:     Lower extremity arterial duplex: An occlusion of the distal superficial femoral artery and proximal and distal  popliteal artery with a dilatation noted at the level of distal superficial  femoral/ above knee popliteal artery measuring approximately 1 90 cm  An  anechoic non vascularized fluid collection is noted in the popliteal fossa  consistent with a Baker's cyst   Ankle/Brachial index: 0 57, severe claudication range  There is a TMA

## 2018-12-31 ENCOUNTER — TELEPHONE (OUTPATIENT)
Dept: UROLOGY | Facility: MEDICAL CENTER | Age: 73
End: 2018-12-31

## 2018-12-31 ENCOUNTER — APPOINTMENT (OUTPATIENT)
Dept: LAB | Facility: MEDICAL CENTER | Age: 73
End: 2018-12-31
Attending: UROLOGY
Payer: MEDICARE

## 2018-12-31 DIAGNOSIS — R30.0 DYSURIA: Primary | ICD-10-CM

## 2018-12-31 DIAGNOSIS — N30.00 ACUTE CYSTITIS WITHOUT HEMATURIA: Primary | ICD-10-CM

## 2018-12-31 DIAGNOSIS — R35.0 FREQUENT URINATION: ICD-10-CM

## 2018-12-31 DIAGNOSIS — R39.12 WEAK URINARY STREAM: ICD-10-CM

## 2018-12-31 PROCEDURE — 87086 URINE CULTURE/COLONY COUNT: CPT

## 2018-12-31 RX ORDER — CIPROFLOXACIN 500 MG/1
500 TABLET, FILM COATED ORAL EVERY 12 HOURS SCHEDULED
Qty: 14 TABLET | Refills: 0 | Status: SHIPPED | OUTPATIENT
Start: 2018-12-31 | End: 2019-01-07

## 2018-12-31 NOTE — TELEPHONE ENCOUNTER
Spoke with Pardeep Davalos, pt is c/o lower abdominal pain, frequent urination, very weak stream, burning with urination x3 days  Denies f/c, n/v, heme  UC order in chart, Pardeep Davalos will take him today to [facility]  Asking for abx while awaiting results  Pt was most recently on Cipro for same symptoms approx 2-3 weeks ago, and he did get some relief

## 2018-12-31 NOTE — TELEPHONE ENCOUNTER
Patient is experiencing cloudy urine, burning when urinating, very weak stream   He was on Cipro previously by oncologist for same symptoms  Please call girlfriend Annie Lopes at [phone]

## 2019-01-01 LAB — BACTERIA UR CULT: NORMAL

## 2019-01-02 ENCOUNTER — OFFICE VISIT (OUTPATIENT)
Dept: VASCULAR SURGERY | Facility: CLINIC | Age: 74
End: 2019-01-02
Payer: MEDICARE

## 2019-01-02 VITALS
WEIGHT: 191 LBS | TEMPERATURE: 99.2 F | BODY MASS INDEX: 28.95 KG/M2 | DIASTOLIC BLOOD PRESSURE: 66 MMHG | SYSTOLIC BLOOD PRESSURE: 110 MMHG | HEIGHT: 68 IN

## 2019-01-02 DIAGNOSIS — I72.4 ANEURYSM OF LEFT POPLITEAL ARTERY (HCC): Primary | ICD-10-CM

## 2019-01-02 PROBLEM — I73.1 BUERGER'S DISEASE (HCC): Status: ACTIVE | Noted: 2019-01-02

## 2019-01-02 PROCEDURE — 99214 OFFICE O/P EST MOD 30 MIN: CPT | Performed by: SURGERY

## 2019-01-02 NOTE — PATIENT INSTRUCTIONS
Your most recent ultrasound of the legs suggest that the left popliteal artery aneurysm is occluded/thrombosed  Having said that you do not have any symptoms attributable to the occluded aneurysm  There is no urgency at present time  Should you develop any acute left foot pain, decreased sensation and/or discoloration please notify our office immediately  We will plan on repeating a duplex in 1 year and sooner if needed  Please do not hesitate to call our office with any questions and/or concerns

## 2019-01-02 NOTE — ASSESSMENT & PLAN NOTE
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 now status post neoadjuvant chemotherapy  Referred to our office back in August for an incidental finding of 2 5 cm left popliteal artery aneurysm on PET/CT 8/20/2018  He has history of Buerger disease/tobacco abuse with left TMA back in his 25s  He now returns for routine office visit with surveillance duplex  Duplex suggests left distal SFA popliteal occlusion with TRENTON 0 57  Patient is asymptomatic from a vascular standpoint  He has no tissue loss  Denies any claudication  He has completed his neoadjuvant chemotherapy and his scheduled for repeat staging PET-CT scan next Monday with follow-up with Oncology and surgery thereafter for possible surgical resection  I have discussed with Torrey Zheng his non surveillance arterial study  In the face of asymptomatic disease will continue surveillance  Will get a duplex in 1 year and sooner if needed  He will call the office with any acute changes, questions and/or concerns in the interim

## 2019-01-07 ENCOUNTER — HOSPITAL ENCOUNTER (OUTPATIENT)
Dept: NUCLEAR MEDICINE | Facility: HOSPITAL | Age: 74
Discharge: HOME | End: 2019-01-07
Payer: MEDICARE

## 2019-01-07 DIAGNOSIS — C68.9 UROTHELIAL CARCINOMA (HCC): ICD-10-CM

## 2019-01-07 LAB — GLUCOSE SERPL-MCNC: 98 MG/DL (ref 70–99)

## 2019-01-07 PROCEDURE — A9552 F18 FDG: HCPCS

## 2019-01-07 PROCEDURE — 78815 PET IMAGE W/CT SKULL-THIGH: CPT

## 2019-01-07 PROCEDURE — 82948 REAGENT STRIP/BLOOD GLUCOSE: CPT

## 2019-01-11 ENCOUNTER — OFFICE VISIT (OUTPATIENT)
Dept: HEMATOLOGY ONCOLOGY | Facility: CLINIC | Age: 74
End: 2019-01-11
Payer: MEDICARE

## 2019-01-11 VITALS
OXYGEN SATURATION: 97 % | DIASTOLIC BLOOD PRESSURE: 80 MMHG | RESPIRATION RATE: 16 BRPM | BODY MASS INDEX: 27.85 KG/M2 | SYSTOLIC BLOOD PRESSURE: 120 MMHG | TEMPERATURE: 98.5 F | HEIGHT: 69 IN | HEART RATE: 110 BPM | WEIGHT: 188 LBS

## 2019-01-11 DIAGNOSIS — C68.9 UROTHELIAL CARCINOMA (HCC): Primary | ICD-10-CM

## 2019-01-11 PROCEDURE — 99214 OFFICE O/P EST MOD 30 MIN: CPT | Performed by: INTERNAL MEDICINE

## 2019-01-11 RX ORDER — METHOCARBAMOL 500 MG/1
500 TABLET, FILM COATED ORAL 3 TIMES DAILY PRN
COMMUNITY
End: 2019-02-13

## 2019-01-11 RX ORDER — AMLODIPINE BESYLATE 10 MG/1
10 TABLET ORAL DAILY
Refills: 5 | COMMUNITY
Start: 2018-12-13 | End: 2019-02-13

## 2019-01-11 NOTE — PROGRESS NOTES
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  He underwent 3 cycle of neoadjuvant chemotherapy with cisplatin and gemcitabine with significant toxicity  PET-CT scan recently showed persistent hypermetabolism in the bladder and prostate suggesting persistent disease  He is going to be seen by Dr Alhaji Gamez next week to discuss cystectomy  Regarding his lower extremity edema, I recommended him to have Doppler ultrasound to rule out DVT  Once obtain report of Doppler ultrasound, I will contact him  Otherwise, I will see him again a month after the radical cystectomy    He is in agreement with my recommendations                                                                                                     Subjective:      HPI:  A 77-year-old gentleman who has history of Buerger disease, 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32  Vidal Perez was briefly a smoker until 25years old  Vidal Perez was 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, when he underwent lung cyst surgery   Subsequently, he had difficulty of urination, for which he has been under the care of Dr Jacky Leo had multiple procedure including left ureter placement for hydronephrosis as well as prostate biopsy in March 2018 which showed small amount of adenocarcinoma with Monument score 7   He has not had any treatment for his prostate cancer   He underwent cystoscopy and biopsy of bladder   Bladder biopsy was negative for malignancy   However, repeated prostate biopsy showed high-grade urothelial carcinoma with sarcomatoid feature   Therefore, he was referred to me to discuss systemic therapy  Kimmie Rodrigues continued to have some discomfort in the pelvis   He does not see any gross hematuria   He has mild exertional shortness of breath   His weight has been stable   He denied fever, chills or night sweats   His recent creatinine was 1  23   He underwent PET-CT scan which showed highly hypermetabolic prostate with SUV 41 3   There was the rim hypermetabolism in the pelvis which was read as prior abscess  Juan Luis Dobbins is no evidence of distant metastasis based on the PET-CT scan   His performance status is probably 1/4 on the ECOG scale            Interval History:  A 70-year-old gentleman who was diagnosed in limited volume of prostate cancer with Dundee score 7 in March 2018  Kimmie Rodrigues has not had any treatment for this  Kimmie Rodrigues was then diagnosed with high-grade urothelial carcinoma with sarcomatoid feature, based on the prostate biopsy    This was T4 disease with prostate invasion   Therefore, he started neoadjuvant chemotherapy with cisplatin and gemcitabine  He had quite poor tolerance to neoadjuvant chemotherapy with significant side effects including anorexia, some weight loss, severe fatigue  Therefore, dose reduction of cisplatin and gemcitabine was necessary in the 3rd cycle treatment  He completed 3 cycle of gemcitabine and cisplatin in late December 2019 followed by PET-CT scan for tumor evaluation  PET-CT scan still showed significant hypermetabolic bladder wall mass as well as prostate hypermetabolic lesion  There is no evidence of distant metastasis  He presents today for follow-up  He continued to have moderate fatigue  He has been anorexic  However, he has no active weight loss  He has no respiratory symptoms  He denied any pain  His performance status is 1/4 on the ECOG scale  In addition, he recently noticed mild swelling on bilateral extremity right greater than left  He has no complaint of pain in lower extremity  Objective:      Primary Diagnosis:     1    High-grade urothelial carcinoma with sarcomatoid feature, T4 disease is prostate invasion   Diagnosed in August 2018  2    Localized prostate cancer with Alexis score 7, diagnosed in March 2018      Cancer Staging:  Cancer Staging  No matching staging information was found for the patient         Previous Hematologic/ Oncologic Treatment:       Neoadjuvant chemotherapy with cisplatin and gemcitabine x3 cycle  Completed in December 2018      Current Hematologic/ Oncologic Treatment:       Radical cystectomy is expected      Disease Status:      Radiographically stable disease      Test Results:     Pathology:     Prostate biopsy in March 2018 showed adenocarcinoma, Alexis score 7      Repeated prostate biopsy in August 1, 2018 showed high-grade urothelial carcinoma with sarcomatoid feature      Re-biopsy in October 2018 showed high-grade urothelial carcinoma in the prostate gland as well as prostatic urethra      Radiology:     PET-CT scan in January 2019 showed persistent hypermetabolic pelvic mass and prostate invasion  No evidence of distant metastasis      Laboratory:     See below      Physical Exam:        General Appearance:    Alert, oriented          Eyes:    PERRL   Ears:    Normal external ear canals, both ears   Nose:   Nares normal, septum midline   Throat:   Mucosa moist  Pharynx without injection  Neck:   Supple         Lungs:     Clear to auscultation bilaterally   Chest Wall:    No tenderness or deformity    Heart:    Regular rate and rhythm         Abdomen:     Soft, non-tender, bowel sounds +, no organomegaly               Extremities:   Extremities no cyanosis mild bilateral lower extremity edema          Skin:   no rash or icterus  Lymph nodes:   Cervical, supraclavicular, and axillary nodes normal   Neurologic:   CNII-XII intact, normal strength, sensation and reflexes     Throughout             Breast exam:   Not applicable             ROS: Review of Systems   Constitutional: Fatigue   All other systems reviewed and are negative  Imaging: Nm Pet Ct Skull Base To Mid Thigh    Result Date: 1/7/2019  Narrative: PET/CT SCAN INDICATION:  C68 9: Malignant neoplasm of urinary organ, unspecified   , urothelial carcinoma of the bladder with prostatic gland invasion, restaging post chemotherapy for treatment management, history of melanoma 40+ years ago MODIFIER: PS COMPARISON: PET CT 8/20/2018 and priors CELL TYPE:  High-grade urothelial carcinoma TECHNIQUE:   11 889 mCi F-18-FDG administered IV  Multiplanar attenuation corrected and non attenuation corrected PET images are available for interpretation, and contiguous, low dose, axial CT sections were obtained from the skull base through the femurs   Intravenous contrast material was not utilized  This examination, like all CT scans performed in the [facility], was performed utilizing techniques to minimize radiation dose exposure, including the use of iterative reconstruction and automated exposure control  Fasting serum glucose: 98 mg/dl FINDINGS: VISUALIZED BRAIN:   Right frontal temporal encephalomalacia again noted  HEAD/NECK:   There is a physiologic distribution of FDG  No FDG avid cervical adenopathy is seen  CT images: Unremarkable  CHEST:   No FDG avid soft tissue lesions are seen  CT images: Coronary atherosclerosis  Small left pleural effusion  ABDOMEN:   No FDG avid soft tissue lesions are seen  CT images: Cholelithiasis  Left ureteral stent  Mild fullness of the left renal pelvis, with associated mild wall thickening  Stable hepatic hypodensities  Colon diverticula  Tiny punctate nonobstructing right renal calculus  PELVIS: Significant bladder wall thickening with surrounding fat stranding and infiltration again demonstrated  Distal portion of the left ureteral stent in place   Persistent left pelvic cystic mass/fluid collection, contiguous with the left posterior lateral bladder wall, mildly decreased in size, measuring 4 6 x 2 8 cm  Prior measurement 5 4 x 4 1 cm  There remains intense FDG activity within the wall, SUV 10 5, prior SUV 12 3  Intense FDG uptake again noted within the central prostate, SUV 36 4, prior SUV 41 3  Differentiation between physiologically excreted FDG activity and possible tumor would be difficult  No hypermetabolic pelvic adenopathy  CT images: Otherwise stable  OSSEOUS STRUCTURES: No FDG avid lesions are seen  CT images: Spine degenerative change  Impression: 1  Significant bladder wall thickening with surrounding fat stranding and infiltration again demonstrated  Mildly decreased size of contiguous left pelvic cystic mass/fluid collection, with persistent intense FDG activity within the wall  2   Intense FDG uptake again noted within the central prostate  Differentiation between physiologically excreted FDG activity and possible tumor would be difficult  3   No new hypermetabolic pelvic adenopathy  4   No new hypermetabolic metastases in the neck, chest, or upper abdomen  5   Left ureteral stent in place with persistent mild fullness of the left renal pelvis   Workstation performed: [id number]         Labs:   Lab Results   Component Value Date    WBC 4 34 12/24/2018    HGB 9 2 (L) 12/24/2018    HCT 28 9 (L) 12/24/2018    MCV 90 12/24/2018     12/24/2018     Lab Results   Component Value Date    K 3 7 12/24/2018    CL 98 (L) 12/24/2018    CO2 27 12/24/2018    BUN 14 12/24/2018    CREATININE 1 22 12/24/2018    GLUF 82 11/19/2018    CALCIUM 9 8 12/24/2018    AST 14 12/24/2018    ALT 10 (L) 12/24/2018    ALKPHOS 69 12/24/2018    EGFR 58 12/24/2018           Lab Results   Component Value Date    PSA 0 5 06/22/2018         Lab Results   Component Value Date    IRON 38 (L) 11/28/2018    TIBC 192 (L) 11/28/2018    FERRITIN 534 (H) 11/28/2018       Lab Results   Component Value Date    [id number] 442 11/28/2018       Lab Results   Component Value Date    FOLATE 6 3 11/28/2018         Current Medications: Reviewed  Allergies: Reviewed  PMH/FH/SH:  Reviewed      Vital Sign:    Body surface area is 2 01 meters squared      Wt Readings from Last 3 Encounters:   01/11/19 85 3 kg (188 lb)   01/02/19 86 6 kg (191 lb)   12/27/18 85 7 kg (188 lb 15 oz)        Temp Readings from Last 3 Encounters:   01/11/19 98 5 °F (36 9 °C) (Tympanic)   01/02/19 99 2 °F (37 3 °C) (Tympanic)   12/27/18 98 6 °F (37 °C) (Tympanic)        BP Readings from Last 3 Encounters:   01/11/19 120/80   01/02/19 110/66   12/27/18 127/79         Pulse Readings from Last 3 Encounters:   01/11/19 (!) 110   12/27/18 101   12/26/18 71     @LASTSAO2(3)@

## 2019-01-14 ENCOUNTER — HOSPITAL ENCOUNTER (OUTPATIENT)
Dept: NON INVASIVE DIAGNOSTICS | Facility: HOSPITAL | Age: 74
Discharge: HOME | End: 2019-01-14
Attending: INTERNAL MEDICINE
Payer: MEDICARE

## 2019-01-14 DIAGNOSIS — C68.9 UROTHELIAL CARCINOMA (HCC): ICD-10-CM

## 2019-01-14 PROCEDURE — 93970 EXTREMITY STUDY: CPT

## 2019-01-15 ENCOUNTER — OFFICE VISIT (OUTPATIENT)
Dept: UROLOGY | Facility: MEDICAL CENTER | Age: 74
End: 2019-01-15
Payer: MEDICARE

## 2019-01-15 VITALS
SYSTOLIC BLOOD PRESSURE: 118 MMHG | BODY MASS INDEX: 28.29 KG/M2 | HEART RATE: 102 BPM | WEIGHT: 191 LBS | DIASTOLIC BLOOD PRESSURE: 68 MMHG | HEIGHT: 69 IN

## 2019-01-15 DIAGNOSIS — C68.9 UROTHELIAL CARCINOMA (HCC): ICD-10-CM

## 2019-01-15 DIAGNOSIS — C61 MALIGNANT NEOPLASM OF PROSTATE (HCC): Primary | ICD-10-CM

## 2019-01-15 LAB
SL AMB  POCT GLUCOSE, UA: ABNORMAL
SL AMB LEUKOCYTE ESTERASE,UA: ABNORMAL
SL AMB POCT BILIRUBIN,UA: ABNORMAL
SL AMB POCT BLOOD,UA: ABNORMAL
SL AMB POCT CLARITY,UA: ABNORMAL
SL AMB POCT COLOR,UA: YELLOW
SL AMB POCT KETONES,UA: ABNORMAL
SL AMB POCT NITRITE,UA: ABNORMAL
SL AMB POCT PH,UA: 7
SL AMB POCT SPECIFIC GRAVITY,UA: 1.02
SL AMB POCT URINE PROTEIN: ABNORMAL
SL AMB POCT UROBILINOGEN: 0.2

## 2019-01-15 PROCEDURE — 99215 OFFICE O/P EST HI 40 MIN: CPT | Performed by: UROLOGY

## 2019-01-15 PROCEDURE — 93970 EXTREMITY STUDY: CPT | Performed by: SURGERY

## 2019-01-15 PROCEDURE — 81003 URINALYSIS AUTO W/O SCOPE: CPT | Performed by: UROLOGY

## 2019-01-15 RX ORDER — LEVOFLOXACIN 5 MG/ML
500 INJECTION, SOLUTION INTRAVENOUS ONCE
Status: CANCELLED | OUTPATIENT
Start: 2019-02-25 | End: 2019-01-15

## 2019-01-15 RX ORDER — DEXTROSE, SODIUM CHLORIDE, SODIUM LACTATE, POTASSIUM CHLORIDE, AND CALCIUM CHLORIDE 5; .6; .31; .03; .02 G/100ML; G/100ML; G/100ML; G/100ML; G/100ML
125 INJECTION, SOLUTION INTRAVENOUS CONTINUOUS
Status: CANCELLED | OUTPATIENT
Start: 2019-02-25

## 2019-01-15 RX ORDER — CHLORHEXIDINE GLUCONATE 4 G/100ML
SOLUTION TOPICAL DAILY PRN
Status: CANCELLED | OUTPATIENT
Start: 2019-02-25

## 2019-01-15 NOTE — LETTER
January 15, 2019     Jordin Dimas MD  [address] [facility] [address]    Patient: Robert Kapoor   YOB: 1945   Date of Visit: 1/15/2019       Dear Dr Paxton Hoskins: Thank you for referring Grady Marquez to me for evaluation  Below are my notes for this consultation  If you have questions, please do not hesitate to call me  I look forward to following your patient along with you  Sincerely,        Katerina Hankins MD        CC: No Recipients  Katerina Hankins MD  1/15/2019  9:01 AM  Sign at close encounter  [address] [facility]  [address], [address] [place] [phone]  www  [facility]  org      NAME: Robert Kapoor  AGE: 68 y o  SEX: male  : 1945   MRN: [id number]    DATE: 1/15/2019  TIME: 8:45 AM    Assessment and Plan:  High-grade TCC of the prostatic urethra, stage IV  No demonstrated bladder involvement  Radical cystoprostatectomy, bilateral pelvic lymph node dissection, appendectomy and ileal conduit formation now that neoadjuvant chemotherapy has been completed  Only other option is doing nothing but chemo, but he would most likely succumb to the disease  We have discussed at length that this surgery will be a major ordeal, and he wishes to proceed  Chief Complaint     Chief Complaint   Patient presents with    Prostate Cancer       History of Present Illness   Urothelial carcinoma of the prostatic urethra-is finishing chemotherapy, to be prepared for radical cystoprostatectomy, bilateral lymph node dissection, appendectomy and ileal conduit formation  The procedure, the risks of bleeding infection, recurrence of disease, incomplete resection, small bowel obstruction, adjacent organ damage, ureteral ileal anastomotic obstruction have been explained he gives informed consent        The following portions of the patient's history were reviewed and updated as appropriate: allergies, current medications, past family history, past medical history, past social history, past surgical history and problem list     Review of Systems   Review of Systems    Active Problem List     Patient Active Problem List   Diagnosis    Right bundle melissa block, 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    Aortic regurgitation    BPH with obstruction/lower urinary tract symptoms    Bladder diverticulum    Postoperative ileus ([facility] )    Kidney stone    Major depressive disorder, single episode, moderate ([facility] )    Peripheral vascular disease ([facility] )    Essential hypertension    Acute pain of right shoulder    Benign localized hyperplasia of prostate without urinary obstruction    Urinary retention due to benign prostatic hyperplasia    Hydronephrosis of left kidney    Ureteral stricture, left    Pelvic abscess in male [facility])    Aneurysm of left popliteal artery (HCC)    Urothelial carcinoma ([facility] )    [facility]    Hypokalemia    Orthostatic lightheadedness    Chemotherapy-induced neutropenia (HCC)    SIRS (systemic inflammatory response syndrome) ([facility])    CKD (chronic kidney disease) stage 3, GFR 30-59 ml/min ([facility])    Acute cystitis without hematuria    Acute on chronic anemia    Moderate protein-calorie malnutrition ([facility] )    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[facility] )       Objective   /68 (BP Location: Left arm, Patient Position: Sitting, Cuff Size: Adult)   Pulse 102   Ht 5' 8 9" (1 75 m)   Wt 86 6 kg (191 lb)   BMI 28 29 kg/m²      Physical Exam   Constitutional: He is oriented to person, place, and time  He appears well-developed and well-nourished  HENT:   Head: Normocephalic and atraumatic  Eyes: EOM are normal    Neck: Normal range of motion  Cardiovascular: Normal rate, regular rhythm and normal heart sounds  Pulmonary/Chest: Effort normal and breath sounds normal  No respiratory distress  Abdominal: Soft  He exhibits no distension   There is no tenderness  There is no rebound  Musculoskeletal: Normal range of motion  Neurological: He is alert and oriented to person, place, and time  Skin: Skin is warm and dry  Psychiatric: He has a normal mood and affect   His behavior is normal  Judgment and thought content normal            Current Medications     Current Outpatient Prescriptions:     acetaminophen (TYLENOL) 500 mg tablet, Take 500 mg by mouth every 6 (six) hours as needed for mild pain, Disp: , Rfl:     divalproex sodium (DEPAKOTE) 500 mg EC tablet, Take 500 mg by mouth 2 (two) times a day  , Disp: , Rfl:     LORazepam (ATIVAN) 0 5 mg tablet, Take 1 tablet (0 5 mg total) by mouth every 6 (six) hours as needed for anxiety, Disp: 30 tablet, Rfl: 0    mometasone (NASONEX) 50 mcg/act nasal spray, 2 sprays into each nostril as needed, Disp: , Rfl:     ondansetron (ZOFRAN) 8 mg tablet, Take 1 tablet (8 mg total) by mouth every 8 (eight) hours as needed for nausea or vomiting, Disp: 90 tablet, Rfl: 2    pentoxifylline (TRENtal) 400 mg ER tablet, Take 400 mg by mouth 3 (three) times a day with meals, Disp: , Rfl:     ranitidine (ZANTAC) 300 MG tablet, Take 300 mg by mouth daily at bedtime, Disp: , Rfl: 5    tamsulosin (FLOMAX) 0 4 mg, Take 1 capsule (0 4 mg total) by mouth daily with dinner, Disp: 90 capsule, Rfl: 3    amLODIPine (NORVASC) 10 mg tablet, Take 10 mg by mouth daily, Disp: , Rfl: 5    methocarbamol (ROBAXIN) 500 mg tablet, Take 500 mg by mouth Three times daily as needed, Disp: , Rfl:     oxyCODONE (ROXICODONE) 5 mg immediate release tablet, Take 1 tablet (5 mg total) by mouth every 4 (four) hours as needed for moderate pain Max Daily Amount: 30 mg (Patient not taking: Reported on 1/15/2019 ), Disp: 60 tablet, Rfl: 0        Dasia Chua MD

## 2019-01-15 NOTE — H&P
[address] [facility]  [facility]  [address] [facility]  [place], [address] [place] [address]  [phone]  www  [facility]  org      NAME: Vangie Thomas  AGE: 68 y o  SEX: male  : 1945   MRN: [id number]    DATE: 1/15/2019  TIME: 8:45 AM    Assessment and Plan:  High-grade TCC of the prostatic urethra, stage IV  No demonstrated bladder involvement  Radical cystoprostatectomy, bilateral pelvic lymph node dissection, appendectomy and ileal conduit formation now that neoadjuvant chemotherapy has been completed  Only other option is doing nothing but chemo, but he would most likely succumb to the disease  We have discussed at length that this surgery will be a major ordeal, and he wishes to proceed  Chief Complaint     Chief Complaint   Patient presents with    Prostate Cancer       History of Present Illness   Urothelial carcinoma of the prostatic urethra-is finishing chemotherapy, to be prepared for radical cystoprostatectomy, bilateral lymph node dissection, appendectomy and ileal conduit formation  The procedure, the risks of bleeding infection, recurrence of disease, incomplete resection, small bowel obstruction, adjacent organ damage, ureteral ileal anastomotic obstruction have been explained he gives informed consent        The following portions of the patient's history were reviewed and updated as appropriate: allergies, current medications, past family history, past medical history, past social history, past surgical history and problem list     Review of Systems   Review of Systems    Active Problem List     Patient Active Problem List   Diagnosis    Right bundle melissa block, 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 )    Essential hypertension    Acute pain of right shoulder    Benign localized hyperplasia of prostate without urinary obstruction    Urinary retention due to benign prostatic hyperplasia    Hydronephrosis of left kidney    Ureteral stricture, left    Pelvic abscess in male [facility])    Aneurysm of left popliteal artery (HCC)    Urothelial carcinoma (HCC)    Prostate cancer (HCC)    Hypokalemia    Orthostatic lightheadedness    Chemotherapy-induced neutropenia (HCC)    SIRS (systemic inflammatory response syndrome) (HCC)    CKD (chronic kidney disease) stage 3, GFR 30-59 ml/min (HCC)    Acute cystitis without hematuria    Acute on chronic anemia    Moderate protein-calorie malnutrition (HCC)    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HCC)       Objective   /68 (BP Location: Left arm, Patient Position: Sitting, Cuff Size: Adult)   Pulse 102   Ht 5' 8 9" (1 75 m)   Wt 86 6 kg (191 lb)   BMI 28 29 kg/m²      Physical Exam   Constitutional: He is oriented to person, place, and time  He appears well-developed and well-nourished  HENT:   Head: Normocephalic and atraumatic  Eyes: EOM are normal    Neck: Normal range of motion  Cardiovascular: Normal rate, regular rhythm and normal heart sounds  Pulmonary/Chest: Effort normal and breath sounds normal  No respiratory distress  Abdominal: Soft  He exhibits no distension  There is no tenderness  There is no rebound  Musculoskeletal: Normal range of motion  Neurological: He is alert and oriented to person, place, and time  Skin: Skin is warm and dry  Psychiatric: He has a normal mood and affect   His behavior is normal  Judgment and thought content normal            Current Medications     Current Outpatient Prescriptions:     acetaminophen (TYLENOL) 500 mg tablet, Take 500 mg by mouth every 6 (six) hours as needed for mild pain, Disp: , Rfl:     divalproex sodium (DEPAKOTE) 500 mg EC tablet, Take 500 mg by mouth 2 (two) times a day  , Disp: , Rfl:     LORazepam (ATIVAN) 0 5 mg tablet, Take 1 tablet (0 5 mg total) by mouth every 6 (six) hours as needed for anxiety, Disp: 30 tablet, Rfl: 0    mometasone (NASONEX) 50 mcg/act nasal spray, 2 sprays into each nostril as needed, Disp: , Rfl:     ondansetron (ZOFRAN) 8 mg tablet, Take 1 tablet (8 mg total) by mouth every 8 (eight) hours as needed for nausea or vomiting, Disp: 90 tablet, Rfl: 2    pentoxifylline (TRENtal) 400 mg ER tablet, Take 400 mg by mouth 3 (three) times a day with meals, Disp: , Rfl:     ranitidine (ZANTAC) 300 MG tablet, Take 300 mg by mouth daily at bedtime, Disp: , Rfl: 5    tamsulosin (FLOMAX) 0 4 mg, Take 1 capsule (0 4 mg total) by mouth daily with dinner, Disp: 90 capsule, Rfl: 3    amLODIPine (NORVASC) 10 mg tablet, Take 10 mg by mouth daily, Disp: , Rfl: 5    methocarbamol (ROBAXIN) 500 mg tablet, Take 500 mg by mouth Three times daily as needed, Disp: , Rfl:     oxyCODONE (ROXICODONE) 5 mg immediate release tablet, Take 1 tablet (5 mg total) by mouth every 4 (four) hours as needed for moderate pain Max Daily Amount: 30 mg (Patient not taking: Reported on 1/15/2019 ), Disp: 60 tablet, Rfl: 0        Maria T Lopez MD

## 2019-01-15 NOTE — PROGRESS NOTES
[address] [facility]  [facility]  [address] [facility]  [place], [address] [place] [address]  [phone]  www  [facility]  org      NAME: Camille Martinez  AGE: 68 y o  SEX: male  : 1945   MRN: [id number]    DATE: 1/15/2019  TIME: 8:45 AM    Assessment and Plan:  High-grade TCC of the prostatic urethra, stage IV  No demonstrated bladder involvement  Radical cystoprostatectomy, bilateral pelvic lymph node dissection, appendectomy and ileal conduit formation now that neoadjuvant chemotherapy has been completed  Only other option is doing nothing but chemo, but he would most likely succumb to the disease  We have discussed at length that this surgery will be a major ordeal, and he wishes to proceed  Chief Complaint     Chief Complaint   Patient presents with    Prostate Cancer       History of Present Illness   Urothelial carcinoma of the prostatic urethra-is finishing chemotherapy, to be prepared for radical cystoprostatectomy, bilateral lymph node dissection, appendectomy and ileal conduit formation  The procedure, the risks of bleeding infection, recurrence of disease, incomplete resection, small bowel obstruction, adjacent organ damage, ureteral ileal anastomotic obstruction have been explained he gives informed consent        The following portions of the patient's history were reviewed and updated as appropriate: allergies, current medications, past family history, past medical history, past social history, past surgical history and problem list     Review of Systems   Review of Systems    Active Problem List     Patient Active Problem List   Diagnosis    Right bundle melissa block, 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    Aortic regurgitation    BPH with obstruction/lower urinary tract symptoms    Bladder diverticulum    Postoperative ileus ([address] )    Kidney stone    Major depressive disorder, single episode, moderate [facility])    Peripheral vascular disease ([facility] )    Essential hypertension    Acute pain of right shoulder    Benign localized hyperplasia of prostate without urinary obstruction    Urinary retention due to benign prostatic hyperplasia    Hydronephrosis of left kidney    Ureteral stricture, left    Pelvic abscess in male [facility])    Aneurysm of left popliteal artery (HCC)    Urothelial carcinoma (HCC)    Prostate cancer (HCC)    Hypokalemia    Orthostatic lightheadedness    Chemotherapy-induced neutropenia (HCC)    SIRS (systemic inflammatory response syndrome) (HCC)    CKD (chronic kidney disease) stage 3, GFR 30-59 ml/min (HCC)    Acute cystitis without hematuria    Acute on chronic anemia    Moderate protein-calorie malnutrition (HCC)    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HCC)       Objective   /68 (BP Location: Left arm, Patient Position: Sitting, Cuff Size: Adult)   Pulse 102   Ht 5' 8 9" (1 75 m)   Wt 86 6 kg (191 lb)   BMI 28 29 kg/m²     Physical Exam   Constitutional: He is oriented to person, place, and time  He appears well-developed and well-nourished  HENT:   Head: Normocephalic and atraumatic  Eyes: EOM are normal    Neck: Normal range of motion  Cardiovascular: Normal rate, regular rhythm and normal heart sounds  Pulmonary/Chest: Effort normal and breath sounds normal  No respiratory distress  Abdominal: Soft  He exhibits no distension  There is no tenderness  There is no rebound  Musculoskeletal: Normal range of motion  Neurological: He is alert and oriented to person, place, and time  Skin: Skin is warm and dry  Psychiatric: He has a normal mood and affect   His behavior is normal  Judgment and thought content normal            Current Medications     Current Outpatient Prescriptions:     acetaminophen (TYLENOL) 500 mg tablet, Take 500 mg by mouth every 6 (six) hours as needed for mild pain, Disp: , Rfl:     divalproex sodium (DEPAKOTE) 500 mg EC tablet, Take 500 mg by mouth 2 (two) times a day  , Disp: , Rfl:     LORazepam (ATIVAN) 0 5 mg tablet, Take 1 tablet (0 5 mg total) by mouth every 6 (six) hours as needed for anxiety, Disp: 30 tablet, Rfl: 0    mometasone (NASONEX) 50 mcg/act nasal spray, 2 sprays into each nostril as needed, Disp: , Rfl:     ondansetron (ZOFRAN) 8 mg tablet, Take 1 tablet (8 mg total) by mouth every 8 (eight) hours as needed for nausea or vomiting, Disp: 90 tablet, Rfl: 2    pentoxifylline (TRENtal) 400 mg ER tablet, Take 400 mg by mouth 3 (three) times a day with meals, Disp: , Rfl:     ranitidine (ZANTAC) 300 MG tablet, Take 300 mg by mouth daily at bedtime, Disp: , Rfl: 5    tamsulosin (FLOMAX) 0 4 mg, Take 1 capsule (0 4 mg total) by mouth daily with dinner, Disp: 90 capsule, Rfl: 3    amLODIPine (NORVASC) 10 mg tablet, Take 10 mg by mouth daily, Disp: , Rfl: 5    methocarbamol (ROBAXIN) 500 mg tablet, Take 500 mg by mouth Three times daily as needed, Disp: , Rfl:     oxyCODONE (ROXICODONE) 5 mg immediate release tablet, Take 1 tablet (5 mg total) by mouth every 4 (four) hours as needed for moderate pain Max Daily Amount: 30 mg (Patient not taking: Reported on 1/15/2019 ), Disp: 60 tablet, Rfl: 0        Eduardo Moya MD

## 2019-01-16 ENCOUNTER — TELEPHONE (OUTPATIENT)
Dept: HEMATOLOGY ONCOLOGY | Facility: CLINIC | Age: 74
End: 2019-01-16

## 2019-01-23 PROBLEM — C61 MALIGNANT NEOPLASM OF PROSTATE (HCC): Status: ACTIVE | Noted: 2019-01-23

## 2019-01-24 ENCOUNTER — TELEPHONE (OUTPATIENT)
Dept: UROLOGY | Facility: CLINIC | Age: 74
End: 2019-01-24

## 2019-01-24 NOTE — TELEPHONE ENCOUNTER
Patient is scheduled for cystectomy on 2/25/19  Patient was given P/O appointment date with Dr Jarred Escudero on 3/12/19 at 10:30  Called patient  Spoke to Stella Simms  Made her aware of P/O appointment date, time and location with Dr Jarred Escudero  Made her aware that patient does not have an appointment date for follow up with Dr Tata Henson  Called [facility] in [place]  Spoke to Carmine  She scheduled patient for 1 month follow up with Dr Tata Henson per last OV note on 3/22/19 @ 2:20  Called Stella Simms back  Made her aware of date, time and location of appointment with Dr Tata Henson  She reports that patient's appetite is poor  Reviewed foods high in protein and encouraged patient to eat small frequent  Meals  Made her aware that the stoma nurse from the hospital was contacted by Dr Perez Santos surgery scheduler and she will call her to schedule that appointment prior to surgery

## 2019-02-13 ENCOUNTER — HOSPITAL ENCOUNTER (OUTPATIENT)
Dept: NON INVASIVE DIAGNOSTICS | Facility: HOSPITAL | Age: 74
Discharge: HOME | End: 2019-02-13
Attending: UROLOGY
Payer: MEDICARE

## 2019-02-13 ENCOUNTER — APPOINTMENT (OUTPATIENT)
Dept: PREADMISSION TESTING | Facility: HOSPITAL | Age: 74
End: 2019-02-13
Payer: MEDICARE

## 2019-02-13 ENCOUNTER — ANESTHESIA EVENT (OUTPATIENT)
Dept: PERIOP | Facility: HOSPITAL | Age: 74
DRG: 657 | End: 2019-02-13
Payer: MEDICARE

## 2019-02-13 ENCOUNTER — APPOINTMENT (OUTPATIENT)
Dept: LAB | Facility: HOSPITAL | Age: 74
End: 2019-02-13
Attending: UROLOGY
Payer: MEDICARE

## 2019-02-13 DIAGNOSIS — C67.9 MALIGNANT NEOPLASM OF URINARY BLADDER, UNSPECIFIED SITE (HCC): ICD-10-CM

## 2019-02-13 DIAGNOSIS — C68.9 UROTHELIAL CARCINOMA (HCC): ICD-10-CM

## 2019-02-13 DIAGNOSIS — C61 MALIGNANT NEOPLASM OF PROSTATE (HCC): ICD-10-CM

## 2019-02-13 LAB
ABO GROUP BLD: NORMAL
ALBUMIN SERPL BCP-MCNC: 2.7 G/DL (ref 3.5–5)
ALP SERPL-CCNC: 78 U/L (ref 46–116)
ALT SERPL W P-5'-P-CCNC: 12 U/L (ref 12–78)
ANION GAP SERPL CALCULATED.3IONS-SCNC: 7 MMOL/L (ref 4–13)
APTT PPP: 33 SECONDS (ref 26–38)
AST SERPL W P-5'-P-CCNC: 17 U/L (ref 5–45)
ATRIAL RATE: 95 BPM
BACTERIA UR QL AUTO: ABNORMAL /HPF
BASOPHILS # BLD AUTO: 0.03 THOUSANDS/ΜL (ref 0–0.1)
BASOPHILS NFR BLD AUTO: 1 % (ref 0–1)
BILIRUB SERPL-MCNC: 0.26 MG/DL (ref 0.2–1)
BILIRUB UR QL STRIP: NEGATIVE
BLD GP AB SCN SERPL QL: NEGATIVE
BUN SERPL-MCNC: 15 MG/DL (ref 5–25)
CALCIUM SERPL-MCNC: 9 MG/DL (ref 8.3–10.1)
CHLORIDE SERPL-SCNC: 102 MMOL/L (ref 100–108)
CLARITY UR: ABNORMAL
CO2 SERPL-SCNC: 29 MMOL/L (ref 21–32)
COLOR UR: YELLOW
CREAT SERPL-MCNC: 1.26 MG/DL (ref 0.6–1.3)
EOSINOPHIL # BLD AUTO: 0.11 THOUSAND/ΜL (ref 0–0.61)
EOSINOPHIL NFR BLD AUTO: 2 % (ref 0–6)
ERYTHROCYTE [DISTWIDTH] IN BLOOD BY AUTOMATED COUNT: 14.5 % (ref 11.6–15.1)
GFR SERPL CREATININE-BSD FRML MDRD: 56 ML/MIN/1.73SQ M
GLUCOSE SERPL-MCNC: 92 MG/DL (ref 65–140)
GLUCOSE UR STRIP-MCNC: NEGATIVE MG/DL
HCT VFR BLD AUTO: 38 % (ref 36.5–49.3)
HGB BLD-MCNC: 12.1 G/DL (ref 12–17)
HGB UR QL STRIP.AUTO: ABNORMAL
IMM GRANULOCYTES # BLD AUTO: 0.04 THOUSAND/UL (ref 0–0.2)
IMM GRANULOCYTES NFR BLD AUTO: 1 % (ref 0–2)
INR PPP: 0.96 (ref 0.86–1.17)
KETONES UR STRIP-MCNC: NEGATIVE MG/DL
LEUKOCYTE ESTERASE UR QL STRIP: ABNORMAL
LYMPHOCYTES # BLD AUTO: 0.88 THOUSANDS/ΜL (ref 0.6–4.47)
LYMPHOCYTES NFR BLD AUTO: 14 % (ref 14–44)
MCH RBC QN AUTO: 31.6 PG (ref 26.8–34.3)
MCHC RBC AUTO-ENTMCNC: 31.8 G/DL (ref 31.4–37.4)
MCV RBC AUTO: 99 FL (ref 82–98)
MONOCYTES # BLD AUTO: 0.72 THOUSAND/ΜL (ref 0.17–1.22)
MONOCYTES NFR BLD AUTO: 11 % (ref 4–12)
NEUTROPHILS # BLD AUTO: 4.65 THOUSANDS/ΜL (ref 1.85–7.62)
NEUTS SEG NFR BLD AUTO: 71 % (ref 43–75)
NITRITE UR QL STRIP: NEGATIVE
NON-SQ EPI CELLS URNS QL MICRO: ABNORMAL /HPF
NRBC BLD AUTO-RTO: 0 /100 WBCS
P AXIS: 31 DEGREES
PH UR STRIP.AUTO: 7 [PH] (ref 4.5–8)
PLATELET # BLD AUTO: 305 THOUSANDS/UL (ref 149–390)
PMV BLD AUTO: 9.1 FL (ref 8.9–12.7)
POTASSIUM SERPL-SCNC: 4 MMOL/L (ref 3.5–5.3)
PR INTERVAL: 168 MS
PROT SERPL-MCNC: 7.2 G/DL (ref 6.4–8.2)
PROT UR STRIP-MCNC: ABNORMAL MG/DL
PROTHROMBIN TIME: 12.9 SECONDS (ref 11.8–14.2)
QRS AXIS: -56 DEGREES
QRSD INTERVAL: 122 MS
QT INTERVAL: 384 MS
QTC INTERVAL: 482 MS
RBC # BLD AUTO: 3.83 MILLION/UL (ref 3.88–5.62)
RBC #/AREA URNS AUTO: ABNORMAL /HPF
RH BLD: POSITIVE
SODIUM SERPL-SCNC: 138 MMOL/L (ref 136–145)
SP GR UR STRIP.AUTO: 1.02 (ref 1–1.03)
SPECIMEN EXPIRATION DATE: NORMAL
T WAVE AXIS: -5 DEGREES
UROBILINOGEN UR QL STRIP.AUTO: 0.2 E.U./DL
VENTRICULAR RATE: 95 BPM
WBC # BLD AUTO: 6.43 THOUSAND/UL (ref 4.31–10.16)
WBC #/AREA URNS AUTO: ABNORMAL /HPF

## 2019-02-13 PROCEDURE — 85025 COMPLETE CBC W/AUTO DIFF WBC: CPT

## 2019-02-13 PROCEDURE — 85610 PROTHROMBIN TIME: CPT

## 2019-02-13 PROCEDURE — 80053 COMPREHEN METABOLIC PANEL: CPT

## 2019-02-13 PROCEDURE — 85730 THROMBOPLASTIN TIME PARTIAL: CPT

## 2019-02-13 PROCEDURE — 86901 BLOOD TYPING SEROLOGIC RH(D): CPT

## 2019-02-13 PROCEDURE — 93005 ELECTROCARDIOGRAM TRACING: CPT | Performed by: UROLOGY

## 2019-02-13 PROCEDURE — 86850 RBC ANTIBODY SCREEN: CPT

## 2019-02-13 PROCEDURE — 36415 COLL VENOUS BLD VENIPUNCTURE: CPT

## 2019-02-13 PROCEDURE — 81001 URINALYSIS AUTO W/SCOPE: CPT | Performed by: UROLOGY

## 2019-02-13 PROCEDURE — 86900 BLOOD TYPING SEROLOGIC ABO: CPT

## 2019-02-13 PROCEDURE — 87086 URINE CULTURE/COLONY COUNT: CPT

## 2019-02-13 PROCEDURE — 93010 ELECTROCARDIOGRAM REPORT: CPT | Performed by: INTERNAL MEDICINE

## 2019-02-13 RX ORDER — SODIUM CHLORIDE 9 MG/ML
125 INJECTION, SOLUTION INTRAVENOUS CONTINUOUS
Status: CANCELLED | OUTPATIENT
Start: 2019-02-25

## 2019-02-13 NOTE — PERIOPERATIVE NURSING NOTE
Given and instructed on use of incentive spirometry  Was able to perform repeat demonstration- attained 1750 ml level

## 2019-02-13 NOTE — ANESTHESIA PREPROCEDURE EVALUATION
Review of Systems/Medical History      History of anesthetic complications PONV    Cardiovascular  Hypertension , PVD,    Pulmonary  Smoker (quit 50 years ago) ex-smoker  ,        GI/Hepatic    GERD ,        Kidney stones, Prostatic disorder, history of prostate cancer  Comment: urethral carcinoma s/p chemotx last tx 12/2018     Endo/Other     GYN  Negative gynecology ROS          Hematology  Anemia drug induced anemia,    Comment: bergers's disease  Previous transfusion 11/2018 Musculoskeletal    Arthritis     Neurology   Psychology   Depression , bipolar disorder and depressed,              Physical Exam    Airway    Mallampati score: II  TM Distance: >3 FB  Neck ROM: full     Dental   Comment: Partial dentures,     Cardiovascular  Rhythm: regular, Rate: normal,     Pulmonary  Breath sounds clear to auscultation,     Other Findings        Anesthesia Plan  ASA Score- 3     Anesthesia Type- general and epidural with ASA Monitors  Additional Monitors: arterial line and central venous line  Airway Plan: ETT  Comment: Cardiology clearance scheduled for 2/19/19  Ask surgeon about epidural for post pain  D/w pt about a-line and central line (if needed)  Also d/w pt possibility of transfusion  Plan Factors-    Induction- intravenous  Postoperative Plan- Plan for postoperative opioid use  Informed Consent- Anesthetic plan and risks discussed with patient

## 2019-02-13 NOTE — PRE-PROCEDURE INSTRUCTIONS
Pre-Surgery Instructions:   Medication Instructions    acetaminophen (TYLENOL) 500 mg tablet Patient was instructed by Physician and understands   divalproex sodium (DEPAKOTE) 500 mg EC tablet Patient was instructed by Physician and understands   mometasone (NASONEX) 50 mcg/act nasal spray Patient was instructed by Physician and understands   pentoxifylline (TRENtal) 400 mg ER tablet Patient was instructed by Physician and understands   ranitidine (ZANTAC) 300 MG tablet Patient was instructed by Physician and understands   tamsulosin (FLOMAX) 0 4 mg Patient was instructed by Physician and understands  Seen by Dr Sumi Chester  Instructed has no medications to be taken morning of surgery  Has instructions from his physician to stop Trental 1 week before surgery  No aspirin, NSAIDs, vitamins, or supplements 1 week before surgery

## 2019-02-15 LAB — BACTERIA UR CULT: NORMAL

## 2019-02-19 ENCOUNTER — OFFICE VISIT (OUTPATIENT)
Dept: CARDIOLOGY CLINIC | Facility: CLINIC | Age: 74
End: 2019-02-19
Payer: MEDICARE

## 2019-02-19 VITALS
WEIGHT: 189 LBS | HEIGHT: 71 IN | HEART RATE: 96 BPM | DIASTOLIC BLOOD PRESSURE: 86 MMHG | BODY MASS INDEX: 26.46 KG/M2 | RESPIRATION RATE: 18 BRPM | SYSTOLIC BLOOD PRESSURE: 138 MMHG

## 2019-02-19 DIAGNOSIS — Z01.810 PREOP CARDIOVASCULAR EXAM: Primary | ICD-10-CM

## 2019-02-19 PROCEDURE — 99214 OFFICE O/P EST MOD 30 MIN: CPT | Performed by: INTERNAL MEDICINE

## 2019-02-19 NOTE — PROGRESS NOTES
[address] [facility] [place]  [address]  [facility] [address] [facility]  [phone]    Cardiology Follow up    Patient:  Jaspreet Harrison  :  1945  MRN:  [id number]    History of Present Illness:     Mr Nila Peraza   A 79-year-old man with past medical history of hypertension now off medicines,, dyslipidemia, rheumatic fever as a child, right bundle branch block, kidney stones and bladder diverticula status post urethral stent placement, urethral cancer,  benign prostatic hypertrophy, and Buerger's disease status post toe amputations in the past presents for preoperative evaluation prior to noncardiac surgery  He has had no chest pain, shortness of breath, palpitations, dizziness, or syncope  He does have a somewhat limited functional capacity      I understand he had a cardiac catheterization from  that reportedly showed "no blockages"  He reportedly had his antihypertensives stopped during chemotherapy        Patient Active Problem List   Diagnosis    Right bundle melissa block, 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    Aortic regurgitation    BPH with obstruction/lower urinary tract symptoms    Bladder diverticulum    Postoperative ileus (HCC)    Kidney stone    Major depressive disorder, single episode, moderate (HCC)    Peripheral vascular disease ([facility] [address] )    Essential hypertension    Acute pain of right shoulder    Benign localized hyperplasia of prostate without urinary obstruction    Urinary retention due to benign prostatic hyperplasia    Hydronephrosis of left kidney    Ureteral stricture, left    Pelvic abscess in [facility])    Aneurysm of left popliteal artery (HCC)    Urothelial carcinoma (HCC)    Prostate cancer ([facility] [address] )    Hypokalemia    Orthostatic lightheadedness    Chemotherapy-induced neutropenia (HCC)    SIRS (systemic inflammatory response syndrome) (HCC)    CKD (chronic kidney disease) stage 3, GFR 30-59 ml/min (HCC)    Acute cystitis without hematuria    Acute on chronic anemia    Moderate protein-calorie malnutrition (HCC)    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[facility] [address] )    Malignant neoplasm of prostate ([facility] [address] )       Past Surgical History  Past Surgical History:   Procedure Laterality Date    ABDOMINAL SURGERY      When young had procedure for improviing circulation to left lower extremety    BACK SURGERY      Lumbar- not sure what was done   [employer]HE[employer]ATION      Approximately 2007- no blockages    CARPAL TUNNEL RELEASE Bilateral     wrists are fused    CATARACT EXTRACTION Left     COLONOSCOPY      CYST REMOVAL      Robotic procedure to remove benign cyst from lower left lung    CYSTOGRAM N/A 3/14/2018    Procedure: CYSTOGRAM;  Surgeon: Katerina Hankins MD;  [facility];  Service: Urology    CYSTOSCOPY      ESOPHAGOGASTRODUODENOSCOPY      IR TUBE PLACEMENT NEPHROSTOMY  8/10/2018    LUNG SURGERY      cyst removed left lung    OTHER SURGICAL HISTORY Left     fistula drain in left buttock    NM CYSTO/URETERO W/LITHOTRIPSY &INDWELL STENT INSRT Left 1/3/2018    Procedure: CYSTOSCOPY URETEROSCOPY, RETROGRADE PYELOGRAM AND INSERTION STENT URETERAL;  Surgeon: Katerina Hankins MD;  [facility];  Service: Urology    NM CYSTOTOMY,EXCIS BLADDER TIC N/A 2/14/2018    Procedure: ABDOMINAL EXPLORATION; CLOSURE OF BLADDER DIVERTICULITIS;  Surgeon: Katerina Hankins MD;  [facility];  Service: Urology    NM CYSTOURETHROSCOPY,URETER CATHETER Left 8/1/2018    Procedure: Cystoscopy, cystogram, bladder biopsies, removal of pelvic drain;  Surgeon: Katerina Hankins MD;  [facility];  Service: Urology    NM INCISE/DRAIN BLADDER N/A 2/14/2018    Procedure: OPEN SUPRAPUBIC TUBE PLACEMENT;  Surgeon: Katerina Hankins MD;  [facility];  Service: Urology    NM RELEASE Paola Zoya FIBROSIS Left 2/14/2018    Procedure: LYSIS OF ADHESIONS; URETEROLYSIS ;  Surgeon: Katerina Hankins MD;  [facility];  Service: Urology    SKIN CANCER EXCISION      Melanoma removal on back in early     TOE AMPUTATION Left     All 5 toes left foot were amputated due to poor circulation     TRANSURETHRAL RESECTION OF PROSTATE N/A 3/14/2018    Procedure: TRANSURETHRAL RESECTION OF PROSTATE (TURP), LEFT URETERAL STENT REMOVAL;  Surgeon: Siobhan De La Garza MD;  [facility];  Service: Urology    TRANSURETHRAL RESECTION OF PROSTATE N/A 2018    Procedure: TRANSURETHRAL RESECTION OF PROSTATE (TURP);   Surgeon: Siobhan De La Garza MD;  [facility];  Service: Urology    WRIST SURGERY Bilateral     Ulnar nerve on right arm and both wrists are fused       Social History   Social History     Socioeconomic History    Marital status: Common Law     Spouse name: Not on file    Number of children: Not on file    Years of education: Not on file    Highest education level: Not on file   Occupational History    Not on file   Social Needs    Financial resource strain: Not on file    Food insecurity:     Worry: Not on file     Inability: Not on file    Transportation needs:     Medical: Not on file     Non-medical: Not on file   Tobacco Use    Smoking status: Former Smoker     Packs/day: 1 00     Years: 15 00     Pack years: 15 00     Types: Cigarettes     Last attempt to quit: 1970     Years since quittin 0    Smokeless tobacco: Never Used    Tobacco comment: Quit 50 years   Substance and Sexual Activity    Alcohol use: Yes     Comment: Very rare    Drug use: No    Sexual activity: Not on file   Lifestyle    Physical activity:     Days per week: Not on file     Minutes per session: Not on file    Stress: Not on file   Relationships    Social connections:     Talks on phone: Not on file     Gets together: Not on file     Attends Confucianism service: Not on file     Active member of club or organization: Not on file     Attends meetings of clubs or organizations: Not on file     Relationship status: Not on file    Intimate partner violence:     Fear of current or ex partner: Not on file     Emotionally abused: Not on file     Physically abused: Not on file     Forced sexual activity: Not on file   Other Topics Concern    Not on file   Social History Narrative    Not on file        Allergies   Allergen Reactions    Iodine Shortness Of Breath, Swelling and Hives     Contrast dye causes respiratory distress  Iodine causes skin rash    Mercury Hives and Swelling    Shellfish-Derived Products Anaphylaxis, Hives and Shortness Of Breath    Augmentin [Amoxicillin-Pot Clavulanate] GI Intolerance, Rash and Diarrhea     Diarrhea    Lidoderm [Lidocaine] Rash     Lidoderm patch caused rash    Penicillins Rash, Swelling and Hives    Sulfa Antibiotics Hives, Swelling and Rash       Family History   Family History   Problem Relation Age of Onset    Heart disease Father     Heart disease Mother     Cancer Paternal Grandfather        Review of Systems:  Review of Systems   Constitutional: Negative for chills, fatigue and fever  HENT: Negative for hearing loss and trouble swallowing  Eyes: Negative for pain  Respiratory: Negative for cough, chest tightness and shortness of breath  Cardiovascular: Negative for chest pain, palpitations and leg swelling  Gastrointestinal: Negative for abdominal pain, blood in stool, nausea and vomiting  Endocrine: Negative for cold intolerance and heat intolerance  Genitourinary: Negative for difficulty urinating, frequency and hematuria  Musculoskeletal: Negative for arthralgias and neck pain  Skin: Negative for rash  Allergic/Immunologic: Negative for environmental allergies  Neurological: Negative for dizziness, weakness and headaches  Hematological: Does not bruise/bleed easily  Psychiatric/Behavioral: Negative for decreased concentration and sleep disturbance  The patient is not nervous/anxious            Current Outpatient Medications:     acetaminophen (TYLENOL) 500 mg tablet, Take 500 mg by mouth every 6 (six) hours as needed for mild pain, Disp: , Rfl:     divalproex sodium (DEPAKOTE) 500 mg EC tablet, Take 500 mg by mouth 2 (two) times a day  , Disp: , Rfl:     mometasone (NASONEX) 50 mcg/act nasal spray, 2 sprays into each nostril as needed, Disp: , Rfl:     pentoxifylline (TRENtal) 400 mg ER tablet, Take 400 mg by mouth 3 (three) times a day with meals, Disp: , Rfl:     ranitidine (ZANTAC) 300 MG tablet, Take 300 mg by mouth daily at bedtime, Disp: , Rfl: 5    tamsulosin (FLOMAX) 0 4 mg, Take 1 capsule (0 4 mg total) by mouth daily with dinner, Disp: 90 capsule, Rfl: 3     Physical Exam:    Vitals:    02/19/19 1351   BP: 138/86   Pulse: 96   Resp: 18   Weight: 85 7 kg (189 lb)   Height: 5' 11" (1 803 m)       Physical Exam   Constitutional: He is oriented to person, place, and time  He appears well-developed and well-nourished  HENT:   Head: Normocephalic  Right Ear: External ear normal    Left Ear: External ear normal    Mouth/Throat: Oropharynx is clear and moist    Eyes: Pupils are equal, round, and reactive to light  Neck: No JVD present  Carotid bruit is not present  Cardiovascular: Normal rate, regular rhythm and intact distal pulses  Exam reveals no gallop and no friction rub  No murmur heard  Pulmonary/Chest: Effort normal and breath sounds normal  No tachypnea  No respiratory distress  He has no wheezes  He has no rales  He exhibits no tenderness  Abdominal: Soft  He exhibits no distension  There is no tenderness  There is no rebound and no guarding  Musculoskeletal: He exhibits no edema  Neurological: He is alert and oriented to person, place, and time  Skin: Skin is warm and dry  Psychiatric: He has a normal mood and affect  His behavior is normal  Judgment and thought content normal    Nursing note and vitals reviewed  Labs:not applicable    Assessment/Plan:    1   Preoperative evaluation prior to radical cystoprostatectomy, bilateral pelvic lymph node dissection, appendectomy, ileal conduit  He is acceptable risk from a cardiovascular standpoint  His heart rate is a bit elevated in the office today likely secondary to anxiety-his heart rate is 70-80 at home  2  Hypertension-this is acceptable currently off meds  3  Dyslipidemia-on no medicines currently  We can see him back in the future if needed to help with hypertension and dyslipidemia  Thank you so much, do please not hesitate to contact me with any questions or concerns        Teressa Delgado MD  2/19/2019  2:09 PM

## 2019-02-20 ENCOUNTER — TELEPHONE (OUTPATIENT)
Dept: UROLOGY | Facility: AMBULATORY SURGERY CENTER | Age: 74
End: 2019-02-20

## 2019-02-20 NOTE — TELEPHONE ENCOUNTER
Patient is scheduled for cystoprostatectomy with ileal conduit on 2/25/19 with Dr  Gerhardt Carbon  Called Alexx Cano (significant other)  Reviewed bowel prep for surgery  Patient had stoma markings yesterday  Answered questions  Padma Dubose to call with additional questions  Gave her my direct extension

## 2019-02-25 ENCOUNTER — ANESTHESIA (OUTPATIENT)
Dept: PERIOP | Facility: HOSPITAL | Age: 74
DRG: 657 | End: 2019-02-25
Payer: MEDICARE

## 2019-02-25 ENCOUNTER — HOSPITAL ENCOUNTER (INPATIENT)
Facility: HOSPITAL | Age: 74
LOS: 11 days | DRG: 657 | End: 2019-03-08
Attending: UROLOGY | Admitting: UROLOGY
Payer: MEDICARE

## 2019-02-25 ENCOUNTER — APPOINTMENT (INPATIENT)
Dept: RADIOLOGY | Facility: HOSPITAL | Age: 74
DRG: 657 | End: 2019-02-25
Payer: MEDICARE

## 2019-02-25 DIAGNOSIS — Z78.9 ON TOTAL PARENTERAL NUTRITION (TPN): ICD-10-CM

## 2019-02-25 DIAGNOSIS — K56.7 POSTOPERATIVE ILEUS (HCC): Primary | ICD-10-CM

## 2019-02-25 DIAGNOSIS — K91.89 POSTOPERATIVE ILEUS (HCC): Primary | ICD-10-CM

## 2019-02-25 DIAGNOSIS — E46 MALNUTRITION, UNSPECIFIED TYPE (HCC): ICD-10-CM

## 2019-02-25 DIAGNOSIS — C68.9 UROTHELIAL CARCINOMA (HCC): ICD-10-CM

## 2019-02-25 DIAGNOSIS — D64.9 ACUTE ON CHRONIC ANEMIA: ICD-10-CM

## 2019-02-25 DIAGNOSIS — E87.6 HYPOKALEMIA: ICD-10-CM

## 2019-02-25 DIAGNOSIS — C61 MALIGNANT NEOPLASM OF PROSTATE (HCC): ICD-10-CM

## 2019-02-25 DIAGNOSIS — Z78.9 ON TOTAL PARENTERAL NUTRITION: ICD-10-CM

## 2019-02-25 LAB
ABO GROUP BLD: NORMAL
ANION GAP SERPL CALCULATED.3IONS-SCNC: 12 MMOL/L (ref 4–13)
BLD GP AB SCN SERPL QL: NEGATIVE
BUN SERPL-MCNC: 15 MG/DL (ref 5–25)
CALCIUM SERPL-MCNC: 7.9 MG/DL (ref 8.3–10.1)
CHLORIDE SERPL-SCNC: 106 MMOL/L (ref 100–108)
CO2 SERPL-SCNC: 20 MMOL/L (ref 21–32)
CREAT SERPL-MCNC: 1.01 MG/DL (ref 0.6–1.3)
ERYTHROCYTE [DISTWIDTH] IN BLOOD BY AUTOMATED COUNT: 13.7 % (ref 11.6–15.1)
GFR SERPL CREATININE-BSD FRML MDRD: 73 ML/MIN/1.73SQ M
GLUCOSE SERPL-MCNC: 161 MG/DL (ref 65–140)
HCT VFR BLD AUTO: 34.1 % (ref 36.5–49.3)
HGB BLD-MCNC: 10.9 G/DL (ref 12–17)
MCH RBC QN AUTO: 31.1 PG (ref 26.8–34.3)
MCHC RBC AUTO-ENTMCNC: 32 G/DL (ref 31.4–37.4)
MCV RBC AUTO: 97 FL (ref 82–98)
PLATELET # BLD AUTO: 244 THOUSANDS/UL (ref 149–390)
PMV BLD AUTO: 8.8 FL (ref 8.9–12.7)
POTASSIUM SERPL-SCNC: 4.2 MMOL/L (ref 3.5–5.3)
RBC # BLD AUTO: 3.51 MILLION/UL (ref 3.88–5.62)
RH BLD: POSITIVE
SODIUM SERPL-SCNC: 138 MMOL/L (ref 136–145)
SPECIMEN EXPIRATION DATE: NORMAL
WBC # BLD AUTO: 8.9 THOUSAND/UL (ref 4.31–10.16)

## 2019-02-25 PROCEDURE — C2625 STENT, NON-COR, TEM W/DEL SY: HCPCS | Performed by: UROLOGY

## 2019-02-25 PROCEDURE — 50820 CONSTRUCT BOWEL BLADDER: CPT | Performed by: UROLOGY

## 2019-02-25 PROCEDURE — C1769 GUIDE WIRE: HCPCS | Performed by: UROLOGY

## 2019-02-25 PROCEDURE — 94762 N-INVAS EAR/PLS OXIMTRY CONT: CPT

## 2019-02-25 PROCEDURE — 80048 BASIC METABOLIC PNL TOTAL CA: CPT | Performed by: UROLOGY

## 2019-02-25 PROCEDURE — 85027 COMPLETE CBC AUTOMATED: CPT | Performed by: UROLOGY

## 2019-02-25 PROCEDURE — 88341 IMHCHEM/IMCYTCHM EA ADD ANTB: CPT | Performed by: PATHOLOGY

## 2019-02-25 PROCEDURE — 0DTJ0ZZ RESECTION OF APPENDIX, OPEN APPROACH: ICD-10-PCS | Performed by: UROLOGY

## 2019-02-25 PROCEDURE — 88342 IMHCHEM/IMCYTCHM 1ST ANTB: CPT | Performed by: PATHOLOGY

## 2019-02-25 PROCEDURE — 44955 APPENDECTOMY ADD-ON: CPT | Performed by: UROLOGY

## 2019-02-25 PROCEDURE — 88302 TISSUE EXAM BY PATHOLOGIST: CPT | Performed by: PATHOLOGY

## 2019-02-25 PROCEDURE — C9113 INJ PANTOPRAZOLE SODIUM, VIA: HCPCS | Performed by: UROLOGY

## 2019-02-25 PROCEDURE — 0TP90DZ REMOVAL OF INTRALUMINAL DEVICE FROM URETER, OPEN APPROACH: ICD-10-PCS | Performed by: UROLOGY

## 2019-02-25 PROCEDURE — 86900 BLOOD TYPING SEROLOGIC ABO: CPT | Performed by: UROLOGY

## 2019-02-25 PROCEDURE — 0T180ZC BYPASS BILATERAL URETERS TO ILEOCUTANEOUS, OPEN APPROACH: ICD-10-PCS | Performed by: UROLOGY

## 2019-02-25 PROCEDURE — 71045 X-RAY EXAM CHEST 1 VIEW: CPT

## 2019-02-25 PROCEDURE — 86901 BLOOD TYPING SEROLOGIC RH(D): CPT | Performed by: UROLOGY

## 2019-02-25 PROCEDURE — 88331 PATH CONSLTJ SURG 1 BLK 1SPC: CPT | Performed by: PATHOLOGY

## 2019-02-25 PROCEDURE — 86850 RBC ANTIBODY SCREEN: CPT | Performed by: UROLOGY

## 2019-02-25 PROCEDURE — 0TB60ZX EXCISION OF RIGHT URETER, OPEN APPROACH, DIAGNOSTIC: ICD-10-PCS | Performed by: UROLOGY

## 2019-02-25 PROCEDURE — 0TB70ZX EXCISION OF LEFT URETER, OPEN APPROACH, DIAGNOSTIC: ICD-10-PCS | Performed by: UROLOGY

## 2019-02-25 PROCEDURE — 88305 TISSUE EXAM BY PATHOLOGIST: CPT | Performed by: PATHOLOGY

## 2019-02-25 DEVICE — 7.0FR(2.3MM) X 90 CM SINGLE J URINARY DIVERSION URETERAL STENT
Type: IMPLANTABLE DEVICE | Site: URETER | Status: FUNCTIONAL
Brand: SINGLE J

## 2019-02-25 RX ORDER — EPHEDRINE SULFATE 50 MG/ML
INJECTION, SOLUTION INTRAVENOUS AS NEEDED
Status: DISCONTINUED | OUTPATIENT
Start: 2019-02-25 | End: 2019-02-25 | Stop reason: SURG

## 2019-02-25 RX ORDER — DEXAMETHASONE SODIUM PHOSPHATE 4 MG/ML
INJECTION, SOLUTION INTRA-ARTICULAR; INTRALESIONAL; INTRAMUSCULAR; INTRAVENOUS; SOFT TISSUE AS NEEDED
Status: DISCONTINUED | OUTPATIENT
Start: 2019-02-25 | End: 2019-02-25 | Stop reason: SURG

## 2019-02-25 RX ORDER — PANTOPRAZOLE SODIUM 40 MG/1
40 INJECTION, POWDER, FOR SOLUTION INTRAVENOUS
Status: DISCONTINUED | OUTPATIENT
Start: 2019-02-25 | End: 2019-02-26

## 2019-02-25 RX ORDER — SODIUM CHLORIDE 9 MG/ML
125 INJECTION, SOLUTION INTRAVENOUS CONTINUOUS
Status: DISCONTINUED | OUTPATIENT
Start: 2019-02-25 | End: 2019-02-25

## 2019-02-25 RX ORDER — ONDANSETRON 2 MG/ML
4 INJECTION INTRAMUSCULAR; INTRAVENOUS EVERY 6 HOURS PRN
Status: DISCONTINUED | OUTPATIENT
Start: 2019-02-25 | End: 2019-03-08 | Stop reason: HOSPADM

## 2019-02-25 RX ORDER — ROCURONIUM BROMIDE 10 MG/ML
INJECTION, SOLUTION INTRAVENOUS AS NEEDED
Status: DISCONTINUED | OUTPATIENT
Start: 2019-02-25 | End: 2019-02-25 | Stop reason: SURG

## 2019-02-25 RX ORDER — TOBRAMYCIN AND DEXAMETHASONE 3; 1 MG/ML; MG/ML
1 SUSPENSION/ DROPS OPHTHALMIC EVERY 6 HOURS SCHEDULED
Status: DISCONTINUED | OUTPATIENT
Start: 2019-02-25 | End: 2019-03-08 | Stop reason: HOSPADM

## 2019-02-25 RX ORDER — DEXTROSE, SODIUM CHLORIDE, SODIUM LACTATE, POTASSIUM CHLORIDE, AND CALCIUM CHLORIDE 5; .6; .31; .03; .02 G/100ML; G/100ML; G/100ML; G/100ML; G/100ML
125 INJECTION, SOLUTION INTRAVENOUS CONTINUOUS
Status: DISCONTINUED | OUTPATIENT
Start: 2019-02-25 | End: 2019-02-25

## 2019-02-25 RX ORDER — HYDROMORPHONE HYDROCHLORIDE 2 MG/ML
INJECTION, SOLUTION INTRAMUSCULAR; INTRAVENOUS; SUBCUTANEOUS AS NEEDED
Status: DISCONTINUED | OUTPATIENT
Start: 2019-02-25 | End: 2019-02-25 | Stop reason: SURG

## 2019-02-25 RX ORDER — LEVOFLOXACIN 5 MG/ML
500 INJECTION, SOLUTION INTRAVENOUS ONCE
Status: COMPLETED | OUTPATIENT
Start: 2019-02-25 | End: 2019-02-25

## 2019-02-25 RX ORDER — LEVOFLOXACIN 5 MG/ML
500 INJECTION, SOLUTION INTRAVENOUS ONCE
Status: COMPLETED | OUTPATIENT
Start: 2019-02-26 | End: 2019-02-26

## 2019-02-25 RX ORDER — DOCUSATE SODIUM 100 MG/1
100 CAPSULE, LIQUID FILLED ORAL 2 TIMES DAILY
Status: DISCONTINUED | OUTPATIENT
Start: 2019-02-25 | End: 2019-03-08 | Stop reason: HOSPADM

## 2019-02-25 RX ORDER — CHLORHEXIDINE GLUCONATE 4 G/100ML
SOLUTION TOPICAL DAILY PRN
Status: DISCONTINUED | OUTPATIENT
Start: 2019-02-25 | End: 2019-02-25

## 2019-02-25 RX ORDER — DEXTROSE AND SODIUM CHLORIDE 5; .45 G/100ML; G/100ML
125 INJECTION, SOLUTION INTRAVENOUS CONTINUOUS
Status: DISCONTINUED | OUTPATIENT
Start: 2019-02-25 | End: 2019-02-26

## 2019-02-25 RX ORDER — NEOSTIGMINE METHYLSULFATE 1 MG/ML
INJECTION INTRAVENOUS AS NEEDED
Status: DISCONTINUED | OUTPATIENT
Start: 2019-02-25 | End: 2019-02-25 | Stop reason: SURG

## 2019-02-25 RX ORDER — ONDANSETRON 2 MG/ML
INJECTION INTRAMUSCULAR; INTRAVENOUS AS NEEDED
Status: DISCONTINUED | OUTPATIENT
Start: 2019-02-25 | End: 2019-02-25 | Stop reason: SURG

## 2019-02-25 RX ORDER — ONDANSETRON 2 MG/ML
4 INJECTION INTRAMUSCULAR; INTRAVENOUS ONCE AS NEEDED
Status: DISCONTINUED | OUTPATIENT
Start: 2019-02-25 | End: 2019-02-25 | Stop reason: HOSPADM

## 2019-02-25 RX ORDER — FENTANYL CITRATE/PF 50 MCG/ML
50 SYRINGE (ML) INJECTION
Status: DISCONTINUED | OUTPATIENT
Start: 2019-02-25 | End: 2019-02-25 | Stop reason: HOSPADM

## 2019-02-25 RX ORDER — HEPARIN SODIUM 5000 [USP'U]/ML
5000 INJECTION, SOLUTION INTRAVENOUS; SUBCUTANEOUS EVERY 8 HOURS SCHEDULED
Status: DISCONTINUED | OUTPATIENT
Start: 2019-02-25 | End: 2019-03-08 | Stop reason: HOSPADM

## 2019-02-25 RX ORDER — SIMETHICONE 80 MG
80 TABLET,CHEWABLE ORAL 4 TIMES DAILY PRN
Status: DISCONTINUED | OUTPATIENT
Start: 2019-02-25 | End: 2019-03-08 | Stop reason: HOSPADM

## 2019-02-25 RX ORDER — SODIUM CHLORIDE, SODIUM LACTATE, POTASSIUM CHLORIDE, CALCIUM CHLORIDE 600; 310; 30; 20 MG/100ML; MG/100ML; MG/100ML; MG/100ML
INJECTION, SOLUTION INTRAVENOUS CONTINUOUS PRN
Status: DISCONTINUED | OUTPATIENT
Start: 2019-02-25 | End: 2019-02-25 | Stop reason: SURG

## 2019-02-25 RX ORDER — GLYCOPYRROLATE 0.2 MG/ML
INJECTION INTRAMUSCULAR; INTRAVENOUS AS NEEDED
Status: DISCONTINUED | OUTPATIENT
Start: 2019-02-25 | End: 2019-02-25 | Stop reason: SURG

## 2019-02-25 RX ORDER — DIVALPROEX SODIUM 500 MG/1
500 TABLET, DELAYED RELEASE ORAL 2 TIMES DAILY
Status: DISCONTINUED | OUTPATIENT
Start: 2019-02-25 | End: 2019-02-26 | Stop reason: CLARIF

## 2019-02-25 RX ORDER — FENTANYL CITRATE 50 UG/ML
INJECTION, SOLUTION INTRAMUSCULAR; INTRAVENOUS AS NEEDED
Status: DISCONTINUED | OUTPATIENT
Start: 2019-02-25 | End: 2019-02-25 | Stop reason: SURG

## 2019-02-25 RX ORDER — PROPOFOL 10 MG/ML
INJECTION, EMULSION INTRAVENOUS AS NEEDED
Status: DISCONTINUED | OUTPATIENT
Start: 2019-02-25 | End: 2019-02-25 | Stop reason: SURG

## 2019-02-25 RX ORDER — SODIUM CHLORIDE 9 MG/ML
INJECTION, SOLUTION INTRAVENOUS CONTINUOUS PRN
Status: DISCONTINUED | OUTPATIENT
Start: 2019-02-25 | End: 2019-02-25 | Stop reason: SURG

## 2019-02-25 RX ADMIN — SODIUM CHLORIDE 125 ML/HR: 0.9 INJECTION, SOLUTION INTRAVENOUS at 06:03

## 2019-02-25 RX ADMIN — DEXTROSE AND SODIUM CHLORIDE 125 ML/HR: 5; .45 INJECTION, SOLUTION INTRAVENOUS at 22:30

## 2019-02-25 RX ADMIN — DEXAMETHASONE SODIUM PHOSPHATE 4 MG: 4 INJECTION, SOLUTION INTRAMUSCULAR; INTRAVENOUS at 08:20

## 2019-02-25 RX ADMIN — HYDROMORPHONE HYDROCHLORIDE 1 MG: 2 INJECTION INTRAMUSCULAR; INTRAVENOUS; SUBCUTANEOUS at 08:17

## 2019-02-25 RX ADMIN — SODIUM CHLORIDE: 0.9 INJECTION, SOLUTION INTRAVENOUS at 07:32

## 2019-02-25 RX ADMIN — HYDROMORPHONE HYDROCHLORIDE 1 MG: 2 INJECTION INTRAMUSCULAR; INTRAVENOUS; SUBCUTANEOUS at 08:55

## 2019-02-25 RX ADMIN — SODIUM CHLORIDE: 0.9 INJECTION, SOLUTION INTRAVENOUS at 11:46

## 2019-02-25 RX ADMIN — METRONIDAZOLE 500 MG: 500 INJECTION, SOLUTION INTRAVENOUS at 07:54

## 2019-02-25 RX ADMIN — SODIUM CHLORIDE: 0.9 INJECTION, SOLUTION INTRAVENOUS at 10:05

## 2019-02-25 RX ADMIN — EPHEDRINE SULFATE 10 MG: 50 INJECTION, SOLUTION INTRAMUSCULAR; INTRAVENOUS; SUBCUTANEOUS at 07:44

## 2019-02-25 RX ADMIN — SODIUM CHLORIDE, SODIUM LACTATE, POTASSIUM CHLORIDE, AND CALCIUM CHLORIDE: .6; .31; .03; .02 INJECTION, SOLUTION INTRAVENOUS at 08:12

## 2019-02-25 RX ADMIN — FENTANYL CITRATE 50 MCG: 50 INJECTION, SOLUTION INTRAMUSCULAR; INTRAVENOUS at 07:27

## 2019-02-25 RX ADMIN — FENTANYL CITRATE 50 MCG: 50 INJECTION, SOLUTION INTRAMUSCULAR; INTRAVENOUS at 14:27

## 2019-02-25 RX ADMIN — TOBRAMYCIN AND DEXAMETHASONE 1 DROP: 3; 1 SUSPENSION/ DROPS OPHTHALMIC at 23:24

## 2019-02-25 RX ADMIN — ROCURONIUM BROMIDE 30 MG: 10 INJECTION INTRAVENOUS at 08:05

## 2019-02-25 RX ADMIN — DEXTROSE AND SODIUM CHLORIDE 125 ML/HR: 5; .45 INJECTION, SOLUTION INTRAVENOUS at 14:45

## 2019-02-25 RX ADMIN — NEOSTIGMINE METHYLSULFATE 2 MG: 1 INJECTION INTRAVENOUS at 13:01

## 2019-02-25 RX ADMIN — DOCUSATE SODIUM 100 MG: 100 CAPSULE, LIQUID FILLED ORAL at 18:23

## 2019-02-25 RX ADMIN — METRONIDAZOLE 500 MG: 500 INJECTION, SOLUTION INTRAVENOUS at 17:25

## 2019-02-25 RX ADMIN — FENTANYL CITRATE 50 MCG: 50 INJECTION, SOLUTION INTRAMUSCULAR; INTRAVENOUS at 14:37

## 2019-02-25 RX ADMIN — HYDROMORPHONE HYDROCHLORIDE: 10 INJECTION, SOLUTION INTRAMUSCULAR; INTRAVENOUS; SUBCUTANEOUS at 14:44

## 2019-02-25 RX ADMIN — ROCURONIUM BROMIDE 20 MG: 10 INJECTION INTRAVENOUS at 09:58

## 2019-02-25 RX ADMIN — DIVALPROEX SODIUM 500 MG: 500 TABLET, DELAYED RELEASE ORAL at 18:23

## 2019-02-25 RX ADMIN — ROCURONIUM BROMIDE 40 MG: 10 INJECTION INTRAVENOUS at 07:28

## 2019-02-25 RX ADMIN — ONDANSETRON 4 MG: 2 INJECTION INTRAMUSCULAR; INTRAVENOUS at 13:01

## 2019-02-25 RX ADMIN — LEVOFLOXACIN: 5 INJECTION, SOLUTION INTRAVENOUS at 07:42

## 2019-02-25 RX ADMIN — EPHEDRINE SULFATE 10 MG: 50 INJECTION, SOLUTION INTRAMUSCULAR; INTRAVENOUS; SUBCUTANEOUS at 07:38

## 2019-02-25 RX ADMIN — HEPARIN SODIUM 5000 UNITS: 5000 INJECTION INTRAVENOUS; SUBCUTANEOUS at 23:24

## 2019-02-25 RX ADMIN — PROPOFOL 150 MG: 10 INJECTION, EMULSION INTRAVENOUS at 07:27

## 2019-02-25 RX ADMIN — LIDOCAINE HYDROCHLORIDE 50 MG: 20 INJECTION, SOLUTION INTRAVENOUS at 07:27

## 2019-02-25 RX ADMIN — METRONIDAZOLE 500 MG: 500 INJECTION, SOLUTION INTRAVENOUS at 23:25

## 2019-02-25 RX ADMIN — FENTANYL CITRATE 50 MCG: 50 INJECTION, SOLUTION INTRAMUSCULAR; INTRAVENOUS at 07:20

## 2019-02-25 RX ADMIN — TOBRAMYCIN AND DEXAMETHASONE 1 DROP: 3; 1 SUSPENSION/ DROPS OPHTHALMIC at 17:25

## 2019-02-25 RX ADMIN — PANTOPRAZOLE SODIUM 40 MG: 40 INJECTION, POWDER, FOR SOLUTION INTRAVENOUS at 17:25

## 2019-02-25 RX ADMIN — DEXAMETHASONE SODIUM PHOSPHATE 4 MG: 4 INJECTION, SOLUTION INTRAMUSCULAR; INTRAVENOUS at 13:01

## 2019-02-25 RX ADMIN — EPHEDRINE SULFATE 10 MG: 50 INJECTION, SOLUTION INTRAMUSCULAR; INTRAVENOUS; SUBCUTANEOUS at 12:33

## 2019-02-25 RX ADMIN — GLYCOPYRROLATE 0.4 MG: 0.2 INJECTION, SOLUTION INTRAMUSCULAR; INTRAVENOUS at 13:01

## 2019-02-25 NOTE — PERIOPERATIVE NURSING NOTE
Med time out performed by self and Umesh Urbina RN  Dilaudid PCA initiated  Patient rates pain as 4  Explained use and return demonstration given    Verbalizes understanding of use

## 2019-02-25 NOTE — PERIOPERATIVE NURSING NOTE
Complaining of feeling something in his right eye  Nothing noted on exam   Eye flushed with saline  No relief found  Dr Kenneth Pina notified

## 2019-02-25 NOTE — ANESTHESIA POSTPROCEDURE EVALUATION
Post-Op Assessment Note    CV Status:  Stable    Pain management: adequate     Mental Status:  Alert and awake   Hydration Status:  Euvolemic   PONV Controlled:  Controlled   Airway Patency:  Patent  Airway: intubated   Post Op Vitals Reviewed: Yes      Staff: Anesthesiologist           BP      Temp      Pulse    Resp      SpO2

## 2019-02-25 NOTE — ANESTHESIA POSTPROCEDURE EVALUATION
Post-Op Assessment Note          Comments: pt evaluated for corneal abrasion Right eye  Woods lamp applied and abrasion noted R eye    Will start corneal abrasion protocol          BP      Temp      Pulse    Resp      SpO2

## 2019-02-25 NOTE — H&P
H&P                [address] [facility]  [facility]  [address] [facility] [place] [address] [facility]  [phone]  www  [facility]  org        NAME: Rosio Villalobos  AGE: 68 y o  SEX: male  : 1945            MRN: [id number]     DATE: 2019  TIME: 8:45 AM     Assessment and Plan:  High-grade TCC of the prostatic urethra, stage IV  No demonstrated bladder involvement  Radical cystoprostatectomy, bilateral pelvic lymph node dissection, appendectomy and ileal conduit formation now that neoadjuvant chemotherapy has been completed  Only other option is doing nothing but chemo, but he would most likely succumb to the disease  We have discussed at length that this surgery will be a major ordeal, and he wishes to proceed                      Chief Complaint          Chief Complaint   Patient presents with    Prostate Cancer         History of Present Illness   Urothelial carcinoma of the prostatic urethra-is finishing chemotherapy, to be prepared for radical cystoprostatectomy, bilateral lymph node dissection, appendectomy and ileal conduit formation    The procedure, the risks of bleeding infection, recurrence of disease, incomplete resection, small bowel obstruction, adjacent organ damage, ureteral ileal anastomotic obstruction have been explained he gives informed consent         The following portions of the patient's history were reviewed and updated as appropriate: allergies, current medications, past family history, past medical history, past social history, past surgical history and problem list      Review of Systems   Review of Systems     Active Problem List          Patient Active Problem List   Diagnosis    Right bundle melissa block, 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)    Aneurysm of left popliteal artery (HCC)    Urothelial carcinoma (HCC)    Prostate cancer (HCC)    Hypokalemia    Orthostatic lightheadedness    Chemotherapy-induced neutropenia (HCC)    SIRS (systemic inflammatory response syndrome) (HCC)    CKD (chronic kidney disease) stage 3, GFR 30-59 ml/min (HCC)    Acute cystitis without hematuria    Acute on chronic anemia    Moderate protein-calorie malnutrition (HCC)    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HCC)         Objective   /68 (BP Location: Left arm, Patient Position: Sitting, Cuff Size: Adult)   Pulse 102   Ht 5' 8 9" (1 75 m)   Wt 86 6 kg (191 lb)   BMI 28 29 kg/m²       Physical Exam   Constitutional: He is oriented to person, place, and time  He appears well-developed and well-nourished  HENT:   Head: Normocephalic and atraumatic  Eyes: EOM are normal    Neck: Normal range of motion  Cardiovascular: Normal rate, regular rhythm and normal heart sounds  Pulmonary/Chest: Effort normal and breath sounds normal  No respiratory distress  Abdominal: Soft  He exhibits no distension  There is no tenderness  There is no rebound  Musculoskeletal: Normal range of motion  Neurological: He is alert and oriented to person, place, and time  Skin: Skin is warm and dry  Psychiatric: He has a normal mood and affect   His behavior is normal  Judgment and thought content normal                Current Medications      Current Outpatient Prescriptions:     acetaminophen (TYLENOL) 500 mg tablet, Take 500 mg by mouth every 6 (six) hours as needed for mild pain, Disp: , Rfl:    divalproex sodium (DEPAKOTE) 500 mg EC tablet, Take 500 mg by mouth 2 (two) times a day  , Disp: , Rfl:     LORazepam (ATIVAN) 0 5 mg tablet, Take 1 tablet (0 5 mg total) by mouth every 6 (six) hours as needed for anxiety, Disp: 30 tablet, Rfl: 0    mometasone (NASONEX) 50 mcg/act nasal spray, 2 sprays into each nostril as needed, Disp: , Rfl:     ondansetron (ZOFRAN) 8 mg tablet, Take 1 tablet (8 mg total) by mouth every 8 (eight) hours as needed for nausea or vomiting, Disp: 90 tablet, Rfl: 2    pentoxifylline (TRENtal) 400 mg ER tablet, Take 400 mg by mouth 3 (three) times a day with meals, Disp: , Rfl:     ranitidine (ZANTAC) 300 MG tablet, Take 300 mg by mouth daily at bedtime, Disp: , Rfl: 5    tamsulosin (FLOMAX) 0 4 mg, Take 1 capsule (0 4 mg total) by mouth daily with dinner, Disp: 90 capsule, Rfl: 3    amLODIPine (NORVASC) 10 mg tablet, Take 10 mg by mouth daily, Disp: , Rfl: 5    methocarbamol (ROBAXIN) 500 mg tablet, Take 500 mg by mouth Three times daily as needed, Disp: , Rfl:     oxyCODONE (ROXICODONE) 5 mg immediate release tablet, Take 1 tablet (5 mg total) by mouth every 4 (four) hours as needed for moderate pain Max Daily Amount: 30 mg (Patient not taking: Reported on 1/15/2019 ), Disp: 60 tablet, Rfl: 0           Nishi Dorsey MD

## 2019-02-25 NOTE — OP NOTE
OPERATIVE REPORT  PATIENT NAME: Liliana Cabezas    :  1945  MRN: [id number]  Pt Location: [facility] OR ROOM 07    SURGERY DATE: 2019    Surgeon(s) and Role:     * Sis Mckee MD - Primary     * Tony Bhagat MD - Assisting     * Alysia Delgado PA-C - Assisting  Preop Diagnosis:  Malignant neoplasm of prostate ([address] ) [place]  Urothelial carcinoma ([address] ) [C68 9]    Post-Op Diagnosis Codes:     * Malignant neoplasm of prostate ([address] ) [place]     * Urothelial carcinoma ([address] ) [C68 9]   malignant neoplasm of distal left ureter, urothelial carcinoma  Procedure(s) (LRB):  ATTEMPTED CYSTECTOMY RADICAL; ILEAL CONDUIT URINARY DIVERSION; BIOPSY OF PELVIC MASS; APPENDECTOMY (N/A)   lysis of adhesions  Specimen(s):  ID Type Source Tests Collected by Time Destination   1 : LEFT DISTAL URETER Tissue Ureter, Left TISSUE EXAM Sis Mckee MD 2019 3059    2 : LEFT URETERAL TUMOR Tissue Ureter, Left TISSUE EXAM Sis Mckee MD 2019 0957    3 : APPENDIX Tissue Appendix TISSUE EXAM Sis Mckee MD 2019 1030    4 : RIGHT DISTAL URETERAL MARGIN Tissue Ureter, Right TISSUE EXAM Sis Mckee MD 2019 1107        Estimated Blood Loss:   700 mL    Drains:  Closed/Suction Drain Right Abdomen Bulb 10 Fr  (Active)   Site Description Unable to view 2019  1:29 PM   Dressing Status Clean;Dry; Intact 2019  1:29 PM   Drainage Appearance Serosanguineous 2019  3:13 PM   Status To bulb suction 2019  3:13 PM   Output (mL) 5 mL 2019  3:13 PM   Number of days: 0       NG/OG/Enteral Tube Nasogastric 18 Fr Right nares (Active)   Site Assessment Clean;Dry; Intact 2019  1:29 PM   Status Clamped 2019  1:29 PM   Number of days: 0       Ileostomy (Active)   Stoma Shape Round 2019  1:29 PM   Peristomal Assessment Clean; Intact 2019  1:29 PM   Number of days:        Urostomy Ileal conduit RLQ (Active)   Stoma Shape Round 2019  1:29 PM   Peristomal Assessment Clean; Intact 2019  1:29 PM Output (mL) 70 mL 2/25/2019  3:13 PM   Number of days: 0       Urethral Catheter Latex 18 Fr  (Active)   Site Assessment Clean;Skin intact 2/25/2019  1:29 PM   Collection Container Standard drainage bag 2/25/2019  1:29 PM   Securement Method Securing device (Describe) 2/25/2019  1:29 PM   Output (mL) 450 mL 2/25/2019  3:13 PM   Number of days: 0       [REMOVED] Rectal Tube With balloon (Removed)   Number of days: 0       Anesthesia Type:   General    Operative Indications:  Malignant neoplasm of prostate ([address] ) [C61]  Urothelial carcinoma ([address] ) [C68 9]      Operative Findings:   active tumor, high-grade TCC the distal left ureter forming a pouch outside of the bladder wall and involving the bladder wall  This was found to be muscle invasive  There was tumor left behind  Unable to perform radical cystoprostatectomy due to amount of massive inflammation on the left side of the bladder and pedicle  Urinary diversion and appendectomy was performed instead  Complications:   None    Procedure and Technique:    The patient is a 75-year-old man status post open diverticulectomy by me with left ureteral lysis 1 year ago, and he had hydronephrosis at the time with decreasing renal function  He   Is left renal dominant  He had BPH with obstruction and underwent trans urethral resection of the prostate which showed incidental prostate cancer, but nothing else  He developed worsening voiding symptoms after the procedure and I perform cystoscopy and I found that he had a very inflamed bladder and basically a nonhealing prostatic fossa  I biopsied the prostatic fossa as well as the bladder  The bladder showed no cancer, but there was high-grade TCC of the prostatic urethra  Around this time he developed  An abscess behind the bladder in the area with the diverticulum had been in this had to be drained and this resolved spontaneously    However due to all the inflammation he developed left ureteral  Compression and nephrostomy tube it to be placed and eventually internalized stent  He currently has an internalized stent and he has undergone a course of chemotherapy for his cancer  He is now here for definitive therapy the form of  Radical cystoprostatectomy, bilateral pelvic lymph node dissection, appendectomy and ileal conduit urinary diversion  The risks of bleeding infection and inability to completely resect the tumor and the bladder and with a prostate were explained and he gives informed consent  I also explained of the risks of stoma issues ureteroileal anastomotic strictures  The patient was brought to the operating room and identified properly  General endotracheal anesthesia was placed as well as a nasogastric tube and an arterial line  He was prepped and draped in the dorsal lithotomy position and a time-out was performed  A Bolden catheter was placed into the bladder and cloudy yellow urine was obtained  The old wound was incised with a  10 blade down to subcutaneous tissue to the fascia  Entered the peritoneum up above the umbilicus, and carried the incision down to the space of Retzius  Careful dissection was used to remove the omental flap to  Move it aside, and adhesions were dissected away in the lower quadrants near the bladder  Using the Omni retractor, I packed away the bowel and assess the bladder  There was lot of inflammation and swelling in the left posterior bladder  Eventually I incised the posterior peritoneum and eventually connected both sides of the posterior peritoneum avoiding the sigmoid colon  Both ureters were isolated and divided and placed aside  The left ureteral stent was removed intact  The left ureter was  Dilated widely  Distal ends were either clipped or oversewn and then attempt was made to remove the bladder and the prostate    We are able to develop the space of Retzius down to the prostate, and were able to take pedicles with the endovascular staple bilaterally to the mid way, but we encountered a fluid-filled space which contained necrotic tissue in the left posterior area and this was sent for frozen section  The distal left ureter was sent for frozen section and this showed no tumor  The left posterior wall of the bladder showed active tumor on frozen section which was high-grade TCC  We tried to remove more of this but we decided that the amount of inflammation that obliterated the pedicle would make it very difficult to isolate the pedicles to get the prostate itself  We did not feel we could safely remove the bladder  And prostate without a great deal of blood loss of possible damage to adjacent structures  We therefore decided to leave the bladder and prostate in situ and have the patient undergo external beam radiotherapy in the future  The presence of these organs would also prevent radiation enteritis  At this point we located the cecum and performed appendectomy with the YORDAN 45 stapler and the  Harmonic scalpel  We then localized the stomal end of the conduit 15 cm from the terminal ileum, measured about 20 cm of ileum to be used as a conduit, isolated the vascular arcades and made 2 holes in the mesentery which would connect with the stomal and butt ends of the conduit  The mesentery was divided with Harmonic scalpel and the bowel was divided with the YORDAN 45 stapler  The YORDAN was used to performed a [id number] anastomosis of the ileum in the usual fashion and 3 0 pop-off sutures were used to secure the anastomosis  The top of the anastomosis was sealed with the YORDAN stapler as well  The mesenteric window was closed with 3 0 silk pop-off sutures  The conduit was then prepared by opening up the stomal end with Patten scissors and the conduit was irrigated clean  A Yankauer tip suction was placed into the conduit to use the Bovie to make stomal ends for the ureteroileal anastomosis    The left ureter was prepared and brought under the sigmoid mesentery to the right lower quadrant  The right ureter was prepared as well  A 3 0 Monocryl suture was placed in the apex of the spatulated left ureter , and this was brought to the  Anastomosis itself  Some difficulty was faced placing the stent due to the sharp turn of the ureter and this had to be fixed by bringing the ureter over to the left side again and then repeating it and eventually I was able to get the stent up into the kidney  This was deployed as well as the right 1 was  The stents were fixed into the conduit with 4 0 Monocryl sutures  Ureteroileal anastomosis was carried out with 3 0 and 4 Monocryl sutures in interrupted fashion  Excellent mucosal to mucosal  Bites were obtained  Once this was done, I found a place for the stoma approximately 2 cm proximal to the umbilicus in 4 cm lateral   The regional stomal markings were up in the patient's costal margin so I determined that they were too high  I opened a the skin  With the Bovie and removed the fat to expose the subcutaneous fascia  The fascia was incised and the peritoneum was incised and an adequate amount of space was made to bring the conduit through without tension  There was plenty of conduit to be used  A moderate jeff bud was achieved by placing fascial sutures into the side of the conduit and then placing skin to conduit and stoma sutures of 3 0 Vicryl interrupted  Once this was all in place, the NG tube position was confirmed manually, the belly was irrigated, counts were performed which showed all sponge and needle counts be correct, and 1  PDS retention sutures were placed first in interrupted fashion for a total of 3 along the wound  These were tied and then running 1  PDS was placed to close the fascia completely  Prior to closure of the wound a 10 [place]i flat Lexa-Guerrero drain was placed in the left lower quadrant taking care to avoid the epigastric vessels    This was done by making an incision in the left lower abdomen and using a tonsil clamp to go through to the peritoneum and bringing the drain through this  The drain was secured with a 2 0 nylon stitch  After all this was done the wound was closed with staples, and appliance was placed on the stoma, and the patient was extubated and transferred to recovery room in good condition     I was present for the entire procedure and A physician assistant was required during the procedure for retraction tissue handling,dissection and suturing    Patient Disposition:  PACU  and extubated and stable    SIGNATURE: Suni Vasquez MD  DATE: February 25, 2019  TIME: 3:35 PM

## 2019-02-26 LAB
ANION GAP SERPL CALCULATED.3IONS-SCNC: 11 MMOL/L (ref 4–13)
BUN SERPL-MCNC: 16 MG/DL (ref 5–25)
CALCIUM SERPL-MCNC: 8 MG/DL (ref 8.3–10.1)
CHLORIDE SERPL-SCNC: 102 MMOL/L (ref 100–108)
CO2 SERPL-SCNC: 23 MMOL/L (ref 21–32)
CREAT SERPL-MCNC: 1.21 MG/DL (ref 0.6–1.3)
ERYTHROCYTE [DISTWIDTH] IN BLOOD BY AUTOMATED COUNT: 13.6 % (ref 11.6–15.1)
GFR SERPL CREATININE-BSD FRML MDRD: 59 ML/MIN/1.73SQ M
GLUCOSE SERPL-MCNC: 139 MG/DL (ref 65–140)
HCT VFR BLD AUTO: 32.3 % (ref 36.5–49.3)
HGB BLD-MCNC: 10.3 G/DL (ref 12–17)
MAGNESIUM SERPL-MCNC: 1.3 MG/DL (ref 1.6–2.6)
MCH RBC QN AUTO: 31.4 PG (ref 26.8–34.3)
MCHC RBC AUTO-ENTMCNC: 31.9 G/DL (ref 31.4–37.4)
MCV RBC AUTO: 99 FL (ref 82–98)
PLATELET # BLD AUTO: 236 THOUSANDS/UL (ref 149–390)
PMV BLD AUTO: 8.4 FL (ref 8.9–12.7)
POTASSIUM SERPL-SCNC: 4.5 MMOL/L (ref 3.5–5.3)
RBC # BLD AUTO: 3.28 MILLION/UL (ref 3.88–5.62)
SODIUM SERPL-SCNC: 136 MMOL/L (ref 136–145)
WBC # BLD AUTO: 10.77 THOUSAND/UL (ref 4.31–10.16)

## 2019-02-26 PROCEDURE — 97530 THERAPEUTIC ACTIVITIES: CPT

## 2019-02-26 PROCEDURE — C9113 INJ PANTOPRAZOLE SODIUM, VIA: HCPCS | Performed by: UROLOGY

## 2019-02-26 PROCEDURE — 97163 PT EVAL HIGH COMPLEX 45 MIN: CPT

## 2019-02-26 PROCEDURE — G8978 MOBILITY CURRENT STATUS: HCPCS

## 2019-02-26 PROCEDURE — G8979 MOBILITY GOAL STATUS: HCPCS

## 2019-02-26 PROCEDURE — 83735 ASSAY OF MAGNESIUM: CPT | Performed by: UROLOGY

## 2019-02-26 PROCEDURE — 80048 BASIC METABOLIC PNL TOTAL CA: CPT | Performed by: UROLOGY

## 2019-02-26 PROCEDURE — 85027 COMPLETE CBC AUTOMATED: CPT | Performed by: UROLOGY

## 2019-02-26 PROCEDURE — 99024 POSTOP FOLLOW-UP VISIT: CPT | Performed by: UROLOGY

## 2019-02-26 RX ORDER — DEXTROSE AND SODIUM CHLORIDE 5; .45 G/100ML; G/100ML
75 INJECTION, SOLUTION INTRAVENOUS CONTINUOUS
Status: DISCONTINUED | OUTPATIENT
Start: 2019-02-26 | End: 2019-03-07

## 2019-02-26 RX ORDER — PANTOPRAZOLE SODIUM 40 MG/1
40 TABLET, DELAYED RELEASE ORAL
Status: DISCONTINUED | OUTPATIENT
Start: 2019-02-27 | End: 2019-02-27

## 2019-02-26 RX ORDER — FUROSEMIDE 10 MG/ML
10 INJECTION INTRAMUSCULAR; INTRAVENOUS ONCE
Status: COMPLETED | OUTPATIENT
Start: 2019-02-26 | End: 2019-02-26

## 2019-02-26 RX ADMIN — VALPROATE SODIUM 250 MG: 100 INJECTION, SOLUTION INTRAVENOUS at 23:31

## 2019-02-26 RX ADMIN — HEPARIN SODIUM 5000 UNITS: 5000 INJECTION INTRAVENOUS; SUBCUTANEOUS at 05:26

## 2019-02-26 RX ADMIN — VALPROATE SODIUM 250 MG: 100 INJECTION, SOLUTION INTRAVENOUS at 17:45

## 2019-02-26 RX ADMIN — TOBRAMYCIN AND DEXAMETHASONE 1 DROP: 3; 1 SUSPENSION/ DROPS OPHTHALMIC at 05:26

## 2019-02-26 RX ADMIN — HEPARIN SODIUM 5000 UNITS: 5000 INJECTION INTRAVENOUS; SUBCUTANEOUS at 13:20

## 2019-02-26 RX ADMIN — HEPARIN SODIUM 5000 UNITS: 5000 INJECTION INTRAVENOUS; SUBCUTANEOUS at 21:15

## 2019-02-26 RX ADMIN — LEVOFLOXACIN 500 MG: 5 INJECTION, SOLUTION INTRAVENOUS at 08:25

## 2019-02-26 RX ADMIN — PANTOPRAZOLE SODIUM 40 MG: 40 INJECTION, POWDER, FOR SOLUTION INTRAVENOUS at 08:24

## 2019-02-26 RX ADMIN — DEXTROSE AND SODIUM CHLORIDE 75 ML/HR: 5; .45 INJECTION, SOLUTION INTRAVENOUS at 21:09

## 2019-02-26 RX ADMIN — DEXTROSE AND SODIUM CHLORIDE 125 ML/HR: 5; .45 INJECTION, SOLUTION INTRAVENOUS at 07:01

## 2019-02-26 RX ADMIN — FUROSEMIDE 10 MG: 10 INJECTION, SOLUTION INTRAVENOUS at 08:24

## 2019-02-26 RX ADMIN — DIVALPROEX SODIUM 500 MG: 500 TABLET, DELAYED RELEASE ORAL at 08:25

## 2019-02-26 RX ADMIN — TOBRAMYCIN AND DEXAMETHASONE 1 DROP: 3; 1 SUSPENSION/ DROPS OPHTHALMIC at 23:31

## 2019-02-26 RX ADMIN — DEXTROSE AND SODIUM CHLORIDE 75 ML/HR: 5; .45 INJECTION, SOLUTION INTRAVENOUS at 08:24

## 2019-02-26 RX ADMIN — TOBRAMYCIN AND DEXAMETHASONE 1 DROP: 3; 1 SUSPENSION/ DROPS OPHTHALMIC at 17:45

## 2019-02-26 RX ADMIN — TOBRAMYCIN AND DEXAMETHASONE 1 DROP: 3; 1 SUSPENSION/ DROPS OPHTHALMIC at 13:20

## 2019-02-26 NOTE — PROGRESS NOTES
Postoperative day 1  Status post attempted radical cystoprostatectomy, ileal conduit urinary diversion and appendectomy  Overnight he did well  Hemoglobin remained stable at 10 3 the white blood count is slightly elevated at 10,700 thousand seven hundred  Basic metabolic panel is normal and his creatinine is stable at 1 21  Urine output overnight was 700 cc and LENNOX output was approximately 55 cc  His eyes and nose show him to be approximately 3 L in the positive, so I will give him a small amount of Lasix today to diurese him  He has been afebrile and his vital signs are normal and stable  Out of bed today and up with assistance  Will get physical therapy involved  Continue with IV fluids but will decrease the rate  I spoke with patient he is awake alert oriented  The Capnography alarms are very bothersome and kept him awake through the night  His abdomen is mildly distended and stoma looks good  He has lot of urine in his bag  No abdominal tenderness  I encouraged him to move his extremities and use incentive spirometry  Continue with IV antibiotics  Check labs again tomorrow

## 2019-02-26 NOTE — PROGRESS NOTES
The pantoprazole has been converted to Oral per [facility] IV-to-PO Auto-Conversion Protocol for Adults as approved by the Pharmacy and Therapeutics Committee  The patient met all eligible criteria:  3 Age = 25years old   2) Received at least one dose of the IV form   3) Receiving at least one other scheduled oral/enteral medication   4) Tolerating an oral/enteral diet   and did not have any exclusions:   1) Critical care patient   2) Active GI bleed (IF assessing H2RAs or PPIs)   3) Continuous tube feeding (IF assessing cipro, doxycycline, levofloxacin, minocycline, rifampin, or voriconazole)   4) Receiving PO vancomycin (IF assessing metronidazole)   5) Persistent nausea and/or vomiting   6) Ileus or gastrointestinal obstruction   7) Catrachito/nasogastric tube set for continuous suction   8) Specific order not to automatically convert to PO (in the order's comments or if discussed in the most recent Infectious Disease or primary team's progress notes)

## 2019-02-26 NOTE — UTILIZATION REVIEW
Initial Clinical Review    Age/Sex: 68 y o  male     Surgery Date:    2/25/2019    Procedure: Preop Diagnosis:  Malignant neoplasm of prostate ([facility] [address] ) [C61]  Urothelial carcinoma ([facility] [address] ) [C68 9]     Post-Op Diagnosis Codes:     * Malignant neoplasm of prostate ([address] ) [C61]     * Urothelial carcinoma ([facility] [address] ) [C68 9]   malignant neoplasm of distal left ureter, urothelial carcinoma  Procedure(s) (LRB):  ATTEMPTED CYSTECTOMY RADICAL; ILEAL CONDUIT URINARY DIVERSION; BIOPSY OF PELVIC MASS; APPENDECTOMY (N/A)   lysis of adhesions    Operative Findings:   active tumor, high-grade TCC the distal left ureter forming a pouch outside of the bladder wall and involving the bladder wall  This was found to be muscle invasive  There was tumor left behind  Unable to perform radical cystoprostatectomy due to amount of massive inflammation on the left side of the bladder and pedicle  Urinary diversion and appendectomy was performed instead        Anesthesia:    general    Admission Orders: Date/Time/Statement: 2/25/19 @ 1303   Orders Placed This Encounter   Procedures    Inpatient Admission     Standing Status:   Standing     Number of Occurrences:   1     Order Specific Question:   Admitting Physician     Answer:   Woo Crocker [[id number]]     Order Specific Question:   Level of Care     Answer:   Med Surg [[id number]]     Order Specific Question:   Estimated length of stay     Answer:   More than 2 Midnights     Order Specific Question:   Certification     Answer:   I certify that inpatient services are medically necessary for this patient for a duration of greater than two midnights  See H&P and MD Progress Notes for additional information about the patient's course of treatment       Vital Signs: /69 (BP Location: Right arm)   Pulse 99   Temp 97 5 °F (36 4 °C) (Tympanic)   Resp 18   Ht 6' (1 829 m)   Wt 86 2 kg (190 lb)   SpO2 98%   BMI 25 77 kg/m²   Diet:      Mobility:    As  stephany    DVT Prophylaxis:   SCD'S    Pain Control:   Pain Medications             acetaminophen (TYLENOL) 500 mg tablet Take 500 mg by mouth every 6 (six) hours as needed for mild pain    divalproex sodium (DEPAKOTE) 500 mg EC tablet Take 500 mg by mouth 2 (two) times a day          LENNOX drain  NGT  PT  depakote   BID  SQ heparin Q 8 hrs  IV  protonix  Daily  IVF  75/hr  Dilaudid  PCA    POD  #  1  PROGRESS  NOTE  2/26  Postoperative day 1  Status post attempted radical cystoprostatectomy, ileal conduit urinary diversion and appendectomy  Overnight he did well  Hemoglobin remained stable at 10 3 the white blood count is slightly elevated at 10,700 thousand seven hundred  Basic metabolic panel is normal and his creatinine is stable at 1 21  Urine output overnight was 700 cc and LENNOX output was approximately 55 cc  His eyes and nose show him to be approximately 3 L in the positive, so I will give him a small amount of Lasix today to diurese him  He has been afebrile and his vital signs are normal and stable  Out of bed today and up with assistance  Will get physical therapy involved  Continue with IV fluids but will decrease the rate  I spoke with patient he is awake alert oriented  The Capnography alarms are very bothersome and kept him awake through the night  His abdomen is mildly distended and stoma looks good  He has lot of urine in his bag  No abdominal tenderness  I encouraged him to move his extremities and use incentive spirometry  Continue with IV antibiotics  Check labs again tomorrow

## 2019-02-27 LAB
ALBUMIN SERPL BCP-MCNC: 1.9 G/DL (ref 3.5–5)
ALP SERPL-CCNC: 52 U/L (ref 46–116)
ALT SERPL W P-5'-P-CCNC: 11 U/L (ref 12–78)
ANION GAP SERPL CALCULATED.3IONS-SCNC: 8 MMOL/L (ref 4–13)
AST SERPL W P-5'-P-CCNC: 14 U/L (ref 5–45)
BILIRUB SERPL-MCNC: 0.31 MG/DL (ref 0.2–1)
BUN SERPL-MCNC: 20 MG/DL (ref 5–25)
CALCIUM SERPL-MCNC: 8.3 MG/DL (ref 8.3–10.1)
CHLORIDE SERPL-SCNC: 103 MMOL/L (ref 100–108)
CO2 SERPL-SCNC: 24 MMOL/L (ref 21–32)
CREAT SERPL-MCNC: 1.38 MG/DL (ref 0.6–1.3)
ERYTHROCYTE [DISTWIDTH] IN BLOOD BY AUTOMATED COUNT: 13.6 % (ref 11.6–15.1)
GFR SERPL CREATININE-BSD FRML MDRD: 50 ML/MIN/1.73SQ M
GLUCOSE SERPL-MCNC: 120 MG/DL (ref 65–140)
HCT VFR BLD AUTO: 30.3 % (ref 36.5–49.3)
HGB BLD-MCNC: 9.7 G/DL (ref 12–17)
MCH RBC QN AUTO: 31.3 PG (ref 26.8–34.3)
MCHC RBC AUTO-ENTMCNC: 32 G/DL (ref 31.4–37.4)
MCV RBC AUTO: 98 FL (ref 82–98)
PLATELET # BLD AUTO: 229 THOUSANDS/UL (ref 149–390)
PMV BLD AUTO: 8.6 FL (ref 8.9–12.7)
POTASSIUM SERPL-SCNC: 4.3 MMOL/L (ref 3.5–5.3)
PROT SERPL-MCNC: 5.8 G/DL (ref 6.4–8.2)
RBC # BLD AUTO: 3.1 MILLION/UL (ref 3.88–5.62)
SODIUM SERPL-SCNC: 135 MMOL/L (ref 136–145)
WBC # BLD AUTO: 12.57 THOUSAND/UL (ref 4.31–10.16)

## 2019-02-27 PROCEDURE — 99024 POSTOP FOLLOW-UP VISIT: CPT | Performed by: UROLOGY

## 2019-02-27 PROCEDURE — 80053 COMPREHEN METABOLIC PANEL: CPT | Performed by: UROLOGY

## 2019-02-27 PROCEDURE — C9113 INJ PANTOPRAZOLE SODIUM, VIA: HCPCS | Performed by: UROLOGY

## 2019-02-27 PROCEDURE — 85027 COMPLETE CBC AUTOMATED: CPT | Performed by: UROLOGY

## 2019-02-27 RX ORDER — PANTOPRAZOLE SODIUM 40 MG/1
40 INJECTION, POWDER, FOR SOLUTION INTRAVENOUS EVERY 12 HOURS SCHEDULED
Status: DISCONTINUED | OUTPATIENT
Start: 2019-02-27 | End: 2019-03-02 | Stop reason: CLARIF

## 2019-02-27 RX ADMIN — VALPROATE SODIUM 250 MG: 100 INJECTION, SOLUTION INTRAVENOUS at 11:35

## 2019-02-27 RX ADMIN — ONDANSETRON 4 MG: 2 INJECTION INTRAMUSCULAR; INTRAVENOUS at 05:29

## 2019-02-27 RX ADMIN — TOBRAMYCIN AND DEXAMETHASONE 1 DROP: 3; 1 SUSPENSION/ DROPS OPHTHALMIC at 11:45

## 2019-02-27 RX ADMIN — HEPARIN SODIUM 5000 UNITS: 5000 INJECTION INTRAVENOUS; SUBCUTANEOUS at 13:27

## 2019-02-27 RX ADMIN — PANTOPRAZOLE SODIUM 40 MG: 40 INJECTION, POWDER, FOR SOLUTION INTRAVENOUS at 21:25

## 2019-02-27 RX ADMIN — Medication 1 SPRAY: at 10:55

## 2019-02-27 RX ADMIN — VALPROATE SODIUM 250 MG: 100 INJECTION, SOLUTION INTRAVENOUS at 05:04

## 2019-02-27 RX ADMIN — VALPROATE SODIUM 250 MG: 100 INJECTION, SOLUTION INTRAVENOUS at 17:19

## 2019-02-27 RX ADMIN — HEPARIN SODIUM 5000 UNITS: 5000 INJECTION INTRAVENOUS; SUBCUTANEOUS at 05:04

## 2019-02-27 RX ADMIN — TOBRAMYCIN AND DEXAMETHASONE 1 DROP: 3; 1 SUSPENSION/ DROPS OPHTHALMIC at 17:19

## 2019-02-27 RX ADMIN — PANTOPRAZOLE SODIUM 40 MG: 40 INJECTION, POWDER, FOR SOLUTION INTRAVENOUS at 10:52

## 2019-02-27 RX ADMIN — DEXTROSE AND SODIUM CHLORIDE 75 ML/HR: 5; .45 INJECTION, SOLUTION INTRAVENOUS at 11:38

## 2019-02-27 RX ADMIN — ONDANSETRON 4 MG: 2 INJECTION INTRAMUSCULAR; INTRAVENOUS at 11:12

## 2019-02-27 RX ADMIN — HEPARIN SODIUM 5000 UNITS: 5000 INJECTION INTRAVENOUS; SUBCUTANEOUS at 21:24

## 2019-02-27 RX ADMIN — TOBRAMYCIN AND DEXAMETHASONE 1 DROP: 3; 1 SUSPENSION/ DROPS OPHTHALMIC at 05:11

## 2019-02-27 RX ADMIN — ONDANSETRON 4 MG: 2 INJECTION INTRAMUSCULAR; INTRAVENOUS at 21:32

## 2019-02-27 NOTE — PROGRESS NOTES
Postoperative day 2  Status post attempted radical cystoprostatectomy, ileal conduit urinary diversion and appendectomy  Yesterday he was given 10 mg of IV Lasix and he diuresed  Afebrile vital signs stable  He has been leaking around the drain in through the incision and this can be expected because the Lasix  Today's sodium is slightly low at 135, his hemoglobin is basically stable at 9 7 and his white blood count is [address]  His creatinine has risen little bit to 1 38, probably due to the diuresis  His IV fluids are running at 75 cc an hour of D5 half normal saline  He continues with an IV Dilaudid PCA  Physical therapy is seeing him  On exam he is awake alert oriented  He has not been using the PCA very much so he has been having pain  Encouraged him to use  it  I removed the dressing and his wound is clean dry intact  The stoma looks okay  The Lexa-Guerrero drain is not putting out much  Neither is the NG tube  The nurse reports hearing some hypoactive bowel sounds yesterday  The patient felt like he was ready to pass gas yesterday  He was up to a chair yesterday    Check labs tomorrow  Ambulate today  Clamp NG tube  If he does okay with clamping of the NG tube, I will remove it tomorrow  Progress is overall good

## 2019-02-27 NOTE — WOUND OSTOMY CARE
Progress Note- Marcia Cassette 68 y o  male  [id number]  [facility] -E2 -01        Assessment:  Patient seen for urostomy assessment and education  Patient is sleepy  Reports abdominal pain-PCA in use  Remains NPO with ice chips--NG tube in place  Requires assist x 2 to turn and reposition  Bolden catheter in place  Continent of bowel  Midline abdominal incision--approximated with staples, open to air  No drainage  Melly-wound within normal limits  Left lower quadrant LENNOX--site with some leakage of serosanguinous drainage, sutured in place, to bulb suction draining serosanguinous drainage  Urostomy/ileal conduit--stoma is oval, very slightly budded (almost flush with abdomen) measuring 7/8 x 1 1/4 inches  Mucocutaneous junction and peristomal skin are intact  Blunt stent blocked (no urine flowing), slanted stent draining clear yellow urine  Bloody urine draining from stoma os  Ostomy pouching system was leaking medially at 3 o'clock (new abdominal crease) on initial assessment  Pouching system changed with strip paste placed in crease prior to pouch application  Dr Sierra Kaur made aware of blocked stent  Patient not appropriate for teaching at this time--sleepy and in pain  Plan:  Ongoing ostomy assessment and education--teaching session scheduled with patient's wife on Friday, March 1, 2019 at 2 pm       Preventative skin care orders placed:  1-Hydraguard to bilateral heels BID and PRN  2-Elevate heels to offload pressure  3-Soft care cushion when out of bed  4-Moisturize skin daily with skin nourishing cream   5-Turn/reposition q2h or when medically stable for pressure re-distribution on skin  6-Protect sacrum w/Allevyn foam, julia with P, change q3d and PRN, check skin at least daily  7-Empty urostomy pouch when 1/3 full      If changing ostomy pouch, place a small piece of strip paste in crease at 3 o'clock (medially toward incision) prior to placing the new pouching system  8-Wound care team to follow Monday, Wednesday, Friday  Objective:    Wound 02/25/19 Incision Abdomen Right (Active)   Wound Description Clean; Intact 2/27/2019 11:45 AM   Melly-wound Assessment Clean;Dry; Intact 2/27/2019 11:45 AM   Tunneling 0 cm 2/27/2019 11:45 AM   Undermining 0 2/27/2019 11:45 AM   Closure Staples 2/27/2019 11:45 AM   Drainage Amount None 2/27/2019 11:45 AM   Drainage Description Clear 2/26/2019 10:00 AM   Treatments Cleansed 2/27/2019 11:45 AM   Dressing Open to air 2/27/2019 11:45 AM   Wound packed? No 2/27/2019 11:45 AM   Dressing Changed Changed 2/26/2019 12:54 PM   Patient Tolerance Tolerated well 2/27/2019 11:45 AM   Dressing Status Clean; Intact;Dry 2/26/2019  9:00 PM   Number of days: 2     Closed/Suction Drain Right Abdomen Bulb 10 Fr  (Active)   Site Description Healing;Leaking at site 2/27/2019 11:38 AM   Dressing Status Clean;Dry; Intact 2/27/2019 11:38 AM   Drainage Appearance Serosanguineous 2/27/2019 11:38 AM   Status To bulb suction 2/27/2019 11:38 AM   Output (mL) 10 mL 2/26/2019 12:01 PM   Number of days: 2           Urostomy Ileal conduit RLQ (Active)   Stomal Appliance 2 piece;Leaking; Changed 2/27/2019 11:45 AM   Stoma Assessment Red 2/27/2019 11:45 AM   Stoma Shape Oval 2/27/2019 11:45 AM   Peristomal Assessment Clean; Intact;Mucocutaneous intact 2/27/2019 11:45 AM   Treatment Bag change;Site care 2/27/2019 11:45 AM   Output (mL) 75 mL 2/27/2019 12:15 PM   Number of days: [address] [facility] BSN, RN, [employer]

## 2019-02-28 LAB
ALBUMIN SERPL BCP-MCNC: 1.8 G/DL (ref 3.5–5)
ALP SERPL-CCNC: 53 U/L (ref 46–116)
ALT SERPL W P-5'-P-CCNC: 7 U/L (ref 12–78)
ANION GAP SERPL CALCULATED.3IONS-SCNC: 6 MMOL/L (ref 4–13)
AST SERPL W P-5'-P-CCNC: 15 U/L (ref 5–45)
BASOPHILS # BLD AUTO: 0.01 THOUSANDS/ΜL (ref 0–0.1)
BASOPHILS NFR BLD AUTO: 0 % (ref 0–1)
BILIRUB SERPL-MCNC: 0.33 MG/DL (ref 0.2–1)
BUN SERPL-MCNC: 23 MG/DL (ref 5–25)
CALCIUM SERPL-MCNC: 8.7 MG/DL (ref 8.3–10.1)
CHLORIDE SERPL-SCNC: 105 MMOL/L (ref 100–108)
CO2 SERPL-SCNC: 26 MMOL/L (ref 21–32)
CREAT SERPL-MCNC: 1.19 MG/DL (ref 0.6–1.3)
EOSINOPHIL # BLD AUTO: 0 THOUSAND/ΜL (ref 0–0.61)
EOSINOPHIL NFR BLD AUTO: 0 % (ref 0–6)
ERYTHROCYTE [DISTWIDTH] IN BLOOD BY AUTOMATED COUNT: 13.5 % (ref 11.6–15.1)
GFR SERPL CREATININE-BSD FRML MDRD: 60 ML/MIN/1.73SQ M
GLUCOSE SERPL-MCNC: 128 MG/DL (ref 65–140)
HCT VFR BLD AUTO: 30.8 % (ref 36.5–49.3)
HGB BLD-MCNC: 9.8 G/DL (ref 12–17)
IMM GRANULOCYTES # BLD AUTO: 0.09 THOUSAND/UL (ref 0–0.2)
IMM GRANULOCYTES NFR BLD AUTO: 1 % (ref 0–2)
LYMPHOCYTES # BLD AUTO: 0.56 THOUSANDS/ΜL (ref 0.6–4.47)
LYMPHOCYTES NFR BLD AUTO: 5 % (ref 14–44)
MCH RBC QN AUTO: 31.5 PG (ref 26.8–34.3)
MCHC RBC AUTO-ENTMCNC: 31.8 G/DL (ref 31.4–37.4)
MCV RBC AUTO: 99 FL (ref 82–98)
MONOCYTES # BLD AUTO: 0.72 THOUSAND/ΜL (ref 0.17–1.22)
MONOCYTES NFR BLD AUTO: 6 % (ref 4–12)
NEUTROPHILS # BLD AUTO: 10.73 THOUSANDS/ΜL (ref 1.85–7.62)
NEUTS SEG NFR BLD AUTO: 88 % (ref 43–75)
NRBC BLD AUTO-RTO: 0 /100 WBCS
PLATELET # BLD AUTO: 272 THOUSANDS/UL (ref 149–390)
PMV BLD AUTO: 8.8 FL (ref 8.9–12.7)
POTASSIUM SERPL-SCNC: 4.2 MMOL/L (ref 3.5–5.3)
PROT SERPL-MCNC: 5.9 G/DL (ref 6.4–8.2)
RBC # BLD AUTO: 3.11 MILLION/UL (ref 3.88–5.62)
SODIUM SERPL-SCNC: 137 MMOL/L (ref 136–145)
WBC # BLD AUTO: 12.11 THOUSAND/UL (ref 4.31–10.16)

## 2019-02-28 PROCEDURE — 97110 THERAPEUTIC EXERCISES: CPT

## 2019-02-28 PROCEDURE — 85025 COMPLETE CBC W/AUTO DIFF WBC: CPT | Performed by: UROLOGY

## 2019-02-28 PROCEDURE — 97530 THERAPEUTIC ACTIVITIES: CPT

## 2019-02-28 PROCEDURE — C9113 INJ PANTOPRAZOLE SODIUM, VIA: HCPCS | Performed by: UROLOGY

## 2019-02-28 PROCEDURE — 99024 POSTOP FOLLOW-UP VISIT: CPT | Performed by: PHYSICIAN ASSISTANT

## 2019-02-28 PROCEDURE — 80053 COMPREHEN METABOLIC PANEL: CPT | Performed by: UROLOGY

## 2019-02-28 PROCEDURE — 97116 GAIT TRAINING THERAPY: CPT

## 2019-02-28 RX ADMIN — VALPROATE SODIUM 250 MG: 100 INJECTION, SOLUTION INTRAVENOUS at 13:02

## 2019-02-28 RX ADMIN — DEXTROSE AND SODIUM CHLORIDE 75 ML/HR: 5; .45 INJECTION, SOLUTION INTRAVENOUS at 01:27

## 2019-02-28 RX ADMIN — TOBRAMYCIN AND DEXAMETHASONE 1 DROP: 3; 1 SUSPENSION/ DROPS OPHTHALMIC at 13:03

## 2019-02-28 RX ADMIN — TOBRAMYCIN AND DEXAMETHASONE 1 DROP: 3; 1 SUSPENSION/ DROPS OPHTHALMIC at 17:27

## 2019-02-28 RX ADMIN — ONDANSETRON 4 MG: 2 INJECTION INTRAMUSCULAR; INTRAVENOUS at 20:47

## 2019-02-28 RX ADMIN — PANTOPRAZOLE SODIUM 40 MG: 40 INJECTION, POWDER, FOR SOLUTION INTRAVENOUS at 08:36

## 2019-02-28 RX ADMIN — VALPROATE SODIUM 250 MG: 100 INJECTION, SOLUTION INTRAVENOUS at 06:24

## 2019-02-28 RX ADMIN — VALPROATE SODIUM 250 MG: 100 INJECTION, SOLUTION INTRAVENOUS at 17:28

## 2019-02-28 RX ADMIN — HEPARIN SODIUM 5000 UNITS: 5000 INJECTION INTRAVENOUS; SUBCUTANEOUS at 21:12

## 2019-02-28 RX ADMIN — PANTOPRAZOLE SODIUM 40 MG: 40 INJECTION, POWDER, FOR SOLUTION INTRAVENOUS at 20:42

## 2019-02-28 RX ADMIN — ONDANSETRON 4 MG: 2 INJECTION INTRAMUSCULAR; INTRAVENOUS at 04:43

## 2019-02-28 RX ADMIN — VALPROATE SODIUM 250 MG: 100 INJECTION, SOLUTION INTRAVENOUS at 00:59

## 2019-02-28 RX ADMIN — TOBRAMYCIN AND DEXAMETHASONE 1 DROP: 3; 1 SUSPENSION/ DROPS OPHTHALMIC at 06:24

## 2019-02-28 RX ADMIN — HEPARIN SODIUM 5000 UNITS: 5000 INJECTION INTRAVENOUS; SUBCUTANEOUS at 13:02

## 2019-02-28 RX ADMIN — DEXTROSE AND SODIUM CHLORIDE 75 ML/HR: 5; .45 INJECTION, SOLUTION INTRAVENOUS at 19:06

## 2019-02-28 RX ADMIN — DOCUSATE SODIUM 100 MG: 100 CAPSULE, LIQUID FILLED ORAL at 17:27

## 2019-02-28 RX ADMIN — TOBRAMYCIN AND DEXAMETHASONE 1 DROP: 3; 1 SUSPENSION/ DROPS OPHTHALMIC at 00:59

## 2019-02-28 RX ADMIN — HEPARIN SODIUM 5000 UNITS: 5000 INJECTION INTRAVENOUS; SUBCUTANEOUS at 06:24

## 2019-02-28 NOTE — PHYSICAL THERAPY NOTE
Physical Therapy Progress Note     02/28/19 1110   Pain Assessment   Pain Assessment 0-10   Pain Score 6   Pain Type Surgical pain   Pain Location Abdomen   Pain Orientation Bilateral   Hospital Pain Intervention(s) Ambulation/increased activity;Repositioned   Response to Interventions Tolerated  Restrictions/Precautions   Weight Bearing Precautions Per Order No   Other Precautions Multiple lines;O2;Fall Risk;Pain  (capnography; pain pump; NG tube)   General   Chart Reviewed Yes   Response to Previous Treatment Patient reporting fatigue but able to participate  Family/Caregiver Present No   Subjective   Subjective Willing to participate in therapy this AM    Bed Mobility   Supine to Sit 4  Minimal assistance   Additional items Assist x 1;HOB elevated; Bedrails;Leg ; Increased time required;Verbal cues;LE management   Transfers   Sit to Stand 4  Minimal assistance   Additional items Assist x 1;Bedrails; Increased time required;Verbal cues   Stand to Sit 4  Minimal assistance   Additional items Assist x 1; Armrests; Increased time required;Verbal cues   Ambulation/Elevation   Gait pattern Decreased foot clearance; Forward Flexion; Short stride; Step to;Excessively slow; Inconsistent kieran   Gait Assistance 4  Minimal assist   Additional items Assist x 1;Verbal cues; Tactile cues; Other (Comment)  (with second present for safety)   Assistive Device Rolling walker   Distance 10'   Balance   Static Sitting Fair +   Dynamic Sitting Fair   Static Standing Fair -   Dynamic Standing Poor +   Ambulatory Poor +   Endurance Deficit   Endurance Deficit Yes   Endurance Deficit Description pain/fatigue/weakness   Activity Tolerance   Activity Tolerance Patient limited by fatigue;Patient limited by pain   Nurse Made Aware Yes   Exercises   TKR Sitting;10 reps;AAROM; Bilateral   Assessment   Prognosis Fair   Problem List Decreased strength;Decreased endurance;Decreased range of motion;Decreased mobility; Impaired balance; Impaired hearing;Decreased safety awareness;Decreased skin integrity;Pain   Assessment Pt  supine in bed upon my arrival  Pt  reporting increased pain/fatigue, however agreeable to therapeutic intervention  Progressed with transfers continuing to require A of therapist with cues for hand placement/technique  Progressed with a limited amb  trial due to increased fatigue/pain with A of 1 with second present for line management  Positioned seated in bedside chair  Performance of HEP seated in chair with cues provided for proper completion  Pt  remained seated in bedside chair at end of treatment session with chair alarm active  PT will continue to recommend rehab upon d/c for continued improvement of noted impairments above when medically stable  Barriers to Discharge Decreased caregiver support; Inaccessible home environment   Barriers to Discharge Comments PRAKASH, home alone    Goals   Patient Goals To have less pain  STG Expiration Date 03/08/19   Treatment Day 2   Plan   Treatment/Interventions Functional transfer training;LE strengthening/ROM; Therapeutic exercise; Endurance training;Bed mobility;Gait training;Spoke to nursing;Spoke to case management   Progress Slow progress, decreased activity tolerance   PT Frequency Other (Comment)  (4-5x/wk)   Recommendation   Recommendation Short-term skilled PT; Home PT; Home with family support  (Pt  prefers home )   Equipment Recommended Walker  (RW)   PT - OK to Discharge Yes  (if d/c to rehab when medically stable )     Isabel Herring, PTA

## 2019-02-28 NOTE — ASSESSMENT & PLAN NOTE
7 Days Post-Op  Procedure(s):  ATTEMPTED CYSTECTOMY RADICAL; ILEAL CONDUIT URINARY DIVERSION; BIOPSY OF PELVIC MASS; APPENDECTOMY  Surgeon(s):  MD Melissa Salgado MD Burns Floro, PA-C  2/25/2019    Continues with an ileus  No substantial bowel movement  Flatus, but lots of burping  Tolerating clear liquids  Urostomy functioning well with good output from the ileal conduit  Plan:  Continue to encourage incentive spirometer, ambulation  General Surgery consultation ordered this morning, pending, regarding postoperative ileus  Internal Medicine consult ordered this morning, pending, regarding TPN and PICC line placement given prolonged NPO and clear liquid oral intake status

## 2019-02-28 NOTE — PLAN OF CARE
Problem: PHYSICAL THERAPY ADULT  Goal: Performs mobility at highest level of function for planned discharge setting  See evaluation for individualized goals  Description  Treatment/Interventions: Functional transfer training, LE strengthening/ROM, Elevations, Therapeutic exercise, Endurance training, Patient/family training, Bed mobility, Gait training, Spoke to nursing, Family  Equipment Recommended: Nataliia Huddleston       See flowsheet documentation for full assessment, interventions and recommendations  Outcome: Progressing  Note:   Prognosis: Fair  Problem List: Decreased strength, Decreased endurance, Decreased range of motion, Decreased mobility, Impaired balance, Impaired hearing, Decreased safety awareness, Decreased skin integrity, Pain  Assessment: Pt  supine in bed upon my arrival  Pt  reporting increased pain/fatigue, however agreeable to therapeutic intervention  Progressed with transfers continuing to require A of therapist with cues for hand placement/technique  Progressed with a limited amb  trial due to increased fatigue/pain with A of 1 with second present for line management  Positioned seated in bedside chair  Performance of HEP seated in chair with cues provided for proper completion  Pt  remained seated in bedside chair at end of treatment session with chair alarm active  PT will continue to recommend rehab upon d/c for continued improvement of noted impairments above when medically stable  Barriers to Discharge: Decreased caregiver support, Inaccessible home environment  Barriers to Discharge Comments: PRAKASH, home alone   Recommendation: Short-term skilled PT, Home PT, Home with family support(Pt  prefers home  )     PT - OK to Discharge: Yes(if d/c to rehab when medically stable )    See flowsheet documentation for full assessment

## 2019-02-28 NOTE — PROGRESS NOTES
Progress Note - Urology  Daun Quiver 1945, 68 y o  male MRN: [id number]    Unit/Bed#: E2 -01 Encounter: [id number]    Malignant neoplasm of prostate ([facility] [address] )  Assessment & Plan  3 Days Post-Op  Procedure(s):  ATTEMPTED CYSTECTOMY RADICAL; ILEAL CONDUIT URINARY DIVERSION; BIOPSY OF PELVIC MASS; APPENDECTOMY  Surgeon(s):  MD Ketty Mcclelland MD Alean Lamas, PA-C  2/25/2019    He continues to have good pain control on PCA, he experienced some nausea when his NG tube was clamped  His NG tube output has been approximately consistent with his ice chips input  Will discuss with Dr Kassie Bautista definitive decision regarding NG tube  No significant bowel function yet and no flatus, although he is not the greatest historian  Urostomy functioning well with good output from the ileal conduit  Continue to encourage incentive spirometer, ambulation  Continue pain control with PCA for now  Continue wound care from wound care RN and plan teaching with the patient and his wife regarding his urostomy in ileal conduit on Friday 3/1/2019  Bedside rounds performed with Abby Gonzalez RN  Subjective/Objective     Subjective:   Nursing notes reviewed  Overnight he complained of nausea during the time period where his NG tube was clamped  Zofran was given without relief  His NG tube was reattached to intermittent suction and 200 cc were drained from his stomach  This resolved his nausea  He denies any recurrent nausea  He denies any passage of flatus  No bowel movements yet, corroborated by the nurses  He has been out of bed and it is eager to be compliant with recovery and therapy  He denies any nausea this morning  He has continue to consume ice chips of approximate equal volume to his NG tube output  He is using his PCA pump with good relief of his abdominal discomfort  He describes cramping and occasionally sharp and stabbing abdominal pain and lower midline incision      Review of Systems   Constitutional: Negative for activity change and appetite change  HENT: Negative for congestion and ear pain  Eyes: Negative for pain  Respiratory: Negative for cough and shortness of breath  Cardiovascular: Negative for chest pain and palpitations  Gastrointestinal: Positive for abdominal distention and nausea  Negative for abdominal pain, blood in stool, constipation and diarrhea  Genitourinary: Negative for difficulty urinating and dysuria  Musculoskeletal: Negative for arthralgias and myalgias  Skin: Negative for rash  Allergic/Immunologic: Negative for immunocompromised state  Neurological: Negative for dizziness and headaches  Hematological: Negative for adenopathy  Does not bruise/bleed easily  Psychiatric/Behavioral: Negative for agitation  The patient is not nervous/anxious  Objective:  Nursing notes reviewed:  His left LENNOX drain continues to drain approximately 15 cc daily of serosanguineous drainage  His NG tube has had 400 cc out overnight after trial of intermittent clamping with resultant nausea  His Bolden catheter is drain 10 cc of urine in his urostomy and ileal conduit has drained 1250 cc in 24 hours yesterday  Vitals: Blood pressure 119/74, pulse 98, temperature 98 °F (36 7 °C), temperature source Temporal, resp  rate 18, height 6' (1 829 m), weight 86 2 kg (190 lb), SpO2 96 %  ,Body mass index is 25 77 kg/m²      Intake/Output Summary (Last 24 hours) at 2/28/2019 1032  Last data filed at 2/28/2019 0901  Gross per 24 hour   Intake 5 8 ml   Output 1825 ml   Net -1819 2 ml     Invasive Devices     Peripheral Intravenous Line            Peripheral IV 02/25/19 Left Wrist 3 days    Peripheral IV 02/25/19 Right Wrist 3 days          Drain            NG/OG/Enteral Tube Nasogastric 18 Fr Right nares 3 days    Urethral Catheter Latex 18 Fr  3 days    Closed/Suction Drain Right Abdomen Bulb 10 Fr  2 days    Urostomy Ileal conduit RLQ 2 days                Physical Exam   Constitutional: He is oriented to person, place, and time  He appears well-developed and well-nourished  He is cooperative  He does not appear ill  No distress  51-year-old male, sleeping comfortably in bed  Easily arousable  Reports nausea when I started him awake  Also reports nausea overnight relieved with NG tube to suction  HENT:   Head: Normocephalic and atraumatic  Moist mucous membranes  Eyes: Conjunctivae and EOM are normal    Neck: Normal range of motion  Neck supple  No tracheal deviation present  Cardiovascular: Normal rate, regular rhythm and normal heart sounds  No murmur heard  Pulmonary/Chest: Effort normal and breath sounds normal  No respiratory distress  He has no wheezes  Good airflow bilaterally on deep inspiration  Abdominal: Soft  He exhibits distension (Distention with mild tympany)  He exhibits no mass  There is tenderness ( expected becky-incisional tenderness around his lower midline incision with staples in place)  There is no rebound and no guarding  Abdomen distended with tympany noted  Expected becky-incisional tenderness in the lower midline around his incision, stapled  Some evidence of weeping and serosanguineous drainage from his midline incision and also around his LENNOX drain  Left-sided LENNOX drain with serosanguineous drainage  Right-sided urostomy with clear rosemary urine, stents x2 visualized, urostomy flush with the skin  Appliance in place without evidence of leaking  Hypoactive bowel sounds noted in 2 a 4 quadrants  Genitourinary:   Genitourinary Comments: Bolden catheter in place, draining clear rosemary urine  Minimal drainage  Musculoskeletal: Normal range of motion  He exhibits no edema  Neurological: He is alert and oriented to person, place, and time  Skin: Skin is warm and dry  No rash noted  He is not diaphoretic  No erythema  No pallor  Psychiatric: He has a normal mood and affect   His behavior is normal  Judgment and thought content normal    Nursing note and vitals reviewed        History:    Past Medical History:   Diagnosis Date    Aneurysm ([address] )     Behind left knee recently diagnosed    Back pain     Ceron disease     Ceron's disease     BPH (benign prostatic hyperplasia)     Cancer (HCC)     melanoma    Cataract     right eye    Cataract     right eye    Confusion     DDD (degenerative disc disease), lumbar     Depression     Diverticulosis     Gallstones     GERD (gastroesophageal reflux disease)     History of cardiac murmur     Past    History of melanoma     Was on back in early 1990's    History of rheumatic fever     Childhood    History of transfusion     Nov 2018    [facility] (hard of hearing)     Hydronephrosis     Hypertension     Kidney stone     Multiple times    Neck pain     Nervous breakdown     History of due to reaction to psych med which he was on for anxiety/depression and became suicidal    Numbness and tingling in both hands     Numbness and tingling of both feet     PONV (postoperative nausea and vomiting)     PVD (peripheral vascular disease) ([address] )     RBBB     Scoliosis     Seasonal allergies     Tinnitus     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[address] )     Wears partial dentures     Upper    Wears partial dentures     Upper     Past Surgical History:   Procedure Laterality Date    ABDOMINAL SURGERY      When young had procedure for improviing circulation to left lower extremety    BACK SURGERY      Lumbar- not sure what was done   [place] CARDIAC CATHETERIZATION      Approximately 2007- no blockages    CARPAL TUNNEL RELEASE Bilateral     wrists are fused    CATARACT EXTRACTION Left     COLONOSCOPY      CYST REMOVAL      Robotic procedure to remove benign cyst from lower left lung    CYSTOGRAM N/A 3/14/2018    Procedure: CYSTOGRAM;  Surgeon: Hong Echevarria MD;  [facility];  Service: Urology    CYSTOSCOPY      ESOPHAGOGASTRODUODENOSCOPY      IR TUBE PLACEMENT NEPHROSTOMY  8/10/2018    LUNG SURGERY      cyst removed left lung    OTHER SURGICAL HISTORY Left     fistula drain in left buttock    NE CYSTO/URETERO W/LITHOTRIPSY &INDWELL STENT INSRT Left 1/3/2018    Procedure: CYSTOSCOPY URETEROSCOPY, RETROGRADE PYELOGRAM AND INSERTION STENT URETERAL;  Surgeon: Noemi Cogan, MD;  [facility];  Service: Urology    NE CYSTOTOMY,EXCIS BLADDER TIC N/A 2/14/2018    Procedure: ABDOMINAL EXPLORATION; CLOSURE OF BLADDER DIVERTICULITIS;  Surgeon: Noemi Cogan, MD;  [facility];  Service: Urology    NE CYSTOURETHROSCOPY,URETER CATHETER Left 8/1/2018    Procedure: Cystoscopy, cystogram, bladder biopsies, removal of pelvic drain;  Surgeon: Noemi Cogan, MD;  [facility];  Service: Urology    NE INCISE/DRAIN BLADDER N/A 2/14/2018    Procedure: OPEN SUPRAPUBIC TUBE PLACEMENT;  Surgeon: Noemi Cogan, MD;  [facility];  Service: Urology    NE RELEASE Thomasena Mini FIBROSIS Left 2/14/2018    Procedure: LYSIS OF ADHESIONS; URETEROLYSIS ;  Surgeon: Noemi Cogan, MD;  [facility];  Service: Urology     [facility] BLADDER/NODES,ILEAL CONDUIT N/A 2/25/2019    Procedure: ATTEMPTED CYSTECTOMY RADICAL; ILEAL CONDUIT URINARY DIVERSION; BIOPSY OF PELVIC MASS; APPENDECTOMY;  Surgeon: Noemi Cogan, MD;  [facility];  Service: Urology    SKIN CANCER EXCISION      Melanoma removal on back in early 1990's    TOE AMPUTATION Left     All 5 toes left foot were amputated due to poor circulation     TRANSURETHRAL RESECTION OF PROSTATE N/A 3/14/2018    Procedure: TRANSURETHRAL RESECTION OF PROSTATE (TURP), LEFT URETERAL STENT REMOVAL;  Surgeon: Noemi Cogan, MD;  [facility];  Service: Urology    TRANSURETHRAL RESECTION OF PROSTATE N/A 9/26/2018    Procedure: TRANSURETHRAL RESECTION OF PROSTATE (TURP);   Surgeon: Noemi Cogan, MD;  [facility];  Service: Urology    WRIST SURGERY Bilateral     Ulnar nerve on right arm and both wrists are fused     Family History Problem Relation Age of Onset    Heart disease Father     Heart disease Mother     Cancer Paternal Grandfather      Social History     Socioeconomic History    Marital status: Common Law     Spouse name: None    Number of children: None    Years of education: None    Highest education level: None   Occupational History    None   Social Needs    Financial resource strain: None    Food insecurity:     Worry: None     Inability: None    Transportation needs:     Medical: None     Non-medical: None   Tobacco Use    Smoking status: Former Smoker     Packs/day: 1 00     Years: 15 00     Pack years: 15 00     Types: Cigarettes     Last attempt to quit: 1970     Years since quittin 0    Smokeless tobacco: Never Used    Tobacco comment: Quit 50 years   Substance and Sexual Activity    Alcohol use: Yes     Comment: Very rare    Drug use: No    Sexual activity: None   Lifestyle    Physical activity:     Days per week: None     Minutes per session: None    Stress: None   Relationships    Social connections:     Talks on phone: None     Gets together: None     Attends [employer] service: None     Active member of club or organization: None     Attends meetings of clubs or organizations: None     Relationship status: None    Intimate partner violence:     Fear of current or ex partner: None     Emotionally abused: None     Physically abused: None     Forced sexual activity: None   Other Topics Concern    None   Social History Narrative    None       Labs:  Recent Labs     19  1422 19  0518 19  0503 19  0511   WBC 8 90 10 77* 12 57* 12 11*     Recent Labs     19  1422 19  0518 19  0503 19  0511   HGB 10 9* 10 3* 9 7* 9 8*       Recent Labs     19  1445 19  0518 19  0503 19  0511   CREATININE 1 01 1 21 1 38* 1 19       Stella Caldewll PA-C  Date: 2019 Time: 10:32 AM

## 2019-02-28 NOTE — PLAN OF CARE
Problem: Potential for Falls  Goal: Patient will remain free of falls  Description  INTERVENTIONS:  - Assess patient frequently for physical needs  -  Identify cognitive and physical deficits and behaviors that affect risk of falls  -  [place] fall precautions as indicated by assessment   - Educate patient/family on patient safety including physical limitations  - Instruct patient to call for assistance with activity based on assessment  - Modify environment to reduce risk of injury  - Consider OT/PT consult to assist with strengthening/mobility  Outcome: Progressing     Problem: Nutrition/Hydration-ADULT  Goal: Nutrient/Hydration intake appropriate for improving, restoring or maintaining nutritional needs  Description  Monitor and assess patient's nutrition/hydration status for malnutrition (ex- brittle hair, bruises, dry skin, pale skin and conjunctiva, muscle wasting, smooth red tongue, and disorientation)  Collaborate with interdisciplinary team and initiate plan and interventions as ordered  Monitor patient's weight and dietary intake as ordered or per policy  Utilize nutrition screening tool and intervene per policy  Determine patient's food preferences and provide high-protein, high-caloric foods as appropriate       INTERVENTIONS:  - Monitor oral intake, urinary output, labs, and treatment plans  - Assess nutrition and hydration status and recommend course of action  - Evaluate amount of meals eaten  - Assist patient with eating if necessary   - Allow adequate time for meals  - Recommend/ encourage appropriate diets, oral nutritional supplements, and vitamin/mineral supplements  - Order, calculate, and assess calorie counts as needed  - Recommend, monitor, and adjust tube feedings and TPN/PPN based on assessed needs  - Assess need for intravenous fluids  - Provide specific nutrition/hydration education as appropriate  - Include patient/family/caregiver in decisions related to nutrition  Outcome: Progressing

## 2019-03-01 ENCOUNTER — APPOINTMENT (INPATIENT)
Dept: RADIOLOGY | Facility: HOSPITAL | Age: 74
DRG: 657 | End: 2019-03-01
Payer: MEDICARE

## 2019-03-01 LAB
ANION GAP SERPL CALCULATED.3IONS-SCNC: 8 MMOL/L (ref 4–13)
BASOPHILS # BLD AUTO: 0.01 THOUSANDS/ΜL (ref 0–0.1)
BASOPHILS NFR BLD AUTO: 0 % (ref 0–1)
BUN SERPL-MCNC: 29 MG/DL (ref 5–25)
CALCIUM SERPL-MCNC: 9 MG/DL (ref 8.3–10.1)
CHLORIDE SERPL-SCNC: 106 MMOL/L (ref 100–108)
CO2 SERPL-SCNC: 25 MMOL/L (ref 21–32)
CREAT SERPL-MCNC: 1.27 MG/DL (ref 0.6–1.3)
EOSINOPHIL # BLD AUTO: 0.03 THOUSAND/ΜL (ref 0–0.61)
EOSINOPHIL NFR BLD AUTO: 0 % (ref 0–6)
ERYTHROCYTE [DISTWIDTH] IN BLOOD BY AUTOMATED COUNT: 13.6 % (ref 11.6–15.1)
GFR SERPL CREATININE-BSD FRML MDRD: 56 ML/MIN/1.73SQ M
GLUCOSE SERPL-MCNC: 94 MG/DL (ref 65–140)
HCT VFR BLD AUTO: 32.5 % (ref 36.5–49.3)
HGB BLD-MCNC: 10.2 G/DL (ref 12–17)
IMM GRANULOCYTES # BLD AUTO: 0.06 THOUSAND/UL (ref 0–0.2)
IMM GRANULOCYTES NFR BLD AUTO: 1 % (ref 0–2)
LYMPHOCYTES # BLD AUTO: 0.81 THOUSANDS/ΜL (ref 0.6–4.47)
LYMPHOCYTES NFR BLD AUTO: 9 % (ref 14–44)
MCH RBC QN AUTO: 31.7 PG (ref 26.8–34.3)
MCHC RBC AUTO-ENTMCNC: 31.4 G/DL (ref 31.4–37.4)
MCV RBC AUTO: 101 FL (ref 82–98)
MONOCYTES # BLD AUTO: 0.83 THOUSAND/ΜL (ref 0.17–1.22)
MONOCYTES NFR BLD AUTO: 9 % (ref 4–12)
NEUTROPHILS # BLD AUTO: 7.26 THOUSANDS/ΜL (ref 1.85–7.62)
NEUTS SEG NFR BLD AUTO: 81 % (ref 43–75)
NRBC BLD AUTO-RTO: 0 /100 WBCS
PLATELET # BLD AUTO: 313 THOUSANDS/UL (ref 149–390)
PMV BLD AUTO: 8.8 FL (ref 8.9–12.7)
POTASSIUM SERPL-SCNC: 3.9 MMOL/L (ref 3.5–5.3)
RBC # BLD AUTO: 3.22 MILLION/UL (ref 3.88–5.62)
SODIUM SERPL-SCNC: 139 MMOL/L (ref 136–145)
WBC # BLD AUTO: 9 THOUSAND/UL (ref 4.31–10.16)

## 2019-03-01 PROCEDURE — 85025 COMPLETE CBC W/AUTO DIFF WBC: CPT | Performed by: PHYSICIAN ASSISTANT

## 2019-03-01 PROCEDURE — 99024 POSTOP FOLLOW-UP VISIT: CPT | Performed by: PHYSICIAN ASSISTANT

## 2019-03-01 PROCEDURE — 80048 BASIC METABOLIC PNL TOTAL CA: CPT | Performed by: PHYSICIAN ASSISTANT

## 2019-03-01 PROCEDURE — C9113 INJ PANTOPRAZOLE SODIUM, VIA: HCPCS | Performed by: UROLOGY

## 2019-03-01 PROCEDURE — 74022 RADEX COMPL AQT ABD SERIES: CPT

## 2019-03-01 RX ORDER — HYDROCODONE BITARTRATE AND ACETAMINOPHEN 5; 325 MG/1; MG/1
1 TABLET ORAL EVERY 4 HOURS PRN
Status: DISCONTINUED | OUTPATIENT
Start: 2019-03-01 | End: 2019-03-08 | Stop reason: HOSPADM

## 2019-03-01 RX ADMIN — VALPROATE SODIUM 250 MG: 100 INJECTION, SOLUTION INTRAVENOUS at 23:43

## 2019-03-01 RX ADMIN — TOBRAMYCIN AND DEXAMETHASONE 1 DROP: 3; 1 SUSPENSION/ DROPS OPHTHALMIC at 05:36

## 2019-03-01 RX ADMIN — VALPROATE SODIUM 250 MG: 100 INJECTION, SOLUTION INTRAVENOUS at 17:56

## 2019-03-01 RX ADMIN — PANTOPRAZOLE SODIUM 40 MG: 40 INJECTION, POWDER, FOR SOLUTION INTRAVENOUS at 09:00

## 2019-03-01 RX ADMIN — VALPROATE SODIUM 250 MG: 100 INJECTION, SOLUTION INTRAVENOUS at 05:36

## 2019-03-01 RX ADMIN — HYDROCODONE BITARTRATE AND ACETAMINOPHEN 1 TABLET: 5; 325 TABLET ORAL at 23:02

## 2019-03-01 RX ADMIN — PANTOPRAZOLE SODIUM 40 MG: 40 INJECTION, POWDER, FOR SOLUTION INTRAVENOUS at 20:11

## 2019-03-01 RX ADMIN — Medication 1 SPRAY: at 16:41

## 2019-03-01 RX ADMIN — TOBRAMYCIN AND DEXAMETHASONE 1 DROP: 3; 1 SUSPENSION/ DROPS OPHTHALMIC at 23:03

## 2019-03-01 RX ADMIN — HEPARIN SODIUM 5000 UNITS: 5000 INJECTION INTRAVENOUS; SUBCUTANEOUS at 21:19

## 2019-03-01 RX ADMIN — VALPROATE SODIUM 250 MG: 100 INJECTION, SOLUTION INTRAVENOUS at 11:47

## 2019-03-01 RX ADMIN — TOBRAMYCIN AND DEXAMETHASONE 1 DROP: 3; 1 SUSPENSION/ DROPS OPHTHALMIC at 17:56

## 2019-03-01 RX ADMIN — TOBRAMYCIN AND DEXAMETHASONE 1 DROP: 3; 1 SUSPENSION/ DROPS OPHTHALMIC at 11:45

## 2019-03-01 RX ADMIN — HEPARIN SODIUM 5000 UNITS: 5000 INJECTION INTRAVENOUS; SUBCUTANEOUS at 05:36

## 2019-03-01 RX ADMIN — HEPARIN SODIUM 5000 UNITS: 5000 INJECTION INTRAVENOUS; SUBCUTANEOUS at 13:47

## 2019-03-01 RX ADMIN — TOBRAMYCIN AND DEXAMETHASONE 1 DROP: 3; 1 SUSPENSION/ DROPS OPHTHALMIC at 01:10

## 2019-03-01 RX ADMIN — ONDANSETRON 4 MG: 2 INJECTION INTRAMUSCULAR; INTRAVENOUS at 13:47

## 2019-03-01 RX ADMIN — Medication 1 SPRAY: at 21:20

## 2019-03-01 RX ADMIN — DEXTROSE AND SODIUM CHLORIDE 75 ML/HR: 5; .45 INJECTION, SOLUTION INTRAVENOUS at 10:29

## 2019-03-01 RX ADMIN — Medication 1 SPRAY: at 11:45

## 2019-03-01 RX ADMIN — HYDROCODONE BITARTRATE AND ACETAMINOPHEN 1 TABLET: 5; 325 TABLET ORAL at 13:47

## 2019-03-01 RX ADMIN — DOCUSATE SODIUM 100 MG: 100 CAPSULE, LIQUID FILLED ORAL at 17:56

## 2019-03-01 RX ADMIN — DOCUSATE SODIUM 100 MG: 100 CAPSULE, LIQUID FILLED ORAL at 09:01

## 2019-03-01 RX ADMIN — Medication 1 SPRAY: at 13:41

## 2019-03-01 RX ADMIN — VALPROATE SODIUM 250 MG: 100 INJECTION, SOLUTION INTRAVENOUS at 01:11

## 2019-03-01 NOTE — QUICK NOTE
Treatment Plan - Urology   Marcelo Toledo 68 y o  male MRN: [id number]  Unit/Bed#: E2 -01 Encounter: [id number]    Treatment Plan:  Discussed findings of KUB with primary RN  KUB with diffuse bowel distention likely representing an ileus  Patient is now without NG tube decompression which he is tolerating well  Passing some flatus without significant bowel movement  No nausea or vomiting  Ambulating around  In the bathroom trying to pass gas at this time  Nurse reports that he is persistently slightly dilated with tympany but he does have normoactive bowel sounds x4 quadrants  Will review with Dr Sierra Kaur, but for now will hold off on NG tube free insertion  If he develops nausea or vomiting, plan for NG tube 3 insertion  Order placed for NG tube insertion if nauseous, unrelieved by Zofran, or vomiting      Phuong Maloney PA-C  Date: 3/1/2019 Time: 12:21 PM

## 2019-03-01 NOTE — NURSING NOTE
Kings Beckham PA-C called to notify of abd x-ray results, patient currently resting comfortably in chair, voided post moreira removal   No s/s N/V at this time, c/o mild cramping, diminished hypoactive bowel sounds noted  See orders placed for parameters for NG tube insertion

## 2019-03-01 NOTE — SOCIAL WORK
CM met with pt at bedside to discuss discharge needs  Pt lives in a house with his SO  ADL's are completed independently  Pt reports no DME use  PCP identified as Dr Ramirez [facility]  Pharmacy identified as [facility]  Pt denies VNA/STR hx  CM discussed PT/OT recommendations  Pt's goal is to return home with VNA  Pt does still drive  POA identified as his SO Keyur Juan and daughter Juan M Ruggiero  CM following for discharge needs

## 2019-03-01 NOTE — PROGRESS NOTES
Postoperative day 4  Status post aborted radical cystoprostatectomy, but had ileal conduit urinary diversion with appendectomy  He has high-grade urothelial carcinoma with sarcomatoid features  He will eventually need radiation oncology consultation for consideration of pelvic radiation of remaining tumor  Afebrile and vital signs are stable  He is no longer on antibiotics  Creatinine is normal at 1 27, his hemoglobin is stable at 10 2  He has received no blood transfusions  He has passed some flatus but he has not had a bowel movement  On exam is abdomen is moderately distended and there is some tympany  He is currently eating ice chips and the nasogastric tube is out  He is still using the PCA  The wound is clean dry and intact  He has Bolden catheter in and the LENNOX drain  Plan:  Remove Bolden catheter, discontinue PCA  Check acute abdominal series for partial ileus  Continue with ice chips only  He was strongly encouraged to ambulate in the halls  Will ask Radiation Oncology to see him for adjuvant radiation therapy to the pelvis to cover the bladder pelvic lymph nodes and prostate

## 2019-03-02 PROCEDURE — C9113 INJ PANTOPRAZOLE SODIUM, VIA: HCPCS | Performed by: UROLOGY

## 2019-03-02 PROCEDURE — 99024 POSTOP FOLLOW-UP VISIT: CPT | Performed by: UROLOGY

## 2019-03-02 RX ORDER — PANTOPRAZOLE SODIUM 40 MG/1
40 TABLET, DELAYED RELEASE ORAL EVERY 12 HOURS
Status: DISCONTINUED | OUTPATIENT
Start: 2019-03-02 | End: 2019-03-08 | Stop reason: HOSPADM

## 2019-03-02 RX ADMIN — VALPROATE SODIUM 250 MG: 100 INJECTION, SOLUTION INTRAVENOUS at 06:13

## 2019-03-02 RX ADMIN — TOBRAMYCIN AND DEXAMETHASONE 1 DROP: 3; 1 SUSPENSION/ DROPS OPHTHALMIC at 13:00

## 2019-03-02 RX ADMIN — HEPARIN SODIUM 5000 UNITS: 5000 INJECTION INTRAVENOUS; SUBCUTANEOUS at 14:59

## 2019-03-02 RX ADMIN — HEPARIN SODIUM 5000 UNITS: 5000 INJECTION INTRAVENOUS; SUBCUTANEOUS at 05:05

## 2019-03-02 RX ADMIN — PANTOPRAZOLE SODIUM 40 MG: 40 TABLET, DELAYED RELEASE ORAL at 21:23

## 2019-03-02 RX ADMIN — VALPROATE SODIUM 250 MG: 100 INJECTION, SOLUTION INTRAVENOUS at 13:00

## 2019-03-02 RX ADMIN — HYDROCODONE BITARTRATE AND ACETAMINOPHEN 1 TABLET: 5; 325 TABLET ORAL at 14:55

## 2019-03-02 RX ADMIN — HEPARIN SODIUM 5000 UNITS: 5000 INJECTION INTRAVENOUS; SUBCUTANEOUS at 21:23

## 2019-03-02 RX ADMIN — HYDROCODONE BITARTRATE AND ACETAMINOPHEN 1 TABLET: 5; 325 TABLET ORAL at 05:02

## 2019-03-02 RX ADMIN — TOBRAMYCIN AND DEXAMETHASONE 1 DROP: 3; 1 SUSPENSION/ DROPS OPHTHALMIC at 19:27

## 2019-03-02 RX ADMIN — SIMETHICONE CHEW TAB 80 MG 80 MG: 80 TABLET ORAL at 23:36

## 2019-03-02 RX ADMIN — HYDROCODONE BITARTRATE AND ACETAMINOPHEN 1 TABLET: 5; 325 TABLET ORAL at 11:11

## 2019-03-02 RX ADMIN — TOBRAMYCIN AND DEXAMETHASONE 1 DROP: 3; 1 SUSPENSION/ DROPS OPHTHALMIC at 23:36

## 2019-03-02 RX ADMIN — VALPROATE SODIUM 250 MG: 100 INJECTION, SOLUTION INTRAVENOUS at 19:27

## 2019-03-02 RX ADMIN — PANTOPRAZOLE SODIUM 40 MG: 40 INJECTION, POWDER, FOR SOLUTION INTRAVENOUS at 10:33

## 2019-03-02 RX ADMIN — TOBRAMYCIN AND DEXAMETHASONE 1 DROP: 3; 1 SUSPENSION/ DROPS OPHTHALMIC at 06:12

## 2019-03-02 RX ADMIN — HYDROCODONE BITARTRATE AND ACETAMINOPHEN 1 TABLET: 5; 325 TABLET ORAL at 19:27

## 2019-03-02 RX ADMIN — DEXTROSE AND SODIUM CHLORIDE 75 ML/HR: 5; .45 INJECTION, SOLUTION INTRAVENOUS at 05:04

## 2019-03-02 RX ADMIN — DOCUSATE SODIUM 100 MG: 100 CAPSULE, LIQUID FILLED ORAL at 10:34

## 2019-03-02 RX ADMIN — HYDROCODONE BITARTRATE AND ACETAMINOPHEN 1 TABLET: 5; 325 TABLET ORAL at 23:32

## 2019-03-02 RX ADMIN — DEXTROSE AND SODIUM CHLORIDE 75 ML/HR: 5; .45 INJECTION, SOLUTION INTRAVENOUS at 23:31

## 2019-03-02 NOTE — WOUND OSTOMY CARE
Progress Note- Aylin Jay 68 y o  male  [id number]  [facility] -E2 -01        Assessment:  Patient seen for urostomy assessment and education  Patient's significant other at bedside  Patient more awake today, but still sleepy and unable to visualize stoma in lying position  Reports gas pain and mild nausea  Continues to be NPO  Patient has had occasional urinary incontinence per nursing staff  Continent of bowel  Abdomen is soft, distended and round with tender lower quadrants on palpation  Rare bowel sounds--patient reports passing very minimal amount of gas  Midline abdominal incision--approximated with staples, open to air  No drainage  Melly-wound within normal limits  Left lower quadrant LENNOX--site with some leakage of serosanguinous drainage, sutured in place, to bulb suction draining serosanguinous drainage  Urostomy/ileal conduit--stoma is oval, very slightly budded (almost flush with abdomen) measuring 1 x 1 1/2 inches, deep red  Mucocutaneous junction and peristomal skin are intact  Blunt stent blocked (no urine flowing), slanted stent draining clear yellow urine  Ostomy pouching system leaking medially at 3 o'clock (new abdominal crease)  Pouching system changed with 1/2 an Nena ring placed from 3 to 9 o'clock just under the stoma prior to pouch application        Education:  -Reviewed normal stoma characteristics  -Reviewed importance of hydration and signs of dehydration   -Reviewed signs of urinary tract infection and when to contact the physician--bleeding, fever, abdominal pain, stoma color change  -Reviewed pouch emptying--patient's significant other able to return demonstrate   -Reviewed frequency of pouch change and showering with pouch   -Reviewed use of and cleaning of night drainage bag    Patient's significant other can return demonstrate attaching night drainage bag   -Reviewed reason for stents and typical stent removal   -Pouching system change performed--significant other actively participated  -Verbal consent given by patient and significant other for samples to be ordered from [address] provided for enrollment in [address] support programs--patient does not wish to enroll at this time     -Discharge urostomy supplies provided--family to take home this evening  Plan:  Ongoing ostomy assessment and education  Patient should be discharged home with visiting nurses  Objective:    Wound 02/25/19 Incision Abdomen Right (Active)   Wound Description Clean;Dry; Intact 3/1/2019  2:45 PM   Melly-wound Assessment Clean;Dry; Intact 3/1/2019  2:45 PM   Tunneling 0 cm 2/27/2019 11:45 AM   Undermining 0 2/27/2019 11:45 AM   Closure Staples 3/1/2019  2:45 PM   Drainage Amount None 3/1/2019  2:45 PM   Drainage Description Clear 2/28/2019  7:00 PM   Treatments Site care 3/1/2019  2:45 PM   Dressing Open to air 3/1/2019  2:45 PM   Wound packed? No 2/28/2019  7:00 PM   Dressing Changed Changed by provider 2/27/2019  8:00 AM   Patient Tolerance Tolerated well 3/1/2019  2:45 PM   Dressing Status Clean; Intact;Dry 3/1/2019 12:41 PM   Number of days: 4     Closed/Suction Drain Right Abdomen Bulb 10 Fr  (Active)   Site Description Unable to view 3/1/2019 12:01 PM   Dressing Status Clean;Dry; Intact 3/1/2019 12:01 PM   Drainage Appearance Bloody 3/1/2019 12:01 PM   Status To bulb suction 3/1/2019 12:01 PM   Output (mL) 5 mL 2/28/2019  1:44 PM   Number of days: 4       Urostomy Ileal conduit RLQ (Active)   Stomal Appliance 2 piece; Changed;Leaking 3/1/2019  2:45 PM   Stoma Assessment Red 3/1/2019  2:45 PM   Stoma size (in) 1 25 in 2/27/2019 11:26 PM   Stoma Shape Oval 3/1/2019  2:45 PM   Peristomal Assessment Clean; Intact 3/1/2019  2:45 PM   Treatment Bag change;Site care 3/1/2019  2:45 PM   Output (mL) 150 mL 3/1/2019  2:45 PM   Number of days: 8901  [facility] BSN, RN, [employer]

## 2019-03-02 NOTE — PROGRESS NOTES
UROLOGY PROGRESS NOTE   Patient Identifiers: Edward Stovall (MRN [id number])  Date of Service: 3/2/2019        Assessment:   77-year-old gentleman postop day 5  Status post attempted Radical cystectomy which could not be performed due to advanced nature of disease, status post ileal conduit urinary diversion biopsy of pelvic mass  He has been slow to ambulate and mobilized, he has had difficulties regaining his bowel function  He is passing gas this morning, he is not distended  It is appropriate to advance him to clear diet today with aggressive ambulation, as well        Plan:   Advance diet to clears  Ambulate and mobilized  Continuous center spirometry and pulmonary toilet  Continue LENNOX drain at this time, monitor strict ins and outs  Subjective:     24 HR EVENTS:   no significant events  Patient has  no complaints        Objective:     VITALS:    Vitals:    03/02/19 0700   BP: 150/69   Pulse: 78   Resp: 18   Temp: 97 8 °F (36 6 °C)   SpO2: 94%       INS & OUTS:  [unfilled]  Reviewed pertinent values I's/O's      LABS:  Lab Results   Component Value Date    HGB 10 2 (L) 03/01/2019    HCT 32 5 (L) 03/01/2019    WBC 9 00 03/01/2019     03/01/2019   ]    Lab Results   Component Value Date    K 3 9 03/01/2019     03/01/2019    CO2 25 03/01/2019    BUN 29 (H) 03/01/2019    CREATININE 1 27 03/01/2019    CALCIUM 9 0 03/01/2019   ]    INPATIENT MEDS:    Current Facility-Administered Medications:     dextrose 5 % and sodium chloride 0 45 % infusion, 75 mL/hr, Intravenous, Continuous, Sintia Ruggiero MD, Last Rate: 75 mL/hr at 03/02/19 0713, 75 mL/hr at 03/02/19 0713    docusate sodium (COLACE) capsule 100 mg, 100 mg, Oral, BID, Verdell Gilford, MD, 100 mg at 03/01/19 3756    heparin (porcine) subcutaneous injection 5,000 Units, 5,000 Units, Subcutaneous, Q8H [address], 5,000 Units at 03/02/19 0505 **AND** [CANCELED] Platelet count, , , Once, Verdell Gilford, MD  Ankit Abt HYDROcodone-acetaminophen (NORCO) 5-325 mg per tablet 1 tablet, 1 tablet, Oral, Q4H PRN, Chery Dumont MD, 1 tablet at 03/02/19 0502    ondansetron [facility]) injection 4 mg, 4 mg, Intravenous, Q6H PRN, Ann Sorto MD, 4 mg at 03/01/19 1347    pantoprazole (PROTONIX) injection 40 mg, 40 mg, Intravenous, Q12H [address], Chery Dumont MD, 40 mg at 03/01/19 2011    phenol (CHLORASEPTIC) 1 4 % mucosal liquid 1 spray, 1 spray, Mouth/Throat, Q2H PRN, Ann Sorto MD    phenol (CHLORASEPTIC) 1 4 % mucosal liquid 1 spray, 1 spray, Mouth/Throat, Q2H PRN, Chery Dumont MD, 1 spray at 03/01/19 2120    simethicone (MYLICON) chewable tablet 80 mg, 80 mg, Oral, 4x Daily PRN, Ann Sorto MD    tobramycin-dexamethasone (TOBRADEX) 0 3-0 1 % ophthalmic suspension 1 drop, 1 drop, Right Eye, Q6H [address], Dorethmanuel Serranoess, DO, 1 drop at 03/02/19 0612    valproate (DEPACON) 250 mg in sodium chloride 0 9 % 50 mL IVPB, 250 mg, Intravenous, Q6H [address], Lorrie England PA-C, Stopped at 03/02/19 3034      Physical Exam:   /69 (BP Location: Left arm)   Pulse 78   Temp 97 8 °F (36 6 °C) (Tympanic)   Resp 18   Ht 6' (1 829 m)   Wt 86 2 kg (190 lb)   SpO2 94%   BMI 25 77 kg/m²   GEN: alert and oriented x 3    RESP: breathing comfortably with no accessory muscle use    CARDIAC: peripheral pulses present and regular rate and rhythm    ABD: soft, appropriately tender to palpation, non-distended   INCISION: clean, dry, intact patient's ostomy is pink, no parastomal hernia, ostomy catheters are patent, urine is clear yellow  EXT: no significant peripheral edema   DRAINS: none  NEURO: cranial nerves 2-12 intact, no sensory deficits, no motor deficits and the remainder of the neurological examination is unremarkable   PSYCHIATRIC: normal mood and affect and normal train of thought    GENITOURINARY:uncircumcised    scrotum normal and without induration/swelling/cellulitis      SONI: none  no clots      RADIOLOGY:   No new films for my review

## 2019-03-02 NOTE — DISCHARGE INSTR - OTHER ORDERS
Urostomy:  Stoma oval, slightly budded, 1 x 1 1/2 inches  Place 1/2 an Nena ring just under the stoma from 3 to 9 o'clock prior to pouch application  Measure stoma at least weekly for the first 6-8 weeks  Cleanse your night time drainage bag with vinegar solution (1 part vinegar, 3 part water)

## 2019-03-02 NOTE — PROGRESS NOTES
IV Protonix has been converted to PO Protonix  per [facility] IV-to-PO Auto-Conversion Protocol for Adults as approved by the Pharmacy and Therapeutics Committee  The patient met all eligible criteria:  3 Age = 25years old   2) Received at least one dose of the IV form   3) Receiving at least one other scheduled oral/enteral medication   4) Tolerating an oral/enteral diet   and did not have any exclusions:   1) Critical care patient   2) Active GI bleed (IF assessing H2RAs or PPIs)   3) Continuous tube feeding (IF assessing cipro, doxycycline, levofloxacin, minocycline, rifampin, or voriconazole)   4) Receiving PO vancomycin (IF assessing metronidazole)   5) Persistent nausea and/or vomiting   6) Ileus or gastrointestinal obstruction   7) Catrachito/nasogastric tube set for continuous suction   8) Specific order not to automatically convert to PO (in the order's comments or if discussed in the most recent Infectious Disease or primary team's progress notes)

## 2019-03-03 PROCEDURE — 97530 THERAPEUTIC ACTIVITIES: CPT

## 2019-03-03 PROCEDURE — 97116 GAIT TRAINING THERAPY: CPT

## 2019-03-03 PROCEDURE — 99024 POSTOP FOLLOW-UP VISIT: CPT | Performed by: UROLOGY

## 2019-03-03 PROCEDURE — 97110 THERAPEUTIC EXERCISES: CPT

## 2019-03-03 RX ADMIN — SIMETHICONE CHEW TAB 80 MG 80 MG: 80 TABLET ORAL at 15:50

## 2019-03-03 RX ADMIN — HYDROCODONE BITARTRATE AND ACETAMINOPHEN 1 TABLET: 5; 325 TABLET ORAL at 15:40

## 2019-03-03 RX ADMIN — DOCUSATE SODIUM 100 MG: 100 CAPSULE, LIQUID FILLED ORAL at 17:48

## 2019-03-03 RX ADMIN — HEPARIN SODIUM 5000 UNITS: 5000 INJECTION INTRAVENOUS; SUBCUTANEOUS at 05:57

## 2019-03-03 RX ADMIN — PANTOPRAZOLE SODIUM 40 MG: 40 TABLET, DELAYED RELEASE ORAL at 21:42

## 2019-03-03 RX ADMIN — SIMETHICONE CHEW TAB 80 MG 80 MG: 80 TABLET ORAL at 06:35

## 2019-03-03 RX ADMIN — VALPROATE SODIUM 250 MG: 100 INJECTION, SOLUTION INTRAVENOUS at 05:57

## 2019-03-03 RX ADMIN — TOBRAMYCIN AND DEXAMETHASONE 1 DROP: 3; 1 SUSPENSION/ DROPS OPHTHALMIC at 05:58

## 2019-03-03 RX ADMIN — PANTOPRAZOLE SODIUM 40 MG: 40 TABLET, DELAYED RELEASE ORAL at 08:01

## 2019-03-03 RX ADMIN — DEXTROSE AND SODIUM CHLORIDE 75 ML/HR: 5; .45 INJECTION, SOLUTION INTRAVENOUS at 22:03

## 2019-03-03 RX ADMIN — HYDROCODONE BITARTRATE AND ACETAMINOPHEN 1 TABLET: 5; 325 TABLET ORAL at 11:05

## 2019-03-03 RX ADMIN — VALPROATE SODIUM 250 MG: 100 INJECTION, SOLUTION INTRAVENOUS at 01:12

## 2019-03-03 RX ADMIN — HEPARIN SODIUM 5000 UNITS: 5000 INJECTION INTRAVENOUS; SUBCUTANEOUS at 15:40

## 2019-03-03 RX ADMIN — HYDROCODONE BITARTRATE AND ACETAMINOPHEN 1 TABLET: 5; 325 TABLET ORAL at 06:35

## 2019-03-03 RX ADMIN — SIMETHICONE CHEW TAB 80 MG 80 MG: 80 TABLET ORAL at 21:42

## 2019-03-03 RX ADMIN — DOCUSATE SODIUM 100 MG: 100 CAPSULE, LIQUID FILLED ORAL at 08:01

## 2019-03-03 RX ADMIN — VALPROATE SODIUM 250 MG: 100 INJECTION, SOLUTION INTRAVENOUS at 11:28

## 2019-03-03 RX ADMIN — TOBRAMYCIN AND DEXAMETHASONE 1 DROP: 3; 1 SUSPENSION/ DROPS OPHTHALMIC at 11:06

## 2019-03-03 RX ADMIN — HYDROCODONE BITARTRATE AND ACETAMINOPHEN 1 TABLET: 5; 325 TABLET ORAL at 22:02

## 2019-03-03 RX ADMIN — HEPARIN SODIUM 5000 UNITS: 5000 INJECTION INTRAVENOUS; SUBCUTANEOUS at 21:41

## 2019-03-03 RX ADMIN — VALPROATE SODIUM 250 MG: 100 INJECTION, SOLUTION INTRAVENOUS at 17:49

## 2019-03-03 RX ADMIN — TOBRAMYCIN AND DEXAMETHASONE 1 DROP: 3; 1 SUSPENSION/ DROPS OPHTHALMIC at 17:49

## 2019-03-03 NOTE — PLAN OF CARE
Problem: Potential for Falls  Goal: Patient will remain free of falls  Description  INTERVENTIONS:  - Assess patient frequently for physical needs  -  Identify cognitive and physical deficits and behaviors that affect risk of falls  -  [place] fall precautions as indicated by assessment   - Educate patient/family on patient safety including physical limitations  - Instruct patient to call for assistance with activity based on assessment  - Modify environment to reduce risk of injury  - Consider OT/PT consult to assist with strengthening/mobility  Outcome: Progressing     Problem: Nutrition/Hydration-ADULT  Goal: Nutrient/Hydration intake appropriate for improving, restoring or maintaining nutritional needs  Description  Monitor and assess patient's nutrition/hydration status for malnutrition (ex- brittle hair, bruises, dry skin, pale skin and conjunctiva, muscle wasting, smooth red tongue, and disorientation)  Collaborate with interdisciplinary team and initiate plan and interventions as ordered  Monitor patient's weight and dietary intake as ordered or per policy  Utilize nutrition screening tool and intervene per policy  Determine patient's food preferences and provide high-protein, high-caloric foods as appropriate       INTERVENTIONS:  - Monitor oral intake, urinary output, labs, and treatment plans  - Assess nutrition and hydration status and recommend course of action  - Evaluate amount of meals eaten  - Assist patient with eating if necessary   - Allow adequate time for meals  - Recommend/ encourage appropriate diets, oral nutritional supplements, and vitamin/mineral supplements  - Order, calculate, and assess calorie counts as needed  - Recommend, monitor, and adjust tube feedings and TPN/PPN based on assessed needs  - Assess need for intravenous fluids  - Provide specific nutrition/hydration education as appropriate  - Include patient/family/caregiver in decisions related to nutrition  Outcome: Progressing     Problem: Prexisting or High Potential for Compromised Skin Integrity  Goal: Skin integrity is maintained or improved  Description  INTERVENTIONS:  - Identify patients at risk for skin breakdown  - Assess and monitor skin integrity  - Assess and monitor nutrition and hydration status  - Monitor labs (i e  albumin)  - Assess for incontinence   - Turn and reposition patient  - Assist with mobility/ambulation  - Relieve pressure over bony prominences  - Avoid friction and shearing  - Provide appropriate hygiene as needed including keeping skin clean and dry  - Evaluate need for skin moisturizer/barrier cream  - Collaborate with interdisciplinary team (i e  Nutrition, Rehabilitation, etc )   - Patient/family teaching  Outcome: Progressing     Problem: DISCHARGE PLANNING - CARE MANAGEMENT  Goal: Discharge to post-acute care or home with appropriate resources  Description  INTERVENTIONS:  - Conduct assessment to determine patient/family and health care team treatment goals, and need for post-acute services based on payer coverage, community resources, and patient preferences, and barriers to discharge  - Address psychosocial, clinical, and financial barriers to discharge as identified in assessment in conjunction with the patient/family and health care team  - Arrange appropriate level of post-acute services according to patient?s   needs and preference and payer coverage in collaboration with the physician and health care team  - Communicate with and update the patient/family, physician, and health care team regarding progress on the discharge plan  - Arrange appropriate transportation to post-acute venues  Outcome: Progressing

## 2019-03-03 NOTE — PHYSICAL THERAPY NOTE
Physical Therapy Treatment Note       03/03/19 3203   Pain Assessment   Pain Assessment 0-10   Pain Score 5   Pain Type Surgical pain   Pain Location Abdomen   Pain Orientation Mid   Restrictions/Precautions   Other Precautions Multiple lines; Fall Risk;Pain   General   Chart Reviewed Yes   Family/Caregiver Present Yes   Cognition   Overall Cognitive Status WFL   Arousal/Participation Responsive; Cooperative   Attention Attends with cues to redirect   Orientation Level Oriented X4   Subjective   Subjective pt out of bed in recliner chair  Pt reporting pain and fatigue  Transfers   Sit to Stand 3  Moderate assistance   Additional items Assist x 1; Armrests; Increased time required;Verbal cues   Stand to Sit 4  Minimal assistance   Additional items Assist x 1; Armrests; Increased time required;Verbal cues   Ambulation/Elevation   Gait pattern Forward Flexion; Wide BELLA; Decreased foot clearance; Short stride; Step to;Excessively slow; Inconsistent kieran   Gait Assistance 4  Minimal assist   Additional items Assist x 2;Verbal cues   Assistive Device Rolling walker   Distance 25'x2   Balance   Static Sitting Good   Dynamic Sitting Fair +   Static Standing Fair -   Dynamic Standing Poor +   Ambulatory Poor +   Endurance Deficit   Endurance Deficit Yes   Endurance Deficit Description pain, fatigue, weakness deconditioning   Activity Tolerance   Activity Tolerance Patient limited by pain; Patient limited by fatigue   Nurse Made Aware suraj queen   Exercises   Hip Flexion Sitting;10 reps;AROM; Bilateral   Hip Abduction Sitting;10 reps;AROM; Bilateral   Hip Adduction Sitting;10 reps;AROM; Bilateral  (arom and isometric hip add)   Knee AROM Long Arc Quad Sitting;10 reps;AROM; Bilateral   Ankle Pumps Sitting;10 reps;AROM; Bilateral  (heel/ toe raises)   Equipment Use   Comments Assisted pt in change of gown, sliper socks and washing of lower legs due to bloody purulent drainage with mobility       Assessment   Prognosis Fair   Problem List Decreased strength;Decreased endurance; Impaired balance;Decreased mobility; Decreased skin integrity;Pain;Decreased range of motion; Impaired hearing;Decreased safety awareness   Assessment Pt out of bed in recliner upon arrival   Pt reporting increased pain however pt agreeable  o Pt intervention  Pt demonstrates impaired ability to perform transfers and ambulation  Pt is requiring more assistance for sit to stand transfers and increased time to perform all aspects of mobility  Pt currently is requiring mod assist x2 for sit to stand, min assist x1 fot stand to sit and min assist for ambulation with verbal for improved gait pattern, posture, and safety  Pt tends to push walker too far ahead and requires verbal cues to step closer into walker bounds and to maintain close to distance to walker at all times  Pt is limited by fatigue, pain, and generalized weakness and deconditioning  Pt encouraged to ambulate 3-4x/day to sit out of bed for all meals and to perform b/l le arom exercises daily  At this time STR is recommended due to deficits mobility, strength, balance, endurance, activity tolerance and safety  As pt is requiring increased assistance for all aspects of mobility, and is functioning well below baseline level of mobility  Goals   Patient Goals " to have less pain "   [facility] Expiration Date 03/08/19   Treatment Day 3   Plan   Treatment/Interventions Functional transfer training;LE strengthening/ROM; Elevations; Therapeutic exercise; Endurance training;Patient/family training;Equipment eval/education; Bed mobility;Gait training;Spoke to nursing;Family   Progress Slow progress, decreased activity tolerance   PT Frequency   (4-5x/week)   Recommendation   Recommendation Short-term skilled PT   PT - OK to Discharge Yes  (rehab)        03/03/19 9303   Pain Assessment   Pain Assessment 0-10   Pain Score 5   Pain Type Surgical pain   Pain Location Abdomen   Pain Orientation Mid   Restrictions/Precautions Other Precautions Multiple lines; Fall Risk;Pain   General   Chart Reviewed Yes   Family/Caregiver Present Yes   Cognition   Overall Cognitive Status WFL   Arousal/Participation Responsive; Cooperative   Attention Attends with cues to redirect   Orientation Level Oriented X4   Subjective   Subjective pt out of bed in recliner chair  Pt reporting pain and fatigue  Transfers   Sit to Stand 3  Moderate assistance   Additional items Assist x 1; Armrests; Increased time required;Verbal cues   Stand to Sit 4  Minimal assistance   Additional items Assist x 1; Armrests; Increased time required;Verbal cues   Ambulation/Elevation   Gait pattern Forward Flexion; Wide BELLA; Decreased foot clearance; Short stride; Step to;Excessively slow; Inconsistent kieran   Gait Assistance 4  Minimal assist   Additional items Assist x 2;Verbal cues   Assistive Device Rolling walker   Distance 25'x2   Balance   Static Sitting Good   Dynamic Sitting Fair +   Static Standing Fair -   Dynamic Standing Poor +   Ambulatory Poor +   Endurance Deficit   Endurance Deficit Yes   Endurance Deficit Description pain, fatigue, weakness deconditioning   Activity Tolerance   Activity Tolerance Patient limited by pain; Patient limited by fatigue   Nurse Made Aware rnsuraj   Exercises   Hip Flexion Sitting;10 reps;AROM; Bilateral   Hip Abduction Sitting;10 reps;AROM; Bilateral   Hip Adduction Sitting;10 reps;AROM; Bilateral  (arom and isometric hip add)   Knee AROM Long Arc Quad Sitting;10 reps;AROM; Bilateral   Ankle Pumps Sitting;10 reps;AROM; Bilateral  (heel/ toe raises)   Equipment Use   Comments Assisted pt in change of gown, sliper socks and washing of lower legs due to bloody purulent drainage with mobility  Assessment   Prognosis Fair   Problem List Decreased strength;Decreased endurance; Impaired balance;Decreased mobility; Decreased skin integrity;Pain;Decreased range of motion; Impaired hearing;Decreased safety awareness   Assessment Pt out of bed in recliner upon arrival   Pt reporting increased pain however pt agreeable  o Pt intervention  Pt demonstrates impaired ability to perform transfers and ambulation  Pt is requiring more assistance for sit to stand transfers and increased time to perform all aspects of mobility  Pt currently is requiring mod assist x2 for sit to stand, min assist x1 fot stand to sit and min assist for ambulation with verbal for improved gait pattern, posture, and safety  Pt tends to push walker too far ahead and requires verbal cues to step closer into walker bounds and to maintain close to distance to walker at all times  Pt is limited by fatigue, pain, and generalized weakness and deconditioning  Pt encouraged to ambulate 3-4x/day to sit out of bed for all meals and to perform b/l le arom exercises daily  At this time STR is recommended due to deficits mobility, strength, balance, endurance, activity tolerance and safety  As pt is requiring increased assistance for all aspects of mobility, and is functioning well below baseline level of mobility  Goals   Patient Goals " to have less pain "   [facility] Expiration Date 03/08/19   Treatment Day 3   Plan   Treatment/Interventions Functional transfer training;LE strengthening/ROM; Elevations; Therapeutic exercise; Endurance training;Patient/family training;Equipment eval/education; Bed mobility;Gait training;Spoke to nursing;Family   Progress Slow progress, decreased activity tolerance   PT Frequency   (4-5x/week)   Recommendation   Recommendation Short-term skilled PT   PT - OK to Discharge Yes  (rehab)       [place] Parker, SHAR

## 2019-03-03 NOTE — PLAN OF CARE
Problem: PHYSICAL THERAPY ADULT  Goal: Performs mobility at highest level of function for planned discharge setting  See evaluation for individualized goals  Description  Treatment/Interventions: Functional transfer training, LE strengthening/ROM, Elevations, Therapeutic exercise, Endurance training, Patient/family training, Bed mobility, Gait training, Spoke to nursing, Family  Equipment Recommended: Merlin Sovereign       See flowsheet documentation for full assessment, interventions and recommendations  Outcome: Progressing  Note:   Prognosis: Fair  Problem List: Decreased strength, Decreased endurance, Impaired balance, Decreased mobility, Decreased skin integrity, Pain, Decreased range of motion, Impaired hearing, Decreased safety awareness  Assessment: Pt out of bed in recliner upon arrival   Pt reporting increased pain however pt agreeable  o Pt intervention  Pt demonstrates impaired ability to perform transfers and ambulation  Pt is requiring more assistance for sit to stand transfers and increased time to perform all aspects of mobility  Pt currently is requiring mod assist x2 for sit to stand, min assist x1 fot stand to sit and min assist for ambulation with verbal for improved gait pattern, posture, and safety  Pt tends to push walker too far ahead and requires verbal cues to step closer into walker bounds and to maintain close to distance to walker at all times  Pt is limited by fatigue, pain, and generalized weakness and deconditioning  Pt encouraged to ambulate 3-4x/day to sit out of bed for all meals and to perform b/l le arom exercises daily  At this time STR is recommended due to deficits mobility, strength, balance, endurance, activity tolerance and safety  As pt is requiring increased assistance for all aspects of mobility, and is functioning well below baseline level of mobility      Barriers to Discharge: Decreased caregiver support, Inaccessible home environment  Barriers to Discharge Comments: PRAKASH, home alone   Recommendation: Short-term skilled PT     PT - OK to Discharge: Yes(rehab)    See flowsheet documentation for full assessment

## 2019-03-03 NOTE — PROGRESS NOTES
UROLOGY PROGRESS NOTE   Patient Identifiers: Silvia Aquino (MRN [id number])  Date of Service: 3/3/2019        Assessment:   Postoperative day 6  Status post attempted radical cystectomy, advanced disease was found, ileal conduit was performed and the pelvic mass was biopsied  Patient continues to have difficulty mobilizing, he was only out of bed for a short period of time yesterday due to his refusal to cooperate with our efforts to mobilize him  He has had some small bowel movement since yesterday, he is tolerating a clears diet which we will continue today    I have spoken to the physical therapy team, their input is appreciated, they are aware of his case and his slow mobilization    I did walk with the patient a quarter of a lap around the hospital floor, and told him that the expectation is that he walk again around lunchtime, as well as this afternoon  Plan:   Continue incentive spirometry and pulmonary toilet  Continue clears diet at this time, we will consider advancing to regular diet tomorrow  Patient needs to continue to ambulate and mobilized, we will await further input from the physical therapy team regarding potential for inpatient versus outpatient rehab upon discharge  Subjective:     24 HR EVENTS:   no significant events  Patient has  complaints of Some abdominal discomfort, improving, no further nausea or vomiting, bowel function is returning         Objective:     VITALS:    Vitals:    03/03/19 0700   BP: 142/69   Pulse: 85   Resp: 18   Temp: 99 °F (37 2 °C)   SpO2: 93%       INS & OUTS:  [unfilled]  Reviewed pertinent values I's/O's      LABS:  Lab Results   Component Value Date    HGB 10 2 (L) 03/01/2019    HCT 32 5 (L) 03/01/2019    WBC 9 00 03/01/2019     03/01/2019   ]    Lab Results   Component Value Date    K 3 9 03/01/2019     03/01/2019    CO2 25 03/01/2019    BUN 29 (H) 03/01/2019    CREATININE 1 27 03/01/2019    CALCIUM 9 0 03/01/2019 ]    INPATIENT MEDS:    Current Facility-Administered Medications:     dextrose 5 % and sodium chloride 0 45 % infusion, 75 mL/hr, Intravenous, Continuous, Viona Peabody, MD, Last Rate: 75 mL/hr at 03/02/19 2331, 75 mL/hr at 03/02/19 2331    docusate sodium (COLACE) capsule 100 mg, 100 mg, Oral, BID, Claudia Stanford MD, 100 mg at 03/03/19 0801    heparin (porcine) subcutaneous injection 5,000 Units, 5,000 Units, Subcutaneous, Q8H [address], 5,000 Units at 03/03/19 0557 **AND** [CANCELED] Platelet count, , , Once, Claudia Stanford MD    HYDROcodone-acetaminophen [facility]) 5-325 mg per tablet 1 tablet, 1 tablet, Oral, Q4H PRN, Viona Peabody, MD, 1 tablet at 03/03/19 0635    ondansetron (ZOFRAN) injection 4 mg, 4 mg, Intravenous, Q6H PRN, Claudia Stanford MD, 4 mg at 03/01/19 1347    pantoprazole (PROTONIX) EC tablet 40 mg, 40 mg, Oral, Q12H, Viona Peabody, MD, 40 mg at 03/03/19 0801    phenol (CHLORASEPTIC) 1 4 % mucosal liquid 1 spray, 1 spray, Mouth/Throat, Q2H PRN, Claudia Stanford MD    phenol (CHLORASEPTIC) 1 4 % mucosal liquid 1 spray, 1 spray, Mouth/Throat, Q2H PRN, Viona Peabody, MD, 1 spray at 03/01/19 2120    simethicone (MYLICON) chewable tablet 80 mg, 80 mg, Oral, 4x Daily PRN, Claudia Stanford MD, 80 mg at 03/03/19 0635    tobramycin-dexamethasone (TOBRADEX) 0 3-0 1 % ophthalmic suspension 1 drop, 1 drop, Right Eye, Q6H [address], Kiersten Herrera DO, 1 drop at 03/03/19 0558    valproate (DEPACON) 250 mg in sodium chloride 0 9 % 50 mL IVPB, 250 mg, Intravenous, Q6H [address], Eleanor Najjar, PA-C, Last Rate: 50 mL/hr at 03/03/19 0557, 250 mg at 03/03/19 0557      Physical Exam:   /69 (BP Location: Left arm)   Pulse 85   Temp 99 °F (37 2 °C) (Tympanic)   Resp 18   Ht 6' (1 829 m)   Wt 86 2 kg (190 lb)   SpO2 93%   BMI 25 77 kg/m²   GEN: alert and oriented x 3    RESP: breathing comfortably with no accessory muscle use    CARDIAC: regular rate and rhythm    ABD: soft, appropriately tender to palpation, non-distended   INCISION: clean, dry, intact   EXT: no significant peripheral edema   DRAINS: serosanguineous  NEURO: cranial nerves 2-12 intact, no sensory deficits, no motor deficits and the remainder of the neurological examination is unremarkable   PSYCHIATRIC: Does appear to be slightly negative in his affect and in his thinking about ambulating and mobilizing, normal mood and affect and normal train of thought    [id number] [facility] does leak some reddish brown material from the urethra upon ambulating, not uncommon after surgery such as his    scrotum normal and without induration/swelling/cellulitis and perineum clean and without crepitus      SONI: none         His urostomy is in place, draining clear yellow urine, diversionary catheters are still in place, adequate urine output    RADIOLOGY:   No new films for my review

## 2019-03-04 ENCOUNTER — APPOINTMENT (INPATIENT)
Dept: RADIOLOGY | Facility: HOSPITAL | Age: 74
DRG: 657 | End: 2019-03-04
Payer: MEDICARE

## 2019-03-04 LAB
ANION GAP SERPL CALCULATED.3IONS-SCNC: 8 MMOL/L (ref 4–13)
BUN SERPL-MCNC: 10 MG/DL (ref 5–25)
CALCIUM SERPL-MCNC: 7.8 MG/DL (ref 8.3–10.1)
CHLORIDE SERPL-SCNC: 99 MMOL/L (ref 100–108)
CO2 SERPL-SCNC: 27 MMOL/L (ref 21–32)
CREAT SERPL-MCNC: 1.05 MG/DL (ref 0.6–1.3)
ERYTHROCYTE [DISTWIDTH] IN BLOOD BY AUTOMATED COUNT: 13.4 % (ref 11.6–15.1)
GFR SERPL CREATININE-BSD FRML MDRD: 70 ML/MIN/1.73SQ M
GLUCOSE SERPL-MCNC: 92 MG/DL (ref 65–140)
HCT VFR BLD AUTO: 27.1 % (ref 36.5–49.3)
HGB BLD-MCNC: 8.9 G/DL (ref 12–17)
MAGNESIUM SERPL-MCNC: 1.1 MG/DL (ref 1.6–2.6)
MCH RBC QN AUTO: 30.8 PG (ref 26.8–34.3)
MCHC RBC AUTO-ENTMCNC: 32.8 G/DL (ref 31.4–37.4)
MCV RBC AUTO: 94 FL (ref 82–98)
PHOSPHATE SERPL-MCNC: 3 MG/DL (ref 2.3–4.1)
PLATELET # BLD AUTO: 276 THOUSANDS/UL (ref 149–390)
PMV BLD AUTO: 8.7 FL (ref 8.9–12.7)
POTASSIUM SERPL-SCNC: 2.4 MMOL/L (ref 3.5–5.3)
RBC # BLD AUTO: 2.89 MILLION/UL (ref 3.88–5.62)
SODIUM SERPL-SCNC: 134 MMOL/L (ref 136–145)
WBC # BLD AUTO: 6.9 THOUSAND/UL (ref 4.31–10.16)

## 2019-03-04 PROCEDURE — 85027 COMPLETE CBC AUTOMATED: CPT | Performed by: UROLOGY

## 2019-03-04 PROCEDURE — 99222 1ST HOSP IP/OBS MODERATE 55: CPT | Performed by: SURGERY

## 2019-03-04 PROCEDURE — 97530 THERAPEUTIC ACTIVITIES: CPT

## 2019-03-04 PROCEDURE — 84100 ASSAY OF PHOSPHORUS: CPT | Performed by: UROLOGY

## 2019-03-04 PROCEDURE — 97116 GAIT TRAINING THERAPY: CPT

## 2019-03-04 PROCEDURE — 99024 POSTOP FOLLOW-UP VISIT: CPT | Performed by: UROLOGY

## 2019-03-04 PROCEDURE — 80048 BASIC METABOLIC PNL TOTAL CA: CPT | Performed by: UROLOGY

## 2019-03-04 PROCEDURE — 83735 ASSAY OF MAGNESIUM: CPT | Performed by: UROLOGY

## 2019-03-04 PROCEDURE — 74022 RADEX COMPL AQT ABD SERIES: CPT

## 2019-03-04 RX ORDER — POTASSIUM CHLORIDE 20MEQ/15ML
20 LIQUID (ML) ORAL 3 TIMES DAILY
Status: COMPLETED | OUTPATIENT
Start: 2019-03-04 | End: 2019-03-04

## 2019-03-04 RX ORDER — HYDROCODONE BITARTRATE AND ACETAMINOPHEN 5; 325 MG/1; MG/1
1 TABLET ORAL ONCE
Status: COMPLETED | OUTPATIENT
Start: 2019-03-04 | End: 2019-03-04

## 2019-03-04 RX ORDER — MAGNESIUM CARB/ALUMINUM HYDROX 105-160MG
296 TABLET,CHEWABLE ORAL ONCE
Status: COMPLETED | OUTPATIENT
Start: 2019-03-04 | End: 2019-03-04

## 2019-03-04 RX ADMIN — VALPROATE SODIUM 250 MG: 100 INJECTION, SOLUTION INTRAVENOUS at 06:28

## 2019-03-04 RX ADMIN — DOCUSATE SODIUM 100 MG: 100 CAPSULE, LIQUID FILLED ORAL at 08:24

## 2019-03-04 RX ADMIN — HYDROCODONE BITARTRATE AND ACETAMINOPHEN 1 TABLET: 5; 325 TABLET ORAL at 21:54

## 2019-03-04 RX ADMIN — MAGNESIUM CITRATE 296 ML: 1.75 LIQUID ORAL at 21:53

## 2019-03-04 RX ADMIN — TOBRAMYCIN AND DEXAMETHASONE 1 DROP: 3; 1 SUSPENSION/ DROPS OPHTHALMIC at 00:54

## 2019-03-04 RX ADMIN — HYDROCODONE BITARTRATE AND ACETAMINOPHEN 1 TABLET: 5; 325 TABLET ORAL at 12:56

## 2019-03-04 RX ADMIN — HYDROCODONE BITARTRATE AND ACETAMINOPHEN 1 TABLET: 5; 325 TABLET ORAL at 00:54

## 2019-03-04 RX ADMIN — TOBRAMYCIN AND DEXAMETHASONE 1 DROP: 3; 1 SUSPENSION/ DROPS OPHTHALMIC at 08:27

## 2019-03-04 RX ADMIN — DOCUSATE SODIUM 100 MG: 100 CAPSULE, LIQUID FILLED ORAL at 16:20

## 2019-03-04 RX ADMIN — VALPROATE SODIUM 250 MG: 100 INJECTION, SOLUTION INTRAVENOUS at 18:33

## 2019-03-04 RX ADMIN — HEPARIN SODIUM 5000 UNITS: 5000 INJECTION INTRAVENOUS; SUBCUTANEOUS at 06:28

## 2019-03-04 RX ADMIN — VALPROATE SODIUM 250 MG: 100 INJECTION, SOLUTION INTRAVENOUS at 12:57

## 2019-03-04 RX ADMIN — PANTOPRAZOLE SODIUM 40 MG: 40 TABLET, DELAYED RELEASE ORAL at 21:54

## 2019-03-04 RX ADMIN — POTASSIUM CHLORIDE 20 MEQ: 20 SOLUTION ORAL at 21:54

## 2019-03-04 RX ADMIN — HEPARIN SODIUM 5000 UNITS: 5000 INJECTION INTRAVENOUS; SUBCUTANEOUS at 13:07

## 2019-03-04 RX ADMIN — TOBRAMYCIN AND DEXAMETHASONE 1 DROP: 3; 1 SUSPENSION/ DROPS OPHTHALMIC at 13:00

## 2019-03-04 RX ADMIN — POTASSIUM CHLORIDE 20 MEQ: 20 SOLUTION ORAL at 16:20

## 2019-03-04 RX ADMIN — PANTOPRAZOLE SODIUM 40 MG: 40 TABLET, DELAYED RELEASE ORAL at 08:24

## 2019-03-04 RX ADMIN — HEPARIN SODIUM 5000 UNITS: 5000 INJECTION INTRAVENOUS; SUBCUTANEOUS at 21:57

## 2019-03-04 RX ADMIN — TOBRAMYCIN AND DEXAMETHASONE 1 DROP: 3; 1 SUSPENSION/ DROPS OPHTHALMIC at 18:32

## 2019-03-04 RX ADMIN — HYDROCODONE BITARTRATE AND ACETAMINOPHEN 1 TABLET: 5; 325 TABLET ORAL at 06:27

## 2019-03-04 RX ADMIN — SIMETHICONE CHEW TAB 80 MG 80 MG: 80 TABLET ORAL at 06:28

## 2019-03-04 RX ADMIN — POTASSIUM CHLORIDE 20 MEQ: 20 SOLUTION ORAL at 08:24

## 2019-03-04 RX ADMIN — VALPROATE SODIUM 250 MG: 100 INJECTION, SOLUTION INTRAVENOUS at 00:54

## 2019-03-04 NOTE — CONSULTS
Consultation - General Surgery   Jaspreet Harrison 68 y o  male MRN: [id number]  Unit/Bed#: E2 -01 Encounter: [id number]    Assessment/Plan     Assessment:  67 y/o male with hx of prostate cancer POD #7 s/p attempted radical cystoprostatectomy, ileal conduit urinary diversion, biopsy of pelvic mass, appendectomy with Dr Gaby Ramírez and Dr Laura Aleman now with postoperative ileus    Plan:    1  Postoperative ileus  -patient is POD #7 s/p attempted radical cystoprostatectomy, ileal conduit urinary diversion, biopsy of pelvic mass, appendectomy currently with no bowel movement since prior to surgery  -patient passing flatus, denies nausea, vomiting, no evidence of infection  -current clear liquid diet, tolerating  -x-ray showing evidence of ileus, no evidence of obstruction  -potassium of 2 4 this morning, magnesium of 1 1  -patient able to ambulate with assistance  -still using narcotic pain medication  -patient likely has an ileus secondary to abnormal electrolytes, use of narcotics, difficulty with ambulation  -would recommend aggressive repletion of magnesium and potassium, encourage ambulation, encourage reduction of narcotic use    2  Malignant neoplasm of prostate  -POD#7 as noted above  -urostomy functioning with good output  -LENNOX drain pulled this afternoon  -being followed by Urology, continue management    3  Hypokalemia  -magnesium is also low  -likely contributing to ileus  -continue aggressive repletion of potassium and magnesium  -continue to trend labs    History of Present Illness     HPI:  Jaspreet Harrison is a 68 y o  male with a history of prostate cancer currently POD #7 s/p  attempted radical cystoprostatectomy, ileal conduit urinary diversion, biopsy of pelvic mass, appendectomy with Dr Gaby Ramírez and Dr Laura Aleman on 2/25/19 now presenting with postoperative ileus  Patient states that he has not had a bowel movement since prior to surgery  Passing flatus and burping    Denying any abdominal pain other than feeling bloated  He on a clear liquid diet and states that he is tolerating it well without any nausea or vomiting  He states that he does have difficulty ambulating without assistance and is still using narcotic pain medication, prior to admission to the hospital the patient states that he has regular bowel movements without constipation or diarrhea  Inpatient consult to Acute Care Surgery  Consult performed by: Devin Garcia PA-C  Consult ordered by: Sherryle Alice, MD          Review of Systems   Constitutional: Negative for appetite change, chills and fever  HENT: Negative  Eyes: Negative  Respiratory: Negative for cough, chest tightness and shortness of breath  Cardiovascular: Negative for chest pain, palpitations and leg swelling  Gastrointestinal: Positive for abdominal distention  Negative for abdominal pain, diarrhea, nausea and vomiting  Endocrine: Negative  Genitourinary: Negative  Musculoskeletal: Negative  Skin: Negative  Neurological: Negative          Historical Information   Past Medical History:   Diagnosis Date    Aneurysm ([address] )     Behind left knee recently diagnosed    Back pain     Ceron disease     Ceron's disease     BPH (benign prostatic hyperplasia)     Cancer (HCC)     melanoma    Cataract     right eye    Cataract     right eye    Confusion     DDD (degenerative disc disease), lumbar     Depression     Diverticulosis     Gallstones     GERD (gastroesophageal reflux disease)     History of cardiac murmur     Past    History of melanoma     Was on back in early 1990's    History of rheumatic fever     Childhood    History of transfusion     Nov 2018    [facility] (hard of hearing)     Hydronephrosis     Hypertension     Kidney stone     Multiple times    Neck pain     Nervous breakdown     History of due to reaction to psych med which he was on for anxiety/depression and became suicidal    Numbness and tingling in both hands  Numbness and tingling of both feet     PONV (postoperative nausea and vomiting)     PVD (peripheral vascular disease) (HCC)     RBBB     Scoliosis     Seasonal allergies     Tinnitus     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HCC)     Wears partial dentures     Upper    Wears partial dentures     Upper     Past Surgical History:   Procedure Laterality Date    ABDOMINAL SURGERY      When young had procedure for improviing circulation to left lower extremety    BACK SURGERY      Lumbar- not sure what was done   Brigitte Riis CARDIAC CATHETERIZATION      Approximately 2007- no blockages    CARPAL TUNNEL RELEASE Bilateral     wrists are fused    CATARACT EXTRACTION Left     COLONOSCOPY      CYST REMOVAL      Robotic procedure to remove benign cyst from lower left lung    CYSTOGRAM N/A 3/14/2018    Procedure: CYSTOGRAM;  Surgeon: Sherryle Alice, MD;  [facility];  Service: Urology    CYSTOSCOPY      ESOPHAGOGASTRODUODENOSCOPY      IR TUBE PLACEMENT NEPHROSTOMY  8/10/2018    LUNG SURGERY      cyst removed left lung    OTHER SURGICAL HISTORY Left     fistula drain in left buttock    ID CYSTO/URETERO W/LITHOTRIPSY &INDWELL STENT INSRT Left 1/3/2018    Procedure: CYSTOSCOPY URETEROSCOPY, RETROGRADE PYELOGRAM AND INSERTION STENT URETERAL;  Surgeon: Sherryle Alice, MD;  [facility];  Service: Urology    ID CYSTOTOMY,EXCIS BLADDER TIC N/A 2/14/2018    Procedure: ABDOMINAL EXPLORATION; CLOSURE OF BLADDER DIVERTICULITIS;  Surgeon: Sherryle Alice, MD;  [facility];  Service: Urology    ID CYSTOURETHROSCOPY,URETER CATHETER Left 8/1/2018    Procedure: Cystoscopy, cystogram, bladder biopsies, removal of pelvic drain;  Surgeon: Sherryle Alice, MD;  [facility];  Service: Urology    ID INCISE/DRAIN BLADDER N/A 2/14/2018    Procedure: OPEN SUPRAPUBIC TUBE PLACEMENT;  Surgeon: Sherryle Alice, MD;  [facility];  Service: Urology    ID RELEASE Moshe Mini FIBROSIS Left 2/14/2018    Procedure: LYSIS OF ADHESIONS; URETEROLYSIS ;  Surgeon: Eduardo Fernandez MD;  [facility];  Service: Urology     [facility] [address] BLADDER/NODES,ILEAL CONDUIT N/A 2019    Procedure: ATTEMPTED CYSTECTOMY RADICAL; ILEAL CONDUIT URINARY DIVERSION; BIOPSY OF PELVIC MASS; APPENDECTOMY;  Surgeon: Eduardo Fernandez MD;  [facility];  Service: Urology    SKIN CANCER EXCISION      Melanoma removal on back in early     TOE AMPUTATION Left     All 5 toes left foot were amputated due to poor circulation     TRANSURETHRAL RESECTION OF PROSTATE N/A 3/14/2018    Procedure: TRANSURETHRAL RESECTION OF PROSTATE (TURP), LEFT URETERAL STENT REMOVAL;  Surgeon: Eduardo Fernandez MD;  [facility];  Service: Urology    TRANSURETHRAL RESECTION OF PROSTATE N/A 2018    Procedure: TRANSURETHRAL RESECTION OF PROSTATE (TURP);   Surgeon: Eduardo Fernandez MD;  [facility];  Service: Urology    WRIST SURGERY Bilateral     Ulnar nerve on right arm and both wrists are fused     Social History   Social History     Substance and Sexual Activity   Alcohol Use Yes    Comment: Very rare     Social History     Substance and Sexual Activity   Drug Use No     Social History     Tobacco Use   Smoking Status Former Smoker    Packs/day: 1 00    Years: 15 00    Pack years: 15 00    Types: Cigarettes    Last attempt to quit: 1970    Years since quittin 1   Smokeless Tobacco Never Used   Tobacco Comment    Quit 48 years     Family History:   Family History   Problem Relation Age of Onset    Heart disease Father     Heart disease Mother     Cancer Paternal Grandfather        Meds/Allergies   current meds:   Current Facility-Administered Medications   Medication Dose Route Frequency    dextrose 5 % and sodium chloride 0 45 % infusion  75 mL/hr Intravenous Continuous    docusate sodium (COLACE) capsule 100 mg  100 mg Oral BID    heparin (porcine) subcutaneous injection 5,000 Units  5,000 Units Subcutaneous Q8H [address]    HYDROcodone-acetaminophen (NORCO) 5-325 mg per tablet 1 tablet  1 tablet Oral Q4H PRN    ondansetron (ZOFRAN) injection 4 mg  4 mg Intravenous Q6H PRN    pantoprazole (PROTONIX) EC tablet 40 mg  40 mg Oral Q12H    phenol (CHLORASEPTIC) 1 4 % mucosal liquid 1 spray  1 spray Mouth/Throat Q2H PRN    phenol (CHLORASEPTIC) 1 4 % mucosal liquid 1 spray  1 spray Mouth/Throat Q2H PRN    potassium chloride 10 % oral solution 20 mEq  20 mEq Oral TID    simethicone (MYLICON) chewable tablet 80 mg  80 mg Oral 4x Daily PRN    tobramycin-dexamethasone (TOBRADEX) 0 3-0 1 % ophthalmic suspension 1 drop  1 drop Right Eye Q6H [address]    valproate (DEPACON) 250 mg in sodium chloride 0 9 % 50 mL IVPB  250 mg Intravenous Q6H [address]     Allergies   Allergen Reactions    Iodine Shortness Of Breath, Swelling and Hives     Contrast dye causes respiratory distress   Iodine causes skin rash    Mercury Hives and Swelling    Shellfish-Derived Products Anaphylaxis, Hives and Shortness Of Breath    Augmentin [Amoxicillin-Pot Clavulanate] GI Intolerance, Rash and Diarrhea     Diarrhea    Lidoderm [Lidocaine] Rash     Lidoderm patch caused rash    Penicillins Rash, Swelling and Hives    Sulfa Antibiotics Hives, Swelling and Rash       Objective   First Vitals:   Blood Pressure: 126/78 (02/13/19 1018)  Pulse: 100 (02/13/19 1018)  Temperature: 98 °F (36 7 °C) (02/13/19 1018)  Temp Source: Tympanic (02/13/19 1018)  Respirations: 15 (02/13/19 1018)  Height: 6' (182 9 cm) (02/13/19 1018)  Weight - Scale: 86 2 kg (190 lb) (02/13/19 1018)  SpO2: 98 % (02/13/19 1018)    Current Vitals:   Blood Pressure: 142/73 (03/04/19 1500)  Pulse: 81 (03/04/19 1500)  Temperature: 99 2 °F (37 3 °C) (03/04/19 1500)  Temp Source: Tympanic (03/04/19 1500)  Respirations: 18 (03/04/19 1500)  Height: 6' (182 9 cm) (02/25/19 1217)  Weight - Scale: 86 2 kg (190 lb) (02/25/19 0605)  SpO2: 95 % (03/04/19 1500)      Intake/Output Summary (Last 24 hours) at 3/4/2019 [address] filed at 3/4/2019 1033  Gross per 24 hour   Intake 1320 ml   Output 2365 ml   Net -1045 ml       Invasive Devices     Peripheral Intravenous Line            Peripheral IV 03/03/19 Left Wrist 1 day          Drain            Closed/Suction Drain Left Abdomen Bulb 10 Fr  7 days    Urostomy Ileal conduit RLQ 7 days                Physical Exam   Constitutional: He is oriented to person, place, and time  He appears well-developed and well-nourished  No distress  HENT:   Head: Normocephalic and atraumatic  Eyes: Pupils are equal, round, and reactive to light  EOM are normal    Neck: Normal range of motion  Neck supple  Cardiovascular: Normal rate, regular rhythm, normal heart sounds and intact distal pulses  Exam reveals no gallop and no friction rub  No murmur heard  Pulmonary/Chest: Effort normal and breath sounds normal  No stridor  No respiratory distress  He has no wheezes  He has no rales  He exhibits no tenderness  Abdominal: Soft  Bowel sounds are normal    Midline incision present, clean, dry, intact with no surrounding erythema or discharge  Urostomy is with adequate output  Abdomen is soft  Normoactive bowel sounds present  Patient is mildly distended  Some discomfort with palpation, however no area of significant tenderness  Neurological: He is alert and oriented to person, place, and time  Skin: Skin is warm and dry  He is not diaphoretic  Psychiatric: He has a normal mood and affect   His behavior is normal        Lab Results:   CBC:   Lab Results   Component Value Date    WBC 6 90 03/04/2019    HGB 8 9 (L) 03/04/2019    HCT 27 1 (L) 03/04/2019    MCV 94 03/04/2019     03/04/2019    MCH 30 8 03/04/2019    MCHC 32 8 03/04/2019    RDW 13 4 03/04/2019    MPV 8 7 (L) 03/04/2019   , CMP:   Lab Results   Component Value Date    SODIUM 134 (L) 03/04/2019    K 2 4 (LL) 03/04/2019    CL 99 (L) 03/04/2019    CO2 27 03/04/2019    BUN 10 03/04/2019    CREATININE 1 05 03/04/2019 CALCIUM 7 8 (L) 2019    EGFR 70 2019   Potassium: 2 4  Magnesium: 1 1     Imagin  3/1/19 XR abdomen obstruction series  IMPRESSION:  Diffuse bowel distention likely representing an ileus  2  3/4/19 XR abdomen obstruction series   IMPRESSION:  Nonobstructive bowel gas pattern  EKG, Pathology, and Other Studies: I have personally reviewed pertinent reports

## 2019-03-04 NOTE — PROGRESS NOTES
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  Potassium is 2 4 this morning, so this is being replaced  Hemoglobin 8 9, hematocrit 27 1%  He is afebrile and his vital signs are stable  Physical therapy is working with him, and Dr Thor Woodward actually walked with him in the ruvalcaba yesterday  Creatinine is normal at 1 05  His wound is clean dry and intact  The Lexa-Guerrero drain is still in place in not putting out much  He still has upper abdominal distention  He is passing gas from below, but he is also burping  He says he still does not have much of an appetite he still feels an upset stomach  No bowel movement since surgery  I am concerned that he has a partial colonic ileus  I will ask General surgery to take a look at him, and I will ask internal medicine to see him to start TPN  His potassium is being replaced  I will hold off on giving him a blood transfusion pending what medicine says  Check another acute abdominal series

## 2019-03-04 NOTE — PROGRESS NOTES
Walked the pt around the hallways, along the way pt started to complain weakness and abdominal pain getting worst  Assisted the pt to sit in wheelchair and brought back to room, assisted back to bed  V/S were checked, (see flowshee)  Pain medication was given, comfort measures were done, needs attended  Will continue to monitor

## 2019-03-04 NOTE — PROGRESS NOTES
Progress Note - Urology  Bj Gutierrez 1945, 68 y o  male MRN: [id number]    Unit/Bed#: E2 -01 Encounter: [id number]    Malignant neoplasm of prostate ([facility] [address] )  Assessment & Plan  7 Days Post-Op  Procedure(s):  ATTEMPTED CYSTECTOMY RADICAL; ILEAL CONDUIT URINARY DIVERSION; BIOPSY OF PELVIC MASS; APPENDECTOMY  Surgeon(s):  MD Funmi Peters MD Chrystal Ford, PA-C  2/25/2019    Continues with an ileus  No substantial bowel movement  Flatus, but lots of burping  Tolerating clear liquids  Urostomy functioning well with good output from the ileal conduit  Plan:  Continue to encourage incentive spirometer, ambulation  General Surgery consultation ordered this morning, pending, regarding postoperative ileus  Internal Medicine consult ordered this morning, pending, regarding TPN and PICC line placement given prolonged NPO and clear liquid oral intake status  Hypokalemia  Assessment & Plan  Potassium 2 4 this morning  Repletion with 90 mg of once orally today  Recheck potassium tomorrow morning  Bedside rounds performed with RN  Discussed with Dr Noelle Perry  Subjective/Objective     Subjective:   CC: "Still a lot of burping, and passing gas, no #2 (BM) yet"  HPI:  Jackson reports he is feeling well this afternoon  No nausea or vomiting  He continues to tolerate clear liquids without issue  He is burping a substantial amount, passing flatus  No bowel movement today  He has been ambulating, out of bed to the chair  Using his incentive spirometer  Reports he does not feel hungry  Complains of lower midline abdominal soreness at the site of his incision  Otherwise offers no complaints this afternoon  ROS:  Review of Systems   Constitutional: Negative for activity change and appetite change  HENT: Negative for congestion and ear pain  Eyes: Negative for pain  Respiratory: Negative for cough and shortness of breath      Cardiovascular: Negative for chest pain and palpitations  Gastrointestinal: Positive for abdominal distention ( reports feeling bloated) and abdominal pain (Incisional abdominal pain, worse with movement)  Negative for blood in stool, constipation, diarrhea and nausea  Genitourinary: Negative for difficulty urinating, dysuria, flank pain, frequency, hematuria, penile pain, penile swelling and scrotal swelling  Musculoskeletal: Negative for arthralgias and myalgias  Skin: Negative for rash  Allergic/Immunologic: Negative for immunocompromised state  Neurological: Negative for dizziness and headaches  Hematological: Negative for adenopathy  Does not bruise/bleed easily  Psychiatric/Behavioral: Negative for agitation  The patient is not nervous/anxious  Objective:  Nursing Rounds:  Ambulating well  Compliant with nursing staff  Out of bed to the chair  Vitals: Blood pressure 142/73, pulse 81, temperature 99 2 °F (37 3 °C), temperature source Tympanic, resp  rate 18, height 6' (1 829 m), weight 86 2 kg (190 lb), SpO2 95 %  ,Body mass index is 25 77 kg/m²  Intake/Output Summary (Last 24 hours) at 3/4/2019 1637  Last data filed at 3/4/2019 1033  Gross per 24 hour   Intake 1320 ml   Output 2365 ml   Net -1045 ml     Invasive Devices     Peripheral Intravenous Line            Peripheral IV 03/03/19 Left Wrist 1 day          Drain            Closed/Suction Drain Left Abdomen Bulb 10 Fr  7 days    Urostomy Ileal conduit RLQ 7 days                Physical Exam   Constitutional: He is oriented to person, place, and time  He appears well-developed and well-nourished  He is cooperative  He does not appear ill  No distress  66-year-old male, out of bed to the chair  No acute distress  HENT:   Head: Normocephalic and atraumatic  Moist mucous membranes  Eyes: Conjunctivae and EOM are normal    Neck: Normal range of motion  Neck supple  No tracheal deviation present  Cardiovascular: Regular rhythm and normal heart sounds     No murmur heard   Slight tachycardia noted   Pulmonary/Chest: Effort normal and breath sounds normal  No respiratory distress  He has no wheezes  Good airflow bilaterally on deep inspiration  Abdominal: He exhibits distension (Distention with mild tympany)  He exhibits no mass  There is tenderness ( expected becky-incisional tenderness in the lower midline)  There is no rebound and no guarding  Abdomen slightly distended with tympany  Hypoactive bowel sounds present into a 4 quadrants  Expected becky-incisional tenderness in the lower midline at the site of his staples and incision  Right-sided urostomy pink and viable with clear yellow urine draining  Left-sided LENNOX with serosanguineous drainage  Left-sided LENNOX drain removed without event  Patient tolerated this well  Musculoskeletal: Normal range of motion  He exhibits no edema  Neurological: He is alert and oriented to person, place, and time  Skin: Skin is warm and dry  No rash noted  He is not diaphoretic  No erythema  No pallor  Psychiatric: He has a normal mood and affect  His behavior is normal  Judgment and thought content normal    Nursing note and vitals reviewed        History:    Past Medical History:   Diagnosis Date    Aneurysm ([address] )     Behind left knee recently diagnosed    Back pain     Ceron disease     Ceron's disease     BPH (benign prostatic hyperplasia)     Cancer (HCC)     melanoma    Cataract     right eye    Cataract     right eye    Confusion     DDD (degenerative disc disease), lumbar     Depression     Diverticulosis     Gallstones     GERD (gastroesophageal reflux disease)     History of cardiac murmur     Past    History of melanoma     Was on back in early 1990's    History of rheumatic fever     Childhood    History of transfusion     Nov 2018    [facility] (hard of hearing)     Hydronephrosis     Hypertension     Kidney stone     Multiple times    Neck pain     Nervous breakdown     History of due to reaction to psych med which he was on for anxiety/depression and became suicidal    Numbness and tingling in both hands     Numbness and tingling of both feet     PONV (postoperative nausea and vomiting)     PVD (peripheral vascular disease) ([address] )     RBBB     Scoliosis     Seasonal allergies     Tinnitus     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[address] )     Wears partial dentures     Upper    Wears partial dentures     Upper     Past Surgical History:   Procedure Laterality Date    ABDOMINAL SURGERY      When young had procedure for improviing circulation to left lower extremety    BACK SURGERY      Lumbar- not sure what was done   Earjonatan Garcia CARDIAC CATHETERIZATION      Approximately 2007- no blockages    CARPAL TUNNEL RELEASE Bilateral     wrists are fused    CATARACT EXTRACTION Left     COLONOSCOPY      CYST REMOVAL      Robotic procedure to remove benign cyst from lower left lung    CYSTOGRAM N/A 3/14/2018    Procedure: CYSTOGRAM;  Surgeon: Alexis Santoro MD;  [facility];  Service: Urology    CYSTOSCOPY      ESOPHAGOGASTRODUODENOSCOPY      IR TUBE PLACEMENT NEPHROSTOMY  8/10/2018    LUNG SURGERY      cyst removed left lung    OTHER SURGICAL HISTORY Left     fistula drain in left buttock    TN CYSTO/URETERO W/LITHOTRIPSY &INDWELL STENT INSRT Left 1/3/2018    Procedure: CYSTOSCOPY URETEROSCOPY, RETROGRADE PYELOGRAM AND INSERTION STENT URETERAL;  Surgeon: Alexis Santoro MD;  [facility];  Service: Urology    TN CYSTOTOMY,EXCIS BLADDER TIC N/A 2/14/2018    Procedure: ABDOMINAL EXPLORATION; CLOSURE OF BLADDER DIVERTICULITIS;  Surgeon: Alexis Santoro MD;  [facility];  Service: Urology    TN CYSTOURETHROSCOPY,URETER CATHETER Left 8/1/2018    Procedure: Cystoscopy, cystogram, bladder biopsies, removal of pelvic drain;  Surgeon: Alexis Santoro MD;  [facility];  Service: Urology    TN INCISE/DRAIN BLADDER N/A 2/14/2018    Procedure: OPEN SUPRAPUBIC TUBE PLACEMENT;  Surgeon: Alexis Santoro MD; [facility];  Service: Urology    DC RELEASE URETER,RETROPER FIBROSIS Left 2018    Procedure: LYSIS OF ADHESIONS; URETEROLYSIS ;  Surgeon: Meg Roman MD;  [facility];  Service: Urology     [facility] BLADDER/NODES,ILEAL CONDUIT N/A 2019    Procedure: ATTEMPTED CYSTECTOMY RADICAL; ILEAL CONDUIT URINARY DIVERSION; BIOPSY OF PELVIC MASS; APPENDECTOMY;  Surgeon: Meg Roman MD;  [facility];  Service: Urology    SKIN CANCER EXCISION      Melanoma removal on back in early     TOE AMPUTATION Left     All 5 toes left foot were amputated due to poor circulation     TRANSURETHRAL RESECTION OF PROSTATE N/A 3/14/2018    Procedure: TRANSURETHRAL RESECTION OF PROSTATE (TURP), LEFT URETERAL STENT REMOVAL;  Surgeon: Meg Roman MD;  [facility];  Service: Urology    TRANSURETHRAL RESECTION OF PROSTATE N/A 2018    Procedure: TRANSURETHRAL RESECTION OF PROSTATE (TURP);   Surgeon: Meg Roman MD;  [facility];  Service: Urology    WRIST SURGERY Bilateral     Ulnar nerve on right arm and both wrists are fused     Family History   Problem Relation Age of Onset    Heart disease Father     Heart disease Mother     Cancer Paternal Grandfather      Social History     Socioeconomic History    Marital status: Common Law     Spouse name: None    Number of children: None    Years of education: None    Highest education level: None   Occupational History    None   Social Needs    Financial resource strain: None    Food insecurity:     Worry: None     Inability: None    Transportation needs:     Medical: None     Non-medical: None   Tobacco Use    Smoking status: Former Smoker     Packs/day: 1 00     Years: 15 00     Pack years: 15 00     Types: Cigarettes     Last attempt to quit: 1970     Years since quittin 1    Smokeless tobacco: Never Used    Tobacco comment: Quit 50 years   Substance and Sexual Activity    Alcohol use: Yes     Comment: Very rare    Drug use: No    Sexual activity: None   Lifestyle    Physical activity:     Days per week: None     Minutes per session: None    Stress: None   Relationships    Social connections:     Talks on phone: None     Gets together: None     Attends [employer] service: None     Active member of club or organization: None     Attends meetings of clubs or organizations: None     Relationship status: None    Intimate partner violence:     Fear of current or ex partner: None     Emotionally abused: None     Physically abused: None     Forced sexual activity: None   Other Topics Concern    None   Social History Narrative    None       Imaging:  KUB reviewed, both report and images  Essentially unchanged  Ileus  Nonobstructive bowel gas pattern  Imaging reviewed - both report and images personally reviewed       Labs:  Recent Labs     03/04/19  0456   WBC 6 90       Recent Labs     03/04/19  0456   HGB 8 9*     Recent Labs     03/04/19  0456   HCT 27 1*     Recent Labs     03/04/19  0456   CREATININE 1 05     Ruth Sales PA-C  Date: 3/4/2019 Time: 4:37 PM

## 2019-03-04 NOTE — ASSESSMENT & PLAN NOTE
Potassium 2 4 this morning  Repletion with 90 mg of once orally today  Recheck potassium tomorrow morning

## 2019-03-04 NOTE — PHYSICAL THERAPY NOTE
Physical Therapy Treatment Note     03/04/19 1558   Pain Assessment   Pain Assessment FLACC   Pain Type Surgical pain   Pain Location Abdomen   Pain Orientation Bilateral   Response to Interventions tolerated   Pain Rating: FLACC (Rest) - Face 0   Pain Rating: FLACC (Rest) - Legs 0   Pain Rating: FLACC (Rest) - Activity 0   Pain Rating: FLACC (Rest) - Cry 0   Pain Rating: FLACC (Rest) - Consolability 0   Score: FLACC (Rest) 0   Pain Rating: FLACC (Activity) - Face 0   Pain Rating: FLACC (Activity) - Legs 0   Pain Rating: FLACC (Activity) - Activity 1   Pain Rating: FLACC (Activity) - Cry 1   Pain Rating: FLACC (Activity) - Consolability 0   Score: FLACC (Activity) 2   Restrictions/Precautions   Other Precautions Multiple lines; Fall Risk;Pain;Hard of hearing  (khadra drain abdomen, (+) moreira bag, ileostomy)   General   Chart Reviewed Yes   Response to Previous Treatment Patient with no complaints from previous session  Family/Caregiver Present Yes  (daughter)   Cognition   Overall Cognitive Status WFL   Arousal/Participation Alert; Responsive; Cooperative   Attention Within functional limits   Orientation Level Oriented to person;Oriented to place;Oriented to situation  (thoughtit was Tuesday Not Monday)   Memory Decreased recall of precautions   Following Commands Follows multistep commands with increased time or repetition   Subjective   Subjective PT in bed reports needing to be changed as he has a leak somewhere '  pt offerd no c/o pain  Pt agreeable to PT  Bed Mobility   Supine to Sit 4  Minimal assistance   Additional items Assist x 1;HOB elevated; Bedrails; Increased time required;Verbal cues;LE management   Additional Comments cues for techniques   Transfers   Sit to Stand 4  Minimal assistance   Additional items Assist x 1; Armrests; Increased time required;Verbal cues   Stand to Sit 4  Minimal assistance   Additional items Assist x 1; Armrests; Increased time required;Verbal cues   Additional Comments cues for techniques  Pt performed static, dynamic standing tolerance activities x 10 minutes whle gown changed,and sioled depends doffed and clean depends donned  with max assist x1  Ambulation/Elevation   Gait pattern Forward Flexion; Wide BELLA; Inconsistent kieran; Short stride; Excessively slow  (cues to step up into walker bounds and for improved posture)   Gait Assistance 4  Minimal assist   Additional items Assist x 1;Verbal cues   Assistive Device Rolling walker   Distance 200'x1, frequent stop starts as pt  needing to let go of walker to itch nose  Balance   Static Sitting Good   Static Standing Fair  (with support of rw   )   Dynamic Standing Fair -  (with support of rw)   Ambulatory Fair -   Endurance Deficit   Endurance Deficit Description pain, fatigue, weakness   Activity Tolerance   Activity Tolerance Patient limited by pain; Patient limited by fatigue   Nurse Made Aware dalila DESAI   Assessment   Prognosis Fair   Problem List Decreased strength;Decreased range of motion;Decreased endurance; Impaired balance;Decreased mobility; Decreased safety awareness;Decreased skin integrity;Pain; Impaired hearing   Assessment Pt rec'd in bed upon arrival   Pt's gown and bedding soiled with urine, pt reports needing to bed changed and cleaned- up  Pt demonstrating improved ease of bed Mobility, transfers and ambulation  Pt continues to require min assist for bed mobility, however less time required to perform  Pt continues to require use of bed rail to assist in performing bed mobility with head of bed elevated  Pt performs sit to stand with min assist from bed level and chair level with 2 attempts from chair required and cues for handplacement and techniques  Pt progressed with ambulation distances to 200' with min assist with inconsistent kieran due to frequent stop starts as pt needing to scratch nose    Pt  Requires frequent verbal cues to step up into walker bounds to maintain close distance to walker at all times  Pt requires cues to maintain  on walker at all times as pt tends to let go of walker with one hand while ambulating with use of RW  Pt demonstrates stable static standing balance with support of rw while performing gown and depend change  Pt remained seated out of bed in chair at conclusion of PT session while eating supper with call bell in reach  Overall noted improved tolerance to activity this session and less assistance for ambulation and transfers  should pt continue to improve and make progress with all aspects of mobility feel pt may be able to return home at d/c with family support and assistance and HHPT, however if not then recommend d/c to STR  Goals   Patient Goals " to get better "     STG Expiration Date 03/08/19   Treatment Day 4   Plan   Treatment/Interventions Functional transfer training;LE strengthening/ROM; Elevations; Therapeutic exercise; Endurance training;Patient/family training;Equipment eval/education; Bed mobility;Gait training;Spoke to nursing   Progress Progressing toward goals   Recommendation   Recommendation Home PT; Home with family support; Short-term skilled PT  (anticipate pt to progress and return home at d/c, if not STR)   PT - OK to Discharge No  (must achieve Pt goals prior to d/c home  )         Yvrose Rosas, PTA

## 2019-03-04 NOTE — PLAN OF CARE
Problem: Potential for Falls  Goal: Patient will remain free of falls  Description  INTERVENTIONS:  - Assess patient frequently for physical needs  -  Identify cognitive and physical deficits and behaviors that affect risk of falls  -  [place] fall precautions as indicated by assessment   - Educate patient/family on patient safety including physical limitations  - Instruct patient to call for assistance with activity based on assessment  - Modify environment to reduce risk of injury  - Consider OT/PT consult to assist with strengthening/mobility  Outcome: Progressing     Problem: Nutrition/Hydration-ADULT  Goal: Nutrient/Hydration intake appropriate for improving, restoring or maintaining nutritional needs  Description  Monitor and assess patient's nutrition/hydration status for malnutrition (ex- brittle hair, bruises, dry skin, pale skin and conjunctiva, muscle wasting, smooth red tongue, and disorientation)  Collaborate with interdisciplinary team and initiate plan and interventions as ordered  Monitor patient's weight and dietary intake as ordered or per policy  Utilize nutrition screening tool and intervene per policy  Determine patient's food preferences and provide high-protein, high-caloric foods as appropriate       INTERVENTIONS:  - Monitor oral intake, urinary output, labs, and treatment plans  - Assess nutrition and hydration status and recommend course of action  - Evaluate amount of meals eaten  - Assist patient with eating if necessary   - Allow adequate time for meals  - Recommend/ encourage appropriate diets, oral nutritional supplements, and vitamin/mineral supplements  - Order, calculate, and assess calorie counts as needed  - Recommend, monitor, and adjust tube feedings and TPN/PPN based on assessed needs  - Assess need for intravenous fluids  - Provide specific nutrition/hydration education as appropriate  - Include patient/family/caregiver in decisions related to nutrition  Outcome: Progressing     Problem: Prexisting or High Potential for Compromised Skin Integrity  Goal: Skin integrity is maintained or improved  Description  INTERVENTIONS:  - Identify patients at risk for skin breakdown  - Assess and monitor skin integrity  - Assess and monitor nutrition and hydration status  - Monitor labs (i e  albumin)  - Assess for incontinence   - Turn and reposition patient  - Assist with mobility/ambulation  - Relieve pressure over bony prominences  - Avoid friction and shearing  - Provide appropriate hygiene as needed including keeping skin clean and dry  - Evaluate need for skin moisturizer/barrier cream  - Collaborate with interdisciplinary team (i e  Nutrition, Rehabilitation, etc )   - Patient/family teaching  Outcome: Progressing     Problem: DISCHARGE PLANNING - CARE MANAGEMENT  Goal: Discharge to post-acute care or home with appropriate resources  Description  INTERVENTIONS:  - Conduct assessment to determine patient/family and health care team treatment goals, and need for post-acute services based on payer coverage, community resources, and patient preferences, and barriers to discharge  - Address psychosocial, clinical, and financial barriers to discharge as identified in assessment in conjunction with the patient/family and health care team  - Arrange appropriate level of post-acute services according to patient?s   needs and preference and payer coverage in collaboration with the physician and health care team  - Communicate with and update the patient/family, physician, and health care team regarding progress on the discharge plan  - Arrange appropriate transportation to post-acute venues  Outcome: Progressing

## 2019-03-04 NOTE — PLAN OF CARE
Problem: PHYSICAL THERAPY ADULT  Goal: Performs mobility at highest level of function for planned discharge setting  See evaluation for individualized goals  Description  Treatment/Interventions: Functional transfer training, LE strengthening/ROM, Elevations, Therapeutic exercise, Endurance training, Patient/family training, Bed mobility, Gait training, Spoke to nursing, Family  Equipment Recommended: Jenny Socks       See flowsheet documentation for full assessment, interventions and recommendations  Outcome: Progressing  Note:   Prognosis: Fair  Problem List: Decreased strength, Decreased range of motion, Decreased endurance, Impaired balance, Decreased mobility, Decreased safety awareness, Decreased skin integrity, Pain, Impaired hearing  Assessment: Pt rec'd in bed upon arrival   Pt's gown and bedding soiled with urine, pt reports needing to bed changed and cleaned- up  Pt demonstrating improved ease of bed Mobility, transfers and ambulation  Pt continues to require min assist for bed mobility, however less time required to perform  Pt continues to require use of bed rail to assist in performing bed mobility with head of bed elevated  Pt performs sit to stand with min assist from bed level and chair level with 2 attempts from chair required and cues for handplacement and techniques  Pt progressed with ambulation distances to 200' with min assist with inconsistent kieran due to frequent stop starts as pt needing to scratch nose  Pt  Requires frequent verbal cues to step up into walker bounds to maintain close distance to walker at all times  Pt requires cues to maintain  on walker at all times as pt tends to let go of walker with one hand while ambulating with use of RW  Pt demonstrates stable static standing balance with support of rw while performing gown and depend change  Pt remained seated out of bed in chair at conclusion of PT session while eating supper with call bell in reach   Overall noted improved tolerance to activity this session and less assistance for ambulation and transfers  should pt continue to improve and make progress with all aspects of mobility feel pt may be able to return home at d/c with family support and assistance and HHPT, however if not then recommend d/c to STR  Barriers to Discharge: Decreased caregiver support, Inaccessible home environment  Barriers to Discharge Comments: PRAKASH, home alone   Recommendation: Home PT, Home with family support(anticipate pt to continue to progress and return home at d/c)     PT - OK to Discharge: No(must achieve Pt goals prior to d/c home  )    See flowsheet documentation for full assessment

## 2019-03-05 LAB
ALBUMIN SERPL BCP-MCNC: 1.6 G/DL (ref 3.5–5)
ALP SERPL-CCNC: 59 U/L (ref 46–116)
ALT SERPL W P-5'-P-CCNC: 6 U/L (ref 12–78)
ANION GAP SERPL CALCULATED.3IONS-SCNC: 7 MMOL/L (ref 4–13)
AST SERPL W P-5'-P-CCNC: 15 U/L (ref 5–45)
BILIRUB DIRECT SERPL-MCNC: 0.16 MG/DL (ref 0–0.2)
BILIRUB SERPL-MCNC: 0.29 MG/DL (ref 0.2–1)
BUN SERPL-MCNC: 7 MG/DL (ref 5–25)
CALCIUM SERPL-MCNC: 8 MG/DL (ref 8.3–10.1)
CHLORIDE SERPL-SCNC: 101 MMOL/L (ref 100–108)
CO2 SERPL-SCNC: 30 MMOL/L (ref 21–32)
CREAT SERPL-MCNC: 1.06 MG/DL (ref 0.6–1.3)
ERYTHROCYTE [DISTWIDTH] IN BLOOD BY AUTOMATED COUNT: 13.4 % (ref 11.6–15.1)
GFR SERPL CREATININE-BSD FRML MDRD: 69 ML/MIN/1.73SQ M
GLUCOSE SERPL-MCNC: 94 MG/DL (ref 65–140)
HCT VFR BLD AUTO: 27.9 % (ref 36.5–49.3)
HGB BLD-MCNC: 9.3 G/DL (ref 12–17)
MAGNESIUM SERPL-MCNC: 1.4 MG/DL (ref 1.6–2.6)
MCH RBC QN AUTO: 31.3 PG (ref 26.8–34.3)
MCHC RBC AUTO-ENTMCNC: 33.3 G/DL (ref 31.4–37.4)
MCV RBC AUTO: 94 FL (ref 82–98)
PLATELET # BLD AUTO: 299 THOUSANDS/UL (ref 149–390)
PMV BLD AUTO: 8.4 FL (ref 8.9–12.7)
POTASSIUM SERPL-SCNC: 2.8 MMOL/L (ref 3.5–5.3)
PROT SERPL-MCNC: 5.3 G/DL (ref 6.4–8.2)
RBC # BLD AUTO: 2.97 MILLION/UL (ref 3.88–5.62)
SODIUM SERPL-SCNC: 138 MMOL/L (ref 136–145)
TRIGL SERPL-MCNC: 106 MG/DL
WBC # BLD AUTO: 6.63 THOUSAND/UL (ref 4.31–10.16)

## 2019-03-05 PROCEDURE — 36569 INSJ PICC 5 YR+ W/O IMAGING: CPT

## 2019-03-05 PROCEDURE — 97116 GAIT TRAINING THERAPY: CPT

## 2019-03-05 PROCEDURE — 80048 BASIC METABOLIC PNL TOTAL CA: CPT | Performed by: UROLOGY

## 2019-03-05 PROCEDURE — C1751 CATH, INF, PER/CENT/MIDLINE: HCPCS

## 2019-03-05 PROCEDURE — 84478 ASSAY OF TRIGLYCERIDES: CPT | Performed by: HOSPITALIST

## 2019-03-05 PROCEDURE — 83735 ASSAY OF MAGNESIUM: CPT | Performed by: PHYSICIAN ASSISTANT

## 2019-03-05 PROCEDURE — 80076 HEPATIC FUNCTION PANEL: CPT | Performed by: HOSPITALIST

## 2019-03-05 PROCEDURE — 02HV33Z INSERTION OF INFUSION DEVICE INTO SUPERIOR VENA CAVA, PERCUTANEOUS APPROACH: ICD-10-PCS | Performed by: UROLOGY

## 2019-03-05 PROCEDURE — 3E0436Z INTRODUCTION OF NUTRITIONAL SUBSTANCE INTO CENTRAL VEIN, PERCUTANEOUS APPROACH: ICD-10-PCS | Performed by: UROLOGY

## 2019-03-05 PROCEDURE — 99232 SBSQ HOSP IP/OBS MODERATE 35: CPT | Performed by: SURGERY

## 2019-03-05 PROCEDURE — 99024 POSTOP FOLLOW-UP VISIT: CPT | Performed by: UROLOGY

## 2019-03-05 PROCEDURE — 99222 1ST HOSP IP/OBS MODERATE 55: CPT | Performed by: HOSPITALIST

## 2019-03-05 PROCEDURE — 85027 COMPLETE CBC AUTOMATED: CPT | Performed by: UROLOGY

## 2019-03-05 PROCEDURE — 97530 THERAPEUTIC ACTIVITIES: CPT

## 2019-03-05 PROCEDURE — 97110 THERAPEUTIC EXERCISES: CPT

## 2019-03-05 RX ORDER — MAGNESIUM SULFATE HEPTAHYDRATE 40 MG/ML
4 INJECTION, SOLUTION INTRAVENOUS ONCE
Status: COMPLETED | OUTPATIENT
Start: 2019-03-05 | End: 2019-03-06

## 2019-03-05 RX ORDER — MAGNESIUM SULFATE HEPTAHYDRATE 40 MG/ML
2 INJECTION, SOLUTION INTRAVENOUS ONCE
Status: COMPLETED | OUTPATIENT
Start: 2019-03-05 | End: 2019-03-05

## 2019-03-05 RX ORDER — HYDRALAZINE HYDROCHLORIDE 20 MG/ML
5 INJECTION INTRAMUSCULAR; INTRAVENOUS EVERY 6 HOURS PRN
Status: DISCONTINUED | OUTPATIENT
Start: 2019-03-05 | End: 2019-03-08 | Stop reason: HOSPADM

## 2019-03-05 RX ORDER — AMLODIPINE BESYLATE 5 MG/1
5 TABLET ORAL DAILY
Status: DISCONTINUED | OUTPATIENT
Start: 2019-03-05 | End: 2019-03-08 | Stop reason: HOSPADM

## 2019-03-05 RX ORDER — POTASSIUM CHLORIDE 14.9 MG/ML
20 INJECTION INTRAVENOUS
Status: DISCONTINUED | OUTPATIENT
Start: 2019-03-05 | End: 2019-03-05 | Stop reason: SDUPTHER

## 2019-03-05 RX ORDER — POTASSIUM CHLORIDE 14.9 MG/ML
20 INJECTION INTRAVENOUS
Status: COMPLETED | OUTPATIENT
Start: 2019-03-05 | End: 2019-03-06

## 2019-03-05 RX ADMIN — CALCIUM GLUCONATE: 94 INJECTION, SOLUTION INTRAVENOUS at 21:20

## 2019-03-05 RX ADMIN — VALPROATE SODIUM 250 MG: 100 INJECTION, SOLUTION INTRAVENOUS at 06:24

## 2019-03-05 RX ADMIN — PANTOPRAZOLE SODIUM 40 MG: 40 TABLET, DELAYED RELEASE ORAL at 08:43

## 2019-03-05 RX ADMIN — VALPROATE SODIUM 250 MG: 100 INJECTION, SOLUTION INTRAVENOUS at 21:28

## 2019-03-05 RX ADMIN — TOBRAMYCIN AND DEXAMETHASONE 1 DROP: 3; 1 SUSPENSION/ DROPS OPHTHALMIC at 23:51

## 2019-03-05 RX ADMIN — DEXTROSE AND SODIUM CHLORIDE 75 ML/HR: 5; .45 INJECTION, SOLUTION INTRAVENOUS at 11:53

## 2019-03-05 RX ADMIN — PANTOPRAZOLE SODIUM 40 MG: 40 TABLET, DELAYED RELEASE ORAL at 21:23

## 2019-03-05 RX ADMIN — POTASSIUM CHLORIDE 20 MEQ: 200 INJECTION, SOLUTION INTRAVENOUS at 23:45

## 2019-03-05 RX ADMIN — SIMETHICONE CHEW TAB 80 MG 80 MG: 80 TABLET ORAL at 06:28

## 2019-03-05 RX ADMIN — POTASSIUM CHLORIDE 20 MEQ: 200 INJECTION, SOLUTION INTRAVENOUS at 08:43

## 2019-03-05 RX ADMIN — SIMETHICONE CHEW TAB 80 MG 80 MG: 80 TABLET ORAL at 00:50

## 2019-03-05 RX ADMIN — TOBRAMYCIN AND DEXAMETHASONE 1 DROP: 3; 1 SUSPENSION/ DROPS OPHTHALMIC at 06:23

## 2019-03-05 RX ADMIN — HEPARIN SODIUM 5000 UNITS: 5000 INJECTION INTRAVENOUS; SUBCUTANEOUS at 21:22

## 2019-03-05 RX ADMIN — VALPROATE SODIUM 250 MG: 100 INJECTION, SOLUTION INTRAVENOUS at 00:42

## 2019-03-05 RX ADMIN — TOBRAMYCIN AND DEXAMETHASONE 1 DROP: 3; 1 SUSPENSION/ DROPS OPHTHALMIC at 00:50

## 2019-03-05 RX ADMIN — TOBRAMYCIN AND DEXAMETHASONE 1 DROP: 3; 1 SUSPENSION/ DROPS OPHTHALMIC at 17:35

## 2019-03-05 RX ADMIN — TOBRAMYCIN AND DEXAMETHASONE 1 DROP: 3; 1 SUSPENSION/ DROPS OPHTHALMIC at 11:46

## 2019-03-05 RX ADMIN — AMLODIPINE BESYLATE 5 MG: 5 TABLET ORAL at 16:18

## 2019-03-05 RX ADMIN — POTASSIUM CHLORIDE 20 MEQ: 200 INJECTION, SOLUTION INTRAVENOUS at 16:18

## 2019-03-05 RX ADMIN — DEXTROSE AND SODIUM CHLORIDE 75 ML/HR: 5; .45 INJECTION, SOLUTION INTRAVENOUS at 23:52

## 2019-03-05 RX ADMIN — DEXTROSE AND SODIUM CHLORIDE 75 ML/HR: 5; .45 INJECTION, SOLUTION INTRAVENOUS at 03:56

## 2019-03-05 RX ADMIN — MAGNESIUM SULFATE HEPTAHYDRATE 2 G: 40 INJECTION, SOLUTION INTRAVENOUS at 12:01

## 2019-03-05 RX ADMIN — HEPARIN SODIUM 5000 UNITS: 5000 INJECTION INTRAVENOUS; SUBCUTANEOUS at 13:56

## 2019-03-05 RX ADMIN — HEPARIN SODIUM 5000 UNITS: 5000 INJECTION INTRAVENOUS; SUBCUTANEOUS at 06:23

## 2019-03-05 RX ADMIN — POTASSIUM CHLORIDE 20 MEQ: 200 INJECTION, SOLUTION INTRAVENOUS at 18:55

## 2019-03-05 RX ADMIN — VALPROATE SODIUM 250 MG: 100 INJECTION, SOLUTION INTRAVENOUS at 13:52

## 2019-03-05 NOTE — PROGRESS NOTES
Can trial ensure enlive, initiate calorie count, if plans to initiate TPN, replete electrolytes, start w/ standard TPN, will monitor po intake and provide TPN goal recs as needed  Current nutrition protocol is no therefore RD not able to add supplement at this time, please add ensure enlive w/ meals (if pt able to tolerate 3 a day this would provide 60g pro, 1050cal)

## 2019-03-05 NOTE — PLAN OF CARE
Problem: Nutrition/Hydration-ADULT  Goal: Nutrient/Hydration intake appropriate for improving, restoring or maintaining nutritional needs  Description  Monitor and assess patient's nutrition/hydration status for malnutrition (ex- brittle hair, bruises, dry skin, pale skin and conjunctiva, muscle wasting, smooth red tongue, and disorientation)  Collaborate with interdisciplinary team and initiate plan and interventions as ordered  Monitor patient's weight and dietary intake as ordered or per policy  Utilize nutrition screening tool and intervene per policy  Determine patient's food preferences and provide high-protein, high-caloric foods as appropriate  INTERVENTIONS:  - Monitor oral intake, urinary output, labs, and treatment plans  - Assess nutrition and hydration status and recommend course of action  - Evaluate amount of meals eaten  - Assist patient with eating if necessary   - Allow adequate time for meals  - Recommend/ encourage appropriate diets, oral nutritional supplements, and vitamin/mineral supplements  - Order, calculate, and assess calorie counts as needed  - Recommend, monitor, and adjust tube feedings and TPN/PPN based on assessed needs  - Assess need for intravenous fluids  - Provide specific nutrition/hydration education as appropriate  - Include patient/family/caregiver in decisions related to nutrition  Outcome: Progressing  Diet just advanced to regular  Discussed ideas for light meals to start with  Can trial ensure enlive, initiate calorie count, if plans to initiate TPN, replete electrolytes, start w/ standard TPN, will monitor po intake and provide TPN goal recs as needed  Current nutrition protocol is no therefore RD not able to add supplement at this time, please add ensure enlive w/ meals (if pt able to tolerate 3 a day this would provide 60g pro, 1050cal)

## 2019-03-05 NOTE — PROGRESS NOTES
Progress Note - General Surgery   Ambrocio Mars 68 y o  male MRN: [id number]  Unit/Bed#: E2 -17 Encounter: [id number]    Assessment/Plan:  Ambrocio Mars is doing stable  Malignant neoplasm of prostate postop day 8  Status post ileal conduit biopsy of pelvic mass and appendectomy    1  Postoperative ileus, resolving  -bowels functional  -advance diet per primary team  -replete electrolytes as needed  -encourage ambulation  -will follow as needed    Hypokalemia  -replete as needed    Hypo magnesium  -replete as needed    2  VTE Prophylaxis   Pharmacologic: Heparin   Mechanical: sequential compression device    Subjective:  - Pain: denies  - Current diet: Clear liquid  - no nausea and no vomiting  - + BM and + Flatus  - Using incentive spirometer and Incentive spirometer within reach  - Laying in bed   -       Objective:     Vitals:   Vitals:    03/04/19 1100 03/04/19 1500 03/04/19 2334 03/05/19 0728   BP: 136/69 142/73 153/91 (!) 178/79   BP Location: Right arm Right arm Right arm Right arm   Pulse: 79 81 85 84   Resp: 18 18 18 18   Temp: 99 1 °F (37 3 °C) 99 2 °F (37 3 °C) 99 6 °F (37 6 °C) 98 °F (36 7 °C)   TempSrc: Tympanic Tympanic Tympanic Tympanic   SpO2: 95% 95% 96% 98%   Weight:       Height:           I/O:   I/O       03/03 0701 - 03/04 0700 03/04 0701 - 03/05 0700 03/05 0701 - 03/06 0700    P  O  120      I V  (mL/kg) 1578 8 (18 3) 600 (7)     IV Piggyback 150 150     Total Intake(mL/kg) 1848 8 (21 4) 750 (8 7)     Urine (mL/kg/hr) 2050 (1) 2875 (1 4)     Drains 15      Stool  0     Total Output 2065 2875     Net -216 3 -2125            Unmeasured Stool Occurrence  1 x 1 x          Labs:  [unfilled]    Results from last 7 days   Lab Units 03/05/19  0555 03/04/19  0456 03/01/19  0443 02/28/19  0511 02/27/19  0503   SODIUM mmol/L 138 134* 139 137 135*   POTASSIUM mmol/L 2 8* 2 4* 3 9 4 2 4 3   CHLORIDE mmol/L 101 99* 106 105 103   CO2 mmol/L 30 27 25 26 24   BUN mg/dL 7 10 29* 23 20 CREATININE mg/dL 1 06 1 05 1 27 1 19 1 38*   CALCIUM mg/dL 8 0* 7 8* 9 0 8 7 8 3   ALK PHOS U/L  --   --   --  53 52   ALT U/L  --   --   --  7* 11*   AST U/L  --   --   --  15 14             Imaging: No new pertinent imaging studies  Exam:  General: alert, appears stated age and no distress  HEENT: Head: Normocephalic, no lesions, without obvious abnormality  Eyes: PERRL, EOM's intact  Fundi benign  , conjunctivae/corneas clear  Neck: normal, supple, no adenopathy and trachea midline  Skin: no rashes, no jaundice  Chest: Normal chest wall and respirations  Clear to auscultation  Heart[de-identified] regular rate and rhythm, S1, S2 normal, no murmur, click, rub or gallop  Abdomen: abdomen is soft without significant tenderness, masses, organomegaly or guarding Bowel sounds hypoactive  Ileo conduit in right lower quadrant functional   Abdominal incision clean, dry ,intact  Extremities: no redness or tenderness in the calves or thighs, no edema      Some portions of this records may have been generated with voice recognition software  There may be translation, syntax,  or grammatical errors  Occasional wrong word or "sound-a-like" substitutions may have occurred due to the inherent limitations of the voice recognition software  Read the chart carefully and recognize, using context, where substations may have occurred  If you have any questions, please contact the dictating provider for clarification or correction, as needed          Juanito Gruber PA-C  3/5/2019

## 2019-03-05 NOTE — WOUND OSTOMY CARE
Progress Note- Valeria Lynn 68 y o  male  [id number]  [facility] -E2 -01        Assessment:  Patient seen for wound care follow-up  Reports gas pain, belching  Denies nausea  Passing small amount of gas  Abdomen remains softly distended and tender  Left LENNOX drain has been removed by urology team with dressing dry and intact  Midline abdominal incision--approximated with staples, open to air   No drainage     Urostomy/ileal conduit--stoma is oval, very slightly budded (almost flush with abdomen) measuring 1 x 1 1/2 inches, deep red   Mucocutaneous junction and peristomal skin are intact   Clear yellow urine draining from both stents  Ostomy pouching system leaking medially at 3 o'clock (new abdominal crease post-op)   Pouching system changed with 1/2 a 4 inch Nena ring placed from 3 to 9 o'clock just under the stoma prior to pouch application and strip paste placed along 3 o'clock skin fold  Patient tolerated well  Significant other at bedside  Reviewed steps of ostomy pouching system change with patient and family throughout change         Dr Catarina Reeves at bedside during assessment--discussed leaking issues, ok to use light convexity with pouching if needed        Plan:  Ongoing ostomy assessment and education       Patient should be discharged home with visiting nurses      Objective:    Wound 02/25/19 Incision Abdomen Right;Mid (Active)   Wound Description Clean;Dry 3/4/2019  6:45 PM   Melly-wound Assessment Clean;Dry; Intact; Erythema 3/4/2019  6:45 PM   Tunneling 0 cm 2/27/2019 11:45 AM   Undermining 0 2/27/2019 11:45 AM   Closure Staples; Approximated 3/4/2019  6:45 PM   Drainage Amount None 3/4/2019  6:45 PM   Drainage Description Clear 3/2/2019  7:40 PM   Treatments Cleansed 3/4/2019  6:45 PM   Dressing Open to air 3/4/2019  6:45 PM   Wound packed?  No 3/3/2019  8:00 AM   Dressing Changed Changed by provider 2/27/2019  8:00 AM   Patient Tolerance Tolerated well 3/4/2019  6:45 PM Dressing Status Clean;Dry; Intact 3/3/2019  8:20 PM   Number of days: 7         Urostomy Ileal conduit RLQ (Active)   Stomal Appliance 2 piece;Leaking; Changed 3/4/2019  6:45 PM   Stoma Assessment Budded;Red 3/4/2019  6:45 PM   Stoma size (in) 1 25 in 2/27/2019 11:26 PM   Stoma Shape Oval;Budded 3/4/2019  6:45 PM   Peristomal Assessment Clean; Intact;Mucocutaneous intact 3/4/2019  6:45 PM   Treatment Bag change;Site care 3/4/2019  6:45 PM   Output (mL) 550 mL 3/4/2019 10:33 AM   Number of days: 8901  [facility]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

## 2019-03-05 NOTE — CONSULTS
Consulted for Medical Management by:  Dr Gan Getting      Assessment/Plan      1-pod 8-status post ideal conduit, biopsy of pelvic mass and appendicectomy-conduit functioning well  Management per primary  2-postoperative ID's which is improving-diet advanced to regular  However patient still nutritionally depleted and will need TPN till nutritional status is adequate  Encourage ambulation  Patient passing flatus  Denies any nausea  Had a bowel movement this morning  3-moderate protein calorie malnutrition-secondary to no oral intake over the past 7 days in a patient with malignant prostate  Patient will require TPN  PICC line placed today  Started on standard TPN regimen  Patient will be discharged home on TPN and follow up with Urology as outpatient  This could be discontinued once patient's nutritional intake is adequate orally  4-essential hypertension with uncontrolled hypertension currently-will add IV hydralazine 5 mg q 6 hours p r n  And start patient on amlodipine 5 mg daily  Blood pressure currently 623 systolic  5-hypokalemia and hypomagnesemia secondary to ideal conduit as well as poor oral intake  Being repleted      History of Present Illness     HPI: Julian Santiago is a 68 y o  male with recently diagnosed prostate and bladder cancer, on chemo since October, history of her good disease status post left TMA, and history of peripheral vascular disease  who is postoperative day 7 status post ideal conduit urinary diversion and subsequently developed postoperative IBS  Patient has had very poor oral intake over the past 7 days and consideration was made to start the patient on TPN  The patient today's able to tolerate clears but it is felt that he would benefit from continue TPN for a period of time till his p o  Intake improves  The patient's wound is clean dry and intact  His belly is less distended today    Ricky was consulted for TPN management given his poor nutritional status  Patient has hypomagnesemia and hypokalemia which is being repleted              Historical Information   Past Medical History:   Diagnosis Date    Aneurysm ([address] )     Behind left knee recently diagnosed    Back pain     Ceron disease     Ceron's disease     BPH (benign prostatic hyperplasia)     Cancer (HCC)     melanoma    Cataract     right eye    Cataract     right eye    Confusion     DDD (degenerative disc disease), lumbar     Depression     Diverticulosis     Gallstones     GERD (gastroesophageal reflux disease)     History of cardiac murmur     Past    History of melanoma     Was on back in early 1990's    History of rheumatic fever     Childhood    History of transfusion     Nov 2018    [facility] (hard of hearing)     Hydronephrosis     Hypertension     Kidney stone     Multiple times    Neck pain     Nervous breakdown     History of due to reaction to psych med which he was on for anxiety/depression and became suicidal    Numbness and tingling in both hands     Numbness and tingling of both feet     PONV (postoperative nausea and vomiting)     PVD (peripheral vascular disease) ([address] )     RBBB     Scoliosis     Seasonal allergies     Tinnitus     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[address] )     Wears partial dentures     Upper    Wears partial dentures     Upper     Patient Active Problem List   Diagnosis    Right bundle melissa block, 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    Aortic regurgitation    BPH with obstruction/lower urinary tract symptoms    Bladder diverticulum    Postoperative ileus ([address] )    Kidney stone    Major depressive disorder, single episode, moderate ([address] )    Peripheral vascular disease ([address] )    Essential hypertension    Acute pain of right shoulder    Benign localized hyperplasia of prostate without urinary obstruction    Urinary retention due to benign prostatic hyperplasia    Hydronephrosis of left kidney    Ureteral stricture, left    Pelvic abscess in male [facility])    Aneurysm of left popliteal artery (HCC)    Urothelial carcinoma (HCC)    Prostate cancer (HCC)    Hypokalemia    Orthostatic lightheadedness    Chemotherapy-induced neutropenia (HCC)    SIRS (systemic inflammatory response syndrome) (HCC)    CKD (chronic kidney disease) stage 3, GFR 30-59 ml/min (HCC)    Acute cystitis without hematuria    Acute on chronic anemia    Moderate protein-calorie malnutrition (HCC)    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 [address]    Malignant neoplasm of prostate ([facility] )    Preop cardiovascular exam     Past Surgical History:   Procedure Laterality Date    ABDOMINAL SURGERY      When young had procedure for improviing circulation to left lower extremety    BACK SURGERY      Lumbar- not sure what was done   [facility] CARDIAC CATHETERIZATION      Approximately 2007- no blockages    CARPAL TUNNEL RELEASE Bilateral     wrists are fused    CATARACT EXTRACTION Left     COLONOSCOPY      CYST REMOVAL      Robotic procedure to remove benign cyst from lower left lung    CYSTOGRAM N/A 3/14/2018    Procedure: CYSTOGRAM;  Surgeon: Eduardo Moya MD;  [facility];  Service: Urology    CYSTOSCOPY      ESOPHAGOGASTRODUODENOSCOPY      IR TUBE PLACEMENT NEPHROSTOMY  8/10/2018    LUNG SURGERY      cyst removed left lung    OTHER SURGICAL HISTORY Left     fistula drain in left buttock    SD CYSTO/URETERO W/LITHOTRIPSY &INDWELL STENT INSRT Left 1/3/2018    Procedure: CYSTOSCOPY URETEROSCOPY, RETROGRADE PYELOGRAM AND INSERTION STENT URETERAL;  Surgeon: Eduardo Moya MD;  [facility];  Service: Urology    SD CYSTOTOMY,EXCIS BLADDER TIC N/A 2/14/2018    Procedure: ABDOMINAL EXPLORATION; CLOSURE OF BLADDER DIVERTICULITIS;  Surgeon: Eduardo Moya MD;  [facility];  Service: Urology    SD CYSTOURETHROSCOPY,URETER CATHETER Left 8/1/2018    Procedure: Cystoscopy, cystogram, bladder biopsies, removal of pelvic drain;  Surgeon: Eduardo Moya MD;  [facility];  Service: Urology    ID INCISE/DRAIN BLADDER N/A 2018    Procedure: OPEN SUPRAPUBIC TUBE PLACEMENT;  Surgeon: Nishi Dorsey MD;  [facility];  Service: Urology    ID RELEASE Madelyn Sheerer FIBROSIS Left 2018    Procedure: LYSIS OF ADHESIONS; URETEROLYSIS ;  Surgeon: Nishi Dorsey MD;  [facility];  Service: Urology     [address] [facility] [address] BLADDER/NODES,ILEAL CONDUIT N/A 2019    Procedure: ATTEMPTED CYSTECTOMY RADICAL; ILEAL CONDUIT URINARY DIVERSION; BIOPSY OF PELVIC MASS; APPENDECTOMY;  Surgeon: Nishi Dorsey MD;  [facility];  Service: Urology    SKIN CANCER EXCISION      Melanoma removal on back in early     TOE AMPUTATION Left     All 5 toes left foot were amputated due to poor circulation     TRANSURETHRAL RESECTION OF PROSTATE N/A 3/14/2018    Procedure: TRANSURETHRAL RESECTION OF PROSTATE (TURP), LEFT URETERAL STENT REMOVAL;  Surgeon: Nishi Dorsey MD;  [facility];  Service: Urology    TRANSURETHRAL RESECTION OF PROSTATE N/A 2018    Procedure: TRANSURETHRAL RESECTION OF PROSTATE (TURP);   Surgeon: Nishi Dorsey MD;  [facility];  Service: Urology    WRIST SURGERY Bilateral     Ulnar nerve on right arm and both wrists are fused       Social History   Social History     Substance and Sexual Activity   Alcohol Use Yes    Comment: Very rare     Social History     Substance and Sexual Activity   Drug Use No     Social History     Tobacco Use   Smoking Status Former Smoker    Packs/day: 1 00    Years: 15 00    Pack years: 15 00    Types: Cigarettes    Last attempt to quit: 1970    Years since quittin 1   Smokeless Tobacco Never Used   Tobacco Comment    Quit 48 years       Family History:   Family History   Problem Relation Age of Onset    Heart disease Father     Heart disease Mother     Cancer Paternal Grandfather        Meds/Allergies       Current Facility-Administered Medications:     Adult 3-in-1 TPN (standard base / standard electrolytes), , Intravenous, Continuous, Jay Shelton MD    dextrose 5 % and sodium chloride 0 45 % infusion, 75 mL/hr, Intravenous, Continuous, Michael Esparza MD, Last Rate: 75 mL/hr at 03/05/19 1153, 75 mL/hr at 03/05/19 1153    docusate sodium (COLACE) capsule 100 mg, 100 mg, Oral, BID, Maryuri Weeks MD, 100 mg at 03/04/19 1620    heparin (porcine) subcutaneous injection 5,000 Units, 5,000 Units, Subcutaneous, Q8H [address], 5,000 Units at 03/05/19 1356 **AND** [CANCELED] Platelet count, , , Once, Maryuri Weeks MD    HYDROcodone-acetaminophen [facility]) 5-325 mg per tablet 1 tablet, 1 tablet, Oral, Q4H PRN, Michael Esparza MD, 1 tablet at 03/04/19 2154    [COMPLETED] magnesium sulfate 2 g/50 mL IVPB (premix) 2 g, 2 g, Intravenous, Once, Last Rate: 25 mL/hr at 03/05/19 1201, 2 g at 03/05/19 1201 **AND** magnesium sulfate 4 g/100 mL IVPB (premix) 4 g, 4 g, Intravenous, Once, Juanito Gruber PA-C    ondansetron [facility]) injection 4 mg, 4 mg, Intravenous, Q6H PRN, Maryuri Weeks MD, 4 mg at 03/01/19 1347    pantoprazole (PROTONIX) EC tablet 40 mg, 40 mg, Oral, Q12H, Michael Esparza MD, 40 mg at 03/05/19 0843    phenol (CHLORASEPTIC) 1 4 % mucosal liquid 1 spray, 1 spray, Mouth/Throat, Q2H PRN, Maryuri Weeks MD    phenol (CHLORASEPTIC) 1 4 % mucosal liquid 1 spray, 1 spray, Mouth/Throat, Q2H PRN, Michael Esparza MD, 1 spray at 03/01/19 2120    potassium chloride 20 mEq IVPB (premix), 20 mEq, Intravenous, Q2H, Anette Correa PA-C, Last Rate: 12 5 mL/hr at 03/05/19 0843, 20 mEq at 03/05/19 0843    simethicone (MYLICON) chewable tablet 80 mg, 80 mg, Oral, 4x Daily PRN, Maryuri Weeks MD, 80 mg at 03/05/19 0628    tobramycin-dexamethasone (TOBRADEX) 0 3-0 1 % ophthalmic suspension 1 drop, 1 drop, Right Eye, Q6H [address], Orlando Self, DO, 1 drop at 03/05/19 1146    valproate (DEPACON) 250 mg in sodium chloride 0 9 % 50 mL IVPB, 250 mg, Intravenous, Q6H [address], Anette Correa, PA-C, Last Rate: 50 mL/hr at 03/05/19 1352, 250 mg at 03/05/19 1352    Allergies   Allergen Reactions    Iodine Shortness Of Breath, Swelling and Hives     Contrast dye causes respiratory distress  Iodine causes skin rash    Mercury Hives and Swelling    Shellfish-Derived Products Anaphylaxis, Hives and Shortness Of Breath    Augmentin [Amoxicillin-Pot Clavulanate] GI Intolerance, Rash and Diarrhea     Diarrhea    Lidoderm [Lidocaine] Rash     Lidoderm patch caused rash    Penicillins Rash, Swelling and Hives    Sulfa Antibiotics Hives, Swelling and Rash       Review of Systems  A detailed 12 point review of systems was conducted and is negative apart from those mentioned in the HPI  Subjective:  Abdomen:  Soft without tenderness, ideal conduit in the right lower quadrant functional   Abdominal incision is clean dry and intact     Physical Exam:   Vitals: Blood pressure (!) 178/79, pulse 84, temperature 98 °F (36 7 °C), temperature source Tympanic, resp  rate 18, height 6' (1 829 m), weight 86 2 kg (190 lb), SpO2 98 %  ,Body mass index is 25 77 kg/m²  Gen:  Pleasant, non-tachypnic, non-dyspnic  Conversant  Heart: regular rate and rhythm, S1S2 present, no murmur, rub or gallop  Lungs: clear to ausculatation bilaterally  No wheezing, crackless, or rhonchi  No accessory muscle use or respiratory distress  Abd: Abdomen:  Soft without tenderness, ideal conduit in the right lower quadrant functional   Abdominal incision is clean dry and intact   Extremities: no clubbing, cyanosis or edema  2+pedal pulses bilaterally  Full range of motion  Neuro: awake, alert and oriented  Cranial nerves 2-12 intact  Strength and sensation grossly intact  Skin: warm and dry: no petechiae, purpura and rash      LABS:   Results from last 7 days   Lab Units 03/05/19  0555 03/04/19  0456 03/01/19  0443   WBC Thousand/uL 6 63 6 90 9 00   HEMOGLOBIN g/dL 9 3* 8 9* 10 2*   HEMATOCRIT % 27 9* 27 1* 32 5*   PLATELETS Thousands/uL 299 276 313     Results from last 7 days   Lab Units 03/05/19  0555 03/04/19  0456 03/01/19  0443   POTASSIUM mmol/L 2 8* 2 4* 3 9   CHLORIDE mmol/L 101 99* 106   CO2 mmol/L 30 27 25   BUN mg/dL 7 10 29*   CREATININE mg/dL 1 06 1 05 1 27   CALCIUM mg/dL 8 0* 7 8* 9 0       Intake/Output Summary (Last 24 hours) at 3/5/2019 1511  Last data filed at 3/5/2019 1308  Gross per 24 hour   Intake 1927 5 ml   Output 3050 ml   Net -1122 5 ml           Current Facility-Administered Medications:  Adult TPN (STANDARD BASE/STANDARD ELECTROLYTE)  Intravenous Continuous Milagro Harrington MD    dextrose 5 % and sodium chloride 0 45 % 75 mL/hr Intravenous Continuous Alexis Santoro MD Last Rate: 75 mL/hr (03/05/19 1153)   docusate sodium 100 mg Oral BID Fabiano Lincoln MD    heparin (porcine) 5,000 Units Subcutaneous [facility] Fabiano Lincoln MD    HYDROcodone-acetaminophen 1 tablet Oral Q4H PRN Alexis Santoro MD    magnesium sulfate 4 g Intravenous Once Tita Funez PA-C    ondansetron 4 mg Intravenous Q6H PRN Fabiano Lincoln MD    pantoprazole 40 mg Oral Q12H Alexis Santoro MD    phenol 1 spray Mouth/Throat Q2H PRN Fabiano Lincoln MD    phenol 1 spray Mouth/Throat Q2H PRN Alexis Santoro MD    potassium chloride 20 mEq Intravenous Q2H Ja Ruiz PA-C Last Rate: 20 mEq (03/05/19 0843)   simethicone 80 mg Oral 4x Daily PRN Fabiano Lincoln MD    tobramycin-dexamethasone 1 drop Right Eye Becky Soriano  [address] Radha Flanagan DO    valproate sodium 250 mg Intravenous Q6H [address] Kathy Coughlin PA-C Last Rate: 250 mg (03/05/19 3472)

## 2019-03-05 NOTE — PROCEDURES
Insert PICC line  Date/Time: 3/5/2019 11:31 AM  Performed by: Nona Winn RN  Authorized by: Doe Bautista MD     Patient location:  Bedside  Consent:     Consent obtained:  Written    Consent given by:  Patient    Procedural risks discussed: consent obtained by physician  [place] protocol:     Procedure explained and questions answered to patient or proxy's satisfaction: yes      Relevant documents present and verified: yes      Test results available and properly labeled: yes      Radiology Images displayed and confirmed  If images not available, report reviewed: yes      Required blood products, implants, devices, and special equipment available: yes      Site/side marked: yes      Immediately prior to procedure, a time out was called: yes      Patient identity confirmed:  Verbally with patient, arm band, provided demographic data and hospital-assigned identification number  Pre-procedure details:     Hand hygiene: Hand hygiene performed prior to insertion      Sterile barrier technique: All elements of maximal sterile technique followed      Skin preparation:  ChloraPrep    Skin preparation agent: Skin preparation agent completely dried prior to procedure    Indications:     PICC line indications: total parenteral nutrition    Anesthesia (see MAR for exact dosages):      Anesthesia method:  None (pt hx of allergy to lidoderm/lidocaine)  Procedure details:     Location:  [place]    Vessel type: vein      Laterality:  Right    Approach: percutaneous technique used      Patient position:  Flat    Procedural supplies:  Double lumen    Catheter size:  5 Fr    Landmarks identified: yes      Ultrasound guidance: yes      Sterile ultrasound techniques: Sterile gel and sterile probe covers were used      Number of attempts:  1    Successful placement: yes      Vessel of catheter tip end:  Sherlock 3CG confirmed (sherlock 3cg procedure record confirmed placement sent to medical records)    Total catheter length (cm):  41    Catheter out on skin (cm):  0    Max flow rate:  999ml/hr    Arm circumference:  34cm  Post-procedure details:     Post-procedure:  Dressing applied and securement device placed    Assessment:  Blood return through all ports and free fluid flow (sherlock 3cg confirmed placement)    Post-procedure complications: none      Patient tolerance of procedure:   Tolerated well, no immediate complications  Comments:      SIRI#[id number] 2019-12-31

## 2019-03-06 LAB
ANION GAP SERPL CALCULATED.3IONS-SCNC: 7 MMOL/L (ref 4–13)
BUN SERPL-MCNC: 6 MG/DL (ref 5–25)
CALCIUM SERPL-MCNC: 7.7 MG/DL (ref 8.3–10.1)
CHLORIDE SERPL-SCNC: 99 MMOL/L (ref 100–108)
CO2 SERPL-SCNC: 29 MMOL/L (ref 21–32)
CREAT SERPL-MCNC: 1.06 MG/DL (ref 0.6–1.3)
ERYTHROCYTE [DISTWIDTH] IN BLOOD BY AUTOMATED COUNT: 13.5 % (ref 11.6–15.1)
EST. AVERAGE GLUCOSE BLD GHB EST-MCNC: 88 MG/DL
GFR SERPL CREATININE-BSD FRML MDRD: 69 ML/MIN/1.73SQ M
GLUCOSE SERPL-MCNC: 104 MG/DL (ref 65–140)
GLUCOSE SERPL-MCNC: 128 MG/DL (ref 65–140)
GLUCOSE SERPL-MCNC: 129 MG/DL (ref 65–140)
GLUCOSE SERPL-MCNC: 130 MG/DL (ref 65–140)
GLUCOSE SERPL-MCNC: 417 MG/DL (ref 65–140)
HBA1C MFR BLD: 4.7 % (ref 4.2–6.3)
HCT VFR BLD AUTO: 25.1 % (ref 36.5–49.3)
HGB BLD-MCNC: 8.1 G/DL (ref 12–17)
MAGNESIUM SERPL-MCNC: 1.8 MG/DL (ref 1.6–2.6)
MCH RBC QN AUTO: 31.5 PG (ref 26.8–34.3)
MCHC RBC AUTO-ENTMCNC: 32.3 G/DL (ref 31.4–37.4)
MCV RBC AUTO: 98 FL (ref 82–98)
PHOSPHATE SERPL-MCNC: 4.7 MG/DL (ref 2.3–4.1)
PLATELET # BLD AUTO: 328 THOUSANDS/UL (ref 149–390)
PMV BLD AUTO: 8.9 FL (ref 8.9–12.7)
POTASSIUM SERPL-SCNC: 3.9 MMOL/L (ref 3.5–5.3)
RBC # BLD AUTO: 2.57 MILLION/UL (ref 3.88–5.62)
SODIUM SERPL-SCNC: 135 MMOL/L (ref 136–145)
WBC # BLD AUTO: 6.05 THOUSAND/UL (ref 4.31–10.16)

## 2019-03-06 PROCEDURE — 83735 ASSAY OF MAGNESIUM: CPT | Performed by: HOSPITALIST

## 2019-03-06 PROCEDURE — 85027 COMPLETE CBC AUTOMATED: CPT | Performed by: UROLOGY

## 2019-03-06 PROCEDURE — 82948 REAGENT STRIP/BLOOD GLUCOSE: CPT

## 2019-03-06 PROCEDURE — 99232 SBSQ HOSP IP/OBS MODERATE 35: CPT | Performed by: HOSPITALIST

## 2019-03-06 PROCEDURE — 83036 HEMOGLOBIN GLYCOSYLATED A1C: CPT | Performed by: HOSPITALIST

## 2019-03-06 PROCEDURE — 84100 ASSAY OF PHOSPHORUS: CPT | Performed by: HOSPITALIST

## 2019-03-06 PROCEDURE — 80048 BASIC METABOLIC PNL TOTAL CA: CPT | Performed by: UROLOGY

## 2019-03-06 RX ADMIN — HYDROCODONE BITARTRATE AND ACETAMINOPHEN 1 TABLET: 5; 325 TABLET ORAL at 00:43

## 2019-03-06 RX ADMIN — CALCIUM GLUCONATE: 94 INJECTION, SOLUTION INTRAVENOUS at 20:36

## 2019-03-06 RX ADMIN — AMLODIPINE BESYLATE 5 MG: 5 TABLET ORAL at 09:24

## 2019-03-06 RX ADMIN — SIMETHICONE CHEW TAB 80 MG 80 MG: 80 TABLET ORAL at 19:28

## 2019-03-06 RX ADMIN — TOBRAMYCIN AND DEXAMETHASONE 1 DROP: 3; 1 SUSPENSION/ DROPS OPHTHALMIC at 11:34

## 2019-03-06 RX ADMIN — SIMETHICONE CHEW TAB 80 MG 80 MG: 80 TABLET ORAL at 09:23

## 2019-03-06 RX ADMIN — VALPROATE SODIUM 250 MG: 100 INJECTION, SOLUTION INTRAVENOUS at 11:30

## 2019-03-06 RX ADMIN — SIMETHICONE CHEW TAB 80 MG 80 MG: 80 TABLET ORAL at 13:52

## 2019-03-06 RX ADMIN — PANTOPRAZOLE SODIUM 40 MG: 40 TABLET, DELAYED RELEASE ORAL at 09:23

## 2019-03-06 RX ADMIN — HYDROCODONE BITARTRATE AND ACETAMINOPHEN 1 TABLET: 5; 325 TABLET ORAL at 04:56

## 2019-03-06 RX ADMIN — HEPARIN SODIUM 5000 UNITS: 5000 INJECTION INTRAVENOUS; SUBCUTANEOUS at 14:48

## 2019-03-06 RX ADMIN — VALPROATE SODIUM 250 MG: 100 INJECTION, SOLUTION INTRAVENOUS at 17:44

## 2019-03-06 RX ADMIN — PANTOPRAZOLE SODIUM 40 MG: 40 TABLET, DELAYED RELEASE ORAL at 20:44

## 2019-03-06 RX ADMIN — MAGNESIUM SULFATE HEPTAHYDRATE 4 G: 40 INJECTION, SOLUTION INTRAVENOUS at 09:38

## 2019-03-06 RX ADMIN — HEPARIN SODIUM 5000 UNITS: 5000 INJECTION INTRAVENOUS; SUBCUTANEOUS at 05:13

## 2019-03-06 RX ADMIN — TOBRAMYCIN AND DEXAMETHASONE 1 DROP: 3; 1 SUSPENSION/ DROPS OPHTHALMIC at 05:15

## 2019-03-06 RX ADMIN — VALPROATE SODIUM 250 MG: 100 INJECTION, SOLUTION INTRAVENOUS at 07:09

## 2019-03-06 RX ADMIN — DEXTROSE AND SODIUM CHLORIDE 75 ML/HR: 5; .45 INJECTION, SOLUTION INTRAVENOUS at 11:42

## 2019-03-06 RX ADMIN — HEPARIN SODIUM 5000 UNITS: 5000 INJECTION INTRAVENOUS; SUBCUTANEOUS at 20:51

## 2019-03-06 RX ADMIN — TOBRAMYCIN AND DEXAMETHASONE 1 DROP: 3; 1 SUSPENSION/ DROPS OPHTHALMIC at 17:43

## 2019-03-06 RX ADMIN — HYDROCODONE BITARTRATE AND ACETAMINOPHEN 1 TABLET: 5; 325 TABLET ORAL at 20:44

## 2019-03-06 NOTE — PLAN OF CARE
Problem: PHYSICAL THERAPY ADULT  Goal: Performs mobility at highest level of function for planned discharge setting  See evaluation for individualized goals  Description  Treatment/Interventions: Functional transfer training, LE strengthening/ROM, Elevations, Therapeutic exercise, Endurance training, Patient/family training, Bed mobility, Gait training, Spoke to nursing, Family  Equipment Recommended: Merlin Sovereign       See flowsheet documentation for full assessment, interventions and recommendations  Outcome: Progressing  Note:   Prognosis: Fair  Problem List: Decreased strength, Decreased endurance, Impaired balance, Decreased mobility, Decreased safety awareness, Decreased skin integrity, Impaired hearing, Decreased range of motion  Assessment: Pt requires min assist for rollnig le and right and supine to sit with use of bed rails and verbl cues for techniques  Pt requiring min assist initially for sit to stand transfers from bed and toilet progressing to supervision for sit to stand from recliner and sit to stand from toilet with use of grab bar  Pt  requirng min assist for toilet transfer with stand to sit with use of grab bar  Pt incontinent of bowel requiring max assist for washing of le's, donning and doffing of slipper socks and mod assist for donning and doffing of gown  Pt requires assist for becky care  Feel pt would benefit from OT consult for  ADL training  Pt ambulates with use of RW with min assist x1 for safety and balance  Pt requires verbal cues to slow down, as pt rushing to get to bathroom x1 episode  Pt performs b/l le seated arom exercises  Unable to reach to feet  Pt  Remained out of bed at conclusion of Pt session  Recommend OT consult for ADL training  Pt unable to progress with ambulation distances as pt too fatigued from loose bowels   Anticipate pt to continue to improve and make good progress toward goals inorder to return home at d/c with family support and assistance and HHPT, however if not then recommend d/c to STR  Barriers to Discharge: Decreased caregiver support, Inaccessible home environment  Barriers to Discharge Comments: PRAKASH, home alone   Recommendation: Home PT, Home with family support, Short-term skilled PT(pending progress and achievement of PT goals  )     PT - OK to Discharge: No(must achieve Pt goals prior to d/c to home )    See flowsheet documentation for full assessment

## 2019-03-06 NOTE — PHYSICAL THERAPY NOTE
Physical Therapy Treatment Note       03/05/19 1300   Pain Assessment   Pain Assessment No/denies pain   Pain Score No Pain   Restrictions/Precautions   Other Precautions Multiple lines; Fall Risk;Hard of hearing  (ileostomy with moreira bag   )   General   Chart Reviewed Yes   Family/Caregiver Present   (pt 's SO enterred during session )   Cognition   Overall Cognitive Status WFL   Arousal/Participation Alert; Responsive; Cooperative   Attention Within functional limits   Orientation Level Oriented X4   Memory Decreased recall of precautions   Following Commands Follows multistep commands with increased time or repetition   Subjective   Subjective Pt reports fatigue and needing to uise the bathroom  Bed Mobility   Rolling R 4  Minimal assistance   Additional items Assist x 1;Bedrails; Increased time required;Verbal cues;LE management   Rolling L 4  Minimal assistance   Additional items Assist x 1;Bedrails; Increased time required;Verbal cues;LE management   Supine to Sit 4  Minimal assistance   Additional items Assist x 1;HOB elevated; Bedrails; Increased time required;Verbal cues;LE management   Transfers   Sit to Stand 4  Minimal assistance   Additional items Assist x 1; Increased time required;Verbal cues  (progressing to supervision)   Stand to Sit 4  Minimal assistance   Additional items Assist x 1; Armrests; Increased time required;Verbal cues  (progressing to supervision   )   Toilet transfer 4  Minimal assistance   Additional items Assist x 1; Increased time required;Verbal cues;Standard toilet  (min A stand to sit and supervision sit to stand c grab bar)   Additional Comments Pt performed sit to stand transfers x 5  PT peformed static/ dynamic standing with close supervison  pt requiring assistance for management ot undergarment for pulling it up overbackside and in front and well as for donning of depends    Pt required assit for becky care for first bowel movement and assist for washing of  legs and gown change due to incontinence of bowel  pt ablet o wipe self second bowel movement and less assist for pulling up of depnds over bottom and third bowel movemetn pt able to pull depends down I'ly  Ambulation/Elevation   Gait pattern Forward Flexion  (fast at times  )   Gait Assistance 4  Minimal assist   Additional items Assist x 1;Verbal cues   Assistive Device Rolling walker   Distance 15' x4,    Balance   Static Sitting Good   Static Standing Fair +  (with support of rw)   Dynamic Standing Fair  (with support of rw   )   Ambulatory Fair   Endurance Deficit   Endurance Deficit Description fatigue,   Activity Tolerance   Activity Tolerance Patient limited by fatigue  (diarrhea)   Nurse Made Aware Alyson DESAI   Hip Flexion Sitting;10 reps;AROM; Bilateral   Knee AROM Long Arc Quad Sitting;10 reps;AROM; Bilateral   Assessment   Prognosis Fair   Problem List Decreased strength;Decreased endurance; Impaired balance;Decreased mobility; Decreased safety awareness;Decreased skin integrity; Impaired hearing;Decreased range of motion   Assessment Pt requires min assist for rollnig le and right and supine to sit with use of bed rails and verbl cues for techniques  Pt requiring min assist initially for sit to stand transfers from bed and toilet progressing to supervision for sit to stand from recliner and sit to stand from toilet with use of grab bar  Pt  requirng min assist for toilet transfer with stand to sit with use of grab bar  Pt incontinent of bowel requiring max assist for washing of le's, donning and doffing of slipper socks and mod assist for donning and doffing of gown  Pt requires assist for becky care  Feel pt would benefit from OT consult for  ADL training  Pt ambulates with use of RW with min assist x1 for safety and balance  Pt requires verbal cues to slow down, as pt rushing to get to bathroom x1 episode  Pt performs b/l le seated arom exercises  Unable to reach to feet    Pt  Remained out of bed at conclusion of Pt session  Recommend OT consult for ADL training  Pt unable to progress with ambulation distances as pt too fatigued from loose bowels  Anticipate pt to continue to improve and make good progress toward goals inorder to return home at d/c with family support and assistance and HHPT, however if not then recommend d/c to STR      Goals   Patient Goals "to get better "    [facility] Expiration Date 03/08/19   Treatment Day 5   Plan   Treatment/Interventions Functional transfer training;LE strengthening/ROM; Therapeutic exercise; Endurance training;Patient/family training;Equipment eval/education; Bed mobility;Gait training;Spoke to nursing   PT Frequency   (4-5x/week)   Recommendation   Recommendation Home PT; Home with family support; Short-term skilled PT  (pending progress and achievement of PT goals   )   PT - OK to Discharge No  (must achieve Pt goals prior to d/c to home )       Jean-Claude Hunt, PTA

## 2019-03-06 NOTE — PROGRESS NOTES
Progress Note - Reta Lombard 68 y o  male MRN: [id number]    Unit/Bed#: E2 -01 Encounter: [id number]    Principal Problem:    Urothelial carcinoma ([facility] [address] )  Active Problems:    Hypokalemia    Malignant neoplasm of prostate ([facility] [address] )  Resolved Problems:    * No resolved hospital problems  *      Assessment/Plan:  1-pod 8-status post ideal conduit, biopsy of pelvic mass and appendicectomy-conduit functioning well    vision now tolerating 25% of his diet  Very weak and tired  will benefit from inpatient rehab        2-postoperative ID's which is improving-diet advanced to regular  However patient still nutritionally depleted and will need TPN till nutritional status is adequate  Tolerating his standard TPN well  Encourage ambulation  Patient passing flatus  Denies any nausea  Had a bowel movement this morning      3-moderate protein calorie malnutrition-secondary to no oral intake over the past 7 days in a patient with malignant prostate  Patient will require TPN  PICC line placed today  Started on standard TPN regimen  Patient will be discharged home on TPN and follow up with Urology as outpatient  This could be discontinued once patient's nutritional intake is adequate orally      4-essential hypertension with uncontrolled hypertension currently-will add IV hydralazine 5 mg q 6 hours p r n  And start patient on amlodipine 5 mg daily  blood pressure no 669M systolic     5-hypokalemia and hypomagnesemia secondary to ideal conduit as well as poor oral intake  Being repleted     Ambulatory dysfunction secondary to deconditioning  Patient will need rehab up  Discussed with case management        Subjective:  Patient reports generalized weakness  Please postoperative day 8 status post RUL continued urinary diversion   These are uses improving  The stoma is functioning   He will benefit from inpatient rehab  Will continue his TPN till he is able to tolerate 100 % off his feeds      Physical Exam: Vitals: Blood pressure 139/81, pulse 82, temperature 98 °F (36 7 °C), temperature source Tympanic, resp  rate 18, height 6' (1 829 m), weight 86 2 kg (190 lb), SpO2 98 %  ,Body mass index is 25 77 kg/m²  Gen:  Pleasant, non-tachypnic, non-dyspnic  Conversant  Heart: regular rate and rhythm, S1S2 present, no murmur, rub or gallop  Lungs: clear to ausculatation bilaterally  No wheezing, crackless, or rhonchi  No accessory muscle use or respiratory distress  Abd: soft, non-tender, ileal conduit in right lower quadrant functional   Abdominal incision clean dry  Extremities: no clubbing, cyanosis or edema  2+pedal pulses bilaterally  Full range of motion  Neuro: awake, alert and oriented  Cranial nerves 2-12 intact  Strength and sensation grossly intact  Skin: warm and dry: no petechiae, purpura and rash      LABS:   Results from last 7 days   Lab Units 03/06/19 0527 03/05/19  0555 03/04/19  0456   WBC Thousand/uL 6 05 6 63 6 90   HEMOGLOBIN g/dL 8 1* 9 3* 8 9*   HEMATOCRIT % 25 1* 27 9* 27 1*   PLATELETS Thousands/uL 328 299 276     Results from last 7 days   Lab Units 03/06/19 0527 03/05/19  0555 03/04/19  0456   POTASSIUM mmol/L 3 9 2 8* 2 4*   CHLORIDE mmol/L 99* 101 99*   CO2 mmol/L 29 30 27   BUN mg/dL 6 7 10   CREATININE mg/dL 1 06 1 06 1 05   CALCIUM mg/dL 7 7* 8 0* 7 8*       Intake/Output Summary (Last 24 hours) at 3/6/2019 1353  Last data filed at 3/6/2019 1142  Gross per 24 hour   Intake 2081 25 ml   Output 2775 ml   Net -693 75 ml           Current Facility-Administered Medications:  Adult TPN (STANDARD BASE/STANDARD ELECTROLYTE)  Intravenous Continuous Heidi Mullins MD Last Rate: 48 6 mL/hr at 03/05/19 2120   Adult TPN (STANDARD BASE/STANDARD ELECTROLYTE)  Intravenous Continuous Milagro Harrington MD    amLODIPine 5 mg Oral Daily Milagro Harrington MD    dextrose 5 % and sodium chloride 0 45 % 75 mL/hr Intravenous Continuous Catarino Ruiz MD Last Rate: 75 mL/hr (03/06/19 1142) docusate sodium 100 mg Oral BID Michel Tijerina MD    heparin (porcine) 5,000 Units Subcutaneous [facility] Michel Tijerina MD    hydrALAZINE 5 mg Intravenous Q6H PRN Alfred Deluca MD    HYDROcodone-acetaminophen 1 tablet Oral Q4H PRN Meg Roman MD    insulin lispro 1-5 Units Subcutaneous TID AC Alfred Deluca MD    insulin lispro 1-5 Units Subcutaneous HS Alfred Deluca MD    ondansetron 4 mg Intravenous Q6H PRN Michel Tijerina MD    pantoprazole 40 mg Oral Q12H Meg Roman MD    phenol 1 spray Mouth/Throat Q2H PRN Michel Tijerina MD    phenol 1 spray Mouth/Throat Q2H PRN Meg Roman MD    simethicone 80 mg Oral 4x Daily PRN Michel Tijerina MD    tobramycin-dexamethasone 1 drop Right Eye [place]  [address] Jenny DO Guillermo    valproate sodium 250 mg Intravenous Q6H [address] Kathy Coughlin PA-C Last Rate: 250 mg (03/06/19 5210)

## 2019-03-06 NOTE — WOUND OSTOMY CARE
Progress Note- Kamille Dejesus 68 y o  male  [id number]  [facility] -E2 -01        Assessment:  Patient seen for urostomy assessment and education  Patient more alert and interactive today  However, he has retained little of the previous education  He reports feeling weak and continues to have gas pain  Has started having liquid BMs  Abdominal tenderness and distention has improved  Denies nausea  TPN infusing, advancing diet slowly  Nutrition team following  Midline abdominal incision--approximated with staples, open to air   No drainage     Urostomy/ileal conduit--stoma is oval, very slightly budded (almost flush with abdomen) measuring 1 x 1 1/2 inches, deep red   Mucocutaneous junction and peristomal skin are intact   Clear yellow urine draining from both stents  Ostomy pouching system washed out centrally and lifting at 3 o'clock (not leaking)  Pouching system changed using Cassidy sample flat one piece urostomy pouch  Patient tolerated well        Plan:  Ongoing ostomy assessment and education--patient will need reinforcement of urostomy teaching  Patient likely being discharged to short term rehab per [facility] and urology  Subjective:  Contacted patient's significant other via telephone to discuss plan of care--using flexible one piece sample as an alternative to our Convatec pouching system which is only holding for 1-2 days at this time  Objective:    Wound 02/25/19 Incision Abdomen Right;Mid (Active)   Wound Description Clean; Intact;Dry 3/6/2019 10:15 AM   Melly-wound Assessment Clean;Dry; Intact 3/6/2019 10:15 AM   Tunneling 0 cm 2/27/2019 11:45 AM   Undermining 0 2/27/2019 11:45 AM   Closure Approximated;Staples 3/6/2019 10:15 AM   Drainage Amount None 3/6/2019 10:15 AM   Drainage Description Clear 3/2/2019  7:40 PM   Treatments Cleansed 3/6/2019 10:15 AM   Dressing Open to air 3/6/2019 10:15 AM   Wound packed?  No 3/5/2019  9:00 PM   Dressing Changed Changed by provider 2/27/2019  8:00 AM   Patient Tolerance Tolerated well 3/4/2019  6:45 PM   Dressing Status Clean;Dry; Intact 3/3/2019  8:20 PM   Number of days: 9     Urostomy Ileal conduit RLQ (Active)   Stomal Appliance Changed;Clean;Dry; Intact 3/6/2019 10:15 AM   Stoma Assessment Budded;Red 3/6/2019 10:15 AM   Stoma size (in) 1 25 in 2/27/2019 11:26 PM   Stoma Shape Oval 3/6/2019 10:15 AM   Peristomal Assessment Clean; Intact;Mucocutaneous intact 3/6/2019 10:15 AM   Treatment Bag change;Site care 3/6/2019 10:15 AM   Output (mL) 500 mL 3/6/2019 11:42 AM   Number of days: [address] BSN, RN, [facility]

## 2019-03-06 NOTE — PROGRESS NOTES
Postop day 8  Status post ileal conduit urinary diversion  He had a postoperative ileus which is now resolved  He is still having watery bowel movements  TPN is up i and running  He still feels very weak  He says he did not get out of bed yesterday  His wound is clean dry and intact and his stoma looks good  His abdomen is soft and no longer distended  He had about a quarter of his regular breakfast, which included eggs and potatoes  He is still quite weak  I am wondering if short-term rehab might not be a bad option given that he is on TPN and he needs strong incentive to move  We will see what physical therapy and medicine have to say

## 2019-03-06 NOTE — WOUND OSTOMY CARE
Progress Note- Jarocho Lopez 68 y o  male  [id number]  [facility] -E2 -01        Assessment:  Received call from primary RN that patient's urostomy pouch is leaking medially at 3 o'clock  Ostomy pouching system changed using 2 piece moldable Convatec pouch with 1/2 of a 4 inch Nena ring placed from 3-9 o'clock just below the stoma  Small piece of strip paste placed in skin fold at 3 o'clock  Patient tolerated change well  Objective:    Urostomy Ileal conduit RLQ (Active)   Stomal Appliance 1 piece;Leaking; Changed 3/6/2019  4:01 PM   Stoma Assessment Budded;Red 3/6/2019  4:01 PM   Stoma size (in) 1 25 in 2/27/2019 11:26 PM   Stoma Shape Oval 3/6/2019  4:01 PM   Peristomal Assessment Clean; Intact;Mucocutaneous intact 3/6/2019  4:01 PM   Treatment Site care; Bag change 3/6/2019  4:01 PM   Output (mL) 500 mL 3/6/2019 11:42 AM   Number of days: [address] BSN, RN, [facility]

## 2019-03-07 LAB
ALBUMIN SERPL BCP-MCNC: 1.4 G/DL (ref 3.5–5)
ALP SERPL-CCNC: 54 U/L (ref 46–116)
ALT SERPL W P-5'-P-CCNC: 6 U/L (ref 12–78)
ANION GAP SERPL CALCULATED.3IONS-SCNC: 4 MMOL/L (ref 4–13)
AST SERPL W P-5'-P-CCNC: 14 U/L (ref 5–45)
BILIRUB SERPL-MCNC: 0.26 MG/DL (ref 0.2–1)
BUN SERPL-MCNC: 7 MG/DL (ref 5–25)
CALCIUM SERPL-MCNC: 7.4 MG/DL (ref 8.3–10.1)
CHLORIDE SERPL-SCNC: 97 MMOL/L (ref 100–108)
CO2 SERPL-SCNC: 31 MMOL/L (ref 21–32)
CREAT SERPL-MCNC: 1.04 MG/DL (ref 0.6–1.3)
GFR SERPL CREATININE-BSD FRML MDRD: 71 ML/MIN/1.73SQ M
GLUCOSE SERPL-MCNC: 100 MG/DL (ref 65–140)
GLUCOSE SERPL-MCNC: 118 MG/DL (ref 65–140)
GLUCOSE SERPL-MCNC: 122 MG/DL (ref 65–140)
GLUCOSE SERPL-MCNC: 142 MG/DL (ref 65–140)
GLUCOSE SERPL-MCNC: 418 MG/DL (ref 65–140)
HCT VFR BLD AUTO: 23.4 % (ref 36.5–49.3)
HGB BLD-MCNC: 7.6 G/DL (ref 12–17)
MAGNESIUM SERPL-MCNC: 1.8 MG/DL (ref 1.6–2.6)
PHOSPHATE SERPL-MCNC: 3.1 MG/DL (ref 2.3–4.1)
POTASSIUM SERPL-SCNC: 3.8 MMOL/L (ref 3.5–5.3)
PROT SERPL-MCNC: 4.9 G/DL (ref 6.4–8.2)
SODIUM SERPL-SCNC: 132 MMOL/L (ref 136–145)

## 2019-03-07 PROCEDURE — 97110 THERAPEUTIC EXERCISES: CPT

## 2019-03-07 PROCEDURE — G8987 SELF CARE CURRENT STATUS: HCPCS

## 2019-03-07 PROCEDURE — 97167 OT EVAL HIGH COMPLEX 60 MIN: CPT

## 2019-03-07 PROCEDURE — 85014 HEMATOCRIT: CPT | Performed by: HOSPITALIST

## 2019-03-07 PROCEDURE — 83735 ASSAY OF MAGNESIUM: CPT | Performed by: HOSPITALIST

## 2019-03-07 PROCEDURE — 82948 REAGENT STRIP/BLOOD GLUCOSE: CPT

## 2019-03-07 PROCEDURE — 99232 SBSQ HOSP IP/OBS MODERATE 35: CPT | Performed by: HOSPITALIST

## 2019-03-07 PROCEDURE — 80053 COMPREHEN METABOLIC PANEL: CPT | Performed by: HOSPITALIST

## 2019-03-07 PROCEDURE — 84100 ASSAY OF PHOSPHORUS: CPT | Performed by: HOSPITALIST

## 2019-03-07 PROCEDURE — 99024 POSTOP FOLLOW-UP VISIT: CPT | Performed by: UROLOGY

## 2019-03-07 PROCEDURE — G8988 SELF CARE GOAL STATUS: HCPCS

## 2019-03-07 PROCEDURE — 97116 GAIT TRAINING THERAPY: CPT

## 2019-03-07 PROCEDURE — 85018 HEMOGLOBIN: CPT | Performed by: HOSPITALIST

## 2019-03-07 RX ADMIN — HEPARIN SODIUM 5000 UNITS: 5000 INJECTION INTRAVENOUS; SUBCUTANEOUS at 15:16

## 2019-03-07 RX ADMIN — VALPROATE SODIUM 250 MG: 100 INJECTION, SOLUTION INTRAVENOUS at 00:33

## 2019-03-07 RX ADMIN — DEXTROSE AND SODIUM CHLORIDE 75 ML/HR: 5; .45 INJECTION, SOLUTION INTRAVENOUS at 00:39

## 2019-03-07 RX ADMIN — DOCUSATE SODIUM 100 MG: 100 CAPSULE, LIQUID FILLED ORAL at 08:30

## 2019-03-07 RX ADMIN — TOBRAMYCIN AND DEXAMETHASONE 1 DROP: 3; 1 SUSPENSION/ DROPS OPHTHALMIC at 12:01

## 2019-03-07 RX ADMIN — PANTOPRAZOLE SODIUM 40 MG: 40 TABLET, DELAYED RELEASE ORAL at 21:03

## 2019-03-07 RX ADMIN — SIMETHICONE CHEW TAB 80 MG 80 MG: 80 TABLET ORAL at 19:32

## 2019-03-07 RX ADMIN — VALPROATE SODIUM 250 MG: 100 INJECTION, SOLUTION INTRAVENOUS at 23:23

## 2019-03-07 RX ADMIN — HYDROCODONE BITARTRATE AND ACETAMINOPHEN 1 TABLET: 5; 325 TABLET ORAL at 00:47

## 2019-03-07 RX ADMIN — DOCUSATE SODIUM 100 MG: 100 CAPSULE, LIQUID FILLED ORAL at 18:03

## 2019-03-07 RX ADMIN — HYDROCODONE BITARTRATE AND ACETAMINOPHEN 1 TABLET: 5; 325 TABLET ORAL at 23:20

## 2019-03-07 RX ADMIN — VALPROATE SODIUM 250 MG: 100 INJECTION, SOLUTION INTRAVENOUS at 17:59

## 2019-03-07 RX ADMIN — HEPARIN SODIUM 5000 UNITS: 5000 INJECTION INTRAVENOUS; SUBCUTANEOUS at 21:03

## 2019-03-07 RX ADMIN — TOBRAMYCIN AND DEXAMETHASONE 1 DROP: 3; 1 SUSPENSION/ DROPS OPHTHALMIC at 23:22

## 2019-03-07 RX ADMIN — TOBRAMYCIN AND DEXAMETHASONE 1 DROP: 3; 1 SUSPENSION/ DROPS OPHTHALMIC at 05:14

## 2019-03-07 RX ADMIN — HYDROCODONE BITARTRATE AND ACETAMINOPHEN 1 TABLET: 5; 325 TABLET ORAL at 12:08

## 2019-03-07 RX ADMIN — TOBRAMYCIN AND DEXAMETHASONE 1 DROP: 3; 1 SUSPENSION/ DROPS OPHTHALMIC at 18:03

## 2019-03-07 RX ADMIN — TOBRAMYCIN AND DEXAMETHASONE 1 DROP: 3; 1 SUSPENSION/ DROPS OPHTHALMIC at 00:37

## 2019-03-07 RX ADMIN — PANTOPRAZOLE SODIUM 40 MG: 40 TABLET, DELAYED RELEASE ORAL at 08:30

## 2019-03-07 RX ADMIN — HYDROCODONE BITARTRATE AND ACETAMINOPHEN 1 TABLET: 5; 325 TABLET ORAL at 19:31

## 2019-03-07 RX ADMIN — SIMETHICONE CHEW TAB 80 MG 80 MG: 80 TABLET ORAL at 08:34

## 2019-03-07 RX ADMIN — VALPROATE SODIUM 250 MG: 100 INJECTION, SOLUTION INTRAVENOUS at 12:01

## 2019-03-07 RX ADMIN — AMLODIPINE BESYLATE 5 MG: 5 TABLET ORAL at 08:30

## 2019-03-07 RX ADMIN — VALPROATE SODIUM 250 MG: 100 INJECTION, SOLUTION INTRAVENOUS at 05:11

## 2019-03-07 RX ADMIN — CALCIUM GLUCONATE: 94 INJECTION, SOLUTION INTRAVENOUS at 21:03

## 2019-03-07 RX ADMIN — HEPARIN SODIUM 5000 UNITS: 5000 INJECTION INTRAVENOUS; SUBCUTANEOUS at 05:11

## 2019-03-07 NOTE — PLAN OF CARE
Problem: PHYSICAL THERAPY ADULT  Goal: Performs mobility at highest level of function for planned discharge setting  See evaluation for individualized goals  Description  Treatment/Interventions: Functional transfer training, LE strengthening/ROM, Elevations, Therapeutic exercise, Endurance training, Patient/family training, Bed mobility, Gait training, Spoke to nursing, Family  Equipment Recommended: Giancarlo Green       See flowsheet documentation for full assessment, interventions and recommendations  Outcome: Progressing  Note:   Prognosis: Fair  Problem List: Decreased strength, Decreased endurance, Impaired balance, Decreased mobility, Decreased safety awareness, Decreased cognition, Pain  Assessment: Pt seen for progression of mobility and strengthening  Continues to require modA for bed mobilitiy with increased time and cues for technique needed as well as modifications of HOB elevation and bedrails  Fabian for transfers from bed surface height  ModA needed from toilet  Increased time to achieve upright and for hand placement with transitions for safety  Able to progress with ambulation distances using RW support  Cues for upright posture and to remain inside BELLA of RW  Gait slowed, decreased foot clearance and step length  Fatigues with same, standing rests needed  Education given on importance of progressive mobility  Goal to be OOB for all meals to chair and improve toileting to bathroom during days over use of BSC  Verbalizes understanding of same  Pt will continue to require skilled PT services in order to optimize strength, balance, activity tolerance and achieve functional independence p hospitalization  Barriers to Discharge: Decreased caregiver support  Barriers to Discharge Comments: PRAKASH, home alone  Recommendation: Short-term skilled PT     PT - OK to Discharge: Yes    See flowsheet documentation for full assessment

## 2019-03-07 NOTE — PROGRESS NOTES
Progress Note - Colonel Samposn 68 y o  male MRN: [id number]    Unit/Bed#: E2 -01 Encounter: [id number]    Principal Problem:    Urothelial carcinoma ([facility] [address] )  Active Problems:    Hypokalemia    Malignant neoplasm of prostate ([facility] [address] )  Resolved Problems:    * No resolved hospital problems  *      Assessment/Plan:  1-pod 9-status post ideal conduit, biopsy of pelvic mass and appendicectomy-conduit functioning well   patient now tolerating 25% of his diet  Very weak and tired  will benefit from inpatient rehab      Patient interested in [facility] [facility] or [facility]     2-postoperative ID's which is improving-diet advanced to regular   However patient still nutritionally depleted and will need TPN till nutritional status is adequate  Tolerating his standard TPN well  Today have added extra sodium since his sodium level has trended down to 132    Encourage ambulation   Patient passing flatus   Denies any nausea   Had a bowel movement this morning      3-moderate protein calorie malnutrition-secondary to no oral intake over the past 7 days in a patient with malignant prostate   Patient will require TPN  [address] line placed for TPN   Started on standard TPN regimen which has been modified to increase the sodium today  Myles Spaulding will be discharged home on TPN and follow up with Urology as outpatient   TPN could be discontinued once patient's nutritional intake is adequate orally      4-essential hypertension with uncontrolled hypertension currently-will add IV hydralazine 5 mg q 6 hours p r n  And start patient on amlodipine 5 mg daily    blood pressure around 209J systolic     5-hypokalemia and hypomagnesemia secondary to ideal conduit as well as poor oral intake  repleted    6-anemia  Likely secondary to dilution  No evidence of active bleeding    Hemoglobin today 7 6 will transfuse if less than 7 5      Ambulatory dysfunction secondary to deconditioning  Patient will need rehab up  Discussed with case management   Patient interested in [facility] rehab or [facility]         Subjective:  Patient reports generalized weakness  And is agreeable to inpatient rehab  Please postoperative day 9 status post RUL continued urinary diversion   These are  improving  The stoma is functioning   Culberson Snyder Will continue his TPN till he is able to tolerate 100 % off his oral feeds  Physical Exam:   Vitals: Blood pressure 167/78, pulse 82, temperature 98 5 °F (36 9 °C), temperature source Tympanic, resp  rate 18, height 6' (1 829 m), weight 86 2 kg (190 lb), SpO2 96 %  ,Body mass index is 25 77 kg/m²  Gen:non-tachypnic, non-dyspnic  Conversant  Heart: regular rate and rhythm, S1S2 present, no murmur, rub or gallop  Lungs: clear to ausculatation bilaterally  No wheezing, crackless, or rhonchi  No accessory muscle use or respiratory distress  Abd:  Stoma right lower quadrant  Functioning  Extremities: no clubbing, cyanosis or edema  2+pedal pulses bilaterally  Full range of motion  Neuro: awake, alert and oriented  Cranial nerves 2-12 intact  Strength and sensation grossly intact  Skin: warm and dry: no petechiae, purpura and rash      LABS:   Results from last 7 days   Lab Units 03/07/19  0507 03/06/19  0527 03/05/19  0555 03/04/19  0456   WBC Thousand/uL  --  6 05 6 63 6 90   HEMOGLOBIN g/dL 7 6* 8 1* 9 3* 8 9*   HEMATOCRIT % 23 4* 25 1* 27 9* 27 1*   PLATELETS Thousands/uL  --  328 299 276     Results from last 7 days   Lab Units 03/07/19  0507 03/06/19  0527 03/05/19  0555   POTASSIUM mmol/L 3 8 3 9 2 8*   CHLORIDE mmol/L 97* 99* 101   CO2 mmol/L 31 29 30   BUN mg/dL 7 6 7   CREATININE mg/dL 1 04 1 06 1 06   CALCIUM mg/dL 7 4* 7 7* 8 0*       Intake/Output Summary (Last 24 hours) at 3/7/2019 1109  Last data filed at 3/7/2019 0947  Gross per 24 hour   Intake 1786 25 ml   Output 4825 ml   Net -3038 75 ml           Current Facility-Administered Medications:  Adult TPN (STANDARD BASE/STANDARD ELECTROLYTE)  Intravenous Continuous Efrem Park MD Last Rate: 48 3 mL/hr at 03/06/19 2036   Adult TPN (STANDARD BASE/STANDARD ELECTROLYTE)  Intravenous Continuous Milagro Harrington MD    amLODIPine 5 mg Oral Daily Efrem Park MD    dextrose 5 % and sodium chloride 0 45 % 75 mL/hr Intravenous Continuous Brock Still MD Last Rate: 75 mL/hr (03/07/19 0039)   docusate sodium 100 mg Oral BID Saul Banks MD    heparin (porcine) 5,000 Units Subcutaneous [facility] Saul Banks MD    hydrALAZINE 5 mg Intravenous Q6H PRN Efrem Park MD    HYDROcodone-acetaminophen 1 tablet Oral Q4H PRN Brock Still MD    insulin lispro 1-5 Units Subcutaneous TID AC Efrem Park MD    insulin lispro 1-5 Units Subcutaneous HS Efrem Park MD    ondansetron 4 mg Intravenous Q6H PRN Saul Banks MD    pantoprazole 40 mg Oral Q12H Brock Still MD    phenol 1 spray Mouth/Throat Q2H PRN Saul Banks MD    phenol 1 spray Mouth/Throat Q2H PRN Brock Still MD    simethicone 80 mg Oral 4x Daily PRN Saul Banks MD    tobramycin-dexamethasone 1 drop Right Eye [facility] Lazara Renteria DO    valproate sodium 250 mg Intravenous Q6H [facility] Kathy Coughlin PA-C Last Rate: 250 mg (03/07/19 0511)

## 2019-03-07 NOTE — PROGRESS NOTES
Met with patient to discuss recommendations made for need for rehab prior to discharging home  Discussed differences between acute rehab and subacute rehab, as well as patient's ability to tolerate acute rehab  Patient is unsure if he would be able to tolerate the acute program, as he is very fatigued and weak  However, from a PT standpoint, patient is functioning at a Min assist level and may benefit from acute rehab  We are awaiting an OT evaluation at this time  If patient would be appropriate for acute rehab, patient would be interested in [address] [place] [address] at [facility], as it is closest to where he lives  He also expressed interest in [facility] for subacute level of rehab  CM notified, and patient provided with a list of facilities in the area

## 2019-03-07 NOTE — PROGRESS NOTES
Progress Note - Urology Progress  Falguni Wells 68 y o  male MRN: [id number]  Unit/Bed#: E2 -01 Encounter: [id number]    Assessment:  1) Postop day 9 Status post ileal loop urinary diversion for advanced bladder cancer  2) ileus-slowly resolving  3) protein calorie malnutrition  4) anemia    Plan:  I encouraged patient try and eat  I also encouraged him to try and get out of bed and be more physically active  Continue TPN and present therapy  Continue supportive care  Follow anemia  Short-term rehabilitation in the future  Subjective/Objective       Subjective:  No complaints  He has some gas pain and is passing flatus  He also is having some diarrhea  He is not hungry  He reports he has not been walking  He  only went from bed to chair yesterday  Objective:     Blood pressure 167/78, pulse 82, temperature 98 5 °F (36 9 °C), temperature source Tympanic, resp  rate 18, height 6' (1 829 m), weight 86 2 kg (190 lb), SpO2 96 %  ,Body mass index is 25 77 kg/m²  Intake/Output Summary (Last 24 hours) at 3/7/2019 0804  Last data filed at 3/7/2019 0741  Gross per 24 hour   Intake 2081 25 ml   Output 4450 ml   Net -2368 75 ml       Invasive Devices     Peripherally Inserted Central Catheter Line            PICC Line 17/85/11 Right Basilic 1 day          Peripheral Intravenous Line            Peripheral IV (Ped) 03/06/19 Left Hand 1 day          Drain            Urostomy Ileal conduit RLQ 9 days                Review of Systems    Physical Exam: General appearance: alert and oriented, in no acute distress  Head: Normocephalic, without obvious abnormality, atraumatic  Neck: supple, symmetrical, trachea midline  Back: symmetric, no curvature  ROM normal  No CVA tenderness  Lungs: Normal respiratory effort  Abdomen: Softly distended, mild incisional tenderness  Incision is intact and without evidence of cellulitis  Stoma is pink, ureteral catheters in place  Urine is clear    Male genitalia: Uncircumcised phallus, testes nontender  Extremities: Homans sign is negative, no sign of DVT  Neurologic: Grossly normal    Lab, Imaging and other studies:  I have personally reviewed pertinent lab results    , CBC:   Lab Results   Component Value Date    HGB 7 6 (L) 03/07/2019    HCT 23 4 (L) 03/07/2019   , CMP:   Lab Results   Component Value Date    SODIUM 132 (L) 03/07/2019    K 3 8 03/07/2019    CL 97 (L) 03/07/2019    CO2 31 03/07/2019    BUN 7 03/07/2019    CREATININE 1 04 03/07/2019    CALCIUM 7 4 (L) 03/07/2019    AST 14 03/07/2019    ALT 6 (L) 03/07/2019    ALKPHOS 54 03/07/2019    EGFR 71 03/07/2019

## 2019-03-07 NOTE — OCCUPATIONAL THERAPY NOTE
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    Aortic regurgitation    BPH with obstruction/lower urinary tract symptoms    Bladder diverticulum    Postoperative ileus ([address] )    Kidney stone    Major depressive disorder, single episode, moderate (HCC)    Peripheral vascular disease ([address] )    Essential hypertension    Acute pain of right shoulder    Benign localized hyperplasia of prostate without urinary obstruction    Urinary retention due to benign prostatic hyperplasia    Hydronephrosis of left kidney    Ureteral stricture, left    Pelvic abscess in [facility])    Aneurysm of left popliteal artery (HCC)    Urothelial carcinoma (HCC)    Prostate cancer ([address] )    Hypokalemia    Orthostatic lightheadedness    Chemotherapy-induced neutropenia (HCC)    SIRS (systemic inflammatory response syndrome) (HCC)    CKD (chronic kidney disease) stage 3, GFR 30-59 ml/min (HCC)    Acute cystitis without hematuria    Acute on chronic anemia    Moderate protein-calorie malnutrition (HCC)    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[address] )    Malignant neoplasm of prostate ([address] )    Preop cardiovascular exam     Past Medical History  Past Medical History:   Diagnosis Date    Aneurysm ([address] )     Behind left knee recently diagnosed    Back pain     Ceron disease     Ceron's disease     BPH (benign prostatic hyperplasia)     Cancer ([address] )     melanoma    Cataract     right eye    Cataract     right eye    Confusion     DDD (degenerative disc disease), lumbar     Depression     Diverticulosis     Gallstones     GERD (gastroesophageal reflux disease)     History of cardiac murmur     Past    History of melanoma     Was on back in early 1990's    History of rheumatic fever     Childhood    History of transfusion     Nov 2018   [facility] (hard of hearing)     Hydronephrosis     Hypertension     Kidney stone     Multiple times    Neck pain     Nervous breakdown     History of due to reaction to psych med which he was on for anxiety/depression and became suicidal    Numbness and tingling in both hands     Numbness and tingling of both feet     PONV (postoperative nausea and vomiting)     PVD (peripheral vascular disease) ([address] )     RBBB     Scoliosis     Seasonal allergies     Tinnitus     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[address] )     Wears partial dentures     Upper    Wears partial dentures     Upper     Past Surgical History  Past Surgical History:   Procedure Laterality Date    ABDOMINAL SURGERY      When young had procedure for improviing circulation to left lower extremety    BACK SURGERY      Lumbar- not sure what was done   [facility] CARDIAC CATHETERIZATION      Approximately 2007- no blockages    CARPAL TUNNEL RELEASE Bilateral     wrists are fused    CATARACT EXTRACTION Left     COLONOSCOPY      CYST REMOVAL      Robotic procedure to remove benign cyst from lower left lung    CYSTOGRAM N/A 3/14/2018    Procedure: CYSTOGRAM;  Surgeon: Katerina Hankins MD;  [facility];  Service: Urology    CYSTOSCOPY      ESOPHAGOGASTRODUODENOSCOPY      IR TUBE PLACEMENT NEPHROSTOMY  8/10/2018    LUNG SURGERY      cyst removed left lung    OTHER SURGICAL HISTORY Left     fistula drain in left buttock    NM CYSTO/URETERO W/LITHOTRIPSY &INDWELL STENT INSRT Left 1/3/2018    Procedure: CYSTOSCOPY URETEROSCOPY, RETROGRADE PYELOGRAM AND INSERTION STENT URETERAL;  Surgeon: Katerina Hankins MD;  [facility];  Service: Urology    NM CYSTOTOMY,EXCIS BLADDER TIC N/A 2/14/2018    Procedure: ABDOMINAL EXPLORATION; CLOSURE OF BLADDER DIVERTICULITIS;  Surgeon: Katerina Hankins MD;  [facility];  Service: Urology    NM CYSTOURETHROSCOPY,URETER CATHETER Left 8/1/2018    Procedure: Cystoscopy, cystogram, bladder biopsies, removal of pelvic drain;  Surgeon: Noemi Cogan, MD;  [facility];  Service: Urology    CT INCISE/DRAIN BLADDER N/A 2/14/2018    Procedure: OPEN SUPRAPUBIC TUBE PLACEMENT;  Surgeon: Noemi Cogan, MD;  [facility];  Service: Urology    CT RELEASE Thomasena Mini FIBROSIS Left 2/14/2018    Procedure: LYSIS OF ADHESIONS; URETEROLYSIS ;  Surgeon: Noemi Cogan, MD;  [facility];  Service: Urology     [facility] BLADDER/NODES,ILEAL CONDUIT N/A 2/25/2019    Procedure: ATTEMPTED CYSTECTOMY RADICAL; ILEAL CONDUIT URINARY DIVERSION; BIOPSY OF PELVIC MASS; APPENDECTOMY;  Surgeon: Noemi Cogan, MD;  [facility];  Service: Urology    SKIN CANCER EXCISION      Melanoma removal on back in early 1990's    TOE AMPUTATION Left     All 5 toes left foot were amputated due to poor circulation     TRANSURETHRAL RESECTION OF PROSTATE N/A 3/14/2018    Procedure: TRANSURETHRAL RESECTION OF PROSTATE (TURP), LEFT URETERAL STENT REMOVAL;  Surgeon: Noemi Cogan, MD;  [facility];  Service: Urology    TRANSURETHRAL RESECTION OF PROSTATE N/A 9/26/2018    Procedure: TRANSURETHRAL RESECTION OF PROSTATE (TURP);   Surgeon: Noemi Cogan, MD;  [facility];  Service: Urology    WRIST SURGERY Bilateral     Ulnar nerve on right arm and both wrists are fused           03/07/19 1200   Note Type   Note type Eval only   Restrictions/Precautions   Other Precautions Fall Risk;Pain   Pain Assessment   Pain Assessment FLACC   Pain Rating: FLACC (Rest) - Face 0   Pain Rating: FLACC (Rest) - Legs 0   Pain Rating: FLACC (Rest) - Activity 0   Pain Rating: FLACC (Rest) - Cry 1   Pain Rating: FLACC (Rest) - Consolability 0   Score: FLACC (Rest) 1   Home Living   Type of Home House   Home Layout One level  (1 jeffry)   Home Equipment Walker;Cane   Prior Function   Lives With Significant other   ADL Assistance Independent   Falls in the last 6 months 0   Lifestyle   Autonomy PTA pt states independence with all aspects of his ADLs, transfers, ambulation--w/o device; +, +home alone   Reciprocal Relationships supportive S O  Service to Others works as a    Intrinsic Gratification watching [address] ([place]) [address] "I have gas pain "   ADL   Where Assessed Edge of bed   Eating Assistance 6  Modified independent   Grooming Assistance 6  Modified Independent   UB Bathing Assistance 5  Supervision/Setup   LB Bathing Assistance 4  [address] 5  [address] 3  Moderate [address]  4  Minimal Assistance   Toileting Deficit Clothing management up;Steadying;Verbal cueing;Supervison/safety   Bed Mobility   Rolling R 4  Minimal assistance   Additional items Assist x 1; Increased time required;Verbal cues;LE management   Rolling L 4  Minimal assistance   Additional items Assist x 1; Increased time required;Verbal cues;LE management   Supine to Sit 3  Moderate assistance   Additional items Assist x 1; Increased time required;Verbal cues;LE management   Transfers   Sit to Stand 4  Minimal assistance   Additional items Assist x 1; Increased time required;Verbal cues   Stand to Sit 4  Minimal assistance   Additional items Assist x 1; Increased time required;Verbal cues   Toilet transfer 4  Minimal assistance   Additional items Assist x 1; Increased time required;Verbal cues   Functional Mobility   Functional Mobility 4  Minimal assistance   Additional Comments x1   Additional items Rolling walker   Balance   Static Sitting Good   Dynamic Sitting Fair +   Static Standing Fair   Dynamic Standing Fair -   Activity Tolerance   Activity Tolerance Patient limited by fatigue;Patient limited by pain   Medical Staff Made Aware nsg P T     RUE Assessment   RUE Assessment WFL   RUE Strength   RUE Overall Strength Within Functional Limits - able to perform ADL tasks with strength  (4/5 throughout;limited MMT 2* c/o increased pain)   LUE Assessment   LUE Assessment WFL   LUE Strength   LUE Overall Strength Within Functional Limits - able to perform ADL tasks with strength  (4/5 throughout;limited MMT 2* c/o increased pain)   Hand Function   Gross Motor Coordination Functional   Fine Motor Coordination Functional   Sensation   Light Touch No apparent deficits   Proprioception   Proprioception No apparent deficits   Vision-Basic Assessment   Current Vision   (glasses)   Vision - Complex Assessment   Acuity Able to read clock/calendar on wall without difficulty   Perception   Inattention/Neglect Appears intact   Cognition   Overall Cognitive Status WFL   Arousal/Participation Alert   Attention Within functional limits   Orientation Level Oriented X4   Memory Within functional limits   Following Commands Follows all commands and directions without difficulty   Assessment   Limitation Decreased ADL status; Decreased UE strength;Decreased Safe judgement during ADL;Decreased endurance   Prognosis Good   Assessment Pt is a 72y/o male admitted to the hospital for an elected s/p attempted cystectomy radical, ileal conduit urinary diversion, bx pelvic mass, and appendectomy(2/25)  Pt with PMH prostate and bladder Ca, L TMA, and PVD  PTA pt states independence with all aspects of his ADLs, transfers, ambulation--w/o device; +, +home alone  During initial eval, pt demonstrated deficits with his functional balance, functional mobility, ADL status, activity tolerance(currently fair=15-20mins), transfer safety, and b/l UE strength  Pt would benefit from continued OT tx for the above deficits  3-5xwk/1-2wks  Goals   Patient Goals "to get better "   STG Time Frame 3-5   Short Term Goal #1 Pt will demonstrate improved activity tolerance to good(20-30mins) and standing tolerance to 3-5mins to assist with ADLs  Short Term Goal #2 Pt will demonstrate mod I with his bed mobility and sit-stand transfers to assist with ADLs      Short Term Goal Pt will demonstrate proper walker/transfer safety 100% of the time  LTG Time Frame   (5-10days)   Long Term Goal #1 Pt will demonstrate g/g- balance with all functional activities  Long Term Goal #2 Pt will demonstrate mod I with their UE and LE bathing/dresssing  Long Term Goal Pt will demonstrate improved b/l UE strength by 1/2 MM grade to assist with ADLs/transfers  Plan   Treatment Interventions ADL retraining;Functional transfer training;UE strengthening/ROM; Endurance training;Patient/family training;Equipment evaluation/education; Compensatory technique education;Continued evaluation;Cardiac education   Goal Expiration Date 03/18/19   Treatment Day 0   OT Frequency 3-5x/wk   Recommendation   OT Discharge Recommendation Short Term Rehab   Barthel Index   Feeding 10   Bathing 0   Grooming Score 5   Dressing Score 5   Bladder Score 0   Bowels Score 10   Toilet Use Score 10   Transfers (Bed/Chair) Score 10   Mobility (Level Surface) Score 0   Stairs Score 0   Barthel Index Score 50   Modified [place] Scale   Modified [place] Scale 4   Cesar Elizalde, OT

## 2019-03-07 NOTE — PHYSICAL THERAPY NOTE
PHYSICAL THERAPY NOTE          Patient Name: Marcelo Toledo  XNNDR'L Date: 3/7/2019     03/07/19 1159   Pain Assessment   Pain Type Surgical pain   Pain Location Back; Chest;Abdomen  (gas pain)   Hospital Pain Intervention(s) Repositioned; Ambulation/increased activity; Emotional support   Response to Interventions tolerated   Pain Rating: FLACC (Rest) - Face 0   Pain Rating: FLACC (Rest) - Legs 0   Pain Rating: FLACC (Rest) - Activity 0   Pain Rating: FLACC (Rest) - Cry 1   Pain Rating: FLACC (Rest) - Consolability 0   Score: FLACC (Rest) 1   Pain Rating: FLACC (Activity) - Face 1   Pain Rating: FLACC (Activity) - Legs 1   Pain Rating: FLACC (Activity) - Activity 1   Pain Rating: FLACC (Activity) - Cry 1   Pain Rating: FLACC (Activity) - Consolability 1   Score: FLACC (Activity) 5   Restrictions/Precautions   Weight Bearing Precautions Per Order No   Other Precautions Fall Risk;Pain;Multiple lines   General   Chart Reviewed Yes   Response to Previous Treatment Patient reporting fatigue but able to participate  Family/Caregiver Present No   Cognition   Overall Cognitive Status WFL   Arousal/Participation Cooperative   Orientation Level Oriented to person;Oriented to place   Following Commands Follows one step commands without difficulty   Subjective   Subjective Tired, but agreeable to participate as he knows he has to do it  Bed Mobility   Supine to Sit 3  Moderate assistance   Additional items Assist x 1; Increased time required;Verbal cues;LE management; Bedrails;HOB elevated   Additional Comments Increased time and cues for technique wiht bed mobility  Transfers   Sit to Stand 4  Minimal assistance   Additional items Assist x 1; Increased time required;Verbal cues   Stand to Sit 4  Minimal assistance   Additional items Assist x 1; Increased time required;Verbal cues   Toilet transfer 3  Moderate assistance   Additional items Assist x 1; Increased time required;Verbal cues; Other;Standard toilet  (use of R grab bar  Increased time )   Additional Comments cues for hand placement wiht all mobility  Transfers with increased assistance  Ambulation/Elevation   Gait pattern Improper Weight shift;Decreased foot clearance; Forward Flexion; Inconsistent kieran; Short stride; Excessively slow   Gait Assistance 4  Minimal assist   Additional items Assist x 1;Verbal cues; Tactile cues   Assistive Device Rolling walker   Distance Amb with RW 50'x1, 65'x1, 5'x1, 15'x1  Increased time with several standing rests needed  Balance   Static Sitting Good   Dynamic Sitting Fair +   Static Standing Fair   Dynamic Standing Fair -   Ambulatory Fair   Endurance Deficit   Endurance Deficit Yes   Endurance Deficit Description fatigue, pain, weakness, deconditoining  Activity Tolerance   Activity Tolerance Patient limited by fatigue;Patient limited by pain   Nurse Made Aware yes   Medical Staff Made Aware NurseTami   Exercises   Quad Sets Supine;10 reps;AROM; Bilateral   Heelslides Supine;10 reps;AROM; Bilateral   Ankle Pumps Supine;15 reps;AROM; Bilateral   Balance training  standing tolerance x 2 minutes for static and dynamic balance/reaching activity  Assessment   Prognosis Fair   Problem List Decreased strength;Decreased endurance; Impaired balance;Decreased mobility; Decreased safety awareness;Decreased cognition;Pain   Assessment Pt seen for progression of mobility and strengthening  Continues to require modA for bed mobilitiy with increased time and cues for technique needed as well as modifications of HOB elevation and bedrails  Fabian for transfers from bed surface height  ModA needed from toilet  Increased time to achieve upright and for hand placement with transitions for safety  Able to progress with ambulation distances using RW support  Cues for upright posture and to remain inside BELLA of RW    Gait slowed, decreased foot clearance and step length  Fatigues with same, standing rests needed  Education given on importance of progressive mobility  Goal to be OOB for all meals to chair and improve toileting to bathroom during days over use of BSC  Verbalizes understanding of same  Pt will continue to require skilled PT services in order to optimize strength, balance, activity tolerance and achieve functional independence p hospitalization  Barriers to Discharge Decreased caregiver support   Barriers to Discharge Comments PRAKASH, home alone   Goals   Patient Goals to get better   STG Expiration Date 03/17/19   Short Term Goal #1 10 days: 1)  Pt will perform bed mobility with Francois demonstrating appropriate technique 100% of the time in order to improve function  2)  Perform all transfers with Francois demonstrating safe and appropriate technique 100% of the time in order to improve ability to negotiate safely in home environment  3) Amb with least restrictive AD > 150'x1 with mod I in order to demonstrate ability to negotiate in home environment  4)  Improve overall strength and balance 1/2 grade in order to optimize ability to perform functional tasks and reduce fall risk  5) Increase activity tolerance to 45 minutes in order to improve endurance to functional tasks  6)  Negotiate stairs using most appropriate technique and S in order to be able to negotiate safely in home environment  7) PT for ongoing patient and family/caregiver education, DME needs and d/c planning in order to promote highest level of function in least restrictive environment  Treatment Day 6   Plan   Treatment/Interventions Functional transfer training;LE strengthening/ROM; Elevations; Therapeutic exercise; Endurance training;Patient/family training;Equipment eval/education; Bed mobility;Gait training; Compensatory technique education;Continued evaluation;Spoke to nursing;OT   Progress Slow progress, decreased activity tolerance   PT Frequency Other (Comment)  (4-5x/wk) Recommendation   Recommendation Short-term skilled PT   Equipment Recommended Walker   PT - OK to Discharge Yes  (to rhb, NO to home)   Chris Enriquez PT

## 2019-03-07 NOTE — PLAN OF CARE
Problem: OCCUPATIONAL THERAPY ADULT  Goal: Performs self-care activities at highest level of function for planned discharge setting  See evaluation for individualized goals  Description  Treatment Interventions: ADL retraining, Functional transfer training, UE strengthening/ROM, Endurance training, Patient/family training, Equipment evaluation/education, Compensatory technique education, Continued evaluation, Cardiac education          See flowsheet documentation for full assessment, interventions and recommendations  Note:   Limitation: Decreased ADL status, Decreased UE strength, Decreased Safe judgement during ADL, Decreased endurance  Prognosis: Good  Assessment: Pt is a 74y/o male admitted to the hospital for an elected s/p attempted cystectomy radical, ileal conduit urinary diversion, bx pelvic mass, and appendectomy(2/25)  Pt with PMH prostate and bladder Ca, L TMA, and PVD  PTA pt states independence with all aspects of his ADLs, transfers, ambulation--w/o device; +, +home alone  During initial eval, pt demonstrated deficits with his functional balance, functional mobility, ADL status, activity tolerance(currently fair=15-20mins), transfer safety, and b/l UE strength  Pt would benefit from continued OT tx for the above deficits  3-5xwk/1-2wks        OT Discharge Recommendation: Short Term Rehab

## 2019-03-08 VITALS
DIASTOLIC BLOOD PRESSURE: 78 MMHG | BODY MASS INDEX: 25.73 KG/M2 | OXYGEN SATURATION: 98 % | WEIGHT: 190 LBS | RESPIRATION RATE: 18 BRPM | TEMPERATURE: 98 F | HEIGHT: 72 IN | HEART RATE: 81 BPM | SYSTOLIC BLOOD PRESSURE: 139 MMHG

## 2019-03-08 LAB
ANION GAP SERPL CALCULATED.3IONS-SCNC: 4 MMOL/L (ref 4–13)
BUN SERPL-MCNC: 13 MG/DL (ref 5–25)
CALCIUM SERPL-MCNC: 7.9 MG/DL (ref 8.3–10.1)
CHLORIDE SERPL-SCNC: 99 MMOL/L (ref 100–108)
CO2 SERPL-SCNC: 33 MMOL/L (ref 21–32)
CREAT SERPL-MCNC: 1.01 MG/DL (ref 0.6–1.3)
GFR SERPL CREATININE-BSD FRML MDRD: 73 ML/MIN/1.73SQ M
GLUCOSE SERPL-MCNC: 110 MG/DL (ref 65–140)
GLUCOSE SERPL-MCNC: 118 MG/DL (ref 65–140)
GLUCOSE SERPL-MCNC: 136 MG/DL (ref 65–140)
GLUCOSE SERPL-MCNC: 84 MG/DL (ref 65–140)
HCT VFR BLD AUTO: 25.1 % (ref 36.5–49.3)
HGB BLD-MCNC: 8 G/DL (ref 12–17)
POTASSIUM SERPL-SCNC: 2.9 MMOL/L (ref 3.5–5.3)
SODIUM SERPL-SCNC: 136 MMOL/L (ref 136–145)

## 2019-03-08 PROCEDURE — 99024 POSTOP FOLLOW-UP VISIT: CPT | Performed by: UROLOGY

## 2019-03-08 PROCEDURE — 80048 BASIC METABOLIC PNL TOTAL CA: CPT | Performed by: HOSPITALIST

## 2019-03-08 PROCEDURE — 99232 SBSQ HOSP IP/OBS MODERATE 35: CPT | Performed by: HOSPITALIST

## 2019-03-08 PROCEDURE — 82948 REAGENT STRIP/BLOOD GLUCOSE: CPT

## 2019-03-08 PROCEDURE — 85018 HEMOGLOBIN: CPT | Performed by: HOSPITALIST

## 2019-03-08 PROCEDURE — 85014 HEMATOCRIT: CPT | Performed by: HOSPITALIST

## 2019-03-08 RX ADMIN — SIMETHICONE CHEW TAB 80 MG 80 MG: 80 TABLET ORAL at 12:21

## 2019-03-08 RX ADMIN — DOCUSATE SODIUM 100 MG: 100 CAPSULE, LIQUID FILLED ORAL at 08:42

## 2019-03-08 RX ADMIN — PANTOPRAZOLE SODIUM 40 MG: 40 TABLET, DELAYED RELEASE ORAL at 08:42

## 2019-03-08 RX ADMIN — HEPARIN SODIUM 5000 UNITS: 5000 INJECTION INTRAVENOUS; SUBCUTANEOUS at 05:07

## 2019-03-08 RX ADMIN — VALPROATE SODIUM 250 MG: 100 INJECTION, SOLUTION INTRAVENOUS at 13:44

## 2019-03-08 RX ADMIN — TOBRAMYCIN AND DEXAMETHASONE 1 DROP: 3; 1 SUSPENSION/ DROPS OPHTHALMIC at 12:21

## 2019-03-08 RX ADMIN — AMLODIPINE BESYLATE 5 MG: 5 TABLET ORAL at 08:42

## 2019-03-08 RX ADMIN — HYDROCODONE BITARTRATE AND ACETAMINOPHEN 1 TABLET: 5; 325 TABLET ORAL at 03:44

## 2019-03-08 RX ADMIN — HEPARIN SODIUM 5000 UNITS: 5000 INJECTION INTRAVENOUS; SUBCUTANEOUS at 12:21

## 2019-03-08 RX ADMIN — SIMETHICONE CHEW TAB 80 MG 80 MG: 80 TABLET ORAL at 03:45

## 2019-03-08 RX ADMIN — TOBRAMYCIN AND DEXAMETHASONE 1 DROP: 3; 1 SUSPENSION/ DROPS OPHTHALMIC at 05:07

## 2019-03-08 RX ADMIN — VALPROATE SODIUM 250 MG: 100 INJECTION, SOLUTION INTRAVENOUS at 05:07

## 2019-03-08 RX ADMIN — HYDROCODONE BITARTRATE AND ACETAMINOPHEN 1 TABLET: 5; 325 TABLET ORAL at 12:31

## 2019-03-08 RX ADMIN — HYDROCODONE BITARTRATE AND ACETAMINOPHEN 1 TABLET: 5; 325 TABLET ORAL at 16:55

## 2019-03-08 NOTE — SOCIAL WORK
Pt accepted at [facility] for STR  Pt and SO agreeable   CM arranged a w/c [facility] transport for 5pm

## 2019-03-08 NOTE — DISCHARGE INSTRUCTIONS
Continue with TPN for now, length of standard TPN to be determined by physicians at acute rehab  See me in 1 week in office for staple removal   Reinforce or we apply urostomy appliance as needed

## 2019-03-08 NOTE — PROGRESS NOTES
Progress Note - Shoaib Downs 68 y o  male MRN: [id number]    Unit/Bed#: E2 -01 Encounter: [id number]    Principal Problem:    Urothelial carcinoma ([facility] )  Active Problems:    Hypokalemia    Malignant neoplasm of prostate ([facility] [address] )  Resolved Problems:    * No resolved hospital problems  *      Assessment/Plan:    1-pod 10-status post ideal conduit, biopsy of pelvic mass and appendicectomy-conduit functioning well   Wound Care input appreciated  Moderate amount of mucus at stoma site  Stoma beefy, red peristomal skin intact   patient now tolerating 25% of his diet   Very weak and tired  will benefit from inpatient rehab      2-postoperative ID's which is improving-diet advanced to regular   However patient still nutritionally depleted and will need TPN till nutritional status is adequate   Tolerating his standard TPN well  Today have added extra potassium since his potassium level is trended down to 2 9      Encourage ambulation   Patient passing flatus   Denies any nausea   Had a bowel movement this morning      3-moderate protein calorie malnutrition-secondary to no oral intake over the past 7 days in a patient with malignant prostate   Patient will require TPN  [address] line placed for TPN   Started on standard TPN regimen which has been modified to increase the sodium today  [place] will be discharged home on TPN and follow up with Urology as outpatient   TPN could be discontinued once patient's nutritional intake is adequate orally      4-essential hypertension with uncontrolled hypertension currently-will add IV hydralazine 5 mg q 6 hours p r n  And start patient on amlodipine 5 mg daily    blood pressure around 167S systolic     5-hypokalemia and hypomagnesemia secondary to ideal conduit as well as poor oral intake  repleted     6-anemia  Likely secondary to dilution  No evidence of active bleeding    Hemoglobin today 8 will transfuse if less than 7 5      Ambulatory dysfunction secondary to deconditioning  Patient will need rehab up  Discussed with case management   Patient interested in [facility] rehab or [facility]  Subjective:  Patient tolerating his TPN  Will increase his potassium today given his level is 2 9  Stronger with the TPN  Plan for him to go to rehab with TPN and follow up with Urology as outpatient  Patient received ostomy care by wound management today  Physical Exam:   Vitals: Blood pressure 149/65, pulse 88, temperature 97 8 °F (36 6 °C), temperature source Tympanic, resp  rate 18, height 6' (1 829 m), weight 86 2 kg (190 lb), SpO2 96 %  ,Body mass index is 25 77 kg/m²  Gen:  Pleasant, non-tachypnic, non-dyspnic  Conversant  Heart: regular rate and rhythm, S1S2 present, no murmur, rub or gallop  Lungs: clear to ausculatation bilaterally  No wheezing, crackless, or rhonchi  No accessory muscle use or respiratory distress  Abd: soft, non-tender, non-distended  NABS, right lower quadrant ostomy site with moderate amount of mucus has stoma site  Stoma beefy red, peristomal skin intact  Extremities: no clubbing, cyanosis or edema  2+pedal pulses bilaterally  Full range of motion  Neuro: awake, alert and oriented  Cranial nerves 2-12 intact  Strength and sensation grossly intact  Skin: warm and dry: no petechiae, purpura and rash      LABS:   Results from last 7 days   Lab Units 03/08/19  0431 03/07/19  0507 03/06/19  0527 03/05/19  0555 03/04/19  0456   WBC Thousand/uL  --   --  6 05 6 63 6 90   HEMOGLOBIN g/dL 8 0* 7 6* 8 1* 9 3* 8 9*   HEMATOCRIT % 25 1* 23 4* 25 1* 27 9* 27 1*   PLATELETS Thousands/uL  --   --  328 299 276     Results from last 7 days   Lab Units 03/08/19  0431 03/07/19  0507 03/06/19  0527   POTASSIUM mmol/L 2 9* 3 8 3 9   CHLORIDE mmol/L 99* 97* 99*   CO2 mmol/L 33* 31 29   BUN mg/dL 13 7 6   CREATININE mg/dL 1 01 1 04 1 06   CALCIUM mg/dL 7 9* 7 4* 7 7*       Intake/Output Summary (Last 24 hours) at 3/8/2019 1119  Last data filed at 3/8/2019 0723  Gross per 24 hour   Intake 3517 29 ml   Output 4450 ml   Net -932 71 ml           Current Facility-Administered Medications:  Adult TPN (STANDARD BASE/STANDARD ELECTROLYTE)  Intravenous Continuous Milagro Harrington MD    amLODIPine 5 mg Oral Daily Milagro Harrington MD    docusate sodium 100 mg Oral BID Roann Fleischer, MD    heparin (porcine) 5,000 Units Subcutaneous [facility] Roann Fleischer, MD    hydrALAZINE 5 mg Intravenous Q6H PRN Marisa Slaas MD    HYDROcodone-acetaminophen 1 tablet Oral Q4H PRN Sintia Ruggiero MD    insulin lispro 1-5 Units Subcutaneous HS Marisa Salas MD    ondansetron 4 mg Intravenous Q6H PRN Roann Fleischer, MD    pantoprazole 40 mg Oral Q12H Sintia Ruggiero MD    phenol 1 spray Mouth/Throat Q2H PRN Roann Fleischer, MD    phenol 1 spray Mouth/Throat Q2H PRN Sintia Ruggiero MD    simethicone 80 mg Oral 4x Daily PRN Roann Fleischer, MD    tobramycin-dexamethasone 1 drop Right Eye [place]  [address] Gómez Benson DO    valproate sodium 250 mg Intravenous Q6H [address] Parviz Ricardo PA-C Last Rate: Stopped (03/08/19 4841)

## 2019-03-08 NOTE — PROGRESS NOTES
Pt drinking two Ensure Enlive shakes per day which provide 700 kcal, 40 gm protein daily  Current TPN provides 910 kcal, 50 gm protein daily  Continue to encourage po intake  Totals of 1610 kcal, 90 gm protein daily  Initiate calorie count to determine if TPN calories and/or protein need to be increased  Please provide a current weight to determine if any weight changes have occurred during his hospital stay       We will continue to follow during inpatient stay

## 2019-03-08 NOTE — WOUND OSTOMY CARE
Progress Note- Juan Grimes 68 y o  male  [id number]  [facility] -E2 -01        Assessment:  Patient seen for urostomy assessment and follow-up  Patient continues to have gas pain, passing flatus  Tolerating small amounts of PO intake  Abdomen is soft and rounded  Lower quadrants remain tender on palpation  TPN infusing, nutrition team following  Patient reports feeling overwhelmed by ostomy care, stating "I don't know how any one person is able to this "  Patient has good support system--significant other provided with ostomy education previously  2 piece Convatec pouch intact  However, skin barrier entirely washed out and starting to lift at 3 o'clock  Midline abdominal incision--approximated with staples, open to air   No drainage   Mild erythema to skin around staples  Urostomy/ileal conduit--stoma is oval, very slightly budded, measuring 1 x 1 1/2 inches, deep red   Mucocutaneous junction and peristomal skin are intact  However, there is a fragile, pink area of mucocutaneous junction at 9 o'clock  Clear yellow urine draining from both stents   Pouching system changed using Mia 1 piece light convex cut-to-fit sample urostomy pouch   Strip paste applied to fill in folds at 3 and 9 o'clock  Patient tolerated well        Plan:  Ongoing ostomy assessment and education--patient will need reinforcement of urostomy teaching        Patient being discharged to short term rehab  Objective:    Wound 02/25/19 Incision Abdomen Right;Mid (Active)   Wound Description Clean;Dry; Intact 3/8/2019 10:00 AM   Melly-wound Assessment Erythema 3/8/2019 10:00 AM   Tunneling 0 cm 3/8/2019 10:00 AM   Undermining 0 3/8/2019 10:00 AM   Closure Staples 3/8/2019 10:00 AM   Drainage Amount None 3/8/2019 10:00 AM   Drainage Description Clear 3/8/2019  7:23 AM   Treatments Cleansed 3/8/2019 10:00 AM   Dressing Open to air 3/8/2019 10:00 AM   Wound packed?  No 3/8/2019  7:23 AM   Dressing Changed Changed by provider 2/27/2019  8:00 AM   Patient Tolerance Tolerated well 3/8/2019 10:00 AM   Dressing Status Clean;Dry; Intact 3/8/2019  7:23 AM   Number of days: 11     Urostomy Ileal conduit RLQ (Active)   Stomal Appliance Changed;Clean;Dry; Intact 3/8/2019 10:00 AM   Stoma Assessment Budded;Red 3/8/2019 10:00 AM   Stoma size (in) 1 25 in 2/27/2019 11:26 PM   Stoma Shape Oval 3/8/2019 10:00 AM   Peristomal Assessment Clean;Pink;Mucocutaneous intact 3/8/2019 10:00 AM   Treatment Bag change;Site care 3/8/2019 10:00 AM   Output (mL) 600 mL 3/8/2019  7:23 AM   Number of days: [address] TEJEDAN, RN, [employer]

## 2019-03-08 NOTE — PROGRESS NOTES
UROLOGY PROGRESS NOTE   Patient Identifiers: Elder Litten (MRN [id number])  Date of Service: 3/8/2019      Assessment/Plan: This is a 80-year-old male postop day 10  Status post radical cystectomy attempt, creation of ileal conduit , biopsy of pelvic mass  Patient reports feeling slightly better today; stating that he feels less weak  He complains of gas pains  And tolerating small amounts of food p o  Patient continues to have TPN infusing  Patient tentative discharge to short-term rehabilitation, awaiting finalize plans from case management  Ileal loop conduit  · Wound/ostomy nurse at bedside performing ostomy care  · Right lower quadrant ostomy site; Moderate amount of mucus at stoma site, Urinary stents in place  Stoma beefy red, peristomal skin intact    RN participated in rounds with AP  Plan of care discussed with patient and RN  Subjective:     24 HR EVENTS:   no significant events  Objective:     VITALS:    Vitals:    03/07/19 2238   BP: 149/65   Pulse: 88   Resp: 18   Temp: 97 8 °F (36 6 °C)   SpO2: 96%       INS & OUTS:  I/O last 24 hours:   In: 3517 3 [P O :480; I V :1797 5; IV Piggyback:50; TPN:1189 8]  Out: 6225 [Urine:6225]    LABS:  Lab Results   Component Value Date    HGB 8 0 (L) 03/08/2019    HCT 25 1 (L) 03/08/2019    WBC 6 05 03/06/2019     03/06/2019       Lab Results   Component Value Date    K 2 9 (L) 03/08/2019    CL 99 (L) 03/08/2019    CO2 33 (H) 03/08/2019    BUN 13 03/08/2019    CREATININE 1 01 03/08/2019    CALCIUM 7 9 (L) 03/08/2019       INPATIENT MEDS:    Current Facility-Administered Medications:     Adult TPN (STANDARD BASE/STANDARD ELECTROLYTE), , Intravenous, Continuous, Milagro Harrington MD, Last Rate: 48 3 mL/hr at 03/07/19 2103    amLODIPine (NORVASC) tablet 5 mg, 5 mg, Oral, Daily, Nino Magana MD, 5 mg at 03/08/19 0842    docusate sodium (COLACE) capsule 100 mg, 100 mg, Oral, BID, Nicholas Nicholas MD, 100 mg at 03/08/19 9400   heparin (porcine) subcutaneous injection 5,000 Units, 5,000 Units, Subcutaneous, Q8H [address], 5,000 Units at 03/08/19 0507 **AND** [CANCELED] Platelet count, , , Once, Maryuri Weeks MD    hydrALAZINE (APRESOLINE) injection 5 mg, 5 mg, Intravenous, Q6H PRN, Jay Shelton MD    HYDROcodone-acetaminophen (NORCO) 5-325 mg per tablet 1 tablet, 1 tablet, Oral, Q4H PRN, Michael Esparza MD, 1 tablet at 03/08/19 0344    insulin lispro (HumaLOG) 100 units/mL subcutaneous injection 1-5 Units, 1-5 Units, Subcutaneous, HS, Milagro Harrington MD    ondansetron (ZOFRAN) injection 4 mg, 4 mg, Intravenous, Q6H PRN, Maryuri Weeks MD, 4 mg at 03/01/19 1347    pantoprazole (PROTONIX) EC tablet 40 mg, 40 mg, Oral, Q12H, Michael Esparza MD, 40 mg at 03/08/19 0842    phenol (CHLORASEPTIC) 1 4 % mucosal liquid 1 spray, 1 spray, Mouth/Throat, Q2H PRN, Maryuri Weeks MD    phenol (CHLORASEPTIC) 1 4 % mucosal liquid 1 spray, 1 spray, Mouth/Throat, Q2H PRN, Michael Esparza MD, 1 spray at 03/01/19 2120    simethicone (MYLICON) chewable tablet 80 mg, 80 mg, Oral, 4x Daily PRN, Maryuri Weeks MD, 80 mg at 03/08/19 0345    tobramycin-dexamethasone (TOBRADEX) 0 3-0 1 % ophthalmic suspension 1 drop, 1 drop, Right Eye, Q6H [address], Orlando Sadler, DO, 1 drop at 03/08/19 0507    valproate (DEPACON) 250 mg in sodium chloride 0 9 % 50 mL IVPB, 250 mg, Intravenous, Q6H [address], Anette Correa PA-C, Stopped at 03/08/19 2609      Physical Exam:     GEN: alert and oriented x 3    RESP: breathing comfortably with no accessory muscle use    CARDIO: Regular rate and rhythm, S1S2 present or without murmur or extra heart sounds  ABD: soft, appropriately tender to palpation, non-distended   INCISION:  Low midline abdominal incision well-approximated no drainage noted  Mati intact  Moderate redness noted throughout the length of incision    No erythema noted  EXT: no significant peripheral edema   SONI:  Patient with ileal conduit draining clear yellow urine    Stoma beefy red, a moderate amount of mucus noted at stoma site, peristomal skin intact, urinary stents protruding from stoma    [employer]

## 2019-03-08 NOTE — DISCHARGE SUMMARY
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, post chemotherapy admitted for radical cystoprostatectomy and ileal conduit urinary diversion  Procedures Performed:  Ileal conduit urinary diversion, appendectomy  Abdominal exploration with lysis of adhesion  Unable to perform cysto prostatectomy due to inflammation and tumor mass  Hospital Course:  Admitted same-day for above, hospital course prolonged due to postoperative colonic ileus  This resolved spontaneously  The patient was placed on TPN during his admission  He had electrolyte abnormalities such as hypokalemia and hypomagnesemia which was replaced  He had weakness with difficulty with ambulation and required physical therapy and was determined not to be ready to go home but was to be discharged to acute rehab for further therapy and TPN  He did progress with his diet and was able to take a regular diet  No wound problems  Significant Findings, Care, Treatment and Services Provided:  General surgery consultation, internal medicine consultation, PICC line placement and TPN      Lab Results:   CBC:   Lab Results   Component Value Date    HGB 8 0 (L) 03/08/2019    HCT 25 1 (L) 03/08/2019   , CMP:   Lab Results   Component Value Date    SODIUM 136 03/08/2019    K 2 9 (L) 03/08/2019    CL 99 (L) 03/08/2019    CO2 33 (H) 03/08/2019    BUN 13 03/08/2019    CREATININE 1 01 03/08/2019    CALCIUM 7 9 (L) 03/08/2019    EGFR 73 03/08/2019   , Magnesium: No components found for: MAG    Complications:  Postoperative ileus, resolved    Discharge Diagnosis:  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

## 2019-03-12 ENCOUNTER — TELEPHONE (OUTPATIENT)
Dept: UROLOGY | Facility: MEDICAL CENTER | Age: 74
End: 2019-03-12

## 2019-03-18 ENCOUNTER — TELEPHONE (OUTPATIENT)
Dept: UROLOGY | Facility: MEDICAL CENTER | Age: 74
End: 2019-03-18

## 2019-03-18 ENCOUNTER — TELEPHONE (OUTPATIENT)
Dept: UROLOGY | Facility: AMBULATORY SURGERY CENTER | Age: 74
End: 2019-03-18

## 2019-03-18 NOTE — TELEPHONE ENCOUNTER
Griselda Zimmerman from [place] [address] called re post-op appointment for patient with Dr Ramirez Lunch  Please advise what is needed  She can be reached at [phone]

## 2019-03-18 NOTE — TELEPHONE ENCOUNTER
Dr Diana Gonzalez called would like to speak with you about patient   She can be reached at [phone]

## 2019-03-20 ENCOUNTER — TELEPHONE (OUTPATIENT)
Dept: UROLOGY | Facility: AMBULATORY SURGERY CENTER | Age: 74
End: 2019-03-20

## 2019-03-20 NOTE — TELEPHONE ENCOUNTER
Called Jero Grady (significant other)  Patient is S/P 3 cycles of neoadjuvant chemotherapy with cisplatin and gemcitabine and attempted cystectomy with ileal conduit on 2/25/19 however Dr Rubén Caba was unable to perform cysto prostatectomy due to inflammation and tumor mass  Patient did have creation of ileal conduit  Deepti Silverman is currently at [facility] in [facility] and is expected to be D/C on 3/29/19 per Jero rGady  [facility] will be setting up VNA per Jero Grady  Made Jero Grady aware that the VNA that she chooses should have an ostomy nurse on staff  Jero Grady is aware of date, time and location of patient's follow up OV with Dr Rubén Grady reported that patient is having groin and penile pain and is reporting penile discharge  Dr Rubén Caba aware and ordered CT A/P with and without contrast, PO required  Called Dr Hernandez Perry at [facility] and made her aware of order  Patient will have imaging at [facility]  Jero Grady aware  She was instructed to get a copy of the disc to bring to patient's appointment with Dr Rubén Caba

## 2019-03-20 NOTE — TELEPHONE ENCOUNTER
Dr Dimas Lees called back  Patient has severe allergy to iodine  Dr Gan Getting aware and ordered CT A/P without contrast, no PO  Dr Dimas Lees aware

## 2019-03-25 ENCOUNTER — TELEPHONE (OUTPATIENT)
Dept: UROLOGY | Facility: MEDICAL CENTER | Age: 74
End: 2019-03-25

## 2019-03-25 NOTE — TELEPHONE ENCOUNTER
Telephone call from physician assistant at [facility] who said pt's stents are entirely in his urostomy bag  Urine is draining well and pt has no pain  He has appt here tomorrow  Return call to Eugenio Mahmood at  to let her know as per Dr Torres Perez that is ok and to observe to make sure urine continues to drain well

## 2019-03-26 ENCOUNTER — DOCUMENTATION (OUTPATIENT)
Dept: UROLOGY | Facility: HOSPITAL | Age: 74
End: 2019-03-26

## 2019-03-26 ENCOUNTER — OFFICE VISIT (OUTPATIENT)
Dept: UROLOGY | Facility: MEDICAL CENTER | Age: 74
End: 2019-03-26
Payer: MEDICARE

## 2019-03-26 VITALS — TEMPERATURE: 99 F | HEART RATE: 106 BPM | DIASTOLIC BLOOD PRESSURE: 60 MMHG | SYSTOLIC BLOOD PRESSURE: 120 MMHG

## 2019-03-26 DIAGNOSIS — C68.9 TRANSITIONAL CELL CARCINOMA (HCC): Primary | ICD-10-CM

## 2019-03-26 DIAGNOSIS — N30.80 PYOCYSTIS: ICD-10-CM

## 2019-03-26 PROCEDURE — 96372 THER/PROPH/DIAG INJ SC/IM: CPT

## 2019-03-26 PROCEDURE — 99024 POSTOP FOLLOW-UP VISIT: CPT

## 2019-03-26 RX ORDER — DOCUSATE SODIUM 100 MG/1
100 CAPSULE, LIQUID FILLED ORAL 2 TIMES DAILY PRN
COMMUNITY
End: 2019-05-31 | Stop reason: HOSPADM

## 2019-03-26 RX ORDER — PANTOPRAZOLE SODIUM 40 MG/1
40 TABLET, DELAYED RELEASE ORAL DAILY
COMMUNITY
End: 2019-04-15 | Stop reason: SDUPTHER

## 2019-03-26 RX ORDER — GENTAMICIN SULFATE 40 MG/ML
80 INJECTION, SOLUTION INTRAMUSCULAR; INTRAVENOUS ONCE
Status: COMPLETED | OUTPATIENT
Start: 2019-03-26 | End: 2019-03-26

## 2019-03-26 RX ORDER — CHOLECALCIFEROL (VITAMIN D3) 125 MCG
5 CAPSULE ORAL
COMMUNITY

## 2019-03-26 RX ORDER — OXYCODONE HYDROCHLORIDE 5 MG/1
5 TABLET ORAL EVERY 6 HOURS PRN
COMMUNITY
End: 2019-04-12 | Stop reason: ALTCHOICE

## 2019-03-26 RX ORDER — AMLODIPINE BESYLATE 5 MG/1
5 TABLET ORAL DAILY
COMMUNITY
End: 2019-05-14

## 2019-03-26 RX ORDER — SIMETHICONE 80 MG
80 TABLET,CHEWABLE ORAL EVERY 6 HOURS PRN
COMMUNITY
End: 2019-05-14

## 2019-03-26 RX ADMIN — GENTAMICIN SULFATE 80 MG: 40 INJECTION, SOLUTION INTRAMUSCULAR; INTRAVENOUS at 14:24

## 2019-03-26 NOTE — PROGRESS NOTES
I met with Priyanka Watson and Jyothi Easley at his visit with Dr Michael Stoll today  They understand the treatment plan and need for Radiation Oncology consult  They missed Lokesh's original follow up with Dr Antwan Martell due to being transferred to [facility]  Called Medical Oncology  Spoke to Omi Goldsmith  She scheduled Priyanka Watson on 4/4/19 @ 3 pm in [facility]  Jyothi Easley aware of date, time and location of appointment  Priyanka Watson was scheduled for Radiation Oncology consult on 4/3/19  Patient had CT A/P without contrast done at [facility] and result will be scanned into Media by Dr Carmen Ruth   Priyanka Watson will be discharged from [facility] on 3/29/19  Jyothi Easley stated that Case Management at [facility] is working to set up VNA to include [employer]  Dr Michael Stoll will likely refer to Palliative care if patient continues with pelvic pain  His incision is healing  They have my direct extension to call with questions

## 2019-03-26 NOTE — PROGRESS NOTES
[facility]  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 with now mass in the posterior left bladder in the old diverticular region  Needs wide pelvic field radiation so we will consult Radiation Oncology and I will also ask Hematology Oncology to see him again  Will see him in 1 week to reevaluate- if still having pain will do another instillation, and refer to palliative care  I told Brooks Pat that the situation is not good, but there is no evidence of mets so I would like to proceed with more treatment- not ready for Hospice  Chief Complaint   No chief complaint on file  History of Present Illness   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 now with involvement of the posterior left bladder  He underwent diverting ileal conduit and I was unable to perform radical cystoprostatectomy  His wound is clean dry and intact, staples been removed and the stents are out  He has been complaining of discharge from his penis and pain in the bladder and testicular region  On exam he has no mass or direct tenderness, but he was very uncomfortable in general and I placed a Bolden catheter without difficulty after seen creamy serous discharge coming from the urethra  I then attempted to aspirate was unsuccessful  I irrigated the catheter which irrigated well so the bladder is emptying  I then instructed the nurses to instill 80 mg of gentamicin mixed in 50 cc of 0 5% bupivacaine to provide anesthetic affect as well as anti infective  He tolerated this well and he was quite a bit more comfortable at the end of the visit than he was the beginning        The following portions of the patient's history were reviewed and updated as appropriate: allergies, current medications, past family history, past medical history, past social history, past surgical history and problem list     Review of Systems   Review of Systems    Active Problem List     Patient Active Problem List   Diagnosis    Right bundle melissa block, 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    Aortic regurgitation    BPH with obstruction/lower urinary tract symptoms    Bladder diverticulum    Postoperative ileus ([facility] )    Kidney stone    Major depressive disorder, single episode, moderate ([facility] )    Peripheral vascular disease ([facility] )    Essential hypertension    Acute pain of right shoulder    Benign localized hyperplasia of prostate without urinary obstruction    Urinary retention due to benign prostatic hyperplasia    Hydronephrosis of left kidney    Ureteral stricture, left    Pelvic abscess in [facility])    Aneurysm of left popliteal artery (HCC)    Urothelial carcinoma ([facility] )    Prostate cancer ([facility] )    Hypokalemia    Orthostatic lightheadedness    Chemotherapy-induced neutropenia (HCC)    SIRS (systemic inflammatory response syndrome) ([facility] )    CKD (chronic kidney disease) stage 3, GFR 30-59 ml/min ([facility])    Acute cystitis without hematuria    Acute on chronic anemia    Moderate protein-calorie malnutrition ([facility] )    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[facility] )    Malignant neoplasm of prostate ([facility] )    Preop cardiovascular exam       Objective   There were no vitals taken for this visit      Physical Exam        Current Medications     Current Outpatient Medications:     acetaminophen (TYLENOL) 500 mg tablet, Take 500 mg by mouth every 6 (six) hours as needed for mild pain, Disp: , Rfl:     divalproex sodium (DEPAKOTE) 500 mg EC tablet, Take 500 mg by mouth 2 (two) times a day  , Disp: , Rfl:     mometasone (NASONEX) 50 mcg/act nasal spray, 2 sprays into each nostril as needed, Disp: , Rfl:    pentoxifylline (TRENtal) 400 mg ER tablet, Take 400 mg by mouth 3 (three) times a day with meals, Disp: , Rfl:     ranitidine (ZANTAC) 300 MG tablet, Take 300 mg by mouth daily at bedtime, Disp: , Rfl: 5        Marlon Peña MD

## 2019-03-27 ENCOUNTER — TELEPHONE (OUTPATIENT)
Dept: UROLOGY | Facility: AMBULATORY SURGERY CENTER | Age: 74
End: 2019-03-27

## 2019-03-27 NOTE — TELEPHONE ENCOUNTER
Dana Pete (significant other) called  She spoke to Case Management at [facility] and [place] Notice with have [facility] VNA services when discharged from [facility]  Dana Pete stated that [facility]upinder is dosing his pain medication every 4 hours now and that has helped with his pelvic pain

## 2019-03-28 DIAGNOSIS — C68.9 UROTHELIAL CARCINOMA (HCC): Primary | ICD-10-CM

## 2019-03-28 NOTE — TELEPHONE ENCOUNTER
Pt's wife, Serge Contreras called  Pt is not getting any relief from the bladder instillation done 2 days ago  He is still in a lot of pain and is getting Oxycodone q4 hrs at rehab  He is scheduled to come home tomorrow and she doesn't know what to do for him  I spoke to Dr Carmen Pulido, who said to try to keep him comfortable with pain management  She is very upset because she said she was told he would have quality of life with this procedure and he is just suffering  I explained to her that Dr Nikolas Santiago is out of the office until Monday, but I would give him the message  I will also forward to PER ANSLEY  [facility], who is our oncology nurse

## 2019-03-29 RX ORDER — ATROPA BELLADONNA AND OPIUM 16.2; 3 MG/1; MG/1
30 SUPPOSITORY RECTAL DAILY
Qty: 30 SUPPOSITORY | Refills: 0 | Status: SHIPPED | OUTPATIENT
Start: 2019-03-29 | End: 2019-05-14

## 2019-03-29 NOTE — TELEPHONE ENCOUNTER
Discussed with Dr Tavo Cheng  He ordered B&O suppositories to be administered once daily to replace a dose of oxycodone and myrbetriq 25 mg daily  Called Ady Ugarte and made her aware  She will verify that [facility] will be discharging him with a prescription for oxycodone and call office back if they will not

## 2019-03-29 NOTE — TELEPHONE ENCOUNTER
Michael Martinez called back  [facility] will give Wendiprashanth Enamorado a prescription for pain medication  Reviewed again that B&O suppository given daily needs to replace a dose of PO pain med  She verbalizes understanding

## 2019-04-01 ENCOUNTER — TELEPHONE (OUTPATIENT)
Dept: UROLOGY | Facility: AMBULATORY SURGERY CENTER | Age: 74
End: 2019-04-01

## 2019-04-01 DIAGNOSIS — G89.29 CHRONIC PELVIC PAIN IN MALE: Primary | ICD-10-CM

## 2019-04-01 DIAGNOSIS — G89.3 CANCER RELATED PAIN: ICD-10-CM

## 2019-04-01 DIAGNOSIS — R10.2 CHRONIC PELVIC PAIN IN MALE: Primary | ICD-10-CM

## 2019-04-01 RX ORDER — OXYCODONE HCL 10 MG/1
10 TABLET, FILM COATED, EXTENDED RELEASE ORAL EVERY 12 HOURS SCHEDULED
Qty: 60 TABLET | Refills: 0 | Status: SHIPPED | OUTPATIENT
Start: 2019-04-01 | End: 2019-04-12 | Stop reason: ALTCHOICE

## 2019-04-01 NOTE — TELEPHONE ENCOUNTER
Vishnu Wilson- I will take your suggestion for referral to palliative care for pain control- thanks

## 2019-04-01 NOTE — TELEPHONE ENCOUNTER
Ana Judd  She stated Izzy Espinal did OK this weekend but had a few episodes of intense pain  She stated that the B&O suppositories were not covered by his insurance and that it needed prior auth  She did  #12 of them but the cost was over $300  Offered Izzy Espinal a Palliative Care consult per Dr Marietta Enriquez   They agreed  Order placed and called Palliative care to make them aware of consult  [facility] did come to the house Friday night because they had problems with the Urostomy pouch  Izzy Espinal is scheduled for a home visit today

## 2019-04-03 ENCOUNTER — RADIATION ONCOLOGY CONSULT (OUTPATIENT)
Dept: RADIATION ONCOLOGY | Facility: CLINIC | Age: 74
End: 2019-04-03
Attending: RADIOLOGY
Payer: MEDICARE

## 2019-04-03 VITALS
BODY MASS INDEX: 25.47 KG/M2 | SYSTOLIC BLOOD PRESSURE: 102 MMHG | WEIGHT: 188 LBS | HEIGHT: 72 IN | DIASTOLIC BLOOD PRESSURE: 62 MMHG | OXYGEN SATURATION: 95 % | TEMPERATURE: 99.1 F | RESPIRATION RATE: 18 BRPM | HEART RATE: 104 BPM

## 2019-04-03 DIAGNOSIS — C68.9 UROTHELIAL CARCINOMA (HCC): Primary | ICD-10-CM

## 2019-04-03 DIAGNOSIS — C68.9 TRANSITIONAL CELL CARCINOMA (HCC): ICD-10-CM

## 2019-04-03 DIAGNOSIS — C61 MALIGNANT NEOPLASM OF PROSTATE (HCC): ICD-10-CM

## 2019-04-03 PROCEDURE — 99215 OFFICE O/P EST HI 40 MIN: CPT | Performed by: RADIOLOGY

## 2019-04-04 ENCOUNTER — OFFICE VISIT (OUTPATIENT)
Dept: HEMATOLOGY ONCOLOGY | Facility: CLINIC | Age: 74
End: 2019-04-04
Payer: MEDICARE

## 2019-04-04 VITALS
HEIGHT: 71 IN | HEART RATE: 100 BPM | TEMPERATURE: 99.1 F | DIASTOLIC BLOOD PRESSURE: 80 MMHG | WEIGHT: 189 LBS | RESPIRATION RATE: 14 BRPM | SYSTOLIC BLOOD PRESSURE: 150 MMHG | BODY MASS INDEX: 26.46 KG/M2

## 2019-04-04 DIAGNOSIS — C68.8 PRIMARY UROTHELIAL CARCINOMA OF OVERLAPPING SITES OF URINARY ORGANS (HCC): Primary | ICD-10-CM

## 2019-04-04 PROCEDURE — 99215 OFFICE O/P EST HI 40 MIN: CPT | Performed by: INTERNAL MEDICINE

## 2019-04-05 ENCOUNTER — DOCUMENTATION (OUTPATIENT)
Dept: UROLOGY | Facility: CLINIC | Age: 74
End: 2019-04-05

## 2019-04-05 ENCOUNTER — PROCEDURE VISIT (OUTPATIENT)
Dept: UROLOGY | Facility: MEDICAL CENTER | Age: 74
End: 2019-04-05
Payer: MEDICARE

## 2019-04-05 VITALS
WEIGHT: 190 LBS | SYSTOLIC BLOOD PRESSURE: 120 MMHG | HEIGHT: 71 IN | DIASTOLIC BLOOD PRESSURE: 80 MMHG | HEART RATE: 85 BPM | BODY MASS INDEX: 26.6 KG/M2

## 2019-04-05 DIAGNOSIS — N30.20 CHRONIC CYSTITIS: Primary | ICD-10-CM

## 2019-04-05 PROCEDURE — 99024 POSTOP FOLLOW-UP VISIT: CPT | Performed by: UROLOGY

## 2019-04-05 RX ORDER — GENTAMICIN SULFATE 40 MG/ML
80 INJECTION, SOLUTION INTRAMUSCULAR; INTRAVENOUS ONCE
Status: DISCONTINUED | OUTPATIENT
Start: 2019-04-05 | End: 2019-04-10

## 2019-04-08 ENCOUNTER — APPOINTMENT (OUTPATIENT)
Dept: RADIATION ONCOLOGY | Facility: CLINIC | Age: 74
End: 2019-04-08
Attending: RADIOLOGY
Payer: MEDICARE

## 2019-04-08 PROCEDURE — 77334 RADIATION TREATMENT AID(S): CPT | Performed by: RADIOLOGY

## 2019-04-09 ENCOUNTER — TELEPHONE (OUTPATIENT)
Dept: UROLOGY | Facility: HOSPITAL | Age: 74
End: 2019-04-09

## 2019-04-09 PROCEDURE — 77370 RADIATION PHYSICS CONSULT: CPT | Performed by: RADIOLOGY

## 2019-04-12 ENCOUNTER — OFFICE VISIT (OUTPATIENT)
Dept: PALLIATIVE MEDICINE | Facility: CLINIC | Age: 74
End: 2019-04-12
Payer: MEDICARE

## 2019-04-12 VITALS
BODY MASS INDEX: 26.43 KG/M2 | DIASTOLIC BLOOD PRESSURE: 70 MMHG | SYSTOLIC BLOOD PRESSURE: 118 MMHG | TEMPERATURE: 98.2 F | HEART RATE: 95 BPM | WEIGHT: 188.8 LBS | HEIGHT: 71 IN | OXYGEN SATURATION: 99 %

## 2019-04-12 DIAGNOSIS — R53.0 NEOPLASTIC MALIGNANT RELATED FATIGUE: ICD-10-CM

## 2019-04-12 DIAGNOSIS — C61 MALIGNANT NEOPLASM OF PROSTATE (HCC): Primary | ICD-10-CM

## 2019-04-12 DIAGNOSIS — K59.03 CONSTIPATION DUE TO OPIOID THERAPY: ICD-10-CM

## 2019-04-12 DIAGNOSIS — R63.0 POOR APPETITE: ICD-10-CM

## 2019-04-12 DIAGNOSIS — T40.2X5A CONSTIPATION DUE TO OPIOID THERAPY: ICD-10-CM

## 2019-04-12 DIAGNOSIS — G89.3 CANCER RELATED PAIN: ICD-10-CM

## 2019-04-12 PROBLEM — E87.6 HYPOKALEMIA: Status: RESOLVED | Noted: 2018-11-28 | Resolved: 2019-04-12

## 2019-04-12 PROCEDURE — 99205 OFFICE O/P NEW HI 60 MIN: CPT | Performed by: INTERNAL MEDICINE

## 2019-04-12 RX ORDER — POLYETHYLENE GLYCOL 3350 17 G/17G
17 POWDER, FOR SOLUTION ORAL DAILY
Qty: 24 EACH | Refills: 0 | Status: SHIPPED | OUTPATIENT
Start: 2019-04-12 | End: 2019-05-14

## 2019-04-12 RX ORDER — SENNOSIDES 8.6 MG
TABLET ORAL
Qty: 120 EACH | Refills: 0 | Status: SHIPPED | OUTPATIENT
Start: 2019-04-12

## 2019-04-12 RX ORDER — BISACODYL 10 MG
10 SUPPOSITORY, RECTAL RECTAL DAILY PRN
Qty: 12 SUPPOSITORY | Refills: 0 | Status: SHIPPED | OUTPATIENT
Start: 2019-04-12 | End: 2019-07-19 | Stop reason: ALTCHOICE

## 2019-04-12 RX ORDER — DEXAMETHASONE 2 MG/1
2 TABLET ORAL
Qty: 30 TABLET | Refills: 3 | Status: SHIPPED | OUTPATIENT
Start: 2019-04-12 | End: 2019-05-29

## 2019-04-12 RX ORDER — OXYCODONE HYDROCHLORIDE 10 MG/1
10 TABLET ORAL EVERY 4 HOURS PRN
Qty: 90 TABLET | Refills: 0 | Status: SHIPPED | OUTPATIENT
Start: 2019-04-12 | End: 2019-04-26

## 2019-04-12 RX ORDER — OXYCODONE HYDROCHLORIDE 30 MG/1
30 TABLET, FILM COATED, EXTENDED RELEASE ORAL EVERY 8 HOURS SCHEDULED
Qty: 45 TABLET | Refills: 0 | Status: SHIPPED | OUTPATIENT
Start: 2019-04-12 | End: 2019-04-22 | Stop reason: SDUPTHER

## 2019-04-15 ENCOUNTER — TELEPHONE (OUTPATIENT)
Dept: PALLIATIVE MEDICINE | Facility: CLINIC | Age: 74
End: 2019-04-15

## 2019-04-15 DIAGNOSIS — K21.9 GASTROESOPHAGEAL REFLUX DISEASE, ESOPHAGITIS PRESENCE NOT SPECIFIED: ICD-10-CM

## 2019-04-15 DIAGNOSIS — R12 HEARTBURN: Primary | ICD-10-CM

## 2019-04-15 RX ORDER — PANTOPRAZOLE SODIUM 40 MG/1
40 TABLET, DELAYED RELEASE ORAL DAILY
Qty: 30 TABLET | Refills: 1 | Status: SHIPPED | OUTPATIENT
Start: 2019-04-15 | End: 2019-05-01 | Stop reason: SDUPTHER

## 2019-04-16 ENCOUNTER — TELEPHONE (OUTPATIENT)
Dept: UROLOGY | Facility: AMBULATORY SURGERY CENTER | Age: 74
End: 2019-04-16

## 2019-04-16 PROCEDURE — 77295 3-D RADIOTHERAPY PLAN: CPT | Performed by: RADIOLOGY

## 2019-04-16 PROCEDURE — 77300 RADIATION THERAPY DOSE PLAN: CPT | Performed by: RADIOLOGY

## 2019-04-16 PROCEDURE — 77334 RADIATION TREATMENT AID(S): CPT | Performed by: RADIOLOGY

## 2019-04-19 ENCOUNTER — TELEPHONE (OUTPATIENT)
Dept: PALLIATIVE MEDICINE | Facility: CLINIC | Age: 74
End: 2019-04-19

## 2019-04-19 DIAGNOSIS — G89.3 CANCER RELATED PAIN: Primary | ICD-10-CM

## 2019-04-19 DIAGNOSIS — C68.8 MALIGNANT NEOPLASM OF OVERLAPPING SITES OF URINARY ORGANS (HCC): Primary | ICD-10-CM

## 2019-04-19 DIAGNOSIS — G89.3 CANCER RELATED PAIN: ICD-10-CM

## 2019-04-19 DIAGNOSIS — C61 MALIGNANT NEOPLASM OF PROSTATE (HCC): ICD-10-CM

## 2019-04-19 RX ORDER — OXYCODONE HCL 40 MG/1
40 TABLET, FILM COATED, EXTENDED RELEASE ORAL EVERY 12 HOURS SCHEDULED
Qty: 30 TABLET | Refills: 0 | Status: SHIPPED | OUTPATIENT
Start: 2019-04-19 | End: 2019-05-01 | Stop reason: SDUPTHER

## 2019-04-22 ENCOUNTER — TELEPHONE (OUTPATIENT)
Dept: PALLIATIVE MEDICINE | Facility: CLINIC | Age: 74
End: 2019-04-22

## 2019-04-22 ENCOUNTER — APPOINTMENT (OUTPATIENT)
Dept: RADIATION ONCOLOGY | Facility: CLINIC | Age: 74
End: 2019-04-22
Attending: RADIOLOGY
Payer: MEDICARE

## 2019-04-22 RX ORDER — OXYCODONE HYDROCHLORIDE 30 MG/1
30 TABLET, FILM COATED, EXTENDED RELEASE ORAL EVERY 8 HOURS SCHEDULED
Qty: 15 TABLET | Refills: 0 | Status: SHIPPED | OUTPATIENT
Start: 2019-04-22 | End: 2019-04-26

## 2019-04-23 ENCOUNTER — TELEPHONE (OUTPATIENT)
Dept: UROLOGY | Facility: MEDICAL CENTER | Age: 74
End: 2019-04-23

## 2019-04-23 ENCOUNTER — APPOINTMENT (OUTPATIENT)
Dept: RADIATION ONCOLOGY | Facility: CLINIC | Age: 74
End: 2019-04-23
Attending: RADIOLOGY
Payer: MEDICARE

## 2019-04-23 PROCEDURE — 77280 THER RAD SIMULAJ FIELD SMPL: CPT | Performed by: RADIOLOGY

## 2019-04-26 ENCOUNTER — TELEPHONE (OUTPATIENT)
Dept: PALLIATIVE MEDICINE | Facility: CLINIC | Age: 74
End: 2019-04-26

## 2019-04-26 ENCOUNTER — OFFICE VISIT (OUTPATIENT)
Dept: PALLIATIVE MEDICINE | Facility: CLINIC | Age: 74
End: 2019-04-26
Payer: MEDICARE

## 2019-04-26 VITALS
HEIGHT: 71 IN | RESPIRATION RATE: 20 BRPM | SYSTOLIC BLOOD PRESSURE: 130 MMHG | OXYGEN SATURATION: 97 % | WEIGHT: 188.2 LBS | TEMPERATURE: 98 F | BODY MASS INDEX: 26.35 KG/M2 | DIASTOLIC BLOOD PRESSURE: 80 MMHG | HEART RATE: 68 BPM

## 2019-04-26 DIAGNOSIS — C61 MALIGNANT NEOPLASM OF PROSTATE (HCC): ICD-10-CM

## 2019-04-26 DIAGNOSIS — G89.3 CANCER RELATED PAIN: Primary | ICD-10-CM

## 2019-04-26 PROCEDURE — 99215 OFFICE O/P EST HI 40 MIN: CPT | Performed by: INTERNAL MEDICINE

## 2019-04-26 RX ORDER — OXYCODONE HYDROCHLORIDE 20 MG/1
20 TABLET ORAL EVERY 4 HOURS PRN
Qty: 90 TABLET | Refills: 0 | Status: SHIPPED | OUTPATIENT
Start: 2019-04-26 | End: 2019-05-14

## 2019-04-29 ENCOUNTER — APPOINTMENT (OUTPATIENT)
Dept: RADIATION ONCOLOGY | Facility: CLINIC | Age: 74
End: 2019-04-29
Attending: RADIOLOGY
Payer: MEDICARE

## 2019-04-29 ENCOUNTER — APPOINTMENT (OUTPATIENT)
Dept: LAB | Facility: CLINIC | Age: 74
End: 2019-04-29
Attending: RADIOLOGY
Payer: MEDICARE

## 2019-04-29 DIAGNOSIS — C68.9 UROTHELIAL CARCINOMA (HCC): ICD-10-CM

## 2019-04-29 DIAGNOSIS — G47.00 INSOMNIA, UNSPECIFIED TYPE: Primary | ICD-10-CM

## 2019-04-29 LAB
BASOPHILS # BLD AUTO: 0.02 THOUSANDS/ΜL (ref 0–0.1)
BASOPHILS NFR BLD AUTO: 0 % (ref 0–1)
EOSINOPHIL # BLD AUTO: 0.06 THOUSAND/ΜL (ref 0–0.61)
EOSINOPHIL NFR BLD AUTO: 1 % (ref 0–6)
ERYTHROCYTE [DISTWIDTH] IN BLOOD BY AUTOMATED COUNT: 14.7 % (ref 11.6–15.1)
HCT VFR BLD AUTO: 28.5 % (ref 36.5–49.3)
HGB BLD-MCNC: 9.3 G/DL (ref 12–17)
LYMPHOCYTES # BLD AUTO: 0.47 THOUSANDS/ΜL (ref 0.6–4.47)
LYMPHOCYTES NFR BLD AUTO: 5 % (ref 14–44)
MCH RBC QN AUTO: 28.8 PG (ref 26.8–34.3)
MCHC RBC AUTO-ENTMCNC: 32.6 G/DL (ref 31.4–37.4)
MCV RBC AUTO: 88 FL (ref 82–98)
MONOCYTES # BLD AUTO: 0.44 THOUSAND/ΜL (ref 0.17–1.22)
MONOCYTES NFR BLD AUTO: 5 % (ref 4–12)
NEUTROPHILS # BLD AUTO: 7.77 THOUSANDS/ΜL (ref 1.85–7.62)
NEUTS SEG NFR BLD AUTO: 89 % (ref 43–75)
PLATELET # BLD AUTO: 378 THOUSANDS/UL (ref 149–390)
PMV BLD AUTO: 7.7 FL (ref 8.9–12.7)
RBC # BLD AUTO: 3.23 MILLION/UL (ref 3.88–5.62)
WBC # BLD AUTO: 8.76 THOUSAND/UL (ref 4.31–10.16)

## 2019-04-29 PROCEDURE — 36415 COLL VENOUS BLD VENIPUNCTURE: CPT

## 2019-04-29 PROCEDURE — 77412 RADIATION TX DELIVERY LVL 3: CPT | Performed by: RADIOLOGY

## 2019-04-29 PROCEDURE — 85025 COMPLETE CBC W/AUTO DIFF WBC: CPT

## 2019-04-29 PROCEDURE — 77331 SPECIAL RADIATION DOSIMETRY: CPT | Performed by: RADIOLOGY

## 2019-04-29 PROCEDURE — 77387 GUIDANCE FOR RADJ TX DLVR: CPT | Performed by: RADIOLOGY

## 2019-04-29 RX ORDER — TRAZODONE HYDROCHLORIDE 50 MG/1
75 TABLET ORAL
Qty: 45 TABLET | Refills: 0 | Status: SHIPPED | OUTPATIENT
Start: 2019-04-29 | End: 2019-05-23 | Stop reason: SDUPTHER

## 2019-04-30 ENCOUNTER — APPOINTMENT (OUTPATIENT)
Dept: RADIATION ONCOLOGY | Facility: CLINIC | Age: 74
End: 2019-04-30
Attending: RADIOLOGY
Payer: MEDICARE

## 2019-04-30 ENCOUNTER — TELEPHONE (OUTPATIENT)
Dept: PALLIATIVE MEDICINE | Facility: CLINIC | Age: 74
End: 2019-04-30

## 2019-04-30 PROCEDURE — 77387 GUIDANCE FOR RADJ TX DLVR: CPT | Performed by: RADIOLOGY

## 2019-04-30 PROCEDURE — 77412 RADIATION TX DELIVERY LVL 3: CPT | Performed by: RADIOLOGY

## 2019-05-01 ENCOUNTER — APPOINTMENT (OUTPATIENT)
Dept: RADIATION ONCOLOGY | Facility: CLINIC | Age: 74
End: 2019-05-01
Attending: RADIOLOGY
Payer: MEDICARE

## 2019-05-01 DIAGNOSIS — G89.3 CANCER RELATED PAIN: ICD-10-CM

## 2019-05-01 DIAGNOSIS — K21.9 GASTROESOPHAGEAL REFLUX DISEASE, ESOPHAGITIS PRESENCE NOT SPECIFIED: ICD-10-CM

## 2019-05-01 PROCEDURE — 77387 GUIDANCE FOR RADJ TX DLVR: CPT | Performed by: RADIOLOGY

## 2019-05-01 PROCEDURE — 77412 RADIATION TX DELIVERY LVL 3: CPT | Performed by: RADIOLOGY

## 2019-05-01 RX ORDER — PANTOPRAZOLE SODIUM 40 MG/1
40 TABLET, DELAYED RELEASE ORAL DAILY
Qty: 30 TABLET | Refills: 1 | Status: SHIPPED | OUTPATIENT
Start: 2019-05-01

## 2019-05-01 RX ORDER — OXYCODONE HCL 40 MG/1
40 TABLET, FILM COATED, EXTENDED RELEASE ORAL EVERY 12 HOURS SCHEDULED
Qty: 30 TABLET | Refills: 0 | Status: SHIPPED | OUTPATIENT
Start: 2019-05-01 | End: 2019-05-02 | Stop reason: SDUPTHER

## 2019-05-02 ENCOUNTER — APPOINTMENT (OUTPATIENT)
Dept: RADIATION ONCOLOGY | Facility: CLINIC | Age: 74
End: 2019-05-02
Attending: RADIOLOGY
Payer: MEDICARE

## 2019-05-02 PROCEDURE — 77417 THER RADIOLOGY PORT IMAGE(S): CPT | Performed by: RADIOLOGY

## 2019-05-02 PROCEDURE — 77387 GUIDANCE FOR RADJ TX DLVR: CPT | Performed by: RADIOLOGY

## 2019-05-02 PROCEDURE — 77412 RADIATION TX DELIVERY LVL 3: CPT | Performed by: RADIOLOGY

## 2019-05-02 RX ORDER — OXYCODONE HCL 40 MG/1
40 TABLET, FILM COATED, EXTENDED RELEASE ORAL EVERY 8 HOURS SCHEDULED
Qty: 90 TABLET | Refills: 0 | Status: SHIPPED | OUTPATIENT
Start: 2019-05-02 | End: 2019-05-31 | Stop reason: HOSPADM

## 2019-05-03 ENCOUNTER — APPOINTMENT (OUTPATIENT)
Dept: RADIATION ONCOLOGY | Facility: CLINIC | Age: 74
End: 2019-05-03
Attending: RADIOLOGY
Payer: MEDICARE

## 2019-05-03 PROCEDURE — 77387 GUIDANCE FOR RADJ TX DLVR: CPT | Performed by: RADIOLOGY

## 2019-05-03 PROCEDURE — 77336 RADIATION PHYSICS CONSULT: CPT | Performed by: RADIOLOGY

## 2019-05-03 PROCEDURE — 77412 RADIATION TX DELIVERY LVL 3: CPT | Performed by: RADIOLOGY

## 2019-05-06 ENCOUNTER — APPOINTMENT (OUTPATIENT)
Dept: RADIATION ONCOLOGY | Facility: CLINIC | Age: 74
End: 2019-05-06
Attending: RADIOLOGY
Payer: MEDICARE

## 2019-05-06 PROCEDURE — 77387 GUIDANCE FOR RADJ TX DLVR: CPT | Performed by: RADIOLOGY

## 2019-05-06 PROCEDURE — 77412 RADIATION TX DELIVERY LVL 3: CPT | Performed by: RADIOLOGY

## 2019-05-07 ENCOUNTER — APPOINTMENT (OUTPATIENT)
Dept: RADIATION ONCOLOGY | Facility: CLINIC | Age: 74
End: 2019-05-07
Attending: RADIOLOGY
Payer: MEDICARE

## 2019-05-07 PROCEDURE — 77412 RADIATION TX DELIVERY LVL 3: CPT | Performed by: RADIOLOGY

## 2019-05-07 PROCEDURE — 77387 GUIDANCE FOR RADJ TX DLVR: CPT | Performed by: RADIOLOGY

## 2019-05-08 ENCOUNTER — TELEPHONE (OUTPATIENT)
Dept: UROLOGY | Facility: AMBULATORY SURGERY CENTER | Age: 74
End: 2019-05-08

## 2019-05-08 ENCOUNTER — APPOINTMENT (OUTPATIENT)
Dept: RADIATION ONCOLOGY | Facility: CLINIC | Age: 74
End: 2019-05-08
Attending: RADIOLOGY
Payer: MEDICARE

## 2019-05-08 PROCEDURE — 77412 RADIATION TX DELIVERY LVL 3: CPT | Performed by: RADIOLOGY

## 2019-05-08 PROCEDURE — 77387 GUIDANCE FOR RADJ TX DLVR: CPT | Performed by: RADIOLOGY

## 2019-05-09 ENCOUNTER — APPOINTMENT (OUTPATIENT)
Dept: RADIATION ONCOLOGY | Facility: CLINIC | Age: 74
End: 2019-05-09
Attending: RADIOLOGY
Payer: MEDICARE

## 2019-05-09 ENCOUNTER — TELEPHONE (OUTPATIENT)
Dept: UROLOGY | Facility: CLINIC | Age: 74
End: 2019-05-09

## 2019-05-09 PROCEDURE — 77412 RADIATION TX DELIVERY LVL 3: CPT | Performed by: RADIOLOGY

## 2019-05-09 PROCEDURE — 77387 GUIDANCE FOR RADJ TX DLVR: CPT | Performed by: RADIOLOGY

## 2019-05-09 PROCEDURE — 77417 THER RADIOLOGY PORT IMAGE(S): CPT | Performed by: RADIOLOGY

## 2019-05-10 ENCOUNTER — OFFICE VISIT (OUTPATIENT)
Dept: PALLIATIVE MEDICINE | Facility: CLINIC | Age: 74
End: 2019-05-10
Payer: MEDICARE

## 2019-05-10 ENCOUNTER — APPOINTMENT (OUTPATIENT)
Dept: RADIATION ONCOLOGY | Facility: CLINIC | Age: 74
End: 2019-05-10
Attending: RADIOLOGY
Payer: MEDICARE

## 2019-05-10 VITALS
BODY MASS INDEX: 25.5 KG/M2 | WEIGHT: 182.13 LBS | SYSTOLIC BLOOD PRESSURE: 130 MMHG | DIASTOLIC BLOOD PRESSURE: 80 MMHG | HEART RATE: 107 BPM | HEIGHT: 71 IN | TEMPERATURE: 98.3 F | OXYGEN SATURATION: 97 %

## 2019-05-10 DIAGNOSIS — K59.03 CONSTIPATION DUE TO OPIOID THERAPY: ICD-10-CM

## 2019-05-10 DIAGNOSIS — T40.2X5A CONSTIPATION DUE TO OPIOID THERAPY: ICD-10-CM

## 2019-05-10 DIAGNOSIS — G89.3 CANCER RELATED PAIN: Primary | ICD-10-CM

## 2019-05-10 DIAGNOSIS — C61 MALIGNANT NEOPLASM OF PROSTATE (HCC): ICD-10-CM

## 2019-05-10 DIAGNOSIS — G47.00 INSOMNIA, UNSPECIFIED TYPE: ICD-10-CM

## 2019-05-10 PROCEDURE — 77412 RADIATION TX DELIVERY LVL 3: CPT | Performed by: RADIOLOGY

## 2019-05-10 PROCEDURE — 77336 RADIATION PHYSICS CONSULT: CPT | Performed by: RADIOLOGY

## 2019-05-10 PROCEDURE — 99214 OFFICE O/P EST MOD 30 MIN: CPT | Performed by: FAMILY MEDICINE

## 2019-05-10 PROCEDURE — 77387 GUIDANCE FOR RADJ TX DLVR: CPT | Performed by: RADIOLOGY

## 2019-05-13 ENCOUNTER — APPOINTMENT (OUTPATIENT)
Dept: LAB | Facility: CLINIC | Age: 74
End: 2019-05-13
Attending: RADIOLOGY
Payer: MEDICARE

## 2019-05-13 ENCOUNTER — APPOINTMENT (OUTPATIENT)
Dept: RADIATION ONCOLOGY | Facility: CLINIC | Age: 74
End: 2019-05-13
Attending: RADIOLOGY
Payer: MEDICARE

## 2019-05-13 PROCEDURE — 77387 GUIDANCE FOR RADJ TX DLVR: CPT | Performed by: RADIOLOGY

## 2019-05-13 PROCEDURE — 77412 RADIATION TX DELIVERY LVL 3: CPT | Performed by: RADIOLOGY

## 2019-05-14 ENCOUNTER — APPOINTMENT (OUTPATIENT)
Dept: RADIATION ONCOLOGY | Facility: CLINIC | Age: 74
End: 2019-05-14
Attending: RADIOLOGY
Payer: MEDICARE

## 2019-05-14 ENCOUNTER — OFFICE VISIT (OUTPATIENT)
Dept: UROLOGY | Facility: MEDICAL CENTER | Age: 74
End: 2019-05-14

## 2019-05-14 VITALS
HEART RATE: 83 BPM | SYSTOLIC BLOOD PRESSURE: 112 MMHG | WEIGHT: 182 LBS | HEIGHT: 69 IN | DIASTOLIC BLOOD PRESSURE: 64 MMHG | BODY MASS INDEX: 26.96 KG/M2

## 2019-05-14 DIAGNOSIS — C68.9 UROTHELIAL CARCINOMA (HCC): Primary | ICD-10-CM

## 2019-05-14 PROCEDURE — 77412 RADIATION TX DELIVERY LVL 3: CPT | Performed by: RADIOLOGY

## 2019-05-14 PROCEDURE — 99024 POSTOP FOLLOW-UP VISIT: CPT | Performed by: UROLOGY

## 2019-05-14 PROCEDURE — 77387 GUIDANCE FOR RADJ TX DLVR: CPT | Performed by: RADIOLOGY

## 2019-05-15 ENCOUNTER — APPOINTMENT (OUTPATIENT)
Dept: RADIATION ONCOLOGY | Facility: CLINIC | Age: 74
End: 2019-05-15
Attending: RADIOLOGY
Payer: MEDICARE

## 2019-05-15 PROCEDURE — 77412 RADIATION TX DELIVERY LVL 3: CPT | Performed by: RADIOLOGY

## 2019-05-15 PROCEDURE — 77387 GUIDANCE FOR RADJ TX DLVR: CPT | Performed by: RADIOLOGY

## 2019-05-16 ENCOUNTER — APPOINTMENT (OUTPATIENT)
Dept: RADIATION ONCOLOGY | Facility: CLINIC | Age: 74
End: 2019-05-16
Attending: RADIOLOGY
Payer: MEDICARE

## 2019-05-16 PROCEDURE — 77412 RADIATION TX DELIVERY LVL 3: CPT | Performed by: RADIOLOGY

## 2019-05-16 PROCEDURE — 77417 THER RADIOLOGY PORT IMAGE(S): CPT | Performed by: RADIOLOGY

## 2019-05-16 PROCEDURE — 77387 GUIDANCE FOR RADJ TX DLVR: CPT | Performed by: RADIOLOGY

## 2019-05-17 ENCOUNTER — APPOINTMENT (OUTPATIENT)
Dept: RADIATION ONCOLOGY | Facility: CLINIC | Age: 74
End: 2019-05-17
Attending: RADIOLOGY
Payer: MEDICARE

## 2019-05-17 PROCEDURE — 77417 THER RADIOLOGY PORT IMAGE(S): CPT | Performed by: RADIOLOGY

## 2019-05-17 PROCEDURE — 77387 GUIDANCE FOR RADJ TX DLVR: CPT | Performed by: RADIOLOGY

## 2019-05-17 PROCEDURE — 77336 RADIATION PHYSICS CONSULT: CPT | Performed by: RADIOLOGY

## 2019-05-17 PROCEDURE — 77412 RADIATION TX DELIVERY LVL 3: CPT | Performed by: RADIOLOGY

## 2019-05-20 ENCOUNTER — APPOINTMENT (OUTPATIENT)
Dept: RADIATION ONCOLOGY | Facility: CLINIC | Age: 74
End: 2019-05-20
Attending: RADIOLOGY
Payer: MEDICARE

## 2019-05-20 PROCEDURE — 77412 RADIATION TX DELIVERY LVL 3: CPT | Performed by: RADIOLOGY

## 2019-05-20 PROCEDURE — 77387 GUIDANCE FOR RADJ TX DLVR: CPT | Performed by: RADIOLOGY

## 2019-05-21 ENCOUNTER — APPOINTMENT (OUTPATIENT)
Dept: RADIATION ONCOLOGY | Facility: CLINIC | Age: 74
End: 2019-05-21
Attending: RADIOLOGY
Payer: MEDICARE

## 2019-05-21 PROCEDURE — 77387 GUIDANCE FOR RADJ TX DLVR: CPT | Performed by: RADIOLOGY

## 2019-05-21 PROCEDURE — 77412 RADIATION TX DELIVERY LVL 3: CPT | Performed by: RADIOLOGY

## 2019-05-22 ENCOUNTER — APPOINTMENT (OUTPATIENT)
Dept: RADIATION ONCOLOGY | Facility: CLINIC | Age: 74
End: 2019-05-22
Attending: RADIOLOGY
Payer: MEDICARE

## 2019-05-22 PROCEDURE — 77412 RADIATION TX DELIVERY LVL 3: CPT | Performed by: RADIOLOGY

## 2019-05-23 ENCOUNTER — OFFICE VISIT (OUTPATIENT)
Dept: HEMATOLOGY ONCOLOGY | Facility: CLINIC | Age: 74
End: 2019-05-23
Payer: MEDICARE

## 2019-05-23 ENCOUNTER — APPOINTMENT (OUTPATIENT)
Dept: RADIATION ONCOLOGY | Facility: CLINIC | Age: 74
End: 2019-05-23
Attending: RADIOLOGY
Payer: MEDICARE

## 2019-05-23 VITALS
HEART RATE: 58 BPM | DIASTOLIC BLOOD PRESSURE: 80 MMHG | TEMPERATURE: 99.9 F | WEIGHT: 190 LBS | HEIGHT: 69 IN | BODY MASS INDEX: 28.14 KG/M2 | SYSTOLIC BLOOD PRESSURE: 104 MMHG | OXYGEN SATURATION: 94 % | RESPIRATION RATE: 18 BRPM

## 2019-05-23 DIAGNOSIS — G47.00 INSOMNIA, UNSPECIFIED TYPE: ICD-10-CM

## 2019-05-23 DIAGNOSIS — C68.8 PRIMARY UROTHELIAL CARCINOMA OF OVERLAPPING SITES OF URINARY ORGANS (HCC): Primary | ICD-10-CM

## 2019-05-23 PROCEDURE — 77417 THER RADIOLOGY PORT IMAGE(S): CPT | Performed by: RADIOLOGY

## 2019-05-23 PROCEDURE — 99214 OFFICE O/P EST MOD 30 MIN: CPT | Performed by: INTERNAL MEDICINE

## 2019-05-23 PROCEDURE — 77387 GUIDANCE FOR RADJ TX DLVR: CPT | Performed by: RADIOLOGY

## 2019-05-23 PROCEDURE — 77412 RADIATION TX DELIVERY LVL 3: CPT | Performed by: RADIOLOGY

## 2019-05-23 RX ORDER — SODIUM CHLORIDE 9 MG/ML
20 INJECTION, SOLUTION INTRAVENOUS ONCE
Status: CANCELLED | OUTPATIENT
Start: 2019-08-01

## 2019-05-23 RX ORDER — SODIUM CHLORIDE 9 MG/ML
20 INJECTION, SOLUTION INTRAVENOUS ONCE
Status: CANCELLED | OUTPATIENT
Start: 2019-07-12

## 2019-05-24 ENCOUNTER — APPOINTMENT (OUTPATIENT)
Dept: RADIATION ONCOLOGY | Facility: CLINIC | Age: 74
End: 2019-05-24
Attending: RADIOLOGY
Payer: MEDICARE

## 2019-05-24 PROCEDURE — 77387 GUIDANCE FOR RADJ TX DLVR: CPT | Performed by: RADIOLOGY

## 2019-05-24 PROCEDURE — 77336 RADIATION PHYSICS CONSULT: CPT | Performed by: RADIOLOGY

## 2019-05-24 PROCEDURE — 77412 RADIATION TX DELIVERY LVL 3: CPT | Performed by: RADIOLOGY

## 2019-05-24 RX ORDER — TRAZODONE HYDROCHLORIDE 50 MG/1
75 TABLET ORAL
Qty: 45 TABLET | Refills: 0 | Status: SHIPPED | OUTPATIENT
Start: 2019-05-24 | End: 2019-05-31 | Stop reason: HOSPADM

## 2019-05-28 ENCOUNTER — TRANSCRIBE ORDERS (OUTPATIENT)
Dept: LAB | Facility: CLINIC | Age: 74
End: 2019-05-28

## 2019-05-28 ENCOUNTER — APPOINTMENT (OUTPATIENT)
Dept: RADIATION ONCOLOGY | Facility: CLINIC | Age: 74
End: 2019-05-28
Attending: RADIOLOGY
Payer: MEDICARE

## 2019-05-28 ENCOUNTER — APPOINTMENT (OUTPATIENT)
Dept: LAB | Facility: CLINIC | Age: 74
DRG: 092 | End: 2019-05-28
Attending: RADIOLOGY
Payer: MEDICARE

## 2019-05-28 DIAGNOSIS — C68.8 MALIGNANT NEOPLASM OF OVERLAPPING SITES OF URINARY ORGANS (HCC): ICD-10-CM

## 2019-05-28 DIAGNOSIS — C68.8 MALIGNANT NEOPLASM OF OVERLAPPING SITES OF URINARY ORGANS (HCC): Primary | ICD-10-CM

## 2019-05-28 LAB
BASOPHILS # BLD AUTO: 0.02 THOUSANDS/ΜL (ref 0–0.1)
BASOPHILS NFR BLD AUTO: 0 % (ref 0–1)
EOSINOPHIL # BLD AUTO: 0.13 THOUSAND/ΜL (ref 0–0.61)
EOSINOPHIL NFR BLD AUTO: 2 % (ref 0–6)
ERYTHROCYTE [DISTWIDTH] IN BLOOD BY AUTOMATED COUNT: 15.5 % (ref 11.6–15.1)
HCT VFR BLD AUTO: 29.1 % (ref 36.5–49.3)
HGB BLD-MCNC: 9.4 G/DL (ref 12–17)
LYMPHOCYTES # BLD AUTO: 0.44 THOUSANDS/ΜL (ref 0.6–4.47)
LYMPHOCYTES NFR BLD AUTO: 8 % (ref 14–44)
MCH RBC QN AUTO: 28.3 PG (ref 26.8–34.3)
MCHC RBC AUTO-ENTMCNC: 32.3 G/DL (ref 31.4–37.4)
MCV RBC AUTO: 88 FL (ref 82–98)
MONOCYTES # BLD AUTO: 0.67 THOUSAND/ΜL (ref 0.17–1.22)
MONOCYTES NFR BLD AUTO: 11 % (ref 4–12)
NEUTROPHILS # BLD AUTO: 4.62 THOUSANDS/ΜL (ref 1.85–7.62)
NEUTS SEG NFR BLD AUTO: 79 % (ref 43–75)
PLATELET # BLD AUTO: 303 THOUSANDS/UL (ref 149–390)
PMV BLD AUTO: 7.4 FL (ref 8.9–12.7)
RBC # BLD AUTO: 3.32 MILLION/UL (ref 3.88–5.62)
WBC # BLD AUTO: 5.88 THOUSAND/UL (ref 4.31–10.16)

## 2019-05-28 PROCEDURE — 36415 COLL VENOUS BLD VENIPUNCTURE: CPT

## 2019-05-28 PROCEDURE — 85025 COMPLETE CBC W/AUTO DIFF WBC: CPT

## 2019-05-28 PROCEDURE — 77387 GUIDANCE FOR RADJ TX DLVR: CPT | Performed by: RADIOLOGY

## 2019-05-28 PROCEDURE — 77412 RADIATION TX DELIVERY LVL 3: CPT | Performed by: RADIOLOGY

## 2019-05-29 ENCOUNTER — APPOINTMENT (OUTPATIENT)
Dept: RADIATION ONCOLOGY | Facility: CLINIC | Age: 74
End: 2019-05-29
Attending: RADIOLOGY
Payer: MEDICARE

## 2019-05-29 ENCOUNTER — APPOINTMENT (EMERGENCY)
Dept: CT IMAGING | Facility: HOSPITAL | Age: 74
DRG: 092 | End: 2019-05-29
Payer: MEDICARE

## 2019-05-29 ENCOUNTER — HOSPITAL ENCOUNTER (INPATIENT)
Facility: HOSPITAL | Age: 74
LOS: 2 days | Discharge: HOME | DRG: 092 | End: 2019-05-31
Attending: EMERGENCY MEDICINE | Admitting: FAMILY MEDICINE
Payer: MEDICARE

## 2019-05-29 ENCOUNTER — TELEPHONE (OUTPATIENT)
Dept: RADIATION ONCOLOGY | Facility: CLINIC | Age: 74
End: 2019-05-29

## 2019-05-29 DIAGNOSIS — F51.01 PRIMARY INSOMNIA: ICD-10-CM

## 2019-05-29 DIAGNOSIS — G93.40 ACUTE ENCEPHALOPATHY: ICD-10-CM

## 2019-05-29 DIAGNOSIS — E83.42 HYPOMAGNESEMIA: ICD-10-CM

## 2019-05-29 DIAGNOSIS — D64.9 ANEMIA: ICD-10-CM

## 2019-05-29 DIAGNOSIS — N17.9 AKI (ACUTE KIDNEY INJURY) (HCC): Primary | ICD-10-CM

## 2019-05-29 DIAGNOSIS — G89.3 CANCER RELATED PAIN: ICD-10-CM

## 2019-05-29 DIAGNOSIS — R41.82 ALTERED MENTAL STATUS, UNSPECIFIED ALTERED MENTAL STATUS TYPE: ICD-10-CM

## 2019-05-29 DIAGNOSIS — C66.9: ICD-10-CM

## 2019-05-29 DIAGNOSIS — E86.0 DEHYDRATION: ICD-10-CM

## 2019-05-29 DIAGNOSIS — C61 MALIGNANT NEOPLASM OF PROSTATE (HCC): ICD-10-CM

## 2019-05-29 LAB
ALBUMIN SERPL BCP-MCNC: 2.5 G/DL (ref 3.5–5)
ALP SERPL-CCNC: 61 U/L (ref 46–116)
ALT SERPL W P-5'-P-CCNC: 9 U/L (ref 12–78)
AMORPH PHOS CRY URNS QL MICRO: ABNORMAL /HPF
ANION GAP SERPL CALCULATED.3IONS-SCNC: 8 MMOL/L (ref 4–13)
APAP SERPL-MCNC: <2 UG/ML (ref 10–20)
APTT PPP: 33 SECONDS (ref 26–38)
AST SERPL W P-5'-P-CCNC: 9 U/L (ref 5–45)
BACTERIA UR QL AUTO: ABNORMAL /HPF
BASOPHILS # BLD AUTO: 0.02 THOUSANDS/ΜL (ref 0–0.1)
BASOPHILS NFR BLD AUTO: 0 % (ref 0–1)
BILIRUB SERPL-MCNC: 0.22 MG/DL (ref 0.2–1)
BILIRUB UR QL STRIP: NEGATIVE
BUN SERPL-MCNC: 20 MG/DL (ref 5–25)
CALCIUM SERPL-MCNC: 8.8 MG/DL (ref 8.3–10.1)
CHLORIDE SERPL-SCNC: 100 MMOL/L (ref 100–108)
CLARITY UR: ABNORMAL
CO2 SERPL-SCNC: 24 MMOL/L (ref 21–32)
COLOR UR: YELLOW
COLOR, POC: NORMAL
CREAT SERPL-MCNC: 1.47 MG/DL (ref 0.6–1.3)
EOSINOPHIL # BLD AUTO: 0.08 THOUSAND/ΜL (ref 0–0.61)
EOSINOPHIL NFR BLD AUTO: 1 % (ref 0–6)
ERYTHROCYTE [DISTWIDTH] IN BLOOD BY AUTOMATED COUNT: 15.3 % (ref 11.6–15.1)
ETHANOL SERPL-MCNC: <3 MG/DL (ref 0–3)
GFR SERPL CREATININE-BSD FRML MDRD: 47 ML/MIN/1.73SQ M
GLUCOSE SERPL-MCNC: 101 MG/DL (ref 65–140)
GLUCOSE UR STRIP-MCNC: NEGATIVE MG/DL
HCT VFR BLD AUTO: 30.4 % (ref 36.5–49.3)
HGB BLD-MCNC: 9.3 G/DL (ref 12–17)
HGB UR QL STRIP.AUTO: ABNORMAL
IMM GRANULOCYTES # BLD AUTO: 0.06 THOUSAND/UL (ref 0–0.2)
IMM GRANULOCYTES NFR BLD AUTO: 1 % (ref 0–2)
INR PPP: 1.13 (ref 0.86–1.17)
KETONES UR STRIP-MCNC: NEGATIVE MG/DL
LEUKOCYTE ESTERASE UR QL STRIP: ABNORMAL
LYMPHOCYTES # BLD AUTO: 0.19 THOUSANDS/ΜL (ref 0.6–4.47)
LYMPHOCYTES NFR BLD AUTO: 3 % (ref 14–44)
MAGNESIUM SERPL-MCNC: 1.5 MG/DL (ref 1.6–2.6)
MCH RBC QN AUTO: 27.9 PG (ref 26.8–34.3)
MCHC RBC AUTO-ENTMCNC: 30.6 G/DL (ref 31.4–37.4)
MCV RBC AUTO: 91 FL (ref 82–98)
MONOCYTES # BLD AUTO: 0.7 THOUSAND/ΜL (ref 0.17–1.22)
MONOCYTES NFR BLD AUTO: 12 % (ref 4–12)
NEUTROPHILS # BLD AUTO: 4.75 THOUSANDS/ΜL (ref 1.85–7.62)
NEUTS SEG NFR BLD AUTO: 83 % (ref 43–75)
NITRITE UR QL STRIP: NEGATIVE
NON-SQ EPI CELLS URNS QL MICRO: ABNORMAL /HPF
NRBC BLD AUTO-RTO: 0 /100 WBCS
PH UR STRIP.AUTO: 7 [PH] (ref 4.5–8)
PLATELET # BLD AUTO: 335 THOUSANDS/UL (ref 149–390)
PMV BLD AUTO: 8.5 FL (ref 8.9–12.7)
POTASSIUM SERPL-SCNC: 4.6 MMOL/L (ref 3.5–5.3)
PROT SERPL-MCNC: 6.5 G/DL (ref 6.4–8.2)
PROT UR STRIP-MCNC: ABNORMAL MG/DL
PROTHROMBIN TIME: 14.6 SECONDS (ref 11.8–14.2)
RBC # BLD AUTO: 3.33 MILLION/UL (ref 3.88–5.62)
RBC #/AREA URNS AUTO: ABNORMAL /HPF
SALICYLATES SERPL-MCNC: <3 MG/DL (ref 3–20)
SODIUM SERPL-SCNC: 132 MMOL/L (ref 136–145)
SP GR UR STRIP.AUTO: 1.01 (ref 1–1.03)
TROPONIN I SERPL-MCNC: <0.02 NG/ML
UROBILINOGEN UR QL STRIP.AUTO: 0.2 E.U./DL
WBC # BLD AUTO: 5.8 THOUSAND/UL (ref 4.31–10.16)
WBC #/AREA URNS AUTO: ABNORMAL /HPF

## 2019-05-29 PROCEDURE — 71250 CT THORAX DX C-: CPT

## 2019-05-29 PROCEDURE — 99223 1ST HOSP IP/OBS HIGH 75: CPT | Performed by: NURSE PRACTITIONER

## 2019-05-29 PROCEDURE — 87040 BLOOD CULTURE FOR BACTERIA: CPT | Performed by: NURSE PRACTITIONER

## 2019-05-29 PROCEDURE — 77307 TELETHX ISODOSE PLAN CPLX: CPT | Performed by: RADIOLOGY

## 2019-05-29 PROCEDURE — 80320 DRUG SCREEN QUANTALCOHOLS: CPT | Performed by: NURSE PRACTITIONER

## 2019-05-29 PROCEDURE — 83735 ASSAY OF MAGNESIUM: CPT | Performed by: NURSE PRACTITIONER

## 2019-05-29 PROCEDURE — 85025 COMPLETE CBC W/AUTO DIFF WBC: CPT | Performed by: NURSE PRACTITIONER

## 2019-05-29 PROCEDURE — 81001 URINALYSIS AUTO W/SCOPE: CPT

## 2019-05-29 PROCEDURE — 93005 ELECTROCARDIOGRAM TRACING: CPT

## 2019-05-29 PROCEDURE — 80329 ANALGESICS NON-OPIOID 1 OR 2: CPT | Performed by: NURSE PRACTITIONER

## 2019-05-29 PROCEDURE — 85610 PROTHROMBIN TIME: CPT | Performed by: NURSE PRACTITIONER

## 2019-05-29 PROCEDURE — 96361 HYDRATE IV INFUSION ADD-ON: CPT

## 2019-05-29 PROCEDURE — 74176 CT ABD & PELVIS W/O CONTRAST: CPT

## 2019-05-29 PROCEDURE — 36415 COLL VENOUS BLD VENIPUNCTURE: CPT | Performed by: NURSE PRACTITIONER

## 2019-05-29 PROCEDURE — 99285 EMERGENCY DEPT VISIT HI MDM: CPT

## 2019-05-29 PROCEDURE — 70450 CT HEAD/BRAIN W/O DYE: CPT

## 2019-05-29 PROCEDURE — 85730 THROMBOPLASTIN TIME PARTIAL: CPT | Performed by: NURSE PRACTITIONER

## 2019-05-29 PROCEDURE — 99285 EMERGENCY DEPT VISIT HI MDM: CPT | Performed by: NURSE PRACTITIONER

## 2019-05-29 PROCEDURE — 96365 THER/PROPH/DIAG IV INF INIT: CPT

## 2019-05-29 PROCEDURE — 77334 RADIATION TREATMENT AID(S): CPT | Performed by: RADIOLOGY

## 2019-05-29 PROCEDURE — 80053 COMPREHEN METABOLIC PANEL: CPT | Performed by: NURSE PRACTITIONER

## 2019-05-29 PROCEDURE — 84484 ASSAY OF TROPONIN QUANT: CPT | Performed by: NURSE PRACTITIONER

## 2019-05-29 RX ORDER — SODIUM CHLORIDE 9 MG/ML
100 INJECTION, SOLUTION INTRAVENOUS CONTINUOUS
Status: DISCONTINUED | OUTPATIENT
Start: 2019-05-29 | End: 2019-05-29

## 2019-05-29 RX ORDER — TRAZODONE HYDROCHLORIDE 50 MG/1
75 TABLET ORAL
Status: DISCONTINUED | OUTPATIENT
Start: 2019-05-29 | End: 2019-05-30

## 2019-05-29 RX ORDER — FAMOTIDINE 20 MG/1
20 TABLET, FILM COATED ORAL
Status: DISCONTINUED | OUTPATIENT
Start: 2019-05-29 | End: 2019-05-31 | Stop reason: HOSPADM

## 2019-05-29 RX ORDER — MAGNESIUM SULFATE HEPTAHYDRATE 40 MG/ML
2 INJECTION, SOLUTION INTRAVENOUS ONCE
Status: COMPLETED | OUTPATIENT
Start: 2019-05-29 | End: 2019-05-29

## 2019-05-29 RX ORDER — DOCUSATE SODIUM 100 MG/1
100 CAPSULE, LIQUID FILLED ORAL 2 TIMES DAILY PRN
Status: DISCONTINUED | OUTPATIENT
Start: 2019-05-29 | End: 2019-05-30

## 2019-05-29 RX ORDER — PENTOXIFYLLINE 400 MG/1
400 TABLET, EXTENDED RELEASE ORAL
Status: DISCONTINUED | OUTPATIENT
Start: 2019-05-30 | End: 2019-05-31 | Stop reason: HOSPADM

## 2019-05-29 RX ORDER — LANOLIN ALCOHOL/MO/W.PET/CERES
6 CREAM (GRAM) TOPICAL
Status: DISCONTINUED | OUTPATIENT
Start: 2019-05-29 | End: 2019-05-31 | Stop reason: HOSPADM

## 2019-05-29 RX ORDER — OXYCODONE HYDROCHLORIDE 5 MG/1
5 TABLET ORAL EVERY 4 HOURS PRN
Status: DISCONTINUED | OUTPATIENT
Start: 2019-05-29 | End: 2019-05-31 | Stop reason: HOSPADM

## 2019-05-29 RX ORDER — OXYCODONE HCL 20 MG/1
40 TABLET, FILM COATED, EXTENDED RELEASE ORAL EVERY 12 HOURS SCHEDULED
Status: DISCONTINUED | OUTPATIENT
Start: 2019-05-29 | End: 2019-05-30

## 2019-05-29 RX ORDER — DIVALPROEX SODIUM 500 MG/1
500 TABLET, DELAYED RELEASE ORAL 2 TIMES DAILY
Status: DISCONTINUED | OUTPATIENT
Start: 2019-05-29 | End: 2019-05-31 | Stop reason: HOSPADM

## 2019-05-29 RX ORDER — ONDANSETRON 2 MG/ML
4 INJECTION INTRAMUSCULAR; INTRAVENOUS EVERY 6 HOURS PRN
Status: DISCONTINUED | OUTPATIENT
Start: 2019-05-29 | End: 2019-05-30

## 2019-05-29 RX ORDER — BISACODYL 10 MG
10 SUPPOSITORY, RECTAL RECTAL DAILY PRN
Status: DISCONTINUED | OUTPATIENT
Start: 2019-05-29 | End: 2019-05-31 | Stop reason: HOSPADM

## 2019-05-29 RX ORDER — SENNOSIDES 8.6 MG
2 TABLET ORAL
Status: DISCONTINUED | OUTPATIENT
Start: 2019-05-29 | End: 2019-05-30

## 2019-05-29 RX ORDER — ACETAMINOPHEN 325 MG/1
650 TABLET ORAL EVERY 6 HOURS PRN
Status: DISCONTINUED | OUTPATIENT
Start: 2019-05-29 | End: 2019-05-31 | Stop reason: HOSPADM

## 2019-05-29 RX ORDER — PANTOPRAZOLE SODIUM 40 MG/1
40 TABLET, DELAYED RELEASE ORAL
Status: DISCONTINUED | OUTPATIENT
Start: 2019-05-30 | End: 2019-05-31 | Stop reason: HOSPADM

## 2019-05-29 RX ORDER — OXYCODONE HYDROCHLORIDE 5 MG/1
2.5 TABLET ORAL EVERY 4 HOURS PRN
Status: DISCONTINUED | OUTPATIENT
Start: 2019-05-29 | End: 2019-05-29

## 2019-05-29 RX ORDER — SODIUM CHLORIDE 9 MG/ML
75 INJECTION, SOLUTION INTRAVENOUS CONTINUOUS
Status: DISCONTINUED | OUTPATIENT
Start: 2019-05-29 | End: 2019-05-31 | Stop reason: HOSPADM

## 2019-05-29 RX ORDER — SODIUM CHLORIDE 9 MG/ML
1000 INJECTION, SOLUTION INTRAVENOUS CONTINUOUS
Status: DISCONTINUED | OUTPATIENT
Start: 2019-05-29 | End: 2019-05-29

## 2019-05-29 RX ADMIN — MELATONIN TAB 3 MG 6 MG: 3 TAB at 21:06

## 2019-05-29 RX ADMIN — MAGNESIUM SULFATE HEPTAHYDRATE 2 G: 40 INJECTION, SOLUTION INTRAVENOUS at 17:17

## 2019-05-29 RX ADMIN — OXYCODONE HYDROCHLORIDE 40 MG: 20 TABLET, FILM COATED, EXTENDED RELEASE ORAL at 21:59

## 2019-05-29 RX ADMIN — DIVALPROEX SODIUM 500 MG: 500 TABLET, DELAYED RELEASE ORAL at 21:59

## 2019-05-29 RX ADMIN — SODIUM CHLORIDE 125 ML/HR: 0.9 INJECTION, SOLUTION INTRAVENOUS at 16:33

## 2019-05-29 RX ADMIN — TRAZODONE HYDROCHLORIDE 75 MG: 50 TABLET ORAL at 22:07

## 2019-05-29 RX ADMIN — FAMOTIDINE 20 MG: 20 TABLET ORAL at 22:08

## 2019-05-29 RX ADMIN — SODIUM CHLORIDE 75 ML/HR: 0.9 INJECTION, SOLUTION INTRAVENOUS at 21:25

## 2019-05-30 ENCOUNTER — APPOINTMENT (OUTPATIENT)
Dept: RADIATION ONCOLOGY | Facility: CLINIC | Age: 74
End: 2019-05-30
Attending: RADIOLOGY
Payer: MEDICARE

## 2019-05-30 LAB
ANION GAP SERPL CALCULATED.3IONS-SCNC: 9 MMOL/L (ref 4–13)
ATRIAL RATE: 68 BPM
BASOPHILS # BLD AUTO: 0.01 THOUSANDS/ΜL (ref 0–0.1)
BASOPHILS NFR BLD AUTO: 0 % (ref 0–1)
BUN SERPL-MCNC: 17 MG/DL (ref 5–25)
CALCIUM SERPL-MCNC: 8.5 MG/DL (ref 8.3–10.1)
CHLORIDE SERPL-SCNC: 108 MMOL/L (ref 100–108)
CO2 SERPL-SCNC: 22 MMOL/L (ref 21–32)
CREAT SERPL-MCNC: 1.25 MG/DL (ref 0.6–1.3)
EOSINOPHIL # BLD AUTO: 0.13 THOUSAND/ΜL (ref 0–0.61)
EOSINOPHIL NFR BLD AUTO: 4 % (ref 0–6)
ERYTHROCYTE [DISTWIDTH] IN BLOOD BY AUTOMATED COUNT: 15.4 % (ref 11.6–15.1)
GFR SERPL CREATININE-BSD FRML MDRD: 57 ML/MIN/1.73SQ M
GLUCOSE SERPL-MCNC: 88 MG/DL (ref 65–140)
HCT VFR BLD AUTO: 25.8 % (ref 36.5–49.3)
HGB BLD-MCNC: 7.9 G/DL (ref 12–17)
IMM GRANULOCYTES # BLD AUTO: 0.04 THOUSAND/UL (ref 0–0.2)
IMM GRANULOCYTES NFR BLD AUTO: 1 % (ref 0–2)
LYMPHOCYTES # BLD AUTO: 0.32 THOUSANDS/ΜL (ref 0.6–4.47)
LYMPHOCYTES NFR BLD AUTO: 9 % (ref 14–44)
MCH RBC QN AUTO: 27.7 PG (ref 26.8–34.3)
MCHC RBC AUTO-ENTMCNC: 30.6 G/DL (ref 31.4–37.4)
MCV RBC AUTO: 91 FL (ref 82–98)
MONOCYTES # BLD AUTO: 0.5 THOUSAND/ΜL (ref 0.17–1.22)
MONOCYTES NFR BLD AUTO: 15 % (ref 4–12)
NEUTROPHILS # BLD AUTO: 2.43 THOUSANDS/ΜL (ref 1.85–7.62)
NEUTS SEG NFR BLD AUTO: 71 % (ref 43–75)
NRBC BLD AUTO-RTO: 0 /100 WBCS
P AXIS: 18 DEGREES
PLATELET # BLD AUTO: 273 THOUSANDS/UL (ref 149–390)
PMV BLD AUTO: 8.6 FL (ref 8.9–12.7)
POTASSIUM SERPL-SCNC: 4.1 MMOL/L (ref 3.5–5.3)
PR INTERVAL: 168 MS
QRS AXIS: -40 DEGREES
QRSD INTERVAL: 130 MS
QT INTERVAL: 396 MS
QTC INTERVAL: 421 MS
RBC # BLD AUTO: 2.85 MILLION/UL (ref 3.88–5.62)
SODIUM SERPL-SCNC: 139 MMOL/L (ref 136–145)
T WAVE AXIS: 4 DEGREES
VENTRICULAR RATE: 68 BPM
WBC # BLD AUTO: 3.43 THOUSAND/UL (ref 4.31–10.16)

## 2019-05-30 PROCEDURE — 99222 1ST HOSP IP/OBS MODERATE 55: CPT | Performed by: FAMILY MEDICINE

## 2019-05-30 PROCEDURE — 99232 SBSQ HOSP IP/OBS MODERATE 35: CPT | Performed by: FAMILY MEDICINE

## 2019-05-30 PROCEDURE — 85025 COMPLETE CBC W/AUTO DIFF WBC: CPT | Performed by: NURSE PRACTITIONER

## 2019-05-30 PROCEDURE — 99222 1ST HOSP IP/OBS MODERATE 55: CPT | Performed by: PHYSICIAN ASSISTANT

## 2019-05-30 PROCEDURE — 80048 BASIC METABOLIC PNL TOTAL CA: CPT | Performed by: NURSE PRACTITIONER

## 2019-05-30 PROCEDURE — 93010 ELECTROCARDIOGRAM REPORT: CPT | Performed by: INTERNAL MEDICINE

## 2019-05-30 PROCEDURE — 87086 URINE CULTURE/COLONY COUNT: CPT | Performed by: NURSE PRACTITIONER

## 2019-05-30 PROCEDURE — NC001 PR NO CHARGE: Performed by: FAMILY MEDICINE

## 2019-05-30 RX ORDER — SENNOSIDES 8.6 MG
2 TABLET ORAL
Status: DISCONTINUED | OUTPATIENT
Start: 2019-05-30 | End: 2019-05-31 | Stop reason: HOSPADM

## 2019-05-30 RX ORDER — PREDNISONE 1 MG/1
10 TABLET ORAL
Status: DISCONTINUED | OUTPATIENT
Start: 2019-05-31 | End: 2019-05-31 | Stop reason: HOSPADM

## 2019-05-30 RX ORDER — OXYCODONE HCL 20 MG/1
20 TABLET, FILM COATED, EXTENDED RELEASE ORAL EVERY 8 HOURS SCHEDULED
Status: DISCONTINUED | OUTPATIENT
Start: 2019-05-31 | End: 2019-05-31

## 2019-05-30 RX ORDER — TRAZODONE HYDROCHLORIDE 50 MG/1
50 TABLET ORAL
Status: DISCONTINUED | OUTPATIENT
Start: 2019-05-30 | End: 2019-05-31 | Stop reason: HOSPADM

## 2019-05-30 RX ORDER — ONDANSETRON 4 MG/1
4 TABLET, ORALLY DISINTEGRATING ORAL EVERY 6 HOURS PRN
Status: DISCONTINUED | OUTPATIENT
Start: 2019-05-30 | End: 2019-05-31 | Stop reason: HOSPADM

## 2019-05-30 RX ORDER — OXYCODONE HYDROCHLORIDE 10 MG/1
10 TABLET ORAL EVERY 4 HOURS PRN
Status: DISCONTINUED | OUTPATIENT
Start: 2019-05-30 | End: 2019-05-31 | Stop reason: HOSPADM

## 2019-05-30 RX ADMIN — DIVALPROEX SODIUM 500 MG: 500 TABLET, DELAYED RELEASE ORAL at 08:12

## 2019-05-30 RX ADMIN — PENTOXIFYLLINE 400 MG: 400 TABLET, EXTENDED RELEASE ORAL at 08:12

## 2019-05-30 RX ADMIN — PENTOXIFYLLINE 400 MG: 400 TABLET, EXTENDED RELEASE ORAL at 12:34

## 2019-05-30 RX ADMIN — DIVALPROEX SODIUM 500 MG: 500 TABLET, DELAYED RELEASE ORAL at 18:21

## 2019-05-30 RX ADMIN — SENNOSIDES 17.2 MG: 8.6 TABLET, FILM COATED ORAL at 21:22

## 2019-05-30 RX ADMIN — MELATONIN TAB 3 MG 6 MG: 3 TAB at 20:00

## 2019-05-30 RX ADMIN — ONDANSETRON 4 MG: 4 TABLET, ORALLY DISINTEGRATING ORAL at 21:28

## 2019-05-30 RX ADMIN — PANTOPRAZOLE SODIUM 40 MG: 40 TABLET, DELAYED RELEASE ORAL at 05:33

## 2019-05-30 RX ADMIN — OXYCODONE HYDROCHLORIDE 40 MG: 20 TABLET, FILM COATED, EXTENDED RELEASE ORAL at 08:11

## 2019-05-30 RX ADMIN — ENOXAPARIN SODIUM 40 MG: 40 INJECTION SUBCUTANEOUS at 08:12

## 2019-05-30 RX ADMIN — SODIUM CHLORIDE 75 ML/HR: 0.9 INJECTION, SOLUTION INTRAVENOUS at 09:44

## 2019-05-30 RX ADMIN — PENTOXIFYLLINE 400 MG: 400 TABLET, EXTENDED RELEASE ORAL at 18:21

## 2019-05-30 RX ADMIN — TRAZODONE HYDROCHLORIDE 50 MG: 50 TABLET ORAL at 21:22

## 2019-05-30 RX ADMIN — FAMOTIDINE 20 MG: 20 TABLET ORAL at 21:22

## 2019-05-31 ENCOUNTER — APPOINTMENT (OUTPATIENT)
Dept: RADIATION ONCOLOGY | Facility: CLINIC | Age: 74
End: 2019-05-31
Attending: RADIOLOGY
Payer: MEDICARE

## 2019-05-31 VITALS
RESPIRATION RATE: 18 BRPM | HEART RATE: 68 BPM | SYSTOLIC BLOOD PRESSURE: 120 MMHG | OXYGEN SATURATION: 94 % | DIASTOLIC BLOOD PRESSURE: 65 MMHG | HEIGHT: 67 IN | TEMPERATURE: 98 F | BODY MASS INDEX: 28.48 KG/M2 | WEIGHT: 181.44 LBS

## 2019-05-31 LAB
ANION GAP SERPL CALCULATED.3IONS-SCNC: 8 MMOL/L (ref 4–13)
BASOPHILS # BLD AUTO: 0.02 THOUSANDS/ΜL (ref 0–0.1)
BASOPHILS NFR BLD AUTO: 1 % (ref 0–1)
BUN SERPL-MCNC: 15 MG/DL (ref 5–25)
CALCIUM SERPL-MCNC: 8.6 MG/DL (ref 8.3–10.1)
CHLORIDE SERPL-SCNC: 109 MMOL/L (ref 100–108)
CO2 SERPL-SCNC: 24 MMOL/L (ref 21–32)
CREAT SERPL-MCNC: 1.3 MG/DL (ref 0.6–1.3)
EOSINOPHIL # BLD AUTO: 0.08 THOUSAND/ΜL (ref 0–0.61)
EOSINOPHIL NFR BLD AUTO: 2 % (ref 0–6)
ERYTHROCYTE [DISTWIDTH] IN BLOOD BY AUTOMATED COUNT: 15.5 % (ref 11.6–15.1)
GFR SERPL CREATININE-BSD FRML MDRD: 54 ML/MIN/1.73SQ M
GLUCOSE SERPL-MCNC: 94 MG/DL (ref 65–140)
HCT VFR BLD AUTO: 29.4 % (ref 36.5–49.3)
HGB BLD-MCNC: 8.9 G/DL (ref 12–17)
IMM GRANULOCYTES # BLD AUTO: 0.04 THOUSAND/UL (ref 0–0.2)
IMM GRANULOCYTES NFR BLD AUTO: 1 % (ref 0–2)
LYMPHOCYTES # BLD AUTO: 0.32 THOUSANDS/ΜL (ref 0.6–4.47)
LYMPHOCYTES NFR BLD AUTO: 9 % (ref 14–44)
MAGNESIUM SERPL-MCNC: 1.6 MG/DL (ref 1.6–2.6)
MCH RBC QN AUTO: 27.7 PG (ref 26.8–34.3)
MCHC RBC AUTO-ENTMCNC: 30.3 G/DL (ref 31.4–37.4)
MCV RBC AUTO: 92 FL (ref 82–98)
MONOCYTES # BLD AUTO: 0.51 THOUSAND/ΜL (ref 0.17–1.22)
MONOCYTES NFR BLD AUTO: 14 % (ref 4–12)
NEUTROPHILS # BLD AUTO: 2.78 THOUSANDS/ΜL (ref 1.85–7.62)
NEUTS SEG NFR BLD AUTO: 73 % (ref 43–75)
NRBC BLD AUTO-RTO: 0 /100 WBCS
PLATELET # BLD AUTO: 305 THOUSANDS/UL (ref 149–390)
PMV BLD AUTO: 8.4 FL (ref 8.9–12.7)
POTASSIUM SERPL-SCNC: 4.1 MMOL/L (ref 3.5–5.3)
RBC # BLD AUTO: 3.21 MILLION/UL (ref 3.88–5.62)
SODIUM SERPL-SCNC: 141 MMOL/L (ref 136–145)
WBC # BLD AUTO: 3.75 THOUSAND/UL (ref 4.31–10.16)

## 2019-05-31 PROCEDURE — 99233 SBSQ HOSP IP/OBS HIGH 50: CPT | Performed by: FAMILY MEDICINE

## 2019-05-31 PROCEDURE — NC001 PR NO CHARGE: Performed by: FAMILY MEDICINE

## 2019-05-31 PROCEDURE — 85025 COMPLETE CBC W/AUTO DIFF WBC: CPT | Performed by: FAMILY MEDICINE

## 2019-05-31 PROCEDURE — 83735 ASSAY OF MAGNESIUM: CPT | Performed by: FAMILY MEDICINE

## 2019-05-31 PROCEDURE — 99238 HOSP IP/OBS DSCHRG MGMT 30/<: CPT | Performed by: FAMILY MEDICINE

## 2019-05-31 PROCEDURE — 80048 BASIC METABOLIC PNL TOTAL CA: CPT | Performed by: FAMILY MEDICINE

## 2019-05-31 RX ORDER — PREDNISONE 1 MG/1
TABLET ORAL
Qty: 21 TABLET | Refills: 0 | Status: SHIPPED | OUTPATIENT
Start: 2019-05-31 | End: 2019-06-09

## 2019-05-31 RX ORDER — OXYCODONE HCL 20 MG/1
20 TABLET, FILM COATED, EXTENDED RELEASE ORAL EVERY 12 HOURS SCHEDULED
Status: DISCONTINUED | OUTPATIENT
Start: 2019-05-31 | End: 2019-05-31 | Stop reason: HOSPADM

## 2019-05-31 RX ORDER — TRAZODONE HYDROCHLORIDE 50 MG/1
50 TABLET ORAL
Refills: 0 | Status: SHIPPED | OUTPATIENT
Start: 2019-05-31 | End: 2019-06-18 | Stop reason: SDUPTHER

## 2019-05-31 RX ORDER — OXYCODONE HYDROCHLORIDE 5 MG/1
TABLET ORAL
Qty: 60 TABLET | Refills: 0 | Status: SHIPPED | OUTPATIENT
Start: 2019-05-31 | End: 2019-08-15 | Stop reason: ALTCHOICE

## 2019-05-31 RX ORDER — OXYCODONE HCL 20 MG/1
20 TABLET, FILM COATED, EXTENDED RELEASE ORAL EVERY 12 HOURS SCHEDULED
Qty: 30 TABLET | Refills: 0 | Status: SHIPPED | OUTPATIENT
Start: 2019-05-31 | End: 2019-06-15

## 2019-05-31 RX ADMIN — ENOXAPARIN SODIUM 40 MG: 40 INJECTION SUBCUTANEOUS at 08:46

## 2019-05-31 RX ADMIN — OXYCODONE HYDROCHLORIDE 20 MG: 20 TABLET, FILM COATED, EXTENDED RELEASE ORAL at 06:08

## 2019-05-31 RX ADMIN — DIVALPROEX SODIUM 500 MG: 500 TABLET, DELAYED RELEASE ORAL at 08:46

## 2019-05-31 RX ADMIN — PREDNISONE 10 MG: 5 TABLET ORAL at 12:48

## 2019-05-31 RX ADMIN — PENTOXIFYLLINE 400 MG: 400 TABLET, EXTENDED RELEASE ORAL at 08:40

## 2019-05-31 RX ADMIN — PREDNISONE 10 MG: 5 TABLET ORAL at 06:08

## 2019-05-31 RX ADMIN — PANTOPRAZOLE SODIUM 40 MG: 40 TABLET, DELAYED RELEASE ORAL at 06:08

## 2019-05-31 RX ADMIN — PENTOXIFYLLINE 400 MG: 400 TABLET, EXTENDED RELEASE ORAL at 12:48

## 2019-06-01 LAB — BACTERIA UR CULT: NORMAL

## 2019-06-03 ENCOUNTER — APPOINTMENT (OUTPATIENT)
Dept: RADIATION ONCOLOGY | Facility: CLINIC | Age: 74
End: 2019-06-03
Attending: RADIOLOGY
Payer: MEDICARE

## 2019-06-03 ENCOUNTER — APPOINTMENT (OUTPATIENT)
Dept: RADIATION ONCOLOGY | Facility: CLINIC | Age: 74
End: 2019-06-03
Payer: MEDICARE

## 2019-06-03 ENCOUNTER — EPISODE CHANGES (OUTPATIENT)
Dept: CASE MANAGEMENT | Facility: HOSPITAL | Age: 74
End: 2019-06-03

## 2019-06-03 PROCEDURE — 77412 RADIATION TX DELIVERY LVL 3: CPT | Performed by: RADIOLOGY

## 2019-06-03 PROCEDURE — 77387 GUIDANCE FOR RADJ TX DLVR: CPT | Performed by: RADIOLOGY

## 2019-06-04 ENCOUNTER — APPOINTMENT (OUTPATIENT)
Dept: RADIATION ONCOLOGY | Facility: CLINIC | Age: 74
End: 2019-06-04
Attending: RADIOLOGY
Payer: MEDICARE

## 2019-06-04 ENCOUNTER — TELEPHONE (OUTPATIENT)
Dept: UROLOGY | Facility: MEDICAL CENTER | Age: 74
End: 2019-06-04

## 2019-06-04 LAB
BACTERIA BLD CULT: NORMAL
BACTERIA BLD CULT: NORMAL

## 2019-06-04 PROCEDURE — 77387 GUIDANCE FOR RADJ TX DLVR: CPT | Performed by: RADIOLOGY

## 2019-06-04 PROCEDURE — 77417 THER RADIOLOGY PORT IMAGE(S): CPT | Performed by: RADIOLOGY

## 2019-06-04 PROCEDURE — 77412 RADIATION TX DELIVERY LVL 3: CPT | Performed by: RADIOLOGY

## 2019-06-05 ENCOUNTER — APPOINTMENT (OUTPATIENT)
Dept: RADIATION ONCOLOGY | Facility: CLINIC | Age: 74
End: 2019-06-05
Attending: RADIOLOGY
Payer: MEDICARE

## 2019-06-05 PROCEDURE — 77387 GUIDANCE FOR RADJ TX DLVR: CPT | Performed by: RADIOLOGY

## 2019-06-05 PROCEDURE — 77412 RADIATION TX DELIVERY LVL 3: CPT | Performed by: RADIOLOGY

## 2019-06-06 ENCOUNTER — APPOINTMENT (OUTPATIENT)
Dept: RADIATION ONCOLOGY | Facility: CLINIC | Age: 74
End: 2019-06-06
Attending: RADIOLOGY
Payer: MEDICARE

## 2019-06-06 PROCEDURE — 77336 RADIATION PHYSICS CONSULT: CPT | Performed by: RADIOLOGY

## 2019-06-06 PROCEDURE — 77387 GUIDANCE FOR RADJ TX DLVR: CPT | Performed by: RADIOLOGY

## 2019-06-06 PROCEDURE — 77412 RADIATION TX DELIVERY LVL 3: CPT | Performed by: RADIOLOGY

## 2019-06-07 ENCOUNTER — APPOINTMENT (OUTPATIENT)
Dept: RADIATION ONCOLOGY | Facility: CLINIC | Age: 74
End: 2019-06-07
Attending: RADIOLOGY
Payer: MEDICARE

## 2019-06-07 ENCOUNTER — OFFICE VISIT (OUTPATIENT)
Dept: PALLIATIVE MEDICINE | Facility: CLINIC | Age: 74
End: 2019-06-07
Payer: MEDICARE

## 2019-06-07 VITALS
HEART RATE: 114 BPM | HEIGHT: 67 IN | TEMPERATURE: 98.2 F | RESPIRATION RATE: 18 BRPM | WEIGHT: 179.2 LBS | DIASTOLIC BLOOD PRESSURE: 76 MMHG | BODY MASS INDEX: 28.12 KG/M2 | OXYGEN SATURATION: 95 % | SYSTOLIC BLOOD PRESSURE: 118 MMHG

## 2019-06-07 DIAGNOSIS — K59.03 CONSTIPATION DUE TO OPIOID THERAPY: ICD-10-CM

## 2019-06-07 DIAGNOSIS — C61 MALIGNANT NEOPLASM OF PROSTATE (HCC): ICD-10-CM

## 2019-06-07 DIAGNOSIS — G89.3 CANCER RELATED PAIN: Primary | ICD-10-CM

## 2019-06-07 DIAGNOSIS — T40.2X5A CONSTIPATION DUE TO OPIOID THERAPY: ICD-10-CM

## 2019-06-07 DIAGNOSIS — G47.00 INSOMNIA, UNSPECIFIED TYPE: ICD-10-CM

## 2019-06-07 PROBLEM — M25.511 ACUTE PAIN OF RIGHT SHOULDER: Status: RESOLVED | Noted: 2018-02-17 | Resolved: 2019-06-07

## 2019-06-07 PROCEDURE — 99214 OFFICE O/P EST MOD 30 MIN: CPT | Performed by: FAMILY MEDICINE

## 2019-06-07 PROCEDURE — 77412 RADIATION TX DELIVERY LVL 3: CPT | Performed by: RADIOLOGY

## 2019-06-07 PROCEDURE — 77280 THER RAD SIMULAJ FIELD SMPL: CPT | Performed by: RADIOLOGY

## 2019-06-10 ENCOUNTER — APPOINTMENT (OUTPATIENT)
Dept: RADIATION ONCOLOGY | Facility: CLINIC | Age: 74
End: 2019-06-10
Attending: RADIOLOGY
Payer: MEDICARE

## 2019-06-10 ENCOUNTER — TRANSCRIBE ORDERS (OUTPATIENT)
Dept: RADIATION ONCOLOGY | Facility: CLINIC | Age: 74
End: 2019-06-10

## 2019-06-10 ENCOUNTER — APPOINTMENT (OUTPATIENT)
Dept: LAB | Facility: CLINIC | Age: 74
End: 2019-06-10
Attending: RADIOLOGY
Payer: MEDICARE

## 2019-06-10 DIAGNOSIS — C68.8 MALIGNANT NEOPLASM OF OVERLAPPING SITES OF URINARY ORGANS (HCC): Primary | ICD-10-CM

## 2019-06-10 DIAGNOSIS — C68.8 MALIGNANT NEOPLASM OF OVERLAPPING SITES OF URINARY ORGANS (HCC): ICD-10-CM

## 2019-06-10 LAB
BASOPHILS # BLD MANUAL: 0 THOUSAND/UL (ref 0–0.1)
BASOPHILS NFR MAR MANUAL: 0 % (ref 0–1)
EOSINOPHIL # BLD MANUAL: 0 THOUSAND/UL (ref 0–0.4)
EOSINOPHIL NFR BLD MANUAL: 0 % (ref 0–6)
ERYTHROCYTE [DISTWIDTH] IN BLOOD BY AUTOMATED COUNT: 16.1 % (ref 11.6–15.1)
HCT VFR BLD AUTO: 32.3 % (ref 36.5–49.3)
HGB BLD-MCNC: 10.7 G/DL (ref 12–17)
LG PLATELETS BLD QL SMEAR: PRESENT
LYMPHOCYTES # BLD AUTO: 0.56 THOUSAND/UL (ref 0.6–4.47)
LYMPHOCYTES # BLD AUTO: 8 % (ref 14–44)
MCH RBC QN AUTO: 28.8 PG (ref 26.8–34.3)
MCHC RBC AUTO-ENTMCNC: 33.1 G/DL (ref 31.4–37.4)
MCV RBC AUTO: 87 FL (ref 82–98)
MONOCYTES # BLD AUTO: 0.78 THOUSAND/UL (ref 0–1.22)
MONOCYTES NFR BLD: 11 % (ref 4–12)
NEUTROPHILS # BLD MANUAL: 5.71 THOUSAND/UL (ref 1.85–7.62)
NEUTS BAND NFR BLD MANUAL: 4 % (ref 0–8)
NEUTS SEG NFR BLD AUTO: 77 % (ref 43–75)
OVALOCYTES BLD QL SMEAR: PRESENT
PLATELET # BLD AUTO: 399 THOUSANDS/UL (ref 149–390)
PLATELET BLD QL SMEAR: ADEQUATE
PMV BLD AUTO: 7.9 FL (ref 8.9–12.7)
RBC # BLD AUTO: 3.71 MILLION/UL (ref 3.88–5.62)
TOTAL CELLS COUNTED SPEC: 100
WBC # BLD AUTO: 7.05 THOUSAND/UL (ref 4.31–10.16)

## 2019-06-10 PROCEDURE — 77331 SPECIAL RADIATION DOSIMETRY: CPT | Performed by: RADIOLOGY

## 2019-06-10 PROCEDURE — 85027 COMPLETE CBC AUTOMATED: CPT

## 2019-06-10 PROCEDURE — 85007 BL SMEAR W/DIFF WBC COUNT: CPT

## 2019-06-10 PROCEDURE — 77412 RADIATION TX DELIVERY LVL 3: CPT | Performed by: RADIOLOGY

## 2019-06-10 PROCEDURE — 36415 COLL VENOUS BLD VENIPUNCTURE: CPT

## 2019-06-11 ENCOUNTER — APPOINTMENT (OUTPATIENT)
Dept: RADIATION ONCOLOGY | Facility: CLINIC | Age: 74
End: 2019-06-11
Attending: RADIOLOGY
Payer: MEDICARE

## 2019-06-11 DIAGNOSIS — N13.8 BPH WITH OBSTRUCTION/LOWER URINARY TRACT SYMPTOMS: ICD-10-CM

## 2019-06-11 DIAGNOSIS — N40.1 BPH WITH OBSTRUCTION/LOWER URINARY TRACT SYMPTOMS: ICD-10-CM

## 2019-06-11 PROCEDURE — 77412 RADIATION TX DELIVERY LVL 3: CPT | Performed by: RADIOLOGY

## 2019-06-11 PROCEDURE — 77387 GUIDANCE FOR RADJ TX DLVR: CPT | Performed by: RADIOLOGY

## 2019-06-11 RX ORDER — TAMSULOSIN HYDROCHLORIDE 0.4 MG/1
0.4 CAPSULE ORAL
Qty: 90 CAPSULE | Refills: 3 | Status: SHIPPED | OUTPATIENT
Start: 2019-06-11 | End: 2019-06-25 | Stop reason: ALTCHOICE

## 2019-06-12 ENCOUNTER — APPOINTMENT (OUTPATIENT)
Dept: RADIATION ONCOLOGY | Facility: CLINIC | Age: 74
End: 2019-06-12
Attending: RADIOLOGY
Payer: MEDICARE

## 2019-06-12 PROCEDURE — 77412 RADIATION TX DELIVERY LVL 3: CPT | Performed by: RADIOLOGY

## 2019-06-13 ENCOUNTER — APPOINTMENT (OUTPATIENT)
Dept: RADIATION ONCOLOGY | Facility: CLINIC | Age: 74
End: 2019-06-13
Attending: RADIOLOGY
Payer: MEDICARE

## 2019-06-13 PROCEDURE — 77336 RADIATION PHYSICS CONSULT: CPT | Performed by: RADIOLOGY

## 2019-06-13 PROCEDURE — 77417 THER RADIOLOGY PORT IMAGE(S): CPT | Performed by: RADIOLOGY

## 2019-06-13 PROCEDURE — 77412 RADIATION TX DELIVERY LVL 3: CPT | Performed by: RADIOLOGY

## 2019-06-14 ENCOUNTER — APPOINTMENT (OUTPATIENT)
Dept: RADIATION ONCOLOGY | Facility: CLINIC | Age: 74
End: 2019-06-14
Attending: RADIOLOGY
Payer: MEDICARE

## 2019-06-14 PROCEDURE — 77387 GUIDANCE FOR RADJ TX DLVR: CPT | Performed by: RADIOLOGY

## 2019-06-14 PROCEDURE — 77412 RADIATION TX DELIVERY LVL 3: CPT | Performed by: RADIOLOGY

## 2019-06-17 ENCOUNTER — APPOINTMENT (OUTPATIENT)
Dept: RADIATION ONCOLOGY | Facility: CLINIC | Age: 74
End: 2019-06-17
Attending: RADIOLOGY
Payer: MEDICARE

## 2019-06-17 PROCEDURE — 77387 GUIDANCE FOR RADJ TX DLVR: CPT | Performed by: RADIOLOGY

## 2019-06-17 PROCEDURE — 77412 RADIATION TX DELIVERY LVL 3: CPT | Performed by: RADIOLOGY

## 2019-06-18 DIAGNOSIS — F51.01 PRIMARY INSOMNIA: ICD-10-CM

## 2019-06-19 RX ORDER — TRAZODONE HYDROCHLORIDE 50 MG/1
50 TABLET ORAL
Qty: 30 TABLET | Refills: 0 | Status: SHIPPED | OUTPATIENT
Start: 2019-06-19 | End: 2019-07-08 | Stop reason: SDUPTHER

## 2019-06-24 ENCOUNTER — TELEPHONE (OUTPATIENT)
Dept: UROLOGY | Facility: MEDICAL CENTER | Age: 74
End: 2019-06-24

## 2019-06-25 ENCOUNTER — OFFICE VISIT (OUTPATIENT)
Dept: UROLOGY | Facility: MEDICAL CENTER | Age: 74
End: 2019-06-25
Payer: MEDICARE

## 2019-06-25 VITALS
DIASTOLIC BLOOD PRESSURE: 78 MMHG | BODY MASS INDEX: 25.03 KG/M2 | HEIGHT: 69 IN | SYSTOLIC BLOOD PRESSURE: 118 MMHG | WEIGHT: 169 LBS | HEART RATE: 112 BPM

## 2019-06-25 DIAGNOSIS — C68.8 PRIMARY UROTHELIAL CARCINOMA OF OVERLAPPING SITES OF URINARY ORGANS (HCC): Primary | ICD-10-CM

## 2019-06-25 DIAGNOSIS — C61 PROSTATE CANCER (HCC): ICD-10-CM

## 2019-06-25 PROCEDURE — 99214 OFFICE O/P EST MOD 30 MIN: CPT | Performed by: UROLOGY

## 2019-07-08 DIAGNOSIS — F51.01 PRIMARY INSOMNIA: ICD-10-CM

## 2019-07-10 ENCOUNTER — LAB (OUTPATIENT)
Dept: LAB | Facility: CLINIC | Age: 74
End: 2019-07-10
Payer: MEDICARE

## 2019-07-10 DIAGNOSIS — C61 PROSTATE CANCER (HCC): ICD-10-CM

## 2019-07-10 DIAGNOSIS — C68.8 PRIMARY UROTHELIAL CARCINOMA OF OVERLAPPING SITES OF URINARY ORGANS (HCC): ICD-10-CM

## 2019-07-10 LAB
ALBUMIN SERPL BCP-MCNC: 2.5 G/DL (ref 3.5–5.7)
ALP SERPL-CCNC: 51 U/L (ref 46–116)
ALT SERPL W P-5'-P-CCNC: 9 U/L (ref 12–78)
ANION GAP SERPL CALCULATED.3IONS-SCNC: 4 MMOL/L (ref 4–13)
AST SERPL W P-5'-P-CCNC: 18 U/L (ref 5–45)
BASOPHILS # BLD AUTO: 0.01 THOUSANDS/ΜL (ref 0–0.1)
BASOPHILS NFR BLD AUTO: 0 % (ref 0–1)
BILIRUB SERPL-MCNC: 0.4 MG/DL (ref 0.2–1)
BUN SERPL-MCNC: 15 MG/DL (ref 5–25)
CALCIUM SERPL-MCNC: 9.1 MG/DL (ref 8.3–10.1)
CHLORIDE SERPL-SCNC: 103 MMOL/L (ref 98–108)
CO2 SERPL-SCNC: 28 MMOL/L (ref 21–32)
CREAT SERPL-MCNC: 1.1 MG/DL (ref 0.6–1.3)
EOSINOPHIL # BLD AUTO: 0.11 THOUSAND/ΜL (ref 0–0.61)
EOSINOPHIL NFR BLD AUTO: 2 % (ref 0–6)
ERYTHROCYTE [DISTWIDTH] IN BLOOD BY AUTOMATED COUNT: 16 % (ref 11.6–15.1)
GFR SERPL CREATININE-BSD FRML MDRD: 66 ML/MIN/1.73SQ M
GLUCOSE SERPL-MCNC: 158 MG/DL (ref 65–140)
HCT VFR BLD AUTO: 30.4 % (ref 36.5–49.3)
HGB BLD-MCNC: 10 G/DL (ref 12–17)
LYMPHOCYTES # BLD AUTO: 0.62 THOUSANDS/ΜL (ref 0.6–4.47)
LYMPHOCYTES NFR BLD AUTO: 11 % (ref 14–44)
MCH RBC QN AUTO: 28.7 PG (ref 26.8–34.3)
MCHC RBC AUTO-ENTMCNC: 32.9 G/DL (ref 31.4–37.4)
MCV RBC AUTO: 87 FL (ref 82–98)
MONOCYTES # BLD AUTO: 0.39 THOUSAND/ΜL (ref 0.17–1.22)
MONOCYTES NFR BLD AUTO: 7 % (ref 4–12)
NEUTROPHILS # BLD AUTO: 4.32 THOUSANDS/ΜL (ref 1.85–7.62)
NEUTS SEG NFR BLD AUTO: 80 % (ref 43–75)
PLATELET # BLD AUTO: 404 THOUSANDS/UL (ref 149–390)
PMV BLD AUTO: 7.6 FL (ref 8.9–12.7)
POTASSIUM SERPL-SCNC: 4.2 MMOL/L (ref 3.5–5.3)
PROT SERPL-MCNC: 6.6 G/DL (ref 6.4–8.2)
PSA SERPL-MCNC: 0.3 NG/ML (ref 0–4)
RBC # BLD AUTO: 3.49 MILLION/UL (ref 3.88–5.62)
SODIUM SERPL-SCNC: 135 MMOL/L (ref 136–145)
TSH SERPL DL<=0.05 MIU/L-ACNC: 1.03 UIU/ML (ref 0.36–3.74)
WBC # BLD AUTO: 5.45 THOUSAND/UL (ref 4.31–10.16)

## 2019-07-10 PROCEDURE — 84443 ASSAY THYROID STIM HORMONE: CPT

## 2019-07-10 PROCEDURE — 80053 COMPREHEN METABOLIC PANEL: CPT

## 2019-07-10 PROCEDURE — 85025 COMPLETE CBC W/AUTO DIFF WBC: CPT

## 2019-07-10 PROCEDURE — 36415 COLL VENOUS BLD VENIPUNCTURE: CPT

## 2019-07-10 PROCEDURE — 84153 ASSAY OF PSA TOTAL: CPT

## 2019-07-10 RX ORDER — TRAZODONE HYDROCHLORIDE 50 MG/1
50 TABLET ORAL
Qty: 30 TABLET | Refills: 0 | Status: SHIPPED | OUTPATIENT
Start: 2019-07-10 | End: 2019-07-19 | Stop reason: SDUPTHER

## 2019-07-11 ENCOUNTER — HOSPITAL ENCOUNTER (OUTPATIENT)
Dept: INFUSION CENTER | Facility: CLINIC | Age: 74
Discharge: HOME | End: 2019-07-11
Payer: MEDICARE

## 2019-07-11 VITALS
HEART RATE: 80 BPM | TEMPERATURE: 99 F | DIASTOLIC BLOOD PRESSURE: 79 MMHG | SYSTOLIC BLOOD PRESSURE: 119 MMHG | RESPIRATION RATE: 18 BRPM

## 2019-07-11 DIAGNOSIS — C68.8 PRIMARY UROTHELIAL CARCINOMA OF OVERLAPPING SITES OF URINARY ORGANS (HCC): Primary | ICD-10-CM

## 2019-07-11 PROCEDURE — 96413 CHEMO IV INFUSION 1 HR: CPT

## 2019-07-11 RX ORDER — SODIUM CHLORIDE 9 MG/ML
20 INJECTION, SOLUTION INTRAVENOUS ONCE
Status: COMPLETED | OUTPATIENT
Start: 2019-07-11 | End: 2019-07-11

## 2019-07-11 RX ADMIN — SODIUM CHLORIDE 20 ML/HR: 0.9 INJECTION, SOLUTION INTRAVENOUS at 10:06

## 2019-07-11 RX ADMIN — SODIUM CHLORIDE 200 MG: 9 INJECTION, SOLUTION INTRAVENOUS at 10:23

## 2019-07-11 NOTE — PLAN OF CARE
Problem: Potential for Falls  Goal: Patient will remain free of falls  Description  INTERVENTIONS:  - Assess patient frequently for physical needs  -  Identify cognitive and physical deficits and behaviors that affect risk of falls    -  [place] fall precautions as indicated by assessment   - Educate patient/family on patient safety including physical limitations  - Instruct patient to call for assistance with activity based on assessment  - Modify environment to reduce risk of injury  - Consider OT/PT consult to assist with strengthening/mobility  Outcome: Progressing

## 2019-07-11 NOTE — PROGRESS NOTES
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 ([address] )   Continues to desire treatment focused care  States that he is starting [place] for treatment   2  Cancer related pain   Reports significant increase in hip and groin pains since having stopped radiation therapy  Now requiring 5-10mg Oxycodone IR approximately twice daily  Questioned the use of MMJ for pain control as he does get sedated and notice change sin mental status with some doses of extended release narcotics  Will refer to discussion and eligibility for MMJ   -Start Oxycodone 10mg ER q8 hours  -continue oxycodone 5-10mg IR q4 hours PRN  -return in one week to assess pain control  -Dose of 650mg acetaminophen provided at visit this morning         Requested Prescriptions     Signed Prescriptions Disp Refills    oxyCODONE (OxyCONTIN) 10 mg 12 hr tablet 30 tablet 0     Sig: Take 1 tablet (10 mg total) by mouth every 8 (eight) hoursMax Daily Amount: 30 mg     There are no discontinued medications  Representatives have queried the patient's controlled substance dispensing history in the Prescription Drug Monitoring Program in compliance with regulations before I have prescribed any controlled substances  The prescription history is consistent with prescribed therapy and our practice policies  Return in about 1 week (around 7/19/2019) for Recheck, follow up of pain control, with Dr Russ Fisher, symptom assessment and management  Subjective:   Chief Complaint  Follow up visit for:  symptom management, pain, neoplasm related  HPI     Marilyn Davies is a 68 y o  male with history of prostate cancer diagnosed in March 2018, s/p 3 cycles of chemotherapy, unable to tolerate side effects  S/p radical cystoprostatectomy and ileal conduit urinary diversion in February 2019  We have been following him for his pain control   His last visit in June 2019 his oxycodone IR was decreased to 5mg [id number] PRN and his Extended release Oxycodone was discontinued  Since his last visit, he finished radiation therapy on June 2019 and has noticed significant increase in hip and groin pain  While on radiation therapy he did not require any administration of oxycodone and was weaned off of his extended release and immediate release regimen  Since he stopped radiation therapy he has required frequent doses of p r n  Immediate release oxycodone 5-10 milligrams approximately twice daily  He has noticed increase in his pain  Denies any recent falls or trauma  Reports that he is less functionally active secondary to his pain  The following portions of the medical history were reviewed: past medical history, problem list, medication list, and social history      Current Outpatient Medications:     acetaminophen (TYLENOL) 325 mg tablet, Take 650 mg by mouth every 6 (six) hours as needed for mild pain , Disp: , Rfl:     bisacodyl (DULCOLAX) 10 mg suppository, Insert 1 suppository (10 mg total) into the rectum daily as needed for constipation, Disp: 12 suppository, Rfl: 0    divalproex sodium (DEPAKOTE) 500 mg EC tablet, Take 500 mg by mouth 2 (two) times a day  , Disp: , Rfl:     Melatonin 5 MG TABS, Take by mouth, Disp: , Rfl:     Methylcellulose, Laxative, (CITRUCEL PO), Take by mouth daily as needed, Disp: , Rfl:     mometasone (NASONEX) 50 mcg/act nasal spray, 2 sprays into each nostril as needed, Disp: , Rfl:     oxyCODONE (OxyCONTIN) 10 mg 12 hr tablet, Take 1 tablet (10 mg total) by mouth every 8 (eight) hoursMax Daily Amount: 30 mg, Disp: 30 tablet, Rfl: 0    oxyCODONE (ROXICODONE) 5 mg immediate release tablet, Take 5mg for moderate cancer pain, or 10mg for severe pain, q3hrs PRN, Disp: 60 tablet, Rfl: 0    pantoprazole (PROTONIX) 40 mg tablet, Take 1 tablet (40 mg total) by mouth daily (Patient not taking: Reported on 7/11/2019), Disp: 30 tablet, Rfl: 1    pentoxifylline (TRENtal) 400 mg ER tablet, Take 400 mg by mouth 3 (three) times a day with meals, Disp: , Rfl:     ranitidine (ZANTAC) 300 MG tablet, Take 300 mg by mouth daily at bedtime, Disp: , Rfl: 5    senna (SENOKOT) 8 6 mg, Take 1-2 tablets orally daily as needed for constipation (Patient taking differently: as needed Take 1-2 tablets orally daily as needed for constipation), Disp: 120 each, Rfl: 0    traZODone (DESYREL) 50 mg tablet, Take 1 tablet (50 mg total) by mouth daily at bedtime, Disp: 30 tablet, Rfl: 0    Current Facility-Administered Medications:     Acetaminophen CAPS 650 mg, 650 mg, Oral, Once, Taras Monsivais MD  Review of Systems   Constitutional: Negative for activity change, appetite change, chills, diaphoresis, fatigue and fever  Eyes: Negative for discharge and itching  Respiratory: Negative for cough, chest tightness, shortness of breath and wheezing  Cardiovascular: Negative for chest pain, palpitations and leg swelling  Gastrointestinal: Negative for abdominal distention, abdominal pain, constipation, diarrhea, nausea and vomiting  Endocrine: Negative for cold intolerance and heat intolerance  Musculoskeletal: Positive for back pain  Negative for arthralgias, gait problem and myalgias  Significant hip and groin pains   Skin: Negative for color change, pallor, rash and wound  Allergic/Immunologic: Negative for environmental allergies and food allergies  Neurological: Negative for dizziness, weakness, light-headedness and headaches  Hematological: Negative for adenopathy  Psychiatric/Behavioral: Negative for agitation, behavioral problems, confusion, decreased concentration and dysphoric mood         All other systems negative    Objective:  Vital Signs  /80 (BP Location: Left arm, Patient Position: Sitting, Cuff Size: Standard)   Pulse (!) 112   Temp 99 6 °F (37 6 °C) (Oral)   Resp 18   Wt 79 7 kg (175 lb 9 6 oz)   BMI 25 93 kg/m²    Physical Exam    Constitutional: Appears well-developed and well-nourished  In no acute physical or emotional distress  Head: Normocephalic and atraumatic  Eyes: EOM are normal  No ocular discharge  No scleral icterus  Neck: No visible adenopathy or masses  Respiratory: Effort normal  No stridor  No respiratory distress  Gastrointestinal: No abdominal distension  Musculoskeletal: No edema  Neurological: Alert, oriented and appropriately conversant  Skin: No diaphoresis, no rashes seen on exposed areas of skin  Psychiatric: Displays a normal mood and affect  Behavior, judgement and thought content appear normal      Donna Henderson MD  [address] and Supportive Care

## 2019-07-11 NOTE — PROGRESS NOTES
Pt tolerated first cycle Keytruda well without adverse reaction  Pt left unit in stable condition with his wife, AVS given to both  Pt and wife without question or concern

## 2019-07-12 ENCOUNTER — TELEPHONE (OUTPATIENT)
Dept: PALLIATIVE MEDICINE | Facility: CLINIC | Age: 74
End: 2019-07-12

## 2019-07-12 ENCOUNTER — OFFICE VISIT (OUTPATIENT)
Dept: PALLIATIVE MEDICINE | Facility: CLINIC | Age: 74
End: 2019-07-12
Payer: MEDICARE

## 2019-07-12 VITALS
TEMPERATURE: 99.6 F | RESPIRATION RATE: 18 BRPM | HEART RATE: 112 BPM | DIASTOLIC BLOOD PRESSURE: 80 MMHG | SYSTOLIC BLOOD PRESSURE: 118 MMHG | WEIGHT: 175.6 LBS | BODY MASS INDEX: 25.93 KG/M2

## 2019-07-12 DIAGNOSIS — C68.8 PRIMARY UROTHELIAL CARCINOMA OF OVERLAPPING SITES OF URINARY ORGANS (HCC): Primary | ICD-10-CM

## 2019-07-12 DIAGNOSIS — G89.3 CANCER RELATED PAIN: ICD-10-CM

## 2019-07-12 PROCEDURE — 99214 OFFICE O/P EST MOD 30 MIN: CPT | Performed by: INTERNAL MEDICINE

## 2019-07-12 RX ORDER — OXYCODONE HCL 10 MG/1
10 TABLET, FILM COATED, EXTENDED RELEASE ORAL EVERY 8 HOURS SCHEDULED
Qty: 30 TABLET | Refills: 0 | Status: SHIPPED | OUTPATIENT
Start: 2019-07-12 | End: 2019-07-19 | Stop reason: SDUPTHER

## 2019-07-12 NOTE — TELEPHONE ENCOUNTER
[facility] medication exception form for oxycodone ER faxed to [phone]  ID NUMBER [id number]    Await response

## 2019-07-15 NOTE — TELEPHONE ENCOUNTER
Per phone call to [facility] the oxycontin 10mg has been approved from July 2019 through December 2019  Phone call to pharmacist at [facility] to make him aware   Phone call to Christian Barone daughter to make her aware  Adama Salas

## 2019-07-17 ENCOUNTER — APPOINTMENT (EMERGENCY)
Dept: RADIOLOGY | Facility: HOSPITAL | Age: 74
End: 2019-07-17
Payer: MEDICARE

## 2019-07-17 ENCOUNTER — HOSPITAL ENCOUNTER (EMERGENCY)
Facility: HOSPITAL | Age: 74
Discharge: HOME | End: 2019-07-17
Attending: EMERGENCY MEDICINE
Payer: MEDICARE

## 2019-07-17 ENCOUNTER — OFFICE VISIT (OUTPATIENT)
Dept: URGENT CARE | Age: 74
End: 2019-07-17
Payer: MEDICARE

## 2019-07-17 VITALS
HEART RATE: 92 BPM | TEMPERATURE: 99.2 F | HEIGHT: 69 IN | SYSTOLIC BLOOD PRESSURE: 112 MMHG | RESPIRATION RATE: 18 BRPM | WEIGHT: 175 LBS | OXYGEN SATURATION: 97 % | BODY MASS INDEX: 25.92 KG/M2 | DIASTOLIC BLOOD PRESSURE: 69 MMHG

## 2019-07-17 VITALS
TEMPERATURE: 99 F | WEIGHT: 178.79 LBS | HEART RATE: 88 BPM | OXYGEN SATURATION: 99 % | RESPIRATION RATE: 16 BRPM | BODY MASS INDEX: 26.4 KG/M2 | DIASTOLIC BLOOD PRESSURE: 70 MMHG | SYSTOLIC BLOOD PRESSURE: 136 MMHG

## 2019-07-17 DIAGNOSIS — M25.532 LEFT WRIST PAIN: Primary | ICD-10-CM

## 2019-07-17 LAB
ANION GAP SERPL CALCULATED.3IONS-SCNC: 10 MMOL/L (ref 4–13)
APTT PPP: 32 SECONDS (ref 23–37)
BASOPHILS # BLD AUTO: 0.03 THOUSANDS/ΜL (ref 0–0.1)
BASOPHILS NFR BLD AUTO: 1 % (ref 0–1)
BUN SERPL-MCNC: 19 MG/DL (ref 5–25)
CALCIUM SERPL-MCNC: 8.6 MG/DL (ref 8.3–10.1)
CHLORIDE SERPL-SCNC: 101 MMOL/L (ref 100–108)
CO2 SERPL-SCNC: 25 MMOL/L (ref 21–32)
CREAT SERPL-MCNC: 1.33 MG/DL (ref 0.6–1.3)
CRP SERPL QL: <90 MG/L
EOSINOPHIL # BLD AUTO: 0.12 THOUSAND/ΜL (ref 0–0.61)
EOSINOPHIL NFR BLD AUTO: 2 % (ref 0–6)
ERYTHROCYTE [DISTWIDTH] IN BLOOD BY AUTOMATED COUNT: 15.3 % (ref 11.6–15.1)
ERYTHROCYTE [SEDIMENTATION RATE] IN BLOOD: 100 MM/HOUR (ref 0–10)
GFR SERPL CREATININE-BSD FRML MDRD: 53 ML/MIN/1.73SQ M
GLUCOSE SERPL-MCNC: 123 MG/DL (ref 65–140)
HCT VFR BLD AUTO: 30.3 % (ref 36.5–49.3)
HGB BLD-MCNC: 9.6 G/DL (ref 12–17)
IMM GRANULOCYTES # BLD AUTO: 0.08 THOUSAND/UL (ref 0–0.2)
IMM GRANULOCYTES NFR BLD AUTO: 1 % (ref 0–2)
INR PPP: 1.04 (ref 0.84–1.19)
LYMPHOCYTES # BLD AUTO: 0.55 THOUSANDS/ΜL (ref 0.6–4.47)
LYMPHOCYTES NFR BLD AUTO: 10 % (ref 14–44)
MCH RBC QN AUTO: 28.3 PG (ref 26.8–34.3)
MCHC RBC AUTO-ENTMCNC: 31.7 G/DL (ref 31.4–37.4)
MCV RBC AUTO: 89 FL (ref 82–98)
MONOCYTES # BLD AUTO: 0.63 THOUSAND/ΜL (ref 0.17–1.22)
MONOCYTES NFR BLD AUTO: 11 % (ref 4–12)
NEUTROPHILS # BLD AUTO: 4.13 THOUSANDS/ΜL (ref 1.85–7.62)
NEUTS SEG NFR BLD AUTO: 75 % (ref 43–75)
NRBC BLD AUTO-RTO: 0 /100 WBCS
PLATELET # BLD AUTO: 434 THOUSANDS/UL (ref 149–390)
PMV BLD AUTO: 8.2 FL (ref 8.9–12.7)
POTASSIUM SERPL-SCNC: 3.6 MMOL/L (ref 3.5–5.3)
PROTHROMBIN TIME: 13.7 SECONDS (ref 11.6–14.5)
RBC # BLD AUTO: 3.39 MILLION/UL (ref 3.88–5.62)
SODIUM SERPL-SCNC: 136 MMOL/L (ref 136–145)
WBC # BLD AUTO: 5.54 THOUSAND/UL (ref 4.31–10.16)

## 2019-07-17 PROCEDURE — 85652 RBC SED RATE AUTOMATED: CPT | Performed by: EMERGENCY MEDICINE

## 2019-07-17 PROCEDURE — 36415 COLL VENOUS BLD VENIPUNCTURE: CPT | Performed by: EMERGENCY MEDICINE

## 2019-07-17 PROCEDURE — 99203 OFFICE O/P NEW LOW 30 MIN: CPT | Performed by: PHYSICIAN ASSISTANT

## 2019-07-17 PROCEDURE — 80048 BASIC METABOLIC PNL TOTAL CA: CPT | Performed by: EMERGENCY MEDICINE

## 2019-07-17 PROCEDURE — 86140 C-REACTIVE PROTEIN: CPT | Performed by: EMERGENCY MEDICINE

## 2019-07-17 PROCEDURE — 87040 BLOOD CULTURE FOR BACTERIA: CPT | Performed by: EMERGENCY MEDICINE

## 2019-07-17 PROCEDURE — 85730 THROMBOPLASTIN TIME PARTIAL: CPT | Performed by: EMERGENCY MEDICINE

## 2019-07-17 PROCEDURE — 73110 X-RAY EXAM OF WRIST: CPT

## 2019-07-17 PROCEDURE — 85610 PROTHROMBIN TIME: CPT | Performed by: EMERGENCY MEDICINE

## 2019-07-17 PROCEDURE — 99283 EMERGENCY DEPT VISIT LOW MDM: CPT

## 2019-07-17 PROCEDURE — 99284 EMERGENCY DEPT VISIT MOD MDM: CPT | Performed by: EMERGENCY MEDICINE

## 2019-07-17 PROCEDURE — 96374 THER/PROPH/DIAG INJ IV PUSH: CPT

## 2019-07-17 PROCEDURE — 85025 COMPLETE CBC W/AUTO DIFF WBC: CPT | Performed by: EMERGENCY MEDICINE

## 2019-07-17 PROCEDURE — G0463 HOSPITAL OUTPT CLINIC VISIT: HCPCS | Performed by: PHYSICIAN ASSISTANT

## 2019-07-17 RX ORDER — CHLOROPROCAINE HYDROCHLORIDE 30 MG/ML
120 INJECTION, SOLUTION EPIDURAL; INFILTRATION; INTRACAUDAL; PERINEURAL ONCE
Status: COMPLETED | OUTPATIENT
Start: 2019-07-17 | End: 2019-07-17

## 2019-07-17 RX ADMIN — CHLOROPROCAINE HYDROCHLORIDE 4 ML: 30 INJECTION, SOLUTION EPIDURAL; INFILTRATION; INTRACAUDAL; PERINEURAL at 21:26

## 2019-07-17 NOTE — PROGRESS NOTES
[facility]        NAME: Calvin Siemens is a 68 y o  male  : 1945    MRN: [id number]  DATE: 2019  TIME: 6:10 PM    Assessment and Plan   Left wrist pain [M25 532]  1  Left wrist pain  Transfer to other facility       Patient currently has cancer  Immunocompromised  Prior surgery to left wrist   Atraumatic swelling, warmth, tenderness, pain with range of motion to left wrist   Concern for septic joint  Patient sent to ED for further evaluation  Patient Instructions       Go to ER    Chief Complaint     Chief Complaint   Patient presents with    Wrist Pain     Pt c/o left wrist swelling for the past 5 days  Pt also had infusion of Keytruda for Stage 4 Urethral Carcinoma and Prostate Cancer  Pt had an IV placed in his left AC  Denies injury  Denies fever  Pt has hx of b/l fusion in wrists  History of Present Illness       Wrist Pain    This is a new problem  Episode onset: Small amount of wrist pain to left wrist over the past 5 days, since this morning, significant swelling and worsening pain  History of extremity trauma: No acute trauma  Patient has prior fusion  The problem occurs constantly  The problem has been gradually worsening  The quality of the pain is described as aching  The pain is moderate  Associated symptoms include joint swelling, a limited range of motion and stiffness  Pertinent negatives include no fever, numbness or tingling  He has tried cold for the symptoms  The treatment provided mild relief  Review of Systems   Review of Systems   Constitutional: Negative  Negative for chills and fever  HENT: Negative  Eyes: Negative  Respiratory: Negative  Negative for chest tightness, shortness of breath and wheezing  Cardiovascular: Negative  Negative for chest pain and palpitations  Gastrointestinal: Negative  Endocrine: Negative  Genitourinary: Negative  Negative for dysuria     Musculoskeletal: Positive for joint swelling and stiffness  Negative for back pain and neck pain  Skin: Negative  Negative for color change, rash and wound  Neurological: Negative for dizziness, tingling, weakness, light-headedness, numbness and headaches  Hematological: Negative  Psychiatric/Behavioral: Negative            Current Medications       Current Outpatient Medications:     acetaminophen (TYLENOL) 325 mg tablet, Take 650 mg by mouth every 6 (six) hours as needed for mild pain , Disp: , Rfl:     bisacodyl (DULCOLAX) 10 mg suppository, Insert 1 suppository (10 mg total) into the rectum daily as needed for constipation, Disp: 12 suppository, Rfl: 0    divalproex sodium (DEPAKOTE) 500 mg EC tablet, Take 500 mg by mouth 2 (two) times a day  , Disp: , Rfl:     Melatonin 5 MG TABS, Take by mouth, Disp: , Rfl:     Methylcellulose, Laxative, (CITRUCEL PO), Take by mouth daily as needed, Disp: , Rfl:     mometasone (NASONEX) 50 mcg/act nasal spray, 2 sprays into each nostril as needed, Disp: , Rfl:     oxyCODONE (OxyCONTIN) 10 mg 12 hr tablet, Take 1 tablet (10 mg total) by mouth every 8 (eight) hoursMax Daily Amount: 30 mg, Disp: 30 tablet, Rfl: 0    oxyCODONE (ROXICODONE) 5 mg immediate release tablet, Take 5mg for moderate cancer pain, or 10mg for severe pain, q3hrs PRN, Disp: 60 tablet, Rfl: 0    pantoprazole (PROTONIX) 40 mg tablet, Take 1 tablet (40 mg total) by mouth daily (Patient not taking: Reported on 7/11/2019), Disp: 30 tablet, Rfl: 1    pentoxifylline (TRENtal) 400 mg ER tablet, Take 400 mg by mouth 3 (three) times a day with meals, Disp: , Rfl:     ranitidine (ZANTAC) 300 MG tablet, Take 300 mg by mouth daily at bedtime, Disp: , Rfl: 5    senna (SENOKOT) 8 6 mg, Take 1-2 tablets orally daily as needed for constipation (Patient taking differently: as needed Take 1-2 tablets orally daily as needed for constipation), Disp: 120 each, Rfl: 0    traZODone (DESYREL) 50 mg tablet, Take 1 tablet (50 mg total) by mouth daily at bedtime, Disp: 30 tablet, Rfl: 0    Current Allergies     Allergies as of 07/17/2019 - Reviewed 07/17/2019   Allergen Reaction Noted    Iodine Shortness Of Breath, Swelling, and Hives 01/07/2008    Mercury Hives and Swelling 01/07/2008    Shellfish-derived products Anaphylaxis, Hives, and Shortness Of Breath 12/23/2012    Augmentin [amoxicillin-pot clavulanate] GI Intolerance, Rash, and Diarrhea 12/23/2012    Lidoderm [lidocaine] Rash 12/23/2012    Penicillins Rash, Swelling, and Hives 01/07/2008    Sulfa antibiotics Hives, Swelling, and Rash 01/07/2008            The following portions of the patient's history were reviewed and updated as appropriate: allergies, current medications, past family history, past medical history, past social history, past surgical history and problem list      Past Medical History:   Diagnosis Date    Aneurysm ([address] )     Behind left knee recently diagnosed    Back pain     Ceron disease     Ceron's disease     Bladder cancer ([address] )     BPH (benign prostatic hyperplasia)     Cancer ([address] )     melanoma    Cataract     right eye    Cataract     right eye    Confusion     DDD (degenerative disc disease), lumbar     Depression     Diverticulosis     Gallstones     GERD (gastroesophageal reflux disease)     History of cardiac murmur     Past    History of melanoma     Was on back in early 1990's    History of rheumatic fever     Childhood    History of transfusion     Nov 2018    [facility] (hard of hearing)     Hydronephrosis     Hypertension     Kidney stone     Multiple times    Neck pain     Nervous breakdown     History of due to reaction to psych med which he was on for anxiety/depression and became suicidal    Numbness and tingling in both hands     Numbness and tingling of both feet     PONV (postoperative nausea and vomiting)     Prostate cancer ([address] )     PVD (peripheral vascular disease) ([address] )     RBBB     Scoliosis     Seasonal allergies     Tinnitus     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     Wears partial dentures     Upper    Wears partial dentures     Upper       Past Surgical History:   Procedure Laterality Date    ABDOMINAL SURGERY      When young had procedure for improviing circulation to left lower extremety    BACK SURGERY      Lumbar- not sure what was done   [facility] CARDIAC CATHETERIZATION      Approximately 2007- no blockages    CARPAL TUNNEL RELEASE Bilateral     wrists are fused    CATARACT EXTRACTION Left     COLONOSCOPY      CYST REMOVAL      Robotic procedure to remove benign cyst from lower left lung    CYSTOGRAM N/A 3/14/2018    Procedure: CYSTOGRAM;  Surgeon: Yanci Rajput MD;  [facility];  Service: Urology    CYSTOSCOPY      ESOPHAGOGASTRODUODENOSCOPY      IR TUBE PLACEMENT NEPHROSTOMY  8/10/2018    LUNG SURGERY      cyst removed left lung    OTHER SURGICAL HISTORY Left     fistula drain in left buttock    NE CYSTO/URETERO W/LITHOTRIPSY &INDWELL STENT INSRT Left 1/3/2018    Procedure: CYSTOSCOPY URETEROSCOPY, RETROGRADE PYELOGRAM AND INSERTION STENT URETERAL;  Surgeon: Yanci Rajput MD;  [facility];  Service: Urology    [place] [address] [facility] N/A 2/14/2018    Procedure: ABDOMINAL EXPLORATION; CLOSURE OF BLADDER DIVERTICULITIS;  Surgeon: Yanci Rajput MD;  [facility];  Service: Urology    NE CYSTOURETHROSCOPY,URETER CATHETER Left 8/1/2018    Procedure: Cystoscopy, cystogram, bladder biopsies, removal of pelvic drain;  Surgeon: Yanci Rajput MD;  [facility];  Service: Urology    NE INCISE/DRAIN BLADDER N/A 2/14/2018    Procedure: OPEN SUPRAPUBIC TUBE PLACEMENT;  Surgeon: Yanci Rajput MD;  [facility];  Service: Urology    NE RELEASE Gerold Munch FIBROSIS Left 2/14/2018    Procedure: LYSIS OF ADHESIONS; URETEROLYSIS ;  Surgeon: Yanci Rajput MD;  [facility];  Service: Urology     [facility] BLADDER/NODES,ILEAL CONDUIT N/A 2/25/2019    Procedure: ATTEMPTED CYSTECTOMY RADICAL; ILEAL CONDUIT URINARY DIVERSION; BIOPSY OF PELVIC MASS; APPENDECTOMY;  Surgeon: Conner Mtz MD;  [facility];  Service: Urology    SKIN CANCER EXCISION      Melanoma removal on back in early 1990's    TOE AMPUTATION Left     All 5 toes left foot were amputated due to poor circulation     TRANSURETHRAL RESECTION OF PROSTATE N/A 3/14/2018    Procedure: TRANSURETHRAL RESECTION OF PROSTATE (TURP), LEFT URETERAL STENT REMOVAL;  Surgeon: Conner Mtz MD;  [facility];  Service: Urology    TRANSURETHRAL RESECTION OF PROSTATE N/A 9/26/2018    Procedure: TRANSURETHRAL RESECTION OF PROSTATE (TURP); Surgeon: Conner Mtz MD;  [facility];  Service: Urology    WRIST SURGERY Bilateral     Ulnar nerve on right arm and both wrists are fused       Family History   Problem Relation Age of Onset    Heart disease Father     Heart disease Mother     Cancer Paternal Grandfather          Medications have been verified  Objective   /69   Pulse 92   Temp 99 2 °F (37 3 °C)   Resp 18   Ht 5' 9" (1 753 m)   Wt 79 4 kg (175 lb)   SpO2 97%   BMI 25 84 kg/m²        Physical Exam     Physical Exam   Constitutional: He appears well-developed and well-nourished  No distress  HENT:   Head: Normocephalic and atraumatic  Cardiovascular: Normal rate, regular rhythm, normal heart sounds and intact distal pulses  Pulmonary/Chest: Effort normal and breath sounds normal  No respiratory distress  He has no wheezes  He has no rales  Musculoskeletal:        Left wrist: He exhibits decreased range of motion (Stiffness and "locking"sensation with flexion and extension  Significant pain with supination ), tenderness (Diffuse) and swelling (Diffuse swelling, warmth to wrist, particular over ulnar aspect)  He exhibits no crepitus  Left hand: He exhibits normal range of motion, normal capillary refill and no deformity  Normal sensation noted  Normal strength noted  Skin: Skin is warm   No rash noted  He is not diaphoretic  Nursing note and vitals reviewed

## 2019-07-18 NOTE — ED PROVIDER NOTES
History  Chief Complaint   Patient presents with    Wrist Swelling     pt sent over from care now with concern for septic joint  pt started about 5 days ago with swelling, warmth and tenderness to left wrist   denies trauma/injury  pt with old surgery to same area 10+ years ago  pt on chemo  denies fevers at home  History provided by:  Patient   used: No    Wrist Swelling   Location:  Wrist  Wrist location:  L wrist  Injury: no    Pain details:     Quality:  Aching    Radiates to:  Does not radiate    Severity:  Moderate    Onset quality:  Gradual    Duration:  1 week    Timing:  Constant    Progression:  Worsening  Handedness:  Right-handed  Dislocation: no    Prior injury to area:  No (Has had prior wrist fusion)  Relieved by:  Nothing  Worsened by: Movement (Palpation)  Ineffective treatments:  None tried  Associated symptoms: decreased range of motion, stiffness and swelling    Associated symptoms: no fever, no muscle weakness, no numbness and no tingling    Risk factors: no recent illness    Risk factors comment:  Being treated for bladder cancer      Prior to Admission Medications   Prescriptions Last Dose Informant Patient Reported? Taking?    Melatonin 5 MG TABS  Self Yes Yes   Sig: Take by mouth   Methylcellulose, Laxative, (CITRUCEL PO)  Spouse/Significant Other Yes Yes   Sig: Take by mouth daily as needed   acetaminophen (TYLENOL) 325 mg tablet  Self Yes Yes   Sig: Take 650 mg by mouth every 6 (six) hours as needed for mild pain    bisacodyl (DULCOLAX) 10 mg suppository  Self No Yes   Sig: Insert 1 suppository (10 mg total) into the rectum daily as needed for constipation   divalproex sodium (DEPAKOTE) 500 mg EC tablet  Self Yes Yes   Sig: Take 500 mg by mouth 2 (two) times a day     mometasone (NASONEX) 50 mcg/act nasal spray  Self Yes Yes   Si sprays into each nostril as needed   oxyCODONE (OxyCONTIN) 10 mg 12 hr tablet   No Yes   Sig: Take 1 tablet (10 mg total) by mouth every 8 (eight) hoursMax Daily Amount: 30 mg   oxyCODONE (ROXICODONE) 5 mg immediate release tablet   No Yes   Sig: Take 5mg for moderate cancer pain, or 10mg for severe pain, q3hrs PRN   pantoprazole (PROTONIX) 40 mg tablet Not Taking at Unknown time Self No No   Sig: Take 1 tablet (40 mg total) by mouth daily   Patient not taking: Reported on 7/11/2019   pentoxifylline (TRENtal) 400 mg ER tablet  Self Yes Yes   Sig: Take 400 mg by mouth 3 (three) times a day with meals   ranitidine (ZANTAC) 300 MG tablet  Self Yes Yes   Sig: Take 300 mg by mouth daily at bedtime   senna (SENOKOT) 8 6 mg  Self No Yes   Sig: Take 1-2 tablets orally daily as needed for constipation   Patient taking differently: as needed Take 1-2 tablets orally daily as needed for constipation   traZODone (DESYREL) 50 mg tablet   No Yes   Sig: Take 1 tablet (50 mg total) by mouth daily at bedtime      Facility-Administered Medications: None       Past Medical History:   Diagnosis Date    Aneurysm ([facility] )     Behind left knee recently diagnosed    Back pain     Ceron disease     Ceron's disease     Bladder cancer ([facility] )     BPH (benign prostatic hyperplasia)     Cancer ([facility] [facility] )     melanoma    Cataract     right eye    Cataract     right eye    Confusion     DDD (degenerative disc disease), lumbar     Depression     Diverticulosis     Gallstones     GERD (gastroesophageal reflux disease)     History of cardiac murmur     Past    History of melanoma     Was on back in early 1990's    History of rheumatic fever     Childhood    History of transfusion     Nov 2018    [facility] (hard of hearing)     Hydronephrosis     Hypertension     Kidney stone     Multiple times    Neck pain     Nervous breakdown     History of due to reaction to psych med which he was on for anxiety/depression and became suicidal    Numbness and tingling in both hands     Numbness and tingling of both feet     PONV (postoperative nausea and vomiting)     Prostate cancer ([facility] [facility]     PVD (peripheral vascular disease) ([facility] )     RBBB     Scoliosis     Seasonal allergies     Tinnitus     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HCC)     Wears partial dentures     Upper    Wears partial dentures     Upper       Past Surgical History:   Procedure Laterality Date    ABDOMINAL SURGERY      When young had procedure for improviing circulation to left lower extremety    BACK SURGERY      Lumbar- not sure what was done   Geoffrey Roles CARDIAC CATHETERIZATION      Approximately 2007- no blockages    CARPAL TUNNEL RELEASE Bilateral     wrists are fused    CATARACT EXTRACTION Left     COLONOSCOPY      CYST REMOVAL      Robotic procedure to remove benign cyst from lower left lung    CYSTOGRAM N/A 3/14/2018    Procedure: CYSTOGRAM;  Surgeon: Apolinar Cancino MD;  [facility];  Service: Urology    CYSTOSCOPY      ESOPHAGOGASTRODUODENOSCOPY      IR TUBE PLACEMENT NEPHROSTOMY  8/10/2018    LUNG SURGERY      cyst removed left lung    OTHER SURGICAL HISTORY Left     fistula drain in left buttock    HI CYSTO/URETERO W/LITHOTRIPSY &INDWELL STENT INSRT Left 1/3/2018    Procedure: CYSTOSCOPY URETEROSCOPY, RETROGRADE PYELOGRAM AND INSERTION STENT URETERAL;  Surgeon: Apolinar Cancino MD;  [facility];  Service: Urology    HI CYSTOTOMY,EXCIS BLADDER TIC N/A 2/14/2018    Procedure: ABDOMINAL EXPLORATION; CLOSURE OF BLADDER DIVERTICULITIS;  Surgeon: Apolinar Cancino MD;  [facility];  Service: Urology    HI CYSTOURETHROSCOPY,URETER CATHETER Left 8/1/2018    Procedure: Cystoscopy, cystogram, bladder biopsies, removal of pelvic drain;  Surgeon: Apolinar Cancino MD;  [facility];  Service: Urology    HI INCISE/DRAIN BLADDER N/A 2/14/2018    Procedure: OPEN SUPRAPUBIC TUBE PLACEMENT;  Surgeon: Apolinar Cancino MD;  [facility];  Service: Urology    HI RELEASE Roni Maxin FIBROSIS Left 2/14/2018    Procedure: LYSIS OF ADHESIONS; URETEROLYSIS ;  Surgeon: Apolinar Cancino MD;  [facility];  Service: Urology     [facility] BLADDER/NODES,ILEAL CONDUIT N/A 2019    Procedure: ATTEMPTED CYSTECTOMY RADICAL; ILEAL CONDUIT URINARY DIVERSION; BIOPSY OF PELVIC MASS; APPENDECTOMY;  Surgeon: Apolinar Cancino MD;  [facility];  Service: Urology    SKIN CANCER EXCISION      Melanoma removal on back in early     TOE AMPUTATION Left     All 5 toes left foot were amputated due to poor circulation     TRANSURETHRAL RESECTION OF PROSTATE N/A 3/14/2018    Procedure: TRANSURETHRAL RESECTION OF PROSTATE (TURP), LEFT URETERAL STENT REMOVAL;  Surgeon: Apolinar Cancino MD;  [facility];  Service: Urology    TRANSURETHRAL RESECTION OF PROSTATE N/A 2018    Procedure: TRANSURETHRAL RESECTION OF PROSTATE (TURP); Surgeon: Apolinar Cancino MD;  [facility];  Service: Urology    WRIST SURGERY Bilateral     Ulnar nerve on right arm and both wrists are fused       Family History   Problem Relation Age of Onset    Heart disease Father     Heart disease Mother     Cancer Paternal Grandfather      I have reviewed and agree with the history as documented  Social History     Tobacco Use    Smoking status: Former Smoker     Packs/day: 1 00     Years: 15 00     Pack years: 15 00     Types: Cigarettes     Last attempt to quit: 1970     Years since quittin 4    Smokeless tobacco: Never Used    Tobacco comment: Quit 50 years   Substance Use Topics    Alcohol use: Yes     Comment: Very rare    Drug use: No        Review of Systems   Constitutional: Negative for chills and fever  Musculoskeletal: Positive for arthralgias, joint swelling and stiffness  Skin: Negative for color change and wound  Neurological: Negative for weakness and numbness  Physical Exam  Physical Exam   Constitutional: He appears well-developed and well-nourished  No distress  Cardiovascular: Normal rate, regular rhythm, normal heart sounds and intact distal pulses     Pulmonary/Chest: Effort normal and breath sounds normal  No respiratory distress  Musculoskeletal:        Left wrist: He exhibits decreased range of motion, tenderness, swelling and effusion  He exhibits no bony tenderness, no crepitus, no deformity and no laceration  Neurological: He is alert  No sensory deficit  He exhibits normal muscle tone  Skin: Skin is warm  Capillary refill takes less than 2 seconds  He is not diaphoretic  No erythema  Psychiatric: He has a normal mood and affect  Nursing note and vitals reviewed        Vital Signs  ED Triage Vitals   Temperature Pulse Respirations Blood Pressure SpO2   07/17/19 1939 07/17/19 1939 07/17/19 1939 07/17/19 1939 07/17/19 1939   99 °F (37 2 °C) 94 16 137/64 98 %      Temp Source Heart Rate Source Patient Position - Orthostatic VS BP Location FiO2 (%)   07/17/19 1939 07/17/19 2231 07/17/19 2231 07/17/19 2231 --   Oral Monitor Sitting Right arm       Pain Score       07/17/19 1939       8           Vitals:    07/17/19 1939 07/17/19 2231   BP: 137/64 136/70   Pulse: 94 88   Patient Position - Orthostatic VS:  Sitting         Visual Acuity      ED Medications  Medications   chloroprocaine HCl (PF) 3 % injection 4 mL (4 mL Injection Given 7/17/19 2126)       Diagnostic Studies  Results Reviewed     Procedure Component Value Units Date/Time    C-reactive protein [[id number]]  (Abnormal) Collected:  07/17/19 2020    Lab Status:  Final result Specimen:  Blood from Arm, Right Updated:  07/17/19 2203     CRP <90 0 mg/L     Sedimentation rate, automated [[id number]]  (Abnormal) Collected:  07/17/19 2034    Lab Status:  Final result Specimen:  Blood from Arm, Left Updated:  07/17/19 2141     Sed Rate 100 mm/hour     Basic metabolic panel [[id number]]  (Abnormal) Collected:  07/17/19 2020    Lab Status:  Final result Specimen:  Blood from Arm, Right Updated:  07/17/19 2116     Sodium 136 mmol/L      Potassium 3 6 mmol/L      Chloride 101 mmol/L      CO2 25 mmol/L      ANION GAP 10 mmol/L      BUN 19 mg/dL      Creatinine 1 33 mg/dL      Glucose 123 mg/dL      Calcium 8 6 mg/dL      eGFR 53 ml/min/1 73sq m     Narrative:       [place] guidelines for Chronic Kidney Disease (CKD):     Stage 1 with normal or high GFR (GFR > 90 mL/min/1 73 square meters)    Stage 2 Mild CKD (GFR = 60-89 mL/min/1 73 square meters)    Stage 3A Moderate CKD (GFR = 45-59 mL/min/1 73 square meters)    Stage 3B Moderate CKD (GFR = 30-44 mL/min/1 73 square meters)    Stage 4 Severe CKD (GFR = 15-29 mL/min/1 73 square meters)    Stage 5 End Stage CKD (GFR <15 mL/min/1 73 square meters)  Note: GFR calculation is accurate only with a steady state creatinine    Protime-INR [[id number]]  (Normal) Collected:  07/17/19 2020    Lab Status:  Final result Specimen:  Blood from Arm, Right Updated:  07/17/19 2040     Protime 13 7 seconds      INR 1 04    APTT [[id number]]  (Normal) Collected:  07/17/19 2020    Lab Status:  Final result Specimen:  Blood from Arm, Right Updated:  07/17/19 2040     PTT 32 seconds     Blood culture #2 [[id number]] Collected:  07/17/19 2034    Lab Status:   In process Specimen:  Blood from Arm, Left Updated:  07/17/19 2037    CBC and differential [[id number]]  (Abnormal) Collected:  07/17/19 2020    Lab Status:  Final result Specimen:  Blood from Arm, Right Updated:  07/17/19 2027     WBC 5 54 Thousand/uL      RBC 3 39 Million/uL      Hemoglobin 9 6 g/dL      Hematocrit 30 3 %      MCV 89 fL      MCH 28 3 pg      MCHC 31 7 g/dL      RDW 15 3 %      MPV 8 2 fL      Platelets 483 Thousands/uL      nRBC 0 /100 WBCs      Neutrophils Relative 75 %      Immat GRANS % 1 %      Lymphocytes Relative 10 %      Monocytes Relative 11 %      Eosinophils Relative 2 %      Basophils Relative 1 %      Neutrophils Absolute 4 13 Thousands/µL      Immature Grans Absolute 0 08 Thousand/uL      Lymphocytes Absolute 0 55 Thousands/µL      Monocytes Absolute 0 63 Thousand/µL      Eosinophils Absolute 0 12 Thousand/µL Basophils Absolute 0 03 Thousands/µL     Blood culture #1 [[id number]] Collected:  07/17/19 2020    Lab Status: In process Specimen:  Blood from Arm, Right Updated:  07/17/19 2025             I have personally reviewed the x-ray and my findings are: no acute fracture  XR wrist 3+ views LEFT    (Results Pending)              Procedures  Procedures       ED Course                               MDM  Number of Diagnoses or Management Options  Left wrist pain:   Diagnosis management comments: After discussion of risks and benefits attempted arthrocentesis was attempted  Sterile prep  Anesthesia with chloroprocaine secondary to lidocaine allergy  Was able to get into the joint space but no fluid was able to be obtained  Patient has a hand surgeon to follow up with  Patient is not febrile and has had this pain and swelling for the last week  Suspect osteoarthritis versus gout versus pseudogout and less likely septic arthritis  White count normal   Follow up with Orthopedics  Inflammatory markers may be elevated secondary to cancer chemotherapy  Amount and/or Complexity of Data Reviewed  Clinical lab tests: ordered and reviewed  Tests in the radiology section of CPT®: ordered and reviewed        Disposition  Final diagnoses:   Left wrist pain     Time reflects when diagnosis was documented in both MDM as applicable and the Disposition within this note     Time User Action Codes Description Comment    7/17/2019  9:58 PM Leandrew Files Add [J18 [id number]] Left wrist pain       ED Disposition     ED Disposition Condition Date/Time Comment    Discharge Stable Wed Jul 17, 2019  9:57 PM Alexander Griggs discharge to home/self care              Follow-up Information     Follow up With Specialties Details Why Contact Info    Cruz Jessica MD Hand Surgery Schedule an appointment as soon as possible for a visit in 2 days  [address]            Discharge Medication List as of 7/17/2019 10:00 PM      CONTINUE these medications which have NOT CHANGED    Details   acetaminophen (TYLENOL) 325 mg tablet Take 650 mg by mouth every 6 (six) hours as needed for mild pain , Historical Med      bisacodyl (DULCOLAX) 10 mg suppository Insert 1 suppository (10 mg total) into the rectum daily as needed for constipation, Starting Fri 4/12/2019, Normal      divalproex sodium (DEPAKOTE) 500 mg EC tablet Take 500 mg by mouth 2 (two) times a day  , Historical Med      Melatonin 5 MG TABS Take by mouth, Historical Med      Methylcellulose, Laxative, (CITRUCEL PO) Take by mouth daily as needed, Historical Med      mometasone (NASONEX) 50 mcg/act nasal spray 2 sprays into each nostril as needed, Historical Med      oxyCODONE (OxyCONTIN) 10 mg 12 hr tablet Take 1 tablet (10 mg total) by mouth every 8 (eight) hoursMax Daily Amount: 30 mg, Starting Fri 7/12/2019, Print      oxyCODONE (ROXICODONE) 5 mg immediate release tablet Take 5mg for moderate cancer pain, or 10mg for severe pain, q3hrs PRN, Print      pentoxifylline (TRENtal) 400 mg ER tablet Take 400 mg by mouth 3 (three) times a day with meals, Historical Med      ranitidine (ZANTAC) 300 MG tablet Take 300 mg by mouth daily at bedtime, Starting Tue 11/13/2018, Historical Med      senna (SENOKOT) 8 6 mg Take 1-2 tablets orally daily as needed for constipation, Normal      traZODone (DESYREL) 50 mg tablet Take 1 tablet (50 mg total) by mouth daily at bedtime, Starting Wed 7/10/2019, Normal      pantoprazole (PROTONIX) 40 mg tablet Take 1 tablet (40 mg total) by mouth daily, Starting Wed 5/1/2019, Normal           No discharge procedures on file      ED Provider  Electronically Signed by           Jono Dozier MD  07/18/19 9857

## 2019-07-19 ENCOUNTER — OFFICE VISIT (OUTPATIENT)
Dept: PALLIATIVE MEDICINE | Facility: CLINIC | Age: 74
End: 2019-07-19
Payer: MEDICARE

## 2019-07-19 VITALS
HEART RATE: 93 BPM | OXYGEN SATURATION: 98 % | WEIGHT: 177.8 LBS | RESPIRATION RATE: 16 BRPM | TEMPERATURE: 98.7 F | DIASTOLIC BLOOD PRESSURE: 80 MMHG | BODY MASS INDEX: 26.26 KG/M2 | SYSTOLIC BLOOD PRESSURE: 130 MMHG

## 2019-07-19 DIAGNOSIS — Z79.899 MEDICAL MARIJUANA USE: ICD-10-CM

## 2019-07-19 DIAGNOSIS — R63.0 POOR APPETITE: ICD-10-CM

## 2019-07-19 DIAGNOSIS — C68.8 PRIMARY UROTHELIAL CARCINOMA OF OVERLAPPING SITES OF URINARY ORGANS (HCC): ICD-10-CM

## 2019-07-19 DIAGNOSIS — F51.01 PRIMARY INSOMNIA: ICD-10-CM

## 2019-07-19 DIAGNOSIS — G89.3 CANCER RELATED PAIN: Primary | ICD-10-CM

## 2019-07-19 PROBLEM — N30.00 ACUTE CYSTITIS WITHOUT HEMATURIA: Status: RESOLVED | Noted: 2018-11-28 | Resolved: 2019-07-19

## 2019-07-19 PROBLEM — Z01.810 PREOP CARDIOVASCULAR EXAM: Status: RESOLVED | Noted: 2019-02-19 | Resolved: 2019-07-19

## 2019-07-19 PROBLEM — R65.10 SIRS (SYSTEMIC INFLAMMATORY RESPONSE SYNDROME) (HCC): Status: RESOLVED | Noted: 2018-11-28 | Resolved: 2019-07-19

## 2019-07-19 PROBLEM — N17.9 ACUTE KIDNEY INJURY (HCC): Status: RESOLVED | Noted: 2019-05-29 | Resolved: 2019-07-19

## 2019-07-19 PROCEDURE — 99214 OFFICE O/P EST MOD 30 MIN: CPT | Performed by: FAMILY MEDICINE

## 2019-07-19 RX ORDER — TRAZODONE HYDROCHLORIDE 50 MG/1
100 TABLET ORAL
Qty: 60 TABLET | Refills: 0 | Status: SHIPPED | OUTPATIENT
Start: 2019-07-19 | End: 2019-08-23 | Stop reason: SDUPTHER

## 2019-07-19 RX ORDER — OXYCODONE HCL 10 MG/1
10 TABLET, FILM COATED, EXTENDED RELEASE ORAL EVERY 8 HOURS SCHEDULED
Qty: 90 TABLET | Refills: 0 | Status: SHIPPED | OUTPATIENT
Start: 2019-07-19 | End: 2019-08-23 | Stop reason: SDUPTHER

## 2019-07-19 NOTE — PROGRESS NOTES
Palliative and Supportive Care   Tamika Elizabeth 68 y o  male [id number]    Assessment/Plan:  1  Cancer related pain   Reports improved pain control at this time with his Oxycodone 10mg q8hour ER  Does not want to increase regimen at this time for history of side effects of medications  Portion of pain control may be related to steroids at this time secondary to gout attack, will continue to monitor and assess symptoms as patient is down titrated off of steroids  Discussed medical marijuana with patient and Jah Haney and completed certification together and signed consents   -will follow up in one month to review pain regimen and control   2  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 ([facility] [address] )    3  Primary insomnia    4  Poor appetite    5  Medical marijuana use   Reviewed and signed consent with patient and Jah Haney (significant other) at visit  Information provided to patient regarding medical marijuana dispensaries  Requested Prescriptions     Signed Prescriptions Disp Refills    oxyCODONE (OxyCONTIN) 10 mg 12 hr tablet 90 tablet 0     Sig: Take 1 tablet (10 mg total) by mouth every 8 (eight) hoursMax Daily Amount: 30 mg    traZODone (DESYREL) 50 mg tablet 60 tablet 0     Sig: Take 2 tablets (100 mg total) by mouth daily at bedtime     Medications Discontinued During This Encounter   Medication Reason    bisacodyl (DULCOLAX) 10 mg suppository Therapy completed    oxyCODONE (OxyCONTIN) 10 mg 12 hr tablet Reorder    traZODone (DESYREL) 50 mg tablet Reorder     The patient qualifies for use of MMJ in the state [facility] by having the following medical condition - cancer  From a palliative care stand point the patient is suffering with pain, poor appetite and poor sleep  These symptoms and side effects might be alleviated with use of MMJ products  The patient registered online  The patient read and I reviewed the informed consent document with the patient    I answered all questions related to it before the patient signed it  The patient's medical certification was completed on this date  The patient was given a signed copy of the informed consent and medical certification  Certification for MMJ use with palliative intent was performed by Dr Bobo Fernandez -  To help alleviate cancer related symptoms and cancer treatment related side effects  I do not endorse the belief that MMJ can treat cancer and strongly encouraged the patient to continue treatments and surveillance as recommend by cancer specialists  Representatives have queried the patient's controlled substance dispensing history in the Prescription Drug Monitoring Program in compliance with regulations before I have prescribed any controlled substances  The prescription history is consistent with prescribed therapy and our practice policies  Return in about 5 weeks (around 8/23/2019) for Recheck, symptom assessment and management, follow up of pain control, with Dr Salazar Cárdenas  Subjective:   Chief Complaint  Follow up visit for:  symptom management, pain, neoplasm related, medical marijuana certification  HPI     Vero Quan is a 68 y o  male with history of prostate cancer diagnosed in March 2018, s/p 3 cycles of chemotherapy, unable to tolerate side effects  S/p radical cystoprostatectomy and ileal conduit urinary diversion in February 2019  We have been following him for his pain control  His next appointment for Keytruda infusion is August 1  He comes in today for follow up of pain control  At last visit, he started Oxycodone ER 10mg [id number]  His significant other, Linnea Xiang, is with him today on examination  He states that he has been going well with his pain control since starting the ER Oxycodone on Wednesday  He has not been requiring his PRN oxycodone   He reports that his pain is about a 7-8/10 but does not want to increase his oxycodone regimen as he does have significant side effects of changes of mental status and somnolence with higher doses of oxycodone  Additionally, since last visit he had an episode of gout of the left wrist and is currently on steroids for symptoms and pain control  He states that his swelling and pain are under better control and will be seeing his orthopedist on July 31  The following portions of the medical history were reviewed: past medical history, problem list, medication list, and social history      Current Outpatient Medications:     acetaminophen (TYLENOL) 325 mg tablet, Take 650 mg by mouth every 6 (six) hours as needed for mild pain , Disp: , Rfl:     divalproex sodium (DEPAKOTE) 500 mg EC tablet, Take 500 mg by mouth 2 (two) times a day  , Disp: , Rfl:     Melatonin 5 MG TABS, Take by mouth, Disp: , Rfl:     Methylcellulose, Laxative, (CITRUCEL PO), Take by mouth daily as needed, Disp: , Rfl:     mometasone (NASONEX) 50 mcg/act nasal spray, 2 sprays into each nostril as needed, Disp: , Rfl:     oxyCODONE (OxyCONTIN) 10 mg 12 hr tablet, Take 1 tablet (10 mg total) by mouth every 8 (eight) hoursMax Daily Amount: 30 mg, Disp: 90 tablet, Rfl: 0    oxyCODONE (ROXICODONE) 5 mg immediate release tablet, Take 5mg for moderate cancer pain, or 10mg for severe pain, q3hrs PRN, Disp: 60 tablet, Rfl: 0    pentoxifylline (TRENtal) 400 mg ER tablet, Take 400 mg by mouth 3 (three) times a day with meals, Disp: , Rfl:     senna (SENOKOT) 8 6 mg, Take 1-2 tablets orally daily as needed for constipation (Patient taking differently: as needed Take 1-2 tablets orally daily as needed for constipation), Disp: 120 each, Rfl: 0    traZODone (DESYREL) 50 mg tablet, Take 2 tablets (100 mg total) by mouth daily at bedtime, Disp: 60 tablet, Rfl: 0    pantoprazole (PROTONIX) 40 mg tablet, Take 1 tablet (40 mg total) by mouth daily (Patient not taking: Reported on 7/11/2019), Disp: 30 tablet, Rfl: 1    ranitidine (ZANTAC) 300 MG tablet, Take 300 mg by mouth daily at bedtime, Disp: , Rfl: 5  Review of Systems   Musculoskeletal: Positive for joint swelling  Left wrist pain and swelling  Groin pain       All other systems negative    Objective:  Vital Signs  /80 (BP Location: Right arm, Patient Position: Sitting, Cuff Size: Standard)   Pulse 93   Temp 98 7 °F (37 1 °C) (Oral)   Resp 16   Wt 80 6 kg (177 lb 12 8 oz)   SpO2 98%   BMI 26 26 kg/m²    Physical Exam    Constitutional: Appears well-developed and well-nourished  In no acute physical or emotional distress  Head: Normocephalic and atraumatic  Eyes: EOM are normal  No ocular discharge  No scleral icterus  Neck: No visible adenopathy or masses  Respiratory: Effort normal  No stridor  No respiratory distress  Gastrointestinal: No abdominal distension  Musculoskeletal: Casting of the left wrist present  No edema  Neurological: Alert, oriented and appropriately conversant  Skin: No diaphoresis, no rashes seen on exposed areas of skin  Psychiatric: Displays a normal mood and affect  Behavior, judgement and thought content appear normal      Sharla Marie MD  [address] and Supportive Care

## 2019-07-22 LAB
BACTERIA BLD CULT: NORMAL
BACTERIA BLD CULT: NORMAL

## 2019-07-26 ENCOUNTER — RADIATION ONCOLOGY FOLLOW-UP (OUTPATIENT)
Dept: RADIATION ONCOLOGY | Facility: CLINIC | Age: 74
End: 2019-07-26
Attending: RADIOLOGY
Payer: MEDICARE

## 2019-07-26 VITALS
HEIGHT: 69 IN | SYSTOLIC BLOOD PRESSURE: 122 MMHG | BODY MASS INDEX: 26.57 KG/M2 | WEIGHT: 179.4 LBS | DIASTOLIC BLOOD PRESSURE: 62 MMHG | HEART RATE: 95 BPM | RESPIRATION RATE: 16 BRPM

## 2019-07-26 DIAGNOSIS — C68.8 PRIMARY UROTHELIAL CARCINOMA OF OVERLAPPING SITES OF URINARY ORGANS (HCC): Primary | ICD-10-CM

## 2019-07-26 DIAGNOSIS — C61 MALIGNANT NEOPLASM OF PROSTATE (HCC): ICD-10-CM

## 2019-07-26 PROCEDURE — 99211 OFF/OP EST MAY X REQ PHY/QHP: CPT | Performed by: RADIOLOGY

## 2019-07-26 NOTE — PROGRESS NOTES
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  with involvement of the posterior  left bladder  Radiation was completed on 6/17/19  Interval History:   6/25/19 Arely Villarreal MD Urology  some decreased ambulatory status and chronic pelvic pain fatigue  Impression: Advance localized urothelial cancer status post pelvic radiation and urinary diversion with episodes of pelvic pain awaiting further immunotherapy     7/11/19 Keytruda q 3 weeks initiated which he tolerated well      7/12/19 Seen by Shamika Colunga MD Resident in Palliative care/Dr Griffin Shoulders  Start Oxycodone 10mg ER q8 hours and continue oxycodone 5-10mg IR q4 hours PRN     7/17/19 Seen in ER with decreased range of motion, tenderness, swelling and effusion of left wrist  osteoarthritis versus gout versus pseudogout and less likely septic arthritis  To follow with orthopedic hand surgeon     7/18/19 Seen by Calixto Starks  "This appears to be gout to me  I recommended a short arm splint  If he has an annoyance from the dressing they can cut it off and just go with an over-the-counter splint  I will start him on a Medrol Dosepak and hold off on injecting him  I will see him back in 8-10 days for follow up "     7/19/19 Seen by Shamika Colunga MD Resident in [address] [place] [address] discussed to try to avoid increasing opiod dosages and for control of symptoms of pain, decreased appetite, and difficulty sleeping       Patient just got [facility] card  Will look into obtaining it today      Wrist pain started after first [address]  This was swollen  Steroids helped wrist and pain in the groin area  He is seen today with his wife    Overall, he reports his hand is getting better  He still has a penile discharge that has decreased and there is no blood  His urine in urostomy remains clear  He is having no difficulty with diarrhea  He is following a regular diet  He reports fatigue from the [facility] without any nausea nor vomiting  8/1/19 Dr Leonel Whitt and infusion scheduled  8/15/19 Yanic Rajput MD Urology  8/23/19 Palliative Care    Historical Information      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 [facility])    8/28/2018 Initial Diagnosis     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 ([facility] )      4/29/2019 - 6/17/2019 Radiation     Pelvic radiation to 4500 cGy followed by an additional 1260 cGy to the tumor Dr Kermit Ogden      7/10/2019 -  Chemotherapy     pembrolizumab (KEYTRUDA) 200 mg in sodium chloride 0 9 % 50 mL IVPB, 200 mg, Intravenous, Once, 1 of 6 cycles  Administration: 200 mg (7/11/2019)        Malignant neoplasm of prostate ([facility] )    3/14/2018 Surgery     TRANSURETHRAL RESECTION OF PROSTATE (TURP), LEFT URETERAL STENT REMOVAL (N/A)  CYSTOGRAM (N/A)  Prostate, TUR:  - Prostatic adenocarcinoma   * procedure:  transurethral prostatic resection   * histologic type: acinar adenocarcinoma   * histologic grade: 3+4 = 7, Grade Group 2     * percentage of pattern 4 = 10%   * intraductal carcinoma (IDC): not identified   * tumor quantitation: estimated percentage of prostatic tissue involved by tumor: 6 8%    -- number of positive chips: 4    -- total number of chips: 59   * periprostatic fat invasion: not identified   * seminal vesicle invasion: not identified   * lymphovascular invasion  not identified   * perineural invasion: not identified  - Additional Pathologic Findings:  Prostatic urethra, unremarkable        8/1/2018 Biopsy     CYSTOSCOPY RETROGRADE PYELOGRAM WITH INSERTION STENT URETERAL (Left) bladder biopsy, prostatic urethral biopsy, removal of pelvic drain    A  Bladder, trigone, biopsy:  - Acute cystitis with ulceration and lamina propria with fresh hemorrhage   - Negative for carcinoma  - No muscularis propria identified in the specimen      B  Prostate, biopsy:  - High grade urothelial carcinoma with sarcomatous features  See note       Note: Immunostain demonstrate the lesional cells to be positive for GATA3 and negative for p63, NKX3, PSA and PSAP  Clinical correlation is needed  9/26/2018 Surgery     TRANSURETHRAL RESECTION OF PROSTATE (TURP)    A  Prostate, TURP:  - High-grade urothelial carcinoma with sarcomatoid features  - Tumor is morphologically similar to tumor noted in prior prostate biopsy [id number]   - Tumor involves prostatic tissue and prostatic urethra                   10/17/2018 - 12/27/2018 Chemotherapy     Cisplatin and Gemzar      2/25/2019 Surgery     ATTEMPTED CYSTECTOMY RADICAL; ILEAL CONDUIT URINARY DIVERSION; BIOPSY OF PELVIC MASS; APPENDECTOMY     A  Ureter, distal left, excision:             - Reactive urothelial mucosa with chronic ureteritis  - No malignancy is identified      B  "Left ureteral tumor", excision:             - Poorly differentiated urothelial carcinoma with sarcomatoid differentiation and extensive necrosis  - Muscularis propria invasion is identified      C  Appendix, appendectomy:             - Benign appendix with fibrous obliteration of the distal tip  - No malignancy is identified      D  Ureter, right distal margin, excision:             - Reactive urothelial mucosa with chronic ureteritis  - No malignancy is identified               Past Medical History:   Diagnosis Date    Aneurysm ([address] )     Behind left knee recently diagnosed    Back pain     Ceron disease     Ceron's disease     Bladder cancer ([address] )     BPH (benign prostatic hyperplasia)     Cancer ([address] )     melanoma    Cataract     right eye    Cataract     right eye    Confusion     DDD (degenerative disc disease), lumbar     Depression     Diverticulosis     Gallstones     GERD (gastroesophageal reflux disease)     History of cardiac murmur     Past    History of melanoma     Was on back in early 1990's    History of rheumatic fever     Childhood    History of transfusion     Nov 2018    [facility] (hard of hearing)     Hydronephrosis     Hypertension     Kidney stone     Multiple times    Neck pain     Nervous breakdown     History of due to reaction to psych med which he was on for anxiety/depression and became suicidal    Numbness and tingling in both hands     Numbness and tingling of both feet     PONV (postoperative nausea and vomiting)     Prostate cancer ([address] )     PVD (peripheral vascular disease) ([address] )     RBBB     Scoliosis     Seasonal allergies     Tinnitus     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 )     Wears partial dentures     Upper    Wears partial dentures     Upper     Past Surgical History:   Procedure Laterality Date    ABDOMINAL SURGERY      When young had procedure for improviing circulation to left lower extremety    BACK SURGERY      Lumbar- not sure what was done   Raymond Wong CARDIAC CATHETERIZATION      Approximately 2007- no blockages    CARPAL TUNNEL RELEASE Bilateral     wrists are fused    CATARACT EXTRACTION Left     COLONOSCOPY      CYST REMOVAL      Robotic procedure to remove benign cyst from lower left lung    CYSTOGRAM N/A 3/14/2018    Procedure: CYSTOGRAM;  Surgeon: Dustin Martinez MD;  [facility];  Service: Urology    CYSTOSCOPY      ESOPHAGOGASTRODUODENOSCOPY      IR TUBE PLACEMENT NEPHROSTOMY  8/10/2018    LUNG SURGERY      cyst removed left lung    OTHER SURGICAL HISTORY Left     fistula drain in left buttock    MS CYSTO/URETERO W/LITHOTRIPSY &INDWELL STENT INSRT Left 1/3/2018    Procedure: CYSTOSCOPY URETEROSCOPY, RETROGRADE PYELOGRAM AND INSERTION STENT URETERAL;  Surgeon: Dustin Martinez MD;  [facility];  Service: Urology    MS CYSTOTOMY,EXCIS BLADDER TIC N/A 2018    Procedure: ABDOMINAL EXPLORATION; CLOSURE OF BLADDER DIVERTICULITIS;  Surgeon: Quinn Treviño MD;  [facility];  Service: Urology    NV CYSTOURETHROSCOPY,URETER CATHETER Left 2018    Procedure: Cystoscopy, cystogram, bladder biopsies, removal of pelvic drain;  Surgeon: Quinn Treviño MD;  [facility];  Service: Urology    NV INCISE/DRAIN BLADDER N/A 2018    Procedure: OPEN SUPRAPUBIC TUBE PLACEMENT;  Surgeon: Quinn Treviño MD;  [facility];  Service: Urology    NV RELEASE Lacie Newness FIBROSIS Left 2018    Procedure: LYSIS OF ADHESIONS; URETEROLYSIS ;  Surgeon: Quinn Treviño MD;  [facility];  Service: Urology     [facility] BLADDER/NODES,ILEAL CONDUIT N/A 2019    Procedure: ATTEMPTED CYSTECTOMY RADICAL; ILEAL CONDUIT URINARY DIVERSION; BIOPSY OF PELVIC MASS; APPENDECTOMY;  Surgeon: Quinn Treviño MD;  [facility];  Service: Urology    SKIN CANCER EXCISION      Melanoma removal on back in early     TOE AMPUTATION Left     All 5 toes left foot were amputated due to poor circulation     TRANSURETHRAL RESECTION OF PROSTATE N/A 3/14/2018    Procedure: TRANSURETHRAL RESECTION OF PROSTATE (TURP), LEFT URETERAL STENT REMOVAL;  Surgeon: Quinn Treviño MD;  [facility];  Service: Urology    TRANSURETHRAL RESECTION OF PROSTATE N/A 2018    Procedure: TRANSURETHRAL RESECTION OF PROSTATE (TURP);   Surgeon: Quinn Treviño MD;  [facility];  Service: Urology    WRIST SURGERY Bilateral     Ulnar nerve on right arm and both wrists are fused       Social History   Social History     Substance and Sexual Activity   Alcohol Use Yes    Comment: Very rare     Social History     Substance and Sexual Activity   Drug Use No     Social History     Tobacco Use   Smoking Status Former Smoker    Packs/day: 1 00    Years: 15 00    Pack years: 15 00    Types: Cigarettes    Last attempt to quit: 1970    Years since quittin 5 Smokeless Tobacco Never Used   Tobacco Comment    Quit 50 years         Meds/Allergies     Current Outpatient Medications:     acetaminophen (TYLENOL) 325 mg tablet, Take 650 mg by mouth every 6 (six) hours as needed for mild pain , Disp: , Rfl:     divalproex sodium (DEPAKOTE) 500 mg EC tablet, Take 500 mg by mouth 2 (two) times a day  , Disp: , Rfl:     Melatonin 5 MG TABS, Take by mouth, Disp: , Rfl:     Methylcellulose, Laxative, (CITRUCEL PO), Take by mouth daily as needed, Disp: , Rfl:     mometasone (NASONEX) 50 mcg/act nasal spray, 2 sprays into each nostril as needed, Disp: , Rfl:     oxyCODONE (OxyCONTIN) 10 mg 12 hr tablet, Take 1 tablet (10 mg total) by mouth every 8 (eight) hoursMax Daily Amount: 30 mg, Disp: 90 tablet, Rfl: 0    oxyCODONE (ROXICODONE) 5 mg immediate release tablet, Take 5mg for moderate cancer pain, or 10mg for severe pain, q3hrs PRN, Disp: 60 tablet, Rfl: 0    pentoxifylline (TRENtal) 400 mg ER tablet, Take 400 mg by mouth 3 (three) times a day with meals, Disp: , Rfl:     ranitidine (ZANTAC) 300 MG tablet, Take 300 mg by mouth daily at bedtime, Disp: , Rfl: 5    senna (SENOKOT) 8 6 mg, Take 1-2 tablets orally daily as needed for constipation (Patient taking differently: as needed Take 1-2 tablets orally daily as needed for constipation), Disp: 120 each, Rfl: 0    traZODone (DESYREL) 50 mg tablet, Take 2 tablets (100 mg total) by mouth daily at bedtime, Disp: 60 tablet, Rfl: 0    pantoprazole (PROTONIX) 40 mg tablet, Take 1 tablet (40 mg total) by mouth daily (Patient not taking: Reported on 7/11/2019), Disp: 30 tablet, Rfl: 1  Allergies   Allergen Reactions    Iodine Shortness Of Breath, Swelling and Hives     Contrast dye causes respiratory distress   Iodine causes skin rash    Mercury Hives and Swelling    Shellfish-Derived Products Anaphylaxis, Hives and Shortness Of Breath    Augmentin [Amoxicillin-Pot Clavulanate] GI Intolerance, Rash and Diarrhea     Diarrhea  Lidoderm [Lidocaine] Rash     Lidoderm patch caused rash    Penicillins Rash, Swelling and Hives    Sulfa Antibiotics Hives, Swelling and Rash     Review of Systems   Constitutional: Positive for activity change (decreased) and fatigue  HENT: Negative  Eyes: Negative  Respiratory: Positive for cough (occasional) and shortness of breath (heat and humidity bothers him)  Cardiovascular: Negative  Gastrointestinal: Positive for constipation and diarrhea (more when off pain meds)  Negative for blood in stool  Endocrine: Negative  Genitourinary: Positive for discharge (chalky looking mucus varies in amount)  Has bag - no blood noted  Groin pain improved during radiation but returned after a couple of weeks   Musculoskeletal: Positive for joint swelling (left wrist)  Skin: Negative  Allergic/Immunologic: Negative  Neurological: Negative  Hematological: Negative  Psychiatric/Behavioral: Negative         OBJECTIVE:   /62 (BP Location: Left arm, Patient Position: Sitting, Cuff Size: Standard)   Pulse 95   Resp 16   Ht 5' 9" (1 753 m)   Wt 81 4 kg (179 lb 6 4 oz)   BMI 26 49 kg/m²   Pain Assessment:  8  ECOG/Zubrod/WHO: 2 - Symptomatic, <50% confined to bed    Physical Exam  Constitutional: He is oriented to person, place, and time  He appears well-developed and well-nourished  No distress  HENT:   Head: Normocephalic and atraumatic  Mouth/Throat: No oropharyngeal exudate  Eyes: Pupils are equal, round, and reactive to light  Conjunctivae and EOM are normal  No scleral icterus  Neck: Normal range of motion  Neck supple  No tracheal deviation present  No thyromegaly present  Cardiovascular: Normal rate, regular rhythm and normal heart sounds  Pulmonary/Chest: Effort normal and breath sounds normal  No respiratory distress  He has no wheezes  He has no rales  He exhibits no tenderness  Abdominal: Soft   Bowel sounds are normal  He exhibits no distension and no mass  There is no tenderness  Urostomy bag in the right lower quadrant draining clear orsemary urine without any evidence of blood  Musculoskeletal: Normal range of motion  He exhibits no edema or tenderness  Lymphadenopathy:     He has no cervical adenopathy  Right: No inguinal and no supraclavicular adenopathy present  Left: No inguinal and no supraclavicular adenopathy present  Neurological: He is alert and oriented to person, place, and time  No cranial nerve deficit  Coordination normal    Skin: Skin is warm and dry  No rash noted  He is not diaphoretic  No erythema  No pallor  Psychiatric: He has a normal mood and affect   His behavior is normal  Judgment and thought content normal    Nursing note and vitals reviewed      RESULTS    Lab Results:   Recent Results (from the past 672 hour(s))   CBC and differential    Collection Time: 07/10/19  2:05 PM   Result Value Ref Range    WBC 5 45 4 31 - 10 16 Thousand/uL    RBC 3 49 (L) 3 88 - 5 62 Million/uL    Hemoglobin 10 0 (L) 12 0 - 17 0 g/dL    Hematocrit 30 4 (L) 36 5 - 49 3 %    MCV 87 82 - 98 fL    MCH 28 7 26 8 - 34 3 pg    MCHC 32 9 31 4 - 37 4 g/dL    RDW 16 0 (H) 11 6 - 15 1 %    MPV 7 6 (L) 8 9 - 12 7 fL    Platelets 177 (H) 487 - 390 Thousands/uL    Neutrophils Relative 80 (H) 43 - 75 %    Lymphocytes Relative 11 (L) 14 - 44 %    Monocytes Relative 7 4 - 12 %    Eosinophils Relative 2 0 - 6 %    Basophils Relative 0 0 - 1 %    Neutrophils Absolute 4 32 1 85 - 7 62 Thousands/µL    Lymphocytes Absolute 0 62 0 60 - 4 47 Thousands/µL    Monocytes Absolute 0 39 0 17 - 1 22 Thousand/µL    Eosinophils Absolute 0 11 0 00 - 0 61 Thousand/µL    Basophils Absolute 0 01 0 00 - 0 10 Thousands/µL   Comprehensive metabolic panel    Collection Time: 07/10/19  2:05 PM   Result Value Ref Range    Sodium 135 (L) 136 - 145 mmol/L    Potassium 4 2 3 5 - 5 3 mmol/L    Chloride 103 98 - 108 mmol/L    CO2 28 21 - 32 mmol/L    ANION GAP 4 4 - 13 mmol/L BUN 15 5 - 25 mg/dL    Creatinine 1 10 0 60 - 1 30 mg/dL    Glucose 158 (H) 65 - 140 mg/dL    Calcium 9 1 8 3 - 10 1 mg/dL    AST 18 5 - 45 U/L    ALT 9 (L) 12 - 78 U/L    Alkaline Phosphatase 51 46 - 116 U/L    Total Protein 6 6 6 4 - 8 2 g/dL    Albumin 2 5 (L) 3 5 - 5 7 g/dL    Total Bilirubin 0 40 0 20 - 1 00 mg/dL    eGFR 66 ml/min/1 73sq m   TSH, 3rd generation with Free T4 reflex    Collection Time: 07/10/19  2:05 PM   Result Value Ref Range    TSH 3RD GENERATON 1 030 0 358 - 3 740 uIU/mL   PSA Total, Diagnostic    Collection Time: 07/10/19  2:05 PM   Result Value Ref Range    PSA, Diagnostic 0 3 0 0 - 4 0 ng/mL   CBC and differential    Collection Time: 07/17/19  8:20 PM   Result Value Ref Range    WBC 5 54 4 31 - 10 16 Thousand/uL    RBC 3 39 (L) 3 88 - 5 62 Million/uL    Hemoglobin 9 6 (L) 12 0 - 17 0 g/dL    Hematocrit 30 3 (L) 36 5 - 49 3 %    MCV 89 82 - 98 fL    MCH 28 3 26 8 - 34 3 pg    MCHC 31 7 31 4 - 37 4 g/dL    RDW 15 3 (H) 11 6 - 15 1 %    MPV 8 2 (L) 8 9 - 12 7 fL    Platelets 291 (H) 749 - 390 Thousands/uL    nRBC 0 /100 WBCs    Neutrophils Relative 75 43 - 75 %    Immat GRANS % 1 0 - 2 %    Lymphocytes Relative 10 (L) 14 - 44 %    Monocytes Relative 11 4 - 12 %    Eosinophils Relative 2 0 - 6 %    Basophils Relative 1 0 - 1 %    Neutrophils Absolute 4 13 1 85 - 7 62 Thousands/µL    Immature Grans Absolute 0 08 0 00 - 0 20 Thousand/uL    Lymphocytes Absolute 0 55 (L) 0 60 - 4 47 Thousands/µL    Monocytes Absolute 0 63 0 17 - 1 22 Thousand/µL    Eosinophils Absolute 0 12 0 00 - 0 61 Thousand/µL    Basophils Absolute 0 03 0 00 - 0 10 Thousands/µL   Protime-INR    Collection Time: 07/17/19  8:20 PM   Result Value Ref Range    Protime 13 7 11 6 - 14 5 seconds    INR 1 04 0 84 - 1 19   APTT    Collection Time: 07/17/19  8:20 PM   Result Value Ref Range    PTT 32 23 - 37 seconds   Basic metabolic panel    Collection Time: 07/17/19  8:20 PM   Result Value Ref Range    Sodium 136 136 - 145 mmol/L Potassium 3 6 3 5 - 5 3 mmol/L    Chloride 101 100 - 108 mmol/L    CO2 25 21 - 32 mmol/L    ANION GAP 10 4 - 13 mmol/L    BUN 19 5 - 25 mg/dL    Creatinine 1 33 (H) 0 60 - 1 30 mg/dL    Glucose 123 65 - 140 mg/dL    Calcium 8 6 8 3 - 10 1 mg/dL    eGFR 53 ml/min/1 73sq m   C-reactive protein    Collection Time: 07/17/19  8:20 PM   Result Value Ref Range    CRP <90 0 (H) <3 0 mg/L   Blood culture #1    Collection Time: 07/17/19  8:20 PM   Result Value Ref Range    Blood Culture No Growth After 5 Days  Sedimentation rate, automated    Collection Time: 07/17/19  8:34 PM   Result Value Ref Range    Sed Rate 100 (H) 0 - 10 mm/hour   Blood culture #2    Collection Time: 07/17/19  8:34 PM   Result Value Ref Range    Blood Culture No Growth After 5 Days  Imaging Studies:Xr Wrist 3+ Views Left    Result Date: 7/18/2019  Narrative: LEFT WRIST INDICATION:   wrist swelling and pain  COMPARISON:  None VIEWS:  XR WRIST 3+ VW LEFT FINDINGS: There is no acute fracture or dislocation  Status post scaphoid removal   There are well-corticated densities about the wrist compatible degenerative change/sequela of old trauma  Degenerative changes the radiocarpal and distal radioulnar joint  Degenerative changes of 1st carpometacarpal joint  No lytic or blastic lesions are seen  There is soft tissue swelling about the wrist      Impression: No acute osseous abnormality  Degenerative changes as described  Workstation performed: [id number]     Assessment/Plan:  No orders of the defined types were placed in this encounter  Penny Bradford is a 68y o  year old male with a locally advanced stage IV transitional carcinoma of the prostatic urethra with bladder involvement and a mass in the posterior left lateral wall in the region of a previous bladder diverticulum  He also has a history of a [place] score 7 prostate adenocarcinoma that was small volume disease initially diagnosed in March 2018 and was observed    He had a repeat prostate biopsy September 26, 2018 showing high-grade urothelial carcinoma with sarcomatoid features  He was seen by Dr Lion Mackenzie and had T4 N0 locally advanced disease with a PET-CT negative for metastasis in September 2018  Recommendations were made for preoperative chemotherapy with cisplatin and Gemzar to be followed by a radical cystoprostatectomy, bilateral pelvic lymph node dissection, appendectomy and ileal conduit formation  He completed the chemotherapy December 27, 2018 and had repeat PET-CT January 7, 2019 that revealed continued bladder wall thickening with surrounding fat stranding and infiltration with a decrease in size of the continuous left pelvic cystic mass/fluid collection  There was intense uptake in the central prostate  There was no evidence of any pelvic lymphadenopathy nor distant metastasis  He was taken for surgery February 25, 2019 but his disease was unresectable due to local infiltration of the tumor mass and inflammation  He did have ileal conduit urinary diversion with appendectomy and abdominal exploration with lysis of adhesions  He was then discharged to [address] [place] [address] for postoperative recovery and then went home 1 week ago  He is having no difficulty with functioning of his urinary diversion  He has been having persistent discharge from the penis with chronic pain in the perineum/pelvis in the region of the prostate/bladder  He is status post irrigation with gentamicin with Dr Leonie Black  The discharge is likely a combination of infection along with necrotic tumor       We recommended radiation therapy to the whole pelvis for his locally advanced unresectable disease  Given his performance status, chemotherapy was not recommended by Dr Lion Mackenzie  He returns today for follow-up after completing radiation therapy on June 17, 2019  He has recovered well from treatment  He started immunotherapy with Jairo Tinoco on July 11, 2019 that he is tolerating well  He will continue with his 2nd dose of the immunotherapy on August 1, 2019  He has follow-up appointment August 15th with Dr Elizabeth Kaufman  He will return here for follow-up in 4 months  Raymond Magana MD  7/26/2019,10:46 AM    Portions of the record may have been created with voice recognition software   Occasional wrong word or "sound a like" substitutions may have occurred due to the inherent limitations of voice recognition software   Read the chart carefully and recognize, using context, where substitutions have occurred

## 2019-07-26 NOTE — PROGRESS NOTES
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  with involvement of the posterior  left bladder  Radiation was completed on 6/17/19 6/25/19 Rodrigo Franz MD Urology  some decreased ambulatory status and chronic pelvic pain fatigue  Impression: Advance localized urothelial cancer status post pelvic radiation and urinary diversion with episodes of pelvic pain awaiting further immunotherapy    7/11/19 Keytruda q 3 weeks initiated    7/12/19 Seen by Taras Monsivais MD Resident in Palliative care/Dr Stephanie Rees  Start Oxycodone 10mg ER q8 hours and continue oxycodone 5-10mg IR q4 hours PRN    7/17/19 Seen in ER with decreased range of motion, tenderness, swelling and effusion of left wrist  osteoarthritis versus gout versus pseudogout and less likely septic arthritis  To follow with orthopedic hand surgeon    7/18/19 Seen by Lilly Landau  "This appears to be gout to me  I recommended a short arm splint  If he has an annoyance from the dressing they can cut it off and just go with an over-the-counter splint  I will start him on a Medrol Dosepak and hold off on injecting him  I will see him back in 8-10 days for follow up "    7/19/19 Seen by Taras Monsivais MD Resident in [address] [place] [address] discussed to try to avoid increasing opiod dosages and for control of symptoms of pain, decreased appetite, and difficulty sleeping  8/1/19 Dr Rufus Cranker and infusion scheduled  8/15/19 Quinn Treviño MD Urology  8/23/19 Palliative Care    Just got [facility] card  Will look into obtaining it today  Wrist pain started after first Keytruda  Was swollen  Steroids helped wrist and pain in the groin area            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 ([facility] [address] )    8/28/2018 Initial Diagnosis     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 ([facility] [address] )      4/29/2019 - 6/17/2019 Radiation     Pelvic radiation to 4500 cGy followed by an additional 1260 cGy to the tumor Dr Inocente Valenzuela      7/10/2019 -  Chemotherapy     pembrolizumab (KEYTRUDA) 200 mg in sodium chloride 0 9 % 50 mL IVPB, 200 mg, Intravenous, Once, 1 of 6 cycles  Administration: 200 mg (7/11/2019)        Malignant neoplasm of prostate ([facility] [address] )    3/14/2018 Surgery     TRANSURETHRAL RESECTION OF PROSTATE (TURP), LEFT URETERAL STENT REMOVAL (N/A)  CYSTOGRAM (N/A)  Prostate, TUR:  - Prostatic adenocarcinoma   * procedure:  transurethral prostatic resection   * histologic type: acinar adenocarcinoma   * histologic grade: 3+4 = 7, Grade Group 2  * percentage of pattern 4 = 10%   * intraductal carcinoma (IDC): not identified   * tumor quantitation: estimated percentage of prostatic tissue involved by tumor: 6 8%    -- number of positive chips: 4    -- total number of chips: 59   * periprostatic fat invasion: not identified   * seminal vesicle invasion: not identified   * lymphovascular invasion  not identified   * perineural invasion: not identified  - Additional Pathologic Findings:  Prostatic urethra, unremarkable        8/1/2018 Biopsy     CYSTOSCOPY RETROGRADE PYELOGRAM WITH INSERTION STENT URETERAL (Left) bladder biopsy, prostatic urethral biopsy, removal of pelvic drain    A  Bladder, trigone, biopsy:  - Acute cystitis with ulceration and lamina propria with fresh hemorrhage   - Negative for carcinoma  - No muscularis propria identified in the specimen      B  Prostate, biopsy:  - High grade urothelial carcinoma with sarcomatous features  See note       Note: Immunostain demonstrate the lesional cells to be positive for GATA3 and negative for p63, NKX3, PSA and PSAP  Clinical correlation is needed  9/26/2018 Surgery     TRANSURETHRAL RESECTION OF PROSTATE (TURP)    A  Prostate, TURP:  - High-grade urothelial carcinoma with sarcomatoid features    - Tumor is morphologically similar to tumor noted in prior prostate biopsy [id number]   - Tumor involves prostatic tissue and prostatic urethra                   10/17/2018 - 12/27/2018 Chemotherapy     Cisplatin and Gemzar      2/25/2019 Surgery     ATTEMPTED CYSTECTOMY RADICAL; ILEAL CONDUIT URINARY DIVERSION; BIOPSY OF PELVIC MASS; APPENDECTOMY     A  Ureter, distal left, excision:             - Reactive urothelial mucosa with chronic ureteritis  - No malignancy is identified      B  "Left ureteral tumor", excision:             - Poorly differentiated urothelial carcinoma with sarcomatoid differentiation and extensive necrosis  - Muscularis propria invasion is identified      C  Appendix, appendectomy:             - Benign appendix with fibrous obliteration of the distal tip  - No malignancy is identified      D  Ureter, right distal margin, excision:             - Reactive urothelial mucosa with chronic ureteritis  - No malignancy is identified               Clinical Trial: no          Health Maintenance   Topic Date Due    Hepatitis C Screening  1945    Medicare Annual Wellness Visit (AWV)  1945    CRC Screening: Colonoscopy  1945    BMI: Followup Plan  09/21/1963    HEPATITIS B VACCINES (1 of 3 - Risk 3-dose series) 09/21/1964    Fall Risk  09/21/2010    Pneumococcal Vaccine: 65+ Years (1 of 2 - PCV13) 09/21/2010    INFLUENZA VACCINE  07/01/2019    BMI: Adult  07/19/2020    DTaP,Tdap,and Td Vaccines (2 - Td) 10/06/2025    Pneumococcal Vaccine: Pediatrics (0 to 5 Years) and At-Risk Patients (6 to 59 Years)  Aged Out       Patient Active Problem List   Diagnosis    Right bundle melissa block, 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    Aortic regurgitation    BPH with obstruction/lower urinary tract symptoms    Bladder diverticulum    Postoperative ileus ([address] )    Kidney stone    Major depressive disorder, single episode, moderate [facility])    Peripheral vascular disease ([facility] )    Essential hypertension    Hydronephrosis of left kidney    Ureteral stricture, left    Pelvic abscess in male [facility])    Aneurysm of left popliteal artery (HCC)    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 (HCC)    Orthostatic lightheadedness    Chemotherapy-induced neutropenia (HCC)    CKD (chronic kidney disease) stage 3, GFR 30-59 ml/min (HCC)    Acute on chronic anemia    Moderate protein-calorie malnutrition (HCC)    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[facility] )    Malignant neoplasm of prostate ([facility] )    Cancer related pain    Poor appetite    Neoplastic malignant related fatigue    Constipation due to opioid therapy    Insomnia    Acute encephalopathy    Hypomagnesemia    Medical marijuana use     Past Medical History:   Diagnosis Date    Aneurysm ([place] [address] )     Behind left knee recently diagnosed    Back pain     Ceron disease     Ceron's disease     Bladder cancer ([facility] )     BPH (benign prostatic hyperplasia)     Cancer ([facility] )     melanoma    Cataract     right eye    Cataract     right eye    Confusion     DDD (degenerative disc disease), lumbar     Depression     Diverticulosis     Gallstones     GERD (gastroesophageal reflux disease)     History of cardiac murmur     Past    History of melanoma     Was on back in early 1990's    History of rheumatic fever     Childhood    History of transfusion     Nov 2018    [facility] (hard of hearing)     Hydronephrosis     Hypertension     Kidney stone     Multiple times    Neck pain     Nervous breakdown     History of due to reaction to psych med which he was on for anxiety/depression and became suicidal    Numbness and tingling in both hands     Numbness and tingling of both feet     PONV (postoperative nausea and vomiting)     Prostate cancer ([facility] )     PVD (peripheral vascular disease) ([facility] )     RBBB     Scoliosis     Seasonal allergies     Tinnitus     Urethral cancer [facility])    Diego Montes Wears partial dentures     Upper    Wears partial dentures     Upper     Past Surgical History:   Procedure Laterality Date    ABDOMINAL SURGERY      When young had procedure for improviing circulation to left lower extremety    BACK SURGERY      Lumbar- not sure what was done   Diego Montes CARDIAC CATHETERIZATION      Approximately 2007- no blockages    CARPAL TUNNEL RELEASE Bilateral     wrists are fused    CATARACT EXTRACTION Left     COLONOSCOPY      CYST REMOVAL      Robotic procedure to remove benign cyst from lower left lung    CYSTOGRAM N/A 3/14/2018    Procedure: CYSTOGRAM;  Surgeon: Lyle Baugh MD;  [facility];  Service: Urology    CYSTOSCOPY      ESOPHAGOGASTRODUODENOSCOPY      IR TUBE PLACEMENT NEPHROSTOMY  8/10/2018    LUNG SURGERY      cyst removed left lung    OTHER SURGICAL HISTORY Left     fistula drain in left buttock    [place] CYSTO/URETERO W/LITHOTRIPSY &INDWELL STENT INSRT Left 1/3/2018    Procedure: CYSTOSCOPY URETEROSCOPY, RETROGRADE PYELOGRAM AND INSERTION STENT URETERAL;  Surgeon: Lyle Baugh MD;  [facility];  Service: Urology    [place] [address] [address] [facility] N/A 2/14/2018    Procedure: ABDOMINAL EXPLORATION; CLOSURE OF BLADDER DIVERTICULITIS;  Surgeon: Lyle Baugh MD;  [facility];  Service: Urology    [place] CYSTOURETHROSCOPY,URETER CATHETER Left 8/1/2018    Procedure: Cystoscopy, cystogram, bladder biopsies, removal of pelvic drain;  Surgeon: Lyle Baugh MD;  [facility];  Service: Urology    [place] INCISE/DRAIN BLADDER N/A 2/14/2018    Procedure: OPEN SUPRAPUBIC TUBE PLACEMENT;  Surgeon: Lyle Baugh MD;  [facility];  Service: Urology    KS RELEASE [facility]qual[facility] FIBROSIS Left 2/14/2018    Procedure: LYSIS OF ADHESIONS; URETEROLYSIS ;  Surgeon: Lyle Baugh MD;  [facility];  Service: Urology     [facility] BLADDER/NODES,ILEAL CONDUIT N/A 2/25/2019    Procedure: ATTEMPTED CYSTECTOMY RADICAL; ILEAL CONDUIT URINARY DIVERSION; BIOPSY OF PELVIC MASS; APPENDECTOMY;  Surgeon: Cathy Tripathi MD;  [facility];  Service: Urology    SKIN CANCER EXCISION      Melanoma removal on back in early     TOE AMPUTATION Left     All 5 toes left foot were amputated due to poor circulation     TRANSURETHRAL RESECTION OF PROSTATE N/A 3/14/2018    Procedure: TRANSURETHRAL RESECTION OF PROSTATE (TURP), LEFT URETERAL STENT REMOVAL;  Surgeon: Cathy Tripathi MD;  [facility];  Service: Urology    TRANSURETHRAL RESECTION OF PROSTATE N/A 2018    Procedure: TRANSURETHRAL RESECTION OF PROSTATE (TURP);   Surgeon: Cathy Tripathi MD;  [facility];  Service: Urology    WRIST SURGERY Bilateral     Ulnar nerve on right arm and both wrists are fused     Family History   Problem Relation Age of Onset    Heart disease Father     Heart disease Mother     Cancer Paternal Grandfather      Social History     Socioeconomic History    Marital status: Common Law     Spouse name: Not on file    Number of children: Not on file    Years of education: Not on file    Highest education level: Not on file   Occupational History    Not on file   Social Needs    Financial resource strain: Not on file    Food insecurity:     Worry: Not on file     Inability: Not on file    Transportation needs:     Medical: Not on file     Non-medical: Not on file   Tobacco Use    Smoking status: Former Smoker     Packs/day: 1 00     Years: 15 00     Pack years: 15 00     Types: Cigarettes     Last attempt to quit: 1970     Years since quittin 5    Smokeless tobacco: Never Used    Tobacco comment: Quit 50 years   Substance and Sexual Activity    Alcohol use: Yes     Comment: Very rare    Drug use: No    Sexual activity: Not on file   Lifestyle    Physical activity:     Days per week: Not on file     Minutes per session: Not on file    Stress: Not on file   Relationships    Social connections:     Talks on phone: Not on file     Gets together: Not on file     Attends [employer] service: Not on file     Active member of club or organization: Not on file     Attends meetings of clubs or organizations: Not on file     Relationship status: Not on file    Intimate partner violence:     Fear of current or ex partner: Not on file     Emotionally abused: Not on file     Physically abused: Not on file     Forced sexual activity: Not on file   Other Topics Concern    Not on file   Social History Narrative    Not on file       Current Outpatient Medications:     acetaminophen (TYLENOL) 325 mg tablet, Take 650 mg by mouth every 6 (six) hours as needed for mild pain , Disp: , Rfl:     divalproex sodium (DEPAKOTE) 500 mg EC tablet, Take 500 mg by mouth 2 (two) times a day  , Disp: , Rfl:     Melatonin 5 MG TABS, Take by mouth, Disp: , Rfl:     Methylcellulose, Laxative, (CITRUCEL PO), Take by mouth daily as needed, Disp: , Rfl:     mometasone (NASONEX) 50 mcg/act nasal spray, 2 sprays into each nostril as needed, Disp: , Rfl:     oxyCODONE (OxyCONTIN) 10 mg 12 hr tablet, Take 1 tablet (10 mg total) by mouth every 8 (eight) hoursMax Daily Amount: 30 mg, Disp: 90 tablet, Rfl: 0    oxyCODONE (ROXICODONE) 5 mg immediate release tablet, Take 5mg for moderate cancer pain, or 10mg for severe pain, q3hrs PRN, Disp: 60 tablet, Rfl: 0    pentoxifylline (TRENtal) 400 mg ER tablet, Take 400 mg by mouth 3 (three) times a day with meals, Disp: , Rfl:     ranitidine (ZANTAC) 300 MG tablet, Take 300 mg by mouth daily at bedtime, Disp: , Rfl: 5    senna (SENOKOT) 8 6 mg, Take 1-2 tablets orally daily as needed for constipation (Patient taking differently: as needed Take 1-2 tablets orally daily as needed for constipation), Disp: 120 each, Rfl: 0    traZODone (DESYREL) 50 mg tablet, Take 2 tablets (100 mg total) by mouth daily at bedtime, Disp: 60 tablet, Rfl: 0    pantoprazole (PROTONIX) 40 mg tablet, Take 1 tablet (40 mg total) by mouth daily (Patient not taking: Reported on 7/11/2019), Disp: 30 tablet, Rfl: 1  Allergies   Allergen Reactions    Iodine Shortness Of Breath, Swelling and Hives     Contrast dye causes respiratory distress  Iodine causes skin rash    Mercury Hives and Swelling    Shellfish-Derived Products Anaphylaxis, Hives and Shortness Of Breath    Augmentin [Amoxicillin-Pot Clavulanate] GI Intolerance, Rash and Diarrhea     Diarrhea    Lidoderm [Lidocaine] Rash     Lidoderm patch caused rash    Penicillins Rash, Swelling and Hives    Sulfa Antibiotics Hives, Swelling and Rash       Review of Systems:  Review of Systems   Constitutional: Positive for activity change (decreased) and fatigue  HENT: Negative  Eyes: Negative  Respiratory: Positive for cough (occasional) and shortness of breath (heat and humidity bothers him)  Cardiovascular: Negative  Gastrointestinal: Positive for constipation and diarrhea (more when off pain meds)  Negative for blood in stool  Endocrine: Negative  Genitourinary: Positive for discharge (chalky looking mucus varies in amount)  Has bag - no blood noted  Groin pain improved during radiation but returned after a couple of weeks   Musculoskeletal: Positive for joint swelling (left wrist)  Skin: Negative  Allergic/Immunologic: Negative  Neurological: Negative  Hematological: Negative  Psychiatric/Behavioral: Negative  Vitals:    07/26/19 0958   BP: 122/62   BP Location: Left arm   Patient Position: Sitting   Cuff Size: Standard   Pulse: 95   Resp: 16   Weight: 81 4 kg (179 lb 6 4 oz)   Height: 5' 9" (1 753 m)   Oxygen 97%    Pain Score:   8    Imaging:Xr Wrist 3+ Views Left    Result Date: 7/18/2019  Narrative: LEFT WRIST INDICATION:   wrist swelling and pain  COMPARISON:  None VIEWS:  XR WRIST 3+ VW LEFT FINDINGS: There is no acute fracture or dislocation    Status post scaphoid removal   There are well-corticated densities about the wrist compatible degenerative change/sequela of old trauma  Degenerative changes the radiocarpal and distal radioulnar joint  Degenerative changes of 1st carpometacarpal joint  No lytic or blastic lesions are seen  There is soft tissue swelling about the wrist      Impression: No acute osseous abnormality  Degenerative changes as described   Workstation performed: [id number]

## 2019-07-31 ENCOUNTER — APPOINTMENT (OUTPATIENT)
Dept: LAB | Facility: CLINIC | Age: 74
End: 2019-07-31
Payer: MEDICARE

## 2019-07-31 DIAGNOSIS — K21.9 GASTROESOPHAGEAL REFLUX DISEASE, ESOPHAGITIS PRESENCE NOT SPECIFIED: ICD-10-CM

## 2019-07-31 DIAGNOSIS — C68.8 PRIMARY UROTHELIAL CARCINOMA OF OVERLAPPING SITES OF URINARY ORGANS (HCC): ICD-10-CM

## 2019-07-31 LAB
ALBUMIN SERPL BCP-MCNC: 2.6 G/DL (ref 3.5–5.7)
ALP SERPL-CCNC: 60 U/L (ref 46–116)
ALT SERPL W P-5'-P-CCNC: 7 U/L (ref 12–78)
ANION GAP SERPL CALCULATED.3IONS-SCNC: 7 MMOL/L (ref 4–13)
AST SERPL W P-5'-P-CCNC: 17 U/L (ref 5–45)
BASOPHILS # BLD AUTO: 0.02 THOUSANDS/ΜL (ref 0–0.1)
BASOPHILS NFR BLD AUTO: 0 % (ref 0–1)
BILIRUB SERPL-MCNC: 0.4 MG/DL (ref 0.2–1)
BUN SERPL-MCNC: 13 MG/DL (ref 5–25)
CALCIUM SERPL-MCNC: 9.1 MG/DL (ref 8.3–10.1)
CHLORIDE SERPL-SCNC: 102 MMOL/L (ref 98–108)
CO2 SERPL-SCNC: 26 MMOL/L (ref 21–32)
CREAT SERPL-MCNC: 1.2 MG/DL (ref 0.6–1.3)
EOSINOPHIL # BLD AUTO: 0.07 THOUSAND/ΜL (ref 0–0.61)
EOSINOPHIL NFR BLD AUTO: 1 % (ref 0–6)
ERYTHROCYTE [DISTWIDTH] IN BLOOD BY AUTOMATED COUNT: 15.5 % (ref 11.6–15.1)
GFR SERPL CREATININE-BSD FRML MDRD: 60 ML/MIN/1.73SQ M
GLUCOSE SERPL-MCNC: 100 MG/DL (ref 65–140)
HCT VFR BLD AUTO: 30.3 % (ref 36.5–49.3)
HGB BLD-MCNC: 10.1 G/DL (ref 12–17)
LYMPHOCYTES # BLD AUTO: 0.65 THOUSANDS/ΜL (ref 0.6–4.47)
LYMPHOCYTES NFR BLD AUTO: 10 % (ref 14–44)
MCH RBC QN AUTO: 28.4 PG (ref 26.8–34.3)
MCHC RBC AUTO-ENTMCNC: 33.3 G/DL (ref 31.4–37.4)
MCV RBC AUTO: 85 FL (ref 82–98)
MONOCYTES # BLD AUTO: 0.77 THOUSAND/ΜL (ref 0.17–1.22)
MONOCYTES NFR BLD AUTO: 11 % (ref 4–12)
NEUTROPHILS # BLD AUTO: 5.28 THOUSANDS/ΜL (ref 1.85–7.62)
NEUTS SEG NFR BLD AUTO: 78 % (ref 43–75)
PLATELET # BLD AUTO: 393 THOUSANDS/UL (ref 149–390)
PMV BLD AUTO: 7.9 FL (ref 8.9–12.7)
POTASSIUM SERPL-SCNC: 4.1 MMOL/L (ref 3.5–5.3)
PROT SERPL-MCNC: 6.8 G/DL (ref 6.4–8.2)
RBC # BLD AUTO: 3.56 MILLION/UL (ref 3.88–5.62)
SODIUM SERPL-SCNC: 135 MMOL/L (ref 136–145)
WBC # BLD AUTO: 6.79 THOUSAND/UL (ref 4.31–10.16)

## 2019-07-31 PROCEDURE — 80053 COMPREHEN METABOLIC PANEL: CPT

## 2019-07-31 PROCEDURE — 85025 COMPLETE CBC W/AUTO DIFF WBC: CPT

## 2019-07-31 PROCEDURE — 36415 COLL VENOUS BLD VENIPUNCTURE: CPT

## 2019-07-31 RX ORDER — PANTOPRAZOLE SODIUM 40 MG/1
40 TABLET, DELAYED RELEASE ORAL DAILY
Qty: 1 TABLET | Refills: 0 | OUTPATIENT
Start: 2019-07-31

## 2019-07-31 NOTE — TELEPHONE ENCOUNTER
Please disregard this request- it was an auto pharmacy fax and I see patient declined taking this on 7/11/19 JM

## 2019-08-01 ENCOUNTER — OFFICE VISIT (OUTPATIENT)
Dept: HEMATOLOGY ONCOLOGY | Facility: CLINIC | Age: 74
End: 2019-08-01
Payer: MEDICARE

## 2019-08-01 ENCOUNTER — HOSPITAL ENCOUNTER (OUTPATIENT)
Dept: INFUSION CENTER | Facility: CLINIC | Age: 74
Discharge: HOME | End: 2019-08-01
Payer: MEDICARE

## 2019-08-01 VITALS
DIASTOLIC BLOOD PRESSURE: 69 MMHG | TEMPERATURE: 98.7 F | HEART RATE: 100 BPM | SYSTOLIC BLOOD PRESSURE: 134 MMHG | RESPIRATION RATE: 18 BRPM

## 2019-08-01 VITALS
WEIGHT: 172 LBS | HEIGHT: 69 IN | DIASTOLIC BLOOD PRESSURE: 68 MMHG | OXYGEN SATURATION: 93 % | HEART RATE: 103 BPM | BODY MASS INDEX: 25.48 KG/M2 | TEMPERATURE: 99.9 F | RESPIRATION RATE: 18 BRPM | SYSTOLIC BLOOD PRESSURE: 118 MMHG

## 2019-08-01 DIAGNOSIS — C61 MALIGNANT NEOPLASM OF PROSTATE (HCC): ICD-10-CM

## 2019-08-01 DIAGNOSIS — C68.8 PRIMARY UROTHELIAL CARCINOMA OF OVERLAPPING SITES OF URINARY ORGANS (HCC): Primary | ICD-10-CM

## 2019-08-01 PROCEDURE — 96413 CHEMO IV INFUSION 1 HR: CPT

## 2019-08-01 PROCEDURE — 99214 OFFICE O/P EST MOD 30 MIN: CPT | Performed by: INTERNAL MEDICINE

## 2019-08-01 RX ORDER — SODIUM CHLORIDE 9 MG/ML
20 INJECTION, SOLUTION INTRAVENOUS ONCE
Status: CANCELLED | OUTPATIENT
Start: 2019-09-12

## 2019-08-01 RX ORDER — SODIUM CHLORIDE 9 MG/ML
20 INJECTION, SOLUTION INTRAVENOUS ONCE
Status: CANCELLED | OUTPATIENT
Start: 2019-08-22

## 2019-08-01 RX ORDER — SODIUM CHLORIDE 9 MG/ML
20 INJECTION, SOLUTION INTRAVENOUS ONCE
Status: COMPLETED | OUTPATIENT
Start: 2019-08-01 | End: 2019-08-01

## 2019-08-01 RX ORDER — SODIUM CHLORIDE 9 MG/ML
20 INJECTION, SOLUTION INTRAVENOUS ONCE
Status: CANCELLED | OUTPATIENT
Start: 2019-10-03

## 2019-08-01 RX ADMIN — SODIUM CHLORIDE 20 ML/HR: 0.9 INJECTION, SOLUTION INTRAVENOUS at 09:43

## 2019-08-01 RX ADMIN — SODIUM CHLORIDE 200 MG: 9 INJECTION, SOLUTION INTRAVENOUS at 09:54

## 2019-08-01 NOTE — PROGRESS NOTES
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   He underwent 3 cycle of neoadjuvant chemotherapy with cisplatin and gemcitabine with significant toxicity   Subsequently, he attempted to have surgical resection which was not possible due to the extent of disease   He ended up having diverting urostomy, followed by palliative radiation therapy to the bladder  He is currently on pembrolizumab  He presents today for 2nd infusion  It is too soon to evaluate tumor response  His joint symptom may be related to gout  I think it is quite unlikely to be related to the pembrolizumab infusion  I recommended him to continue with pembrolizumab 200 mg every 3 weeks  He is in agreement with my recommendation  I will see him again in 6 weeks for routine follow-up                                                                                                        Subjective:      HPI:  A 51-year-old gentleman who has history of Buerger disease, 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32  [facility] was briefly a smoker until 25years old  [facility] was 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, when he underwent lung cyst surgery   Subsequently, he had difficulty of urination, for which he has been under the care of Dr Mavis Vidal had multiple procedure including left ureter placement for hydronephrosis as well as prostate biopsy in March 2018 which showed small amount of adenocarcinoma with Angel score 7   He has not had any treatment for his prostate cancer   He underwent cystoscopy and biopsy of bladder   Bladder biopsy was negative for malignancy   However, repeated prostate biopsy showed high-grade urothelial carcinoma with sarcomatoid feature   Therefore, he was referred to me to discuss systemic therapy  [facility] continued to have some discomfort in the pelvis   He does not see any gross hematuria   He has mild exertional shortness of breath   His weight has been stable   He denied fever, chills or night sweats   His recent creatinine was 1  23   He underwent PET-CT scan which showed highly hypermetabolic prostate with SUV 41 3   There was the rim hypermetabolism in the pelvis which was read as prior abscess  Juan Manuel Gil is no evidence of distant metastasis based on the PET-CT scan   His performance status is probably 1/4 on the ECOG scale            Interval History:  A 15-year-old gentleman who was diagnosed in limited volume of prostate cancer with Longford score 7 in March 2018  [facility] has not had any treatment for this  [facility] was then diagnosed with high-grade urothelial carcinoma with sarcomatoid feature, based on the prostate biopsy    This was T4 disease with prostate invasion   Therefore, he started neoadjuvant chemotherapy with cisplatin and gemcitabine   He has significant toxicity with neoadjuvant chemotherapy   However, he was able to complete 3 cycle    PET-CT scan after the neoadjuvant chemotherapy showed persistent hypermetabolism in the bladder, suggesting significant residual disease   He attempted to have resection   However, due to the extensive disease, resection could not be possible   He had diverting urostomy placed  He subsequently underwent palliative radiation therapy to the pelvis  Since early July 2019, he started pembrolizumab  A several days after the 1st infusion, he developed left wrist pain which migrated to the right big toe pain  He was injected with steroid which relieved the pain  He has some anorexia, resulting in a few lb of interval weight loss  His lower abdominal pain is relatively well controlled    He has mild exertional shortness of breath  He still have some urinary secretion  His performance status is 1 to 2/4 on the ECOG scale           Objective:      Primary Diagnosis:     1    High-grade urothelial carcinoma with sarcomatoid feature, T4 disease is prostate invasion   Diagnosed in August 2018  2    Localized prostate cancer with Angel score 7, diagnosed in March 2018      Cancer Staging:  Cancer Staging  No matching staging information was found for the patient         Previous Hematologic/ Oncologic Treatment:       1  Neoadjuvant chemotherapy with cisplatin and gemcitabine x3 cycle   Completed in December 2018  2  Palliative radiation therapy, completed in June 2018       Current Hematologic/ Oncologic Treatment:       Pembrolizumab, 2nd dose to be given today      Disease Status:      Not evaluated at this time on pembrolizumab      Test Results:     Pathology:     Prostate biopsy in March 2018 showed adenocarcinoma, Theodosia score 7      Repeated prostate biopsy in August 1, 2018 showed high-grade urothelial carcinoma with sarcomatoid feature      Re-biopsy in October 2018 showed high-grade urothelial carcinoma in the prostate gland as well as prostatic urethra      Radiology:     PET-CT scan in January 2019 showed persistent hypermetabolic pelvic mass and prostate invasion   No evidence of distant metastasis      Laboratory:     See below      Physical Exam:        General Appearance:    Alert, oriented          Eyes:    PERRL   Ears:    Normal external ear canals, both ears   Nose:   Nares normal, septum midline   Throat:   Mucosa moist  Pharynx without injection      Neck:   Supple         Lungs:     Clear to auscultation bilaterally   Chest Wall:    No tenderness or deformity    Heart:    Regular rate and rhythm         Abdomen:     Soft, non-tender, bowel sounds +, no organomegaly               Extremities:   Extremities no cyanosis mild bilateral lower extremity edema          Skin:   no rash or icterus  Lymph nodes:   Cervical, supraclavicular, and axillary nodes normal   Neurologic:   CNII-XII intact, normal strength, sensation and reflexes     Throughout             Breast exam:   Not applicable             ROS: Review of Systems   Constitutional:        Significant fatigue  Some weight loss   Genitourinary:        Urinary discharge  Musculoskeletal:        Right big toe pain  All other systems reviewed and are negative  Imaging: Xr Wrist 3+ Views Left    Result Date: 7/18/2019  Narrative: LEFT WRIST INDICATION:   wrist swelling and pain  COMPARISON:  None VIEWS:  XR WRIST 3+ VW LEFT FINDINGS: There is no acute fracture or dislocation  Status post scaphoid removal   There are well-corticated densities about the wrist compatible degenerative change/sequela of old trauma  Degenerative changes the radiocarpal and distal radioulnar joint  Degenerative changes of 1st carpometacarpal joint  No lytic or blastic lesions are seen  There is soft tissue swelling about the wrist      Impression: No acute osseous abnormality  Degenerative changes as described   Workstation performed: [id number]         Labs:   Lab Results   Component Value Date    WBC 6 79 07/31/2019    HGB 10 1 (L) 07/31/2019    HCT 30 3 (L) 07/31/2019    MCV 85 07/31/2019     (H) 07/31/2019     Lab Results   Component Value Date    K 4 1 07/31/2019     07/31/2019    CO2 26 07/31/2019    BUN 13 07/31/2019    CREATININE 1 20 07/31/2019    GLUF 82 11/19/2018    CALCIUM 9 1 07/31/2019    AST 17 07/31/2019    ALT 7 (L) 07/31/2019    ALKPHOS 60 07/31/2019    EGFR 60 07/31/2019         Lab Results   Component Value Date    PSA 0 3 07/10/2019    PSA 0 5 06/22/2018         Lab Results   Component Value Date    IRON 38 (L) 11/28/2018    TIBC 192 (L) 11/28/2018    FERRITIN 534 (H) 11/28/2018       Lab Results   Component Value Date    KCAHZXPA20 442 11/28/2018       Lab Results   Component Value Date    FOLATE 6 3 11/28/2018 Current Medications: Reviewed  Allergies: Reviewed  PMH/FH/SH:  Reviewed      Vital Sign:    Body surface area is 1 94 meters squared      Wt Readings from Last 3 Encounters:   08/01/19 78 kg (172 lb)   07/26/19 81 4 kg (179 lb 6 4 oz)   07/19/19 80 6 kg (177 lb 12 8 oz)        Temp Readings from Last 3 Encounters:   08/01/19 99 9 °F (37 7 °C) (Tympanic Core)   07/19/19 98 7 °F (37 1 °C) (Oral)   07/17/19 99 °F (37 2 °C) (Oral)        BP Readings from Last 3 Encounters:   08/01/19 118/68   07/26/19 122/62   07/19/19 130/80         Pulse Readings from Last 3 Encounters:   08/01/19 103   07/26/19 95   07/19/19 93     @LASTSAO2(3)@

## 2019-08-01 NOTE — PROGRESS NOTES
Patient tolerated Keytruda infusion well  Offers no complaints  Pt is aware of all future appointments   Refused AVS

## 2019-08-15 ENCOUNTER — OFFICE VISIT (OUTPATIENT)
Dept: UROLOGY | Facility: MEDICAL CENTER | Age: 74
End: 2019-08-15
Payer: MEDICARE

## 2019-08-15 VITALS
WEIGHT: 177 LBS | HEIGHT: 69 IN | DIASTOLIC BLOOD PRESSURE: 60 MMHG | HEART RATE: 102 BPM | SYSTOLIC BLOOD PRESSURE: 110 MMHG | BODY MASS INDEX: 26.22 KG/M2

## 2019-08-15 DIAGNOSIS — C68.8 PRIMARY UROTHELIAL CARCINOMA OF OVERLAPPING SITES OF URINARY ORGANS (HCC): Primary | ICD-10-CM

## 2019-08-15 DIAGNOSIS — R10.2 CHRONIC PELVIC PAIN IN MALE: ICD-10-CM

## 2019-08-15 DIAGNOSIS — G89.29 CHRONIC PELVIC PAIN IN MALE: ICD-10-CM

## 2019-08-15 PROCEDURE — 99214 OFFICE O/P EST MOD 30 MIN: CPT | Performed by: UROLOGY

## 2019-08-15 RX ORDER — MELOXICAM 15 MG/1
15 TABLET ORAL DAILY
Refills: 0 | COMMUNITY
Start: 2019-08-02 | End: 2019-10-17 | Stop reason: HOSPADM

## 2019-08-15 NOTE — LETTER
August 15, 2019     Breanna Lanza MD  [address]    Patient: Flavio Chavez   YOB: 1945   Date of Visit: 8/15/2019       Dear Dr Miles Hugo: Thank you for referring Rita Roberts to me for evaluation  Below are my notes for this consultation  If you have questions, please do not hesitate to call me  I look forward to following your patient along with you  Sincerely,        Janell Abebe MD        CC: MD Janell Chau MD  8/15/2019  9:28 AM  Sign at close encounter  [address] [facility]  [place] [phone]  www  [facility]  org      NAME: Flavio Chavez  AGE: 68 y o  SEX: male  : 1945   MRN: [id number]    DATE: 8/15/2019  TIME: 9:08 AM    Assessment and Plan:    Locally invasive but not metastatic advanced urothelial carcinoma involving the bladder and prostatic urethra, incidental finding of prostate cancer status post ileal conduit urinary diversion  His bladder was unresectable  He has undergone radiation therapy to the bladder and that lesion  He is doing well on the Pembromizulab , and his renal function is good  Basically what we can expect is for him to continue with the discharge from the penis which we really cannot do much about  His tumors localized does not appear to be spreading and often that is the way the check point inhibitors work, this can go on for some time  Continue with palliative pain management and we will see what his next scan looks like in terms of the left flank pain-however with his normal creatinine I doubt that he has hydronephrosis  See me in 6 months  Chief Complaint   No chief complaint on file        History of Present Illness   Advanced localized urothelial carcinoma involving the bladder and prostatic urethra with incidental adenocarcinoma the prostate with ileal conduit urinary diversion  Has undergone radiation therapy to his bladder because this was unresectable  He is on Pembromizulab per Dr Ping Carpio  PSA was 0 3 as of 7/10/2019  Creatinine 1 20  He continues to have left groin and pelvic pain, which is to be expected, and he does get some left flank pain  Last CT scan was May and this showed actually decrease in the size of his left posterior bladder wall lesion  No sign of metastases  He continues to have the discharge with occasional slight amount of blood from his penis    The following portions of the patient's history were reviewed and updated as appropriate: allergies, current medications, past family history, past medical history, past social history, past surgical history and problem list   Past Medical History:   Diagnosis Date    Aneurysm ([facility] [address] )     Behind left knee recently diagnosed    Back pain     Ceron disease     Ceron's disease     Bladder cancer ([address] )     BPH (benign prostatic hyperplasia)     Cancer ([address] )     melanoma    Cataract     right eye    Cataract     right eye    Confusion     DDD (degenerative disc disease), lumbar     Depression     Diverticulosis     Gallstones     GERD (gastroesophageal reflux disease)     History of cardiac murmur     Past    History of melanoma     Was on back in early 1990's    History of rheumatic fever     Childhood    History of transfusion     Nov 2018    [facility] (hard of hearing)     Hydronephrosis     Hypertension     Kidney stone     Multiple times    Neck pain     Nervous breakdown     History of due to reaction to psych med which he was on for anxiety/depression and became suicidal    Numbness and tingling in both hands     Numbness and tingling of both feet     PONV (postoperative nausea and vomiting)     Prostate cancer ([address] )     PVD (peripheral vascular disease) ([address] )     RBBB     Scoliosis     Seasonal allergies     Tinnitus     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[address] )     Wears partial dentures Upper    Wears partial dentures     Upper     Past Surgical History:   Procedure Laterality Date    ABDOMINAL SURGERY      When young had procedure for improviing circulation to left lower extremety    BACK SURGERY      Lumbar- not sure what was done   Oneil CARDIAC CATHETERIZATION      Approximately 2007- no blockages    CARPAL TUNNEL RELEASE Bilateral     wrists are fused    CATARACT EXTRACTION Left     COLONOSCOPY      CYST REMOVAL      Robotic procedure to remove benign cyst from lower left lung    CYSTOGRAM N/A 3/14/2018    Procedure: CYSTOGRAM;  Surgeon: Lyle Baugh MD;  [facility];  Service: Urology    CYSTOSCOPY      ESOPHAGOGASTRODUODENOSCOPY      IR TUBE PLACEMENT NEPHROSTOMY  8/10/2018    LUNG SURGERY      cyst removed left lung    OTHER SURGICAL HISTORY Left     fistula drain in left buttock    [place] CYSTO/URETERO W/LITHOTRIPSY &INDWELL STENT INSRT Left 1/3/2018    Procedure: CYSTOSCOPY URETEROSCOPY, RETROGRADE PYELOGRAM AND INSERTION STENT URETERAL;  Surgeon: Lyle Baugh MD;  [facility];  Service: Urology    [place] [id number] [facility] N/A 2/14/2018    Procedure: ABDOMINAL EXPLORATION; CLOSURE OF BLADDER DIVERTICULITIS;  Surgeon: Lyle Baugh MD;  [facility];  Service: Urology    [place] CYSTOURETHROSCOPY,URETER CATHETER Left 8/1/2018    Procedure: Cystoscopy, cystogram, bladder biopsies, removal of pelvic drain;  Surgeon: Lyle Baugh MD;  [facility];  Service: Urology    [place] INCISE/DRAIN BLADDER N/A 2/14/2018    Procedure: OPEN SUPRAPUBIC TUBE PLACEMENT;  Surgeon: Lyle Baugh MD;  [facility];  Service: Urology    KS RELEASE [facility]qual[facility] FIBROSIS Left 2/14/2018    Procedure: LYSIS OF ADHESIONS; URETEROLYSIS ;  Surgeon: Lyle Baugh MD;  [facility];  Service: Urology     [facility] BLADDER/NODES,ILEAL CONDUIT N/A 2/25/2019    Procedure: ATTEMPTED CYSTECTOMY RADICAL; ILEAL CONDUIT URINARY DIVERSION; BIOPSY OF PELVIC MASS; APPENDECTOMY;  Surgeon: Barrett Mosquera MD;  [facility];  Service: Urology    SKIN CANCER EXCISION      Melanoma removal on back in early 1990's    TOE AMPUTATION Left     All 5 toes left foot were amputated due to poor circulation     TRANSURETHRAL RESECTION OF PROSTATE N/A 3/14/2018    Procedure: TRANSURETHRAL RESECTION OF PROSTATE (TURP), LEFT URETERAL STENT REMOVAL;  Surgeon: Barrett Mosquera MD;  [facility];  Service: Urology    TRANSURETHRAL RESECTION OF PROSTATE N/A 9/26/2018    Procedure: TRANSURETHRAL RESECTION OF PROSTATE (TURP); Surgeon: Barrett Mosquera MD;  [facility];  Service: Urology    WRIST SURGERY Bilateral     Ulnar nerve on right arm and both wrists are fused     shoulder  Review of Systems   Review of Systems   Constitutional: Positive for appetite change  Negative for unexpected weight change  Gastrointestinal: Positive for constipation  Genitourinary: Positive for discharge and flank pain         Active Problem List     Patient Active Problem List   Diagnosis    Right bundle melissa block, 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    Aortic regurgitation    BPH with obstruction/lower urinary tract symptoms    Bladder diverticulum    Postoperative ileus (HCC)    Kidney stone    Major depressive disorder, single episode, moderate (HCC)    Peripheral vascular disease ([address] )    Essential hypertension    Hydronephrosis of left kidney    Ureteral stricture, left    Pelvic abscess in [facility])    Aneurysm of left popliteal artery (HCC)    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 (HCC)    Orthostatic lightheadedness    Chemotherapy-induced neutropenia (HCC)    CKD (chronic kidney disease) stage 3, GFR 30-59 ml/min (HCC)    Acute on chronic anemia    Moderate protein-calorie malnutrition (HCC)    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[address] )    Malignant neoplasm of prostate ([address] )    Cancer related pain    Poor appetite  Neoplastic malignant related fatigue    Constipation due to opioid therapy    Insomnia    Acute encephalopathy    Hypomagnesemia    Medical marijuana use       Objective   /60   Pulse 102   Ht 5' 9" (1 753 m)   Wt 80 3 kg (177 lb)   BMI 26 14 kg/m²      Physical Exam   Constitutional: He is oriented to person, place, and time  He appears well-developed and well-nourished  HENT:   Head: Normocephalic and atraumatic  Eyes: EOM are normal    Neck: Normal range of motion  Cardiovascular:   Slightly tachycardic today   Pulmonary/Chest: Effort normal    Abdominal: Soft  He exhibits no distension and no mass  There is no tenderness  There is no rebound and no guarding  No hernia  Stoma is pink healthy and prolapse  No parastomal hernia  Musculoskeletal: Normal range of motion  Neurological: He is alert and oriented to person, place, and time  Skin: Skin is warm and dry  Psychiatric: He has a normal mood and affect   His behavior is normal  Judgment and thought content normal            Current Medications     Current Outpatient Medications:     acetaminophen (TYLENOL) 325 mg tablet, Take 650 mg by mouth every 6 (six) hours as needed for mild pain , Disp: , Rfl:     divalproex sodium (DEPAKOTE) 500 mg EC tablet, Take 500 mg by mouth 2 (two) times a day  , Disp: , Rfl:     Melatonin 5 MG TABS, Take by mouth, Disp: , Rfl:     meloxicam (MOBIC) 15 mg tablet, Take 15 mg by mouth daily, Disp: , Rfl: 0    Methylcellulose, Laxative, (CITRUCEL PO), Take by mouth daily as needed, Disp: , Rfl:     Misc Natural Products (T-RELIEF CBD+13 SL), Place under the tongue, Disp: , Rfl:     mometasone (NASONEX) 50 mcg/act nasal spray, 2 sprays into each nostril as needed, Disp: , Rfl:     oxyCODONE (OxyCONTIN) 10 mg 12 hr tablet, Take 1 tablet (10 mg total) by mouth every 8 (eight) hoursMax Daily Amount: 30 mg, Disp: 90 tablet, Rfl: 0    pantoprazole (PROTONIX) 40 mg tablet, Take 1 tablet (40 mg total) by mouth daily, Disp: 30 tablet, Rfl: 1    pentoxifylline (TRENtal) 400 mg ER tablet, Take 400 mg by mouth 3 (three) times a day with meals, Disp: , Rfl:     ranitidine (ZANTAC) 300 MG tablet, Take 300 mg by mouth daily at bedtime, Disp: , Rfl: 5    senna (SENOKOT) 8 6 mg, Take 1-2 tablets orally daily as needed for constipation (Patient taking differently: as needed Take 1-2 tablets orally daily as needed for constipation), Disp: 120 each, Rfl: 0    traZODone (DESYREL) 50 mg tablet, Take 2 tablets (100 mg total) by mouth daily at bedtime, Disp: 60 tablet, Rfl: 0        Kena Etienne MD

## 2019-08-15 NOTE — PROGRESS NOTES
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, incidental finding of prostate cancer status post ileal conduit urinary diversion  His bladder was unresectable  He has undergone radiation therapy to the bladder and that lesion  He is doing well on the Pembromizulab , and his renal function is good  Basically what we can expect is for him to continue with the discharge from the penis which we really cannot do much about  His tumors localized does not appear to be spreading and often that is the way the check point inhibitors work, this can go on for some time  Continue with palliative pain management and we will see what his next scan looks like in terms of the left flank pain-however with his normal creatinine I doubt that he has hydronephrosis  See me in 6 months  Chief Complaint   No chief complaint on file  History of Present Illness   Advanced localized urothelial carcinoma involving the bladder and prostatic urethra with incidental adenocarcinoma the prostate with ileal conduit urinary diversion  Has undergone radiation therapy to his bladder because this was unresectable  He is on Pembromizulab per Dr Peyton Ramos  PSA was 0 3 as of 7/10/2019  Creatinine 1 20  He continues to have left groin and pelvic pain, which is to be expected, and he does get some left flank pain  Last CT scan was May and this showed actually decrease in the size of his left posterior bladder wall lesion  No sign of metastases  He continues to have the discharge with occasional slight amount of blood from his penis    The following portions of the patient's history were reviewed and updated as appropriate: allergies, current medications, past family history, past medical history, past social history, past surgical history and problem list   Past Medical History:   Diagnosis Date    Aneurysm ([address] )     Behind left knee recently diagnosed    Back pain     Ceron disease     Ceron's disease     Bladder cancer ([address] )     BPH (benign prostatic hyperplasia)     Cancer ([address] )     melanoma    Cataract     right eye    Cataract     right eye    Confusion     DDD (degenerative disc disease), lumbar     Depression     Diverticulosis     Gallstones     GERD (gastroesophageal reflux disease)     History of cardiac murmur     Past    History of melanoma     Was on back in early 1990's    History of rheumatic fever     Childhood    History of transfusion     Nov 2018    [facility] (hard of hearing)     Hydronephrosis     Hypertension     Kidney stone     Multiple times    Neck pain     Nervous breakdown     History of due to reaction to psych med which he was on for anxiety/depression and became suicidal    Numbness and tingling in both hands     Numbness and tingling of both feet     PONV (postoperative nausea and vomiting)     Prostate cancer ([address] )     PVD (peripheral vascular disease) ([address] )     RBBB     Scoliosis     Seasonal allergies     Tinnitus     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[address] )     Wears partial dentures     Upper    Wears partial dentures     Upper     Past Surgical History:   Procedure Laterality Date    ABDOMINAL SURGERY      When young had procedure for improviing circulation to left lower extremety    BACK SURGERY      Lumbar- not sure what was done   Asaf Gutierrez CARDIAC CATHETERIZATION      Approximately 2007- no blockages    CARPAL TUNNEL RELEASE Bilateral     wrists are fused    CATARACT EXTRACTION Left     COLONOSCOPY      CYST REMOVAL      Robotic procedure to remove benign cyst from lower left lung    CYSTOGRAM N/A 3/14/2018 Procedure: CYSTOGRAM;  Surgeon: Gordy Hinojosa MD;  [facility];  Service: Urology    CYSTOSCOPY      ESOPHAGOGASTRODUODENOSCOPY      IR TUBE PLACEMENT NEPHROSTOMY  8/10/2018    LUNG SURGERY      cyst removed left lung    OTHER SURGICAL HISTORY Left     fistula drain in left buttock    NV CYSTO/URETERO W/LITHOTRIPSY &INDWELL STENT INSRT Left 1/3/2018    Procedure: CYSTOSCOPY URETEROSCOPY, RETROGRADE PYELOGRAM AND INSERTION STENT URETERAL;  Surgeon: Gordy Hinojosa MD;  [facility];  Service: Urology    NV CYSTOTOMY,EXCIS BLADDER TIC N/A 2/14/2018    Procedure: ABDOMINAL EXPLORATION; CLOSURE OF BLADDER DIVERTICULITIS;  Surgeon: Gordy Hinojosa MD;  [facility];  Service: Urology    NV CYSTOURETHROSCOPY,URETER CATHETER Left 8/1/2018    Procedure: Cystoscopy, cystogram, bladder biopsies, removal of pelvic drain;  Surgeon: Gordy Hinojosa MD;  [facility];  Service: Urology    NV INCISE/DRAIN BLADDER N/A 2/14/2018    Procedure: OPEN SUPRAPUBIC TUBE PLACEMENT;  Surgeon: Gordy Hinojosa MD;  [facility];  Service: Urology    NV RELEASE Faith Cyr FIBROSIS Left 2/14/2018    Procedure: LYSIS OF ADHESIONS; URETEROLYSIS ;  Surgeon: Gordy Hinojosa MD;  [facility];  Service: Urology     [facility] BLADDER/NODES,ILEAL CONDUIT N/A 2/25/2019    Procedure: ATTEMPTED CYSTECTOMY RADICAL; ILEAL CONDUIT URINARY DIVERSION; BIOPSY OF PELVIC MASS; APPENDECTOMY;  Surgeon: Gordy Hinojosa MD;  [facility];  Service: Urology    SKIN CANCER EXCISION      Melanoma removal on back in early 1990's    TOE AMPUTATION Left     All 5 toes left foot were amputated due to poor circulation     TRANSURETHRAL RESECTION OF PROSTATE N/A 3/14/2018    Procedure: TRANSURETHRAL RESECTION OF PROSTATE (TURP), LEFT URETERAL STENT REMOVAL;  Surgeon: Gordy Hinojosa MD;  [facility];  Service: Urology    TRANSURETHRAL RESECTION OF PROSTATE N/A 9/26/2018    Procedure: TRANSURETHRAL RESECTION OF PROSTATE (TURP); Surgeon: Harvey Gregory MD;  [facility];  Service: Urology    WRIST SURGERY Bilateral     Ulnar nerve on right arm and both wrists are fused     shoulder  Review of Systems   Review of Systems   Constitutional: Positive for appetite change  Negative for unexpected weight change  Gastrointestinal: Positive for constipation  Genitourinary: Positive for discharge and flank pain  Active Problem List     Patient Active Problem List   Diagnosis    Right bundle melissa block, 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    Aortic regurgitation    BPH with obstruction/lower urinary tract symptoms    Bladder diverticulum    Postoperative ileus (HCC)    Kidney stone    Major depressive disorder, single episode, moderate (HCC)    Peripheral vascular disease ([facility] )    Essential hypertension    Hydronephrosis of left kidney    Ureteral stricture, left    Pelvic abscess in [facility])    Aneurysm of left popliteal artery (HCC)    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 (HCC)    Orthostatic lightheadedness    Chemotherapy-induced neutropenia (HCC)    CKD (chronic kidney disease) stage 3, GFR 30-59 ml/min (HCC)    Acute on chronic anemia    Moderate protein-calorie malnutrition (HCC)    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[facility] )    Malignant neoplasm of prostate ([facility] )    Cancer related pain    Poor appetite    Neoplastic malignant related fatigue    Constipation due to opioid therapy    Insomnia    Acute encephalopathy    Hypomagnesemia    Medical marijuana use       Objective   /60   Pulse 102   Ht 5' 9" (1 753 m)   Wt 80 3 kg (177 lb)   BMI 26 14 kg/m²     Physical Exam   Constitutional: He is oriented to person, place, and time  He appears well-developed and well-nourished  HENT:   Head: Normocephalic and atraumatic  Eyes: EOM are normal    Neck: Normal range of motion     Cardiovascular:   Slightly tachycardic today Pulmonary/Chest: Effort normal    Abdominal: Soft  He exhibits no distension and no mass  There is no tenderness  There is no rebound and no guarding  No hernia  Stoma is pink healthy and prolapse  No parastomal hernia  Musculoskeletal: Normal range of motion  Neurological: He is alert and oriented to person, place, and time  Skin: Skin is warm and dry  Psychiatric: He has a normal mood and affect   His behavior is normal  Judgment and thought content normal            Current Medications     Current Outpatient Medications:     acetaminophen (TYLENOL) 325 mg tablet, Take 650 mg by mouth every 6 (six) hours as needed for mild pain , Disp: , Rfl:     divalproex sodium (DEPAKOTE) 500 mg EC tablet, Take 500 mg by mouth 2 (two) times a day  , Disp: , Rfl:     Melatonin 5 MG TABS, Take by mouth, Disp: , Rfl:     meloxicam (MOBIC) 15 mg tablet, Take 15 mg by mouth daily, Disp: , Rfl: 0    Methylcellulose, Laxative, (CITRUCEL PO), Take by mouth daily as needed, Disp: , Rfl:     Misc Natural Products (T-RELIEF CBD+13 SL), Place under the tongue, Disp: , Rfl:     mometasone (NASONEX) 50 mcg/act nasal spray, 2 sprays into each nostril as needed, Disp: , Rfl:     oxyCODONE (OxyCONTIN) 10 mg 12 hr tablet, Take 1 tablet (10 mg total) by mouth every 8 (eight) hoursMax Daily Amount: 30 mg, Disp: 90 tablet, Rfl: 0    pantoprazole (PROTONIX) 40 mg tablet, Take 1 tablet (40 mg total) by mouth daily, Disp: 30 tablet, Rfl: 1    pentoxifylline (TRENtal) 400 mg ER tablet, Take 400 mg by mouth 3 (three) times a day with meals, Disp: , Rfl:     ranitidine (ZANTAC) 300 MG tablet, Take 300 mg by mouth daily at bedtime, Disp: , Rfl: 5    senna (SENOKOT) 8 6 mg, Take 1-2 tablets orally daily as needed for constipation (Patient taking differently: as needed Take 1-2 tablets orally daily as needed for constipation), Disp: 120 each, Rfl: 0    traZODone (DESYREL) 50 mg tablet, Take 2 tablets (100 mg total) by mouth daily at bedtime, Disp: 60 tablet, Rfl: 0        Tony Quesada MD

## 2019-08-16 ENCOUNTER — TELEPHONE (OUTPATIENT)
Dept: UROLOGY | Facility: AMBULATORY SURGERY CENTER | Age: 74
End: 2019-08-16

## 2019-08-16 NOTE — TELEPHONE ENCOUNTER
Called Raleigh Bryson to check in on [place]  She stated his bladder pain has returned  Palliative Care is helping him manage his pain  He is not having any problems with his urostomy  He is receiving pembrolizumab every 3 weeks  He has decreased appetite  Encouraged him to eat small frequent meals  She has my direct extension to call if [place] needs anything

## 2019-08-22 ENCOUNTER — APPOINTMENT (OUTPATIENT)
Dept: LAB | Facility: CLINIC | Age: 74
End: 2019-08-22
Payer: MEDICARE

## 2019-08-22 DIAGNOSIS — C68.8 PRIMARY UROTHELIAL CARCINOMA OF OVERLAPPING SITES OF URINARY ORGANS (HCC): ICD-10-CM

## 2019-08-22 LAB
ALBUMIN SERPL BCP-MCNC: 2.7 G/DL (ref 3.5–5.7)
ALP SERPL-CCNC: 58 U/L (ref 46–116)
ALT SERPL W P-5'-P-CCNC: 7 U/L (ref 12–78)
ANION GAP SERPL CALCULATED.3IONS-SCNC: 5 MMOL/L (ref 4–13)
AST SERPL W P-5'-P-CCNC: 20 U/L (ref 5–45)
BASOPHILS # BLD MANUAL: 0 THOUSAND/UL (ref 0–0.1)
BASOPHILS NFR MAR MANUAL: 0 % (ref 0–1)
BILIRUB SERPL-MCNC: 0.4 MG/DL (ref 0.2–1)
BUN SERPL-MCNC: 11 MG/DL (ref 5–25)
CALCIUM SERPL-MCNC: 9.1 MG/DL (ref 8.3–10.1)
CHLORIDE SERPL-SCNC: 104 MMOL/L (ref 98–108)
CO2 SERPL-SCNC: 27 MMOL/L (ref 21–32)
CREAT SERPL-MCNC: 1.2 MG/DL (ref 0.6–1.3)
EOSINOPHIL # BLD MANUAL: 0 THOUSAND/UL (ref 0–0.4)
EOSINOPHIL NFR BLD MANUAL: 0 % (ref 0–6)
ERYTHROCYTE [DISTWIDTH] IN BLOOD BY AUTOMATED COUNT: 15.2 % (ref 11.6–15.1)
GFR SERPL CREATININE-BSD FRML MDRD: 60 ML/MIN/1.73SQ M
GLUCOSE SERPL-MCNC: 98 MG/DL (ref 65–140)
HCT VFR BLD AUTO: 29.9 % (ref 36.5–49.3)
HGB BLD-MCNC: 9.8 G/DL (ref 12–17)
LG PLATELETS BLD QL SMEAR: PRESENT
LYMPHOCYTES # BLD AUTO: 0.47 THOUSAND/UL (ref 0.6–4.47)
LYMPHOCYTES # BLD AUTO: 10 % (ref 14–44)
MCH RBC QN AUTO: 27.3 PG (ref 26.8–34.3)
MCHC RBC AUTO-ENTMCNC: 32.8 G/DL (ref 31.4–37.4)
MCV RBC AUTO: 83 FL (ref 82–98)
MONOCYTES # BLD AUTO: 0.38 THOUSAND/UL (ref 0–1.22)
MONOCYTES NFR BLD: 8 % (ref 4–12)
NEUTROPHILS # BLD MANUAL: 3.89 THOUSAND/UL (ref 1.85–7.62)
NEUTS BAND NFR BLD MANUAL: 4 % (ref 0–8)
NEUTS SEG NFR BLD AUTO: 78 % (ref 43–75)
OVALOCYTES BLD QL SMEAR: PRESENT
PLATELET # BLD AUTO: 443 THOUSANDS/UL (ref 149–390)
PLATELET BLD QL SMEAR: ABNORMAL
PMV BLD AUTO: 7.7 FL (ref 8.9–12.7)
POTASSIUM SERPL-SCNC: 4.4 MMOL/L (ref 3.5–5.3)
PROT SERPL-MCNC: 6.7 G/DL (ref 6.4–8.2)
RBC # BLD AUTO: 3.59 MILLION/UL (ref 3.88–5.62)
SODIUM SERPL-SCNC: 136 MMOL/L (ref 136–145)
TOTAL CELLS COUNTED SPEC: 100
WBC # BLD AUTO: 4.74 THOUSAND/UL (ref 4.31–10.16)

## 2019-08-22 PROCEDURE — 85027 COMPLETE CBC AUTOMATED: CPT

## 2019-08-22 PROCEDURE — 80053 COMPREHEN METABOLIC PANEL: CPT

## 2019-08-22 PROCEDURE — 85007 BL SMEAR W/DIFF WBC COUNT: CPT

## 2019-08-22 PROCEDURE — 36415 COLL VENOUS BLD VENIPUNCTURE: CPT

## 2019-08-23 ENCOUNTER — OFFICE VISIT (OUTPATIENT)
Dept: PALLIATIVE MEDICINE | Facility: CLINIC | Age: 74
End: 2019-08-23
Payer: MEDICARE

## 2019-08-23 ENCOUNTER — HOSPITAL ENCOUNTER (OUTPATIENT)
Dept: INFUSION CENTER | Facility: CLINIC | Age: 74
Discharge: HOME | End: 2019-08-23
Payer: MEDICARE

## 2019-08-23 VITALS
WEIGHT: 178.4 LBS | RESPIRATION RATE: 20 BRPM | OXYGEN SATURATION: 97 % | TEMPERATURE: 98.9 F | DIASTOLIC BLOOD PRESSURE: 60 MMHG | HEART RATE: 93 BPM | BODY MASS INDEX: 26.35 KG/M2 | SYSTOLIC BLOOD PRESSURE: 110 MMHG

## 2019-08-23 VITALS
WEIGHT: 178.57 LBS | DIASTOLIC BLOOD PRESSURE: 80 MMHG | BODY MASS INDEX: 26.37 KG/M2 | HEART RATE: 91 BPM | RESPIRATION RATE: 18 BRPM | TEMPERATURE: 97.8 F | SYSTOLIC BLOOD PRESSURE: 126 MMHG

## 2019-08-23 DIAGNOSIS — G89.3 CANCER RELATED PAIN: ICD-10-CM

## 2019-08-23 DIAGNOSIS — C68.8 PRIMARY UROTHELIAL CARCINOMA OF OVERLAPPING SITES OF URINARY ORGANS (HCC): Primary | ICD-10-CM

## 2019-08-23 DIAGNOSIS — F51.01 PRIMARY INSOMNIA: ICD-10-CM

## 2019-08-23 PROCEDURE — 99214 OFFICE O/P EST MOD 30 MIN: CPT | Performed by: FAMILY MEDICINE

## 2019-08-23 PROCEDURE — 96413 CHEMO IV INFUSION 1 HR: CPT

## 2019-08-23 RX ORDER — OXYCODONE HCL 10 MG/1
10 TABLET, FILM COATED, EXTENDED RELEASE ORAL EVERY 8 HOURS SCHEDULED
Qty: 90 TABLET | Refills: 0 | Status: SHIPPED | OUTPATIENT
Start: 2019-08-23

## 2019-08-23 RX ORDER — TRAZODONE HYDROCHLORIDE 50 MG/1
100 TABLET ORAL
Qty: 60 TABLET | Refills: 0 | Status: SHIPPED | OUTPATIENT
Start: 2019-08-23 | End: 2019-09-24 | Stop reason: SDUPTHER

## 2019-08-23 RX ORDER — SODIUM CHLORIDE 9 MG/ML
20 INJECTION, SOLUTION INTRAVENOUS ONCE
Status: COMPLETED | OUTPATIENT
Start: 2019-08-23 | End: 2019-08-23

## 2019-08-23 RX ADMIN — SODIUM CHLORIDE 200 MG: 9 INJECTION, SOLUTION INTRAVENOUS at 10:36

## 2019-08-23 RX ADMIN — SODIUM CHLORIDE 20 ML/HR: 0.9 INJECTION, SOLUTION INTRAVENOUS at 10:18

## 2019-08-23 NOTE — PROGRESS NOTES
Palliative and Supportive Care   Arely Nieto 68 y o  male [id number]    Assessment/Plan:  1  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 ([facility] )    2  Cancer related pain   -Recommended to start taking oxycodone ER 10mg three times daily instead of every 8 hours as patient was ultimately only taking the regimen twice daily  -will recheck pain control at next visit with TID regimen  3  Primary insomnia          Requested Prescriptions     Signed Prescriptions Disp Refills    traZODone (DESYREL) 50 mg tablet 60 tablet 0     Sig: Take 2 tablets (100 mg total) by mouth daily at bedtime    oxyCODONE (OxyCONTIN) 10 mg 12 hr tablet 90 tablet 0     Sig: Take 1 tablet (10 mg total) by mouth every 8 (eight) hoursMax Daily Amount: 30 mg     Medications Discontinued During This Encounter   Medication Reason    traZODone (DESYREL) 50 mg tablet Reorder    oxyCODONE (OxyCONTIN) 10 mg 12 hr tablet Reorder       Representatives have queried the patient's controlled substance dispensing history in the Prescription Drug Monitoring Program in compliance with regulations before I have prescribed any controlled substances  The prescription history is consistent with prescribed therapy and our practice policies  Return in about 4 weeks (around 9/20/2019) for Recheck, follow up of pain control, with Dr Elise Monae, symptom assessment and management  Subjective:   Chief Complaint  Follow up visit for:  symptom management, pain, neoplasm related  HPI     Cherelle Botello a 68 y  o  male with history of prostate cancer diagnosed in March 2018, s/p 3 cycles of chemotherapy, unable to tolerate side effects  S/p radical cystoprostatectomy and ileal conduit urinary diversion in February 2019  He follows with Dr Jorje Lake of Medical Oncology  His current chemotherapy regimen includes Keytruda [id number]  Arely Nieto follows with [facility] for symptom and pain control   His current regimen includes 10mg Oxycodone ER [id number]  At previous visit, MMJ certification was completed  Today, she reports that he continues to use his oxycodone with appropriate pain control, but states that he has worse pains in the morning  Upon further review of oxycodone regimen, it appears as though patient has been using oxycodone twice daily instead of three times daily which may be a component of his uncontrolled pain symptoms  He is working with his MMJ to find a palatable regimen to assist in pain control  He states that most of the time he has continued groin/pelvic pain which has been limiting his activities and interactions  The following portions of the medical history were reviewed: past medical history, problem list, medication list, and social history      Current Outpatient Medications:     acetaminophen (TYLENOL) 325 mg tablet, Take 650 mg by mouth every 6 (six) hours as needed for mild pain , Disp: , Rfl:     divalproex sodium (DEPAKOTE) 500 mg EC tablet, Take 500 mg by mouth 2 (two) times a day  , Disp: , Rfl:     Melatonin 5 MG TABS, Take by mouth, Disp: , Rfl:     meloxicam (MOBIC) 15 mg tablet, Take 15 mg by mouth daily, Disp: , Rfl: 0    Methylcellulose, Laxative, (CITRUCEL PO), Take by mouth daily as needed, Disp: , Rfl:     Misc Natural Products (T-RELIEF CBD+13 SL), Place under the tongue, Disp: , Rfl:     mometasone (NASONEX) 50 mcg/act nasal spray, 2 sprays into each nostril as needed, Disp: , Rfl:     oxyCODONE (OxyCONTIN) 10 mg 12 hr tablet, Take 1 tablet (10 mg total) by mouth every 8 (eight) hoursMax Daily Amount: 30 mg, Disp: 90 tablet, Rfl: 0    pantoprazole (PROTONIX) 40 mg tablet, Take 1 tablet (40 mg total) by mouth daily, Disp: 30 tablet, Rfl: 1    pentoxifylline (TRENtal) 400 mg ER tablet, Take 400 mg by mouth 3 (three) times a day with meals, Disp: , Rfl:     ranitidine (ZANTAC) 300 MG tablet, Take 300 mg by mouth daily at bedtime, Disp: , Rfl: 5    senna (SENOKOT) 8 6 mg, Take 1-2 tablets orally daily as needed for constipation (Patient taking differently: as needed Take 1-2 tablets orally daily as needed for constipation), Disp: 120 each, Rfl: 0    traZODone (DESYREL) 50 mg tablet, Take 2 tablets (100 mg total) by mouth daily at bedtime, Disp: 60 tablet, Rfl: 0  No current facility-administered medications for this visit  Review of Systems    All other systems negative    Objective:  Vital Signs  /60 (BP Location: Right arm, Patient Position: Sitting, Cuff Size: Standard)   Pulse 93   Temp 98 9 °F (37 2 °C) (Oral)   Resp 20   Wt 80 9 kg (178 lb 6 4 oz)   SpO2 97%   BMI 26 35 kg/m²    Physical Exam    Constitutional: Appears well-developed and well-nourished  In no acute physical or emotional distress  Head: Normocephalic and atraumatic  Eyes: EOM are normal  No ocular discharge  No scleral icterus  Neck: No visible adenopathy or masses  Respiratory: Effort normal  No stridor  No respiratory distress  Gastrointestinal: No abdominal distension  Musculoskeletal: No edema  Neurological: Alert, oriented and appropriately conversant  Skin: No diaphoresis, no rashes seen on exposed areas of skin  Psychiatric: Displays a normal mood and affect  Behavior, judgement and thought content appear normal      Leonard Butts MD  [address] and Supportive Care

## 2019-08-23 NOTE — PROGRESS NOTES
Patient arrived on unit for Keytruda  Denies any discomforts  Cleared for treatment today  Tolerated treatment  Aware of next appointment   Declined AVS

## 2019-09-03 ENCOUNTER — OFFICE VISIT (OUTPATIENT)
Dept: UROLOGY | Facility: MEDICAL CENTER | Age: 74
End: 2019-09-03
Payer: MEDICARE

## 2019-09-03 ENCOUNTER — TELEPHONE (OUTPATIENT)
Dept: UROLOGY | Facility: MEDICAL CENTER | Age: 74
End: 2019-09-03

## 2019-09-03 VITALS
BODY MASS INDEX: 26.66 KG/M2 | DIASTOLIC BLOOD PRESSURE: 60 MMHG | HEART RATE: 76 BPM | HEIGHT: 69 IN | SYSTOLIC BLOOD PRESSURE: 120 MMHG | WEIGHT: 180 LBS

## 2019-09-03 DIAGNOSIS — R31.0 GROSS HEMATURIA: ICD-10-CM

## 2019-09-03 DIAGNOSIS — C68.8 PRIMARY UROTHELIAL CARCINOMA OF OVERLAPPING SITES OF URINARY ORGANS (HCC): Primary | ICD-10-CM

## 2019-09-03 PROCEDURE — 99213 OFFICE O/P EST LOW 20 MIN: CPT | Performed by: UROLOGY

## 2019-09-03 PROCEDURE — 1123F ACP DISCUSS/DSCN MKR DOCD: CPT | Performed by: UROLOGY

## 2019-09-03 RX ORDER — CHLORAL HYDRATE 500 MG
CAPSULE ORAL
COMMUNITY
End: 2019-10-17 | Stop reason: HOSPADM

## 2019-09-03 RX ORDER — ERGOCALCIFEROL (VITAMIN D2) 10 MCG
TABLET ORAL
COMMUNITY
End: 2019-10-17 | Stop reason: HOSPADM

## 2019-09-03 RX ORDER — MAGNESIUM HYDROXIDE 400 MG/5ML
SUSPENSION, ORAL (FINAL DOSE FORM) ORAL
COMMUNITY
End: 2019-10-17 | Stop reason: HOSPADM

## 2019-09-03 RX ORDER — MULTIVIT WITH MINERALS/LUTEIN
TABLET ORAL
COMMUNITY
End: 2019-10-17 | Stop reason: HOSPADM

## 2019-09-03 NOTE — TELEPHONE ENCOUNTER
Patient of Dr Lawanda Pizarro  Patient is still having drainage in his penis with dark red blood clots and weaker  Patient wife requesting appointment for today

## 2019-09-03 NOTE — PROGRESS NOTES
[address] [facility]  [facility]  [address] [facility] [place]  [place], [address] [facility]  [phone]  www  [facility]  org      NAME: Michelle Worthington  AGE: 68 y o  SEX: male  : 1945   MRN: [id number]    DATE: 9/3/2019  TIME: 3:51 PM    Assessment and Plan:    Transitional cell carcinoma, stage IV with prostatic urethral involvement and left posterior bladder wall-status post diverting ileal conduit but bladder had to be left in place due to non resectable lesion  He had radiation after that  He continues to have some discharge from the penis which is normal probably due to necrotic tumor as he is undergoing active treatment and he is also on [place]   He cannot stop the blood thinners due to his history of Buerger disease  We will obtain consultation with interventional Radiology because of the persistent urethral bleeding which I believe is coming from the left posterior wall the bladder where the nonresectable tumor was  I will ask them to consider embolization of the arteries to the tumor and the posterior left bladder wall  Chief Complaint   No chief complaint on file  History of Present Illness   Is having increased gross blood per urethra- before it was just pink, now is like a menstrual period  The last major epiosde was this AM, and he was having some before his blood draw , which showed basically a stable hemoglobin  It comes and goes        The following portions of the patient's history were reviewed and updated as appropriate: allergies, current medications, past family history, past medical history, past social history, past surgical history and problem list   Past Medical History:   Diagnosis Date    Aneurysm ([address] )     Behind left knee recently diagnosed    Back pain     Ceron disease     Ceron's disease     Bladder cancer ([address] )     BPH (benign prostatic hyperplasia)     Cancer ([address] )     melanoma    Cataract     right eye    Cataract     right eye    Confusion     DDD (degenerative disc disease), lumbar     Depression     Diverticulosis     Gallstones     GERD (gastroesophageal reflux disease)     History of cardiac murmur     Past    History of melanoma     Was on back in early 1990's    History of rheumatic fever     Childhood    History of transfusion     Nov 2018    [facility] (hard of hearing)     Hydronephrosis     Hypertension     Kidney stone     Multiple times    Neck pain     Nervous breakdown     History of due to reaction to psych med which he was on for anxiety/depression and became suicidal    Numbness and tingling in both hands     Numbness and tingling of both feet     PONV (postoperative nausea and vomiting)     Prostate cancer ([address] )     PVD (peripheral vascular disease) ([address] )     RBBB     Scoliosis     Seasonal allergies     Tinnitus     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[address] )     Wears partial dentures     Upper    Wears partial dentures     Upper     Past Surgical History:   Procedure Laterality Date    ABDOMINAL SURGERY      When young had procedure for improviing circulation to left lower extremety    BACK SURGERY      Lumbar- not sure what was done   Bri Hyde CARDIAC CATHETERIZATION      Approximately 2007- no blockages    CARPAL TUNNEL RELEASE Bilateral     wrists are fused    CATARACT EXTRACTION Left     COLONOSCOPY      CYST REMOVAL      Robotic procedure to remove benign cyst from lower left lung    CYSTOGRAM N/A 3/14/2018    Procedure: CYSTOGRAM;  Surgeon: Hilaria Pisano MD;  [facility];  Service: Urology    CYSTOSCOPY      ESOPHAGOGASTRODUODENOSCOPY      IR TUBE PLACEMENT NEPHROSTOMY  8/10/2018    LUNG SURGERY      cyst removed left lung    OTHER SURGICAL HISTORY Left     fistula drain in left buttock    WI CYSTO/URETERO W/LITHOTRIPSY &INDWELL STENT INSRT Left 1/3/2018    Procedure: CYSTOSCOPY URETEROSCOPY, RETROGRADE PYELOGRAM AND INSERTION STENT URETERAL;  Surgeon: Hilaria Pisano MD; [facility];  Service: Urology    ID CYSTOTOMY,EXCIS BLADDER TIC N/A 2/14/2018    Procedure: ABDOMINAL EXPLORATION; CLOSURE OF BLADDER DIVERTICULITIS;  Surgeon: Dustin Martinez MD;  [facility];  Service: Urology    ID CYSTOURETHROSCOPY,URETER CATHETER Left 8/1/2018    Procedure: Cystoscopy, cystogram, bladder biopsies, removal of pelvic drain;  Surgeon: Dustin Martinez MD;  [facility];  Service: Urology    ID INCISE/DRAIN BLADDER N/A 2/14/2018    Procedure: OPEN SUPRAPUBIC TUBE PLACEMENT;  Surgeon: Dustin Martinez MD;  [facility];  Service: Urology    ID RELEASE  [place] FIBROSIS Left 2/14/2018    Procedure: LYSIS OF ADHESIONS; URETEROLYSIS ;  Surgeon: Dustin Martinez MD;  [facility];  Service: Urology     [facility] BLADDER/NODES,ILEAL CONDUIT N/A 2/25/2019    Procedure: ATTEMPTED CYSTECTOMY RADICAL; ILEAL CONDUIT URINARY DIVERSION; BIOPSY OF PELVIC MASS; APPENDECTOMY;  Surgeon: Dustin Martinez MD;  [facility];  Service: Urology    SKIN CANCER EXCISION      Melanoma removal on back in early 1990's    TOE AMPUTATION Left     All 5 toes left foot were amputated due to poor circulation     TRANSURETHRAL RESECTION OF PROSTATE N/A 3/14/2018    Procedure: TRANSURETHRAL RESECTION OF PROSTATE (TURP), LEFT URETERAL STENT REMOVAL;  Surgeon: Dustin Martinez MD;  [facility];  Service: Urology    TRANSURETHRAL RESECTION OF PROSTATE N/A 9/26/2018    Procedure: TRANSURETHRAL RESECTION OF PROSTATE (TURP);   Surgeon: Dustin Martinez MD;  [facility];  Service: Urology    WRIST SURGERY Bilateral     Ulnar nerve on right arm and both wrists are fused     shoulder  Review of Systems   Review of Systems    Active Problem List     Patient Active Problem List   Diagnosis    Right bundle melissa block, 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    Aortic regurgitation    BPH with obstruction/lower urinary tract symptoms    Bladder diverticulum    Postoperative ileus (HCC)    Kidney stone    Major depressive disorder, single episode, moderate (HCC)    Peripheral vascular disease (HCC)    Essential hypertension    Hydronephrosis of left kidney    Ureteral stricture, left    Pelvic abscess in male [facility])    Aneurysm of left popliteal artery (HCC)    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 (HCC)    Orthostatic lightheadedness    Chemotherapy-induced neutropenia (HCC)    CKD (chronic kidney disease) stage 3, GFR 30-59 ml/min (HCC)    Acute on chronic anemia    Moderate protein-calorie malnutrition (HCC)    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[facility]    Malignant neoplasm of prostate ([facility] )    Cancer related pain    Poor appetite    Neoplastic malignant related fatigue    Constipation due to opioid therapy    Insomnia    Acute encephalopathy    Hypomagnesemia    Medical marijuana use       Objective   /60   Pulse 76   Ht 5' 9" (1 753 m)   Wt 81 6 kg (180 lb)   BMI 26 58 kg/m²     Physical Exam   Constitutional: He is oriented to person, place, and time  He appears well-developed and well-nourished  HENT:   Head: Normocephalic and atraumatic  Eyes: EOM are normal    Neck: Normal range of motion  Pulmonary/Chest: Effort normal    Musculoskeletal: Normal range of motion  Neurological: He is alert and oriented to person, place, and time  Skin: Skin is warm and dry  Psychiatric: He has a normal mood and affect   His behavior is normal  Judgment and thought content normal            Current Medications     Current Outpatient Medications:     acetaminophen (TYLENOL) 325 mg tablet, Take 650 mg by mouth every 6 (six) hours as needed for mild pain , Disp: , Rfl:     Glucosamine-Chondroit-Vit C-Mn (GLUCOSAMINE CHONDR 500 COMPLEX) CAPS, 2x daily, Disp: , Rfl:     Melatonin 5 MG TABS, Take by mouth, Disp: , Rfl:     meloxicam (MOBIC) 15 mg tablet, Take 15 mg by mouth daily, Disp: , Rfl: 0   Methylcellulose, Laxative, (CITRUCEL PO), Take by mouth daily as needed, Disp: , Rfl:     Misc Natural Products (T-RELIEF CBD+13 SL), Place under the tongue, Disp: , Rfl:     mometasone (NASONEX) 50 mcg/act nasal spray, 2 sprays into each nostril as needed, Disp: , Rfl:     Omega-3 1000 MG CAPS, 1 tablet daily, Disp: , Rfl:     oxyCODONE (OxyCONTIN) 10 mg 12 hr tablet, Take 1 tablet (10 mg total) by mouth every 8 (eight) hoursMax Daily Amount: 30 mg, Disp: 90 tablet, Rfl: 0    pantoprazole (PROTONIX) 40 mg tablet, Take 1 tablet (40 mg total) by mouth daily, Disp: 30 tablet, Rfl: 1    pentoxifylline (TRENtal) 400 mg ER tablet, Take 400 mg by mouth 3 (three) times a day with meals, Disp: , Rfl:     ranitidine (ZANTAC) 300 MG tablet, Take 300 mg by mouth daily at bedtime, Disp: , Rfl: 5    senna (SENOKOT) 8 6 mg, Take 1-2 tablets orally daily as needed for constipation (Patient taking differently: as needed Take 1-2 tablets orally daily as needed for constipation), Disp: 120 each, Rfl: 0    traZODone (DESYREL) 50 mg tablet, Take 2 tablets (100 mg total) by mouth daily at bedtime, Disp: 60 tablet, Rfl: 0    Vitamin D, Cholecalciferol, 400 units TABS, 1 tablet daily, Disp: , Rfl:     vitamin E, tocopherol, 1,000 units capsule, 1 tablet daily, Disp: , Rfl:         Robert García MD

## 2019-09-05 DIAGNOSIS — C68.9 UROTHELIAL CARCINOMA (HCC): Primary | ICD-10-CM

## 2019-09-05 DIAGNOSIS — R31.0 GROSS HEMATURIA: ICD-10-CM

## 2019-09-11 ENCOUNTER — APPOINTMENT (OUTPATIENT)
Dept: LAB | Facility: CLINIC | Age: 74
End: 2019-09-11
Payer: MEDICARE

## 2019-09-11 DIAGNOSIS — C68.8 PRIMARY UROTHELIAL CARCINOMA OF OVERLAPPING SITES OF URINARY ORGANS (HCC): ICD-10-CM

## 2019-09-11 LAB
ALBUMIN SERPL BCP-MCNC: 2.7 G/DL (ref 3.5–5.7)
ALP SERPL-CCNC: 62 U/L (ref 46–116)
ALT SERPL W P-5'-P-CCNC: 6 U/L (ref 12–78)
ANION GAP SERPL CALCULATED.3IONS-SCNC: 2 MMOL/L (ref 4–13)
AST SERPL W P-5'-P-CCNC: 18 U/L (ref 5–45)
BASOPHILS # BLD AUTO: 0.01 THOUSANDS/ΜL (ref 0–0.1)
BASOPHILS NFR BLD AUTO: 0 % (ref 0–1)
BILIRUB SERPL-MCNC: 0.4 MG/DL (ref 0.2–1)
BUN SERPL-MCNC: 11 MG/DL (ref 5–25)
CALCIUM SERPL-MCNC: 8.7 MG/DL (ref 8.3–10.1)
CHLORIDE SERPL-SCNC: 107 MMOL/L (ref 98–108)
CO2 SERPL-SCNC: 25 MMOL/L (ref 21–32)
CREAT SERPL-MCNC: 1.2 MG/DL (ref 0.6–1.3)
EOSINOPHIL # BLD AUTO: 0.06 THOUSAND/ΜL (ref 0–0.61)
EOSINOPHIL NFR BLD AUTO: 1 % (ref 0–6)
ERYTHROCYTE [DISTWIDTH] IN BLOOD BY AUTOMATED COUNT: 15.6 % (ref 11.6–15.1)
GFR SERPL CREATININE-BSD FRML MDRD: 60 ML/MIN/1.73SQ M
GLUCOSE SERPL-MCNC: 128 MG/DL (ref 65–140)
HCT VFR BLD AUTO: 31.5 % (ref 36.5–49.3)
HGB BLD-MCNC: 10.2 G/DL (ref 12–17)
LYMPHOCYTES # BLD AUTO: 0.55 THOUSANDS/ΜL (ref 0.6–4.47)
LYMPHOCYTES NFR BLD AUTO: 13 % (ref 14–44)
MCH RBC QN AUTO: 26.8 PG (ref 26.8–34.3)
MCHC RBC AUTO-ENTMCNC: 32.4 G/DL (ref 31.4–37.4)
MCV RBC AUTO: 83 FL (ref 82–98)
MONOCYTES # BLD AUTO: 0.4 THOUSAND/ΜL (ref 0.17–1.22)
MONOCYTES NFR BLD AUTO: 9 % (ref 4–12)
NEUTROPHILS # BLD AUTO: 3.29 THOUSANDS/ΜL (ref 1.85–7.62)
NEUTS SEG NFR BLD AUTO: 77 % (ref 43–75)
PLATELET # BLD AUTO: 449 THOUSANDS/UL (ref 149–390)
PMV BLD AUTO: 7.8 FL (ref 8.9–12.7)
POTASSIUM SERPL-SCNC: 3.8 MMOL/L (ref 3.5–5.3)
PROT SERPL-MCNC: 6.7 G/DL (ref 6.4–8.2)
RBC # BLD AUTO: 3.81 MILLION/UL (ref 3.88–5.62)
SODIUM SERPL-SCNC: 134 MMOL/L (ref 136–145)
TSH SERPL DL<=0.05 MIU/L-ACNC: 0.97 UIU/ML (ref 0.36–3.74)
WBC # BLD AUTO: 4.31 THOUSAND/UL (ref 4.31–10.16)

## 2019-09-11 PROCEDURE — 84443 ASSAY THYROID STIM HORMONE: CPT

## 2019-09-11 PROCEDURE — 36415 COLL VENOUS BLD VENIPUNCTURE: CPT

## 2019-09-11 PROCEDURE — 80053 COMPREHEN METABOLIC PANEL: CPT

## 2019-09-11 PROCEDURE — 85025 COMPLETE CBC W/AUTO DIFF WBC: CPT

## 2019-09-12 ENCOUNTER — OFFICE VISIT (OUTPATIENT)
Dept: HEMATOLOGY ONCOLOGY | Facility: CLINIC | Age: 74
End: 2019-09-12
Payer: MEDICARE

## 2019-09-12 ENCOUNTER — HOSPITAL ENCOUNTER (OUTPATIENT)
Dept: INFUSION CENTER | Facility: CLINIC | Age: 74
Discharge: HOME | End: 2019-09-12
Payer: MEDICARE

## 2019-09-12 VITALS
DIASTOLIC BLOOD PRESSURE: 62 MMHG | SYSTOLIC BLOOD PRESSURE: 132 MMHG | HEART RATE: 79 BPM | OXYGEN SATURATION: 93 % | WEIGHT: 177 LBS | TEMPERATURE: 98.5 F | HEIGHT: 69 IN | RESPIRATION RATE: 18 BRPM | BODY MASS INDEX: 26.22 KG/M2

## 2019-09-12 VITALS
RESPIRATION RATE: 16 BRPM | SYSTOLIC BLOOD PRESSURE: 107 MMHG | HEART RATE: 88 BPM | DIASTOLIC BLOOD PRESSURE: 67 MMHG | TEMPERATURE: 98.3 F

## 2019-09-12 DIAGNOSIS — C68.8 PRIMARY UROTHELIAL CARCINOMA OF OVERLAPPING SITES OF URINARY ORGANS (HCC): Primary | ICD-10-CM

## 2019-09-12 PROCEDURE — 99214 OFFICE O/P EST MOD 30 MIN: CPT | Performed by: INTERNAL MEDICINE

## 2019-09-12 PROCEDURE — 96413 CHEMO IV INFUSION 1 HR: CPT

## 2019-09-12 RX ORDER — SODIUM CHLORIDE 9 MG/ML
20 INJECTION, SOLUTION INTRAVENOUS ONCE
Status: COMPLETED | OUTPATIENT
Start: 2019-09-12 | End: 2019-09-12

## 2019-09-12 RX ORDER — SODIUM CHLORIDE 9 MG/ML
20 INJECTION, SOLUTION INTRAVENOUS ONCE
Status: CANCELLED | OUTPATIENT
Start: 2019-11-14

## 2019-09-12 RX ORDER — SODIUM CHLORIDE 9 MG/ML
20 INJECTION, SOLUTION INTRAVENOUS ONCE
Status: CANCELLED | OUTPATIENT
Start: 2019-10-24

## 2019-09-12 RX ADMIN — SODIUM CHLORIDE 200 MG: 9 INJECTION, SOLUTION INTRAVENOUS at 12:36

## 2019-09-12 RX ADMIN — SODIUM CHLORIDE 20 ML/HR: 9 INJECTION, SOLUTION INTRAVENOUS at 12:20

## 2019-09-12 NOTE — PROGRESS NOTES
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   He underwent 3 cycle of neoadjuvant chemotherapy with cisplatin and gemcitabine with significant toxicity   Subsequently, he attempted to have surgical resection which was not possible due to the extent of disease   He ended up having diverting urostomy, followed by palliative radiation therapy to the bladder  He is currently on pembrolizumab  He is going to have 4th cycle of pembrolizmab today  So far, he has no significant toxicity  I recommended him to continue with pembrolizmab every 3 weeks  In mid October 2019, I am going to schedule CT scan of chest abdomen pelvis without IV contrast   I will see her him again after the CT scan to discuss results and make further recommendations    He is in agreement with my recommendation                                                                                                       Subjective:      HPI:  A 72-year-old gentleman who has history of Buerger disease, 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32  [facility] was briefly a smoker until 25years old  [facility] was 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, when he underwent lung cyst surgery   Subsequently, he had difficulty of urination, for which he has been under the care of Dr Sheppard Adjutant had multiple procedure including left ureter placement for hydronephrosis as well as prostate biopsy in March 2018 which showed small amount of adenocarcinoma with [place] score 7   He has not had any treatment for his prostate cancer   He underwent cystoscopy and biopsy of bladder   Bladder biopsy was negative for malignancy   However, repeated prostate biopsy showed high-grade urothelial carcinoma with sarcomatoid feature   Therefore, he was referred to me to discuss systemic therapy  Mike Ramirez continued to have some discomfort in the pelvis   He does not see any gross hematuria   He has mild exertional shortness of breath   His weight has been stable   He denied fever, chills or night sweats   His recent creatinine was 1  23   He underwent PET-CT scan which showed highly hypermetabolic prostate with SUV 41 3   There was the rim hypermetabolism in the pelvis which was read as prior abscess  Margaretrajat Arroyo is no evidence of distant metastasis based on the PET-CT scan   His performance status is probably 1/4 on the ECOG scale            Interval History:  A 24-year-old gentleman who was diagnosed in limited volume of prostate cancer with [place] score 7 in March 2018  Mike Ramirez has not had any treatment for this  Mike Ramirez was then diagnosed with high-grade urothelial carcinoma with sarcomatoid feature, based on the prostate biopsy    This was T4 disease with prostate invasion   Therefore, he started neoadjuvant chemotherapy with cisplatin and gemcitabine   He has significant toxicity with neoadjuvant chemotherapy   However, he was able to complete 3 cycle    PET-CT scan after the neoadjuvant chemotherapy showed persistent hypermetabolism in the bladder, suggesting significant residual disease   He attempted to have resection   However, due to the extensive disease, resection could not be possible   He had diverting urostomy placed   He subsequently underwent palliative radiation therapy to the pelvis  Since early July 2019, he started pembrolizumab  He is going to have 4th cycle treatment, today  He does not appear to have any side effects from pembrolizmab  His TSH is normal   However, he has mild lightheadedness  He has chronic anorexia  He has 3 lb weight loss since his last visit  His pain is well controlled    He had brief period of hematuria which has resolved  He has no complaint of shortness of breath  His performance status is 1 to 2/4 on the ECOG scale           Objective:      Primary Diagnosis:     1    High-grade urothelial carcinoma with sarcomatoid feature, T4 disease is prostate invasion   Diagnosed in August 2018  2    Localized prostate cancer with [place] score 7, diagnosed in March 2018      Cancer Staging:  Cancer Staging  No matching staging information was found for the patient         Previous Hematologic/ Oncologic Treatment:       1  Neoadjuvant chemotherapy with cisplatin and gemcitabine x3 cycle   Completed in December 2018  2  Palliative radiation therapy, completed in June 2018       Current Hematologic/ Oncologic Treatment:       Pembrolizumab, 4th cycle to be given today      Disease Status:      Not evaluated at this time on pembrolizumab      Test Results:     Pathology:     Prostate biopsy in March 2018 showed adenocarcinoma, [place] score 7      Repeated prostate biopsy in August 1, 2018 showed high-grade urothelial carcinoma with sarcomatoid feature      Re-biopsy in October 2018 showed high-grade urothelial carcinoma in the prostate gland as well as prostatic urethra      Radiology:     PET-CT scan in January 2019 showed persistent hypermetabolic pelvic mass and prostate invasion   No evidence of distant metastasis      Laboratory:     See below      Physical Exam:        General Appearance:    Alert, oriented          Eyes:    PERRL   Ears:    Normal external ear canals, both ears   Nose:   Nares normal, septum midline   Throat:   Mucosa moist  Pharynx without injection      Neck:   Supple         Lungs:     Clear to auscultation bilaterally   Chest Wall:    No tenderness or deformity    Heart:    Regular rate and rhythm         Abdomen:     Soft, non-tender, bowel sounds +, no organomegaly               Extremities:   Extremities no cyanosis mild bilateral lower extremity edema        Skin:   no rash or icterus  Lymph nodes:   Cervical, supraclavicular, and axillary nodes normal   Neurologic:   CNII-XII intact, normal strength, sensation and reflexes     Throughout             Breast exam:   Not applicable  ROS: Review of Systems   Constitutional:        Chronic fatigue and anorexia  All other systems reviewed and are negative  Imaging: No results found  Labs:   Lab Results   Component Value Date    WBC 4 31 09/11/2019    HGB 10 2 (L) 09/11/2019    HCT 31 5 (L) 09/11/2019    MCV 83 09/11/2019     (H) 09/11/2019     Lab Results   Component Value Date    K 3 8 09/11/2019     09/11/2019    CO2 25 09/11/2019    BUN 11 09/11/2019    CREATININE 1 20 09/11/2019    GLUF 82 11/19/2018    CALCIUM 8 7 09/11/2019    AST 18 09/11/2019    ALT 6 (L) 09/11/2019    ALKPHOS 62 09/11/2019    EGFR 60 09/11/2019         Lab Results   Component Value Date    PSA 0 3 07/10/2019    PSA 0 5 06/22/2018         Lab Results   Component Value Date    IRON 38 (L) 11/28/2018    TIBC 192 (L) 11/28/2018    FERRITIN 534 (H) 11/28/2018       Lab Results   Component Value Date    NRCTKCVL96 442 11/28/2018       Lab Results   Component Value Date    FOLATE 6 3 11/28/2018         Current Medications: Reviewed  Allergies: Reviewed  PMH/FH/SH:  Reviewed      Vital Sign:    Body surface area is 1 96 meters squared      Wt Readings from Last 3 Encounters:   09/12/19 80 3 kg (177 lb)   09/03/19 81 6 kg (180 lb)   08/23/19 81 kg (178 lb 9 2 oz)        Temp Readings from Last 3 Encounters:   09/12/19 98 5 °F (36 9 °C) (Tympanic Core)   08/23/19 97 8 °F (36 6 °C) (Tympanic)   08/23/19 98 9 °F (37 2 °C) (Oral)        BP Readings from Last 3 Encounters:   09/12/19 132/62   09/03/19 120/60   08/23/19 126/80         Pulse Readings from Last 3 Encounters:   09/12/19 79   09/03/19 76   08/23/19 91     @LASTSAO2(3)@

## 2019-09-24 DIAGNOSIS — F51.01 PRIMARY INSOMNIA: ICD-10-CM

## 2019-09-25 RX ORDER — TRAZODONE HYDROCHLORIDE 50 MG/1
100 TABLET ORAL
Qty: 60 TABLET | Refills: 0 | Status: SHIPPED | OUTPATIENT
Start: 2019-09-25

## 2019-09-27 ENCOUNTER — TELEPHONE (OUTPATIENT)
Dept: UROLOGY | Facility: MEDICAL CENTER | Age: 74
End: 2019-09-27

## 2019-09-27 NOTE — TELEPHONE ENCOUNTER
Spoke with Jenny Almonte  Pt is scheduled for his CT scan on 10/18 and was to f/u with Clinic Physician to discuss  She will set up appt  She referred to instructions she received in an e-mail from "Jeremiah Angulo", could not provide last name

## 2019-09-27 NOTE — TELEPHONE ENCOUNTER
Patient of Dr Clarice Garcia seen in [facility] from IR calling in regards to a CT  Asking for a call back from clinical to discuss      She can be reached at [phone]

## 2019-09-30 ENCOUNTER — TELEPHONE (OUTPATIENT)
Dept: UROLOGY | Facility: MEDICAL CENTER | Age: 74
End: 2019-09-30

## 2019-09-30 DIAGNOSIS — R30.0 DYSURIA: Primary | ICD-10-CM

## 2019-09-30 NOTE — TELEPHONE ENCOUNTER
Patient of Dr Ginger Tijerina seen in [facility]  Patient's wife would like a return call from clinical about patient's ostomy bag  There are some black specks in it  She can be reached at [phone]

## 2019-10-01 ENCOUNTER — APPOINTMENT (OUTPATIENT)
Dept: LAB | Facility: MEDICAL CENTER | Age: 74
DRG: 300 | End: 2019-10-01
Payer: MEDICARE

## 2019-10-01 DIAGNOSIS — R30.0 DYSURIA: ICD-10-CM

## 2019-10-01 LAB
BACTERIA UR QL AUTO: ABNORMAL /HPF
BILIRUB UR QL STRIP: NEGATIVE
CLARITY UR: ABNORMAL
COLOR UR: YELLOW
GLUCOSE UR STRIP-MCNC: NEGATIVE MG/DL
HGB UR QL STRIP.AUTO: ABNORMAL
KETONES UR STRIP-MCNC: NEGATIVE MG/DL
LEUKOCYTE ESTERASE UR QL STRIP: ABNORMAL
NITRITE UR QL STRIP: POSITIVE
NON-SQ EPI CELLS URNS QL MICRO: ABNORMAL /HPF
PH UR STRIP.AUTO: 6 [PH]
PROT UR STRIP-MCNC: ABNORMAL MG/DL
RBC #/AREA URNS AUTO: ABNORMAL /HPF
SP GR UR STRIP.AUTO: 1.01 (ref 1–1.03)
UROBILINOGEN UR QL STRIP.AUTO: 0.2 E.U./DL
WBC #/AREA URNS AUTO: ABNORMAL /HPF

## 2019-10-01 PROCEDURE — 81001 URINALYSIS AUTO W/SCOPE: CPT

## 2019-10-01 NOTE — TELEPHONE ENCOUNTER
Is his current state his baseline regarding his sleepiness? We can perform urine testing which I will place orders for in epic to evaluate for the presence of urinary tract infection at this time    If he has worsening mental status change, signs of infection he should be evaluated by his PCP or of the nearest emergency department

## 2019-10-01 NOTE — TELEPHONE ENCOUNTER
Spoke with Emilie Vences; She states that she's taken the past two days off of work to remain with the patient and get him to drink more fluids (She reports he sleeps for close to half of the day, she wakes him up to have him drink) She notes that even with these measures, his urine is still dark and cloudy  She's also been noticing "black specks" in his urine  By her description, they don't sound like blood clots  She's asking for instructions  Routing to a provider to review and advise

## 2019-10-01 NOTE — TELEPHONE ENCOUNTER
Pt  Wife would like a call back asap because pt   Urine is cloudy and black specks are still present

## 2019-10-01 NOTE — TELEPHONE ENCOUNTER
Called pt's wife Marc Christie and relayed Nazario's message  She will go to the lab to obtain sterile cups and collect his urine at home, and return to the lab  He is afebrile at present, and will continue to monitor and push fluids  She will take him to the ER if he worsens

## 2019-10-02 ENCOUNTER — TELEPHONE (OUTPATIENT)
Dept: UROLOGY | Facility: AMBULATORY SURGERY CENTER | Age: 74
End: 2019-10-02

## 2019-10-02 DIAGNOSIS — N39.0 URINARY TRACT INFECTION WITHOUT HEMATURIA, SITE UNSPECIFIED: Primary | ICD-10-CM

## 2019-10-02 RX ORDER — CIPROFLOXACIN 500 MG/1
500 TABLET, FILM COATED ORAL EVERY 12 HOURS SCHEDULED
Qty: 14 TABLET | Refills: 0 | Status: ON HOLD | OUTPATIENT
Start: 2019-10-02 | End: 2019-10-17

## 2019-10-03 ENCOUNTER — TELEPHONE (OUTPATIENT)
Dept: PALLIATIVE MEDICINE | Facility: CLINIC | Age: 74
End: 2019-10-03

## 2019-10-03 NOTE — TELEPHONE ENCOUNTER
Okay to delay OxyContin (normally scheduled TID) if patient is comfortable / sleeping     But give when he wakes  If it is close to next dose, give next dose early instead  Goal it 3 times per day dosing but do not "stack" dosing, i e  do not give 2 doses within 2-3 hours of each other      Can continue meloxicam

## 2019-10-03 NOTE — TELEPHONE ENCOUNTER
Pt daughter called to cancel tomorrows office visit  Reports he has a bad UTI and does not think he will be strong enough to make visit  Per urology started cipro 500 mg 10/02/19   Daughter queried on his pain med status  She reports he has enough Oxycontin 10 mg to last into next week  Pt usually takes every 8 hours as scheduled but has missed mid day dose re sleeping and daughter not always home to administer  Has Mobic   Daughter asking if he is to continue taking this medication  Anna Khan Rescheduled pt for office visit with Dr Fito Avila on 10/10

## 2019-10-04 ENCOUNTER — HOSPITAL ENCOUNTER (OUTPATIENT)
Dept: INFUSION CENTER | Facility: CLINIC | Age: 74
End: 2019-10-04

## 2019-10-04 ENCOUNTER — APPOINTMENT (EMERGENCY)
Dept: CT IMAGING | Facility: HOSPITAL | Age: 74
DRG: 300 | End: 2019-10-04
Payer: MEDICARE

## 2019-10-04 ENCOUNTER — APPOINTMENT (EMERGENCY)
Dept: NON INVASIVE DIAGNOSTICS | Facility: HOSPITAL | Age: 74
DRG: 300 | End: 2019-10-04
Payer: MEDICARE

## 2019-10-04 ENCOUNTER — HOSPITAL ENCOUNTER (INPATIENT)
Facility: HOSPITAL | Age: 74
LOS: 13 days | Discharge: HOSPICE | DRG: 300 | End: 2019-10-17
Attending: EMERGENCY MEDICINE | Admitting: FAMILY MEDICINE
Payer: MEDICARE

## 2019-10-04 ENCOUNTER — TELEPHONE (OUTPATIENT)
Dept: VASCULAR SURGERY | Facility: CLINIC | Age: 74
End: 2019-10-04

## 2019-10-04 DIAGNOSIS — C68.8 PRIMARY UROTHELIAL CARCINOMA OF OVERLAPPING SITES OF URINARY ORGANS (HCC): ICD-10-CM

## 2019-10-04 DIAGNOSIS — N30.80 EMPHYSEMATOUS CYSTITIS: ICD-10-CM

## 2019-10-04 DIAGNOSIS — R10.9 ABDOMINAL PAIN: ICD-10-CM

## 2019-10-04 DIAGNOSIS — N39.0 URINARY TRACT INFECTION WITHOUT HEMATURIA, SITE UNSPECIFIED: ICD-10-CM

## 2019-10-04 DIAGNOSIS — N39.0 UTI (URINARY TRACT INFECTION): ICD-10-CM

## 2019-10-04 DIAGNOSIS — I82.401 ACUTE DEEP VEIN THROMBOSIS (DVT) OF RIGHT LOWER EXTREMITY (HCC): Primary | ICD-10-CM

## 2019-10-04 PROBLEM — R11.0 NAUSEA: Status: ACTIVE | Noted: 2019-10-04

## 2019-10-04 PROBLEM — I82.421 ACUTE DEEP VEIN THROMBOSIS (DVT) OF ILIAC VEIN OF RIGHT LOWER EXTREMITY (HCC): Status: ACTIVE | Noted: 2019-10-04

## 2019-10-04 PROBLEM — I82.409 DVT (DEEP VENOUS THROMBOSIS) (HCC): Status: ACTIVE | Noted: 2019-10-04

## 2019-10-04 LAB
ALBUMIN SERPL BCP-MCNC: 2.1 G/DL (ref 3.5–5)
ALP SERPL-CCNC: 96 U/L (ref 46–116)
ALT SERPL W P-5'-P-CCNC: 6 U/L (ref 12–78)
ANION GAP SERPL CALCULATED.3IONS-SCNC: 13 MMOL/L (ref 4–13)
APTT PPP: 36 SECONDS (ref 23–37)
AST SERPL W P-5'-P-CCNC: 12 U/L (ref 5–45)
BACTERIA UR QL AUTO: ABNORMAL /HPF
BASOPHILS # BLD AUTO: 0.01 THOUSANDS/ΜL (ref 0–0.1)
BASOPHILS NFR BLD AUTO: 0 % (ref 0–1)
BILIRUB SERPL-MCNC: 0.51 MG/DL (ref 0.2–1)
BILIRUB UR QL STRIP: NEGATIVE
BUN SERPL-MCNC: 12 MG/DL (ref 5–25)
CALCIUM SERPL-MCNC: 9.3 MG/DL (ref 8.3–10.1)
CHLORIDE SERPL-SCNC: 92 MMOL/L (ref 100–108)
CLARITY UR: CLEAR
CO2 SERPL-SCNC: 23 MMOL/L (ref 21–32)
COLOR UR: YELLOW
COLOR, POC: YELLOW
CREAT SERPL-MCNC: 1.38 MG/DL (ref 0.6–1.3)
EOSINOPHIL # BLD AUTO: 0.01 THOUSAND/ΜL (ref 0–0.61)
EOSINOPHIL NFR BLD AUTO: 0 % (ref 0–6)
ERYTHROCYTE [DISTWIDTH] IN BLOOD BY AUTOMATED COUNT: 15.2 % (ref 11.6–15.1)
GFR SERPL CREATININE-BSD FRML MDRD: 50 ML/MIN/1.73SQ M
GLUCOSE SERPL-MCNC: 115 MG/DL (ref 65–140)
GLUCOSE UR STRIP-MCNC: NEGATIVE MG/DL
HCT VFR BLD AUTO: 32.1 % (ref 36.5–49.3)
HGB BLD-MCNC: 9.5 G/DL (ref 12–17)
HGB UR QL STRIP.AUTO: ABNORMAL
IMM GRANULOCYTES # BLD AUTO: 0.04 THOUSAND/UL (ref 0–0.2)
IMM GRANULOCYTES NFR BLD AUTO: 1 % (ref 0–2)
INR PPP: 1.23 (ref 0.84–1.19)
KETONES UR STRIP-MCNC: ABNORMAL MG/DL
LEUKOCYTE ESTERASE UR QL STRIP: ABNORMAL
LIPASE SERPL-CCNC: 44 U/L (ref 73–393)
LYMPHOCYTES # BLD AUTO: 0.38 THOUSANDS/ΜL (ref 0.6–4.47)
LYMPHOCYTES NFR BLD AUTO: 5 % (ref 14–44)
MCH RBC QN AUTO: 24.5 PG (ref 26.8–34.3)
MCHC RBC AUTO-ENTMCNC: 29.6 G/DL (ref 31.4–37.4)
MCV RBC AUTO: 83 FL (ref 82–98)
MONOCYTES # BLD AUTO: 0.6 THOUSAND/ΜL (ref 0.17–1.22)
MONOCYTES NFR BLD AUTO: 8 % (ref 4–12)
NEUTROPHILS # BLD AUTO: 6.81 THOUSANDS/ΜL (ref 1.85–7.62)
NEUTS SEG NFR BLD AUTO: 86 % (ref 43–75)
NITRITE UR QL STRIP: NEGATIVE
NON-SQ EPI CELLS URNS QL MICRO: ABNORMAL /HPF
NRBC BLD AUTO-RTO: 0 /100 WBCS
PH UR STRIP.AUTO: 7 [PH] (ref 4.5–8)
PLATELET # BLD AUTO: 507 THOUSANDS/UL (ref 149–390)
PMV BLD AUTO: 8.2 FL (ref 8.9–12.7)
POTASSIUM SERPL-SCNC: 3.2 MMOL/L (ref 3.5–5.3)
PROT SERPL-MCNC: 7.4 G/DL (ref 6.4–8.2)
PROT UR STRIP-MCNC: ABNORMAL MG/DL
PROTHROMBIN TIME: 15.7 SECONDS (ref 11.6–14.5)
RBC # BLD AUTO: 3.87 MILLION/UL (ref 3.88–5.62)
RBC #/AREA URNS AUTO: ABNORMAL /HPF
SODIUM SERPL-SCNC: 128 MMOL/L (ref 136–145)
SP GR UR STRIP.AUTO: 1.02 (ref 1–1.03)
UROBILINOGEN UR QL STRIP.AUTO: 0.2 E.U./DL
WBC # BLD AUTO: 7.85 THOUSAND/UL (ref 4.31–10.16)
WBC #/AREA URNS AUTO: ABNORMAL /HPF

## 2019-10-04 PROCEDURE — 74176 CT ABD & PELVIS W/O CONTRAST: CPT

## 2019-10-04 PROCEDURE — 99285 EMERGENCY DEPT VISIT HI MDM: CPT | Performed by: EMERGENCY MEDICINE

## 2019-10-04 PROCEDURE — 80053 COMPREHEN METABOLIC PANEL: CPT | Performed by: EMERGENCY MEDICINE

## 2019-10-04 PROCEDURE — 93970 EXTREMITY STUDY: CPT

## 2019-10-04 PROCEDURE — 85610 PROTHROMBIN TIME: CPT | Performed by: EMERGENCY MEDICINE

## 2019-10-04 PROCEDURE — 96361 HYDRATE IV INFUSION ADD-ON: CPT

## 2019-10-04 PROCEDURE — 87040 BLOOD CULTURE FOR BACTERIA: CPT | Performed by: EMERGENCY MEDICINE

## 2019-10-04 PROCEDURE — 81001 URINALYSIS AUTO W/SCOPE: CPT

## 2019-10-04 PROCEDURE — 96374 THER/PROPH/DIAG INJ IV PUSH: CPT

## 2019-10-04 PROCEDURE — 87086 URINE CULTURE/COLONY COUNT: CPT

## 2019-10-04 PROCEDURE — 96376 TX/PRO/DX INJ SAME DRUG ADON: CPT

## 2019-10-04 PROCEDURE — 96375 TX/PRO/DX INJ NEW DRUG ADDON: CPT

## 2019-10-04 PROCEDURE — 83690 ASSAY OF LIPASE: CPT | Performed by: EMERGENCY MEDICINE

## 2019-10-04 PROCEDURE — 85730 THROMBOPLASTIN TIME PARTIAL: CPT | Performed by: EMERGENCY MEDICINE

## 2019-10-04 PROCEDURE — 36415 COLL VENOUS BLD VENIPUNCTURE: CPT | Performed by: EMERGENCY MEDICINE

## 2019-10-04 PROCEDURE — 99223 1ST HOSP IP/OBS HIGH 75: CPT | Performed by: FAMILY MEDICINE

## 2019-10-04 PROCEDURE — 99285 EMERGENCY DEPT VISIT HI MDM: CPT

## 2019-10-04 PROCEDURE — 85025 COMPLETE CBC W/AUTO DIFF WBC: CPT | Performed by: EMERGENCY MEDICINE

## 2019-10-04 RX ORDER — HEPARIN SODIUM 1000 [USP'U]/ML
6000 INJECTION, SOLUTION INTRAVENOUS; SUBCUTANEOUS AS NEEDED
Status: DISCONTINUED | OUTPATIENT
Start: 2019-10-04 | End: 2019-10-07

## 2019-10-04 RX ORDER — TRAZODONE HYDROCHLORIDE 100 MG/1
100 TABLET ORAL
Status: DISCONTINUED | OUTPATIENT
Start: 2019-10-04 | End: 2019-10-17 | Stop reason: HOSPADM

## 2019-10-04 RX ORDER — CIPROFLOXACIN 2 MG/ML
400 INJECTION, SOLUTION INTRAVENOUS EVERY 12 HOURS
Status: DISCONTINUED | OUTPATIENT
Start: 2019-10-05 | End: 2019-10-06

## 2019-10-04 RX ORDER — ONDANSETRON 2 MG/ML
4 INJECTION INTRAMUSCULAR; INTRAVENOUS ONCE
Status: COMPLETED | OUTPATIENT
Start: 2019-10-04 | End: 2019-10-04

## 2019-10-04 RX ORDER — LANOLIN ALCOHOL/MO/W.PET/CERES
6 CREAM (GRAM) TOPICAL
Status: DISCONTINUED | OUTPATIENT
Start: 2019-10-04 | End: 2019-10-17 | Stop reason: HOSPADM

## 2019-10-04 RX ORDER — OXYCODONE HYDROCHLORIDE 5 MG/1
5 TABLET ORAL EVERY 4 HOURS PRN
Status: DISCONTINUED | OUTPATIENT
Start: 2019-10-04 | End: 2019-10-17 | Stop reason: HOSPADM

## 2019-10-04 RX ORDER — OXYCODONE HCL 10 MG/1
10 TABLET, FILM COATED, EXTENDED RELEASE ORAL EVERY 8 HOURS SCHEDULED
Status: DISCONTINUED | OUTPATIENT
Start: 2019-10-04 | End: 2019-10-14

## 2019-10-04 RX ORDER — ACETAMINOPHEN 325 MG/1
650 TABLET ORAL EVERY 6 HOURS PRN
Status: DISCONTINUED | OUTPATIENT
Start: 2019-10-04 | End: 2019-10-17 | Stop reason: HOSPADM

## 2019-10-04 RX ORDER — HEPARIN SODIUM 10000 [USP'U]/100ML
3-30 INJECTION, SOLUTION INTRAVENOUS
Status: DISCONTINUED | OUTPATIENT
Start: 2019-10-04 | End: 2019-10-07

## 2019-10-04 RX ORDER — PANTOPRAZOLE SODIUM 40 MG/1
40 TABLET, DELAYED RELEASE ORAL
Status: DISCONTINUED | OUTPATIENT
Start: 2019-10-05 | End: 2019-10-17 | Stop reason: HOSPADM

## 2019-10-04 RX ORDER — HEPARIN SODIUM 1000 [USP'U]/ML
3000 INJECTION, SOLUTION INTRAVENOUS; SUBCUTANEOUS AS NEEDED
Status: DISCONTINUED | OUTPATIENT
Start: 2019-10-04 | End: 2019-10-07

## 2019-10-04 RX ORDER — HYDROMORPHONE HCL/PF 1 MG/ML
0.5 SYRINGE (ML) INJECTION EVERY 4 HOURS PRN
Status: DISCONTINUED | OUTPATIENT
Start: 2019-10-04 | End: 2019-10-17 | Stop reason: HOSPADM

## 2019-10-04 RX ORDER — SODIUM CHLORIDE AND POTASSIUM CHLORIDE .9; .15 G/100ML; G/100ML
75 SOLUTION INTRAVENOUS CONTINUOUS
Status: DISPENSED | OUTPATIENT
Start: 2019-10-04 | End: 2019-10-16

## 2019-10-04 RX ORDER — SENNOSIDES 8.6 MG
2 TABLET ORAL
Status: DISCONTINUED | OUTPATIENT
Start: 2019-10-04 | End: 2019-10-17 | Stop reason: HOSPADM

## 2019-10-04 RX ORDER — MORPHINE SULFATE 4 MG/ML
4 INJECTION, SOLUTION INTRAMUSCULAR; INTRAVENOUS ONCE
Status: COMPLETED | OUTPATIENT
Start: 2019-10-04 | End: 2019-10-04

## 2019-10-04 RX ORDER — HEPARIN SODIUM 1000 [USP'U]/ML
6000 INJECTION, SOLUTION INTRAVENOUS; SUBCUTANEOUS ONCE
Status: COMPLETED | OUTPATIENT
Start: 2019-10-04 | End: 2019-10-04

## 2019-10-04 RX ORDER — HEPARIN SODIUM 1000 [USP'U]/ML
6400 INJECTION, SOLUTION INTRAVENOUS; SUBCUTANEOUS AS NEEDED
Status: DISCONTINUED | OUTPATIENT
Start: 2019-10-04 | End: 2019-10-04

## 2019-10-04 RX ORDER — HEPARIN SODIUM 1000 [USP'U]/ML
6400 INJECTION, SOLUTION INTRAVENOUS; SUBCUTANEOUS ONCE
Status: DISCONTINUED | OUTPATIENT
Start: 2019-10-04 | End: 2019-10-04

## 2019-10-04 RX ORDER — HEPARIN SODIUM 1000 [USP'U]/ML
3200 INJECTION, SOLUTION INTRAVENOUS; SUBCUTANEOUS AS NEEDED
Status: DISCONTINUED | OUTPATIENT
Start: 2019-10-04 | End: 2019-10-04

## 2019-10-04 RX ORDER — ONDANSETRON 2 MG/ML
4 INJECTION INTRAMUSCULAR; INTRAVENOUS EVERY 6 HOURS PRN
Status: DISCONTINUED | OUTPATIENT
Start: 2019-10-04 | End: 2019-10-17 | Stop reason: HOSPADM

## 2019-10-04 RX ORDER — POLYETHYLENE GLYCOL 3350 17 G/17G
17 POWDER, FOR SOLUTION ORAL DAILY PRN
Status: DISCONTINUED | OUTPATIENT
Start: 2019-10-04 | End: 2019-10-17 | Stop reason: HOSPADM

## 2019-10-04 RX ORDER — OXYCODONE HYDROCHLORIDE 10 MG/1
10 TABLET ORAL EVERY 4 HOURS PRN
Status: DISCONTINUED | OUTPATIENT
Start: 2019-10-04 | End: 2019-10-17 | Stop reason: HOSPADM

## 2019-10-04 RX ORDER — MAGNESIUM HYDROXIDE/ALUMINUM HYDROXICE/SIMETHICONE 120; 1200; 1200 MG/30ML; MG/30ML; MG/30ML
30 SUSPENSION ORAL EVERY 6 HOURS PRN
Status: DISCONTINUED | OUTPATIENT
Start: 2019-10-04 | End: 2019-10-17 | Stop reason: HOSPADM

## 2019-10-04 RX ORDER — OMEGA-3S/DHA/EPA/FISH OIL/D3 300MG-1000
400 CAPSULE ORAL DAILY
Status: DISCONTINUED | OUTPATIENT
Start: 2019-10-05 | End: 2019-10-17 | Stop reason: HOSPADM

## 2019-10-04 RX ORDER — NYSTATIN 100000 [USP'U]/G
POWDER TOPICAL 2 TIMES DAILY
Status: DISCONTINUED | OUTPATIENT
Start: 2019-10-04 | End: 2019-10-17 | Stop reason: HOSPADM

## 2019-10-04 RX ORDER — METOCLOPRAMIDE HYDROCHLORIDE 5 MG/ML
10 INJECTION INTRAMUSCULAR; INTRAVENOUS EVERY 6 HOURS PRN
Status: DISCONTINUED | OUTPATIENT
Start: 2019-10-04 | End: 2019-10-06

## 2019-10-04 RX ORDER — FAMOTIDINE 20 MG/1
40 TABLET, FILM COATED ORAL
Status: DISCONTINUED | OUTPATIENT
Start: 2019-10-04 | End: 2019-10-17 | Stop reason: HOSPADM

## 2019-10-04 RX ORDER — POTASSIUM CHLORIDE 20 MEQ/1
20 TABLET, EXTENDED RELEASE ORAL ONCE
Status: COMPLETED | OUTPATIENT
Start: 2019-10-05 | End: 2019-10-05

## 2019-10-04 RX ORDER — CIPROFLOXACIN 2 MG/ML
400 INJECTION, SOLUTION INTRAVENOUS ONCE
Status: COMPLETED | OUTPATIENT
Start: 2019-10-04 | End: 2019-10-05

## 2019-10-04 RX ORDER — PENTOXIFYLLINE 400 MG/1
400 TABLET, EXTENDED RELEASE ORAL
Status: DISCONTINUED | OUTPATIENT
Start: 2019-10-05 | End: 2019-10-17 | Stop reason: HOSPADM

## 2019-10-04 RX ORDER — HEPARIN SODIUM 10000 [USP'U]/100ML
3-30 INJECTION, SOLUTION INTRAVENOUS
Status: DISCONTINUED | OUTPATIENT
Start: 2019-10-04 | End: 2019-10-04

## 2019-10-04 RX ORDER — FLUTICASONE PROPIONATE 50 MCG
2 SPRAY, SUSPENSION (ML) NASAL DAILY
Status: DISCONTINUED | OUTPATIENT
Start: 2019-10-05 | End: 2019-10-17 | Stop reason: HOSPADM

## 2019-10-04 RX ADMIN — MORPHINE SULFATE 4 MG: 4 INJECTION INTRAVENOUS at 19:44

## 2019-10-04 RX ADMIN — ONDANSETRON 4 MG: 2 INJECTION INTRAMUSCULAR; INTRAVENOUS at 16:40

## 2019-10-04 RX ADMIN — CIPROFLOXACIN 400 MG: 2 INJECTION, SOLUTION INTRAVENOUS at 22:59

## 2019-10-04 RX ADMIN — ONDANSETRON 4 MG: 2 INJECTION INTRAMUSCULAR; INTRAVENOUS at 19:44

## 2019-10-04 RX ADMIN — HEPARIN SODIUM AND DEXTROSE 18 UNITS/KG/HR: 10000; 5 INJECTION INTRAVENOUS at 23:32

## 2019-10-04 RX ADMIN — HEPARIN SODIUM 6000 UNITS: 1000 INJECTION INTRAVENOUS; SUBCUTANEOUS at 23:32

## 2019-10-04 RX ADMIN — SODIUM CHLORIDE 1000 ML: 0.9 INJECTION, SOLUTION INTRAVENOUS at 16:38

## 2019-10-04 RX ADMIN — ONDANSETRON 4 MG: 2 INJECTION INTRAMUSCULAR; INTRAVENOUS at 23:46

## 2019-10-04 RX ADMIN — OXYCODONE HYDROCHLORIDE 10 MG: 10 TABLET, FILM COATED, EXTENDED RELEASE ORAL at 23:50

## 2019-10-04 RX ADMIN — NYSTATIN: 100000 POWDER TOPICAL at 23:50

## 2019-10-04 RX ADMIN — SODIUM CHLORIDE AND POTASSIUM CHLORIDE 75 ML/HR: .9; .15 SOLUTION INTRAVENOUS at 23:50

## 2019-10-04 RX ADMIN — IOHEXOL 50 ML: 240 INJECTION, SOLUTION INTRATHECAL; INTRAVASCULAR; INTRAVENOUS; ORAL at 21:07

## 2019-10-04 RX ADMIN — MORPHINE SULFATE 2 MG: 2 INJECTION, SOLUTION INTRAMUSCULAR; INTRAVENOUS at 16:40

## 2019-10-04 NOTE — ED PROVIDER NOTES
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  Hx of xavier's disease  Also c/o pain in groin  History provided by:  Patient  Nausea   The primary symptoms include abdominal pain, nausea and vomiting  Primary symptoms do not include fever, fatigue, diarrhea, dysuria, myalgias, arthralgias or rash  The illness began today  The onset was sudden  The problem has not changed since onset  The abdominal pain began today  The abdominal pain has been unchanged since its onset  The abdominal pain is located in the RUQ, epigastric region and LUQ  The abdominal pain does not radiate  The severity of the abdominal pain is 9/10  The abdominal pain is relieved by nothing  The vomiting began today  The emesis contains stomach contents  The illness does not include chills, anorexia, dysphagia, constipation or back pain  Prior to Admission Medications   Prescriptions Last Dose Informant Patient Reported? Taking?    Glucosamine-Chondroit-Vit C-Mn (GLUCOSAMINE CHONDR 500 COMPLEX) CAPS Not Taking at Unknown time  Yes No   Six daily   Melatonin 5 MG TABS  Self Yes Yes   Sig: Take 5 mg by mouth daily at bedtime    Methylcellulose, Laxative, (CITRUCEL PO)  Spouse/Significant Other Yes Yes   Sig: Take by mouth daily as needed   Misc Natural Products (T-RELIEF CBD+13 SL)   Yes Yes   Sig: Place 1 capsule under the tongue 3 (three) times a day    Omega-3 1000 MG CAPS Not Taking at Unknown time  Yes No   Si tablet daily   Vitamin D, Cholecalciferol, 400 units TABS Not Taking at Unknown time  Yes No   Si tablet daily   acetaminophen (TYLENOL) 325 mg tablet  Self Yes Yes   Sig: Take 650 mg by mouth every 6 (six) hours as needed for mild pain    ciprofloxacin (CIPRO) 500 mg tablet   No Yes   Sig: Take 1 tablet (500 mg total) by mouth every 12 (twelve) hours for 7 days   meloxicam (MOBIC) 15 mg tablet   Yes Yes   Sig: Take 15 mg by mouth daily   mometasone (NASONEX) 50 mcg/act nasal spray  Self Yes Yes   Si sprays into each nostril as needed   oxyCODONE (OxyCONTIN) 10 mg 12 hr tablet   No Yes   Sig: Take 1 tablet (10 mg total) by mouth every 8 (eight) hoursMax Daily Amount: 30 mg   pantoprazole (PROTONIX) 40 mg tablet  Self No Yes   Sig: Take 1 tablet (40 mg total) by mouth daily   pentoxifylline (TRENtal) 400 mg ER tablet  Self Yes Yes   Sig: Take 400 mg by mouth 3 (three) times a day with meals   ranitidine (ZANTAC) 300 MG tablet  Self Yes Yes   Sig: Take 300 mg by mouth daily at bedtime   senna (SENOKOT) 8 6 mg  Self No Yes   Sig: Take 1-2 tablets orally daily as needed for constipation   Patient taking differently: as needed Take 1-2 tablets orally daily as needed for constipation   traZODone (DESYREL) 50 mg tablet   No Yes   Sig: Take 2 tablets (100 mg total) by mouth daily at bedtime   vitamin E, tocopherol, 1,000 units capsule Not Taking at Unknown time  Yes No   Si tablet daily      Facility-Administered Medications: None       Past Medical History:   Diagnosis Date    Aneurysm ([facility] [address] )     Behind left knee recently diagnosed    Back pain     Ceron disease     Ceron's disease     Bladder cancer ([facility] [address] )     BPH (benign prostatic hyperplasia)     Cancer (HCC)     melanoma    Cataract     right eye    Cataract     right eye    Confusion     DDD (degenerative disc disease), lumbar     Depression     Diverticulosis     Gallstones     GERD (gastroesophageal reflux disease)     History of cardiac murmur     Past    History of melanoma     Was on back in early 's    History of rheumatic fever     Childhood    History of transfusion     2018    [facility] (hard of hearing)     Hydronephrosis     Hypertension     Kidney stone     Multiple times    Neck pain     Nervous breakdown     History of due to reaction to psych med which he was on for anxiety/depression and became suicidal    Numbness and tingling in both hands     Numbness and tingling of both feet     PONV (postoperative nausea and vomiting)     Prostate cancer ([facility] [address] )     PVD (peripheral vascular disease) ([facility] [address] )     RBBB     Scoliosis     Seasonal allergies     Tinnitus     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[facility] [address] )     Wears partial dentures     Upper    Wears partial dentures     Upper       Past Surgical History:   Procedure Laterality Date    ABDOMINAL SURGERY      When young had procedure for improviing circulation to left lower extremety    BACK SURGERY      Lumbar- not sure what was done   [facility] CARDIAC CATHETERIZATION      Approximately 2007- no blockages    CARPAL TUNNEL RELEASE Bilateral     wrists are fused    CATARACT EXTRACTION Left     COLONOSCOPY      CYST REMOVAL      Robotic procedure to remove benign cyst from lower left lung    CYSTOGRAM N/A 3/14/2018    Procedure: CYSTOGRAM;  Surgeon: Nixon Howell MD;  [facility];  Service: Urology    CYSTOSCOPY      ESOPHAGOGASTRODUODENOSCOPY      IR TUBE PLACEMENT NEPHROSTOMY  8/10/2018    LUNG SURGERY      cyst removed left lung    OTHER SURGICAL HISTORY Left     fistula drain in left buttock    AZ CYSTO/URETERO W/LITHOTRIPSY &INDWELL STENT INSRT Left 1/3/2018    Procedure: CYSTOSCOPY URETEROSCOPY, RETROGRADE PYELOGRAM AND INSERTION STENT URETERAL;  Surgeon: Nixon Howell MD;  [facility];  Service: Urology    AZ CYSTOTOMY,EXCIS BLADDER TIC N/A 2/14/2018    Procedure: ABDOMINAL EXPLORATION; CLOSURE OF BLADDER DIVERTICULITIS;  Surgeon: Nixon Howell MD;  [facility];  Service: Urology    AZ CYSTOURETHROSCOPY,URETER CATHETER Left 8/1/2018    Procedure: Cystoscopy, cystogram, bladder biopsies, removal of pelvic drain;  Surgeon: Nixon Howell MD;  [facility];  Service: Urology    AZ INCISE/DRAIN BLADDER N/A 2/14/2018    Procedure: OPEN SUPRAPUBIC TUBE PLACEMENT; Surgeon: Avtar Alamo MD;  [facility];  Service: Urology    NC RELEASE Chip Saravanan FIBROSIS Left 2018    Procedure: LYSIS OF ADHESIONS; URETEROLYSIS ;  Surgeon: Avtar Alamo MD;  [facility];  Service: Urology     [facility] BLADDER/NODES,ILEAL CONDUIT N/A 2019    Procedure: ATTEMPTED CYSTECTOMY RADICAL; ILEAL CONDUIT URINARY DIVERSION; BIOPSY OF PELVIC MASS; APPENDECTOMY;  Surgeon: Avtar Alamo MD;  [facility];  Service: Urology    SKIN CANCER EXCISION      Melanoma removal on back in early     TOE AMPUTATION Left     All 5 toes left foot were amputated due to poor circulation     TRANSURETHRAL RESECTION OF PROSTATE N/A 3/14/2018    Procedure: TRANSURETHRAL RESECTION OF PROSTATE (TURP), LEFT URETERAL STENT REMOVAL;  Surgeon: Avtar Alamo MD;  [facility];  Service: Urology    TRANSURETHRAL RESECTION OF PROSTATE N/A 2018    Procedure: TRANSURETHRAL RESECTION OF PROSTATE (TURP); Surgeon: Avtar Alamo MD;  [facility];  Service: Urology    WRIST SURGERY Bilateral     Ulnar nerve on right arm and both wrists are fused       Family History   Problem Relation Age of Onset    Heart disease Father     Heart disease Mother     Cancer Paternal Grandfather      I have reviewed and agree with the history as documented  Social History     Tobacco Use    Smoking status: Former Smoker     Packs/day: 1 00     Years: 15 00     Pack years: 15 00     Types: Cigarettes     Last attempt to quit: 1970     Years since quittin 6    Smokeless tobacco: Never Used    Tobacco comment: Quit 50 years   Substance Use Topics    Alcohol use: Not Currently     Frequency: Never     Comment: Very rare    Drug use: Yes     Types: Prescription, Marijuana     Comment: 2x per day        Review of Systems   Constitutional: Negative for activity change, appetite change, chills, fatigue and fever     HENT: Negative for congestion, dental problem, ear pain, rhinorrhea and sore throat  Eyes: Negative for pain and redness  Respiratory: Negative for chest tightness, shortness of breath and wheezing  Cardiovascular: Negative for chest pain and palpitations  Gastrointestinal: Positive for abdominal pain, nausea and vomiting  Negative for abdominal distention, anal bleeding, anorexia, blood in stool, constipation, diarrhea, dysphagia and rectal pain  Endocrine: Negative for cold intolerance and heat intolerance  Genitourinary: Negative for dysuria, frequency and hematuria  Musculoskeletal: Negative for arthralgias, back pain and myalgias  Skin: Negative for color change, pallor and rash  Neurological: Negative for weakness and numbness  Hematological: Does not bruise/bleed easily  Psychiatric/Behavioral: Negative for agitation, hallucinations and suicidal ideas  Physical Exam  Physical Exam   Constitutional: He is oriented to person, place, and time  He appears well-developed and well-nourished  HENT:   Mouth/Throat: No oropharyngeal exudate  TMs normal bilaterally no pharyngeal erythema no rhinorrhea nontender palpation of sinuses, normal looking turbinates   Eyes: Conjunctivae and EOM are normal    Neck: Normal range of motion  Neck supple  No meningeal signs   Cardiovascular: Normal rate, regular rhythm, normal heart sounds and intact distal pulses  Pulmonary/Chest: Effort normal and breath sounds normal  No respiratory distress  He has no wheezes  He has no rales  He exhibits no tenderness  Abdominal: Soft  Bowel sounds are normal  He exhibits no distension and no mass  There is no tenderness  No hernia  No cvat   Musculoskeletal: Normal range of motion  He exhibits no edema  Lymphadenopathy:     He has no cervical adenopathy  Neurological: He is alert and oriented to person, place, and time  No cranial nerve deficit  Skin: No rash noted  No erythema  No edema   Psychiatric: He has a normal mood and affect   His behavior is normal    Nursing note and vitals reviewed        Vital Signs  ED Triage Vitals [10/04/19 1605]   Temperature Pulse Respirations Blood Pressure SpO2   98 3 °F (36 8 °C) 87 18 135/74 96 %      Temp Source Heart Rate Source Patient Position - Orthostatic VS BP Location FiO2 (%)   Oral Monitor Lying Right arm --      Pain Score       8           Vitals:    10/04/19 1605 10/04/19 1639   BP: 135/74 141/68   Pulse: 87 83   Patient Position - Orthostatic VS: Lying Lying         Visual Acuity      ED Medications  Medications   sodium chloride 0 9 % bolus 1,000 mL (1,000 mL Intravenous New Bag 10/4/19 1638)   ondansetron (ZOFRAN) injection 4 mg (4 mg Intravenous Given 10/4/19 1640)   morphine injection 2 mg (2 mg Intravenous Given 10/4/19 1640)       Diagnostic Studies  Results Reviewed     Procedure Component Value Units Date/Time    Comprehensive metabolic panel [[id number]]  (Abnormal) Collected:  10/04/19 1608    Lab Status:  Final result Specimen:  Blood from Arm, Left Updated:  10/04/19 1720     Sodium 128 mmol/L      Potassium 3 2 mmol/L      Chloride 92 mmol/L      CO2 23 mmol/L      ANION GAP 13 mmol/L      BUN 12 mg/dL      Creatinine 1 38 mg/dL      Glucose 115 mg/dL      Calcium 9 3 mg/dL      AST 12 U/L      ALT 6 U/L      Alkaline Phosphatase 96 U/L      Total Protein 7 4 g/dL      Albumin 2 1 g/dL      Total Bilirubin 0 51 mg/dL      eGFR 50 ml/min/1 73sq m     Narrative:       [place] guidelines for Chronic Kidney Disease (CKD):     Stage 1 with normal or high GFR (GFR > 90 mL/min/1 73 square meters)    Stage 2 Mild CKD (GFR = 60-89 mL/min/1 73 square meters)    Stage 3A Moderate CKD (GFR = 45-59 mL/min/1 73 square meters)    Stage 3B Moderate CKD (GFR = 30-44 mL/min/1 73 square meters)    Stage 4 Severe CKD (GFR = 15-29 mL/min/1 73 square meters)    Stage 5 End Stage CKD (GFR <15 mL/min/1 73 square meters)  Note: GFR calculation is accurate only with a steady state creatinine    Lipase [[id number]]  (Abnormal) Collected:  10/04/19 1608    Lab Status:  Final result Specimen:  Blood from Arm, Left Updated:  10/04/19 1708     Lipase 44 u/L     CBC and differential [[id number]]  (Abnormal) Collected:  10/04/19 1608    Lab Status:  Final result Specimen:  Blood from Arm, Left Updated:  10/04/19 1651     WBC 7 85 Thousand/uL      RBC 3 87 Million/uL      Hemoglobin 9 5 g/dL      Hematocrit 32 1 %      MCV 83 fL      MCH 24 5 pg      MCHC 29 6 g/dL      RDW 15 2 %      MPV 8 2 fL      Platelets 576 Thousands/uL      nRBC 0 /100 WBCs      Neutrophils Relative 86 %      Immat GRANS % 1 %      Lymphocytes Relative 5 %      Monocytes Relative 8 %      Eosinophils Relative 0 %      Basophils Relative 0 %      Neutrophils Absolute 6 81 Thousands/µL      Immature Grans Absolute 0 04 Thousand/uL      Lymphocytes Absolute 0 38 Thousands/µL      Monocytes Absolute 0 60 Thousand/µL      Eosinophils Absolute 0 01 Thousand/µL      Basophils Absolute 0 01 Thousands/µL     POCT urinalysis dipstick [[id number]]     Lab Status:  No result Specimen:  Urine                  CT abdomen pelvis with contrast    (Results Pending)              Procedures  Procedures       ED Course                               MDM  Number of Diagnoses or Management Options  Diagnosis management comments: Abdominal pain, nausea, vomiting without benign exam no history of cancer-will do abdominal labs, urinalysis have as well obstruction/pathology, current medications, treat symptoms for      Disposition  Final diagnoses:   None     ED Disposition     None      Follow-up Information    None         Patient's Medications   Discharge Prescriptions    No medications on file     No discharge procedures on file      ED Provider  Electronically Signed by           Felicita Rose MD  10/06/19 0503

## 2019-10-04 NOTE — ED CARE HANDOFF
Emergency Department Sign Out Note        Sign out and transfer of care from Dr Toña Harrington  See Separate Emergency Department note  The patient, Danielle Up, was evaluated by the previous provider for abdominal pain/nausea    Workup Completed:  Labs  ED Course / Workup Pending (followup):  CT A/P           42-year-old male presenting with abdominal pain, nausea and vomiting  Started on Cipro 2 days ago for urinary tract infection by Urology based on urine appearance change from ileal conduit  Continued with worsening abdominal pain/N/V, therefore came to ED for eval  Family also noted pt has had RLE swelling and it appeared discolored/purple earlier today  No current blood thinners and no history of blood clots  ED Course as of Oct 04 2228   Fri Oct 04, 2019   1819 Sodium(!): 128   1819 Potassium(!): 3 2   1819 Chloride(!): 92   1820 Prior of 1 2   Creatinine(!): 1 38   1836 Patient complains of right lower extremity pain and swelling and family state that it was discolored earlier today described as purple  No skin changes now, mild tenderness to palpation along the right calf, distal sensation intact normal cap refill  No history of blood clots in on any blood thinners  Will get venous duplex to rule out DVT      1915 Vascular tech prelim: RLE: external iliac all the way to ankle, superficial thrombophlebitis  LLE: common femoral, small area of nonocclusive acute DVT, popliteal aneurysm      1942 Discussed diagnosis of extensive DVT to right lower extremity, distally neurovascularly intact with dopplerable PT/DP pulses, normal cap refill, foot warm/pink    No contraindications found to starting heparin      1952 Leukocytes, UA(!): Moderate   2157 Discussed with Dr Denver Overly who recommends IV cipro and treating as emphysematous cystitis        Procedures  MDM  Number of Diagnoses or Management Options  Abdominal pain:   Acute deep vein thrombosis (DVT) of right lower extremity [facility]):   UTI (urinary tract infection):   Diagnosis management comments: 77 yo M with abdominal pain/N/V, already has taken 2 days of oral Cipro  CT with air in bladder/surrounding bladder wall, discussed with urology who state to treat as emphysematous cystitis with Cipro  Pt with R leg swelling/pain, found to have extensive DVT, started on Heparin gtt  Admitted to [facility] for further workup/management       Amount and/or Complexity of Data Reviewed  Clinical lab tests: ordered and reviewed  Tests in the radiology section of CPT®: ordered and reviewed  Tests in the medicine section of CPT®: ordered and reviewed  Review and summarize past medical records: yes  Discuss the patient with other providers: yes (Urology Oliver Patel)  Independent visualization of images, tracings, or specimens: yes        Disposition  Final diagnoses:   Acute deep vein thrombosis (DVT) of right lower extremity ([address] )   UTI (urinary tract infection)   Abdominal pain     Time reflects when diagnosis was documented in both MDM as applicable and the Disposition within this note     Time User Action Codes Description Comment    10/4/2019 10:05 PM Bajwa Alma Delia A Add [I82 401] Acute deep vein thrombosis (DVT) of right lower extremity ([address] )     10/4/2019 10:06 PM Bajwa Alma Delia A Add [N39 0] UTI (urinary tract infection)     10/4/2019 10:15 PM Bajwa Alma Delia A Add [R10 9] Abdominal pain       ED Disposition     ED Disposition Condition Date/Time Comment    Admit Stable Fri Oct 4, 2019 10:05 PM Case was discussed with MARTY and the patient's admission status was agreed to be Admission Status: inpatient status to the service of Dr Preston Schofield   Follow-up Information    None       Patient's Medications   Discharge Prescriptions    No medications on file     No discharge procedures on file         ED Provider  Electronically Signed by     Roderick Griggs DO  10/04/19 9229

## 2019-10-04 NOTE — TELEPHONE ENCOUNTER
Rec'd email 9/27 to schedule pt with Dr Esau Henderson  Called and spoke with Nadia Reeves and she stated that the embolization of bladder mass was cancelled and this appt isn't needed

## 2019-10-05 LAB
ANION GAP SERPL CALCULATED.3IONS-SCNC: 7 MMOL/L (ref 4–13)
APTT PPP: 53 SECONDS (ref 23–37)
APTT PPP: 58 SECONDS (ref 23–37)
APTT PPP: 62 SECONDS (ref 23–37)
BACTERIA UR CULT: NORMAL
BUN SERPL-MCNC: 12 MG/DL (ref 5–25)
CALCIUM SERPL-MCNC: 8.9 MG/DL (ref 8.3–10.1)
CHLORIDE SERPL-SCNC: 100 MMOL/L (ref 100–108)
CO2 SERPL-SCNC: 26 MMOL/L (ref 21–32)
CREAT SERPL-MCNC: 1.11 MG/DL (ref 0.6–1.3)
ERYTHROCYTE [DISTWIDTH] IN BLOOD BY AUTOMATED COUNT: 15.3 % (ref 11.6–15.1)
GFR SERPL CREATININE-BSD FRML MDRD: 65 ML/MIN/1.73SQ M
GLUCOSE SERPL-MCNC: 97 MG/DL (ref 65–140)
HCT VFR BLD AUTO: 27.1 % (ref 36.5–49.3)
HGB BLD-MCNC: 8.2 G/DL (ref 12–17)
MCH RBC QN AUTO: 24.9 PG (ref 26.8–34.3)
MCHC RBC AUTO-ENTMCNC: 30.3 G/DL (ref 31.4–37.4)
MCV RBC AUTO: 82 FL (ref 82–98)
PLATELET # BLD AUTO: 428 THOUSANDS/UL (ref 149–390)
PMV BLD AUTO: 8 FL (ref 8.9–12.7)
POTASSIUM SERPL-SCNC: 3.7 MMOL/L (ref 3.5–5.3)
RBC # BLD AUTO: 3.29 MILLION/UL (ref 3.88–5.62)
SODIUM SERPL-SCNC: 133 MMOL/L (ref 136–145)
WBC # BLD AUTO: 5.52 THOUSAND/UL (ref 4.31–10.16)

## 2019-10-05 PROCEDURE — 87491 CHLMYD TRACH DNA AMP PROBE: CPT | Performed by: FAMILY MEDICINE

## 2019-10-05 PROCEDURE — 85730 THROMBOPLASTIN TIME PARTIAL: CPT | Performed by: FAMILY MEDICINE

## 2019-10-05 PROCEDURE — 80048 BASIC METABOLIC PNL TOTAL CA: CPT | Performed by: PHYSICIAN ASSISTANT

## 2019-10-05 PROCEDURE — 99223 1ST HOSP IP/OBS HIGH 75: CPT | Performed by: UROLOGY

## 2019-10-05 PROCEDURE — 87591 N.GONORRHOEAE DNA AMP PROB: CPT | Performed by: FAMILY MEDICINE

## 2019-10-05 PROCEDURE — 90662 IIV NO PRSV INCREASED AG IM: CPT | Performed by: FAMILY MEDICINE

## 2019-10-05 PROCEDURE — G0008 ADMIN INFLUENZA VIRUS VAC: HCPCS | Performed by: FAMILY MEDICINE

## 2019-10-05 PROCEDURE — 99232 SBSQ HOSP IP/OBS MODERATE 35: CPT | Performed by: FAMILY MEDICINE

## 2019-10-05 PROCEDURE — 85027 COMPLETE CBC AUTOMATED: CPT | Performed by: PHYSICIAN ASSISTANT

## 2019-10-05 RX ORDER — WARFARIN SODIUM 5 MG/1
5 TABLET ORAL
Status: DISCONTINUED | OUTPATIENT
Start: 2019-10-05 | End: 2019-10-10

## 2019-10-05 RX ADMIN — PANTOPRAZOLE SODIUM 40 MG: 40 TABLET, DELAYED RELEASE ORAL at 05:30

## 2019-10-05 RX ADMIN — HEPARIN SODIUM 3000 UNITS: 1000 INJECTION, SOLUTION INTRAVENOUS; SUBCUTANEOUS at 20:51

## 2019-10-05 RX ADMIN — HEPARIN SODIUM AND DEXTROSE 20 UNITS/KG/HR: 10000; 5 INJECTION INTRAVENOUS at 18:10

## 2019-10-05 RX ADMIN — METOCLOPRAMIDE 10 MG: 5 INJECTION, SOLUTION INTRAMUSCULAR; INTRAVENOUS at 12:20

## 2019-10-05 RX ADMIN — MELATONIN 6 MG: 3 TAB ORAL at 21:42

## 2019-10-05 RX ADMIN — CIPROFLOXACIN 400 MG: 2 INJECTION, SOLUTION INTRAVENOUS at 10:21

## 2019-10-05 RX ADMIN — HEPARIN SODIUM 3000 UNITS: 1000 INJECTION, SOLUTION INTRAVENOUS; SUBCUTANEOUS at 14:17

## 2019-10-05 RX ADMIN — POTASSIUM CHLORIDE 20 MEQ: 1500 TABLET, EXTENDED RELEASE ORAL at 05:30

## 2019-10-05 RX ADMIN — FLUTICASONE PROPIONATE 2 SPRAY: 50 SPRAY, METERED NASAL at 08:23

## 2019-10-05 RX ADMIN — PENTOXIFYLLINE 400 MG: 400 TABLET, EXTENDED RELEASE ORAL at 08:23

## 2019-10-05 RX ADMIN — CIPROFLOXACIN 400 MG: 2 INJECTION, SOLUTION INTRAVENOUS at 23:52

## 2019-10-05 RX ADMIN — SODIUM CHLORIDE AND POTASSIUM CHLORIDE 75 ML/HR: .9; .15 SOLUTION INTRAVENOUS at 14:22

## 2019-10-05 RX ADMIN — INFLUENZA A VIRUS A/MICHIGAN/45/2015 X-275 (H1N1) ANTIGEN (FORMALDEHYDE INACTIVATED), INFLUENZA A VIRUS A/SINGAPORE/INFIMH-16-0019/2016 IVR-186 (H3N2) ANTIGEN (FORMALDEHYDE INACTIVATED), AND INFLUENZA B VIRUS B/MARYLAND/15/2016 BX-69A (A B/COLORADO/6/2017-LIKE VIRUS) ANTIGEN (FORMALDEHYDE INACTIVATED) 0.5 ML: 60; 60; 60 INJECTION, SUSPENSION INTRAMUSCULAR at 05:33

## 2019-10-05 RX ADMIN — NYSTATIN: 100000 POWDER TOPICAL at 17:11

## 2019-10-05 RX ADMIN — ONDANSETRON 4 MG: 2 INJECTION INTRAMUSCULAR; INTRAVENOUS at 11:23

## 2019-10-05 RX ADMIN — Medication 400 UNITS: at 08:23

## 2019-10-05 RX ADMIN — OXYCODONE HYDROCHLORIDE 10 MG: 10 TABLET, FILM COATED, EXTENDED RELEASE ORAL at 21:42

## 2019-10-05 RX ADMIN — OXYCODONE HYDROCHLORIDE 10 MG: 10 TABLET, FILM COATED, EXTENDED RELEASE ORAL at 05:30

## 2019-10-05 RX ADMIN — PENTOXIFYLLINE 400 MG: 400 TABLET, EXTENDED RELEASE ORAL at 12:19

## 2019-10-05 RX ADMIN — ONDANSETRON 4 MG: 2 INJECTION INTRAMUSCULAR; INTRAVENOUS at 05:44

## 2019-10-05 RX ADMIN — TRAZODONE HYDROCHLORIDE 100 MG: 100 TABLET ORAL at 21:42

## 2019-10-05 RX ADMIN — ONDANSETRON 4 MG: 2 INJECTION INTRAMUSCULAR; INTRAVENOUS at 19:33

## 2019-10-05 RX ADMIN — OXYCODONE HYDROCHLORIDE 10 MG: 10 TABLET, FILM COATED, EXTENDED RELEASE ORAL at 14:16

## 2019-10-05 RX ADMIN — FAMOTIDINE 40 MG: 20 TABLET ORAL at 21:42

## 2019-10-05 RX ADMIN — METOCLOPRAMIDE 10 MG: 5 INJECTION, SOLUTION INTRAMUSCULAR; INTRAVENOUS at 20:56

## 2019-10-05 RX ADMIN — NYSTATIN: 100000 POWDER TOPICAL at 08:23

## 2019-10-05 RX ADMIN — WARFARIN SODIUM 5 MG: 5 TABLET ORAL at 17:10

## 2019-10-05 RX ADMIN — PENTOXIFYLLINE 400 MG: 400 TABLET, EXTENDED RELEASE ORAL at 16:58

## 2019-10-05 NOTE — ASSESSMENT & PLAN NOTE
Patient following with urology for high grade urothelial carcinoma of prostate  S/p surgical resection which was unable to be accomplished due to severity of the disease  Currently with diverting urostomy   Currently undergoing chemotherapy with palliative radiation  Urology consult

## 2019-10-05 NOTE — ASSESSMENT & PLAN NOTE
Patient following with urology for high grade urothelial carcinoma of prostate  S/p surgical resection which was unable to be accomplished due to severity of the disease  Currently with diverting urostomy   Currently undergoing chemotherapy with palliative radiation  Urology consult completed and no further urological intervention recommended  Urology recommends to continue IV antibiotics, pain control

## 2019-10-05 NOTE — PROGRESS NOTES
Progress Note - Rula Jones 1945, 76 y o  male MRN: [id number]    Unit/Bed#: [facility] 2 -02 Encounter: [id number]    Primary Care Provider: Hilda Hudson MD   Date and time admitted to hospital: 10/4/2019  3:56 PM        * Acute deep vein thrombosis (DVT) of iliac vein of right lower extremity [facility])  Assessment & Plan  Found to have acute occlusive DVT in right leg external iliac, common femoral, femoral, deep femoral, popliteal, gastrocnemius, paired peroneal and posterior tibial veins   Started on heparin drip per DVT protocol  Will continue patient on IV heparin drip and consider bridge with warfarin  Goal INR between 2-3  Denies SOB or chest pain to suggest PE- continue to monitor   Denies history of blood clots, no anticoagulation  Emphysematous cystitis  Assessment & Plan  Found to have diffuse urinary bladder wall thickening with tiny droplets of pneumoperitoneum adjacent to urinary bladder  Urology has been consulted and they have evaluated the patient  As per Urology recommendations, they are of the opinion that the findings are consistent with a mild form of emphysematous cystitis  They recommend continuing antibiotics as prescribed, and obtaining cultures from any expressed urethral discharge  No further urologic intervention at this time      Nausea  Assessment & Plan  Patient presented to ED  with complaints of abdominal pain and nausea  Likely component of UTI, chemotherapy, bladder CA   PRN Zofran and Reglan   IV cipro for UTI   PRN oxycodone and dilaudid     Peripheral vascular disease (HCC)  Assessment & Plan  History of Buerger's disease, history of left TMA  Known left popliteal aneurysm   Reports bilateral claudication pain  Continue heparin drip and home [facility]   Continue outpatient vascular surgery follow up     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 [facility])  Assessment & Plan  Patient following with urology for high grade urothelial carcinoma of prostate  S/p surgical resection which was unable to be accomplished due to severity of the disease  Currently with diverting urostomy   Currently undergoing chemotherapy with palliative radiation  Urology consult completed and no further urological intervention recommended  Urology recommends to continue IV antibiotics, pain control  Cancer related pain  Assessment & Plan  Continue home regimen of oxycodone 10 mg Q8hrs scheduled   PRN oxycodone and dilaudid while inpatient   Continue outpatient palliative care follow up     CKD (chronic kidney disease) stage 3, GFR 30-59 ml/min ([facility])  Assessment & Plan  Creatinine slightly elevated to 1 38 at time of presentation, baseline 1 2  Creatinine is currently back to baseline at 1 11  I   Continue IV fluid hydration and continue to trend BMP  Avoid nephrotoxins and hypotension    Essential hypertension  Assessment & Plan  Appears controlled at this time off medications  Continue to monitor         VTE Pharmacologic Prophylaxis:   Pharmacologic: Heparin Drip  Mechanical VTE Prophylaxis in Place: No    Patient Centered Rounds: I have performed bedside rounds with nursing staff today  Discussions with Specialists or Other Care Team Provider:  Yes    Education and Discussions with Family / Patient:  Yes    Time Spent for Care: 15 minutes  More than 50% of total time spent on counseling and coordination of care as described above  Current Length of Stay: 1 day(s)    Current Patient Status: Inpatient   Certification Statement: The patient will continue to require additional inpatient hospital stay due to Acute DVT requiring anticoagulation    Discharge Plan: To be determined when INR therapeutic    Code Status: Level 1 - Full Code      Subjective:   Patient still complains of right lower extremity pain and swelling  He denies any nausea currently      Objective:     Vitals:   Temp (24hrs), Av 3 °F (36 8 °C), Min:98 2 °F (36 8 °C), Max:98 3 °F (36 8 °C)    Temp:  [98 2 °F (36 8 °C)-98 3 °F (36 8 °C)] 98 2 °F (36 8 °C)  HR:  [57-87] 57  Resp:  [16-18] 16  BP: (100-141)/(50-74) 100/50  SpO2:  [96 %-100 %] 98 %  Body mass index is 26 01 kg/m²  Input and Output Summary (last 24 hours): Intake/Output Summary (Last 24 hours) at 10/5/2019 1428  Last data filed at 10/5/2019 1422  Gross per 24 hour   Intake 3310 ml   Output 700 ml   Net 2610 ml       Physical Exam:     Physical Exam   Constitutional: He is oriented to person, place, and time  He appears well-developed and well-nourished  HENT:   Head: Normocephalic and atraumatic  Eyes: Pupils are equal, round, and reactive to light  EOM are normal    Neck: Normal range of motion  Neck supple  Cardiovascular: Normal rate, regular rhythm and normal heart sounds  No murmur heard  Pulmonary/Chest: Effort normal and breath sounds normal    Abdominal: Soft  Bowel sounds are normal    Musculoskeletal: He exhibits edema  Edema of right lower extremity   Neurological: He is alert and oriented to person, place, and time  Skin: Skin is warm and dry  Additional Data:     Labs:    Results from last 7 days   Lab Units 10/05/19  0512 10/04/19  1608   WBC Thousand/uL 5 52 7 85   HEMOGLOBIN g/dL 8 2* 9 5*   HEMATOCRIT % 27 1* 32 1*   PLATELETS Thousands/uL 428* 507*   NEUTROS PCT %  --  86*   LYMPHS PCT %  --  5*   MONOS PCT %  --  8   EOS PCT %  --  0     Results from last 7 days   Lab Units 10/05/19  0512 10/04/19  1608   SODIUM mmol/L 133* 128*   POTASSIUM mmol/L 3 7 3 2*   CHLORIDE mmol/L 100 92*   CO2 mmol/L 26 23   BUN mg/dL 12 12   CREATININE mg/dL 1 11 1 38*   ANION GAP mmol/L 7 13   CALCIUM mg/dL 8 9 9 3   ALBUMIN g/dL  --  2 1*   TOTAL BILIRUBIN mg/dL  --  0 51   ALK PHOS U/L  --  96   ALT U/L  --  6*   AST U/L  --  12   GLUCOSE RANDOM mg/dL 97 115     Results from last 7 days   Lab Units 10/04/19  2225   INR  1 23*                       * I Have Reviewed All Lab Data Listed Above    * Additional Pertinent Lab Tests Reviewed:  [facility] 66 Admission Reviewed    Imaging:    Imaging Reports Reviewed Today Include:  Unilateral lower limb venous duplex study, CT abdomen and pelvis without contrast   Imaging Personally Reviewed by Myself Includes:  None    Recent Cultures (last 7 days):           Last 24 Hours Medication List:     Current Facility-Administered Medications:  acetaminophen 650 mg Oral Q6H PRN Valorie Smudde, PA-C    aluminum-magnesium hydroxide-simethicone 30 mL Oral Q6H PRN Ulises Russell, PA-C    cholecalciferol 400 Units Oral Daily Valorie Smudde, PA-C    ciprofloxacin 400 mg Intravenous Q12H Valorie Smudde, PA-C Last Rate: Stopped (10/05/19 1121)   famotidine 40 mg Oral HS Valorie Smudde, PA-C    fluticasone 2 spray Each Nare Daily Valorie Smudde, PA-C    heparin (porcine) 3-30 Units/kg/hr (Order-Specific) Intravenous Titrated Valorie Smudde, PA-C Last Rate: 20 Units/kg/hr (10/05/19 1417)   heparin (porcine) 3,000 Units Intravenous PRN Valorie Smudde, PA-C    heparin (porcine) 6,000 Units Intravenous PRN Valorie Smudde, PA-C    HYDROmorphone 0 5 mg Intravenous Q4H PRN Valorie Smudde, PA-C    melatonin 6 mg Oral HS Valorie Smudde, PA-C    metoclopramide 10 mg Intravenous Q6H PRN Valorie Smudde, PA-C    nystatin  Topical BID Valorie Smudde, PA-C    ondansetron 4 mg Intravenous Q6H PRN Valorie Smudde, PA-C    oxyCODONE 10 mg Oral Q8H [address] Valorie Smudde, PA-C    oxyCODONE 10 mg Oral Q4H PRN Valorie Smudde, PA-C    oxyCODONE 5 mg Oral Q4H PRN Valorie Smudde, PA-C    pantoprazole 40 mg Oral Early Morning Valorie Smudde, PA-C    pentoxifylline 400 mg Oral TID With Meals Valorie Smudde, PA-C    polyethylene glycol 17 g Oral Daily PRN Valorie Smudde, PA-C    senna 2 tablet Oral HS PRN Valorie Smudde, PA-C    sodium chloride 0 9 % with KCl 20 mEq/L 75 mL/hr Intravenous Continuous Valorie Smvíctor, PA-C Last Rate: 75 mL/hr (10/05/19 1422)   traZODone 100 mg Oral HS Valorie Marlyn PA-C         Today, Patient Was Seen By: Vandana Sweet MD    ** Please Note: Dictation voice to text software may have been used in the creation of this document   **

## 2019-10-05 NOTE — ASSESSMENT & PLAN NOTE
Patient presented to ED  with complaints of abdominal pain and nausea  Likely component of UTI, chemotherapy, bladder CA   PRN Zofran and Reglan   IV cipro for UTI   PRN oxycodone and dilaudid

## 2019-10-05 NOTE — ASSESSMENT & PLAN NOTE
Patient presented to ED today with complaints of abdominal pain and nausea  Likely component of UTI, chemotherapy, bladder CA   PRN Zofran and Reglan   IV cipro for UTI   PRN oxycodone and dilaudid

## 2019-10-05 NOTE — ASSESSMENT & PLAN NOTE
Creatinine slightly elevated to 1 38 at time of presentation, baseline 1 2  Creatinine is currently back to baseline at 1 11  I   Continue IV fluid hydration and continue to trend BMP  Avoid nephrotoxins and hypotension

## 2019-10-05 NOTE — ASSESSMENT & PLAN NOTE
History of Buerger's disease, history of left TMA  Known left popliteal aneurysm   Reports bilateral claudication pain  Continue heparin drip and home [facility]   Continue outpatient vascular surgery follow up

## 2019-10-05 NOTE — H&P
[address] Internal Medicine  H&P- [facility] 1945, 76 y o  male MRN: [id number]    Unit/Bed#: Jin Conteh 2 -02 Encounter: [id number]    Primary Care Provider: Luz Maria Morillo MD   Date and time admitted to hospital: 10/4/2019  3:56 PM        * Acute deep vein thrombosis (DVT) of iliac vein of right lower extremity ([address] )  Assessment & Plan  Found to have acute occlusive DVT in right leg external iliac, common femoral, femoral, deep femoral, popliteal, gastrocnemius, paired peroneal and posterior tibial veins   Started on heparin drip per DVT protocol  Denies SOB or chest pain to suggest PE- continue to monitor   Denies history of blood clots, no anticoagulation       Emphysematous cystitis  Assessment & Plan  Found to have diffuse urinary bladder wall thickening with tiny droplets of pneumoperitoneum adjacent to urinary bladder  Urology consulted by ED, recommending treatment for emphysematous cystitis   Inpatient urology consult   IV Cipro  IV fluid hydration     Nausea  Assessment & Plan  Patient presented to ED today with complaints of abdominal pain and nausea  Likely component of UTI, chemotherapy, bladder CA   PRN Zofran and Reglan   IV cipro for UTI   PRN oxycodone and dilaudid     Cancer related pain  Assessment & Plan  Continue home regimen of oxycodone 10 mg Q8hrs scheduled   PRN oxycodone and dilaudid while inpatient   Continue outpatient palliative care follow up     CKD (chronic kidney disease) stage 3, GFR 30-59 ml/min ([facility])  Assessment & Plan  Creatinine slightly elevated to 1 38 at time of presentation, baseline 1 2   IV fluid hydration and continue to trend BMP  Avoid nephrotoxins and hypotension    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 [facility])  Assessment & Plan  Patient following with urology for high grade urothelial carcinoma of prostate  S/p surgical resection which was unable to be accomplished due to severity of the disease  Currently with diverting urostomy   Currently undergoing chemotherapy with palliative radiation  Urology consult     Essential hypertension  Assessment & Plan  Appears controlled at this time off medications  Continue to monitor     Peripheral vascular disease ([address] )  Assessment & Plan  History of Buerger's disease, history of left TMA  Known left popliteal aneurysm   Reports bilateral claudication pain  Continue heparin drip and home [facility]   Continue outpatient vascular surgery follow up       VTE Prophylaxis: Heparin Drip  / reason for no mechanical VTE prophylaxis acute DVT   Code Status: full code  POLST: POLST form is not discussed and not completed at this time  Discussion with family: wife at bedside    Anticipated Length of Stay:  Patient will be admitted on an Inpatient basis with an anticipated length of stay of  More than 2 midnights  Justification for Hospital Stay: DVT, UTI    Total Time for Visit, including Counseling / Coordination of Care: 45 minutes  Greater than 50% of this total time spent on direct patient counseling and coordination of care  Chief Complaint:   Abdominal pain    History of Present Illness:    Darin Carrera is a 76 y o  male with a PMHx of urinary tract carcinoma, HTN, PVD, xavier's disease who presents with abdominal pain  Patient reports two days of worsening abdominal pain with nausea and vomiting  Was placed on Cipro outpatient for UTI two days ago  Reports color of urine has improved  Denies associated fever or chills  Reports pain is suprapubic and does not radiate  Reports minimal relief of pain from morphine in ED  Also reporting two days of right leg swelling with transient color change  Denies history of blood clots  Does report history of PVD and known popliteal aneurysm  Currently undergoing treatment for bladder CA  Follows with palliative care outpatient  Review of Systems:    Review of Systems   Constitutional: Positive for fatigue     HENT: Negative for trouble swallowing  Eyes: Negative for photophobia  Respiratory: Negative for cough and shortness of breath  Cardiovascular: Positive for leg swelling  Negative for chest pain  Gastrointestinal: Positive for abdominal pain, nausea and vomiting  Endocrine: Negative  Genitourinary: Negative for difficulty urinating  Musculoskeletal: Negative for back pain  Skin: Positive for color change  Allergic/Immunologic: Positive for immunocompromised state  Neurological: Positive for weakness  Negative for light-headedness and numbness  Hematological: Negative  Psychiatric/Behavioral: Negative          Past Medical and Surgical History:     Past Medical History:   Diagnosis Date    Aneurysm ([facility] [address] [address] )     Behind left knee recently diagnosed    Back pain     Ceron disease     Ceron's disease     Bladder cancer ([facility] )     BPH (benign prostatic hyperplasia)     Cancer ([facility] [address] [address] )     melanoma    Cataract     right eye    Cataract     right eye    Confusion     DDD (degenerative disc disease), lumbar     Depression     Diverticulosis     Gallstones     GERD (gastroesophageal reflux disease)     History of cardiac murmur     Past    History of melanoma     Was on back in early 1990's    History of rheumatic fever     Childhood    History of transfusion     Nov 2018    [facility] (hard of hearing)     Hydronephrosis     Hypertension     Kidney stone     Multiple times    Neck pain     Nervous breakdown     History of due to reaction to psych med which he was on for anxiety/depression and became suicidal    Numbness and tingling in both hands     Numbness and tingling of both feet     PONV (postoperative nausea and vomiting)     Prostate cancer ([address] )     PVD (peripheral vascular disease) ([address] )     RBBB     Scoliosis     Seasonal allergies     Tinnitus     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[address] )     Wears partial dentures     Upper    Wears partial dentures     Upper       Past Surgical History:   Procedure Laterality Date    ABDOMINAL SURGERY      When young had procedure for improviing circulation to left lower extremety    BACK SURGERY      Lumbar- not sure what was done   Emaline Folds CARDIAC CATHETERIZATION      Approximately 2007- no blockages    CARPAL TUNNEL RELEASE Bilateral     wrists are fused    CATARACT EXTRACTION Left     COLONOSCOPY      CYST REMOVAL      Robotic procedure to remove benign cyst from lower left lung    CYSTOGRAM N/A 3/14/2018    Procedure: CYSTOGRAM;  Surgeon: Avtar Alamo MD;  [facility];  Service: Urology    CYSTOSCOPY      ESOPHAGOGASTRODUODENOSCOPY      IR TUBE PLACEMENT NEPHROSTOMY  8/10/2018    LUNG SURGERY      cyst removed left lung    OTHER SURGICAL HISTORY Left     fistula drain in left buttock    ME CYSTO/URETERO W/LITHOTRIPSY &INDWELL STENT INSRT Left 1/3/2018    Procedure: CYSTOSCOPY URETEROSCOPY, RETROGRADE PYELOGRAM AND INSERTION STENT URETERAL;  Surgeon: Avtar Alamo MD;  [facility];  Service: Urology    ME CYSTOTOMY,EXCIS BLADDER TIC N/A 2/14/2018    Procedure: ABDOMINAL EXPLORATION; CLOSURE OF BLADDER DIVERTICULITIS;  Surgeon: Avtar Alamo MD;  [facility];  Service: Urology    ME CYSTOURETHROSCOPY,URETER CATHETER Left 8/1/2018    Procedure: Cystoscopy, cystogram, bladder biopsies, removal of pelvic drain;  Surgeon: Avtar Alamo MD;  [facility];  Service: Urology    ME INCISE/DRAIN BLADDER N/A 2/14/2018    Procedure: OPEN SUPRAPUBIC TUBE PLACEMENT;  Surgeon: Avtar Alamo MD;  [facility];  Service: Urology    ME RELEASE Chip Saravanan FIBROSIS Left 2/14/2018    Procedure: LYSIS OF ADHESIONS; URETEROLYSIS ;  Surgeon: Avtar Alamo MD;  [facility];  Service: Urology     [facility] BLADDER/NODES,ILEAL CONDUIT N/A 2/25/2019    Procedure: ATTEMPTED CYSTECTOMY RADICAL; ILEAL CONDUIT URINARY DIVERSION; BIOPSY OF PELVIC MASS; APPENDECTOMY;  Surgeon: Avtar Alamo MD;  [facility];  Service: Urology    SKIN CANCER EXCISION      Melanoma removal on back in early 1990's    TOE AMPUTATION Left     All 5 toes left foot were amputated due to poor circulation     TRANSURETHRAL RESECTION OF PROSTATE N/A 3/14/2018    Procedure: TRANSURETHRAL RESECTION OF PROSTATE (TURP), LEFT URETERAL STENT REMOVAL;  Surgeon: Danny eMdina MD;  [facility];  Service: Urology    TRANSURETHRAL RESECTION OF PROSTATE N/A 9/26/2018    Procedure: TRANSURETHRAL RESECTION OF PROSTATE (TURP); Surgeon: Danny Medina MD;  [facility];  Service: Urology    WRIST SURGERY Bilateral     Ulnar nerve on right arm and both wrists are fused       Meds/Allergies:    Prior to Admission medications    Medication Sig Start Date End Date Taking?  Authorizing Provider   acetaminophen (TYLENOL) 325 mg tablet Take 650 mg by mouth every 6 (six) hours as needed for mild pain    Yes Historical Provider, MD   ciprofloxacin (CIPRO) 500 mg tablet Take 1 tablet (500 mg total) by mouth every 12 (twelve) hours for 7 days 10/2/19 10/9/19 Yes RUSSELL Shin   Melatonin 5 MG TABS Take 5 mg by mouth daily at bedtime    Yes Historical Provider, MD   meloxicam (MOBIC) 15 mg tablet Take 15 mg by mouth daily 8/2/19  Yes Historical Provider, MD   Methylcellulose, Laxative, (CITRUCEL PO) Take by mouth daily as needed   Yes Historical Provider, MD   Misc Natural Products (T-RELIEF CBD+13 SL) Place 1 capsule under the tongue 3 (three) times a day    Yes Historical Provider, MD   mometasone (NASONEX) 50 mcg/act nasal spray 2 sprays into each nostril as needed   Yes Historical Provider, MD   oxyCODONE (OxyCONTIN) 10 mg 12 hr tablet Take 1 tablet (10 mg total) by mouth every 8 (eight) hoursMax Daily Amount: 30 mg 8/23/19  Yes Chucky Biggs MD   pantoprazole (PROTONIX) 40 mg tablet Take 1 tablet (40 mg total) by mouth daily 5/1/19  Yes Beatriz Lam DO   pentoxifylline (TRENtal) 400 mg ER tablet Take 400 mg by mouth 3 (three) times a day with meals   Yes Historical Provider, MD   ranitidine (ZANTAC) 300 MG tablet Take 300 mg by mouth daily at bedtime 11/13/18  Yes Historical Provider, MD   senna (SENOKOT) 8 6 mg Take 1-2 tablets orally daily as needed for constipation  Patient taking differently: as needed Take 1-2 tablets orally daily as needed for constipation 4/12/19  Yes Analilia Kaur MD   traZODone (DESYREL) 50 mg tablet Take 2 tablets (100 mg total) by mouth daily at bedtime 9/25/19  Yes Cash Casarez MD   Glucosamine-Chondroit-Vit C-Mn (GLUCOSAMINE CHONDR 500 COMPLEX) CAPS 2x daily    Historical Provider, MD   Omega-3 1000 MG CAPS 1 tablet daily    Historical Provider, MD   Vitamin D, Cholecalciferol, 400 units TABS 1 tablet daily    Historical Provider, MD   vitamin E, tocopherol, 1,000 units capsule 1 tablet daily    Historical Provider, MD     I have reviewed home medications with patient personally  Allergies: Allergies   Allergen Reactions    Iodine Shortness Of Breath, Swelling and Hives     Contrast dye causes respiratory distress   Iodine causes skin rash    Mercury Hives and Swelling    Shellfish-Derived Products Anaphylaxis, Hives and Shortness Of Breath    Augmentin [Amoxicillin-Pot Clavulanate] GI Intolerance, Rash and Diarrhea     Diarrhea    Lidoderm [Lidocaine] Rash     Lidoderm patch caused rash    Penicillins Rash, Swelling and Hives    Sulfa Antibiotics Hives, Swelling and Rash       Social History:     Marital Status: Common Law   Occupation: disabled   Patient Pre-hospital Living Situation: home with wife  Patient Pre-hospital Level of Mobility: ambulatory  Patient Pre-hospital Diet Restrictions: none  Substance Use History:   Social History     Substance and Sexual Activity   Alcohol Use Not Currently    Frequency: Never    Comment: Very rare     Social History     Tobacco Use   Smoking Status Former Smoker    Packs/day: 1 00    Years: 15 00    Pack years: 15 00    Types: Cigarettes    Last attempt to quit: 2/5/1970    Years since quittin 6   Smokeless Tobacco Never Used   Tobacco Comment    Quit 50 years     Social History     Substance and Sexual Activity   Drug Use Yes    Types: Prescription, Marijuana    Comment: 2x per day       Family History:    Family History   Problem Relation Age of Onset    Heart disease Father     Heart disease Mother     Cancer Paternal Grandfather        Physical Exam:     Vitals:   Blood Pressure: 124/60 (10/04/19 2252)  Pulse: 66 (10/04/19 2252)  Temperature: 98 3 °F (36 8 °C) (10/04/19 2252)  Temp Source: Oral (10/04/19 1605)  Respirations: 18 (10/04/19 2252)  Weight - Scale: 79 9 kg (176 lb 2 4 oz) (10/04/19 2234)  SpO2: 98 % (10/04/19 2252)    Physical Exam   Constitutional: He is oriented to person, place, and time  He appears well-nourished  HENT:   Head: Normocephalic and atraumatic  Mouth/Throat: Oropharynx is clear and moist    Eyes: Conjunctivae and EOM are normal  No scleral icterus  Neck: Neck supple  Cardiovascular: Normal rate, regular rhythm and normal heart sounds  No murmur heard  Pulmonary/Chest: Effort normal and breath sounds normal  No respiratory distress  He has no wheezes  Abdominal: Soft  Bowel sounds are normal  He exhibits no distension  There is tenderness in the suprapubic area  There is no guarding  Musculoskeletal: He exhibits edema (mild right lower extremity swelling, no color change)  He exhibits no deformity  S/p left TMA   Neurological: He is alert and oriented to person, place, and time  Skin: Skin is warm and dry  Psychiatric: He has a normal mood and affect  His behavior is normal  Thought content normal    Vitals reviewed  Additional Data:     Lab Results: I have personally reviewed pertinent reports        Results from last 7 days   Lab Units 10/04/19  1608   WBC Thousand/uL 7 85   HEMOGLOBIN g/dL 9 5*   HEMATOCRIT % 32 1*   PLATELETS Thousands/uL 507*   NEUTROS PCT % 86*   LYMPHS PCT % 5*   MONOS PCT % 8   EOS PCT % 0     Results from last 7 days   Lab Units 10/04/19  1608   SODIUM mmol/L 128*   POTASSIUM mmol/L 3 2*   CHLORIDE mmol/L 92*   CO2 mmol/L 23   BUN mg/dL 12   CREATININE mg/dL 1 38*   ANION GAP mmol/L 13   CALCIUM mg/dL 9 3   ALBUMIN g/dL 2 1*   TOTAL BILIRUBIN mg/dL 0 51   ALK PHOS U/L 96   ALT U/L 6*   AST U/L 12   GLUCOSE RANDOM mg/dL 115     Results from last 7 days   Lab Units 10/04/19  2225   INR  1 23*                   Imaging: I have personally reviewed pertinent reports  CT abdomen pelvis wo contrast   ED Interpretation by Anjelica Johnson DO (10/04 2149)   IMPRESSION:       Diffuse urinary bladder wall thickening similar to prior study with a unchanged exophytic lesion in the left posterior aspect of the urinary bladder        Tiny droplets of pneumoperitoneum adjacent to the urinary bladder which may be due to recent procedure however cannot rule out postsurgical complication  Final Result by Liz Escoto DO (10/04 2142)      Diffuse urinary bladder wall thickening similar to prior study with a unchanged exophytic lesion in the left posterior aspect of the urinary bladder  Tiny droplets of pneumoperitoneum adjacent to the urinary bladder which may be due to recent procedure however cannot rule out postsurgical complication  I personally discussed this study with GEOVANNI LAMAS on 10/4/2019 at 9:38 PM                         Workstation performed: [id number]         VAS lower limb venous duplex study, complete bilateral    (Results Pending)       Allscripts / Epic Records Reviewed: Yes     ** Please Note: This note has been constructed using a voice recognition system   **

## 2019-10-05 NOTE — PLAN OF CARE
Problem: Potential for Falls  Goal: Patient will remain free of falls  Description  INTERVENTIONS:  - Assess patient frequently for physical needs  -  Identify cognitive and physical deficits and behaviors that affect risk of falls    -  [place] fall precautions as indicated by assessment   - Educate patient/family on patient safety including physical limitations  - Instruct patient to call for assistance with activity based on assessment  - Modify environment to reduce risk of injury  - Consider OT/PT consult to assist with strengthening/mobility  Outcome: Progressing     Problem: Prexisting or High Potential for Compromised Skin Integrity  Goal: Skin integrity is maintained or improved  Description  INTERVENTIONS:  - Identify patients at risk for skin breakdown  - Assess and monitor skin integrity  - Assess and monitor nutrition and hydration status  - Monitor labs   - Assess for incontinence   - Turn and reposition patient  - Assist with mobility/ambulation  - Relieve pressure over bony prominences  - Avoid friction and shearing  - Provide appropriate hygiene as needed including keeping skin clean and dry  - Evaluate need for skin moisturizer/barrier cream  - Collaborate with interdisciplinary team   - Patient/family teaching  - Consider wound care consult   Outcome: Progressing     Problem: PAIN - ADULT  Goal: Verbalizes/displays adequate comfort level or baseline comfort level  Description  Interventions:  - Encourage patient to monitor pain and request assistance  - Assess pain using appropriate pain scale  - Administer analgesics based on type and severity of pain and evaluate response  - Implement non-pharmacological measures as appropriate and evaluate response  - Consider cultural and social influences on pain and pain management  - Notify physician/advanced practitioner if interventions unsuccessful or patient reports new pain  Outcome: Progressing     Problem: INFECTION - ADULT  Goal: Absence or prevention of progression during hospitalization  Description  INTERVENTIONS:  - Assess and monitor for signs and symptoms of infection  - Monitor lab/diagnostic results  - Monitor all insertion sites, i e  indwelling lines, tubes, and drains  - Monitor endotracheal if appropriate and nasal secretions for changes in amount and color  - [place] appropriate cooling/warming therapies per order  - Administer medications as ordered  - Instruct and encourage patient and family to use good hand hygiene technique  - Identify and instruct in appropriate isolation precautions for identified infection/condition  Outcome: Progressing  Goal: Absence of fever/infection during neutropenic period  Description  INTERVENTIONS:  - Monitor WBC    Outcome: Progressing     Problem: SAFETY ADULT  Goal: Maintain or return to baseline ADL function  Description  INTERVENTIONS:  -  Assess patient's ability to carry out ADLs; assess patient's baseline for ADL function and identify physical deficits which impact ability to perform ADLs (bathing, care of mouth/teeth, toileting, grooming, dressing, etc )  - Assess/evaluate cause of self-care deficits   - Assess range of motion  - Assess patient's mobility; develop plan if impaired  - Assess patient's need for assistive devices and provide as appropriate  - Encourage maximum independence but intervene and supervise when necessary  - Involve family in performance of ADLs  - Assess for home care needs following discharge   - Consider OT consult to assist with ADL evaluation and planning for discharge  - Provide patient education as appropriate  Outcome: Progressing  Goal: Maintain or return mobility status to optimal level  Description  INTERVENTIONS:  - Assess patient's baseline mobility status (ambulation, transfers, stairs, etc )    - Identify cognitive and physical deficits and behaviors that affect mobility  - Identify mobility aids required to assist with transfers and/or ambulation (gait belt, sit-to-stand, lift, walker, cane, etc )  - [place] fall precautions as indicated by assessment  - Record patient progress and toleration of activity level on Mobility SBAR; progress patient to next Phase/Stage  - Instruct patient to call for assistance with activity based on assessment  - Consider rehabilitation consult to assist with strengthening/weightbearing, etc   Outcome: Progressing     Problem: DISCHARGE PLANNING  Goal: Discharge to home or other facility with appropriate resources  Description  INTERVENTIONS:  - Identify barriers to discharge w/patient and caregiver  - Arrange for needed discharge resources and transportation as appropriate  - Identify discharge learning needs (meds, wound care, etc )  - Arrange for interpretive services to assist at discharge as needed  - Refer to Case Management Department for coordinating discharge planning if the patient needs post-hospital services based on physician/advanced practitioner order or complex needs related to functional status, cognitive ability, or social support system  Outcome: Progressing     Problem: Knowledge Deficit  Goal: Patient/family/caregiver demonstrates understanding of disease process, treatment plan, medications, and discharge instructions  Description  Complete learning assessment and assess knowledge base    Interventions:  - Provide teaching at level of understanding  - Provide teaching via preferred learning methods  Outcome: Progressing     Problem: GENITOURINARY - ADULT  Goal: Maintains or returns to baseline urinary function  Description  INTERVENTIONS:  - Assess urinary function  - Encourage oral fluids to ensure adequate hydration if ordered  - Administer IV fluids as ordered to ensure adequate hydration  - Administer ordered medications as needed  - Offer frequent toileting  - Follow urinary retention protocol if ordered  Outcome: Progressing  Goal: Absence of urinary retention  Description  INTERVENTIONS:  - Assess patients ability to void and empty bladder  - Monitor I/O  - Bladder scan as needed  - Discuss with physician/AP medications to alleviate retention as needed  - Discuss catheterization for long term situations as appropriate  Outcome: Progressing     Problem: HEMATOLOGIC - ADULT  Goal: Maintains hematologic stability  Description  INTERVENTIONS  - Assess for signs and symptoms of bleeding or hemorrhage  - Monitor labs  - Administer supportive blood products/factors as ordered and appropriate  Outcome: Progressing

## 2019-10-05 NOTE — ASSESSMENT & PLAN NOTE
Found to have acute occlusive DVT in right leg external iliac, common femoral, femoral, deep femoral, popliteal, gastrocnemius, paired peroneal and posterior tibial veins   Started on heparin drip per DVT protocol  Will continue patient on IV heparin drip and consider bridge with warfarin  Goal INR between 2-3  Denies SOB or chest pain to suggest PE- continue to monitor   Denies history of blood clots, no anticoagulation

## 2019-10-05 NOTE — ASSESSMENT & PLAN NOTE
Creatinine slightly elevated to 1 38 at time of presentation, baseline 1 2   IV fluid hydration and continue to trend BMP  Avoid nephrotoxins and hypotension

## 2019-10-05 NOTE — ASSESSMENT & PLAN NOTE
Continue home regimen of oxycodone 10 mg Q8hrs scheduled   PRN oxycodone and dilaudid while inpatient   Continue outpatient palliative care follow up

## 2019-10-05 NOTE — ASSESSMENT & PLAN NOTE
Found to have diffuse urinary bladder wall thickening with tiny droplets of pneumoperitoneum adjacent to urinary bladder  Urology has been consulted and they have evaluated the patient  As per Urology recommendations, they are of the opinion that the findings are consistent with a mild form of emphysematous cystitis  They recommend continuing antibiotics as prescribed, and obtaining cultures from any expressed urethral discharge  No further urologic intervention at this time

## 2019-10-05 NOTE — PLAN OF CARE
Problem: Potential for Falls  Goal: Patient will remain free of falls  Description  INTERVENTIONS:  - Assess patient frequently for physical needs  -  Identify cognitive and physical deficits and behaviors that affect risk of falls    -  [place] fall precautions as indicated by assessment   - Educate patient/family on patient safety including physical limitations  - Instruct patient to call for assistance with activity based on assessment  - Modify environment to reduce risk of injury  - Consider OT/PT consult to assist with strengthening/mobility  Outcome: Progressing     Problem: Prexisting or High Potential for Compromised Skin Integrity  Goal: Skin integrity is maintained or improved  Description  INTERVENTIONS:  - Identify patients at risk for skin breakdown  - Assess and monitor skin integrity  - Assess and monitor nutrition and hydration status  - Monitor labs   - Assess for incontinence   - Turn and reposition patient  - Assist with mobility/ambulation  - Relieve pressure over bony prominences  - Avoid friction and shearing  - Provide appropriate hygiene as needed including keeping skin clean and dry  - Evaluate need for skin moisturizer/barrier cream  - Collaborate with interdisciplinary team   - Patient/family teaching  - Consider wound care consult   Outcome: Progressing     Problem: PAIN - ADULT  Goal: Verbalizes/displays adequate comfort level or baseline comfort level  Description  Interventions:  - Encourage patient to monitor pain and request assistance  - Assess pain using appropriate pain scale  - Administer analgesics based on type and severity of pain and evaluate response  - Implement non-pharmacological measures as appropriate and evaluate response  - Consider cultural and social influences on pain and pain management  - Notify physician/advanced practitioner if interventions unsuccessful or patient reports new pain  Outcome: Progressing     Problem: INFECTION - ADULT  Goal: Absence or prevention of progression during hospitalization  Description  INTERVENTIONS:  - Assess and monitor for signs and symptoms of infection  - Monitor lab/diagnostic results  - Monitor all insertion sites, i e  indwelling lines, tubes, and drains  - Monitor endotracheal if appropriate and nasal secretions for changes in amount and color  - [place] appropriate cooling/warming therapies per order  - Administer medications as ordered  - Instruct and encourage patient and family to use good hand hygiene technique  - Identify and instruct in appropriate isolation precautions for identified infection/condition  Outcome: Progressing  Goal: Absence of fever/infection during neutropenic period  Description  INTERVENTIONS:  - Monitor WBC    Outcome: Progressing     Problem: SAFETY ADULT  Goal: Maintain or return to baseline ADL function  Description  INTERVENTIONS:  -  Assess patient's ability to carry out ADLs; assess patient's baseline for ADL function and identify physical deficits which impact ability to perform ADLs (bathing, care of mouth/teeth, toileting, grooming, dressing, etc )  - Assess/evaluate cause of self-care deficits   - Assess range of motion  - Assess patient's mobility; develop plan if impaired  - Assess patient's need for assistive devices and provide as appropriate  - Encourage maximum independence but intervene and supervise when necessary  - Involve family in performance of ADLs  - Assess for home care needs following discharge   - Consider OT consult to assist with ADL evaluation and planning for discharge  - Provide patient education as appropriate  Outcome: Progressing  Goal: Maintain or return mobility status to optimal level  Description  INTERVENTIONS:  - Assess patient's baseline mobility status (ambulation, transfers, stairs, etc )    - Identify cognitive and physical deficits and behaviors that affect mobility  - Identify mobility aids required to assist with transfers and/or ambulation (gait belt, sit-to-stand, lift, walker, cane, etc )  - [place] fall precautions as indicated by assessment  - Record patient progress and toleration of activity level on Mobility SBAR; progress patient to next Phase/Stage  - Instruct patient to call for assistance with activity based on assessment  - Consider rehabilitation consult to assist with strengthening/weightbearing, etc   Outcome: Progressing     Problem: DISCHARGE PLANNING  Goal: Discharge to home or other facility with appropriate resources  Description  INTERVENTIONS:  - Identify barriers to discharge w/patient and caregiver  - Arrange for needed discharge resources and transportation as appropriate  - Identify discharge learning needs (meds, wound care, etc )  - Arrange for interpretive services to assist at discharge as needed  - Refer to Case Management Department for coordinating discharge planning if the patient needs post-hospital services based on physician/advanced practitioner order or complex needs related to functional status, cognitive ability, or social support system  Outcome: Progressing     Problem: Knowledge Deficit  Goal: Patient/family/caregiver demonstrates understanding of disease process, treatment plan, medications, and discharge instructions  Description  Complete learning assessment and assess knowledge base    Interventions:  - Provide teaching at level of understanding  - Provide teaching via preferred learning methods  Outcome: Progressing     Problem: GENITOURINARY - ADULT  Goal: Maintains or returns to baseline urinary function  Description  INTERVENTIONS:  - Assess urinary function  - Encourage oral fluids to ensure adequate hydration if ordered  - Administer IV fluids as ordered to ensure adequate hydration  - Administer ordered medications as needed  - Offer frequent toileting  - Follow urinary retention protocol if ordered  Outcome: Progressing  Goal: Absence of urinary retention  Description  INTERVENTIONS:  - Assess patients ability to void and empty bladder  - Monitor I/O  - Bladder scan as needed  - Discuss with physician/AP medications to alleviate retention as needed  - Discuss catheterization for long term situations as appropriate  Outcome: Progressing     Problem: HEMATOLOGIC - ADULT  Goal: Maintains hematologic stability  Description  INTERVENTIONS  - Assess for signs and symptoms of bleeding or hemorrhage  - Monitor labs  - Administer supportive blood products/factors as ordered and appropriate  Outcome: Progressing

## 2019-10-05 NOTE — CONSULTS
UROLOGY CONSULTATION NOTE     Patient Identifiers: Supriya Vaughn (MRN [id number])  Service Requesting Consultation: SKIM  Service Providing Consultation:  Urology, Naty Pickett MD    Date of Service: 10/5/2019  Consults  Reason for Consultation:  Bladder cancer, emphysematous cystitis    History of Present Illness:     Supriya Vaughn is a 76 y o  old with a history of urinary tract carcinoma, HTN, PVD, xavier's disease who presents with abdominal pain  Patient reports two days of worsening abdominal pain with nausea and vomiting  Was placed on Cipro outpatient for UTI two days ago  Reports color of urine has improved  Denies associated fever or chills  Reports pain is suprapubic and does not radiate  Reports minimal relief of pain from morphine in ED  Also reporting two days of right leg swelling with transient color change  Denies history of blood clots  Does report history of PVD and known popliteal aneurysm  Currently undergoing treatment for bladder CA  Follows with palliative care outpatient  CT scan incidentally demonstrated air within the upper portion of the bladder along with a bladder wall  Left hydronephrosis is evident and appears to be chronic in nature      Past Medical, Past Surgical History:     Past Medical History:   Diagnosis Date    Aneurysm ([address] )     Behind left knee recently diagnosed    Back pain     Xavier disease     Xavier's disease     Bladder cancer ([address] )     BPH (benign prostatic hyperplasia)     Cancer ([address] )     melanoma    Cataract     right eye    Cataract     right eye    Confusion     DDD (degenerative disc disease), lumbar     Depression     Diverticulosis     Gallstones     GERD (gastroesophageal reflux disease)     History of cardiac murmur     Past    History of melanoma     Was on back in early 1990's    History of rheumatic fever     Childhood    History of transfusion     Nov 2018    [place]     Hydronephrosis     Hypertension     Kidney stone     Multiple times    Neck pain     Nervous breakdown     History of due to reaction to psych med which he was on for anxiety/depression and became suicidal    Numbness and tingling in both hands     Numbness and tingling of both feet     PONV (postoperative nausea and vomiting)     [facility]     PVD (peripheral vascular disease) ([facility]     RBBB     Scoliosis     Seasonal allergies     Tinnitus     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     Wears partial dentures     Upper    Wears partial dentures     Upper   :    Past Surgical History:   Procedure Laterality Date    ABDOMINAL SURGERY      When young had procedure for improviing circulation to left lower extremety    BACK SURGERY      Lumbar- not sure what was done   [facility] CARDIAC CATHETERIZATION      Approximately 2007- no blockages    CARPAL TUNNEL RELEASE Bilateral     wrists are fused    CATARACT EXTRACTION Left     COLONOSCOPY      CYST REMOVAL      Robotic procedure to remove benign cyst from lower left lung    CYSTOGRAM N/A 3/14/2018    Procedure: CYSTOGRAM;  Surgeon: Jamie Mobley MD;  [facility];  Service: Urology    CYSTOSCOPY      ESOPHAGOGASTRODUODENOSCOPY      IR TUBE PLACEMENT NEPHROSTOMY  8/10/2018    LUNG SURGERY      cyst removed left lung    OTHER SURGICAL HISTORY Left     fistula drain in left buttock    CA CYSTO/URETERO W/LITHOTRIPSY &INDWELL STENT INSRT Left 1/3/2018    Procedure: CYSTOSCOPY URETEROSCOPY, RETROGRADE PYELOGRAM AND INSERTION STENT URETERAL;  Surgeon: Jamie Mobley MD;  [facility];  Service: Urology    CA CYSTOTOMY,EXCIS BLADDER TIC N/A 2/14/2018    Procedure: ABDOMINAL EXPLORATION; CLOSURE OF BLADDER DIVERTICULITIS;  Surgeon: Jamie Mobley MD;  [facility];  Service: Urology    CA CYSTOURETHROSCOPY,URETER CATHETER Left 8/1/2018    Procedure: Cystoscopy, cystogram, bladder biopsies, removal of pelvic drain;  Surgeon: Jamie Mobley MD;  [facility];  Service: Urology  LA INCISE/DRAIN BLADDER N/A 2/14/2018    Procedure: OPEN SUPRAPUBIC TUBE PLACEMENT;  Surgeon: Antionette Olivia MD;  [facility];  Service: Urology    LA RELEASE Marisol Willard FIBROSIS Left 2/14/2018    Procedure: LYSIS OF ADHESIONS; URETEROLYSIS ;  Surgeon: Antionette Olivia MD;  [facility];  Service: Urology     [address] BLADDER/NODES,ILEAL CONDUIT N/A 2/25/2019    Procedure: ATTEMPTED CYSTECTOMY RADICAL; ILEAL CONDUIT URINARY DIVERSION; BIOPSY OF PELVIC MASS; APPENDECTOMY;  Surgeon: Antionette Olivia MD;  [facility];  Service: Urology    SKIN CANCER EXCISION      Melanoma removal on back in early 1990's    TOE AMPUTATION Left     All 5 toes left foot were amputated due to poor circulation     TRANSURETHRAL RESECTION OF PROSTATE N/A 3/14/2018    Procedure: TRANSURETHRAL RESECTION OF PROSTATE (TURP), LEFT URETERAL STENT REMOVAL;  Surgeon: Antionette Olivia MD;  [facility];  Service: Urology    TRANSURETHRAL RESECTION OF PROSTATE N/A 9/26/2018    Procedure: TRANSURETHRAL RESECTION OF PROSTATE (TURP);   Surgeon: Antionette Olivia MD;  [facility];  Service: Urology    WRIST SURGERY Bilateral     Ulnar nerve on right arm and both wrists are fused   :    Medications, Allergies:     Current Facility-Administered Medications   Medication Dose Route Frequency    acetaminophen (TYLENOL) tablet 650 mg  650 mg Oral Q6H PRN    aluminum-magnesium hydroxide-simethicone (MYLANTA) 200-200-20 mg/5 mL oral suspension 30 mL  30 mL Oral Q6H PRN    cholecalciferol (VITAMIN D3) tablet 400 Units  400 Units Oral Daily    ciprofloxacin (CIPRO) IVPB (premix) 400 mg  400 mg Intravenous Q12H    famotidine (PEPCID) tablet 40 mg  40 mg Oral HS    fluticasone (FLONASE) 50 mcg/act nasal spray 2 spray  2 spray Each Nare Daily    heparin (porcine) 25,000 units in 250 mL infusion (premix)  3-30 Units/kg/hr (Order-Specific) Intravenous Titrated    heparin (porcine) injection 3,000 Units  3,000 Units Intravenous PRN    heparin (porcine) injection 6,000 Units  6,000 Units Intravenous PRN    HYDROmorphone (DILAUDID) injection 0 5 mg  0 5 mg Intravenous Q4H PRN    melatonin tablet 6 mg  6 mg Oral HS    metoclopramide (REGLAN) injection 10 mg  10 mg Intravenous Q6H PRN    nystatin (MYCOSTATIN) powder   Topical BID    ondansetron (ZOFRAN) injection 4 mg  4 mg Intravenous Q6H PRN    oxyCODONE (OxyCONTIN) 12 hr tablet 10 mg  10 mg Oral Q8H [address]    oxyCODONE (ROXICODONE) immediate release tablet 10 mg  10 mg Oral Q4H PRN    oxyCODONE (ROXICODONE) IR tablet 5 mg  5 mg Oral Q4H PRN    pantoprazole (PROTONIX) EC tablet 40 mg  40 mg Oral Early Morning    pentoxifylline (TRENtal) ER tablet 400 mg  400 mg Oral TID With Meals    polyethylene glycol (MIRALAX) packet 17 g  17 g Oral Daily PRN    senna (SENOKOT) tablet 17 2 mg  2 tablet Oral HS PRN    sodium chloride 0 9 % with KCl 20 mEq/L infusion (premix)  75 mL/hr Intravenous Continuous    traZODone (DESYREL) tablet 100 mg  100 mg Oral HS       Allergies: Allergies   Allergen Reactions    Iodine Shortness Of Breath, Swelling and Hives     Contrast dye causes respiratory distress   Iodine causes skin rash    Mercury Hives and Swelling    Shellfish-Derived Products Anaphylaxis, Hives and Shortness Of Breath    Augmentin [Amoxicillin-Pot Clavulanate] GI Intolerance, Rash and Diarrhea     Diarrhea    Lidoderm [Lidocaine] Rash     Lidoderm patch caused rash    Penicillins Rash, Swelling and Hives    Sulfa Antibiotics Hives, Swelling and Rash   :    Social and Family History:   Social History:   Social History     Tobacco Use    Smoking status: Former Smoker     Packs/day: 1 00     Years: 15 00     Pack years: 15 00     Types: Cigarettes     Last attempt to quit: 1970     Years since quittin 6    Smokeless tobacco: Never Used    Tobacco comment: Quit 50 years   Substance Use Topics    Alcohol use: Not Currently     Frequency: Never     Comment: Very rare    Drug use: Yes     Types: Prescription, Marijuana     Comment: 2x per day     Social History     Tobacco Use   Smoking Status Former Smoker    Packs/day: 1 00    Years: 15     Pack years: 15     Types: Cigarettes    Last attempt to quit: 1970    Years since quittin 6   Smokeless Tobacco Never Used   Tobacco Comment    Quit 50 years       Family History:  Family History   Problem Relation Age of Onset    Heart disease Father     Heart disease Mother     Cancer Paternal Grandfather    :     Review of Systems:     General: Fever, chills, or night sweats: negative  Cardiac: Negative for chest pain  Pulmonary: Negative for shortness of breath  Gastrointestinal: Abdominal pain positive  Nausea, vomiting, or diarrhea negative,  Genitourinary: See HPI above  Patient does not have hematuria  All other systems queried were negative  Physical Exam:   I/O last 24 hours: In: 2220 [I V :1220; IV Piggyback:1000]  Out: 400 [Urine:400]  Temp (24hrs), Av 3 °F (36 8 °C), Min:98 2 °F (36 8 °C), Max:98 3 °F (36 8 °C)  current: Temperature: 98 2 °F (36 8 °C)  General: Patient is pleasant and in NAD   Awake and alert  /50   Pulse 57   Temp 98 2 °F (36 8 °C)   Resp 16   Wt 79 9 kg (176 lb 2 4 oz)   SpO2 98%   BMI 26 01 kg/m²   /50   Pulse 57   Temp 98 2 °F (36 8 °C)   Resp 16   Wt 79 9 kg (176 lb 2 4 oz)   SpO2 98%   BMI 26 01 kg/m²   General appearance: alert and oriented, in no acute distress  Lungs: clear to auscultation bilaterally  Heart: regular rate and rhythm, S1, S2 normal, no murmur, click, rub or gallop  Abdomen: soft, non-tender; bowel sounds normal; no masses,  no organomegaly  Extremities: extremities normal, warm and well-perfused; no cyanosis, clubbing, or edema  SONI: in place draining clear yellow urine, draining pink clear urine, draining cherry red clear urine, draining dark red urine, purulent output and none        Labs:     Lab Results Component Value Date    HGB 8 2 (L) 10/05/2019    HCT 27 1 (L) 10/05/2019    WBC 5 52 10/05/2019     (H) 10/05/2019   ]    Lab Results   Component Value Date    K 3 7 10/05/2019     10/05/2019    CO2 26 10/05/2019    BUN 12 10/05/2019    CREATININE 1 11 10/05/2019    CALCIUM 8 9 10/05/2019   ]    Imaging:   I personally reviewed the images and report of the following studies, and reviewed them with the patient:  CT abdomen pelvis wo contrast   ED Interpretation   IMPRESSION:       Diffuse urinary bladder wall thickening similar to prior study with a unchanged exophytic lesion in the left posterior aspect of the urinary bladder        Tiny droplets of pneumoperitoneum adjacent to the urinary bladder which may be due to recent procedure however cannot rule out postsurgical complication  Final Result      Diffuse urinary bladder wall thickening similar to prior study with a unchanged exophytic lesion in the left posterior aspect of the urinary bladder  Tiny droplets of pneumoperitoneum adjacent to the urinary bladder which may be due to recent procedure however cannot rule out postsurgical complication  I personally discussed this study with Emilio Ya on 10/4/2019 at 9:38 PM                         Workstation performed: [id number]         VAS lower limb venous duplex study, complete bilateral    (Results Pending)       ASSESSMENT:     76 y o  old male with  advanced bladder cancer status post palliative diverting ileal conduit with a small amount of air in bladder and bladder wall  PLAN:     -findings are consistent with a mild form of emphysematous cystitis  Would continue antibiotics as presently prescribed  Obtain culture if possible from any expressed discharge  No further intervention anticipated at this time  Patient is otherwise comfortable  Thank you for allowing me to participate in this patients care    Please do not hesitate to call with any additional questions    Leonila Luciano MD

## 2019-10-05 NOTE — ASSESSMENT & PLAN NOTE
Found to have diffuse urinary bladder wall thickening with tiny droplets of pneumoperitoneum adjacent to urinary bladder  Urology consulted by ED, recommending treatment for emphysematous cystitis   Inpatient urology consult   IV Cipro  IV fluid hydration

## 2019-10-06 LAB
ANION GAP SERPL CALCULATED.3IONS-SCNC: 6 MMOL/L (ref 4–13)
APTT PPP: 104 SECONDS (ref 23–37)
APTT PPP: 67 SECONDS (ref 23–37)
APTT PPP: 76 SECONDS (ref 23–37)
BASOPHILS # BLD AUTO: 0.01 THOUSANDS/ΜL (ref 0–0.1)
BASOPHILS NFR BLD AUTO: 0 % (ref 0–1)
BUN SERPL-MCNC: 9 MG/DL (ref 5–25)
CALCIUM SERPL-MCNC: 9.4 MG/DL (ref 8.3–10.1)
CHLORIDE SERPL-SCNC: 102 MMOL/L (ref 100–108)
CO2 SERPL-SCNC: 26 MMOL/L (ref 21–32)
CREAT SERPL-MCNC: 1.09 MG/DL (ref 0.6–1.3)
EOSINOPHIL # BLD AUTO: 0.02 THOUSAND/ΜL (ref 0–0.61)
EOSINOPHIL NFR BLD AUTO: 0 % (ref 0–6)
ERYTHROCYTE [DISTWIDTH] IN BLOOD BY AUTOMATED COUNT: 15.1 % (ref 11.6–15.1)
GFR SERPL CREATININE-BSD FRML MDRD: 67 ML/MIN/1.73SQ M
GLUCOSE SERPL-MCNC: 103 MG/DL (ref 65–140)
HCT VFR BLD AUTO: 25.3 % (ref 36.5–49.3)
HGB BLD-MCNC: 7.7 G/DL (ref 12–17)
IMM GRANULOCYTES # BLD AUTO: 0.03 THOUSAND/UL (ref 0–0.2)
IMM GRANULOCYTES NFR BLD AUTO: 1 % (ref 0–2)
INR PPP: 1.25 (ref 0.84–1.19)
LYMPHOCYTES # BLD AUTO: 0.37 THOUSANDS/ΜL (ref 0.6–4.47)
LYMPHOCYTES NFR BLD AUTO: 8 % (ref 14–44)
MCH RBC QN AUTO: 25.2 PG (ref 26.8–34.3)
MCHC RBC AUTO-ENTMCNC: 30.4 G/DL (ref 31.4–37.4)
MCV RBC AUTO: 83 FL (ref 82–98)
MONOCYTES # BLD AUTO: 0.46 THOUSAND/ΜL (ref 0.17–1.22)
MONOCYTES NFR BLD AUTO: 9 % (ref 4–12)
NEUTROPHILS # BLD AUTO: 4.01 THOUSANDS/ΜL (ref 1.85–7.62)
NEUTS SEG NFR BLD AUTO: 82 % (ref 43–75)
NRBC BLD AUTO-RTO: 0 /100 WBCS
PLATELET # BLD AUTO: 396 THOUSANDS/UL (ref 149–390)
PMV BLD AUTO: 7.9 FL (ref 8.9–12.7)
POTASSIUM SERPL-SCNC: 3.6 MMOL/L (ref 3.5–5.3)
PROTHROMBIN TIME: 15.9 SECONDS (ref 11.6–14.5)
RBC # BLD AUTO: 3.06 MILLION/UL (ref 3.88–5.62)
SODIUM SERPL-SCNC: 134 MMOL/L (ref 136–145)
WBC # BLD AUTO: 4.9 THOUSAND/UL (ref 4.31–10.16)

## 2019-10-06 PROCEDURE — 85610 PROTHROMBIN TIME: CPT | Performed by: FAMILY MEDICINE

## 2019-10-06 PROCEDURE — 99232 SBSQ HOSP IP/OBS MODERATE 35: CPT | Performed by: FAMILY MEDICINE

## 2019-10-06 PROCEDURE — 87070 CULTURE OTHR SPECIMN AEROBIC: CPT | Performed by: FAMILY MEDICINE

## 2019-10-06 PROCEDURE — 87205 SMEAR GRAM STAIN: CPT | Performed by: FAMILY MEDICINE

## 2019-10-06 PROCEDURE — 85025 COMPLETE CBC W/AUTO DIFF WBC: CPT | Performed by: FAMILY MEDICINE

## 2019-10-06 PROCEDURE — 93970 EXTREMITY STUDY: CPT | Performed by: SURGERY

## 2019-10-06 PROCEDURE — 87077 CULTURE AEROBIC IDENTIFY: CPT | Performed by: FAMILY MEDICINE

## 2019-10-06 PROCEDURE — 85730 THROMBOPLASTIN TIME PARTIAL: CPT | Performed by: FAMILY MEDICINE

## 2019-10-06 PROCEDURE — 80048 BASIC METABOLIC PNL TOTAL CA: CPT | Performed by: FAMILY MEDICINE

## 2019-10-06 RX ORDER — METOCLOPRAMIDE HYDROCHLORIDE 5 MG/ML
10 INJECTION INTRAMUSCULAR; INTRAVENOUS EVERY 6 HOURS SCHEDULED
Status: DISCONTINUED | OUTPATIENT
Start: 2019-10-06 | End: 2019-10-17 | Stop reason: HOSPADM

## 2019-10-06 RX ORDER — CIPROFLOXACIN 500 MG/1
500 TABLET, FILM COATED ORAL EVERY 12 HOURS SCHEDULED
Status: DISCONTINUED | OUTPATIENT
Start: 2019-10-06 | End: 2019-10-10

## 2019-10-06 RX ADMIN — METOCLOPRAMIDE 10 MG: 5 INJECTION, SOLUTION INTRAMUSCULAR; INTRAVENOUS at 06:11

## 2019-10-06 RX ADMIN — HEPARIN SODIUM AND DEXTROSE 20 UNITS/KG/HR: 10000; 5 INJECTION INTRAVENOUS at 11:07

## 2019-10-06 RX ADMIN — SODIUM CHLORIDE AND POTASSIUM CHLORIDE 75 ML/HR: .9; .15 SOLUTION INTRAVENOUS at 19:31

## 2019-10-06 RX ADMIN — OXYCODONE HYDROCHLORIDE 10 MG: 10 TABLET, FILM COATED, EXTENDED RELEASE ORAL at 21:54

## 2019-10-06 RX ADMIN — METOCLOPRAMIDE 10 MG: 5 INJECTION, SOLUTION INTRAMUSCULAR; INTRAVENOUS at 13:46

## 2019-10-06 RX ADMIN — FAMOTIDINE 40 MG: 20 TABLET ORAL at 21:53

## 2019-10-06 RX ADMIN — CIPROFLOXACIN HYDROCHLORIDE 500 MG: 500 TABLET, FILM COATED ORAL at 21:54

## 2019-10-06 RX ADMIN — OXYCODONE HYDROCHLORIDE 10 MG: 10 TABLET, FILM COATED, EXTENDED RELEASE ORAL at 06:11

## 2019-10-06 RX ADMIN — TRAZODONE HYDROCHLORIDE 100 MG: 100 TABLET ORAL at 21:54

## 2019-10-06 RX ADMIN — PANTOPRAZOLE SODIUM 40 MG: 40 TABLET, DELAYED RELEASE ORAL at 06:11

## 2019-10-06 RX ADMIN — PENTOXIFYLLINE 400 MG: 400 TABLET, EXTENDED RELEASE ORAL at 08:21

## 2019-10-06 RX ADMIN — SODIUM CHLORIDE AND POTASSIUM CHLORIDE 75 ML/HR: .9; .15 SOLUTION INTRAVENOUS at 04:50

## 2019-10-06 RX ADMIN — PENTOXIFYLLINE 400 MG: 400 TABLET, EXTENDED RELEASE ORAL at 16:42

## 2019-10-06 RX ADMIN — NYSTATIN: 100000 POWDER TOPICAL at 08:21

## 2019-10-06 RX ADMIN — MELATONIN 6 MG: 3 TAB ORAL at 21:54

## 2019-10-06 RX ADMIN — FLUTICASONE PROPIONATE 2 SPRAY: 50 SPRAY, METERED NASAL at 08:21

## 2019-10-06 RX ADMIN — ONDANSETRON 4 MG: 2 INJECTION INTRAMUSCULAR; INTRAVENOUS at 03:09

## 2019-10-06 RX ADMIN — PENTOXIFYLLINE 400 MG: 400 TABLET, EXTENDED RELEASE ORAL at 11:58

## 2019-10-06 RX ADMIN — OXYCODONE HYDROCHLORIDE 10 MG: 10 TABLET, FILM COATED, EXTENDED RELEASE ORAL at 13:46

## 2019-10-06 RX ADMIN — METOCLOPRAMIDE 10 MG: 5 INJECTION, SOLUTION INTRAMUSCULAR; INTRAVENOUS at 18:12

## 2019-10-06 RX ADMIN — CIPROFLOXACIN 400 MG: 2 INJECTION, SOLUTION INTRAVENOUS at 11:58

## 2019-10-06 RX ADMIN — NYSTATIN: 100000 POWDER TOPICAL at 18:15

## 2019-10-06 RX ADMIN — Medication 400 UNITS: at 08:21

## 2019-10-06 RX ADMIN — WARFARIN SODIUM 5 MG: 5 TABLET ORAL at 18:12

## 2019-10-06 NOTE — UTILIZATION REVIEW
Initial Clinical Review    Admission: Date/Time/Statement: Inpatient Admission Orders (From admission, onward)     Ordered        10/04/19 2216  Inpatient Admission  Once                   Orders Placed This Encounter   Procedures    Inpatient Admission     Standing Status:   Standing     Number of Occurrences:   1     Order Specific Question:   Admitting Physician     Answer:   Gabriela Willoughby [[id number]]     Order Specific Question:   Level of Care     Answer:   Med Surg [[id number]]     Order Specific Question:   Estimated length of stay     Answer:   More than 2 Midnights     Order Specific Question:   Certification     Answer:   I certify that inpatient services are medically necessary for this patient for a duration of greater than two midnights  See H&P and MD Progress Notes for additional information about the patient's course of treatment  ED Arrival Information     Expected Arrival Acuity Means of Arrival Escorted By Service Admission Type    - 10/4/2019 15:55 Urgent Ambulance [facility] ([address]) General Medicine Urgent    Arrival Complaint    -        Chief Complaint   Patient presents with    Nausea     Pt arrived via ems with report of feeling ill x1 week  Started cipro 2 days ago for UTI  N/V began last night increasing today  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  Hx of xavier's disease  Also c/o pain in groin  Assessment/Plan: 75 yo male presents to ED from home with abdominal pain, N/V - started today, abdominal pain is located in the RUQ, epigastric region and  LUQ, does not radiate - severity  is 9/10   abdominal pain is located in the RUQ, epigastric region and LUQ  The abdominal pain does not radiate  The severity of the abdominal pain is 9/10  Family also noted pt has had RLE swelling and it appeared discolored/purple earlier today  No current blood thinners and no history of blood clots    Admitted to IP with:  Acute deep vein thrombosis (DVT) of iliac vein of right lower extremity ([address] )  Assessment & Plan  Found to have acute occlusive DVT in right leg external iliac, common femoral, femoral, deep femoral, popliteal, gastrocnemius, paired peroneal and posterior tibial veins   Started on heparin drip per DVT protocol  Denies SOB or chest pain to suggest PE- continue to monitor   Denies history of blood clots, no anticoagulation      Emphysematous cystitis  Assessment & Plan  Found to have diffuse urinary bladder wall thickening with tiny droplets of pneumoperitoneum adjacent to urinary bladder  Urology consulted by ED, recommending treatment for emphysematous cystitis   Inpatient urology consult   IV Cipro  IV fluid hydration      Nausea  Assessment & Plan  Patient presented to ED today with complaints of abdominal pain and nausea  Likely component of UTI, chemotherapy, bladder CA   PRN Zofran and Reglan   IV cipro for UTI   PRN oxycodone and dilaudid      Cancer related pain  Assessment & Plan  Continue home regimen of oxycodone 10 mg Q8hrs scheduled   PRN oxycodone and dilaudid while inpatient   Continue outpatient palliative care follow up      CKD (chronic kidney disease) stage 3, GFR 30-59 ml/min ([facility])  Assessment & Plan  Creatinine slightly elevated to 1 38 at time of presentation, baseline 1 2   IV fluid hydration and continue to trend BMP  Avoid nephrotoxins and hypotension     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 [facility])  Assessment & Plan  Patient following with urology for high grade urothelial carcinoma of prostate  S/p surgical resection which was unable to be accomplished due to severity of the disease  Currently with diverting urostomy   Currently undergoing chemotherapy with palliative radiation  Urology consult      Essential hypertension  Assessment & Plan  Appears controlled at this time off medications  Continue to monitor      Peripheral vascular disease [facility])  Assessment & Plan  History of Buerger's disease, history of left TMA  Known left popliteal aneurysm   Reports bilateral claudication pain  Continue heparin drip and home [facility]   Continue outpatient vascular surgery follow up       Anticipated Length of Stay:  Patient will be admitted on an Inpatient basis with an anticipated length of stay of  More than 2 midnights     Justification for Hospital Stay: DVT, UTI    ED Triage Vitals [10/04/19 1605]   Temperature Pulse Respirations Blood Pressure SpO2   98 3 °F (36 8 °C) 87 18 135/74 96 %      Temp Source Heart Rate Source Patient Position - Orthostatic VS BP Location FiO2 (%)   Oral Monitor Lying Right arm --      Pain Score       8        Wt Readings from Last 1 Encounters:   10/04/19 79 9 kg (176 lb 2 4 oz)     Additional Vital Signs:   10/05/19 08:02:36  98 2 °F (36 8 °C)  57  16  100/50  67  98 %       10/04/19 22:52:02  98 3 °F (36 8 °C)  66  18  124/60  81  98 %       10/04/19 2034    65  16  125/64    98 %  None (Room air)  Lying       Pertinent Labs/Diagnostic Test Results:   Results from last 7 days   Lab Units 10/06/19  0303 10/05/19  0512 10/04/19  1608   WBC Thousand/uL 4 90 5 52 7 85   HEMOGLOBIN g/dL 7 7* 8 2* 9 5*   HEMATOCRIT % 25 3* 27 1* 32 1*   PLATELETS Thousands/uL 396* 428* 507*   NEUTROS ABS Thousands/µL 4 01  --  6 81         Results from last 7 days   Lab Units 10/06/19  0303 10/05/19  0512 10/04/19  1608   SODIUM mmol/L 134* 133* 128*   POTASSIUM mmol/L 3 6 3 7 3 2*   CHLORIDE mmol/L 102 100 92*   CO2 mmol/L 26 26 23   ANION GAP mmol/L 6 7 13   BUN mg/dL 9 12 12   CREATININE mg/dL 1 09 1 11 1 38*   EGFR ml/min/1 73sq m 67 65 50   CALCIUM mg/dL 9 4 8 9 9 3     Results from last 7 days   Lab Units 10/04/19  1608   AST U/L 12   ALT U/L 6*   ALK PHOS U/L 96   TOTAL PROTEIN g/dL 7 4   ALBUMIN g/dL 2 1*   TOTAL BILIRUBIN mg/dL 0 51         Results from last 7 days   Lab Units 10/06/19  0303 10/05/19  0512 10/04/19  1608   GLUCOSE RANDOM mg/dL 103 97 115         Results from last 7 days   Lab Units 10/06/19  0959 10/06/19  0303 10/05/19  2014  10/04/19  2225   PROTIME seconds  --  15 9*  --   --  15 7*   INR   --  1 25*  --   --  1 23*   PTT seconds 67* 104* 58*   < > 36    < > = values in this interval not displayed  Results from last 7 days   Lab Units 10/04/19  1608   LIPASE u/L 44*             Results from last 7 days   Lab Units 10/04/19  1952 10/04/19  1935 10/01/19  1526   CLARITY UA   --  Clear Cloudy   COLOR UA  yellow Yellow Yellow   SPEC GRAV UA   --  1 020 1 010   PH UA   --  7 0 6 0   GLUCOSE UA mg/dl  --  Negative Negative   KETONES UA mg/dl  --  15 (1+)* Negative   BLOOD UA   --  Trace* Small*   PROTEIN UA mg/dl  --  30 (1+)* 30 (1+)*   NITRITE UA   --  Negative Positive*   BILIRUBIN UA   --  Negative Negative   UROBILINOGEN UA E U /dl  --  0 2 0 2   LEUKOCYTES UA   --  Moderate* Large*   WBC UA /hpf  --  10-20* Innumerable*   RBC UA /hpf  --  2-4* None Seen   BACTERIA UA /hpf  --  Occasional Innumerable*   EPITHELIAL CELLS WET PREP /hpf  --  Occasional None Seen     Results from last 7 days   Lab Units 10/04/19  2230 10/04/19  2225 10/04/19  1935   BLOOD CULTURE  No Growth at 24 hrs  No Growth at 24 hrs   --    URINE CULTURE   --   --  No Growth <1000 cfu/mL     10/4 CT A&P:Diffuse urinary bladder wall thickening similar to prior study with a unchanged exophytic lesion in the left posterior aspect of the urinary bladder  Tiny droplets of pneumoperitoneum adjacent to the urinary bladder which may be due to recent procedure however cannot rule out postsurgical complication  10/4 : Bilat LE Duplex: RIGHT LOWER LIMB:  Evidence of acute occlusive deep vein thrombosis in the external iliac, common  femoral, femoral, deep femoral, popliteal, gastrocnemius, paired peroneal and  paired posterior tibial veins  Evidence of acute mostly occlusive superficial thrombophlebitis noted in the  great saphenous vein at the groin, proximal thigh and knee    Popliteal, posterior tibial and anterior tibial arterial Doppler waveforms are  triphasic/biphasic      LEFT LOWER LIMB:  Evidence of acute non-occlusive deep vein thrombosis in the common femoral vein  and saphenofemoral junction  Evidence of acute superficial thrombophlebitis in the great saphenous vein from  the knee to distal calf and in the small saphenous vein at the mid calf  Evidence of chronic non-occlusive superficial thrombophlebitis in the small  saphenous vein at the knee  Known occlusive popliteal artery aneurysm  Posterior tibial and anterior tibial arterial Doppler waveforms are monophasic  There is a well defined hypoechoic non-vascularized cystic-type structure noted  in the popliteal fossa measuring approximately 3 4cm x 1 9 cm      ED Treatment:   Medication Administration from 10/04/2019 1555 to 10/04/2019 2238       Date/Time Order Dose Route Action Action by Comments     10/04/2019 1749 sodium chloride 0 9 % bolus 1,000 mL 0 mL Intravenous Stopped Eligah Bolus, RN      10/04/2019 1638 sodium chloride 0 9 % bolus 1,000 mL 1,000 mL Intravenous Sal 37 Eligah Bolus, RN      10/04/2019 1640 ondansetron (ZOFRAN) injection 4 mg 4 mg Intravenous Given Eligah Bolus, RN      10/04/2019 1640 morphine injection 2 mg 2 mg Intravenous Given Eligah Bolus, RN      10/04/2019 1944 morphine (PF) 4 mg/mL injection 4 mg 4 mg Intravenous Given Eligah Bolus, RN      10/04/2019 1944 ondansetron (ZOFRAN) injection 4 mg 4 mg Intravenous Given Eligah Bolus, RN      10/04/2019 2107 iohexol (OMNIPAQUE) 240 MG/ML solution 50 mL 50 mL Oral Given Hortencia Barrett         Past Medical History:   Diagnosis Date    Aneurysm ([address] )     Behind left knee recently diagnosed    Back pain     Ceron disease     Ceron's disease     Bladder cancer ([address] )     BPH (benign prostatic hyperplasia)     Cancer ([address] )     melanoma    Cataract     right eye    Cataract     right eye    Confusion     DDD (degenerative disc disease), lumbar     Depression     Diverticulosis     Gallstones     GERD (gastroesophageal reflux disease)     History of cardiac murmur     Past    History of melanoma     Was on back in early 1990's    History of rheumatic fever     Childhood    History of transfusion     Nov 2018   [facility] (hard of hearing)     Hydronephrosis     Hypertension     Kidney stone     Multiple times    Neck pain     Nervous breakdown     History of due to reaction to psych med which he was on for anxiety/depression and became suicidal    Numbness and tingling in both hands     Numbness and tingling of both feet     PONV (postoperative nausea and vomiting)     [facility]     PVD (peripheral vascular disease) ([facility] [facility]     RBBB     Scoliosis     Seasonal allergies     Tinnitus     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     Wears partial dentures     Upper    Wears partial dentures     Upper     Present on Admission:   Essential hypertension   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 ([facility] )   CKD (chronic kidney disease) stage 3, GFR 30-59 ml/min ([facility])   Cancer related pain   Peripheral vascular disease ([facility] )      Admitting Diagnosis: Nausea [R11 0]  UTI (urinary tract infection) [N39 0]  Abdominal pain [R10 9]  Acute deep vein thrombosis (DVT) of right lower extremity ([facility] ) [I82 401]  Age/Sex: 76 y o  male  Admission Orders:    Current Facility-Administered Medications:  acetaminophen 650 mg Oral Q6H PRN    aluminum-magnesium hydroxide-simethicone 30 mL Oral Q6H PRN    cholecalciferol 400 Units Oral Daily    ciprofloxacin 500 mg Oral Q12H [address]    famotidine 40 mg Oral HS    fluticasone 2 spray Each Nare Daily    heparin (porcine) 3-30 Units/kg/hr (Order-Specific) Intravenous Titrated    heparin (porcine) 3,000 Units Intravenous PRN    heparin (porcine) 6,000 Units Intravenous PRN    HYDROmorphone 0 5 mg Intravenous Q4H PRN    melatonin 6 mg Oral HS    metoclopramide 10 mg Intravenous Q6H [address]  x 2 `10/5   nystatin  Topical BID ondansetron 4 mg Intravenous Q6H PRN X 1 10/4 & x 3 10/5   oxyCODONE 10 mg Oral Q8H CAROL    oxyCODONE 10 mg Oral Q4H PRN    oxyCODONE 5 mg Oral Q4H PRN    pantoprazole 40 mg Oral Early Morning    pentoxifylline 400 mg Oral TID With Meals    polyethylene glycol 17 g Oral Daily PRN    senna 2 tablet Oral HS PRN    sodium chloride 0 9 % with KCl 20 mEq/L 75 mL/hr Intravenous Continuous    traZODone 100 mg Oral HS    warfarin 5 mg Oral Daily (warfarin)        IP CONSULT TO UROLOGY    Network Utilization Review Department  Phone: [phone]; Fax [phone]  [email or web address]  ATTENTION: Please call with any questions or concerns to [phone]  and carefully listen to the prompts so that you are directed to the right person  Send all requests for admission clinical reviews, approved or denied determinations and any other requests to fax [phone]   All voicemails are confidential

## 2019-10-06 NOTE — ASSESSMENT & PLAN NOTE
Found to have diffuse urinary bladder wall thickening with tiny droplets of pneumoperitoneum adjacent to urinary bladder  Urology has been consulted and they have evaluated the patient  As per Urology recommendations, they are of the opinion that the findings are consistent with a mild form of emphysematous cystitis  They recommend continuing antibiotics as prescribed, and obtaining cultures from any expressed urethral discharge  Patient noted to have purulent foul-smelling discharge from urethral orifice in penis  Will send for culture  No further urologic intervention at this time

## 2019-10-06 NOTE — PLAN OF CARE
Problem: Potential for Falls  Goal: Patient will remain free of falls  Description  INTERVENTIONS:  - Assess patient frequently for physical needs  -  Identify cognitive and physical deficits and behaviors that affect risk of falls    -  [place] fall precautions as indicated by assessment   - Educate patient/family on patient safety including physical limitations  - Instruct patient to call for assistance with activity based on assessment  - Modify environment to reduce risk of injury  - Consider OT/PT consult to assist with strengthening/mobility  Outcome: Progressing     Problem: Prexisting or High Potential for Compromised Skin Integrity  Goal: Skin integrity is maintained or improved  Description  INTERVENTIONS:  - Identify patients at risk for skin breakdown  - Assess and monitor skin integrity  - Assess and monitor nutrition and hydration status  - Monitor labs   - Assess for incontinence   - Turn and reposition patient  - Assist with mobility/ambulation  - Relieve pressure over bony prominences  - Avoid friction and shearing  - Provide appropriate hygiene as needed including keeping skin clean and dry  - Evaluate need for skin moisturizer/barrier cream  - Collaborate with interdisciplinary team   - Patient/family teaching  - Consider wound care consult   Outcome: Progressing     Problem: PAIN - ADULT  Goal: Verbalizes/displays adequate comfort level or baseline comfort level  Description  Interventions:  - Encourage patient to monitor pain and request assistance  - Assess pain using appropriate pain scale  - Administer analgesics based on type and severity of pain and evaluate response  - Implement non-pharmacological measures as appropriate and evaluate response  - Consider cultural and social influences on pain and pain management  - Notify physician/advanced practitioner if interventions unsuccessful or patient reports new pain  Outcome: Progressing     Problem: INFECTION - ADULT  Goal: Absence or prevention of progression during hospitalization  Description  INTERVENTIONS:  - Assess and monitor for signs and symptoms of infection  - Monitor lab/diagnostic results  - Monitor all insertion sites, i e  indwelling lines, tubes, and drains  - Monitor endotracheal if appropriate and nasal secretions for changes in amount and color  - [place] appropriate cooling/warming therapies per order  - Administer medications as ordered  - Instruct and encourage patient and family to use good hand hygiene technique  - Identify and instruct in appropriate isolation precautions for identified infection/condition  Outcome: Progressing  Goal: Absence of fever/infection during neutropenic period  Description  INTERVENTIONS:  - Monitor WBC    Outcome: Progressing     Problem: SAFETY ADULT  Goal: Maintain or return to baseline ADL function  Description  INTERVENTIONS:  -  Assess patient's ability to carry out ADLs; assess patient's baseline for ADL function and identify physical deficits which impact ability to perform ADLs (bathing, care of mouth/teeth, toileting, grooming, dressing, etc )  - Assess/evaluate cause of self-care deficits   - Assess range of motion  - Assess patient's mobility; develop plan if impaired  - Assess patient's need for assistive devices and provide as appropriate  - Encourage maximum independence but intervene and supervise when necessary  - Involve family in performance of ADLs  - Assess for home care needs following discharge   - Consider OT consult to assist with ADL evaluation and planning for discharge  - Provide patient education as appropriate  Outcome: Progressing  Goal: Maintain or return mobility status to optimal level  Description  INTERVENTIONS:  - Assess patient's baseline mobility status (ambulation, transfers, stairs, etc )    - Identify cognitive and physical deficits and behaviors that affect mobility  - Identify mobility aids required to assist with transfers and/or ambulation (gait belt, sit-to-stand, lift, walker, cane, etc )  - [place] fall precautions as indicated by assessment  - Record patient progress and toleration of activity level on Mobility SBAR; progress patient to next Phase/Stage  - Instruct patient to call for assistance with activity based on assessment  - Consider rehabilitation consult to assist with strengthening/weightbearing, etc   Outcome: Progressing     Problem: DISCHARGE PLANNING  Goal: Discharge to home or other facility with appropriate resources  Description  INTERVENTIONS:  - Identify barriers to discharge w/patient and caregiver  - Arrange for needed discharge resources and transportation as appropriate  - Identify discharge learning needs (meds, wound care, etc )  - Arrange for interpretive services to assist at discharge as needed  - Refer to Case Management Department for coordinating discharge planning if the patient needs post-hospital services based on physician/advanced practitioner order or complex needs related to functional status, cognitive ability, or social support system  Outcome: Progressing     Problem: Knowledge Deficit  Goal: Patient/family/caregiver demonstrates understanding of disease process, treatment plan, medications, and discharge instructions  Description  Complete learning assessment and assess knowledge base    Interventions:  - Provide teaching at level of understanding  - Provide teaching via preferred learning methods  Outcome: Progressing     Problem: GENITOURINARY - ADULT  Goal: Maintains or returns to baseline urinary function  Description  INTERVENTIONS:  - Assess urinary function  - Encourage oral fluids to ensure adequate hydration if ordered  - Administer IV fluids as ordered to ensure adequate hydration  - Administer ordered medications as needed  - Offer frequent toileting  - Follow urinary retention protocol if ordered  Outcome: Progressing  Goal: Absence of urinary retention  Description  INTERVENTIONS:  - Assess patients ability to void and empty bladder  - Monitor I/O  - Bladder scan as needed  - Discuss with physician/AP medications to alleviate retention as needed  - Discuss catheterization for long term situations as appropriate  Outcome: Progressing     Problem: HEMATOLOGIC - ADULT  Goal: Maintains hematologic stability  Description  INTERVENTIONS  - Assess for signs and symptoms of bleeding or hemorrhage  - Monitor labs  - Administer supportive blood products/factors as ordered and appropriate  Outcome: Progressing

## 2019-10-06 NOTE — ASSESSMENT & PLAN NOTE
Found to have acute occlusive DVT in right leg external iliac, common femoral, femoral, deep femoral, popliteal, gastrocnemius, paired peroneal and posterior tibial veins   Started on heparin drip per DVT protocol  Will continue patient on IV heparin drip and bridge with warfarin  Current INR 1 25  Goal INR between 2-3  Denies SOB or chest pain to suggest PE- continue to monitor   Denies history of blood clots, no anticoagulation

## 2019-10-06 NOTE — ASSESSMENT & PLAN NOTE
History of Buerger's disease, history of left TMA  Known left popliteal aneurysm   Reports bilateral claudication pain  Continue heparin drip/warfarin and home [facility]   Continue outpatient vascular surgery follow up

## 2019-10-06 NOTE — PROGRESS NOTES
Progress Note - Chakraborty Days 1945, 76 y o  male MRN: [id number]    Unit/Bed#: Navanceu Rajesh -02 Encounter: [id number]    Primary Care Provider: Nely Alfred MD   Date and time admitted to hospital: 10/4/2019  3:56 PM        * Acute deep vein thrombosis (DVT) of iliac vein of right lower extremity [facility])  Assessment & Plan  Found to have acute occlusive DVT in right leg external iliac, common femoral, femoral, deep femoral, popliteal, gastrocnemius, paired peroneal and posterior tibial veins   Started on heparin drip per DVT protocol  Will continue patient on IV heparin drip and bridge with warfarin  Current INR 1 25  Goal INR between 2-3  Denies SOB or chest pain to suggest PE- continue to monitor   Denies history of blood clots, no anticoagulation  Emphysematous cystitis  Assessment & Plan  Found to have diffuse urinary bladder wall thickening with tiny droplets of pneumoperitoneum adjacent to urinary bladder  Urology has been consulted and they have evaluated the patient  As per Urology recommendations, they are of the opinion that the findings are consistent with a mild form of emphysematous cystitis  They recommend continuing antibiotics as prescribed, and obtaining cultures from any expressed urethral discharge  Patient noted to have purulent foul-smelling discharge from urethral orifice in penis  Will send for culture  No further urologic intervention at this time  Nausea  Assessment & Plan  Patient presented to ED  with complaints of abdominal pain and nausea  Likely component of UTI, chemotherapy, bladder CA   Continue scheduled Reglan and p r n  Zofran    IV cipro for UTI   PRN oxycodone and dilaudid     Peripheral vascular disease (HCC)  Assessment & Plan  History of Buerger's disease, history of left TMA  Known left popliteal aneurysm   Reports bilateral claudication pain  Continue heparin drip/warfarin and home [place]   Continue outpatient vascular surgery follow up 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 [facility])  Assessment & Plan  Patient following with urology for high grade urothelial carcinoma of prostate  S/p surgical resection which was unable to be accomplished due to severity of the disease  Currently with diverting urostomy   Currently undergoing chemotherapy with palliative radiation  Urology consult completed and no further urological intervention recommended  Urology recommends to continue IV antibiotics, pain control  Cancer related pain  Assessment & Plan  Continue home regimen of oxycodone 10 mg Q8hrs scheduled   PRN oxycodone and dilaudid while inpatient   Continue outpatient palliative care follow up     CKD (chronic kidney disease) stage 3, GFR 30-59 ml/min ([facility])  Assessment & Plan  Creatinine slightly elevated to 1 38 at time of presentation, baseline 1 2  Creatinine is currently back to baseline at 1 09  Continue IV fluid hydration and continue to trend BMP  Avoid nephrotoxins and hypotension    Essential hypertension  Assessment & Plan  Appears controlled at this time off medications  Continue to monitor       VTE Pharmacologic Prophylaxis:   Pharmacologic: Heparin Drip  Mechanical VTE Prophylaxis in Place: No    Patient Centered Rounds: I have performed bedside rounds with nursing staff today  Discussions with Specialists or Other Care Team Provider:  Yes    Education and Discussions with Family / Patient:  Yes    Time Spent for Care: 15 minutes  More than 50% of total time spent on counseling and coordination of care as described above  Current Length of Stay: 2 day(s)    Current Patient Status: Inpatient   Certification Statement: The patient will continue to require additional inpatient hospital stay due to Subtherapeutic INR    Discharge Plan: To be determined    Code Status: Level 1 - Full Code      Subjective:   Patient has no complaints except for frequent nausea      Objective:     Vitals:   Temp (24hrs), Av 3 °F (36 8 °C), Min:98 1 °F (36 7 °C), Max:98 6 °F (37 °C)    Temp:  [98 1 °F (36 7 °C)-98 6 °F (37 °C)] 98 6 °F (37 °C)  HR:  [64-67] 64  Resp:  [16-19] 19  BP: (122-125)/(62-65) 122/62  SpO2:  [97 %-98 %] 97 %  Body mass index is 26 01 kg/m²  Input and Output Summary (last 24 hours): Intake/Output Summary (Last 24 hours) at 10/6/2019 1343  Last data filed at 10/6/2019 0630  Gross per 24 hour   Intake 2090 ml   Output 650 ml   Net 1440 ml       Physical Exam:     Physical Exam   Constitutional: He appears well-developed and well-nourished  No distress  HENT:   Head: Normocephalic and atraumatic  Eyes: Pupils are equal, round, and reactive to light  EOM are normal    Neck: Normal range of motion  Neck supple  No JVD present  Cardiovascular: Normal rate, regular rhythm and normal heart sounds  No murmur heard  Pulmonary/Chest: Effort normal and breath sounds normal    Abdominal: Soft  Bowel sounds are normal    Musculoskeletal: He exhibits edema  Swelling in right leg   Neurological: He is alert  Skin: Skin is warm and dry  He is not diaphoretic  Additional Data:     Labs:    Results from last 7 days   Lab Units 10/06/19  0303   WBC Thousand/uL 4 90   HEMOGLOBIN g/dL 7 7*   HEMATOCRIT % 25 3*   PLATELETS Thousands/uL 396*   NEUTROS PCT % 82*   LYMPHS PCT % 8*   MONOS PCT % 9   EOS PCT % 0     Results from last 7 days   Lab Units 10/06/19  0303  10/04/19  1608   SODIUM mmol/L 134*   < > 128*   POTASSIUM mmol/L 3 6   < > 3 2*   CHLORIDE mmol/L 102   < > 92*   CO2 mmol/L 26   < > 23   BUN mg/dL 9   < > 12   CREATININE mg/dL 1 09   < > 1 38*   ANION GAP mmol/L 6   < > 13   CALCIUM mg/dL 9 4   < > 9 3   ALBUMIN g/dL  --   --  2 1*   TOTAL BILIRUBIN mg/dL  --   --  0 51   ALK PHOS U/L  --   --  96   ALT U/L  --   --  6*   AST U/L  --   --  12   GLUCOSE RANDOM mg/dL 103   < > 115    < > = values in this interval not displayed       Results from last 7 days   Lab Units 10/06/19  0303   INR  1 25*                       * I Have Reviewed All Lab Data Listed Above  * Additional Pertinent Lab Tests Reviewed: Luis Eingiza 66 Admission Reviewed    Imaging:    Imaging Reports Reviewed Today Include:  None available  Imaging Personally Reviewed by Myself Includes:  None    Recent Cultures (last 7 days):     Results from last 7 days   Lab Units 10/04/19  2230 10/04/19  2225 10/04/19  1935   BLOOD CULTURE  No Growth at 24 hrs   No Growth at 24 hrs   --    URINE CULTURE   --   --  No Growth <1000 cfu/mL       Last 24 Hours Medication List:     Current Facility-Administered Medications:  acetaminophen 650 mg Oral Q6H PRN Valorie Smudde, PA-C    aluminum-magnesium hydroxide-simethicone 30 mL Oral Q6H PRN Emile Nan, PA-C    cholecalciferol 400 Units Oral Daily Valorie Smudde, PA-C    ciprofloxacin 500 mg Oral Q12H [address] Shadi Cervantes MD    famotidine 40 mg Oral HS Valorie Smudde, PA-C    fluticasone 2 spray Each Nare Daily Valorie Smudde, PA-C    heparin (porcine) 3-30 Units/kg/hr (Order-Specific) Intravenous Titrated Valorie Smudde, PA-C Last Rate: 20 Units/kg/hr (10/06/19 1107)   heparin (porcine) 3,000 Units Intravenous PRN Valorie Smudde, PA-C    heparin (porcine) 6,000 Units Intravenous PRN Valorie Smudde, PA-C    HYDROmorphone 0 5 mg Intravenous Q4H PRN Valorie Smudde, PA-C    melatonin 6 mg Oral HS Valorie Smudde, PA-C    metoclopramide 10 mg Intravenous Q6H [address] Gilma Leon MD    nystatin  Topical BID Valorie Smudde, PA-C    ondansetron 4 mg Intravenous Q6H PRN Valorie Smudde, PA-C    oxyCODONE 10 mg Oral Q8H [address] Valorie Smudde, PA-C    oxyCODONE 10 mg Oral Q4H PRN Valorie Smudde, PA-C    oxyCODONE 5 mg Oral Q4H PRN Valorie Smudde, PA-C    pantoprazole 40 mg Oral Early Morning Valorie Cline PA-C    pentoxifylline 400 mg Oral TID With Meals Valorie Cline PA-C    polyethylene glycol 17 g Oral Daily PRN Valorie Cline PA-C    senna 2 tablet Oral HS PRN Valorie Cline PA-C    sodium chloride 0 9 % with KCl 20 mEq/L 75 mL/hr Intravenous Continuous Valorie Smudde, PA-C Last Rate: 75 mL/hr (10/06/19 0450)   traZODone 100 mg Oral HS Valorie Smudde, PA-C    warfarin 5 mg Oral Daily (warfarin) Efraín Israel MD         Today, Patient Was Seen By: Efraín Israel MD    ** Please Note: Dictation voice to text software may have been used in the creation of this document   **

## 2019-10-06 NOTE — PROGRESS NOTES
The ciprofloxacin IV has / have been converted to Oral per [facility] for Adults as approved by the Pharmacy and Therapeutics Committee  The patient met all eligible criteria:  1) Age = >22 years old   2) Received at least one dose of the IV form   3) Receiving at least one other scheduled oral/enteral medication   4) Tolerating an oral/enteral diet   and did not have any exclusions:   1) Critical care patient   2) Active GI bleed (IF assessing H2RAs or PPIs)   3) Continuous tube feeding (IF assessing cipro, doxycycline, levofloxacin, minocycline, rifampin, or voriconazole)   4) Receiving PO vancomycin (IF assessing metronidazole)   5) Persistent nausea and/or vomiting   6) Ileus or gastrointestinal obstruction   7) Catrachito/nasogastric tube set for continuous suction   8) Specific order not to automatically convert to PO (in the order's comments or if discussed in the most recent Infectious Disease or primary team's progress notes)

## 2019-10-06 NOTE — ASSESSMENT & PLAN NOTE
Patient presented to ED  with complaints of abdominal pain and nausea  Likely component of UTI, chemotherapy, bladder CA   Continue scheduled Reglan and p r n  Zofran    IV cipro for UTI   PRN oxycodone and dilaudid

## 2019-10-06 NOTE — PLAN OF CARE
Problem: Potential for Falls  Goal: Patient will remain free of falls  Description  INTERVENTIONS:  - Assess patient frequently for physical needs  -  Identify cognitive and physical deficits and behaviors that affect risk of falls    -  [place] fall precautions as indicated by assessment   - Educate patient/family on patient safety including physical limitations  - Instruct patient to call for assistance with activity based on assessment  - Modify environment to reduce risk of injury  - Consider OT/PT consult to assist with strengthening/mobility  Outcome: Progressing     Problem: Prexisting or High Potential for Compromised Skin Integrity  Goal: Skin integrity is maintained or improved  Description  INTERVENTIONS:  - Identify patients at risk for skin breakdown  - Assess and monitor skin integrity  - Assess and monitor nutrition and hydration status  - Monitor labs   - Assess for incontinence   - Turn and reposition patient  - Assist with mobility/ambulation  - Relieve pressure over bony prominences  - Avoid friction and shearing  - Provide appropriate hygiene as needed including keeping skin clean and dry  - Evaluate need for skin moisturizer/barrier cream  - Collaborate with interdisciplinary team   - Patient/family teaching  - Consider wound care consult   Outcome: Progressing     Problem: PAIN - ADULT  Goal: Verbalizes/displays adequate comfort level or baseline comfort level  Description  Interventions:  - Encourage patient to monitor pain and request assistance  - Assess pain using appropriate pain scale  - Administer analgesics based on type and severity of pain and evaluate response  - Implement non-pharmacological measures as appropriate and evaluate response  - Consider cultural and social influences on pain and pain management  - Notify physician/advanced practitioner if interventions unsuccessful or patient reports new pain  Outcome: Progressing     Problem: INFECTION - ADULT  Goal: Absence or prevention of progression during hospitalization  Description  INTERVENTIONS:  - Assess and monitor for signs and symptoms of infection  - Monitor lab/diagnostic results  - Monitor all insertion sites, i e  indwelling lines, tubes, and drains  - Monitor endotracheal if appropriate and nasal secretions for changes in amount and color  - [place] appropriate cooling/warming therapies per order  - Administer medications as ordered  - Instruct and encourage patient and family to use good hand hygiene technique  - Identify and instruct in appropriate isolation precautions for identified infection/condition  Outcome: Progressing  Goal: Absence of fever/infection during neutropenic period  Description  INTERVENTIONS:  - Monitor WBC    Outcome: Progressing     Problem: SAFETY ADULT  Goal: Maintain or return to baseline ADL function  Description  INTERVENTIONS:  -  Assess patient's ability to carry out ADLs; assess patient's baseline for ADL function and identify physical deficits which impact ability to perform ADLs (bathing, care of mouth/teeth, toileting, grooming, dressing, etc )  - Assess/evaluate cause of self-care deficits   - Assess range of motion  - Assess patient's mobility; develop plan if impaired  - Assess patient's need for assistive devices and provide as appropriate  - Encourage maximum independence but intervene and supervise when necessary  - Involve family in performance of ADLs  - Assess for home care needs following discharge   - Consider OT consult to assist with ADL evaluation and planning for discharge  - Provide patient education as appropriate  Outcome: Progressing  Goal: Maintain or return mobility status to optimal level  Description  INTERVENTIONS:  - Assess patient's baseline mobility status (ambulation, transfers, stairs, etc )    - Identify cognitive and physical deficits and behaviors that affect mobility  - Identify mobility aids required to assist with transfers and/or ambulation (gait belt, sit-to-stand, lift, walker, cane, etc )  - [place] fall precautions as indicated by assessment  - Record patient progress and toleration of activity level on Mobility SBAR; progress patient to next Phase/Stage  - Instruct patient to call for assistance with activity based on assessment  - Consider rehabilitation consult to assist with strengthening/weightbearing, etc   Outcome: Progressing     Problem: DISCHARGE PLANNING  Goal: Discharge to home or other facility with appropriate resources  Description  INTERVENTIONS:  - Identify barriers to discharge w/patient and caregiver  - Arrange for needed discharge resources and transportation as appropriate  - Identify discharge learning needs (meds, wound care, etc )  - Arrange for interpretive services to assist at discharge as needed  - Refer to Case Management Department for coordinating discharge planning if the patient needs post-hospital services based on physician/advanced practitioner order or complex needs related to functional status, cognitive ability, or social support system  Outcome: Progressing     Problem: Knowledge Deficit  Goal: Patient/family/caregiver demonstrates understanding of disease process, treatment plan, medications, and discharge instructions  Description  Complete learning assessment and assess knowledge base    Interventions:  - Provide teaching at level of understanding  - Provide teaching via preferred learning methods  Outcome: Progressing     Problem: GENITOURINARY - ADULT  Goal: Maintains or returns to baseline urinary function  Description  INTERVENTIONS:  - Assess urinary function  - Encourage oral fluids to ensure adequate hydration if ordered  - Administer IV fluids as ordered to ensure adequate hydration  - Administer ordered medications as needed  - Offer frequent toileting  - Follow urinary retention protocol if ordered  Outcome: Progressing  Goal: Absence of urinary retention  Description  INTERVENTIONS:  - Assess patients ability to void and empty bladder  - Monitor I/O  - Bladder scan as needed  - Discuss with physician/AP medications to alleviate retention as needed  - Discuss catheterization for long term situations as appropriate  Outcome: Progressing     Problem: HEMATOLOGIC - ADULT  Goal: Maintains hematologic stability  Description  INTERVENTIONS  - Assess for signs and symptoms of bleeding or hemorrhage  - Monitor labs  - Administer supportive blood products/factors as ordered and appropriate  Outcome: Progressing

## 2019-10-07 PROBLEM — E87.1 HYPONATREMIA: Status: ACTIVE | Noted: 2019-10-07

## 2019-10-07 LAB
ANION GAP SERPL CALCULATED.3IONS-SCNC: 11 MMOL/L (ref 4–13)
APTT PPP: 75 SECONDS (ref 23–37)
APTT PPP: >210 SECONDS (ref 23–37)
BASOPHILS # BLD AUTO: 0.02 THOUSANDS/ΜL (ref 0–0.1)
BASOPHILS NFR BLD AUTO: 1 % (ref 0–1)
BUN SERPL-MCNC: 6 MG/DL (ref 5–25)
C TRACH DNA SPEC QL NAA+PROBE: NEGATIVE
CALCIUM SERPL-MCNC: 8.2 MG/DL (ref 8.3–10.1)
CHLORIDE SERPL-SCNC: 98 MMOL/L (ref 100–108)
CO2 SERPL-SCNC: 22 MMOL/L (ref 21–32)
CREAT SERPL-MCNC: 1.1 MG/DL (ref 0.6–1.3)
EOSINOPHIL # BLD AUTO: 0.02 THOUSAND/ΜL (ref 0–0.61)
EOSINOPHIL NFR BLD AUTO: 1 % (ref 0–6)
ERYTHROCYTE [DISTWIDTH] IN BLOOD BY AUTOMATED COUNT: 15.4 % (ref 11.6–15.1)
GFR SERPL CREATININE-BSD FRML MDRD: 66 ML/MIN/1.73SQ M
GLUCOSE SERPL-MCNC: 290 MG/DL (ref 65–140)
HCT VFR BLD AUTO: 26.5 % (ref 36.5–49.3)
HGB BLD-MCNC: 7.9 G/DL (ref 12–17)
IMM GRANULOCYTES # BLD AUTO: 0.07 THOUSAND/UL (ref 0–0.2)
IMM GRANULOCYTES NFR BLD AUTO: 2 % (ref 0–2)
INR PPP: 2.72 (ref 0.84–1.19)
LYMPHOCYTES # BLD AUTO: 0.31 THOUSANDS/ΜL (ref 0.6–4.47)
LYMPHOCYTES NFR BLD AUTO: 8 % (ref 14–44)
MCH RBC QN AUTO: 24.8 PG (ref 26.8–34.3)
MCHC RBC AUTO-ENTMCNC: 29.8 G/DL (ref 31.4–37.4)
MCV RBC AUTO: 83 FL (ref 82–98)
MONOCYTES # BLD AUTO: 0.36 THOUSAND/ΜL (ref 0.17–1.22)
MONOCYTES NFR BLD AUTO: 9 % (ref 4–12)
N GONORRHOEA DNA SPEC QL NAA+PROBE: NEGATIVE
NEUTROPHILS # BLD AUTO: 3.24 THOUSANDS/ΜL (ref 1.85–7.62)
NEUTS SEG NFR BLD AUTO: 79 % (ref 43–75)
NRBC BLD AUTO-RTO: 0 /100 WBCS
PLATELET # BLD AUTO: 422 THOUSANDS/UL (ref 149–390)
PMV BLD AUTO: 8.1 FL (ref 8.9–12.7)
POTASSIUM SERPL-SCNC: 3.4 MMOL/L (ref 3.5–5.3)
PROTHROMBIN TIME: 29.4 SECONDS (ref 11.6–14.5)
RBC # BLD AUTO: 3.18 MILLION/UL (ref 3.88–5.62)
SODIUM SERPL-SCNC: 131 MMOL/L (ref 136–145)
WBC # BLD AUTO: 4.02 THOUSAND/UL (ref 4.31–10.16)

## 2019-10-07 PROCEDURE — 85730 THROMBOPLASTIN TIME PARTIAL: CPT | Performed by: FAMILY MEDICINE

## 2019-10-07 PROCEDURE — 85025 COMPLETE CBC W/AUTO DIFF WBC: CPT | Performed by: FAMILY MEDICINE

## 2019-10-07 PROCEDURE — 85610 PROTHROMBIN TIME: CPT | Performed by: FAMILY MEDICINE

## 2019-10-07 PROCEDURE — 99232 SBSQ HOSP IP/OBS MODERATE 35: CPT | Performed by: PHYSICIAN ASSISTANT

## 2019-10-07 PROCEDURE — 80048 BASIC METABOLIC PNL TOTAL CA: CPT | Performed by: FAMILY MEDICINE

## 2019-10-07 PROCEDURE — 99232 SBSQ HOSP IP/OBS MODERATE 35: CPT | Performed by: INTERNAL MEDICINE

## 2019-10-07 RX ORDER — POTASSIUM CHLORIDE 20 MEQ/1
40 TABLET, EXTENDED RELEASE ORAL ONCE
Status: DISCONTINUED | OUTPATIENT
Start: 2019-10-07 | End: 2019-10-07

## 2019-10-07 RX ADMIN — TRAZODONE HYDROCHLORIDE 100 MG: 100 TABLET ORAL at 21:58

## 2019-10-07 RX ADMIN — NYSTATIN: 100000 POWDER TOPICAL at 08:35

## 2019-10-07 RX ADMIN — METOCLOPRAMIDE 10 MG: 5 INJECTION, SOLUTION INTRAMUSCULAR; INTRAVENOUS at 11:52

## 2019-10-07 RX ADMIN — METOCLOPRAMIDE 10 MG: 5 INJECTION, SOLUTION INTRAMUSCULAR; INTRAVENOUS at 17:05

## 2019-10-07 RX ADMIN — FLUTICASONE PROPIONATE 2 SPRAY: 50 SPRAY, METERED NASAL at 08:34

## 2019-10-07 RX ADMIN — METOCLOPRAMIDE 10 MG: 5 INJECTION, SOLUTION INTRAMUSCULAR; INTRAVENOUS at 05:35

## 2019-10-07 RX ADMIN — PENTOXIFYLLINE 400 MG: 400 TABLET, EXTENDED RELEASE ORAL at 11:52

## 2019-10-07 RX ADMIN — METOCLOPRAMIDE 10 MG: 5 INJECTION, SOLUTION INTRAMUSCULAR; INTRAVENOUS at 00:43

## 2019-10-07 RX ADMIN — WARFARIN SODIUM 5 MG: 5 TABLET ORAL at 17:05

## 2019-10-07 RX ADMIN — SODIUM CHLORIDE AND POTASSIUM CHLORIDE 75 ML/HR: .9; .15 SOLUTION INTRAVENOUS at 22:31

## 2019-10-07 RX ADMIN — PENTOXIFYLLINE 400 MG: 400 TABLET, EXTENDED RELEASE ORAL at 17:05

## 2019-10-07 RX ADMIN — OXYCODONE HYDROCHLORIDE 10 MG: 10 TABLET, FILM COATED, EXTENDED RELEASE ORAL at 05:35

## 2019-10-07 RX ADMIN — CIPROFLOXACIN HYDROCHLORIDE 500 MG: 500 TABLET, FILM COATED ORAL at 21:58

## 2019-10-07 RX ADMIN — OXYCODONE HYDROCHLORIDE 10 MG: 10 TABLET, FILM COATED, EXTENDED RELEASE ORAL at 21:57

## 2019-10-07 RX ADMIN — OXYCODONE HYDROCHLORIDE 10 MG: 10 TABLET, FILM COATED, EXTENDED RELEASE ORAL at 14:36

## 2019-10-07 RX ADMIN — PENTOXIFYLLINE 400 MG: 400 TABLET, EXTENDED RELEASE ORAL at 08:35

## 2019-10-07 RX ADMIN — MELATONIN 6 MG: 3 TAB ORAL at 21:58

## 2019-10-07 RX ADMIN — NYSTATIN: 100000 POWDER TOPICAL at 17:05

## 2019-10-07 RX ADMIN — PANTOPRAZOLE SODIUM 40 MG: 40 TABLET, DELAYED RELEASE ORAL at 05:35

## 2019-10-07 RX ADMIN — CIPROFLOXACIN HYDROCHLORIDE 500 MG: 500 TABLET, FILM COATED ORAL at 08:34

## 2019-10-07 RX ADMIN — METOCLOPRAMIDE 10 MG: 5 INJECTION, SOLUTION INTRAMUSCULAR; INTRAVENOUS at 23:49

## 2019-10-07 RX ADMIN — FAMOTIDINE 40 MG: 20 TABLET ORAL at 21:58

## 2019-10-07 RX ADMIN — SODIUM CHLORIDE AND POTASSIUM CHLORIDE 75 ML/HR: .9; .15 SOLUTION INTRAVENOUS at 08:34

## 2019-10-07 RX ADMIN — HEPARIN SODIUM AND DEXTROSE 20 UNITS/KG/HR: 10000; 5 INJECTION INTRAVENOUS at 04:29

## 2019-10-07 RX ADMIN — Medication 400 UNITS: at 08:34

## 2019-10-07 NOTE — ASSESSMENT & PLAN NOTE
Found to have diffuse urinary bladder wall thickening with tiny droplets of pneumoperitoneum adjacent to urinary bladder  -urology follow-up appreciated, recommend to continue antibiotics  -As per Urology recommendations, they are of the opinion that the findings are consistent with a mild form of emphysematous cystitis     -cultures from urethral orifice in penis revealed no growth thus far

## 2019-10-07 NOTE — PLAN OF CARE
Problem: Potential for Falls  Goal: Patient will remain free of falls  Description  INTERVENTIONS:  - Assess patient frequently for physical needs  -  Identify cognitive and physical deficits and behaviors that affect risk of falls    -  [place] fall precautions as indicated by assessment   - Educate patient/family on patient safety including physical limitations  - Instruct patient to call for assistance with activity based on assessment  - Modify environment to reduce risk of injury  - Consider OT/PT consult to assist with strengthening/mobility  Outcome: Progressing     Problem: Prexisting or High Potential for Compromised Skin Integrity  Goal: Skin integrity is maintained or improved  Description  INTERVENTIONS:  - Identify patients at risk for skin breakdown  - Assess and monitor skin integrity  - Assess and monitor nutrition and hydration status  - Monitor labs   - Assess for incontinence   - Turn and reposition patient  - Assist with mobility/ambulation  - Relieve pressure over bony prominences  - Avoid friction and shearing  - Provide appropriate hygiene as needed including keeping skin clean and dry  - Evaluate need for skin moisturizer/barrier cream  - Collaborate with interdisciplinary team   - Patient/family teaching  - Consider wound care consult   Outcome: Progressing     Problem: PAIN - ADULT  Goal: Verbalizes/displays adequate comfort level or baseline comfort level  Description  Interventions:  - Encourage patient to monitor pain and request assistance  - Assess pain using appropriate pain scale  - Administer analgesics based on type and severity of pain and evaluate response  - Implement non-pharmacological measures as appropriate and evaluate response  - Consider cultural and social influences on pain and pain management  - Notify physician/advanced practitioner if interventions unsuccessful or patient reports new pain  Outcome: Progressing     Problem: INFECTION - ADULT  Goal: Absence or prevention of progression during hospitalization  Description  INTERVENTIONS:  - Assess and monitor for signs and symptoms of infection  - Monitor lab/diagnostic results  - Monitor all insertion sites, i e  indwelling lines, tubes, and drains  - Monitor endotracheal if appropriate and nasal secretions for changes in amount and color  - [place] appropriate cooling/warming therapies per order  - Administer medications as ordered  - Instruct and encourage patient and family to use good hand hygiene technique  - Identify and instruct in appropriate isolation precautions for identified infection/condition  Outcome: Progressing  Goal: Absence of fever/infection during neutropenic period  Description  INTERVENTIONS:  - Monitor WBC    Outcome: Progressing     Problem: SAFETY ADULT  Goal: Maintain or return to baseline ADL function  Description  INTERVENTIONS:  -  Assess patient's ability to carry out ADLs; assess patient's baseline for ADL function and identify physical deficits which impact ability to perform ADLs (bathing, care of mouth/teeth, toileting, grooming, dressing, etc )  - Assess/evaluate cause of self-care deficits   - Assess range of motion  - Assess patient's mobility; develop plan if impaired  - Assess patient's need for assistive devices and provide as appropriate  - Encourage maximum independence but intervene and supervise when necessary  - Involve family in performance of ADLs  - Assess for home care needs following discharge   - Consider OT consult to assist with ADL evaluation and planning for discharge  - Provide patient education as appropriate  Outcome: Progressing  Goal: Maintain or return mobility status to optimal level  Description  INTERVENTIONS:  - Assess patient's baseline mobility status (ambulation, transfers, stairs, etc )    - Identify cognitive and physical deficits and behaviors that affect mobility  - Identify mobility aids required to assist with transfers and/or ambulation (gait belt, sit-to-stand, lift, walker, cane, etc )  - [place] fall precautions as indicated by assessment  - Record patient progress and toleration of activity level on Mobility SBAR; progress patient to next Phase/Stage  - Instruct patient to call for assistance with activity based on assessment  - Consider rehabilitation consult to assist with strengthening/weightbearing, etc   Outcome: Progressing     Problem: DISCHARGE PLANNING  Goal: Discharge to home or other facility with appropriate resources  Description  INTERVENTIONS:  - Identify barriers to discharge w/patient and caregiver  - Arrange for needed discharge resources and transportation as appropriate  - Identify discharge learning needs (meds, wound care, etc )  - Arrange for interpretive services to assist at discharge as needed  - Refer to Case Management Department for coordinating discharge planning if the patient needs post-hospital services based on physician/advanced practitioner order or complex needs related to functional status, cognitive ability, or social support system  Outcome: Progressing     Problem: Knowledge Deficit  Goal: Patient/family/caregiver demonstrates understanding of disease process, treatment plan, medications, and discharge instructions  Description  Complete learning assessment and assess knowledge base    Interventions:  - Provide teaching at level of understanding  - Provide teaching via preferred learning methods  Outcome: Progressing     Problem: GENITOURINARY - ADULT  Goal: Maintains or returns to baseline urinary function  Description  INTERVENTIONS:  - Assess urinary function  - Encourage oral fluids to ensure adequate hydration if ordered  - Administer IV fluids as ordered to ensure adequate hydration  - Administer ordered medications as needed  - Offer frequent toileting  - Follow urinary retention protocol if ordered  Outcome: Progressing  Goal: Absence of urinary retention  Description  INTERVENTIONS:  - Assess patients ability to void and empty bladder  - Monitor I/O  - Bladder scan as needed  - Discuss with physician/AP medications to alleviate retention as needed  - Discuss catheterization for long term situations as appropriate  Outcome: Progressing     Problem: HEMATOLOGIC - ADULT  Goal: Maintains hematologic stability  Description  INTERVENTIONS  - Assess for signs and symptoms of bleeding or hemorrhage  - Monitor labs  - Administer supportive blood products/factors as ordered and appropriate  Outcome: Progressing

## 2019-10-07 NOTE — PROGRESS NOTES
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)  Assessment & Plan  Routine ileal conduit ostomy management- pt to change appliance tomorrow or Wednesday with supplies brought in from home    Bedside rounds performed with RN  Discussed with Dr Ellen Raygoza  Urology will continue to follow  Subjective:   HPI: Feels well today  Urine clear  Tolerating heparin OK for the DVT  No flank pain  Abdominal pain is improved today  No fevers  Review of Systems   Constitutional: Negative for activity change, appetite change, chills and fever  Respiratory: Negative for cough and shortness of breath  Cardiovascular: Positive for leg swelling  Negative for chest pain  Gastrointestinal: Positive for abdominal pain  Negative for constipation, diarrhea, nausea and vomiting  Genitourinary: Negative for decreased urine volume, difficulty urinating, dysuria, frequency, hematuria, penile pain, penile swelling, scrotal swelling and urgency  Musculoskeletal: Positive for arthralgias  Negative for back pain  Skin: Negative for rash and wound  Hematological: Does not bruise/bleed easily  Objective:  Nursing Rounds: afebrile no c/o overnight, urine clear  Vitals: Blood pressure 122/62, pulse 68, temperature 98 6 °F (37 °C), resp  rate 18, height 5' 9" (1 753 m), weight 79 9 kg (176 lb 2 4 oz), SpO2 96 %  ,Body mass index is 26 01 kg/m²      Intake/Output Summary (Last 24 hours) at 10/7/2019 1437  Last data filed at 10/7/2019 0834  Gross per 24 hour   Intake 1950 ml   Output 1050 ml   Net 900 ml     Invasive Devices     Peripheral Intravenous Line            Peripheral IV 10/04/19 Left Antecubital 2 days Peripheral IV 10/04/19 Right Wrist 2 days          Drain            Urostomy Ileal conduit  days                Physical Exam   Constitutional: He is oriented to person, place, and time  He appears well-developed and well-nourished  No distress  HENT:   Head: Normocephalic and atraumatic  Cardiovascular: Normal rate, regular rhythm and normal heart sounds  No murmur heard  Pulmonary/Chest: Effort normal and breath sounds normal    Abdominal: Soft  Bowel sounds are normal  He exhibits no distension  There is no tenderness  RLQ ileal conduit pink viable; draining clear yellow urine no gross blood clots or debris   Genitourinary:   Genitourinary Comments: Uncircumcised penis, no urethral discharge today   Musculoskeletal: Normal range of motion  He exhibits no edema  Neurological: He is alert and oriented to person, place, and time  Skin: Skin is warm and dry  Capillary refill takes less than 2 seconds  He is not diaphoretic  No pallor  Nursing note and vitals reviewed        History:    Past Medical History:   Diagnosis Date    Aneurysm ([facility] [address] )     Behind left knee recently diagnosed    Back pain     Ceron disease     Ceron's disease     Bladder cancer ([facility] [address] )     BPH (benign prostatic hyperplasia)     Cancer ([facility] [address] )     melanoma    Cataract     right eye    Cataract     right eye    Confusion     DDD (degenerative disc disease), lumbar     Depression     Diverticulosis     Gallstones     GERD (gastroesophageal reflux disease)     History of cardiac murmur     Past    History of melanoma     Was on back in early 1990's    History of rheumatic fever     Childhood    History of transfusion     Nov 2018    [facility] (hard of hearing)     Hydronephrosis     Hypertension     Kidney stone     Multiple times    Neck pain     Nervous breakdown     History of due to reaction to psych med which he was on for anxiety/depression and became suicidal    Numbness and tingling in both hands     Numbness and tingling of both feet     PONV (postoperative nausea and vomiting)     Prostate cancer ([address] )     PVD (peripheral vascular disease) (HCC)     RBBB     Scoliosis     Seasonal allergies     Tinnitus     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HCC)     Wears partial dentures     Upper    Wears partial dentures     Upper     Past Surgical History:   Procedure Laterality Date    ABDOMINAL SURGERY      When young had procedure for improviing circulation to left lower extremety    BACK SURGERY      Lumbar- not sure what was done   Emaline Folds CARDIAC CATHETERIZATION      Approximately 2007- no blockages    CARPAL TUNNEL RELEASE Bilateral     wrists are fused    CATARACT EXTRACTION Left     COLONOSCOPY      CYST REMOVAL      Robotic procedure to remove benign cyst from lower left lung    CYSTOGRAM N/A 3/14/2018    Procedure: CYSTOGRAM;  Surgeon: Avtar Alamo MD;  [facility];  Service: Urology    CYSTOSCOPY      ESOPHAGOGASTRODUODENOSCOPY      IR TUBE PLACEMENT NEPHROSTOMY  8/10/2018    LUNG SURGERY      cyst removed left lung    OTHER SURGICAL HISTORY Left     fistula drain in left buttock    IA CYSTO/URETERO W/LITHOTRIPSY &INDWELL STENT INSRT Left 1/3/2018    Procedure: CYSTOSCOPY URETEROSCOPY, RETROGRADE PYELOGRAM AND INSERTION STENT URETERAL;  Surgeon: Avtar Alamo MD;  [facility];  Service: Urology    IA CYSTOTOMY,EXCIS BLADDER TIC N/A 2/14/2018    Procedure: ABDOMINAL EXPLORATION; CLOSURE OF BLADDER DIVERTICULITIS;  Surgeon: Avtar Alamo MD;  [facility];  Service: Urology    IA CYSTOURETHROSCOPY,URETER CATHETER Left 8/1/2018    Procedure: Cystoscopy, cystogram, bladder biopsies, removal of pelvic drain;  Surgeon: Avtar Alamo MD;  [facility];  Service: Urology    IA INCISE/DRAIN BLADDER N/A 2/14/2018    Procedure: OPEN SUPRAPUBIC TUBE PLACEMENT;  Surgeon: Avtar Alamo MD;  [facility];  Service: Urology    IA RELEASE Chip Saravanan FIBROSIS Left 2/14/2018 Procedure: LYSIS OF ADHESIONS; URETEROLYSIS ;  Surgeon: Nixon Howell MD;  [facility];  Service: Urology     [facility] BLADDER/NODES,ILEAL CONDUIT N/A 2019    Procedure: ATTEMPTED CYSTECTOMY RADICAL; ILEAL CONDUIT URINARY DIVERSION; BIOPSY OF PELVIC MASS; APPENDECTOMY;  Surgeon: Nixon Howell MD;  [facility];  Service: Urology    SKIN CANCER EXCISION      Melanoma removal on back in early     TOE AMPUTATION Left     All 5 toes left foot were amputated due to poor circulation     TRANSURETHRAL RESECTION OF PROSTATE N/A 3/14/2018    Procedure: TRANSURETHRAL RESECTION OF PROSTATE (TURP), LEFT URETERAL STENT REMOVAL;  Surgeon: Nixon Howell MD;  [facility];  Service: Urology    TRANSURETHRAL RESECTION OF PROSTATE N/A 2018    Procedure: TRANSURETHRAL RESECTION OF PROSTATE (TURP);   Surgeon: Nixon Howell MD;  [facility];  Service: Urology    WRIST SURGERY Bilateral     Ulnar nerve on right arm and both wrists are fused     Family History   Problem Relation Age of Onset    Heart disease Father     Heart disease Mother     Cancer Paternal Grandfather      Social History     Socioeconomic History    Marital status: Common Law     Spouse name: None    Number of children: None    Years of education: None    Highest education level: None   Occupational History    None   Social Needs    Financial resource strain: None    Food insecurity:     Worry: None     Inability: None    Transportation needs:     Medical: None     Non-medical: None   Tobacco Use    Smoking status: Former Smoker     Packs/day: 1 00     Years: 15 00     Pack years: 15 00     Types: Cigarettes     Last attempt to quit: 1970     Years since quittin 7    Smokeless tobacco: Never Used    Tobacco comment: Quit 50 years   Substance and Sexual Activity    Alcohol use: Not Currently     Frequency: Never     Comment: Very rare    Drug use: Yes     Types: Prescription, Marijuana Comment: 2x per day    Sexual activity: None   Lifestyle    Physical activity:     Days per week: None     Minutes per session: None    Stress: None   Relationships    Social connections:     Talks on phone: None     Gets together: None     Attends Oriental orthodox service: None     Active member of club or organization: None     Attends meetings of clubs or organizations: None     Relationship status: None    Intimate partner violence:     Fear of current or ex partner: None     Emotionally abused: None     Physically abused: None     Forced sexual activity: None   Other Topics Concern    None   Social History Narrative    None       Labs:  Recent Labs     10/04/19  1608 10/05/19  0512 10/06/19  0303 10/07/19  0502   WBC 7 85 5 52 4 90 4 02*       Recent Labs     10/04/19  1608 10/05/19  0512 10/06/19  0303 10/07/19  0502   HGB 9 5* 8 2* 7 7* 7 9*     Recent Labs     10/04/19  1608 10/05/19  0512 10/06/19  0303 10/07/19  0502   HCT 32 1* 27 1* 25 3* 26 5*     Recent Labs     10/04/19  1608 10/05/19  0512 10/06/19  0303 10/07/19  0502   CREATININE 1 38* 1 11 1 09 1 10       Nova Desir PA-C  Date: 10/7/2019 Time: 2:37 PM

## 2019-10-07 NOTE — PROGRESS NOTES
Progress Note - Cori Lloyd 1945, 76 y o  male MRN: [id number]    Unit/Bed#: [facility] 2 -02 Encounter: [id number]    Primary Care Provider: Stan Ghosh MD   Date and time admitted to hospital: 10/4/2019  3:56 PM        * Acute deep vein thrombosis (DVT) of iliac vein of right lower extremity ([address] )  Assessment & Plan  Found to have acute occlusive DVT in right leg external iliac, common femoral, femoral, deep femoral, popliteal, gastrocnemius, paired peroneal and posterior tibial veins   Current INR 2 72, continue with warfarin 5 mg daily  Goal INR between 2-3  Emphysematous cystitis  Assessment & Plan  Found to have diffuse urinary bladder wall thickening with tiny droplets of pneumoperitoneum adjacent to urinary bladder  -urology follow-up appreciated, recommend to continue antibiotics  -As per Urology recommendations, they are of the opinion that the findings are consistent with a mild form of emphysematous cystitis  -cultures from urethral orifice in penis revealed no growth thus far    Nausea  Assessment & Plan  Patient presented to ED  with complaints of abdominal pain and nausea  Likely component of UTI, chemotherapy, bladder CA   Continue scheduled Reglan and p r n  Zofran   -patient does use medical marijuana at home for appetite stimulant    Cancer related pain  Assessment & Plan  Continue home regimen of oxycodone 10 mg Q8hrs scheduled   PRN oxycodone and dilaudid while inpatient   Continue outpatient palliative care follow up     CKD (chronic kidney disease) stage 3, GFR 30-59 ml/min ([facility])  Assessment & Plan  Creatinine slightly elevated to 1 38 at time of presentation, baseline 1 2  Creatinine is currently back to baseline at 1 09     Continue IV fluid hydration and continue to trend BMP  Avoid nephrotoxins and hypotension    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 [facility])  Assessment & Plan  Patient following with urology for high grade urothelial carcinoma of prostate  S/p surgical resection which was unable to be accomplished due to severity of the disease  Currently with diverting urostomy   Currently undergoing chemotherapy with palliative radiation  Urology consult completed and no further urological intervention recommended  Urology recommends to continue IV antibiotics, pain control  Essential hypertension  Assessment & Plan  Appears controlled at this time off medications  Continue to monitor     Peripheral vascular disease [facility])  Assessment & Plan  History of Buerger's disease, history of left TMA  Known left popliteal aneurysm   Reports bilateral claudication pain  Continue warfarin and home [facility]   Continue outpatient vascular surgery follow up         VTE Pharmacologic Prophylaxis:   Pharmacologic: warfarin    Patient Centered Rounds: I have performed bedside rounds with nursing staff today  Education and Discussions with Family / Patient: wife, at beside    Time Spent for Care: 20 minutes  More than 50% of total time spent on counseling and coordination of care as described above  Current Length of Stay: 3 day(s)    Current Patient Status: Inpatient   Certification Statement: The patient will continue to require additional inpatient hospital stay due to dvt, emphysematous cystitis    Discharge Plan / Estimated Discharge Date: 24-48 hours    Code Status: Level 1 - Full Code      Subjective:   Patient seen and examined at bedside, complaining of nausea and decreased oral intake    Objective:     Vitals:   Temp (24hrs), Av 4 °F (36 9 °C), Min:98 2 °F (36 8 °C), Max:98 6 °F (37 °C)    Temp:  [98 2 °F (36 8 °C)-98 6 °F (37 °C)] 98 6 °F (37 °C)  HR:  [62-68] 68  Resp:  [18-20] 18  BP: (121-122)/(61-62) 122/62  SpO2:  [96 %-98 %] 96 %  Body mass index is 26 01 kg/m²  Input and Output Summary (last 24 hours):        Intake/Output Summary (Last 24 hours) at 10/7/2019 1624  Last data filed at 10/7/2019 0834  Gross per 24 hour Intake 1950 ml   Output 1050 ml   Net 900 ml       Physical Exam:    Constitutional: Patient is oriented to person, place and time, no acute distress  HEENT:  Normocephalic, atraumatic, EOMI, PERRLA, no scleral icterus, no pallor, moist oral mucosa  Neck:  Supple, no masses, no thyromegaly, no bruits Normal range of motion  Lymph nodes:  No lymphadenopathy  Cardiovascular: Normal S1S2, RRR, No murmurs/rubs/gallops appreciated  Pulmonary:  Bilateral air entry, No rhonchi/rales/wheezing appreciated  Abdominal: Soft, Bowel sounds present, Non-tender, Non-distended, No rebound/guarding, no hepatomegaly   Musculoskeletal: No tenderness/abnormality   Extremities:  Right lower extremity swelling Peripheral pulses palpable and equal bilaterally  Neurological: Cranial nerves II-XII grossly intact, sensation intact, otherwise no focal neurological symptoms  Skin: Skin is warm and dry, no rashes  Additional Data:     Labs:    Results from last 7 days   Lab Units 10/07/19  0502   WBC Thousand/uL 4 02*   HEMOGLOBIN g/dL 7 9*   HEMATOCRIT % 26 5*   PLATELETS Thousands/uL 422*   NEUTROS PCT % 79*   LYMPHS PCT % 8*   MONOS PCT % 9   EOS PCT % 1     Results from last 7 days   Lab Units 10/07/19  0502  10/04/19  1608   POTASSIUM mmol/L 3 4*   < > 3 2*   CHLORIDE mmol/L 98*   < > 92*   CO2 mmol/L 22   < > 23   BUN mg/dL 6   < > 12   CREATININE mg/dL 1 10   < > 1 38*   CALCIUM mg/dL 8 2*   < > 9 3   ALK PHOS U/L  --   --  96   ALT U/L  --   --  6*   AST U/L  --   --  12    < > = values in this interval not displayed  Results from last 7 days   Lab Units 10/07/19  0502   INR  2 72*        I Have Reviewed All Lab Data Listed Above  Recent Cultures (last 7 days):     Results from last 7 days   Lab Units 10/06/19  1401 10/04/19  2230 10/04/19  2225 10/04/19  1935   BLOOD CULTURE   --  No Growth at 48 hrs   No Growth at 48 hrs   --    GRAM STAIN RESULT  2+ Polys*  2+ Gram positive cocci in pairs*  2+ Gram negative rods*  --   --   --    URINE CULTURE   --   --   --  No Growth <1000 cfu/mL   BODY FLUID CULTURE, STERILE  No growth  --   --   --        Last 24 Hours Medication List:     Current Facility-Administered Medications:  acetaminophen 650 mg Oral Q6H PRN Valorie Smudde, PA-C    aluminum-magnesium hydroxide-simethicone 30 mL Oral Q6H PRN Salome Bailey, PA-C    cholecalciferol 400 Units Oral Daily Valorie Smudde, PA-C    ciprofloxacin 500 mg Oral Q12H [facility] Kimberlyn Santiago MD    famotidine 40 mg Oral HS Valorie Smudde, PA-C    fluticasone 2 spray Each Nare Daily Valorie Smudde, PA-C    HYDROmorphone 0 5 mg Intravenous Q4H PRN Valorie Smudde, PA-C    melatonin 6 mg Oral HS Valorie Smudde, PA-C    metoclopramide 10 mg Intravenous Q6H [facility] Gilma Leon MD    nystatin  Topical BID Valorie Smudde, PA-C    ondansetron 4 mg Intravenous Q6H PRN Salome Chancy, PA-C    oxyCODONE 10 mg Oral Q8H [facility] Valorie Smudde, PA-C    oxyCODONE 10 mg Oral Q4H PRN Valorie Smudde, PA-C    oxyCODONE 5 mg Oral Q4H PRN Valorie Smudde, PA-C    pantoprazole 40 mg Oral Early Morning Valorie Smudde, PA-C    pentoxifylline 400 mg Oral TID With Meals Valorie Smudde, PA-C    polyethylene glycol 17 g Oral Daily PRN Valorie Smudde, PA-C    senna 2 tablet Oral HS PRN Valorie Smudde, PA-C    sodium chloride 0 9 % with KCl 20 mEq/L 75 mL/hr Intravenous Continuous Valorie Smudde, PA-C Last Rate: 75 mL/hr (10/07/19 0834)   traZODone 100 mg Oral HS Valorie Smudde, PA-C    warfarin 5 mg Oral Daily (warfarin) Kimberlyn Santiago MD         Today, Patient Was Seen By: Bill Morales MD

## 2019-10-07 NOTE — ASSESSMENT & PLAN NOTE
Worsening abdominal pain and leukocytosis noted 10/14  CT performed shows perforation of native diseased (malignant) bladder  Seen by MD for thorough discussion of (lack of) treatment options given advanced disease, comorbid illnesses, and grave prognosis  He seems to be responding somewhat to the IV cefepime but understands this is not curative  Leukocytosis and pain persists  Bolden continues to drain necrotic/purulent material from native bladder  Diversion urostomy draining yellow urine  Hospice is consulted for initial visit today

## 2019-10-07 NOTE — ASSESSMENT & PLAN NOTE
-baseline hemoglobin 8-9  -hemoglobin today 7 9  -no signs of any active bleeding  -will check stool occult  -monitor H&H, transfuse as needed

## 2019-10-07 NOTE — SOCIAL WORK
CM met with the patient to review the CM role and discuss possible discharge needs  CM pointed out name/number on the white board and communicated she was that individual    Wife, Emilie Vences, was present in the room; CM asked permission to speak in front of her, which was granted  Patient lives in a one-floor ranch-style home with wife; one PRAKASH  Patient has difficulty with steps  Patient needs assistance with ADLs and functional mobility  Food shopping & meal prep is done by wife  Patient uses a RW & WC for ambulation purposes  Patient is not always to ambulate without assistance from a seated or laying position  Hx of VNA & STR; no to MH, ETOH or alcohol reported  Wife is requesting information on being a paid caregiver  CM provided the contact information for [place] [facility] and will call [facility] upon d/c to make a referral    PCP identified  Patient is retired  POA identified as wife and dtr, Lennie Reardon; patient identified wife as designated caregiver if/when needed  Patient uses [facility] in [place] for Rx needs; patient made aware of [address] to use at discharge if needed  Patient does not drive; reports wife transports to appointments and is available at discharge to transport home  CM reviewed d/c planning process including the following: identifying help at home, patient preference for d/c planning needs, [facility] Meds to [facility] program, availability of treatment team to discuss questions or concerns patient and/or family may have regarding understanding medications and recognizing signs and symptoms once discharged  CM also encouraged patient to follow up with all recommended appointments after discharge  Patient advised of importance for patient and family to participate in managing patients medical well being  CM will continue to follow for any further anticipated discharge needs      NISH Long  10/7/2019  [id number]

## 2019-10-07 NOTE — ASSESSMENT & PLAN NOTE
Found to have acute occlusive DVT in right leg external iliac, common femoral, femoral, deep femoral, popliteal, gastrocnemius, paired peroneal and posterior tibial veins   Current INR 2 72, continue with warfarin 5 mg daily  Goal INR between 2-3

## 2019-10-07 NOTE — ASSESSMENT & PLAN NOTE
History of Buerger's disease, history of left TMA  Known left popliteal aneurysm   Reports bilateral claudication pain  Continue warfarin and home [facility]   Continue outpatient vascular surgery follow up

## 2019-10-07 NOTE — ASSESSMENT & PLAN NOTE
Patient presented to ED  with complaints of abdominal pain and nausea  Likely component of UTI, chemotherapy, bladder CA   Continue scheduled Reglan and p r n   Zofran   -patient does use medical marijuana at home for appetite stimulant

## 2019-10-07 NOTE — ASSESSMENT & PLAN NOTE
Creatinine slightly elevated to 1 38 at time of presentation, baseline 1 2  Creatinine is currently back to baseline at 1 09     Continue IV fluid hydration and continue to trend BMP  Avoid nephrotoxins and hypotension

## 2019-10-08 LAB
ANION GAP SERPL CALCULATED.3IONS-SCNC: 10 MMOL/L (ref 4–13)
BASOPHILS # BLD AUTO: 0.01 THOUSANDS/ΜL (ref 0–0.1)
BASOPHILS NFR BLD AUTO: 0 % (ref 0–1)
BUN SERPL-MCNC: 5 MG/DL (ref 5–25)
CALCIUM SERPL-MCNC: 8.4 MG/DL (ref 8.3–10.1)
CHLORIDE SERPL-SCNC: 103 MMOL/L (ref 100–108)
CO2 SERPL-SCNC: 23 MMOL/L (ref 21–32)
CREAT SERPL-MCNC: 1.06 MG/DL (ref 0.6–1.3)
EOSINOPHIL # BLD AUTO: 0.04 THOUSAND/ΜL (ref 0–0.61)
EOSINOPHIL NFR BLD AUTO: 1 % (ref 0–6)
ERYTHROCYTE [DISTWIDTH] IN BLOOD BY AUTOMATED COUNT: 15.5 % (ref 11.6–15.1)
GFR SERPL CREATININE-BSD FRML MDRD: 69 ML/MIN/1.73SQ M
GLUCOSE SERPL-MCNC: 95 MG/DL (ref 65–140)
HCT VFR BLD AUTO: 28 % (ref 36.5–49.3)
HGB BLD-MCNC: 8.4 G/DL (ref 12–17)
IMM GRANULOCYTES # BLD AUTO: 0.09 THOUSAND/UL (ref 0–0.2)
IMM GRANULOCYTES NFR BLD AUTO: 2 % (ref 0–2)
INR PPP: 2.94 (ref 0.84–1.19)
LYMPHOCYTES # BLD AUTO: 0.3 THOUSANDS/ΜL (ref 0.6–4.47)
LYMPHOCYTES NFR BLD AUTO: 6 % (ref 14–44)
MCH RBC QN AUTO: 24.8 PG (ref 26.8–34.3)
MCHC RBC AUTO-ENTMCNC: 30 G/DL (ref 31.4–37.4)
MCV RBC AUTO: 83 FL (ref 82–98)
MONOCYTES # BLD AUTO: 0.52 THOUSAND/ΜL (ref 0.17–1.22)
MONOCYTES NFR BLD AUTO: 11 % (ref 4–12)
NEUTROPHILS # BLD AUTO: 3.82 THOUSANDS/ΜL (ref 1.85–7.62)
NEUTS SEG NFR BLD AUTO: 80 % (ref 43–75)
NRBC BLD AUTO-RTO: 0 /100 WBCS
PLATELET # BLD AUTO: 416 THOUSANDS/UL (ref 149–390)
PMV BLD AUTO: 8.1 FL (ref 8.9–12.7)
POTASSIUM SERPL-SCNC: 3.6 MMOL/L (ref 3.5–5.3)
PROTHROMBIN TIME: 31.3 SECONDS (ref 11.6–14.5)
RBC # BLD AUTO: 3.39 MILLION/UL (ref 3.88–5.62)
SODIUM SERPL-SCNC: 136 MMOL/L (ref 136–145)
WBC # BLD AUTO: 4.78 THOUSAND/UL (ref 4.31–10.16)

## 2019-10-08 PROCEDURE — 85025 COMPLETE CBC W/AUTO DIFF WBC: CPT | Performed by: INTERNAL MEDICINE

## 2019-10-08 PROCEDURE — 99232 SBSQ HOSP IP/OBS MODERATE 35: CPT | Performed by: INTERNAL MEDICINE

## 2019-10-08 PROCEDURE — 80048 BASIC METABOLIC PNL TOTAL CA: CPT | Performed by: INTERNAL MEDICINE

## 2019-10-08 PROCEDURE — 85610 PROTHROMBIN TIME: CPT | Performed by: INTERNAL MEDICINE

## 2019-10-08 PROCEDURE — 99232 SBSQ HOSP IP/OBS MODERATE 35: CPT | Performed by: NURSE PRACTITIONER

## 2019-10-08 RX ADMIN — CIPROFLOXACIN HYDROCHLORIDE 500 MG: 500 TABLET, FILM COATED ORAL at 21:04

## 2019-10-08 RX ADMIN — PANTOPRAZOLE SODIUM 40 MG: 40 TABLET, DELAYED RELEASE ORAL at 05:22

## 2019-10-08 RX ADMIN — TRAZODONE HYDROCHLORIDE 100 MG: 100 TABLET ORAL at 21:04

## 2019-10-08 RX ADMIN — SODIUM CHLORIDE AND POTASSIUM CHLORIDE 75 ML/HR: .9; .15 SOLUTION INTRAVENOUS at 12:30

## 2019-10-08 RX ADMIN — OXYCODONE HYDROCHLORIDE 10 MG: 10 TABLET, FILM COATED, EXTENDED RELEASE ORAL at 05:22

## 2019-10-08 RX ADMIN — METOCLOPRAMIDE 10 MG: 5 INJECTION, SOLUTION INTRAMUSCULAR; INTRAVENOUS at 11:37

## 2019-10-08 RX ADMIN — MELATONIN 6 MG: 3 TAB ORAL at 21:04

## 2019-10-08 RX ADMIN — PENTOXIFYLLINE 400 MG: 400 TABLET, EXTENDED RELEASE ORAL at 11:37

## 2019-10-08 RX ADMIN — PENTOXIFYLLINE 400 MG: 400 TABLET, EXTENDED RELEASE ORAL at 08:23

## 2019-10-08 RX ADMIN — OXYCODONE HYDROCHLORIDE 10 MG: 10 TABLET, FILM COATED, EXTENDED RELEASE ORAL at 21:04

## 2019-10-08 RX ADMIN — FAMOTIDINE 40 MG: 20 TABLET ORAL at 21:06

## 2019-10-08 RX ADMIN — METOCLOPRAMIDE 10 MG: 5 INJECTION, SOLUTION INTRAMUSCULAR; INTRAVENOUS at 17:13

## 2019-10-08 RX ADMIN — WARFARIN SODIUM 5 MG: 5 TABLET ORAL at 17:13

## 2019-10-08 RX ADMIN — METOCLOPRAMIDE 10 MG: 5 INJECTION, SOLUTION INTRAMUSCULAR; INTRAVENOUS at 05:22

## 2019-10-08 RX ADMIN — PENTOXIFYLLINE 400 MG: 400 TABLET, EXTENDED RELEASE ORAL at 17:13

## 2019-10-08 RX ADMIN — OXYCODONE HYDROCHLORIDE 10 MG: 10 TABLET, FILM COATED, EXTENDED RELEASE ORAL at 14:14

## 2019-10-08 RX ADMIN — CIPROFLOXACIN HYDROCHLORIDE 500 MG: 500 TABLET, FILM COATED ORAL at 08:23

## 2019-10-08 RX ADMIN — FLUTICASONE PROPIONATE 2 SPRAY: 50 SPRAY, METERED NASAL at 08:23

## 2019-10-08 RX ADMIN — Medication 400 UNITS: at 08:23

## 2019-10-08 RX ADMIN — NYSTATIN 1 APPLICATION: 100000 POWDER TOPICAL at 08:23

## 2019-10-08 RX ADMIN — NYSTATIN 1 APPLICATION: 100000 POWDER TOPICAL at 17:13

## 2019-10-08 NOTE — ASSESSMENT & PLAN NOTE
Found to have acute occlusive DVT in right leg external iliac, common femoral, femoral, deep femoral, popliteal, gastrocnemius, paired peroneal and posterior tibial veins   Current INR 2 94, continue with warfarin 5 mg daily  Goal INR between 2-3

## 2019-10-08 NOTE — PROGRESS NOTES
UROLOGY PROGRESS NOTE   Patient Identifiers: Bud Mehta (MRN [id number])  Date of Service: 10/8/2019    Assessment:     Acute DVT of the iliac vein-right lower extremity  · Occlusive DVT in right leg external iliac,, femoral, deep femoral, popliteal, peroneal and posterior tibial veins  · Coumadin 5 mg p o  Daily  · INR today-10/08/2019 is 2 94  · Goal INR between 2 and 3    Emphysematous cystitis  · No leukocytosis noted on CBC- WBC count 4 78  · PO antibiotics- ciprofloxacin 500 mg p o  B i d   · Vital signs stable, afebrile    Chronic kidney disease-stage III  · Managed by Medicine  · IV fluids and trend BMP  · Avoid nephrotoxic agents  · Creatinine 1 06 with GFR of 69-has returned to baseline creatinine    Urothelial carcinoma  · Status post ileal conduit for advanced urothelial carcinoma 02/2019  · Status post palliative radiation therapy  · Currently undergoing chemotherapy  · Due to change ileal conduit appliance today    RN participated in rounds with AP  Plan of care discussed with patient and RN  Subjective:     24 HR EVENTS:   no significant events  Patient has  Pt sleeping soundly on my arrival to the room  Upon awakening, he denies pain  He reports no discomfort in the abdomen  He continues to have the RLQ urostomy appliance intact draining clear yellow urine  Stoma pink  Objective:     VITALS:    Vitals:    10/07/19 2341   BP:    Pulse: 67   Resp: 18   Temp: 98 3 °F (36 8 °C)   SpO2: 98%       INS & OUTS:  I/O last 24 hours:   In: 1970 [I V :1970]  Out: 1950 [Urine:1950]    LABS:  Lab Results   Component Value Date    HGB 8 4 (L) 10/08/2019    HCT 28 0 (L) 10/08/2019    WBC 4 78 10/08/2019     (H) 10/08/2019       Lab Results   Component Value Date    K 3 6 10/08/2019     10/08/2019    CO2 23 10/08/2019    BUN 5 10/08/2019    CREATININE 1 06 10/08/2019    CALCIUM 8 4 10/08/2019       INPATIENT MEDS:    Current Facility-Administered Medications:     acetaminophen (TYLENOL) tablet 650 mg, 650 mg, Oral, Q6H PRN, Tuan Dull Smudde, PA-C    aluminum-magnesium hydroxide-simethicone (MYLANTA) 200-200-20 mg/5 mL oral suspension 30 mL, 30 mL, Oral, Q6H PRN, Tuan Dull Smudde, PA-C    cholecalciferol (VITAMIN D3) tablet 400 Units, 400 Units, Oral, Daily, Valorie Smudde, PA-C, 400 Units at 10/08/19 0823    ciprofloxacin (CIPRO) tablet 500 mg, 500 mg, Oral, Q12H [address], Ken Tsai MD, 500 mg at 10/08/19 0823    famotidine (PEPCID) tablet 40 mg, 40 mg, Oral, HS, Valorie Smudde, PA-C, 40 mg at 10/07/19 2158    fluticasone (FLONASE) 50 mcg/act nasal spray 2 spray, 2 spray, Each Nare, Daily, Valorie Smudde, PA-C, 2 spray at 10/08/19 0823    HYDROmorphone (DILAUDID) injection 0 5 mg, 0 5 mg, Intravenous, Q4H PRN, Tuan Dull Smudde, PA-C    melatonin tablet 6 mg, 6 mg, Oral, HS, Valorie Smudde, PA-C, 6 mg at 10/07/19 2158    metoclopramide (REGLAN) injection 10 mg, 10 mg, Intravenous, Q6H [address], Gilma Leon MD, 10 mg at 10/08/19 0522    nystatin (MYCOSTATIN) powder, , Topical, BID, Valorie Smudde, PA-C, 1 application at 87/30/26 0823    ondansetron (ZOFRAN) injection 4 mg, 4 mg, Intravenous, Q6H PRN, Valorie Smudde, PA-C, 4 mg at 10/06/19 0309    oxyCODONE (OxyCONTIN) 12 hr tablet 10 mg, 10 mg, Oral, Q8H CAROL, Valorie Smudde, PA-C, 10 mg at 10/08/19 0522    oxyCODONE (ROXICODONE) immediate release tablet 10 mg, 10 mg, Oral, Q4H PRN, Tuan Dull Smudde, PA-C    oxyCODONE (ROXICODONE) IR tablet 5 mg, 5 mg, Oral, Q4H PRN, Tuan Dull Smudde, PA-C    pantoprazole (PROTONIX) EC tablet 40 mg, 40 mg, Oral, Early Morning, Valorie Smudde, PA-C, 40 mg at 10/08/19 0522    pentoxifylline (TRENtal) ER tablet 400 mg, 400 mg, Oral, TID With Meals, Tuan Dull Smudde, PA-C, 400 mg at 10/08/19 7281    polyethylene glycol (MIRALAX) packet 17 g, 17 g, Oral, Daily PRN, Tuan Dull Smudde, PA-C    senna (SENOKOT) tablet 17 2 mg, 2 tablet, Oral, HS PRN, Tuan Dull Smudde, PA-C    sodium chloride 0 9 % with KCl 20 mEq/L infusion (premix), 75 mL/hr, Intravenous, Continuous, Valorie Cline PA-C, Last Rate: 75 mL/hr at 10/07/19 2231, 75 mL/hr at 10/07/19 2231    traZODone (DESYREL) tablet 100 mg, 100 mg, Oral, HS, Valorie Cline PA-C, 100 mg at 10/07/19 2158    warfarin (COUMADIN) tablet 5 mg, 5 mg, Oral, Daily (warfarin), Munira Stover MD, 5 mg at 10/07/19 1705      Physical Exam:     GEN: no acute distress    RESP: breathing comfortably with no accessory muscle use    CARDIO: Regular rate and rhythm, S1S2 present or without murmur or extra heart sounds  ABD: soft, non-tender, non-distended   EXT: no significant peripheral edema   SONI:RLQ urostomy appliance draining clear yellow uine    RUSSELL Castellano

## 2019-10-08 NOTE — PROGRESS NOTES
Progress Note - Sergio Nguyen 1945, 76 y o  male MRN: [id number]    Unit/Bed#: [place] [facility] 2 -01 Encounter: [id number]    Primary Care Provider: Antoni Lucas MD   Date and time admitted to hospital: 10/4/2019  3:56 PM        * Acute deep vein thrombosis (DVT) of iliac vein of right lower extremity ([address] )  Assessment & Plan  Found to have acute occlusive DVT in right leg external iliac, common femoral, femoral, deep femoral, popliteal, gastrocnemius, paired peroneal and posterior tibial veins   Current INR 2 94, continue with warfarin 5 mg daily  Goal INR between 2-3  Emphysematous cystitis  Assessment & Plan  Found to have diffuse urinary bladder wall thickening with tiny droplets of pneumoperitoneum adjacent to urinary bladder  -urology follow-up appreciated, recommend to continue antibiotics  -As per Urology recommendations, they are of the opinion that the findings are consistent with a mild form of emphysematous cystitis  -cultures from urethral orifice growing corynebacterium, will follow up culture and sensitivity    Nausea  Assessment & Plan  Patient presented to ED  with complaints of abdominal pain and nausea  Likely component of UTI, chemotherapy, bladder CA   Continue scheduled Reglan and p r n   Zofran   -patient does use medical marijuana at home for appetite stimulant    Hyponatremia  Assessment & Plan  -likely secondary dehydration  -sodium 136, continue IV    Cancer related pain  Assessment & Plan  Continue home regimen of oxycodone 10 mg Q8hrs scheduled   PRN oxycodone and dilaudid while inpatient   Continue outpatient palliative care follow up     Acute on chronic anemia  Assessment & Plan  -baseline hemoglobin 8-9  -hemoglobin today 8 4  -no signs of any active bleeding  -will check stool occult  -monitor H&H, transfuse as needed    CKD (chronic kidney disease) stage 3, GFR 30-59 ml/min ([facility])  Assessment & Plan  Creatinine slightly elevated to 1 38 at time of presentation, baseline 1 2  Creatinine is currently back to baseline at 1 09  Continue IV fluid hydration and continue to trend BMP  Avoid nephrotoxins and hypotension    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 [facility])  Assessment & Plan  Patient following with urology for high grade urothelial carcinoma of prostate  S/p surgical resection which was unable to be accomplished due to severity of the disease  Currently with diverting urostomy   Currently undergoing chemotherapy with palliative radiation  Urology consult completed and no further urological intervention recommended  Urology recommends to continue IV antibiotics, pain control  Essential hypertension  Assessment & Plan  Appears controlled at this time off medications  Continue to monitor     Peripheral vascular disease [facility])  Assessment & Plan  History of Buerger's disease, history of left TMA  Known left popliteal aneurysm   Reports bilateral claudication pain  Continue warfarin and home [facility]   Continue outpatient vascular surgery follow up         VTE Pharmacologic Prophylaxis:   Pharmacologic:  Warfarin    Patient Centered Rounds: I have performed bedside rounds with nursing staff today  Discussions with Specialists or Other Care Team Provider: Urology    Education and Discussions with Family / Patient: wife, at bedside    Time Spent for Care: 20 minutes  More than 50% of total time spent on counseling and coordination of care as described above      Current Length of Stay: 4 day(s)    Current Patient Status: Inpatient   Certification Statement: The patient will continue to require additional inpatient hospital stay due to IV abx, poor PO intake    Discharge Plan / Estimated Discharge Date: 2-3 days    Code Status: Level 1 - Full Code      Subjective:   Patient seen and examined at bedside, states he feels will be better tolerating some oral intake    Objective:     Vitals:   Temp (24hrs), Av 4 °F (36 9 °C), Min:98 3 °F (36 8 °C), Max:98 5 °F (36 9 °C)    Temp:  [98 3 °F (36 8 °C)-98 5 °F (36 9 °C)] 98 5 °F (36 9 °C)  HR:  [67-68] 68  Resp:  [18] 18  BP: (109)/(58) 109/58  SpO2:  [97 %-98 %] 97 %  Body mass index is 26 01 kg/m²  Input and Output Summary (last 24 hours): Intake/Output Summary (Last 24 hours) at 10/8/2019 1749  Last data filed at 10/8/2019 1448  Gross per 24 hour   Intake 1970 ml   Output 2650 ml   Net -680 ml       Physical Exam:    Constitutional: Patient is oriented to person, place and time, no acute distress  HEENT:  Normocephalic, atraumatic, EOMI, PERRLA, no scleral icterus, no pallor, moist oral mucosa  Neck:  Supple, no masses, no thyromegaly, no bruits Normal range of motion  Lymph nodes:  No lymphadenopathy  Cardiovascular: Normal S1S2, RRR, No murmurs/rubs/gallops appreciated  Pulmonary:  Bilateral air entry, No rhonchi/rales/wheezing appreciated  Abdominal: Soft, Bowel sounds present, Non-tender, Non-distended, No rebound/guarding, no hepatomegaly   Musculoskeletal: No tenderness/abnormality   Extremities:  No cyanosis, clubbing or edema  Peripheral pulses palpable and equal bilaterally  Neurological: Cranial nerves II-XII grossly intact, sensation intact, otherwise no focal neurological symptoms  Right lower quadrant urostomy draining clear yellow urine    Additional Data:     Labs:    Results from last 7 days   Lab Units 10/08/19  0531   WBC Thousand/uL 4 78   HEMOGLOBIN g/dL 8 4*   HEMATOCRIT % 28 0*   PLATELETS Thousands/uL 416*   NEUTROS PCT % 80*   LYMPHS PCT % 6*   MONOS PCT % 11   EOS PCT % 1     Results from last 7 days   Lab Units 10/08/19  0531  10/04/19  1608   POTASSIUM mmol/L 3 6   < > 3 2*   CHLORIDE mmol/L 103   < > 92*   CO2 mmol/L 23   < > 23   BUN mg/dL 5   < > 12   CREATININE mg/dL 1 06   < > 1 38*   CALCIUM mg/dL 8 4   < > 9 3   ALK PHOS U/L  --   --  96   ALT U/L  --   --  6*   AST U/L  --   --  12    < > = values in this interval not displayed       Results from last 7 days Lab Units 10/08/19  0531   INR  2 94*        I Have Reviewed All Lab Data Listed Above  Recent Cultures (last 7 days):     Results from last 7 days   Lab Units 10/06/19  1401 10/04/19  2230 10/04/19  2225 10/04/19  1935   BLOOD CULTURE   --  No Growth at 72 hrs   No Growth at 72 hrs   --    GRAM STAIN RESULT  2+ Polys*  2+ Gram positive cocci in pairs*  2+ Gram negative rods*  --   --   --    URINE CULTURE   --   --   --  No Growth <1000 cfu/mL   BODY FLUID CULTURE, STERILE  3+ Growth of Corynebacterium striatum group*  --   --   --        Last 24 Hours Medication List:     Current Facility-Administered Medications:  acetaminophen 650 mg Oral Q6H PRN Valorie Smudde, PA-C    aluminum-magnesium hydroxide-simethicone 30 mL Oral Q6H PRN Thurlow Milling, PA-C    cholecalciferol 400 Units Oral Daily Valorie Smudde, PA-C    ciprofloxacin 500 mg Oral Q12H [address] Debra Johns MD    famotidine 40 mg Oral HS Valorie Smudde, PA-C    fluticasone 2 spray Each Nare Daily Valorie Smudde, PA-C    HYDROmorphone 0 5 mg Intravenous Q4H PRN Valorie Smudde, PA-C    melatonin 6 mg Oral HS Valorie Smudde, PA-C    metoclopramide 10 mg Intravenous Q6H [address] Gilma Leon MD    nystatin  Topical BID Valorie Smudde, PA-C    ondansetron 4 mg Intravenous Q6H PRN Thurlow Milling, PA-C    oxyCODONE 10 mg Oral Q8H [address] Valorie Smudde, PA-C    oxyCODONE 10 mg Oral Q4H PRN Valorie Smudde, PA-C    oxyCODONE 5 mg Oral Q4H PRN Valorie Smudde, PA-C    pantoprazole 40 mg Oral Early Morning Valorie Smudde, PA-C    pentoxifylline 400 mg Oral TID With Meals Valorie Smudde, PA-C    polyethylene glycol 17 g Oral Daily PRN Valorie Smudde, PA-C    senna 2 tablet Oral HS PRN Valorie Smudde, PA-C    sodium chloride 0 9 % with KCl 20 mEq/L 75 mL/hr Intravenous Continuous Valorie Smudde, PA-C Last Rate: 75 mL/hr (10/08/19 1230)   traZODone 100 mg Oral HS Valorie Smudde, PA-C    warfarin 5 mg Oral Daily (warfarin) Debra Johns MD         Today, Patient Was Seen By: Mia Domínguez Mauricio Cole MD

## 2019-10-08 NOTE — ASSESSMENT & PLAN NOTE
Found to have diffuse urinary bladder wall thickening with tiny droplets of pneumoperitoneum adjacent to urinary bladder  -urology follow-up appreciated, recommend to continue antibiotics  -As per Urology recommendations, they are of the opinion that the findings are consistent with a mild form of emphysematous cystitis     -cultures from urethral orifice growing corynebacterium, will follow up culture and sensitivity

## 2019-10-09 LAB
ANION GAP SERPL CALCULATED.3IONS-SCNC: 10 MMOL/L (ref 4–13)
ANISOCYTOSIS BLD QL SMEAR: PRESENT
BACTERIA SPEC BFLD CULT: ABNORMAL
BASOPHILS # BLD MANUAL: 0.05 THOUSAND/UL (ref 0–0.1)
BASOPHILS NFR MAR MANUAL: 1 % (ref 0–1)
BUN SERPL-MCNC: 5 MG/DL (ref 5–25)
BURR CELLS BLD QL SMEAR: PRESENT
CALCIUM SERPL-MCNC: 8.3 MG/DL (ref 8.3–10.1)
CHLORIDE SERPL-SCNC: 103 MMOL/L (ref 100–108)
CO2 SERPL-SCNC: 22 MMOL/L (ref 21–32)
CREAT SERPL-MCNC: 1.01 MG/DL (ref 0.6–1.3)
EOSINOPHIL # BLD MANUAL: 0 THOUSAND/UL (ref 0–0.4)
EOSINOPHIL NFR BLD MANUAL: 0 % (ref 0–6)
ERYTHROCYTE [DISTWIDTH] IN BLOOD BY AUTOMATED COUNT: 15.7 % (ref 11.6–15.1)
GFR SERPL CREATININE-BSD FRML MDRD: 73 ML/MIN/1.73SQ M
GLUCOSE SERPL-MCNC: 90 MG/DL (ref 65–140)
GRAM STN SPEC: ABNORMAL
HCT VFR BLD AUTO: 28 % (ref 36.5–49.3)
HGB BLD-MCNC: 7.9 G/DL (ref 12–17)
INR PPP: 3.57 (ref 0.84–1.19)
LYMPHOCYTES # BLD AUTO: 0.46 THOUSAND/UL (ref 0.6–4.47)
LYMPHOCYTES # BLD AUTO: 10 % (ref 14–44)
MCH RBC QN AUTO: 24 PG (ref 26.8–34.3)
MCHC RBC AUTO-ENTMCNC: 28.2 G/DL (ref 31.4–37.4)
MCV RBC AUTO: 85 FL (ref 82–98)
METAMYELOCYTES NFR BLD MANUAL: 1 % (ref 0–1)
MONOCYTES # BLD AUTO: 0.41 THOUSAND/UL (ref 0–1.22)
MONOCYTES NFR BLD: 9 % (ref 4–12)
NEUTROPHILS # BLD MANUAL: 3.62 THOUSAND/UL (ref 1.85–7.62)
NEUTS BAND NFR BLD MANUAL: 1 % (ref 0–8)
NEUTS SEG NFR BLD AUTO: 78 % (ref 43–75)
NRBC BLD AUTO-RTO: 0 /100 WBCS
OVALOCYTES BLD QL SMEAR: PRESENT
PLATELET # BLD AUTO: 401 THOUSANDS/UL (ref 149–390)
PLATELET BLD QL SMEAR: ABNORMAL
PMV BLD AUTO: 8.3 FL (ref 8.9–12.7)
POTASSIUM SERPL-SCNC: 3.5 MMOL/L (ref 3.5–5.3)
PROTHROMBIN TIME: 36.5 SECONDS (ref 11.6–14.5)
RBC # BLD AUTO: 3.29 MILLION/UL (ref 3.88–5.62)
SODIUM SERPL-SCNC: 135 MMOL/L (ref 136–145)
TOTAL CELLS COUNTED SPEC: 100
WBC # BLD AUTO: 4.58 THOUSAND/UL (ref 4.31–10.16)

## 2019-10-09 PROCEDURE — 99232 SBSQ HOSP IP/OBS MODERATE 35: CPT | Performed by: INTERNAL MEDICINE

## 2019-10-09 PROCEDURE — 85007 BL SMEAR W/DIFF WBC COUNT: CPT | Performed by: INTERNAL MEDICINE

## 2019-10-09 PROCEDURE — 85027 COMPLETE CBC AUTOMATED: CPT | Performed by: INTERNAL MEDICINE

## 2019-10-09 PROCEDURE — 85610 PROTHROMBIN TIME: CPT | Performed by: INTERNAL MEDICINE

## 2019-10-09 PROCEDURE — 80048 BASIC METABOLIC PNL TOTAL CA: CPT | Performed by: INTERNAL MEDICINE

## 2019-10-09 RX ADMIN — SODIUM CHLORIDE AND POTASSIUM CHLORIDE 75 ML/HR: .9; .15 SOLUTION INTRAVENOUS at 01:53

## 2019-10-09 RX ADMIN — PENTOXIFYLLINE 400 MG: 400 TABLET, EXTENDED RELEASE ORAL at 12:35

## 2019-10-09 RX ADMIN — TRAZODONE HYDROCHLORIDE 100 MG: 100 TABLET ORAL at 21:47

## 2019-10-09 RX ADMIN — OXYCODONE HYDROCHLORIDE 10 MG: 10 TABLET ORAL at 12:04

## 2019-10-09 RX ADMIN — METOCLOPRAMIDE 10 MG: 5 INJECTION, SOLUTION INTRAMUSCULAR; INTRAVENOUS at 17:29

## 2019-10-09 RX ADMIN — CIPROFLOXACIN HYDROCHLORIDE 500 MG: 500 TABLET, FILM COATED ORAL at 21:46

## 2019-10-09 RX ADMIN — SODIUM CHLORIDE AND POTASSIUM CHLORIDE 75 ML/HR: .9; .15 SOLUTION INTRAVENOUS at 15:07

## 2019-10-09 RX ADMIN — OXYCODONE HYDROCHLORIDE 10 MG: 10 TABLET, FILM COATED, EXTENDED RELEASE ORAL at 21:48

## 2019-10-09 RX ADMIN — MELATONIN 6 MG: 3 TAB ORAL at 21:47

## 2019-10-09 RX ADMIN — METOCLOPRAMIDE 10 MG: 5 INJECTION, SOLUTION INTRAMUSCULAR; INTRAVENOUS at 00:47

## 2019-10-09 RX ADMIN — ALUMINUM HYDROXIDE, MAGNESIUM HYDROXIDE, AND SIMETHICONE 30 ML: 200; 200; 20 SUSPENSION ORAL at 17:29

## 2019-10-09 RX ADMIN — PENTOXIFYLLINE 400 MG: 400 TABLET, EXTENDED RELEASE ORAL at 08:39

## 2019-10-09 RX ADMIN — NYSTATIN: 100000 POWDER TOPICAL at 08:40

## 2019-10-09 RX ADMIN — METOCLOPRAMIDE 10 MG: 5 INJECTION, SOLUTION INTRAMUSCULAR; INTRAVENOUS at 05:23

## 2019-10-09 RX ADMIN — Medication 400 UNITS: at 08:39

## 2019-10-09 RX ADMIN — NYSTATIN: 100000 POWDER TOPICAL at 17:33

## 2019-10-09 RX ADMIN — PENTOXIFYLLINE 400 MG: 400 TABLET, EXTENDED RELEASE ORAL at 17:29

## 2019-10-09 RX ADMIN — OXYCODONE HYDROCHLORIDE 10 MG: 10 TABLET, FILM COATED, EXTENDED RELEASE ORAL at 05:23

## 2019-10-09 RX ADMIN — FLUTICASONE PROPIONATE 2 SPRAY: 50 SPRAY, METERED NASAL at 08:38

## 2019-10-09 RX ADMIN — CIPROFLOXACIN HYDROCHLORIDE 500 MG: 500 TABLET, FILM COATED ORAL at 08:39

## 2019-10-09 RX ADMIN — FAMOTIDINE 40 MG: 20 TABLET ORAL at 21:47

## 2019-10-09 RX ADMIN — PANTOPRAZOLE SODIUM 40 MG: 40 TABLET, DELAYED RELEASE ORAL at 05:23

## 2019-10-09 RX ADMIN — OXYCODONE HYDROCHLORIDE 10 MG: 10 TABLET, FILM COATED, EXTENDED RELEASE ORAL at 15:13

## 2019-10-09 NOTE — PROGRESS NOTES
[facility] 1945[facility] 76 [facility]  [facility] MRN: [id number]    Unit/Bed#: [address]01 Encounter: [id number]    Primary Care Provider: Ankita Olivia MD   Date and time admitted to hospital: 10/4/2019  3:56 PM        * Acute deep vein thrombosis (DVT) of iliac vein of right lower extremity ([address] )  Assessment & Plan  Found to have acute occlusive DVT in right leg external iliac, common femoral, femoral, deep femoral, popliteal, gastrocnemius, paired peroneal and posterior tibial veins   Current INR 3 57, hold warfarin, monitor INR,     Emphysematous cystitis  Assessment & Plan  Found to have diffuse urinary bladder wall thickening with tiny droplets of pneumoperitoneum adjacent to urinary bladder  -urology follow-up appreciated, recommend to continue antibiotics  -As per Urology recommendations, they are of the opinion that the findings are consistent with a mild form of emphysematous cystitis  -cultures from urethral orifice growing corynebacterium, will follow up culture and sensitivity    Nausea  Assessment & Plan  Patient presented to ED  with complaints of abdominal pain and nausea  Likely component of UTI, chemotherapy, bladder CA   Continue scheduled Reglan and p r n   Zofran   -patient does use medical marijuana at home for appetite stimulant    Hyponatremia  Assessment & Plan  -likely secondary dehydration  -sodium 136, continue IV    Cancer related pain  Assessment & Plan  Continue home regimen of oxycodone 10 mg Q8hrs scheduled   PRN oxycodone and dilaudid while inpatient   Continue outpatient palliative care follow up     Acute on chronic anemia  Assessment & Plan  -baseline hemoglobin 8-9  -hemoglobin today 8 4  -no signs of any active bleeding  -will check stool occult  -monitor H&H, transfuse as needed    CKD (chronic kidney disease) stage 3, GFR 30-59 ml/min ([facility])  Assessment & Plan  Creatinine slightly elevated to 1 38 at time of presentation, baseline 1 2  Creatinine is currently back to baseline at 1 09  Continue IV fluid hydration and continue to trend BMP  Avoid nephrotoxins and hypotension    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 [facility])  Assessment & Plan  Patient following with urology for high grade urothelial carcinoma of prostate  S/p surgical resection which was unable to be accomplished due to severity of the disease  Currently with diverting urostomy   Currently undergoing chemotherapy with palliative radiation  Urology consult completed and no further urological intervention recommended  Will consult with Oncology    Essential hypertension  Assessment & Plan  Appears controlled at this time off medications  Continue to monitor     Peripheral vascular disease [facility])  Assessment & Plan  History of Buerger's disease, history of left TMA  Known left popliteal aneurysm   Reports bilateral claudication pain  Continue warfarin and home [facility]   Continue outpatient vascular surgery follow up       VTE Pharmacologic Prophylaxis:   Pharmacologic: warfarin    Patient Centered Rounds: I have performed bedside rounds with nursing staff today  Discussions with Specialists or Other Care Team Provider: urology    Time Spent for Care: 20 minutes  More than 50% of total time spent on counseling and coordination of care as described above      Current Length of Stay: 5 day(s)    Current Patient Status: Inpatient   Certification Statement: The patient will continue to require additional inpatient hospital stay due to Supratherapeutic INR, penile discharge    Discharge Plan / Estimated Discharge Date: 24-48 hours    Code Status: Level 1 - Full Code      Subjective:   Patient seen and examined at bedside, states frustration with having his IV line changed, also complaining of increased penile discharge malodorous    Objective:     Vitals:   Temp (24hrs), Av 4 °F (36 9 °C), Min:97 2 °F (36 2 °C), Max:99 7 °F (37 6 °C)    Temp:  [97 2 °F (36 2 °C)-99 7 °F (37 6 °C)] 98 4 °F (36 9 °C)  HR:  [63-72] 70  Resp:  [18-20] 18  BP: (106-128)/(56-73) 114/56  SpO2:  [95 %-99 %] 97 %  Body mass index is 26 01 kg/m²  Input and Output Summary (last 24 hours): Intake/Output Summary (Last 24 hours) at 10/9/2019 1551  Last data filed at 10/9/2019 1204  Gross per 24 hour   Intake 240 ml   Output 2150 ml   Net -1910 ml       Physical Exam:    Constitutional: Patient is oriented to person, place and time, no acute distress  HEENT:  Normocephalic, atraumatic, EOMI, PERRLA, no scleral icterus, no pallor, moist oral mucosa  Neck:  Supple, no masses, no thyromegaly, no bruits Normal range of motion  Lymph nodes:  No lymphadenopathy  Cardiovascular: Normal S1S2, RRR, No murmurs/rubs/gallops appreciated  Pulmonary:  Bilateral air entry, No rhonchi/rales/wheezing appreciated  Abdominal: Soft, Bowel sounds present, Non-tender, Non-distended, No rebound/guarding, no hepatomegaly   Musculoskeletal: No tenderness/abnormality   Extremities:  No cyanosis, clubbing or edema  Peripheral pulses palpable and equal bilaterally  Neurological: Cranial nerves II-XII grossly intact, sensation intact, otherwise no focal neurological symptoms  RLQ urostomy    Additional Data:     Labs:    Results from last 7 days   Lab Units 10/09/19  0521 10/08/19  0531   WBC Thousand/uL 4 58 4 78   HEMOGLOBIN g/dL 7 9* 8 4*   HEMATOCRIT % 28 0* 28 0*   PLATELETS Thousands/uL 401* 416*   NEUTROS PCT %  --  80*   LYMPHS PCT %  --  6*   LYMPHO PCT % 10*  --    MONOS PCT %  --  11   MONO PCT % 9  --    EOS PCT % 0 1     Results from last 7 days   Lab Units 10/09/19  0521  10/04/19  1608   POTASSIUM mmol/L 3 5   < > 3 2*   CHLORIDE mmol/L 103   < > 92*   CO2 mmol/L 22   < > 23   BUN mg/dL 5   < > 12   CREATININE mg/dL 1 01   < > 1 38*   CALCIUM mg/dL 8 3   < > 9 3   ALK PHOS U/L  --   --  96   ALT U/L  --   --  6*   AST U/L  --   --  12    < > = values in this interval not displayed       Results from last 7 days   Lab Units 10/09/19  0521   INR  3 57*        I Have Reviewed All Lab Data Listed Above  Recent Cultures (last 7 days):     Results from last 7 days   Lab Units 10/06/19  1401 10/04/19  2230 10/04/19  2225 10/04/19  1935   BLOOD CULTURE   --  No Growth After 4 Days  No Growth After 4 Days    --    GRAM STAIN RESULT  2+ Polys*  2+ Gram positive cocci in pairs*  2+ Gram negative rods*  --   --   --    URINE CULTURE   --   --   --  No Growth <1000 cfu/mL   BODY FLUID CULTURE, STERILE  3+ Growth of Corynebacterium striatum group*  --   --   --        Last 24 Hours Medication List:     Current Facility-Administered Medications:  acetaminophen 650 mg Oral Q6H PRN Valorie Smudde, PA-C    aluminum-magnesium hydroxide-simethicone 30 mL Oral Q6H PRN Mary Kate Forts, PA-C    cholecalciferol 400 Units Oral Daily Valorie Smudde, PA-C    ciprofloxacin 500 mg Oral Q12H [address] Antonio Helm MD    famotidine 40 mg Oral HS Valorie Smudde, PA-C    fluticasone 2 spray Each Nare Daily Valorie Smudde, PA-C    HYDROmorphone 0 5 mg Intravenous Q4H PRN Valorie Smudde, PA-C    melatonin 6 mg Oral HS Valorie Smudde, PA-C    metoclopramide 10 mg Intravenous Q6H [address] Gilma Leon MD    nystatin  Topical BID Valorie Smudde, PA-C    ondansetron 4 mg Intravenous Q6H PRN Mary Kate Forts, PA-C    oxyCODONE 10 mg Oral Q8H [address] Valorie Smudde, PA-C    oxyCODONE 10 mg Oral Q4H PRN Valorie Smudde, PA-C    oxyCODONE 5 mg Oral Q4H PRN Valorie Smudde, PA-C    pantoprazole 40 mg Oral Early Morning Valorie Smudde, PA-C    pentoxifylline 400 mg Oral TID With Meals Valorie Smudde, PA-C    polyethylene glycol 17 g Oral Daily PRN Valorie Smudde, PA-C    senna 2 tablet Oral HS PRN Valorie Smudde, PA-C    sodium chloride 0 9 % with KCl 20 mEq/L 75 mL/hr Intravenous Continuous Valorie Smudde, PA-C Last Rate: 75 mL/hr (10/09/19 1507)   traZODone 100 mg Oral HS Valorie Smudde, PA-C    warfarin 5 mg Oral Daily (warfarin) Antonio Helm MD         Today, Patient Was Seen By: Leonel Clayton, MD

## 2019-10-09 NOTE — ASSESSMENT & PLAN NOTE
-baseline hemoglobin 8-9  -hemoglobin today 8 4  -no signs of any active bleeding  -will check stool occult  -monitor H&H, transfuse as needed

## 2019-10-09 NOTE — ASSESSMENT & PLAN NOTE
Found to have acute occlusive DVT in right leg external iliac, common femoral, femoral, deep femoral, popliteal, gastrocnemius, paired peroneal and posterior tibial veins   Current INR 3 57, hold warfarin, monitor INR,

## 2019-10-09 NOTE — PLAN OF CARE
Problem: Potential for Falls  Goal: Patient will remain free of falls  Description  INTERVENTIONS:  - Assess patient frequently for physical needs  -  Identify cognitive and physical deficits and behaviors that affect risk of falls    -  [place] fall precautions as indicated by assessment   - Educate patient/family on patient safety including physical limitations  - Instruct patient to call for assistance with activity based on assessment  - Modify environment to reduce risk of injury  - Consider OT/PT consult to assist with strengthening/mobility  Outcome: Progressing     Problem: Prexisting or High Potential for Compromised Skin Integrity  Goal: Skin integrity is maintained or improved  Description  INTERVENTIONS:  - Identify patients at risk for skin breakdown  - Assess and monitor skin integrity  - Assess and monitor nutrition and hydration status  - Monitor labs   - Assess for incontinence   - Turn and reposition patient  - Assist with mobility/ambulation  - Relieve pressure over bony prominences  - Avoid friction and shearing  - Provide appropriate hygiene as needed including keeping skin clean and dry  - Evaluate need for skin moisturizer/barrier cream  - Collaborate with interdisciplinary team   - Patient/family teaching  - Consider wound care consult   Outcome: Progressing     Problem: PAIN - ADULT  Goal: Verbalizes/displays adequate comfort level or baseline comfort level  Description  Interventions:  - Encourage patient to monitor pain and request assistance  - Assess pain using appropriate pain scale  - Administer analgesics based on type and severity of pain and evaluate response  - Implement non-pharmacological measures as appropriate and evaluate response  - Consider cultural and social influences on pain and pain management  - Notify physician/advanced practitioner if interventions unsuccessful or patient reports new pain  Outcome: Progressing     Problem: INFECTION - ADULT  Goal: Absence or prevention of progression during hospitalization  Description  INTERVENTIONS:  - Assess and monitor for signs and symptoms of infection  - Monitor lab/diagnostic results  - Monitor all insertion sites, i e  indwelling lines, tubes, and drains  - Monitor endotracheal if appropriate and nasal secretions for changes in amount and color  - [place] appropriate cooling/warming therapies per order  - Administer medications as ordered  - Instruct and encourage patient and family to use good hand hygiene technique  - Identify and instruct in appropriate isolation precautions for identified infection/condition  Outcome: Progressing     Problem: SAFETY ADULT  Goal: Maintain or return to baseline ADL function  Description  INTERVENTIONS:  -  Assess patient's ability to carry out ADLs; assess patient's baseline for ADL function and identify physical deficits which impact ability to perform ADLs (bathing, care of mouth/teeth, toileting, grooming, dressing, etc )  - Assess/evaluate cause of self-care deficits   - Assess range of motion  - Assess patient's mobility; develop plan if impaired  - Assess patient's need for assistive devices and provide as appropriate  - Encourage maximum independence but intervene and supervise when necessary  - Involve family in performance of ADLs  - Assess for home care needs following discharge   - Consider OT consult to assist with ADL evaluation and planning for discharge  - Provide patient education as appropriate  Outcome: Progressing  Goal: Maintain or return mobility status to optimal level  Description  INTERVENTIONS:  - Assess patient's baseline mobility status (ambulation, transfers, stairs, etc )    - Identify cognitive and physical deficits and behaviors that affect mobility  - Identify mobility aids required to assist with transfers and/or ambulation (gait belt, sit-to-stand, lift, walker, cane, etc )  - [place] fall precautions as indicated by assessment  - Record patient progress and toleration of activity level on Mobility SBAR; progress patient to next Phase/Stage  - Instruct patient to call for assistance with activity based on assessment  - Consider rehabilitation consult to assist with strengthening/weightbearing, etc   Outcome: Progressing     Problem: DISCHARGE PLANNING  Goal: Discharge to home or other facility with appropriate resources  Description  INTERVENTIONS:  - Identify barriers to discharge w/patient and caregiver  - Arrange for needed discharge resources and transportation as appropriate  - Identify discharge learning needs (meds, wound care, etc )  - Arrange for interpretive services to assist at discharge as needed  - Refer to Case Management Department for coordinating discharge planning if the patient needs post-hospital services based on physician/advanced practitioner order or complex needs related to functional status, cognitive ability, or social support system  Outcome: Progressing     Problem: Knowledge Deficit  Goal: Patient/family/caregiver demonstrates understanding of disease process, treatment plan, medications, and discharge instructions  Description  Complete learning assessment and assess knowledge base    Interventions:  - Provide teaching at level of understanding  - Provide teaching via preferred learning methods  Outcome: Progressing     Problem: GENITOURINARY - ADULT  Goal: Maintains or returns to baseline urinary function  Description  INTERVENTIONS:  - Assess urinary function  - Encourage oral fluids to ensure adequate hydration if ordered  - Administer IV fluids as ordered to ensure adequate hydration  - Administer ordered medications as needed  - Offer frequent toileting  - Follow urinary retention protocol if ordered  Outcome: Progressing  Goal: Absence of urinary retention  Description  INTERVENTIONS:  - Assess patients ability to void and empty bladder  - Monitor I/O  - Bladder scan as needed  - Discuss with physician/AP medications to alleviate retention as needed  - Discuss catheterization for long term situations as appropriate  Outcome: Progressing     Problem: HEMATOLOGIC - ADULT  Goal: Maintains hematologic stability  Description  INTERVENTIONS  - Assess for signs and symptoms of bleeding or hemorrhage  - Monitor labs  - Administer supportive blood products/factors as ordered and appropriate  Outcome: Progressing     Problem: Nutrition/Hydration-ADULT  Goal: Nutrient/Hydration intake appropriate for improving, restoring or maintaining nutritional needs  Description  Monitor and assess patient's nutrition/hydration status for malnutrition  Collaborate with interdisciplinary team and initiate plan and interventions as ordered  Monitor patient's weight and dietary intake as ordered or per policy  Utilize nutrition screening tool and intervene as necessary  Determine patient's food preferences and provide high-protein, high-caloric foods as appropriate       INTERVENTIONS:  - Monitor oral intake, urinary output, labs, and treatment plans  - Assess nutrition and hydration status and recommend course of action  - Evaluate amount of meals eaten  - Assist patient with eating if necessary   - Allow adequate time for meals  - Recommend/ encourage appropriate diets, oral nutritional supplements, and vitamin/mineral supplements  - Order, calculate, and assess calorie counts as needed  - Recommend, monitor, and adjust tube feedings and TPN/PPN based on assessed needs  - Assess need for intravenous fluids  - Provide specific nutrition/hydration education as appropriate  - Include patient/family/caregiver in decisions related to nutrition  Outcome: Progressing

## 2019-10-09 NOTE — ASSESSMENT & PLAN NOTE
Patient following with urology for high grade urothelial carcinoma of prostate  S/p surgical resection which was unable to be accomplished due to severity of the disease  Currently with diverting urostomy   Currently undergoing chemotherapy with palliative radiation  Urology consult completed and no further urological intervention recommended    Will consult with Oncology

## 2019-10-09 NOTE — PLAN OF CARE
Problem: Potential for Falls  Goal: Patient will remain free of falls  Description  INTERVENTIONS:  - Assess patient frequently for physical needs  -  Identify cognitive and physical deficits and behaviors that affect risk of falls    -  [place] fall precautions as indicated by assessment   - Educate patient/family on patient safety including physical limitations  - Instruct patient to call for assistance with activity based on assessment  - Modify environment to reduce risk of injury  - Consider OT/PT consult to assist with strengthening/mobility  Outcome: Progressing     Problem: Prexisting or High Potential for Compromised Skin Integrity  Goal: Skin integrity is maintained or improved  Description  INTERVENTIONS:  - Identify patients at risk for skin breakdown  - Assess and monitor skin integrity  - Assess and monitor nutrition and hydration status  - Monitor labs   - Assess for incontinence   - Turn and reposition patient  - Assist with mobility/ambulation  - Relieve pressure over bony prominences  - Avoid friction and shearing  - Provide appropriate hygiene as needed including keeping skin clean and dry  - Evaluate need for skin moisturizer/barrier cream  - Collaborate with interdisciplinary team   - Patient/family teaching  - Consider wound care consult   Outcome: Progressing     Problem: PAIN - ADULT  Goal: Verbalizes/displays adequate comfort level or baseline comfort level  Description  Interventions:  - Encourage patient to monitor pain and request assistance  - Assess pain using appropriate pain scale  - Administer analgesics based on type and severity of pain and evaluate response  - Implement non-pharmacological measures as appropriate and evaluate response  - Consider cultural and social influences on pain and pain management  - Notify physician/advanced practitioner if interventions unsuccessful or patient reports new pain  Outcome: Progressing     Problem: INFECTION - ADULT  Goal: Absence or prevention of progression during hospitalization  Description  INTERVENTIONS:  - Assess and monitor for signs and symptoms of infection  - Monitor lab/diagnostic results  - Monitor all insertion sites, i e  indwelling lines, tubes, and drains  - Monitor endotracheal if appropriate and nasal secretions for changes in amount and color  - [place] appropriate cooling/warming therapies per order  - Administer medications as ordered  - Instruct and encourage patient and family to use good hand hygiene technique  - Identify and instruct in appropriate isolation precautions for identified infection/condition  Outcome: Progressing  Goal: Absence of fever/infection during neutropenic period  Description  INTERVENTIONS:  - Monitor WBC    Outcome: Progressing     Problem: SAFETY ADULT  Goal: Maintain or return to baseline ADL function  Description  INTERVENTIONS:  -  Assess patient's ability to carry out ADLs; assess patient's baseline for ADL function and identify physical deficits which impact ability to perform ADLs (bathing, care of mouth/teeth, toileting, grooming, dressing, etc )  - Assess/evaluate cause of self-care deficits   - Assess range of motion  - Assess patient's mobility; develop plan if impaired  - Assess patient's need for assistive devices and provide as appropriate  - Encourage maximum independence but intervene and supervise when necessary  - Involve family in performance of ADLs  - Assess for home care needs following discharge   - Consider OT consult to assist with ADL evaluation and planning for discharge  - Provide patient education as appropriate  Outcome: Progressing  Goal: Maintain or return mobility status to optimal level  Description  INTERVENTIONS:  - Assess patient's baseline mobility status (ambulation, transfers, stairs, etc )    - Identify cognitive and physical deficits and behaviors that affect mobility  - Identify mobility aids required to assist with transfers and/or ambulation (gait belt, sit-to-stand, lift, walker, cane, etc )  - [place] fall precautions as indicated by assessment  - Record patient progress and toleration of activity level on Mobility SBAR; progress patient to next Phase/Stage  - Instruct patient to call for assistance with activity based on assessment  - Consider rehabilitation consult to assist with strengthening/weightbearing, etc   Outcome: Progressing     Problem: DISCHARGE PLANNING  Goal: Discharge to home or other facility with appropriate resources  Description  INTERVENTIONS:  - Identify barriers to discharge w/patient and caregiver  - Arrange for needed discharge resources and transportation as appropriate  - Identify discharge learning needs (meds, wound care, etc )  - Arrange for interpretive services to assist at discharge as needed  - Refer to Case Management Department for coordinating discharge planning if the patient needs post-hospital services based on physician/advanced practitioner order or complex needs related to functional status, cognitive ability, or social support system  Outcome: Progressing     Problem: Knowledge Deficit  Goal: Patient/family/caregiver demonstrates understanding of disease process, treatment plan, medications, and discharge instructions  Description  Complete learning assessment and assess knowledge base    Interventions:  - Provide teaching at level of understanding  - Provide teaching via preferred learning methods  Outcome: Progressing     Problem: GENITOURINARY - ADULT  Goal: Maintains or returns to baseline urinary function  Description  INTERVENTIONS:  - Assess urinary function  - Encourage oral fluids to ensure adequate hydration if ordered  - Administer IV fluids as ordered to ensure adequate hydration  - Administer ordered medications as needed  - Offer frequent toileting  - Follow urinary retention protocol if ordered  Outcome: Progressing  Goal: Absence of urinary retention  Description  INTERVENTIONS:  - Assess patients ability to void and empty bladder  - Monitor I/O  - Bladder scan as needed  - Discuss with physician/AP medications to alleviate retention as needed  - Discuss catheterization for long term situations as appropriate  Outcome: Progressing     Problem: HEMATOLOGIC - ADULT  Goal: Maintains hematologic stability  Description  INTERVENTIONS  - Assess for signs and symptoms of bleeding or hemorrhage  - Monitor labs  - Administer supportive blood products/factors as ordered and appropriate  Outcome: Progressing     Problem: Nutrition/Hydration-ADULT  Goal: Nutrient/Hydration intake appropriate for improving, restoring or maintaining nutritional needs  Description  Monitor and assess patient's nutrition/hydration status for malnutrition  Collaborate with interdisciplinary team and initiate plan and interventions as ordered  Monitor patient's weight and dietary intake as ordered or per policy  Utilize nutrition screening tool and intervene as necessary  Determine patient's food preferences and provide high-protein, high-caloric foods as appropriate       INTERVENTIONS:  - Monitor oral intake, urinary output, labs, and treatment plans  - Assess nutrition and hydration status and recommend course of action  - Evaluate amount of meals eaten  - Assist patient with eating if necessary   - Allow adequate time for meals  - Recommend/ encourage appropriate diets, oral nutritional supplements, and vitamin/mineral supplements  - Order, calculate, and assess calorie counts as needed  - Recommend, monitor, and adjust tube feedings and TPN/PPN based on assessed needs  - Assess need for intravenous fluids  - Provide specific nutrition/hydration education as appropriate  - Include patient/family/caregiver in decisions related to nutrition  Outcome: Progressing

## 2019-10-10 LAB
ANION GAP SERPL CALCULATED.3IONS-SCNC: 9 MMOL/L (ref 4–13)
BACTERIA BLD CULT: NORMAL
BACTERIA BLD CULT: NORMAL
BASOPHILS # BLD AUTO: 0.02 THOUSANDS/ΜL (ref 0–0.1)
BASOPHILS NFR BLD AUTO: 0 % (ref 0–1)
BUN SERPL-MCNC: 5 MG/DL (ref 5–25)
CALCIUM SERPL-MCNC: 8.1 MG/DL (ref 8.3–10.1)
CHLORIDE SERPL-SCNC: 103 MMOL/L (ref 100–108)
CO2 SERPL-SCNC: 23 MMOL/L (ref 21–32)
CREAT SERPL-MCNC: 1.02 MG/DL (ref 0.6–1.3)
EOSINOPHIL # BLD AUTO: 0.05 THOUSAND/ΜL (ref 0–0.61)
EOSINOPHIL NFR BLD AUTO: 1 % (ref 0–6)
ERYTHROCYTE [DISTWIDTH] IN BLOOD BY AUTOMATED COUNT: 15.7 % (ref 11.6–15.1)
GFR SERPL CREATININE-BSD FRML MDRD: 72 ML/MIN/1.73SQ M
GLUCOSE SERPL-MCNC: 107 MG/DL (ref 65–140)
HCT VFR BLD AUTO: 26 % (ref 36.5–49.3)
HGB BLD-MCNC: 7.8 G/DL (ref 12–17)
IMM GRANULOCYTES # BLD AUTO: 0.08 THOUSAND/UL (ref 0–0.2)
IMM GRANULOCYTES NFR BLD AUTO: 2 % (ref 0–2)
INR PPP: 4.26 (ref 0.84–1.19)
LYMPHOCYTES # BLD AUTO: 0.27 THOUSANDS/ΜL (ref 0.6–4.47)
LYMPHOCYTES NFR BLD AUTO: 5 % (ref 14–44)
MCH RBC QN AUTO: 24.6 PG (ref 26.8–34.3)
MCHC RBC AUTO-ENTMCNC: 30 G/DL (ref 31.4–37.4)
MCV RBC AUTO: 82 FL (ref 82–98)
MONOCYTES # BLD AUTO: 0.6 THOUSAND/ΜL (ref 0.17–1.22)
MONOCYTES NFR BLD AUTO: 12 % (ref 4–12)
NEUTROPHILS # BLD AUTO: 4.11 THOUSANDS/ΜL (ref 1.85–7.62)
NEUTS SEG NFR BLD AUTO: 80 % (ref 43–75)
NRBC BLD AUTO-RTO: 0 /100 WBCS
PLATELET # BLD AUTO: 357 THOUSANDS/UL (ref 149–390)
PMV BLD AUTO: 7.7 FL (ref 8.9–12.7)
POTASSIUM SERPL-SCNC: 3.5 MMOL/L (ref 3.5–5.3)
PROTHROMBIN TIME: 42 SECONDS (ref 11.6–14.5)
RBC # BLD AUTO: 3.17 MILLION/UL (ref 3.88–5.62)
SODIUM SERPL-SCNC: 135 MMOL/L (ref 136–145)
WBC # BLD AUTO: 5.13 THOUSAND/UL (ref 4.31–10.16)

## 2019-10-10 PROCEDURE — 97166 OT EVAL MOD COMPLEX 45 MIN: CPT

## 2019-10-10 PROCEDURE — 85025 COMPLETE CBC W/AUTO DIFF WBC: CPT | Performed by: INTERNAL MEDICINE

## 2019-10-10 PROCEDURE — 99232 SBSQ HOSP IP/OBS MODERATE 35: CPT | Performed by: INTERNAL MEDICINE

## 2019-10-10 PROCEDURE — G8988 SELF CARE GOAL STATUS: HCPCS

## 2019-10-10 PROCEDURE — G8987 SELF CARE CURRENT STATUS: HCPCS

## 2019-10-10 PROCEDURE — 97535 SELF CARE MNGMENT TRAINING: CPT

## 2019-10-10 PROCEDURE — 80048 BASIC METABOLIC PNL TOTAL CA: CPT | Performed by: INTERNAL MEDICINE

## 2019-10-10 PROCEDURE — 99223 1ST HOSP IP/OBS HIGH 75: CPT | Performed by: INTERNAL MEDICINE

## 2019-10-10 PROCEDURE — 85610 PROTHROMBIN TIME: CPT | Performed by: INTERNAL MEDICINE

## 2019-10-10 RX ADMIN — PENTOXIFYLLINE 400 MG: 400 TABLET, EXTENDED RELEASE ORAL at 11:02

## 2019-10-10 RX ADMIN — NYSTATIN: 100000 POWDER TOPICAL at 17:00

## 2019-10-10 RX ADMIN — METOCLOPRAMIDE 10 MG: 5 INJECTION, SOLUTION INTRAMUSCULAR; INTRAVENOUS at 17:00

## 2019-10-10 RX ADMIN — PENTOXIFYLLINE 400 MG: 400 TABLET, EXTENDED RELEASE ORAL at 08:02

## 2019-10-10 RX ADMIN — POLYETHYLENE GLYCOL 3350 17 G: 17 POWDER, FOR SOLUTION ORAL at 11:02

## 2019-10-10 RX ADMIN — METOCLOPRAMIDE 10 MG: 5 INJECTION, SOLUTION INTRAMUSCULAR; INTRAVENOUS at 05:04

## 2019-10-10 RX ADMIN — SODIUM CHLORIDE AND POTASSIUM CHLORIDE 75 ML/HR: .9; .15 SOLUTION INTRAVENOUS at 16:50

## 2019-10-10 RX ADMIN — FAMOTIDINE 40 MG: 20 TABLET ORAL at 23:04

## 2019-10-10 RX ADMIN — SENNOSIDES 17.2 MG: 8.6 TABLET, FILM COATED ORAL at 23:02

## 2019-10-10 RX ADMIN — FLUTICASONE PROPIONATE 2 SPRAY: 50 SPRAY, METERED NASAL at 08:02

## 2019-10-10 RX ADMIN — TRAZODONE HYDROCHLORIDE 100 MG: 100 TABLET ORAL at 23:04

## 2019-10-10 RX ADMIN — METOCLOPRAMIDE 10 MG: 5 INJECTION, SOLUTION INTRAMUSCULAR; INTRAVENOUS at 23:30

## 2019-10-10 RX ADMIN — METOCLOPRAMIDE 10 MG: 5 INJECTION, SOLUTION INTRAMUSCULAR; INTRAVENOUS at 11:02

## 2019-10-10 RX ADMIN — METOCLOPRAMIDE 10 MG: 5 INJECTION, SOLUTION INTRAMUSCULAR; INTRAVENOUS at 00:07

## 2019-10-10 RX ADMIN — OXYCODONE HYDROCHLORIDE 10 MG: 10 TABLET, FILM COATED, EXTENDED RELEASE ORAL at 14:23

## 2019-10-10 RX ADMIN — MELATONIN 6 MG: 3 TAB ORAL at 23:04

## 2019-10-10 RX ADMIN — PENTOXIFYLLINE 400 MG: 400 TABLET, EXTENDED RELEASE ORAL at 16:50

## 2019-10-10 RX ADMIN — PANTOPRAZOLE SODIUM 40 MG: 40 TABLET, DELAYED RELEASE ORAL at 05:03

## 2019-10-10 RX ADMIN — Medication 400 UNITS: at 08:02

## 2019-10-10 RX ADMIN — OXYCODONE HYDROCHLORIDE 10 MG: 10 TABLET, FILM COATED, EXTENDED RELEASE ORAL at 05:03

## 2019-10-10 RX ADMIN — OXYCODONE HYDROCHLORIDE 10 MG: 10 TABLET ORAL at 11:24

## 2019-10-10 RX ADMIN — SODIUM CHLORIDE AND POTASSIUM CHLORIDE 75 ML/HR: .9; .15 SOLUTION INTRAVENOUS at 05:09

## 2019-10-10 RX ADMIN — OXYCODONE HYDROCHLORIDE 10 MG: 10 TABLET, FILM COATED, EXTENDED RELEASE ORAL at 23:05

## 2019-10-10 RX ADMIN — CIPROFLOXACIN HYDROCHLORIDE 500 MG: 500 TABLET, FILM COATED ORAL at 08:02

## 2019-10-10 RX ADMIN — NYSTATIN: 100000 POWDER TOPICAL at 08:02

## 2019-10-10 NOTE — PLAN OF CARE
Problem: OCCUPATIONAL THERAPY ADULT  Goal: Performs self-care activities at highest level of function for planned discharge setting  See evaluation for individualized goals  Description  Treatment Interventions: ADL retraining, Functional transfer training, UE strengthening/ROM, Endurance training, Cognitive reorientation, Patient/family training, Equipment evaluation/education, Compensatory technique education, Energy conservation, Activityengagement          See flowsheet documentation for full assessment, interventions and recommendations  Note:   Limitation: Decreased ADL status, Decreased UE strength, Decreased Safe judgement during ADL, Decreased cognition, Decreased endurance, Decreased high-level ADLs, Decreased self-care trans, Decreased sensation  Prognosis: Good  Assessment: Pt is a 76 y o  male seen for OT evaluation s/p admit to [facility] on 10/4/2019 w/ nausea vomiting, abdominal pain  Comorbidities affecting pt's functional performance at time of assessment include: Acute deep vein thrombosis (DVT) of iliac vein of right lower extremity ([facility] [address] )  , cancer of urinary bladder receiving treatment currently,hyponatremia, cancer related pain, acute on chronic anemia, CKD III, HTN, PVD, h/o toe 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  Personal factors affecting pt at time of IE include: decreased insight, slightly agitated  Prior to admission, pt was living w/ spouse and reports independent w/ UB ADLs, assist w/ LB ADLs, assist w/ toileting, MOD I functional transfers and mobility w/ RW or w/c depending on the day, MOD I bed mobility, assist IADLs   Upon evaluation: Pt requires supervision supine>sit bed mobility, MIN assist sit<>stand transfers, MIN assist functional mobility w/ RW, MIN-MOD assist LB ADLs, MOD assist toileting  2* the following deficits impacting occupational performance: decreased strength and endurance, impaired balance, impaired activity tolerance, increased pain, decreased insight, cues for safety pt particular about how he wants things done and can get agitated  Pt to benefit from continued skilled OT tx while in the hospital to address deficits as defined above and maximize level of functional independence w ADL's and functional mobility  Occupational Performance areas to address include: grooming, bathing/shower, toilet hygiene, dressing, health maintenance, functional mobility, clothing management, cleaning and meal prep, home safety education  From OT standpoint, recommendation at time of d/c would be home w/ family support and HOME OT  Pt declining rehab        OT Discharge Recommendation: Home OT  OT - OK to Discharge: (when medically stable)

## 2019-10-10 NOTE — CONSULTS
Consultation - Medical Oncology   Vladimir Chavez 76 y o  male MRN: [id number]  Unit/Bed#: [id number] Encounter: [id number]    Referring physician:  Genny Russo Internal Medicine physician  Reason for Consult:  Cancer of urinary bladder  HPI: Vladimir Chavez is a 76y o  year old male   He is here because of acute DVT right lower leg proximal and distal and acute DVT and superficial phlebitis left leg and has been bridged from heparin to Coumadin  Still has some swelling of right leg  No pain in  legs  No bleeding  In 2018 he was diagnosed to have limited volume prostate cancer with Dawson score of 7  Later he was found to have 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 and invasion of the prostate  Patient had 3 cycles of neoadjuvant chemotherapy, combination of cisplatin and Gemzar  He had significant toxicity  Later an attempt was made to resect the tumor surgically but because of extent of disease that was not possible and he ended up having diverting urostomy  After that patient received palliative radiation  Presently he is on Keytruda and he has completed 4 cycles  He was due for cycle 5 last week but was hospitalized with above problem  He has been tolerating Keytruda without much problem  Post discharge she will get in touch with office of Dr Harleen Milner to get back on Keytruda  He states his wife will take care of that  He has generalized weakness and tiredness  No fevers and chills  No pulmonary symptoms  No cardiac symptoms  No GI symptoms  ROS:  10/10/19 Reviewed 13 systems:  Presently no headaches, seizures, dizziness, diplopia, dysphagia, hoarseness, chest pain, palpitations, shortness of breath, cough, hemoptysis, abdominal pain, nausea, vomiting, change in bowel habits, melena, hematuria, fever, chills, bleeding, bone pains, skin rash, weight loss, arthritic symptoms,   numbness, claudication and gait problem   No frequent infections  Not unusually sensitive to heat or cold  No swollen glands  Patient is anxious   Other symptoms are in HPI        Historical Information   Past Medical History:   Diagnosis Date    Aneurysm ([address] )     Behind left knee recently diagnosed    Back pain     Ceron disease     Ceron's disease     Bladder cancer ([address] )     BPH (benign prostatic hyperplasia)     Cancer ([address] )     melanoma    Cataract     right eye    Cataract     right eye    Confusion     DDD (degenerative disc disease), lumbar     Depression     Diverticulosis     Gallstones     GERD (gastroesophageal reflux disease)     History of cardiac murmur     Past    History of melanoma     Was on back in early 1990's    History of rheumatic fever     Childhood    History of transfusion     Nov 2018    [facility] (hard of hearing)     Hydronephrosis     Hypertension     Kidney stone     Multiple times    Neck pain     Nervous breakdown     History of due to reaction to psych med which he was on for anxiety/depression and became suicidal    Numbness and tingling in both hands     Numbness and tingling of both feet     PONV (postoperative nausea and vomiting)     Prostate cancer ([address] )     PVD (peripheral vascular disease) ([address] )     RBBB     Scoliosis     Seasonal allergies     Tinnitus     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[address] )     Wears partial dentures     Upper    Wears partial dentures     Upper     Past Surgical History:   Procedure Laterality Date    ABDOMINAL SURGERY      When young had procedure for improviing circulation to left lower extremety    BACK SURGERY      Lumbar- not sure what was done   Leelee Slipper CARDIAC CATHETERIZATION      Approximately 2007- no blockages    CARPAL TUNNEL RELEASE Bilateral     wrists are fused    CATARACT EXTRACTION Left     COLONOSCOPY      CYST REMOVAL      Robotic procedure to remove benign cyst from lower left lung    CYSTOGRAM N/A 3/14/2018    Procedure: CYSTOGRAM;  Surgeon: Jhonathan Hood MD;  [facility];  Service: Urology    CYSTOSCOPY      ESOPHAGOGASTRODUODENOSCOPY      IR TUBE PLACEMENT NEPHROSTOMY  8/10/2018    LUNG SURGERY      cyst removed left lung    OTHER SURGICAL HISTORY Left     fistula drain in left buttock    KS CYSTO/URETERO W/LITHOTRIPSY &INDWELL STENT INSRT Left 1/3/2018    Procedure: CYSTOSCOPY URETEROSCOPY, RETROGRADE PYELOGRAM AND INSERTION STENT URETERAL;  Surgeon: Jevon Farrell MD;  [facility];  Service: Urology    KS CYSTOTOMY,EXCIS BLADDER TIC N/A 2/14/2018    Procedure: ABDOMINAL EXPLORATION; CLOSURE OF BLADDER DIVERTICULITIS;  Surgeon: Jevon Farrell MD;  [facility];  Service: Urology    KS CYSTOURETHROSCOPY,URETER CATHETER Left 8/1/2018    Procedure: Cystoscopy, cystogram, bladder biopsies, removal of pelvic drain;  Surgeon: Jevon Farrell MD;  [facility];  Service: Urology    KS INCISE/DRAIN BLADDER N/A 2/14/2018    Procedure: OPEN SUPRAPUBIC TUBE PLACEMENT;  Surgeon: Jevon Farrell MD;  [facility];  Service: Urology    KS RELEASE Filemon Rear FIBROSIS Left 2/14/2018    Procedure: LYSIS OF ADHESIONS; URETEROLYSIS ;  Surgeon: Jevon Farrell MD;  [facility];  Service: Urology     [facility] BLADDER/NODES,ILEAL CONDUIT N/A 2/25/2019    Procedure: ATTEMPTED CYSTECTOMY RADICAL; ILEAL CONDUIT URINARY DIVERSION; BIOPSY OF PELVIC MASS; APPENDECTOMY;  Surgeon: Jevon Farrell MD;  [facility];  Service: Urology    SKIN CANCER EXCISION      Melanoma removal on back in early 1990's    TOE AMPUTATION Left     All 5 toes left foot were amputated due to poor circulation     TRANSURETHRAL RESECTION OF PROSTATE N/A 3/14/2018    Procedure: TRANSURETHRAL RESECTION OF PROSTATE (TURP), LEFT URETERAL STENT REMOVAL;  Surgeon: Jevon Farrell MD;  [facility];  Service: Urology    TRANSURETHRAL RESECTION OF PROSTATE N/A 9/26/2018    Procedure: TRANSURETHRAL RESECTION OF PROSTATE (TURP);   Surgeon: Jevon Farrell MD; [facility];  Service: Urology    WRIST SURGERY Bilateral     Ulnar nerve on right arm and both wrists are fused     Social History   Social History     Substance and Sexual Activity   Alcohol Use Not Currently    Frequency: Never    Comment: Very rare     Social History     Substance and Sexual Activity   Drug Use Yes    Types: Prescription, Marijuana    Comment: 2x per day     Social History     Tobacco Use   Smoking Status Former Smoker    Packs/day: 1 00    Years: 15 00    Pack years: 15 00    Types: Cigarettes    Last attempt to quit: 1970    Years since quittin 7   Smokeless Tobacco Never Used   Tobacco Comment    Quit 48 years     Family History:   Family History   Problem Relation Age of Onset    Heart disease Father     Heart disease Mother     Cancer Paternal Grandfather          Current Facility-Administered Medications:     acetaminophen (TYLENOL) tablet 650 mg, 650 mg, Oral, Q6H PRN, Lu[place] Jamie Smudde, PA-C    aluminum-magnesium hydroxide-simethicone (MYLANTA) 200-200-20 mg/5 mL oral suspension 30 mL, 30 mL, Oral, Q6H PRN, Valorie Smudde, PA-C, 30 mL at 10/09/19 172    cholecalciferol (VITAMIN D3) tablet 400 Units, 400 Units, Oral, Daily, Valorie Smudde, PA-C, 400 Units at 10/10/19 08    ciprofloxacin (CIPRO) tablet 500 mg, 500 mg, Oral, Q12H [facility], Rosetta Zhong MD, 500 mg at 10/10/19 0802    famotidine (PEPCID) tablet 40 mg, 40 mg, Oral, HS, Valorie Smudde, PA-C, 40 mg at 10/09/19 2147    fluticasone (FLONASE) 50 mcg/act nasal spray 2 spray, 2 spray, Each Nare, Daily, Valorie Smudde, PA-C, 2 spray at 10/10/19 0802    HYDROmorphone (DILAUDID) injection 0 5 mg, 0 5 mg, Intravenous, Q4H PRN, Valorie Smudde, PA-C    melatonin tablet 6 mg, 6 mg, Oral, HS, Valorie Smudde, PA-C, 6 mg at 10/09/19 2147    metoclopramide (REGLAN) injection 10 mg, 10 mg, Intravenous, Q6H [facility], Gilma Leon MD, 10 mg at 10/10/19 0504    nystatin (MYCOSTATIN) powder, , Topical, BID, Sherlon Po, PA-C    ondansetron (ZOFRAN) injection 4 mg, 4 mg, Intravenous, Q6H PRN, Valorie Smudde, PA-C, 4 mg at 10/06/19 0309    oxyCODONE (OxyCONTIN) 12 hr tablet 10 mg, 10 mg, Oral, Q8H CAROL, Valorie Smudde, PA-C, 10 mg at 10/10/19 0503    oxyCODONE (ROXICODONE) immediate release tablet 10 mg, 10 mg, Oral, Q4H PRN, Valorie Smudde, PA-C, 10 mg at 10/09/19 1204    oxyCODONE (ROXICODONE) IR tablet 5 mg, 5 mg, Oral, Q4H PRN, [place] Clarita Smudde, PA-C    pantoprazole (PROTONIX) EC tablet 40 mg, 40 mg, Oral, Early Morning, Valorie Smudde, PA-C, 40 mg at 10/10/19 0503    pentoxifylline (TRENtal) ER tablet 400 mg, 400 mg, Oral, TID With Meals, [place] Clarita Smudde, PA-C, 400 mg at 10/10/19 0802    polyethylene glycol (MIRALAX) packet 17 g, 17 g, Oral, Daily PRN, [place] Clarita Smudde, PA-C    senna (SENOKOT) tablet 17 2 mg, 2 tablet, Oral, HS PRN, [place] Clarita Smudde, PA-C    sodium chloride 0 9 % with KCl 20 mEq/L infusion (premix), 75 mL/hr, Intravenous, Continuous, Valorie Smudde, PA-C, Last Rate: 75 mL/hr at 10/10/19 0509, 75 mL/hr at 10/10/19 0509    traZODone (DESYREL) tablet 100 mg, 100 mg, Oral, HS, Valorie Smudde, PA-C, 100 mg at 10/09/19 5116    Allergies   Allergen Reactions    Iodine Shortness Of Breath, Swelling and Hives     Contrast dye causes respiratory distress   Iodine causes skin rash    Mercury Hives and Swelling    Shellfish-Derived Products Anaphylaxis, Hives and Shortness Of Breath    Augmentin [Amoxicillin-Pot Clavulanate] GI Intolerance, Rash and Diarrhea     Diarrhea    Lidoderm [Lidocaine] Rash     Lidoderm patch caused rash    Penicillins Rash, Swelling and Hives    Sulfa Antibiotics Hives, Swelling and Rash     @ ROS@  Physical Exam:  Vitals:    10/09/19 0730 10/09/19 1456 10/09/19 2319 10/10/19 0735   BP: 114/56 114/56 114/57 113/57   Pulse: 72 70 69 70   Resp: 18 18 18 18   Temp: 98 1 °F (36 7 °C) 98 4 °F (36 9 °C) 98 6 °F (37 °C) 98 2 °F (36 8 °C)   TempSrc:       SpO2: 95% 97% 98% 95%   Weight:       Height: Alert, oriented, not in distress, no icterus, no oral thrush, no palpable neck mass, clear lung fields, regular heart rate, abdomen  soft and non tender, no palpable abdominal mass, no ascites, functioning urostomy, has 2+ edema of right lower leg and ankle, no calf tenderness, no focal neurological deficit, generalized weakness, no skin rash, no palpable lymphadenopathy,  no clubbing  Patient is anxious  Lab Results: I have reviewed all pertinent labs  LABS:  Results for orders placed or performed during the hospital encounter of 10/04/19   Urine culture   Result Value Ref Range    Urine Culture No Growth <1000 cfu/mL    Blood culture #1   Result Value Ref Range    Blood Culture No Growth After 5 Days  Blood culture #2   Result Value Ref Range    Blood Culture No Growth After 5 Days      Chlamydia/GC amplified DNA by PCR   Result Value Ref Range    N gonorrhoeae, DNA Probe Negative Negative    Chlamydia trachomatis, DNA Probe Negative Negative   Body fluid culture and Gram stain   Result Value Ref Range    Body Fluid Culture, Sterile 3+ Growth of Corynebacterium striatum group (A)     Gram Stain Result 2+ Polys (A)     Gram Stain Result 2+ Gram positive cocci in pairs (A)     Gram Stain Result 2+ Gram negative rods (A)        Susceptibility    Corynebacterium striatum group - BERNA     ZID Performed Yes     CBC and differential   Result Value Ref Range    WBC 7 85 4 31 - 10 16 Thousand/uL    RBC 3 87 (L) 3 88 - 5 62 Million/uL    Hemoglobin 9 5 (L) 12 0 - 17 0 g/dL    Hematocrit 32 1 (L) 36 5 - 49 3 %    MCV 83 82 - 98 fL    MCH 24 5 (L) 26 8 - 34 3 pg    MCHC 29 6 (L) 31 4 - 37 4 g/dL    RDW 15 2 (H) 11 6 - 15 1 %    MPV 8 2 (L) 8 9 - 12 7 fL    Platelets 114 (H) 532 - 390 Thousands/uL    nRBC 0 /100 WBCs    Neutrophils Relative 86 (H) 43 - 75 %    Immat GRANS % 1 0 - 2 %    Lymphocytes Relative 5 (L) 14 - 44 %    Monocytes Relative 8 4 - 12 %    Eosinophils Relative 0 0 - 6 %    Basophils Relative 0 0 - 1 %    Neutrophils Absolute 6 81 1 85 - 7 62 Thousands/µL    Immature Grans Absolute 0 04 0 00 - 0 20 Thousand/uL    Lymphocytes Absolute 0 38 (L) 0 60 - 4 47 Thousands/µL    Monocytes Absolute 0 60 0 17 - 1 22 Thousand/µL    Eosinophils Absolute 0 01 0 00 - 0 61 Thousand/µL    Basophils Absolute 0 01 0 00 - 0 10 Thousands/µL   Comprehensive metabolic panel   Result Value Ref Range    Sodium 128 (L) 136 - 145 mmol/L    Potassium 3 2 (L) 3 5 - 5 3 mmol/L    Chloride 92 (L) 100 - 108 mmol/L    CO2 23 21 - 32 mmol/L    ANION GAP 13 4 - 13 mmol/L    BUN 12 5 - 25 mg/dL    Creatinine 1 38 (H) 0 60 - 1 30 mg/dL    Glucose 115 65 - 140 mg/dL    Calcium 9 3 8 3 - 10 1 mg/dL    AST 12 5 - 45 U/L    ALT 6 (L) 12 - 78 U/L    Alkaline Phosphatase 96 46 - 116 U/L    Total Protein 7 4 6 4 - 8 2 g/dL    Albumin 2 1 (L) 3 5 - 5 0 g/dL    Total Bilirubin 0 51 0 20 - 1 00 mg/dL    eGFR 50 ml/min/1 73sq m   Lipase   Result Value Ref Range    Lipase 44 (L) 73 - 393 u/L   Urine Microscopic   Result Value Ref Range    RBC, UA 2-4 (A) None Seen, 0-5 /hpf    WBC, UA 10-20 (A) None Seen, 0-5, 5-55, 5-65 /hpf    Epithelial Cells Occasional None Seen, Occasional /hpf    Bacteria, UA Occasional None Seen, Occasional /hpf   APTT   Result Value Ref Range    PTT 36 23 - 37 seconds   Protime-INR   Result Value Ref Range    Protime 15 7 (H) 11 6 - 14 5 seconds    INR 1 23 (H) 0 84 - 1 19   APTT   Result Value Ref Range    PTT 62 (H) 23 - 37 seconds   Basic metabolic panel   Result Value Ref Range    Sodium 133 (L) 136 - 145 mmol/L    Potassium 3 7 3 5 - 5 3 mmol/L    Chloride 100 100 - 108 mmol/L    CO2 26 21 - 32 mmol/L    ANION GAP 7 4 - 13 mmol/L    BUN 12 5 - 25 mg/dL    Creatinine 1 11 0 60 - 1 30 mg/dL    Glucose 97 65 - 140 mg/dL    Calcium 8 9 8 3 - 10 1 mg/dL    eGFR 65 ml/min/1 73sq m   CBC (With Platelets)   Result Value Ref Range    WBC 5 52 4 31 - 10 16 Thousand/uL    RBC 3 29 (L) 3 88 - 5 62 Million/uL    Hemoglobin 8 2 (L) 12 0 - 17 0 g/dL    Hematocrit 27 1 (L) 36 5 - 49 3 %    MCV 82 82 - 98 fL    MCH 24 9 (L) 26 8 - 34 3 pg    MCHC 30 3 (L) 31 4 - 37 4 g/dL    RDW 15 3 (H) 11 6 - 15 1 %    Platelets 747 (H) 751 - 390 Thousands/uL    MPV 8 0 (L) 8 9 - 12 7 fL   APTT   Result Value Ref Range    PTT 53 (H) 23 - 37 seconds   APTT   Result Value Ref Range    PTT 58 (H) 23 - 37 seconds   APTT   Result Value Ref Range     (H) 23 - 37 seconds   Basic metabolic panel   Result Value Ref Range    Sodium 134 (L) 136 - 145 mmol/L    Potassium 3 6 3 5 - 5 3 mmol/L    Chloride 102 100 - 108 mmol/L    CO2 26 21 - 32 mmol/L    ANION GAP 6 4 - 13 mmol/L    BUN 9 5 - 25 mg/dL    Creatinine 1 09 0 60 - 1 30 mg/dL    Glucose 103 65 - 140 mg/dL    Calcium 9 4 8 3 - 10 1 mg/dL    eGFR 67 ml/min/1 73sq m   CBC and differential   Result Value Ref Range    WBC 4 90 4 31 - 10 16 Thousand/uL    RBC 3 06 (L) 3 88 - 5 62 Million/uL    Hemoglobin 7 7 (L) 12 0 - 17 0 g/dL    Hematocrit 25 3 (L) 36 5 - 49 3 %    MCV 83 82 - 98 fL    MCH 25 2 (L) 26 8 - 34 3 pg    MCHC 30 4 (L) 31 4 - 37 4 g/dL    RDW 15 1 11 6 - 15 1 %    MPV 7 9 (L) 8 9 - 12 7 fL    Platelets 128 (H) 513 - 390 Thousands/uL    nRBC 0 /100 WBCs    Neutrophils Relative 82 (H) 43 - 75 %    Immat GRANS % 1 0 - 2 %    Lymphocytes Relative 8 (L) 14 - 44 %    Monocytes Relative 9 4 - 12 %    Eosinophils Relative 0 0 - 6 %    Basophils Relative 0 0 - 1 %    Neutrophils Absolute 4 01 1 85 - 7 62 Thousands/µL    Immature Grans Absolute 0 03 0 00 - 0 20 Thousand/uL    Lymphocytes Absolute 0 37 (L) 0 60 - 4 47 Thousands/µL    Monocytes Absolute 0 46 0 17 - 1 22 Thousand/µL    Eosinophils Absolute 0 02 0 00 - 0 61 Thousand/µL    Basophils Absolute 0 01 0 00 - 0 10 Thousands/µL   Protime-INR   Result Value Ref Range    Protime 15 9 (H) 11 6 - 14 5 seconds    INR 1 25 (H) 0 84 - 1 19   APTT   Result Value Ref Range    PTT 67 (H) 23 - 37 seconds   APTT   Result Value Ref Range    PTT 76 (H) 23 - 37 seconds Basic metabolic panel   Result Value Ref Range    Sodium 131 (L) 136 - 145 mmol/L    Potassium 3 4 (L) 3 5 - 5 3 mmol/L    Chloride 98 (L) 100 - 108 mmol/L    CO2 22 21 - 32 mmol/L    ANION GAP 11 4 - 13 mmol/L    BUN 6 5 - 25 mg/dL    Creatinine 1 10 0 60 - 1 30 mg/dL    Glucose 290 (H) 65 - 140 mg/dL    Calcium 8 2 (L) 8 3 - 10 1 mg/dL    eGFR 66 ml/min/1 73sq m   CBC and differential   Result Value Ref Range    WBC 4 02 (L) 4 31 - 10 16 Thousand/uL    RBC 3 18 (L) 3 88 - 5 62 Million/uL    Hemoglobin 7 9 (L) 12 0 - 17 0 g/dL    Hematocrit 26 5 (L) 36 5 - 49 3 %    MCV 83 82 - 98 fL    MCH 24 8 (L) 26 8 - 34 3 pg    MCHC 29 8 (L) 31 4 - 37 4 g/dL    RDW 15 4 (H) 11 6 - 15 1 %    MPV 8 1 (L) 8 9 - 12 7 fL    Platelets 456 (H) 427 - 390 Thousands/uL    nRBC 0 /100 WBCs    Neutrophils Relative 79 (H) 43 - 75 %    Immat GRANS % 2 0 - 2 %    Lymphocytes Relative 8 (L) 14 - 44 %    Monocytes Relative 9 4 - 12 %    Eosinophils Relative 1 0 - 6 %    Basophils Relative 1 0 - 1 %    Neutrophils Absolute 3 24 1 85 - 7 62 Thousands/µL    Immature Grans Absolute 0 07 0 00 - 0 20 Thousand/uL    Lymphocytes Absolute 0 31 (L) 0 60 - 4 47 Thousands/µL    Monocytes Absolute 0 36 0 17 - 1 22 Thousand/µL    Eosinophils Absolute 0 02 0 00 - 0 61 Thousand/µL    Basophils Absolute 0 02 0 00 - 0 10 Thousands/µL   Protime-INR   Result Value Ref Range    Protime 29 4 (H) 11 6 - 14 5 seconds    INR 2 72 (H) 0 84 - 1 19   APTT   Result Value Ref Range    PTT >210 (HH) 23 - 37 seconds   APTT   Result Value Ref Range    PTT 75 (H) 23 - 37 seconds   CBC and differential   Result Value Ref Range    WBC 4 78 4 31 - 10 16 Thousand/uL    RBC 3 39 (L) 3 88 - 5 62 Million/uL    Hemoglobin 8 4 (L) 12 0 - 17 0 g/dL    Hematocrit 28 0 (L) 36 5 - 49 3 %    MCV 83 82 - 98 fL    MCH 24 8 (L) 26 8 - 34 3 pg    MCHC 30 0 (L) 31 4 - 37 4 g/dL    RDW 15 5 (H) 11 6 - 15 1 %    MPV 8 1 (L) 8 9 - 12 7 fL    Platelets 668 (H) 187 - 390 Thousands/uL    nRBC 0 /100 WBCs    Neutrophils Relative 80 (H) 43 - 75 %    Immat GRANS % 2 0 - 2 %    Lymphocytes Relative 6 (L) 14 - 44 %    Monocytes Relative 11 4 - 12 %    Eosinophils Relative 1 0 - 6 %    Basophils Relative 0 0 - 1 %    Neutrophils Absolute 3 82 1 85 - 7 62 Thousands/µL    Immature Grans Absolute 0 09 0 00 - 0 20 Thousand/uL    Lymphocytes Absolute 0 30 (L) 0 60 - 4 47 Thousands/µL    Monocytes Absolute 0 52 0 17 - 1 22 Thousand/µL    Eosinophils Absolute 0 04 0 00 - 0 61 Thousand/µL    Basophils Absolute 0 01 0 00 - 0 10 Thousands/µL   Basic metabolic panel   Result Value Ref Range    Sodium 136 136 - 145 mmol/L    Potassium 3 6 3 5 - 5 3 mmol/L    Chloride 103 100 - 108 mmol/L    CO2 23 21 - 32 mmol/L    ANION GAP 10 4 - 13 mmol/L    BUN 5 5 - 25 mg/dL    Creatinine 1 06 0 60 - 1 30 mg/dL    Glucose 95 65 - 140 mg/dL    Calcium 8 4 8 3 - 10 1 mg/dL    eGFR 69 ml/min/1 73sq m   Protime-INR   Result Value Ref Range    Protime 31 3 (H) 11 6 - 14 5 seconds    INR 2 94 (H) 0 84 - 1 19   CBC and differential   Result Value Ref Range    WBC 4 58 4 31 - 10 16 Thousand/uL    RBC 3 29 (L) 3 88 - 5 62 Million/uL    Hemoglobin 7 9 (L) 12 0 - 17 0 g/dL    Hematocrit 28 0 (L) 36 5 - 49 3 %    MCV 85 82 - 98 fL    MCH 24 0 (L) 26 8 - 34 3 pg    MCHC 28 2 (L) 31 4 - 37 4 g/dL    RDW 15 7 (H) 11 6 - 15 1 %    MPV 8 3 (L) 8 9 - 12 7 fL    Platelets 902 (H) 040 - 390 Thousands/uL    nRBC 0 /100 WBCs   Basic metabolic panel   Result Value Ref Range    Sodium 135 (L) 136 - 145 mmol/L    Potassium 3 5 3 5 - 5 3 mmol/L    Chloride 103 100 - 108 mmol/L    CO2 22 21 - 32 mmol/L    ANION GAP 10 4 - 13 mmol/L    BUN 5 5 - 25 mg/dL    Creatinine 1 01 0 60 - 1 30 mg/dL    Glucose 90 65 - 140 mg/dL    Calcium 8 3 8 3 - 10 1 mg/dL    eGFR 73 ml/min/1 73sq m   Protime-INR   Result Value Ref Range    Protime 36 5 (H) 11 6 - 14 5 seconds    INR 3 57 (H) 0 84 - 1 19   Protime-INR   Result Value Ref Range    Protime 42 0 (H) 11 6 - 14 5 seconds INR 4 26 (H) 0 84 - 1 19   CBC and differential   Result Value Ref Range    WBC 5 13 4 31 - 10 16 Thousand/uL    RBC 3 17 (L) 3 88 - 5 62 Million/uL    Hemoglobin 7 8 (L) 12 0 - 17 0 g/dL    Hematocrit 26 0 (L) 36 5 - 49 3 %    MCV 82 82 - 98 fL    MCH 24 6 (L) 26 8 - 34 3 pg    MCHC 30 0 (L) 31 4 - 37 4 g/dL    RDW 15 7 (H) 11 6 - 15 1 %    MPV 7 7 (L) 8 9 - 12 7 fL    Platelets 666 962 - 840 Thousands/uL    nRBC 0 /100 WBCs    Neutrophils Relative 80 (H) 43 - 75 %    Immat GRANS % 2 0 - 2 %    Lymphocytes Relative 5 (L) 14 - 44 %    Monocytes Relative 12 4 - 12 %    Eosinophils Relative 1 0 - 6 %    Basophils Relative 0 0 - 1 %    Neutrophils Absolute 4 11 1 85 - 7 62 Thousands/µL    Immature Grans Absolute 0 08 0 00 - 0 20 Thousand/uL    Lymphocytes Absolute 0 27 (L) 0 60 - 4 47 Thousands/µL    Monocytes Absolute 0 60 0 17 - 1 22 Thousand/µL    Eosinophils Absolute 0 05 0 00 - 0 61 Thousand/µL    Basophils Absolute 0 02 0 00 - 0 10 Thousands/µL   Basic metabolic panel   Result Value Ref Range    Sodium 135 (L) 136 - 145 mmol/L    Potassium 3 5 3 5 - 5 3 mmol/L    Chloride 103 100 - 108 mmol/L    CO2 23 21 - 32 mmol/L    ANION GAP 9 4 - 13 mmol/L    BUN 5 5 - 25 mg/dL    Creatinine 1 02 0 60 - 1 30 mg/dL    Glucose 107 65 - 140 mg/dL    Calcium 8 1 (L) 8 3 - 10 1 mg/dL    eGFR 72 ml/min/1 73sq m   POCT urinalysis dipstick   Result Value Ref Range    Color, UA yellow    ED Urine Macroscopic   Result Value Ref Range    Color, UA Yellow     Clarity, UA Clear     pH, UA 7 0 4 5 - 8 0    Leukocytes, UA Moderate (A) Negative    Nitrite, UA Negative Negative    Protein, UA 30 (1+) (A) Negative mg/dl    Glucose, UA Negative Negative mg/dl    Ketones, UA 15 (1+) (A) Negative mg/dl    Urobilinogen, UA 0 2 0 2, 1 0 E U /dl E U /dl    Bilirubin, UA Negative Negative    Blood, UA Trace (A) Negative    Specific Gravity, UA 1 020 1 003 - 1 030   Manual Differential(PHLEBS Do Not Order)   Result Value Ref Range    Segmented % 78 (H) 43 - 75 %    Bands % 1 0 - 8 %    Lymphocytes % 10 (L) 14 - 44 %    Monocytes % 9 4 - 12 %    Eosinophils, % 0 0 - 6 %    Basophils % 1 0 - 1 %    Metamyelocytes% 1 0 - 1 %    Absolute Neutrophils 3 62 1 85 - 7 62 Thousand/uL    Lymphocytes Absolute 0 46 (L) 0 60 - 4 47 Thousand/uL    Monocytes Absolute 0 41 0 00 - 1 22 Thousand/uL    Eosinophils Absolute 0 00 0 00 - 0 40 Thousand/uL    Basophils Absolute 0 05 0 00 - 0 10 Thousand/uL    Total Counted 100     Anisocytosis Present     Esa Cells Present     Ovalocytes Present     Platelet Estimate Increased (A) Adequate         Imaging Studies: I have personally reviewed pertinent reports  Pathology, and Other Studies: I have personally reviewed pertinent reports          Diffuse urinary bladder wall thickening similar to prior study with a unchanged exophytic lesion in the left posterior aspect of the urinary bladder      Tiny droplets of pneumoperitoneum adjacent to the urinary bladder which may be due to recent procedure however cannot rule out postsurgical complication            I personally discussed this study with GEOVANNI LAMAS on 10/4/2019 at 9:38 PM     Impression:  RIGHT LOWER LIMB:  Evidence of acute occlusive deep vein thrombosis in the external iliac, common  femoral, femoral, deep femoral, popliteal, gastrocnemius, paired peroneal and  paired posterior tibial veins  Evidence of acute mostly occlusive superficial thrombophlebitis noted in the  great saphenous vein at the groin, proximal thigh and knee  Popliteal, posterior tibial and anterior tibial arterial Doppler waveforms are  triphasic/biphasic  LEFT LOWER LIMB:  Evidence of acute non-occlusive deep vein thrombosis in the common femoral vein  and saphenofemoral junction  Evidence of acute superficial thrombophlebitis in the great saphenous vein from  the knee to distal calf and in the small saphenous vein at the mid calf    Evidence of chronic non-occlusive superficial thrombophlebitis in the small  saphenous vein at the knee  Known occlusive popliteal artery aneurysm  Posterior tibial and anterior tibial arterial Doppler waveforms are monophasic  There is a well defined hypoechoic non-vascularized cystic-type structure noted  in the popliteal fossa measuring approximately 3 4cm x 1 9 cm  The mid to distal inferior vena cava is patent in the visualized portions  Unable to visualize the right common iliac vein due to overlying bowel gas  Technical findings were given to Dr Chrystal Wooten at the time of the exam      SIGNATURE:  Electronically Signed by: Otilia Mayorga MD on 2019-10-06 09:34:26 AM   Linked Documents     View Image   Imaging     VAS lower limb venous duplex study, complete bilateral (Order: [id number]) - 10/4/2019     Assessment and Plan:  Cancer of urinary bladder status post neoadjuvant cisplatin plus Gemzar, diverting urostomy and patient is on Keytruda  Acute DVT right leg, proximal and distal, acute nonocclusive DVT left leg and superficial thrombophlebitis left leg and he is being treated with anticoagulation agent  Has been bridged from heparin to Coumadin  Patient states his wife will call the office for an appointment Dr Shabana Rhoades to get back on Keytruda  Patient voiced understanding and agreement in the discussion  Counseling / Coordination of Care    Greater than 50% of total time was spent with the patient and / or family counseling and / or coordination of care

## 2019-10-10 NOTE — OCCUPATIONAL THERAPY NOTE
[address]  Evaluation     Patient Name: Cori Lloyd  MZUTI'V Date: 10/10/2019  Problem List  Principal Problem:    Acute deep vein thrombosis (DVT) of iliac vein of right lower extremity (HCC)  Active Problems:    Peripheral vascular disease (HCC)    Essential hypertension    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 (HCC)    CKD (chronic kidney disease) stage 3, GFR 30-59 ml/min (HCC)    Acute on chronic anemia    Cancer related pain    Emphysematous cystitis    Nausea    Hyponatremia    Past Medical History  Past Medical History:   Diagnosis Date    Aneurysm ([address] )     Behind left knee recently diagnosed    Back pain     Ceron disease     Ceron's disease     Bladder cancer ([address] )     BPH (benign prostatic hyperplasia)     Cancer ([address] )     melanoma    Cataract     right eye    Cataract     right eye    Confusion     DDD (degenerative disc disease), lumbar     Depression     Diverticulosis     Gallstones     GERD (gastroesophageal reflux disease)     History of cardiac murmur     Past    History of melanoma     Was on back in early 1990's    History of rheumatic fever     Childhood    History of transfusion     Nov 2018    [facility] (hard of hearing)     Hydronephrosis     Hypertension     Kidney stone     Multiple times    Neck pain     Nervous breakdown     History of due to reaction to psych med which he was on for anxiety/depression and became suicidal    Numbness and tingling in both hands     Numbness and tingling of both feet     PONV (postoperative nausea and vomiting)     Prostate cancer ([address] )     PVD (peripheral vascular disease) ([address] )     RBBB     Scoliosis     Seasonal allergies     Tinnitus     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[address] )     Wears partial dentures     Upper    Wears partial dentures     Upper     Past Surgical History  Past Surgical History:   Procedure Laterality Date    ABDOMINAL SURGERY      When young had procedure for improviing circulation to left lower extremety    BACK SURGERY      Lumbar- not sure what was done   Carlos Mare CARDIAC CATHETERIZATION      Approximately 2007- no blockages    CARPAL TUNNEL RELEASE Bilateral     wrists are fused    CATARACT EXTRACTION Left     COLONOSCOPY      CYST REMOVAL      Robotic procedure to remove benign cyst from lower left lung    CYSTOGRAM N/A 3/14/2018    Procedure: CYSTOGRAM;  Surgeon: Yayo Pichardo MD;  [facility];  Service: Urology    CYSTOSCOPY      ESOPHAGOGASTRODUODENOSCOPY      IR TUBE PLACEMENT NEPHROSTOMY  8/10/2018    LUNG SURGERY      cyst removed left lung    OTHER SURGICAL HISTORY Left     fistula drain in left buttock    ID CYSTO/URETERO W/LITHOTRIPSY &INDWELL STENT INSRT Left 1/3/2018    Procedure: CYSTOSCOPY URETEROSCOPY, RETROGRADE PYELOGRAM AND INSERTION STENT URETERAL;  Surgeon: Yayo Pichardo MD;  [facility];  Service: Urology    ID CYSTOTOMY,EXCIS BLADDER TIC N/A 2/14/2018    Procedure: ABDOMINAL EXPLORATION; CLOSURE OF BLADDER DIVERTICULITIS;  Surgeon: Yayo Pichardo MD;  [facility];  Service: Urology    ID CYSTOURETHROSCOPY,URETER CATHETER Left 8/1/2018    Procedure: Cystoscopy, cystogram, bladder biopsies, removal of pelvic drain;  Surgeon: Yayo Pichardo MD;  [facility];  Service: Urology    ID INCISE/DRAIN BLADDER N/A 2/14/2018    Procedure: OPEN SUPRAPUBIC TUBE PLACEMENT;  Surgeon: Yayo Pichardo MD;  [facility];  Service: Urology    ID RELEASE URETER,RETROPER FIBROSIS Left 2/14/2018    Procedure: LYSIS OF ADHESIONS; URETEROLYSIS ;  Surgeon: Yayo Pichardo MD;  [facility];  Service: Urology     [facility] BLADDER/NODES,ILEAL CONDUIT N/A 2/25/2019    Procedure: ATTEMPTED CYSTECTOMY RADICAL; ILEAL CONDUIT URINARY DIVERSION; BIOPSY OF PELVIC MASS; APPENDECTOMY;  Surgeon: Yayo Pichardo MD;  [facility];  Service: Urology    SKIN CANCER EXCISION      Melanoma removal on back in early 1990's    TOE AMPUTATION Left All 5 toes left foot were amputated due to poor circulation     TRANSURETHRAL RESECTION OF PROSTATE N/A 3/14/2018    Procedure: TRANSURETHRAL RESECTION OF PROSTATE (TURP), LEFT URETERAL STENT REMOVAL;  Surgeon: Antionette Olivia MD;  [facility];  Service: Urology    TRANSURETHRAL RESECTION OF PROSTATE N/A 9/26/2018    Procedure: TRANSURETHRAL RESECTION OF PROSTATE (TURP); Surgeon: Antionette Olivia MD;  [facility];  Service: Urology    WRIST SURGERY Bilateral     Ulnar nerve on right arm and both wrists are fused             10/10/19 0951   Note Type   Note type Eval/Treat   Restrictions/Precautions   Weight Bearing Precautions Per Order No   Other Precautions Fall Risk;Pain;Multiple lines  (Fuentes)   Pain Assessment   Pain Assessment 0-10   Pain Score 7   Pain Type Acute pain;Chronic pain   Pain Location Abdomen;Groin   Hospital Pain Intervention(s) Repositioned; Emotional support   Response to Interventions tolerated   Home Living   Type of [address] [place] [address] One level  (1 PRAKASH)   Bathroom Shower/Tub Walk-in shower   Bathroom Toilet Standard   Bathroom Equipment Grab bars in shower; Shower chair   Bathroom Accessibility Accessible   Home Equipment Walker;Cane;Hospital bed; Wheelchair-manual   Additional Comments pt reports has hospital bed at home; reports lives w/ wife and wife able to assist him at home w/ tasks; wife is currently off of work   Prior Function   Level of [place] Independent with ADLs and functional mobility; Needs assistance with ADLs and functional mobility; Needs assistance with IADLs   Lives With Spouse   Receives Help From Family   ADL Assistance Needs assistance  (independent UB ADLs, assist LB ADLs)   IADLs Needs assistance   Falls in the last 6 months 0   Vocational Retired   Comments pt wife does the driving and cooking and cleaning   Lifestyle   Autonomy per pt independent w/ UB ADLs, assist w/ LB ADLs, independent w/ functional transfers and mobility w/ RW, assist w/ IADLs   Reciprocal Relationships spouse   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   Intrinsic Gratification watching tv   ADL   Where Assessed Chair   Eating Assistance 5  [address] [facility] 5  [address] [facility] 5  [address] [facility] 3  Moderate Assistance   [address] 5  [address] [facility] [address] 3  Moderate [address]  3  Moderate Assistance   Bed Mobility   Rolling R 5  Supervision   Additional items Increased time required;Verbal cues; Bedrails   Rolling L 5  Supervision   Additional items Increased time required; Bedrails   Supine to Sit 5  Supervision   Additional items Assist x 1; Increased time required;Verbal cues;LE management;HOB elevated; Bedrails   Additional Comments increased time to complete   Transfers   Sit to Stand 4  Minimal assistance   Additional items Assist x 1; Increased time required;Verbal cues   Stand to Sit 4  Minimal assistance   Additional items Assist x 1; Increased time required;Verbal cues;Armrests   Additional Comments cues for safety and positioning   Functional Mobility   Functional Mobility 4  Minimal assistance   Additional Comments assist x1 w/ increased time to complete and multiple lines   Additional items Rolling walker   Balance   Static Sitting Good   Dynamic Sitting Fair +   Static Standing Fair   Dynamic Standing Fair -   Ambulatory Poor +   Activity Tolerance   Activity Tolerance Patient limited by fatigue;Treatment limited secondary to medical complications (Comment); Patient limited by pain   Nurse Made Aware appropriate to see per RN, Read Roles   RUE Assessment   RUE Assessment WFL  (4-/5)   LUE Assessment   LUE Assessment WFL  (4-/5)   Vision - Complex Assessment   Ocular Range of Motion [facility] Able to read clock/calendar on wall without difficulty   Perception   Inattention/Neglect Appears intact   Cognition   Overall Cognitive Status [facility]   Arousal/Participation Alert; Responsive   Attention Attends with cues to redirect   Orientation Level Oriented to place;Oriented to time;Oriented to person;Oriented to situation   Memory Within functional limits   Following Commands Follows one step commands with increased time or repetition   Comments pt easily agitated and is particular how he wants things done; cues for safety and education on completing tasks as much as he can by himself   Assessment   Limitation Decreased ADL status; Decreased UE strength;Decreased Safe judgement during ADL;Decreased cognition;Decreased endurance;Decreased high-level ADLs; Decreased self-care trans;Decreased sensation   Prognosis Good   Assessment Pt is a 76 y o  male seen for OT evaluation s/p admit to [facility] on 10/4/2019 w/ nausea vomiting, abdominal pain  Comorbidities affecting pt's functional performance at time of assessment include: Acute deep vein thrombosis (DVT) of iliac vein of right lower extremity ([facility] [address] )  , cancer of urinary bladder receiving treatment currently,hyponatremia, cancer related pain, acute on chronic anemia, CKD III, HTN, PVD, h/o toe 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  Personal factors affecting pt at time of IE include: decreased insight, slightly agitated  Prior to admission, pt was living w/ spouse and reports independent w/ UB ADLs, assist w/ LB ADLs, assist w/ toileting, MOD I functional transfers and mobility w/ RW or w/c depending on the day, MOD I bed mobility, assist IADLs  Upon evaluation: Pt requires supervision supine>sit bed mobility, MIN assist sit<>stand transfers, MIN assist functional mobility w/ RW, MIN-MOD assist LB ADLs, MOD assist toileting  2* the following deficits impacting occupational performance: decreased strength and endurance, impaired balance, impaired activity tolerance, increased pain, decreased insight, cues for safety pt particular about how he wants things done and can get agitated   Pt to benefit from continued skilled OT tx while in the hospital to address deficits as defined above and maximize level of functional independence w ADL's and functional mobility  Occupational Performance areas to address include: grooming, bathing/shower, toilet hygiene, dressing, health maintenance, functional mobility, clothing management, cleaning and meal prep, home safety education  From OT standpoint, recommendation at time of d/c would be home w/ family support and HOME OT  Pt declining rehab  Goals   Patient Goals "to go home"   LTG Time Frame 10-14   Long Term Goal please see below goals   Plan   Treatment Interventions ADL retraining;Functional transfer training;UE strengthening/ROM; Endurance training;Cognitive reorientation;Patient/family training;Equipment evaluation/education; Compensatory technique education; Energy conservation; Activityengagement   Goal Expiration Date 10/24/19   OT Treatment Day 1   OT Frequency 2-3x/wk   Additional Treatment Session   Start Time 6887   End Time 7077   Treatment Assessment Pt seen for skilled OT session focused on ADLs  Pt w/ setup for grooming and UB bathing and dressing  Pt particular about completing LB ADLs in bed  Pt supervision rolling in bed to doff underwear  Pt requested assistance for LB Bathing and pt educated on the importance of completing as much as he can of tasks by himself; pt became agitated and yelling at [facility] and OT  Pt w/ MOD assist LB Bathing  Pt w/ assist to don hospital underwear  Pt w/ MIN assist sit>stand and assist to pull up underwear over hips  Pt w/ MIn assist stand>sit and seated in bedside chair at end of session w/ all needs met and call bell within reach  Pt continues to be limited due to self-limiting behaviors, decreased strength and endurance, impaired balance, impaired functional reach, impaired activity tolerance  Recommend HOME OT when medically stable     Additional Treatment Day 1   Recommendation   OT Discharge Recommendation Home OT   OT - OK to Discharge   (when medically stable)   Barthel Index   Feeding 10   Bathing 0   Grooming Score 5   Dressing Score 5   Bladder Score 0   Bowels Score 10   Toilet Use Score 5   Transfers (Bed/Chair) Score 10   Mobility (Level Surface) Score 0   Stairs Score 0   Barthel Index Score 45   Modified [place] Scale   Modified [place] Scale 4      Occupational Therapy Goals to be met in 10-14 days:  1) Pt will improve activity tolerance to G for min 30 min txment sessions to enhance ADLs  2) Pt will complete ADLs/self care w/ supervision  3) Pt will complete toileting w/ mod I w/ G hygiene/thoroughness using DME PRN  4) Pt will improve functional transfers on/off all surfaces using DME PRN w/ G balance/safety including toileting w/ supervision  5) Pt will improve fx'l mobility during I/ADl/leisure tasks using DME PRN w/ g balance/safety w/ supervision  6) Pt will engage in ongoing cognitive assessment w/ G participation to A w/ safe d/c planning/recommendations  7) Pt will demonstrate G carryover of pt/caregiver education and training as appropriate w/ mod I  w/ G tolerance  8) Pt will engage in depression screen/leisure interest checklist w/ G participation to monitor s/s depression and ID 3 positive coping strategies to A w/ emotional regulation and management  9) Pt will demonstrate 100% carryover of E C  techniques w/ mod I t/o fx'l I/ADL/leisure tasks w/o cues s/p skilled education  10) Pt will demonstrate improved standing tolerance to 3-5 minutes during functional tasks w/ no LOB to enhance ADL performance  11) Pt will demonstrate improved b/l UE strength by 1 MMT grade to enhance ADLS and functional transfers    Documentation completed by: Juan Ghosh MS, OTR/L

## 2019-10-10 NOTE — ASSESSMENT & PLAN NOTE
Patient following with urology for high grade urothelial carcinoma of prostate  S/p surgical resection which was unable to be accomplished due to severity of the disease  Currently with diverting urostomy   Currently undergoing chemotherapy with palliative radiation  Urology consult completed and no further urological intervention recommended    Oncology consultation appreciated, will follow outpatient to resume his [facility]

## 2019-10-10 NOTE — ASSESSMENT & PLAN NOTE
Found to have diffuse urinary bladder wall thickening with tiny droplets of pneumoperitoneum adjacent to urinary bladder  -urology follow-up appreciated, recommend to continue antibiotics  -As per Urology recommendations, they are of the opinion that the findings are consistent with a mild form of emphysematous cystitis     -cultures from urethral orifice growing corynebacterium, completed 7 days of ciprofloxacin, will discontinue further antibiotics

## 2019-10-10 NOTE — ASSESSMENT & PLAN NOTE
Found to have acute occlusive DVT in right leg external iliac, common femoral, femoral, deep femoral, popliteal, gastrocnemius, paired peroneal and posterior tibial veins   Supratherapeutic INR 4 26 secondary to ciprofloxacin, will hold warfarin, discontinue ciprofloxacin  -monitor INR

## 2019-10-10 NOTE — PROGRESS NOTES
Progress Note - Roderick Bath 1945, 76 y o  male MRN: [id number]    Unit/Bed#: [address] Encounter: [id number]    Primary Care Provider: Gurinder Reno MD   Date and time admitted to hospital: 10/4/2019  3:56 PM        * Acute deep vein thrombosis (DVT) of iliac vein of right lower extremity ([address] )  Assessment & Plan  Found to have acute occlusive DVT in right leg external iliac, common femoral, femoral, deep femoral, popliteal, gastrocnemius, paired peroneal and posterior tibial veins   Supratherapeutic INR 4 26 secondary to ciprofloxacin, will hold warfarin, discontinue ciprofloxacin  -monitor INR    Emphysematous cystitis  Assessment & Plan  Found to have diffuse urinary bladder wall thickening with tiny droplets of pneumoperitoneum adjacent to urinary bladder  -urology follow-up appreciated, recommend to continue antibiotics  -As per Urology recommendations, they are of the opinion that the findings are consistent with a mild form of emphysematous cystitis  -cultures from urethral orifice growing corynebacterium, completed 7 days of ciprofloxacin, will discontinue further antibiotics    Nausea  Assessment & Plan  Patient presented to ED  with complaints of abdominal pain and nausea  Likely component of UTI, chemotherapy, bladder CA   Continue scheduled Reglan and p r n   Zofran   -patient does use medical marijuana at home for appetite stimulant    Hyponatremia  Assessment & Plan  -likely secondary dehydration  -sodium 135, continue IV    Cancer related pain  Assessment & Plan  Continue home regimen of oxycodone 10 mg Q8hrs scheduled   PRN oxycodone and dilaudid while inpatient   Continue outpatient palliative care follow up     Acute on chronic anemia  Assessment & Plan  -baseline hemoglobin 8-9  -hemoglobin today 8 4  -no signs of any active bleeding  -will check stool occult  -monitor H&H, transfuse as needed    CKD (chronic kidney disease) stage 3, GFR 30-59 ml/min ([facility])  Assessment & Plan  Creatinine slightly elevated to 1 38 at time of presentation, baseline 1 2  Creatinine is currently back to baseline at 1 09  Continue IV fluid hydration and continue to trend BMP  Avoid nephrotoxins and hypotension    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 [facility])  Assessment & Plan  Patient following with urology for high grade urothelial carcinoma of prostate  S/p surgical resection which was unable to be accomplished due to severity of the disease  Currently with diverting urostomy   Currently undergoing chemotherapy with palliative radiation  Urology consult completed and no further urological intervention recommended  Oncology consultation appreciated, will follow outpatient to resume his [facility] hypertension  Assessment & Plan  Appears controlled at this time off medications  Continue to monitor     Peripheral vascular disease [facility])  Assessment & Plan  History of Buerger's disease, history of left TMA  Known left popliteal aneurysm   Reports bilateral claudication pain  Continue warfarin and home [facility]   Continue outpatient vascular surgery follow up         VTE Pharmacologic Prophylaxis:   Pharmacologic: warfarin    Patient Centered Rounds: I have performed bedside rounds with nursing staff today  Education and Discussions with Family / Patient:  Wife, at bedside    Time Spent for Care: 20 minutes  More than 50% of total time spent on counseling and coordination of care as described above      Current Length of Stay: 6 day(s)    Current Patient Status: Inpatient   Certification Statement: The patient will continue to require additional inpatient hospital stay due to Supratherapeutic INR    Discharge Plan / Estimated Discharge Date: 24-48 hours    Code Status: Level 1 - Full Code      Subjective:   Patient seen and examined at bedside, denies any abdominal pain, nausea, vomiting    Objective:     Vitals:   Temp (24hrs), Av 4 °F (36 9 °C), Min:98 2 °F (36 8 °C), Max:98 6 °F (37 °C)    Temp:  [98 2 °F (36 8 °C)-98 6 °F (37 °C)] 98 3 °F (36 8 °C)  HR:  [69-71] 71  Resp:  [18] 18  BP: (113-118)/(57) 118/57  SpO2:  [95 %-98 %] 96 %  Body mass index is 26 01 kg/m²  Input and Output Summary (last 24 hours): Intake/Output Summary (Last 24 hours) at 10/10/2019 1534  Last data filed at 10/10/2019 0511  Gross per 24 hour   Intake 990 ml   Output 1800 ml   Net -810 ml       Physical Exam:    Constitutional: Patient is oriented to person, place and time, no acute distress  HEENT:  Normocephalic, atraumatic, EOMI, PERRLA, no scleral icterus, no pallor, moist oral mucosa  Neck:  Supple, no masses, no thyromegaly, no bruits Normal range of motion  Lymph nodes:  No lymphadenopathy  Cardiovascular: Normal S1S2, RRR, No murmurs/rubs/gallops appreciated  Pulmonary:  Bilateral air entry, No rhonchi/rales/wheezing appreciated  Abdominal: Soft, Bowel sounds present, Non-tender, Non-distended, No rebound/guarding, no hepatomegaly   Musculoskeletal: No tenderness/abnormality   Extremities:  No cyanosis, clubbing or edema  Peripheral pulses palpable and equal bilaterally  Neurological: Cranial nerves II-XII grossly intact, sensation intact, otherwise no focal neurological symptoms  Right lower quadrant urostomy    Additional Data:     Labs:    Results from last 7 days   Lab Units 10/10/19  0538   WBC Thousand/uL 5 13   HEMOGLOBIN g/dL 7 8*   HEMATOCRIT % 26 0*   PLATELETS Thousands/uL 357   NEUTROS PCT % 80*   LYMPHS PCT % 5*   MONOS PCT % 12   EOS PCT % 1     Results from last 7 days   Lab Units 10/10/19  0538  10/04/19  1608   POTASSIUM mmol/L 3 5   < > 3 2*   CHLORIDE mmol/L 103   < > 92*   CO2 mmol/L 23   < > 23   BUN mg/dL 5   < > 12   CREATININE mg/dL 1 02   < > 1 38*   CALCIUM mg/dL 8 1*   < > 9 3   ALK PHOS U/L  --   --  96   ALT U/L  --   --  6*   AST U/L  --   --  12    < > = values in this interval not displayed       Results from last 7 days   Lab Units 10/10/19  0538   INR  4 26*        I Have Reviewed All Lab Data Listed Above  Recent Cultures (last 7 days):     Results from last 7 days   Lab Units 10/06/19  1401 10/04/19  2230 10/04/19  2225 10/04/19  1935   BLOOD CULTURE   --  No Growth After 5 Days  No Growth After 5 Days    --    GRAM STAIN RESULT  2+ Polys*  2+ Gram positive cocci in pairs*  2+ Gram negative rods*  --   --   --    URINE CULTURE   --   --   --  No Growth <1000 cfu/mL   BODY FLUID CULTURE, STERILE  3+ Growth of Corynebacterium striatum group*  --   --   --        Last 24 Hours Medication List:     Current Facility-Administered Medications:  acetaminophen 650 mg Oral Q6H PRN Valorie Smudde, PA-C    aluminum-magnesium hydroxide-simethicone 30 mL Oral Q6H PRN Kelleen Sale, PA-C    cholecalciferol 400 Units Oral Daily Valorie Smudde, PA-C    famotidine 40 mg Oral HS Valorie Smudde, PA-C    fluticasone 2 spray Each Nare Daily Valorie Smudde, PA-C    HYDROmorphone 0 5 mg Intravenous Q4H PRN Valorie Smudde, PA-C    melatonin 6 mg Oral HS Valorie Smudde, PA-C    metoclopramide 10 mg Intravenous Q6H [address] Gilma Leon MD    nystatin  Topical BID Valorie Smudde, PA-C    ondansetron 4 mg Intravenous Q6H PRN Kelleen Sale, PA-C    oxyCODONE 10 mg Oral Q8H [address] Valorie Smudde, PA-C    oxyCODONE 10 mg Oral Q4H PRN Valorie Smudde, PA-C    oxyCODONE 5 mg Oral Q4H PRN Valorie Smudde, PA-C    pantoprazole 40 mg Oral Early Morning Valorie Smudde, PA-C    pentoxifylline 400 mg Oral TID With Meals Valorie Smudde, PA-C    polyethylene glycol 17 g Oral Daily PRN Valorie Smudde, PA-C    senna 2 tablet Oral HS PRN Valorie Smudde, PA-C    sodium chloride 0 9 % with KCl 20 mEq/L 75 mL/hr Intravenous Continuous Valorie Cline PA-C Last Rate: 75 mL/hr (10/10/19 3523)   traZODone 100 mg Oral HS Jesús Landin PA-C         Today, Patient Was Seen By: Gerhard Gloria MD

## 2019-10-11 ENCOUNTER — APPOINTMENT (INPATIENT)
Dept: CT IMAGING | Facility: HOSPITAL | Age: 74
DRG: 300 | End: 2019-10-11
Payer: MEDICARE

## 2019-10-11 LAB
ANION GAP SERPL CALCULATED.3IONS-SCNC: 9 MMOL/L (ref 4–13)
BASOPHILS # BLD AUTO: 0.01 THOUSANDS/ΜL (ref 0–0.1)
BASOPHILS NFR BLD AUTO: 0 % (ref 0–1)
BUN SERPL-MCNC: 5 MG/DL (ref 5–25)
CALCIUM SERPL-MCNC: 8.4 MG/DL (ref 8.3–10.1)
CHLORIDE SERPL-SCNC: 102 MMOL/L (ref 100–108)
CO2 SERPL-SCNC: 24 MMOL/L (ref 21–32)
CREAT SERPL-MCNC: 1.04 MG/DL (ref 0.6–1.3)
EOSINOPHIL # BLD AUTO: 0.06 THOUSAND/ΜL (ref 0–0.61)
EOSINOPHIL NFR BLD AUTO: 1 % (ref 0–6)
ERYTHROCYTE [DISTWIDTH] IN BLOOD BY AUTOMATED COUNT: 15.7 % (ref 11.6–15.1)
GFR SERPL CREATININE-BSD FRML MDRD: 70 ML/MIN/1.73SQ M
GLUCOSE SERPL-MCNC: 85 MG/DL (ref 65–140)
HCT VFR BLD AUTO: 26.5 % (ref 36.5–49.3)
HGB BLD-MCNC: 7.8 G/DL (ref 12–17)
IMM GRANULOCYTES # BLD AUTO: 0.07 THOUSAND/UL (ref 0–0.2)
IMM GRANULOCYTES NFR BLD AUTO: 1 % (ref 0–2)
INR PPP: 3.84 (ref 0.84–1.19)
LYMPHOCYTES # BLD AUTO: 0.33 THOUSANDS/ΜL (ref 0.6–4.47)
LYMPHOCYTES NFR BLD AUTO: 6 % (ref 14–44)
MCH RBC QN AUTO: 24.2 PG (ref 26.8–34.3)
MCHC RBC AUTO-ENTMCNC: 29.4 G/DL (ref 31.4–37.4)
MCV RBC AUTO: 82 FL (ref 82–98)
MONOCYTES # BLD AUTO: 0.65 THOUSAND/ΜL (ref 0.17–1.22)
MONOCYTES NFR BLD AUTO: 12 % (ref 4–12)
NEUTROPHILS # BLD AUTO: 4.47 THOUSANDS/ΜL (ref 1.85–7.62)
NEUTS SEG NFR BLD AUTO: 80 % (ref 43–75)
NRBC BLD AUTO-RTO: 0 /100 WBCS
PLATELET # BLD AUTO: 426 THOUSANDS/UL (ref 149–390)
PMV BLD AUTO: 8 FL (ref 8.9–12.7)
POTASSIUM SERPL-SCNC: 3.9 MMOL/L (ref 3.5–5.3)
PROTHROMBIN TIME: 38.7 SECONDS (ref 11.6–14.5)
RBC # BLD AUTO: 3.22 MILLION/UL (ref 3.88–5.62)
SODIUM SERPL-SCNC: 135 MMOL/L (ref 136–145)
WBC # BLD AUTO: 5.59 THOUSAND/UL (ref 4.31–10.16)

## 2019-10-11 PROCEDURE — 80048 BASIC METABOLIC PNL TOTAL CA: CPT | Performed by: INTERNAL MEDICINE

## 2019-10-11 PROCEDURE — 85025 COMPLETE CBC W/AUTO DIFF WBC: CPT | Performed by: INTERNAL MEDICINE

## 2019-10-11 PROCEDURE — 85610 PROTHROMBIN TIME: CPT | Performed by: INTERNAL MEDICINE

## 2019-10-11 PROCEDURE — 99232 SBSQ HOSP IP/OBS MODERATE 35: CPT | Performed by: INTERNAL MEDICINE

## 2019-10-11 PROCEDURE — 99233 SBSQ HOSP IP/OBS HIGH 50: CPT | Performed by: NURSE PRACTITIONER

## 2019-10-11 PROCEDURE — 71250 CT THORAX DX C-: CPT

## 2019-10-11 RX ORDER — DOCUSATE SODIUM 100 MG/1
100 CAPSULE, LIQUID FILLED ORAL 2 TIMES DAILY
Status: DISCONTINUED | OUTPATIENT
Start: 2019-10-11 | End: 2019-10-17 | Stop reason: HOSPADM

## 2019-10-11 RX ADMIN — SODIUM CHLORIDE AND POTASSIUM CHLORIDE 75 ML/HR: .9; .15 SOLUTION INTRAVENOUS at 22:09

## 2019-10-11 RX ADMIN — POLYETHYLENE GLYCOL 3350 17 G: 17 POWDER, FOR SOLUTION ORAL at 08:15

## 2019-10-11 RX ADMIN — PENTOXIFYLLINE 400 MG: 400 TABLET, EXTENDED RELEASE ORAL at 13:00

## 2019-10-11 RX ADMIN — OXYCODONE HYDROCHLORIDE 10 MG: 10 TABLET, FILM COATED, EXTENDED RELEASE ORAL at 06:16

## 2019-10-11 RX ADMIN — OXYCODONE HYDROCHLORIDE 10 MG: 10 TABLET, FILM COATED, EXTENDED RELEASE ORAL at 22:08

## 2019-10-11 RX ADMIN — DOCUSATE SODIUM 100 MG: 100 CAPSULE, LIQUID FILLED ORAL at 19:48

## 2019-10-11 RX ADMIN — MELATONIN 6 MG: 3 TAB ORAL at 22:08

## 2019-10-11 RX ADMIN — METOCLOPRAMIDE 10 MG: 5 INJECTION, SOLUTION INTRAMUSCULAR; INTRAVENOUS at 13:00

## 2019-10-11 RX ADMIN — NYSTATIN: 100000 POWDER TOPICAL at 08:16

## 2019-10-11 RX ADMIN — SODIUM CHLORIDE AND POTASSIUM CHLORIDE 75 ML/HR: .9; .15 SOLUTION INTRAVENOUS at 08:19

## 2019-10-11 RX ADMIN — FLUTICASONE PROPIONATE 2 SPRAY: 50 SPRAY, METERED NASAL at 08:18

## 2019-10-11 RX ADMIN — OXYCODONE HYDROCHLORIDE 5 MG: 5 TABLET ORAL at 19:48

## 2019-10-11 RX ADMIN — METOCLOPRAMIDE 10 MG: 5 INJECTION, SOLUTION INTRAMUSCULAR; INTRAVENOUS at 08:17

## 2019-10-11 RX ADMIN — Medication 400 UNITS: at 08:00

## 2019-10-11 RX ADMIN — OXYCODONE HYDROCHLORIDE 5 MG: 5 TABLET ORAL at 04:59

## 2019-10-11 RX ADMIN — METOCLOPRAMIDE 10 MG: 5 INJECTION, SOLUTION INTRAMUSCULAR; INTRAVENOUS at 17:06

## 2019-10-11 RX ADMIN — PENTOXIFYLLINE 400 MG: 400 TABLET, EXTENDED RELEASE ORAL at 07:23

## 2019-10-11 RX ADMIN — ONDANSETRON 4 MG: 2 INJECTION INTRAMUSCULAR; INTRAVENOUS at 19:48

## 2019-10-11 RX ADMIN — NYSTATIN: 100000 POWDER TOPICAL at 17:06

## 2019-10-11 RX ADMIN — TRAZODONE HYDROCHLORIDE 100 MG: 100 TABLET ORAL at 22:08

## 2019-10-11 RX ADMIN — PANTOPRAZOLE SODIUM 40 MG: 40 TABLET, DELAYED RELEASE ORAL at 06:16

## 2019-10-11 RX ADMIN — PENTOXIFYLLINE 400 MG: 400 TABLET, EXTENDED RELEASE ORAL at 17:06

## 2019-10-11 RX ADMIN — FAMOTIDINE 40 MG: 20 TABLET ORAL at 22:08

## 2019-10-11 RX ADMIN — OXYCODONE HYDROCHLORIDE 10 MG: 10 TABLET, FILM COATED, EXTENDED RELEASE ORAL at 13:04

## 2019-10-11 NOTE — PROGRESS NOTES
Progress Note - [facility] 1945, 76 y o  male MRN: [id number]    Unit/Bed#: [address] 222-01 Encounter: [id number]    Primary Care Provider: Luz Maria Morillo MD   Date and time admitted to hospital: 10/4/2019  3:56 PM        * Acute deep vein thrombosis (DVT) of iliac vein of right lower extremity ([address] )  Assessment & Plan  Found to have acute occlusive DVT in right leg external iliac, common femoral, femoral, deep femoral, popliteal, gastrocnemius, paired peroneal and posterior tibial veins   Supratherapeutic INR 3 84 secondary to ciprofloxacin, will hold warfarin, discontinue ciprofloxacin  -monitor INR    Emphysematous cystitis  Assessment & Plan  Found to have diffuse urinary bladder wall thickening with tiny droplets of pneumoperitoneum adjacent to urinary bladder  -urology follow-up appreciated, recommend to continue antibiotics  -As per Urology recommendations, they are of the opinion that the findings are consistent with a mild form of emphysematous cystitis  -cultures from urethral orifice growing corynebacterium, completed 7 days of ciprofloxacin, will discontinue further antibiotics    Nausea  Assessment & Plan  Patient presented to ED  with complaints of abdominal pain and nausea  Likely component of UTI, chemotherapy, bladder CA   Continue scheduled Reglan and p r n  Zofran   -patient does use medical marijuana at home for appetite stimulant    Hyponatremia  Assessment & Plan  -likely secondary dehydration  -sodium 135, continue IV, patient still with poor p o   Intake    Cancer related pain  Assessment & Plan  Continue home regimen of oxycodone 10 mg Q8hrs scheduled   PRN oxycodone and dilaudid while inpatient   Continue outpatient palliative care follow up     Acute on chronic anemia  Assessment & Plan  -baseline hemoglobin 8-9  -hemoglobin today 7 8  -no signs of any active bleeding  -will check stool occult  -monitor H&H, transfuse as needed    CKD (chronic kidney disease) stage 3, GFR 30-59 ml/min (HCC)  Assessment & Plan  Creatinine slightly elevated to 1 38 at time of presentation, baseline 1 2  Creatinine is currently back to baseline at 1 09  Continue IV fluid hydration and continue to trend BMP  Avoid nephrotoxins and hypotension    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 372 hypertension  Assessment & Plan  Appears controlled at this time off medications  Continue to monitor     Peripheral vascular disease [facility])  Assessment & Plan  History of Buerger's disease, history of left TMA  Known left popliteal aneurysm   Reports bilateral claudication pain  Continue warfarin and home [facility]   Continue outpatient vascular surgery follow up           VTE Pharmacologic Prophylaxis:   Pharmacologic:  Warfarin    Patient Centered Rounds: I have performed bedside rounds with nursing staff today  Time Spent for Care: 20 minutes  More than 50% of total time spent on counseling and coordination of care as described above      Current Length of Stay: 7 day(s)    Current Patient Status: Inpatient   Certification Statement: The patient will continue to require additional inpatient hospital stay due to Supratherapeutic INR    Discharge Plan / Estimated Discharge Date: 2-3 days    Code Status: Level 1 - Full Code      Subjective:   Patient seen and examined at bedside, complaining of constipation, denies any abdominal pain, nausea, vomiting    Objective:     Vitals:   Temp (24hrs), Av 6 °F (37 °C), Min:98 4 °F (36 9 °C), Max:98 8 °F (37 1 °C)    Temp:  [98 4 °F (36 9 °C)-98 8 °F (37 1 °C)] 98 7 °F (37 1 °C)  HR:  [67-81] 80  Resp:  [18-20] 20  BP: (118-119)/(59-64) 118/62  SpO2:  [96 %-97 %] 97 %  Body mass index is 26 01 kg/m²  Input and Output Summary (last 24 hours): Intake/Output Summary (Last 24 hours) at 10/11/2019 1753  Last data filed at 10/11/2019 1712  Gross per 24 hour   Intake 2036 25 ml   Output 3150 ml   Net -1113 75 ml       Physical Exam:    Constitutional: Patient is oriented to person, place and time, no acute distress  HEENT:  Normocephalic, atraumatic, EOMI, PERRLA, no scleral icterus, no pallor, moist oral mucosa  Neck:  Supple, no masses, no thyromegaly, no bruits Normal range of motion  Lymph nodes:  No lymphadenopathy  Cardiovascular: Normal S1S2, RRR, No murmurs/rubs/gallops appreciated  Pulmonary:  Bilateral air entry, No rhonchi/rales/wheezing appreciated  Abdominal: Soft, Bowel sounds present, Non-tender, Non-distended, No rebound/guarding, no hepatomegaly   Musculoskeletal: No tenderness/abnormality   Extremities:  No cyanosis, clubbing or edema  Peripheral pulses palpable and equal bilaterally  Neurological: Cranial nerves II-XII grossly intact, sensation intact, otherwise no focal neurological symptoms  Right lower quadrant urostomy    Additional Data:     Labs:    Results from last 7 days   Lab Units 10/11/19  0557   WBC Thousand/uL 5 59   HEMOGLOBIN g/dL 7 8*   HEMATOCRIT % 26 5*   PLATELETS Thousands/uL 426*   NEUTROS PCT % 80*   LYMPHS PCT % 6*   MONOS PCT % 12   EOS PCT % 1     Results from last 7 days   Lab Units 10/11/19  0557   POTASSIUM mmol/L 3 9   CHLORIDE mmol/L 102   CO2 mmol/L 24   BUN mg/dL 5   CREATININE mg/dL 1 04   CALCIUM mg/dL 8 4     Results from last 7 days   Lab Units 10/11/19  0557   INR  3 84*        I Have Reviewed All Lab Data Listed Above          Recent Cultures (last 7 days):     Results from last 7 days   Lab Units 10/06/19  1401 10/04/19  2230 10/04/19  2225 10/04/19  1935   BLOOD CULTURE   --  No Growth After 5 Days  No Growth After 5 Days    --    GRAM STAIN RESULT  2+ Polys*  2+ Gram positive cocci in pairs*  2+ Gram negative rods*  --   --   --    URINE CULTURE   --   --   --  No Growth <1000 cfu/mL   BODY FLUID CULTURE, STERILE  3+ Growth of Corynebacterium striatum group*  --   --   --        Last 24 Hours Medication List:     Current Facility-Administered Medications:  acetaminophen 650 mg Oral Q6H PRN Valorie Smudde, PA-C    aluminum-magnesium hydroxide-simethicone 30 mL Oral Q6H PRN [place] Aver, PA-C    cholecalciferol 400 Units Oral Daily Valorie Smudde, PA-C    docusate sodium 100 mg Oral BID Lorna Goldsmith MD    famotidine 40 mg Oral HS Valorie Smudde, PA-C    fluticasone 2 spray Each Nare Daily Valorie Smudde, PA-C    HYDROmorphone 0 5 mg Intravenous Q4H PRN Valorie Smudde, PA-C    melatonin 6 mg Oral HS Valorie Smudde, PA-C    metoclopramide 10 mg Intravenous Q6H [address] Gilma Leon MD    nystatin  Topical BID Valorie Smudde, PA-C    ondansetron 4 mg Intravenous Q6H PRN Jean-Claude Aver, PA-C    oxyCODONE 10 mg Oral Q8H [address] Valorie Smudde, PA-C    oxyCODONE 10 mg Oral Q4H PRN Valorie Smudde, PA-C    oxyCODONE 5 mg Oral Q4H PRN Valorie Smudde, PA-C    pantoprazole 40 mg Oral Early Morning Valorie Smudde, PA-C    pentoxifylline 400 mg Oral TID With Meals Valorie Smudde, PA-C    polyethylene glycol 17 g Oral Daily PRN Valorie Smudde, PA-C    senna 2 tablet Oral HS PRN Valorie Smudde, PA-C    sodium chloride 0 9 % with KCl 20 mEq/L 75 mL/hr Intravenous Continuous Valorie Smudde, PA-C Last Rate: 75 mL/hr (10/11/19 0819)   traZODone 100 mg Oral HS Jean-Claude Patel PA-C         Today, Patient Was Seen By: Lorna Glodsmith MD

## 2019-10-11 NOTE — PLAN OF CARE
Problem: Potential for Falls  Goal: Patient will remain free of falls  Description  INTERVENTIONS:  - Assess patient frequently for physical needs  -  Identify cognitive and physical deficits and behaviors that affect risk of falls    -  [place] fall precautions as indicated by assessment   - Educate patient/family on patient safety including physical limitations  - Instruct patient to call for assistance with activity based on assessment  - Modify environment to reduce risk of injury  - Consider OT/PT consult to assist with strengthening/mobility  10/10/2019 2022 by Corrine Griffith RN  Outcome: Progressing  10/10/2019 2022 by Corrine Griffith RN  Outcome: Progressing     Problem: Prexisting or High Potential for Compromised Skin Integrity  Goal: Skin integrity is maintained or improved  Description  INTERVENTIONS:  - Identify patients at risk for skin breakdown  - Assess and monitor skin integrity  - Assess and monitor nutrition and hydration status  - Monitor labs   - Assess for incontinence   - Turn and reposition patient  - Assist with mobility/ambulation  - Relieve pressure over bony prominences  - Avoid friction and shearing  - Provide appropriate hygiene as needed including keeping skin clean and dry  - Evaluate need for skin moisturizer/barrier cream  - Collaborate with interdisciplinary team   - Patient/family teaching  - Consider wound care consult   10/10/2019 2022 by Corrine Griffith RN  Outcome: Progressing  10/10/2019 2022 by Corrine Griffith RN  Outcome: Progressing     Problem: PAIN - ADULT  Goal: Verbalizes/displays adequate comfort level or baseline comfort level  Description  Interventions:  - Encourage patient to monitor pain and request assistance  - Assess pain using appropriate pain scale  - Administer analgesics based on type and severity of pain and evaluate response  - Implement non-pharmacological measures as appropriate and evaluate response  - Consider cultural and social influences on pain and pain management  - Notify physician/advanced practitioner if interventions unsuccessful or patient reports new pain  10/10/2019 2022 by Lam Murphy RN  Outcome: Progressing  10/10/2019 2022 by Lam Murphy RN  Outcome: Progressing     Problem: INFECTION - ADULT  Goal: Absence or prevention of progression during hospitalization  Description  INTERVENTIONS:  - Assess and monitor for signs and symptoms of infection  - Monitor lab/diagnostic results  - Monitor all insertion sites, i e  indwelling lines, tubes, and drains  - Monitor endotracheal if appropriate and nasal secretions for changes in amount and color  - [place] appropriate cooling/warming therapies per order  - Administer medications as ordered  - Instruct and encourage patient and family to use good hand hygiene technique  - Identify and instruct in appropriate isolation precautions for identified infection/condition  10/10/2019 2022 by Lam Murphy RN  Outcome: Progressing  10/10/2019 2022 by Lam Murphy RN  Outcome: Progressing     Problem: SAFETY ADULT  Goal: Maintain or return to baseline ADL function  Description  INTERVENTIONS:  -  Assess patient's ability to carry out ADLs; assess patient's baseline for ADL function and identify physical deficits which impact ability to perform ADLs (bathing, care of mouth/teeth, toileting, grooming, dressing, etc )  - Assess/evaluate cause of self-care deficits   - Assess range of motion  - Assess patient's mobility; develop plan if impaired  - Assess patient's need for assistive devices and provide as appropriate  - Encourage maximum independence but intervene and supervise when necessary  - Involve family in performance of ADLs  - Assess for home care needs following discharge   - Consider OT consult to assist with ADL evaluation and planning for discharge  - Provide patient education as appropriate  10/10/2019 2022 by Lam Murphy RN  Outcome: Progressing  10/10/2019 2022 by Rehan Mulligan [place]  Outcome: Progressing  Goal: Maintain or return mobility status to optimal level  Description  INTERVENTIONS:  - Assess patient's baseline mobility status (ambulation, transfers, stairs, etc )    - Identify cognitive and physical deficits and behaviors that affect mobility  - Identify mobility aids required to assist with transfers and/or ambulation (gait belt, sit-to-stand, lift, walker, cane, etc )  - [place] fall precautions as indicated by assessment  - Record patient progress and toleration of activity level on Mobility SBAR; progress patient to next Phase/Stage  - Instruct patient to call for assistance with activity based on assessment  - Consider rehabilitation consult to assist with strengthening/weightbearing, etc   10/10/2019 2022 by Hugo Corbett RN  Outcome: Progressing  10/10/2019 2022 by Hugo Corbett RN  Outcome: Progressing     Problem: DISCHARGE PLANNING  Goal: Discharge to home or other facility with appropriate resources  Description  INTERVENTIONS:  - Identify barriers to discharge w/patient and caregiver  - Arrange for needed discharge resources and transportation as appropriate  - Identify discharge learning needs (meds, wound care, etc )  - Arrange for interpretive services to assist at discharge as needed  - Refer to Case Management Department for coordinating discharge planning if the patient needs post-hospital services based on physician/advanced practitioner order or complex needs related to functional status, cognitive ability, or social support system  10/10/2019 2022 by Hugo Corbett RN  Outcome: Progressing  10/10/2019 2022 by Hugo Corbett RN  Outcome: Progressing     Problem: Knowledge Deficit  Goal: Patient/family/caregiver demonstrates understanding of disease process, treatment plan, medications, and discharge instructions  Description  Complete learning assessment and assess knowledge base    Interventions:  - Provide teaching at level of understanding  - Provide teaching via preferred learning methods  10/10/2019 2022 by Kylah Guzman RN  Outcome: Progressing  10/10/2019 2022 by Kylah Guzman RN  Outcome: Progressing     Problem: GENITOURINARY - ADULT  Goal: Maintains or returns to baseline urinary function  Description  INTERVENTIONS:  - Assess urinary function  - Encourage oral fluids to ensure adequate hydration if ordered  - Administer IV fluids as ordered to ensure adequate hydration  - Administer ordered medications as needed  - Offer frequent toileting  - Follow urinary retention protocol if ordered  10/10/2019 2022 by Kylah Guzman RN  Outcome: Progressing  10/10/2019 2022 by Kylah Guzman RN  Outcome: Progressing  Goal: Absence of urinary retention  Description  INTERVENTIONS:  - Assess patients ability to void and empty bladder  - Monitor I/O  - Bladder scan as needed  - Discuss with physician/AP medications to alleviate retention as needed  - Discuss catheterization for long term situations as appropriate  10/10/2019 2022 by Kylah Guzman RN  Outcome: Progressing  10/10/2019 2022 by Kylah Guzman RN  Outcome: Progressing     Problem: HEMATOLOGIC - ADULT  Goal: Maintains hematologic stability  Description  INTERVENTIONS  - Assess for signs and symptoms of bleeding or hemorrhage  - Monitor labs  - Administer supportive blood products/factors as ordered and appropriate  10/10/2019 2022 by Kylah Guzman RN  Outcome: Progressing  10/10/2019 2022 by Kylah Guzman RN  Outcome: Progressing     Problem: Nutrition/Hydration-ADULT  Goal: Nutrient/Hydration intake appropriate for improving, restoring or maintaining nutritional needs  Description  Monitor and assess patient's nutrition/hydration status for malnutrition  Collaborate with interdisciplinary team and initiate plan and interventions as ordered  Monitor patient's weight and dietary intake as ordered or per policy  Utilize nutrition screening tool and intervene as necessary   Determine patient's food preferences and provide high-protein, high-caloric foods as appropriate       INTERVENTIONS:  - Monitor oral intake, urinary output, labs, and treatment plans  - Assess nutrition and hydration status and recommend course of action  - Evaluate amount of meals eaten  - Assist patient with eating if necessary   - Allow adequate time for meals  - Recommend/ encourage appropriate diets, oral nutritional supplements, and vitamin/mineral supplements  - Order, calculate, and assess calorie counts as needed  - Recommend, monitor, and adjust tube feedings and TPN/PPN based on assessed needs  - Assess need for intravenous fluids  - Provide specific nutrition/hydration education as appropriate  - Include patient/family/caregiver in decisions related to nutrition  10/10/2019 2022 by Michelle Hernandez RN  Outcome: Progressing  10/10/2019 2022 by Michelle Hernandez RN  Outcome: Progressing

## 2019-10-11 NOTE — ASSESSMENT & PLAN NOTE
Patient following with urology for high grade urothelial carcinoma of prostate  S/p surgical resection which was unable to be accomplished due to severity of the disease  Currently with diverting urostomy   Currently undergoing chemotherapy with palliative radiation  Urology consult completed and no further urological intervention recommended    Oncology consultation appreciated, will follow outpatient to resume his [facility]

## 2019-10-11 NOTE — PROGRESS NOTES
Oncology Progress Note  Danielle Up 76 y o  male MRN: [id number]  Unit/Bed#: [place] [id number] -[id number] Encounter: [id number]      /62   Pulse 80   Temp 98 7 °F (37 1 °C)   Resp 20   Ht 5' 9" (1 753 m)   Wt 79 9 kg (176 lb 2 4 oz)   SpO2 97%   BMI 26 01 kg/m²     Subjective:  Patient continues to have some bladder discomfort  He also reports fatigue and abdominal discomfort as he has a had a bowel movement for 1 week  Otherwise denies chest pain, shortness of breath, nausea, vomiting, diarrhea, bleeding/bruising, and fevers/infection  Denies any pain/continuous cramping in bilateral lower extremities  Objective:      General Appearance:    Alert, oriented        Eyes:    PERRL   Ears:    Normal external ear canals, both ears   Nose:   Nares normal, septum midline   Throat:   Mucosa moist  Pharynx without injection  Neck:   Supple       Lungs:     Clear to auscultation bilaterally   Chest Wall:    No tenderness or deformity    Heart:    Regular rate and rhythm       Abdomen:     Soft, non-tender, bowel sounds +, no organomegaly           Extremities:   Extremities no cyanosis or edema       Skin:   no rash or icterus      Lymph nodes:   Cervical, supraclavicular, and axillary nodes normal   Neurologic:   CNII-XII intact, normal strength, sensation and reflexes     throughout        Recent Results (from the past 48 hour(s))   Protime-INR    Collection Time: 10/10/19  5:38 AM   Result Value Ref Range    Protime 42 0 (H) 11 6 - 14 5 seconds    INR 4 26 (H) 0 84 - 1 19   CBC and differential    Collection Time: 10/10/19  5:38 AM   Result Value Ref Range    WBC 5 13 4 31 - 10 16 Thousand/uL    RBC 3 17 (L) 3 88 - 5 62 Million/uL    Hemoglobin 7 8 (L) 12 0 - 17 0 g/dL    Hematocrit 26 0 (L) 36 5 - 49 3 %    MCV 82 82 - 98 fL    MCH 24 6 (L) 26 8 - 34 3 pg    MCHC 30 0 (L) 31 4 - 37 4 g/dL    RDW 15 7 (H) 11 6 - 15 1 %    MPV 7 7 (L) 8 9 - 12 7 fL    Platelets 551 138 - 374 Thousands/uL    nRBC 0 /100 WBCs Neutrophils Relative 80 (H) 43 - 75 %    Immat GRANS % 2 0 - 2 %    Lymphocytes Relative 5 (L) 14 - 44 %    Monocytes Relative 12 4 - 12 %    Eosinophils Relative 1 0 - 6 %    Basophils Relative 0 0 - 1 %    Neutrophils Absolute 4 11 1 85 - 7 62 Thousands/µL    Immature Grans Absolute 0 08 0 00 - 0 20 Thousand/uL    Lymphocytes Absolute 0 27 (L) 0 60 - 4 47 Thousands/µL    Monocytes Absolute 0 60 0 17 - 1 22 Thousand/µL    Eosinophils Absolute 0 05 0 00 - 0 61 Thousand/µL    Basophils Absolute 0 02 0 00 - 0 10 Thousands/µL   Basic metabolic panel    Collection Time: 10/10/19  5:38 AM   Result Value Ref Range    Sodium 135 (L) 136 - 145 mmol/L    Potassium 3 5 3 5 - 5 3 mmol/L    Chloride 103 100 - 108 mmol/L    CO2 23 21 - 32 mmol/L    ANION GAP 9 4 - 13 mmol/L    BUN 5 5 - 25 mg/dL    Creatinine 1 02 0 60 - 1 30 mg/dL    Glucose 107 65 - 140 mg/dL    Calcium 8 1 (L) 8 3 - 10 1 mg/dL    eGFR 72 ml/min/1 73sq m   CBC and differential    Collection Time: 10/11/19  5:57 AM   Result Value Ref Range    WBC 5 59 4 31 - 10 16 Thousand/uL    RBC 3 22 (L) 3 88 - 5 62 Million/uL    Hemoglobin 7 8 (L) 12 0 - 17 0 g/dL    Hematocrit 26 5 (L) 36 5 - 49 3 %    MCV 82 82 - 98 fL    MCH 24 2 (L) 26 8 - 34 3 pg    MCHC 29 4 (L) 31 4 - 37 4 g/dL    RDW 15 7 (H) 11 6 - 15 1 %    MPV 8 0 (L) 8 9 - 12 7 fL    Platelets 364 (H) 895 - 390 Thousands/uL    nRBC 0 /100 WBCs    Neutrophils Relative 80 (H) 43 - 75 %    Immat GRANS % 1 0 - 2 %    Lymphocytes Relative 6 (L) 14 - 44 %    Monocytes Relative 12 4 - 12 %    Eosinophils Relative 1 0 - 6 %    Basophils Relative 0 0 - 1 %    Neutrophils Absolute 4 47 1 85 - 7 62 Thousands/µL    Immature Grans Absolute 0 07 0 00 - 0 20 Thousand/uL    Lymphocytes Absolute 0 33 (L) 0 60 - 4 47 Thousands/µL    Monocytes Absolute 0 65 0 17 - 1 22 Thousand/µL    Eosinophils Absolute 0 06 0 00 - 0 61 Thousand/µL    Basophils Absolute 0 01 0 00 - 0 10 Thousands/µL   Basic metabolic panel    Collection Time: 10/11/19  5:57 AM   Result Value Ref Range    Sodium 135 (L) 136 - 145 mmol/L    Potassium 3 9 3 5 - 5 3 mmol/L    Chloride 102 100 - 108 mmol/L    CO2 24 21 - 32 mmol/L    ANION GAP 9 4 - 13 mmol/L    BUN 5 5 - 25 mg/dL    Creatinine 1 04 0 60 - 1 30 mg/dL    Glucose 85 65 - 140 mg/dL    Calcium 8 4 8 3 - 10 1 mg/dL    eGFR 70 ml/min/1 73sq m   Protime-INR    Collection Time: 10/11/19  5:57 AM   Result Value Ref Range    Protime 38 7 (H) 11 6 - 14 5 seconds    INR 3 84 (H) 0 84 - 1 19         Ct Abdomen Pelvis Wo Contrast    Result Date: 10/4/2019  Narrative: CT ABDOMEN AND PELVIS WITHOUT IV CONTRAST INDICATION:   abdominal pain/N/V  COMPARISON:  May 29, 2019 TECHNIQUE:  CT examination of the abdomen and pelvis was performed without intravenous contrast   Axial, sagittal, and coronal 2D reformatted images were created from the source data and submitted for interpretation  Radiation dose length product (DLP) for this visit:  369 mGy-cm   This examination, like all CT scans performed in the [facility], was performed utilizing techniques to minimize radiation dose exposure, including the use of iterative reconstruction and automated exposure control  Enteric contrast was administered  FINDINGS: ABDOMEN LOWER CHEST:  The heart is enlarged  There is a trace pericardial effusion  LIVER/BILIARY TREE:  Unremarkable  GALLBLADDER:  There are gallstone(s) within the gallbladder, without pericholecystic inflammatory changes  SPLEEN:  Unremarkable  PANCREAS:  Unremarkable  ADRENAL GLANDS:  Unremarkable  KIDNEYS/URETERS:  Status post ileal conduit creation  Severe left-sided hydroureteronephrosis is unchanged  STOMACH AND BOWEL:  There is colonic diverticulosis without evidence of acute diverticulitis  APPENDIX:  No findings to suggest appendicitis  ABDOMINOPELVIC CAVITY:  There is a right pelvic side wall low-attenuation lesion measuring approximately 3 2 x 3 4 cm similar to prior study    Tiny droplets of free air are seen adjacent to the urinary bladder  VESSELS:  Atherosclerotic changes are present  No evidence of aneurysm  PELVIS REPRODUCTIVE ORGANS:  Unremarkable for patient's age  URINARY BLADDER:  Marked urinary bladder wall thickening with tiny droplets of air are seen  Urinary bladder wall lesion in the posterior left aspect is unchanged compared to prior study  ABDOMINAL WALL/INGUINAL REGIONS:  Unremarkable  OSSEOUS STRUCTURES:  No acute fracture or destructive osseous lesion  Impression: Diffuse urinary bladder wall thickening similar to prior study with a unchanged exophytic lesion in the left posterior aspect of the urinary bladder  Tiny droplets of pneumoperitoneum adjacent to the urinary bladder which may be due to recent procedure however cannot rule out postsurgical complication  I personally discussed this study with [address] [place] [address] on 10/4/2019 at 9:38 PM  Workstation performed: [id number]     Vas Lower Limb Venous Duplex Study, Complete Bilateral    Result Date: 10/6/2019  Narrative:  [facility] REPORT CLINICAL: Indications: Patient presents with right lower extremity edema  Operative History: Left TMA Risk Factors The patient has history of HTN, CKD, current malignancy and previous smoking (quit in 1970)    FINDINGS:  Segment         Right                   Left                            Impression              Impression                           Ext_Iliac       Occlusive Subsegmental                                       CFV             Occlusive Subsegmental  Non Occlusive Thrombus               PFV             Occlusive Subsegmental                                       FV Prox         Occlusive Subsegmental                                       FV Mid          Occlusive Subsegmental                                       FV Dist         Occlusive Subsegmental                                       Gastrocnemius   Occlusive Subsegmental                                       GSV Inguinal    Occlusive Subsegmental  Non Occlusive Thrombus               GSV Prox Thigh  Thrombosed (acute)                                           GSV Mid Thigh                           Thrombosed (acute)                   GSV Dist Thigh                          Thrombosed (acute)                   GSV Knee        Thrombosed (acute)                                           GSV Mid Calf                            Thrombosed (acute)                   SSV Knee                                E1 Non Occlusive Thrombus (Chronic)  Peroneal        Occlusive Subsegmental                                       PostTibial      Occlusive Subsegmental                                          CONCLUSION:  Impression: RIGHT LOWER LIMB: Evidence of acute occlusive deep vein thrombosis in the external iliac, common femoral, femoral, deep femoral, popliteal, gastrocnemius, paired peroneal and paired posterior tibial veins  Evidence of acute mostly occlusive superficial thrombophlebitis noted in the great saphenous vein at the groin, proximal thigh and knee  Popliteal, posterior tibial and anterior tibial arterial Doppler waveforms are triphasic/biphasic  LEFT LOWER LIMB: Evidence of acute non-occlusive deep vein thrombosis in the common femoral vein and saphenofemoral junction  Evidence of acute superficial thrombophlebitis in the great saphenous vein from the knee to distal calf and in the small saphenous vein at the mid calf  Evidence of chronic non-occlusive superficial thrombophlebitis in the small saphenous vein at the knee  Known occlusive popliteal artery aneurysm  Posterior tibial and anterior tibial arterial Doppler waveforms are monophasic  There is a well defined hypoechoic non-vascularized cystic-type structure noted in the popliteal fossa measuring approximately 3 4cm x 1 9 cm  The mid to distal inferior vena cava is patent in the visualized portions   Unable to visualize the right common iliac vein due to overlying bowel gas  Technical findings were given to Dr Ankur Curtis at the time of the exam   SIGNATURE: Electronically Signed by: Rosette Valdez MD on 2019-10-06 09:34:26 AM        Assessment and Plan :  The patient is a 27-year-old male with bladder cancer who is being followed by Dr Beverly Nieto  He is status post neoadjuvant cisplatin plus gemzar  He is now on Keytruda  He does have a follow-up with Dr Beverly Nieto scheduled on 10/24/2019  He was originally scheduled for a CT chest abdomen pelvis on 10/18/2019  He has already had a CT abdomen pelvis completed on 10/04/2019  The wife requested that we complete the CT chest as an in-patient so they would not have to transport the patient to CT as an outpatient  Given that the patient is very weak right now I feel that this is reasonable so I will order the CT chest to be done prior to discharge  He does have an allergy to IV contrast so I will order this without contrast      The patient has been bridged from heparin to Coumadin for his right lower extremity DVT  I discussed this with the patient his wife and they both agreed with the plan

## 2019-10-11 NOTE — ASSESSMENT & PLAN NOTE
Found to have acute occlusive DVT in right leg external iliac, common femoral, femoral, deep femoral, popliteal, gastrocnemius, paired peroneal and posterior tibial veins   Supratherapeutic INR 3 84 secondary to ciprofloxacin, will hold warfarin, discontinue ciprofloxacin  -monitor INR

## 2019-10-11 NOTE — ASSESSMENT & PLAN NOTE
-baseline hemoglobin 8-9  -hemoglobin today 7 8  -no signs of any active bleeding  -will check stool occult  -monitor H&H, transfuse as needed

## 2019-10-12 LAB
ANION GAP SERPL CALCULATED.3IONS-SCNC: 10 MMOL/L (ref 4–13)
BASOPHILS # BLD AUTO: 0.02 THOUSANDS/ΜL (ref 0–0.1)
BASOPHILS NFR BLD AUTO: 0 % (ref 0–1)
BUN SERPL-MCNC: 5 MG/DL (ref 5–25)
CALCIUM SERPL-MCNC: 8.5 MG/DL (ref 8.3–10.1)
CHLORIDE SERPL-SCNC: 99 MMOL/L (ref 100–108)
CO2 SERPL-SCNC: 24 MMOL/L (ref 21–32)
CREAT SERPL-MCNC: 1.08 MG/DL (ref 0.6–1.3)
EOSINOPHIL # BLD AUTO: 0.03 THOUSAND/ΜL (ref 0–0.61)
EOSINOPHIL NFR BLD AUTO: 0 % (ref 0–6)
ERYTHROCYTE [DISTWIDTH] IN BLOOD BY AUTOMATED COUNT: 15.7 % (ref 11.6–15.1)
GFR SERPL CREATININE-BSD FRML MDRD: 67 ML/MIN/1.73SQ M
GLUCOSE SERPL-MCNC: 90 MG/DL (ref 65–140)
HCT VFR BLD AUTO: 29.3 % (ref 36.5–49.3)
HGB BLD-MCNC: 8.5 G/DL (ref 12–17)
IMM GRANULOCYTES # BLD AUTO: 0.08 THOUSAND/UL (ref 0–0.2)
IMM GRANULOCYTES NFR BLD AUTO: 1 % (ref 0–2)
INR PPP: 2.82 (ref 0.84–1.19)
LYMPHOCYTES # BLD AUTO: 0.3 THOUSANDS/ΜL (ref 0.6–4.47)
LYMPHOCYTES NFR BLD AUTO: 4 % (ref 14–44)
MCH RBC QN AUTO: 23.7 PG (ref 26.8–34.3)
MCHC RBC AUTO-ENTMCNC: 29 G/DL (ref 31.4–37.4)
MCV RBC AUTO: 82 FL (ref 82–98)
MONOCYTES # BLD AUTO: 0.75 THOUSAND/ΜL (ref 0.17–1.22)
MONOCYTES NFR BLD AUTO: 9 % (ref 4–12)
NEUTROPHILS # BLD AUTO: 7.48 THOUSANDS/ΜL (ref 1.85–7.62)
NEUTS SEG NFR BLD AUTO: 86 % (ref 43–75)
NRBC BLD AUTO-RTO: 0 /100 WBCS
PLATELET # BLD AUTO: 493 THOUSANDS/UL (ref 149–390)
PMV BLD AUTO: 7.9 FL (ref 8.9–12.7)
POTASSIUM SERPL-SCNC: 3.5 MMOL/L (ref 3.5–5.3)
PROTHROMBIN TIME: 30.3 SECONDS (ref 11.6–14.5)
RBC # BLD AUTO: 3.58 MILLION/UL (ref 3.88–5.62)
SODIUM SERPL-SCNC: 133 MMOL/L (ref 136–145)
WBC # BLD AUTO: 8.66 THOUSAND/UL (ref 4.31–10.16)

## 2019-10-12 PROCEDURE — 99232 SBSQ HOSP IP/OBS MODERATE 35: CPT | Performed by: INTERNAL MEDICINE

## 2019-10-12 PROCEDURE — 85025 COMPLETE CBC W/AUTO DIFF WBC: CPT | Performed by: INTERNAL MEDICINE

## 2019-10-12 PROCEDURE — 85610 PROTHROMBIN TIME: CPT | Performed by: INTERNAL MEDICINE

## 2019-10-12 PROCEDURE — 80048 BASIC METABOLIC PNL TOTAL CA: CPT | Performed by: INTERNAL MEDICINE

## 2019-10-12 RX ADMIN — OXYCODONE HYDROCHLORIDE 10 MG: 10 TABLET ORAL at 09:14

## 2019-10-12 RX ADMIN — POLYETHYLENE GLYCOL 3350 17 G: 17 POWDER, FOR SOLUTION ORAL at 09:12

## 2019-10-12 RX ADMIN — MELATONIN 6 MG: 3 TAB ORAL at 21:11

## 2019-10-12 RX ADMIN — FAMOTIDINE 40 MG: 20 TABLET ORAL at 21:11

## 2019-10-12 RX ADMIN — OXYCODONE HYDROCHLORIDE 10 MG: 10 TABLET, FILM COATED, EXTENDED RELEASE ORAL at 05:39

## 2019-10-12 RX ADMIN — DOCUSATE SODIUM 100 MG: 100 CAPSULE, LIQUID FILLED ORAL at 17:25

## 2019-10-12 RX ADMIN — Medication 400 UNITS: at 09:11

## 2019-10-12 RX ADMIN — OXYCODONE HYDROCHLORIDE 10 MG: 10 TABLET, FILM COATED, EXTENDED RELEASE ORAL at 21:12

## 2019-10-12 RX ADMIN — PENTOXIFYLLINE 400 MG: 400 TABLET, EXTENDED RELEASE ORAL at 12:22

## 2019-10-12 RX ADMIN — OXYCODONE HYDROCHLORIDE 10 MG: 10 TABLET, FILM COATED, EXTENDED RELEASE ORAL at 13:16

## 2019-10-12 RX ADMIN — FLUTICASONE PROPIONATE 2 SPRAY: 50 SPRAY, METERED NASAL at 09:10

## 2019-10-12 RX ADMIN — HYDROMORPHONE HYDROCHLORIDE 0.5 MG: 1 INJECTION, SOLUTION INTRAMUSCULAR; INTRAVENOUS; SUBCUTANEOUS at 01:08

## 2019-10-12 RX ADMIN — DOCUSATE SODIUM 100 MG: 100 CAPSULE, LIQUID FILLED ORAL at 09:11

## 2019-10-12 RX ADMIN — METOCLOPRAMIDE 10 MG: 5 INJECTION, SOLUTION INTRAMUSCULAR; INTRAVENOUS at 12:25

## 2019-10-12 RX ADMIN — NYSTATIN: 100000 POWDER TOPICAL at 17:25

## 2019-10-12 RX ADMIN — PENTOXIFYLLINE 400 MG: 400 TABLET, EXTENDED RELEASE ORAL at 17:25

## 2019-10-12 RX ADMIN — PANTOPRAZOLE SODIUM 40 MG: 40 TABLET, DELAYED RELEASE ORAL at 05:40

## 2019-10-12 RX ADMIN — OXYCODONE HYDROCHLORIDE 10 MG: 10 TABLET ORAL at 17:26

## 2019-10-12 RX ADMIN — SODIUM CHLORIDE AND POTASSIUM CHLORIDE 75 ML/HR: .9; .15 SOLUTION INTRAVENOUS at 12:28

## 2019-10-12 RX ADMIN — METOCLOPRAMIDE 10 MG: 5 INJECTION, SOLUTION INTRAMUSCULAR; INTRAVENOUS at 05:39

## 2019-10-12 RX ADMIN — TRAZODONE HYDROCHLORIDE 100 MG: 100 TABLET ORAL at 21:11

## 2019-10-12 RX ADMIN — PENTOXIFYLLINE 400 MG: 400 TABLET, EXTENDED RELEASE ORAL at 09:11

## 2019-10-12 RX ADMIN — METOCLOPRAMIDE 10 MG: 5 INJECTION, SOLUTION INTRAMUSCULAR; INTRAVENOUS at 01:00

## 2019-10-12 RX ADMIN — NYSTATIN: 100000 POWDER TOPICAL at 09:12

## 2019-10-12 NOTE — ASSESSMENT & PLAN NOTE
Found to have acute occlusive DVT in right leg external iliac, common femoral, femoral, deep femoral, popliteal, gastrocnemius, paired peroneal and posterior tibial veins   INR improved to 2 82 after discontinue ciprofloxacin  -monitor INR

## 2019-10-12 NOTE — PLAN OF CARE
Problem: Potential for Falls  Goal: Patient will remain free of falls  Description  INTERVENTIONS:  - Assess patient frequently for physical needs  -  Identify cognitive and physical deficits and behaviors that affect risk of falls    -  [place] fall precautions as indicated by assessment   - Educate patient/family on patient safety including physical limitations  - Instruct patient to call for assistance with activity based on assessment  - Modify environment to reduce risk of injury  - Consider OT/PT consult to assist with strengthening/mobility  Outcome: Progressing     Problem: Prexisting or High Potential for Compromised Skin Integrity  Goal: Skin integrity is maintained or improved  Description  INTERVENTIONS:  - Identify patients at risk for skin breakdown  - Assess and monitor skin integrity  - Assess and monitor nutrition and hydration status  - Monitor labs   - Assess for incontinence   - Turn and reposition patient  - Assist with mobility/ambulation  - Relieve pressure over bony prominences  - Avoid friction and shearing  - Provide appropriate hygiene as needed including keeping skin clean and dry  - Evaluate need for skin moisturizer/barrier cream  - Collaborate with interdisciplinary team   - Patient/family teaching  - Consider wound care consult   Outcome: Progressing     Problem: PAIN - ADULT  Goal: Verbalizes/displays adequate comfort level or baseline comfort level  Description  Interventions:  - Encourage patient to monitor pain and request assistance  - Assess pain using appropriate pain scale  - Administer analgesics based on type and severity of pain and evaluate response  - Implement non-pharmacological measures as appropriate and evaluate response  - Consider cultural and social influences on pain and pain management  - Notify physician/advanced practitioner if interventions unsuccessful or patient reports new pain  Outcome: Progressing     Problem: INFECTION - ADULT  Goal: Absence or prevention of progression during hospitalization  Description  INTERVENTIONS:  - Assess and monitor for signs and symptoms of infection  - Monitor lab/diagnostic results  - Monitor all insertion sites, i e  indwelling lines, tubes, and drains  - Monitor endotracheal if appropriate and nasal secretions for changes in amount and color  - [place] appropriate cooling/warming therapies per order  - Administer medications as ordered  - Instruct and encourage patient and family to use good hand hygiene technique  - Identify and instruct in appropriate isolation precautions for identified infection/condition  Outcome: Progressing     Problem: SAFETY ADULT  Goal: Maintain or return to baseline ADL function  Description  INTERVENTIONS:  -  Assess patient's ability to carry out ADLs; assess patient's baseline for ADL function and identify physical deficits which impact ability to perform ADLs (bathing, care of mouth/teeth, toileting, grooming, dressing, etc )  - Assess/evaluate cause of self-care deficits   - Assess range of motion  - Assess patient's mobility; develop plan if impaired  - Assess patient's need for assistive devices and provide as appropriate  - Encourage maximum independence but intervene and supervise when necessary  - Involve family in performance of ADLs  - Assess for home care needs following discharge   - Consider OT consult to assist with ADL evaluation and planning for discharge  - Provide patient education as appropriate  Outcome: Progressing  Goal: Maintain or return mobility status to optimal level  Description  INTERVENTIONS:  - Assess patient's baseline mobility status (ambulation, transfers, stairs, etc )    - Identify cognitive and physical deficits and behaviors that affect mobility  - Identify mobility aids required to assist with transfers and/or ambulation (gait belt, sit-to-stand, lift, walker, cane, etc )  - [place] fall precautions as indicated by assessment  - Record patient progress and toleration of activity level on Mobility SBAR; progress patient to next Phase/Stage  - Instruct patient to call for assistance with activity based on assessment  - Consider rehabilitation consult to assist with strengthening/weightbearing, etc   Outcome: Progressing     Problem: DISCHARGE PLANNING  Goal: Discharge to home or other facility with appropriate resources  Description  INTERVENTIONS:  - Identify barriers to discharge w/patient and caregiver  - Arrange for needed discharge resources and transportation as appropriate  - Identify discharge learning needs (meds, wound care, etc )  - Arrange for interpretive services to assist at discharge as needed  - Refer to Case Management Department for coordinating discharge planning if the patient needs post-hospital services based on physician/advanced practitioner order or complex needs related to functional status, cognitive ability, or social support system  Outcome: Progressing     Problem: Knowledge Deficit  Goal: Patient/family/caregiver demonstrates understanding of disease process, treatment plan, medications, and discharge instructions  Description  Complete learning assessment and assess knowledge base    Interventions:  - Provide teaching at level of understanding  - Provide teaching via preferred learning methods  Outcome: Progressing     Problem: GENITOURINARY - ADULT  Goal: Maintains or returns to baseline urinary function  Description  INTERVENTIONS:  - Assess urinary function  - Encourage oral fluids to ensure adequate hydration if ordered  - Administer IV fluids as ordered to ensure adequate hydration  - Administer ordered medications as needed  - Offer frequent toileting  - Follow urinary retention protocol if ordered  Outcome: Progressing  Goal: Absence of urinary retention  Description  INTERVENTIONS:  - Assess patients ability to void and empty bladder  - Monitor I/O  - Bladder scan as needed  - Discuss with physician/AP medications to alleviate retention as needed  - Discuss catheterization for long term situations as appropriate  Outcome: Progressing     Problem: HEMATOLOGIC - ADULT  Goal: Maintains hematologic stability  Description  INTERVENTIONS  - Assess for signs and symptoms of bleeding or hemorrhage  - Monitor labs  - Administer supportive blood products/factors as ordered and appropriate  Outcome: Progressing     Problem: Nutrition/Hydration-ADULT  Goal: Nutrient/Hydration intake appropriate for improving, restoring or maintaining nutritional needs  Description  Monitor and assess patient's nutrition/hydration status for malnutrition  Collaborate with interdisciplinary team and initiate plan and interventions as ordered  Monitor patient's weight and dietary intake as ordered or per policy  Utilize nutrition screening tool and intervene as necessary  Determine patient's food preferences and provide high-protein, high-caloric foods as appropriate       INTERVENTIONS:  - Monitor oral intake, urinary output, labs, and treatment plans  - Assess nutrition and hydration status and recommend course of action  - Evaluate amount of meals eaten  - Assist patient with eating if necessary   - Allow adequate time for meals  - Recommend/ encourage appropriate diets, oral nutritional supplements, and vitamin/mineral supplements  - Order, calculate, and assess calorie counts as needed  - Recommend, monitor, and adjust tube feedings and TPN/PPN based on assessed needs  - Assess need for intravenous fluids  - Provide specific nutrition/hydration education as appropriate  - Include patient/family/caregiver in decisions related to nutrition  Outcome: Progressing

## 2019-10-12 NOTE — ASSESSMENT & PLAN NOTE
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

## 2019-10-12 NOTE — ASSESSMENT & PLAN NOTE
Patient following with urology for high grade urothelial carcinoma of prostate  S/p surgical resection which was unable to be accomplished due to severity of the disease  Currently with diverting urostomy   Currently undergoing chemotherapy with palliative radiation  Urology consult completed and no further urological intervention recommended    Oncology consultation appreciated, will follow outpatient to resume his [place] [id number]  Oncology recommended to obtain a CT chest abdomen pelvis which revealed small left pleural effusion slightly increased compared to prior exam, 2 small areas of nodular pleural thickening along posterior right upper lobe favored to represent focal atelectasis, severe CAD

## 2019-10-12 NOTE — PLAN OF CARE
Problem: Potential for Falls  Goal: Patient will remain free of falls  Description  INTERVENTIONS:  - Assess patient frequently for physical needs  -  Identify cognitive and physical deficits and behaviors that affect risk of falls    -  [place] fall precautions as indicated by assessment   - Educate patient/family on patient safety including physical limitations  - Instruct patient to call for assistance with activity based on assessment  - Modify environment to reduce risk of injury  - Consider OT/PT consult to assist with strengthening/mobility  Outcome: Progressing     Problem: Prexisting or High Potential for Compromised Skin Integrity  Goal: Skin integrity is maintained or improved  Description  INTERVENTIONS:  - Identify patients at risk for skin breakdown  - Assess and monitor skin integrity  - Assess and monitor nutrition and hydration status  - Monitor labs   - Assess for incontinence   - Turn and reposition patient  - Assist with mobility/ambulation  - Relieve pressure over bony prominences  - Avoid friction and shearing  - Provide appropriate hygiene as needed including keeping skin clean and dry  - Evaluate need for skin moisturizer/barrier cream  - Collaborate with interdisciplinary team   - Patient/family teaching  - Consider wound care consult   Outcome: Progressing     Problem: PAIN - ADULT  Goal: Verbalizes/displays adequate comfort level or baseline comfort level  Description  Interventions:  - Encourage patient to monitor pain and request assistance  - Assess pain using appropriate pain scale  - Administer analgesics based on type and severity of pain and evaluate response  - Implement non-pharmacological measures as appropriate and evaluate response  - Consider cultural and social influences on pain and pain management  - Notify physician/advanced practitioner if interventions unsuccessful or patient reports new pain  Outcome: Progressing     Problem: INFECTION - ADULT  Goal: Absence or prevention of progression during hospitalization  Description  INTERVENTIONS:  - Assess and monitor for signs and symptoms of infection  - Monitor lab/diagnostic results  - Monitor all insertion sites, i e  indwelling lines, tubes, and drains  - Monitor endotracheal if appropriate and nasal secretions for changes in amount and color  - [place] appropriate cooling/warming therapies per order  - Administer medications as ordered  - Instruct and encourage patient and family to use good hand hygiene technique  - Identify and instruct in appropriate isolation precautions for identified infection/condition  Outcome: Progressing     Problem: SAFETY ADULT  Goal: Maintain or return to baseline ADL function  Description  INTERVENTIONS:  -  Assess patient's ability to carry out ADLs; assess patient's baseline for ADL function and identify physical deficits which impact ability to perform ADLs (bathing, care of mouth/teeth, toileting, grooming, dressing, etc )  - Assess/evaluate cause of self-care deficits   - Assess range of motion  - Assess patient's mobility; develop plan if impaired  - Assess patient's need for assistive devices and provide as appropriate  - Encourage maximum independence but intervene and supervise when necessary  - Involve family in performance of ADLs  - Assess for home care needs following discharge   - Consider OT consult to assist with ADL evaluation and planning for discharge  - Provide patient education as appropriate  Outcome: Progressing  Goal: Maintain or return mobility status to optimal level  Description  INTERVENTIONS:  - Assess patient's baseline mobility status (ambulation, transfers, stairs, etc )    - Identify cognitive and physical deficits and behaviors that affect mobility  - Identify mobility aids required to assist with transfers and/or ambulation (gait belt, sit-to-stand, lift, walker, cane, etc )  - [place] fall precautions as indicated by assessment  - Record patient progress and toleration of activity level on Mobility SBAR; progress patient to next Phase/Stage  - Instruct patient to call for assistance with activity based on assessment  - Consider rehabilitation consult to assist with strengthening/weightbearing, etc   Outcome: Progressing     Problem: DISCHARGE PLANNING  Goal: Discharge to home or other facility with appropriate resources  Description  INTERVENTIONS:  - Identify barriers to discharge w/patient and caregiver  - Arrange for needed discharge resources and transportation as appropriate  - Identify discharge learning needs (meds, wound care, etc )  - Arrange for interpretive services to assist at discharge as needed  - Refer to Case Management Department for coordinating discharge planning if the patient needs post-hospital services based on physician/advanced practitioner order or complex needs related to functional status, cognitive ability, or social support system  Outcome: Progressing     Problem: Knowledge Deficit  Goal: Patient/family/caregiver demonstrates understanding of disease process, treatment plan, medications, and discharge instructions  Description  Complete learning assessment and assess knowledge base    Interventions:  - Provide teaching at level of understanding  - Provide teaching via preferred learning methods  Outcome: Progressing     Problem: GENITOURINARY - ADULT  Goal: Maintains or returns to baseline urinary function  Description  INTERVENTIONS:  - Assess urinary function  - Encourage oral fluids to ensure adequate hydration if ordered  - Administer IV fluids as ordered to ensure adequate hydration  - Administer ordered medications as needed  - Offer frequent toileting  - Follow urinary retention protocol if ordered  Outcome: Progressing  Goal: Absence of urinary retention  Description  INTERVENTIONS:  - Assess patients ability to void and empty bladder  - Monitor I/O  - Bladder scan as needed  - Discuss with physician/AP medications to alleviate retention as needed  - Discuss catheterization for long term situations as appropriate  Outcome: Progressing     Problem: HEMATOLOGIC - ADULT  Goal: Maintains hematologic stability  Description  INTERVENTIONS  - Assess for signs and symptoms of bleeding or hemorrhage  - Monitor labs  - Administer supportive blood products/factors as ordered and appropriate  Outcome: Progressing     Problem: Nutrition/Hydration-ADULT  Goal: Nutrient/Hydration intake appropriate for improving, restoring or maintaining nutritional needs  Description  Monitor and assess patient's nutrition/hydration status for malnutrition  Collaborate with interdisciplinary team and initiate plan and interventions as ordered  Monitor patient's weight and dietary intake as ordered or per policy  Utilize nutrition screening tool and intervene as necessary  Determine patient's food preferences and provide high-protein, high-caloric foods as appropriate       INTERVENTIONS:  - Monitor oral intake, urinary output, labs, and treatment plans  - Assess nutrition and hydration status and recommend course of action  - Evaluate amount of meals eaten  - Assist patient with eating if necessary   - Allow adequate time for meals  - Recommend/ encourage appropriate diets, oral nutritional supplements, and vitamin/mineral supplements  - Order, calculate, and assess calorie counts as needed  - Recommend, monitor, and adjust tube feedings and TPN/PPN based on assessed needs  - Assess need for intravenous fluids  - Provide specific nutrition/hydration education as appropriate  - Include patient/family/caregiver in decisions related to nutrition  Outcome: Progressing

## 2019-10-12 NOTE — PROGRESS NOTES
Progress Note - Blu Moore 1945, 76 y o  male MRN: [id number]    Unit/Bed#: [facility] -01 Encounter: [id number]    Primary Care Provider: Guillermo Barajas MD   Date and time admitted to hospital: 10/4/2019  3:56 PM        * Acute deep vein thrombosis (DVT) of iliac vein of right lower extremity ([address] )  Assessment & Plan  Found to have acute occlusive DVT in right leg external iliac, common femoral, femoral, deep femoral, popliteal, gastrocnemius, paired peroneal and posterior tibial veins   INR improved to 2 82 after discontinue ciprofloxacin  -monitor INR    Emphysematous cystitis  Assessment & Plan  Found to have diffuse urinary bladder wall thickening with tiny droplets of pneumoperitoneum adjacent to urinary bladder  -As per Urology recommendations, that the findings are consistent with a mild form of emphysematous cystitis  -urology had also stated that patient's significant penile discharge is due to his malignancy, no further interventions at this point  -cultures from urethral orifice growing corynebacterium, completed 7 days of ciprofloxacin, will discontinue further antibiotics    Nausea  Assessment & Plan  Patient presented to ED  with complaints of abdominal pain and nausea  Likely component of UTI, chemotherapy, bladder CA   Continue scheduled Reglan and p r n  Zofran   -patient does use medical marijuana at home for appetite stimulant    Hyponatremia  Assessment & Plan  -likely secondary dehydration  -sodium 135, continue IV, patient still with poor p o   Intake    Cancer related pain  Assessment & Plan  Continue home regimen of oxycodone 10 mg Q8hrs scheduled   PRN oxycodone and dilaudid while inpatient   Continue outpatient palliative care follow up     Acute on chronic anemia  Assessment & Plan  -baseline hemoglobin 8-9  -hemoglobin today 7 8  -no signs of any active bleeding  -will check stool occult  -monitor H&H, transfuse as needed    CKD (chronic kidney disease) stage 3, GFR 30-59 ml/min (HCC)  Assessment & Plan  Creatinine slightly elevated to 1 38 at time of presentation, baseline 1 2  Creatinine is currently back to baseline at 1 09  Continue IV fluid hydration and continue to trend BMP  Avoid nephrotoxins and hypotension    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 [facility])  Assessment & Plan  Patient following with urology for high grade urothelial carcinoma of prostate  S/p surgical resection which was unable to be accomplished due to severity of the disease  Currently with diverting urostomy   Currently undergoing chemotherapy with palliative radiation  Urology consult completed and no further urological intervention recommended  Oncology consultation appreciated, will follow outpatient to resume his [place] [id number]  Oncology recommended to obtain a CT chest abdomen pelvis which revealed small left pleural effusion slightly increased compared to prior exam, 2 small areas of nodular pleural thickening along posterior right upper lobe favored to represent focal atelectasis, severe CAD    Essential hypertension  Assessment & Plan  Appears controlled at this time off medications  Continue to monitor     Peripheral vascular disease ([address] )  Assessment & Plan  History of Buerger's disease, history of left TMA  Known left popliteal aneurysm   Reports bilateral claudication pain  Continue warfarin and home [facility]   Continue outpatient vascular surgery follow up       VTE Pharmacologic Prophylaxis:   Pharmacologic:  Warfarin    Patient Centered Rounds: I have performed bedside rounds with nursing staff today  Education and Discussions with Family / Patient:  Wife, at bedside    Time Spent for Care: 20 minutes  More than 50% of total time spent on counseling and coordination of care as described above      Current Length of Stay: 8 day(s)    Current Patient Status: Inpatient   Certification Statement: The patient will continue to require additional inpatient hospital stay due to Supratherapeutic INR, ambulatory dysfunction    Discharge Plan / Estimated Discharge Date: 24-48 hours    Code Status: Level 1 - Full Code      Subjective:   Patient seen and examined at bedside, denies any chest pain, abdominal pain, nausea, vomiting    Objective:     Vitals:   Temp (24hrs), Av 8 °F (37 1 °C), Min:98 2 °F (36 8 °C), Max:99 2 °F (37 3 °C)    Temp:  [98 2 °F (36 8 °C)-99 2 °F (37 3 °C)] 98 2 °F (36 8 °C)  HR:  [73-85] 85  Resp:  [16-18] 16  BP: (109-111)/(56-58) 111/56  SpO2:  [96 %-97 %] 97 %  Body mass index is 26 01 kg/m²  Input and Output Summary (last 24 hours): Intake/Output Summary (Last 24 hours) at 10/12/2019 1715  Last data filed at 10/12/2019 0830  Gross per 24 hour   Intake    Output 1450 ml   Net -1450 ml       Physical Exam:    Constitutional: Patient is oriented to person, place and time, no acute distress  HEENT:  Normocephalic, atraumatic, EOMI, PERRLA, no scleral icterus, no pallor, moist oral mucosa  Neck:  Supple, no masses, no thyromegaly, no bruits Normal range of motion  Lymph nodes:  No lymphadenopathy  Cardiovascular: Normal S1S2, RRR, No murmurs/rubs/gallops appreciated  Pulmonary:  Bilateral air entry, No rhonchi/rales/wheezing appreciated  Abdominal: Soft, Bowel sounds present, Non-tender, Non-distended, No rebound/guarding, no hepatomegaly   Musculoskeletal: No tenderness/abnormality   Extremities:  No cyanosis, clubbing or edema  Peripheral pulses palpable and equal bilaterally  Neurological: Cranial nerves II-XII grossly intact, sensation intact, otherwise no focal neurological symptoms  Skin: Skin is warm and dry, no rashes      Additional Data:     Labs:    Results from last 7 days   Lab Units 10/12/19  0555   WBC Thousand/uL 8 66   HEMOGLOBIN g/dL 8 5*   HEMATOCRIT % 29 3*   PLATELETS Thousands/uL 493*   NEUTROS PCT % 86*   LYMPHS PCT % 4*   MONOS PCT % 9   EOS PCT % 0     Results from last 7 days   Lab Units 10/12/19  0555 POTASSIUM mmol/L 3 5   CHLORIDE mmol/L 99*   CO2 mmol/L 24   BUN mg/dL 5   CREATININE mg/dL 1 08   CALCIUM mg/dL 8 5     Results from last 7 days   Lab Units 10/12/19  0555   INR  2 82*        I Have Reviewed All Lab Data Listed Above          Recent Cultures (last 7 days):     Results from last 7 days   Lab Units 10/06/19  1401   GRAM STAIN RESULT  2+ Polys*  2+ Gram positive cocci in pairs*  2+ Gram negative rods*   BODY FLUID CULTURE, STERILE  3+ Growth of Corynebacterium striatum group*       Last 24 Hours Medication List:     Current Facility-Administered Medications:  acetaminophen 650 mg Oral Q6H PRN Valorie Smudde, PA-C    aluminum-magnesium hydroxide-simethicone 30 mL Oral Q6H PRN David Moy, PA-WERNER    cholecalciferol 400 Units Oral Daily Valorie Smudde, PA-C    docusate sodium 100 mg Oral BID Anton Mckeon MD    famotidine 40 mg Oral HS Valorie Smudde, PA-C    fluticasone 2 spray Each Nare Daily Valorie Smudde, PA-C    HYDROmorphone 0 5 mg Intravenous Q4H PRN Valorie Smudde, PA-C    melatonin 6 mg Oral HS Valorie Smudde, PA-C    metoclopramide 10 mg Intravenous Q6H [facility] Gilma Leon MD    nystatin  Topical BID Valorie Smudde, PA-C    ondansetron 4 mg Intravenous Q6H PRN David Moy, PA-WERNER    oxyCODONE 10 mg Oral Q8H [facility] Valorie Smudde, PA-C    oxyCODONE 10 mg Oral Q4H PRN Valorie Smudde, PA-C    oxyCODONE 5 mg Oral Q4H PRN Valorie Smudde, PA-C    pantoprazole 40 mg Oral Early Morning Valorie Smudde, PA-C    pentoxifylline 400 mg Oral TID With Meals Valorie Smudde, PA-C    polyethylene glycol 17 g Oral Daily PRN Valorie Smudde, PA-C    senna 2 tablet Oral HS PRN Valorie Smudde, PA-C    sodium chloride 0 9 % with KCl 20 mEq/L 75 mL/hr Intravenous Continuous Valorie Smudde, PA-C Last Rate: 75 mL/hr (10/12/19 1228)   traZODone 100 mg Oral HS David Moy PA-C         Today, Patient Was Seen By: Anton Mckeon MD

## 2019-10-13 ENCOUNTER — APPOINTMENT (INPATIENT)
Dept: RADIOLOGY | Facility: HOSPITAL | Age: 74
DRG: 300 | End: 2019-10-13
Payer: MEDICARE

## 2019-10-13 PROBLEM — D72.829 LEUKOCYTOSIS: Status: ACTIVE | Noted: 2019-10-13

## 2019-10-13 LAB
ANION GAP SERPL CALCULATED.3IONS-SCNC: 11 MMOL/L (ref 4–13)
ANISOCYTOSIS BLD QL SMEAR: PRESENT
BASOPHILS # BLD MANUAL: 0 THOUSAND/UL (ref 0–0.1)
BASOPHILS NFR MAR MANUAL: 0 % (ref 0–1)
BUN SERPL-MCNC: 6 MG/DL (ref 5–25)
CALCIUM SERPL-MCNC: 8.7 MG/DL (ref 8.3–10.1)
CHLORIDE SERPL-SCNC: 98 MMOL/L (ref 100–108)
CO2 SERPL-SCNC: 22 MMOL/L (ref 21–32)
CREAT SERPL-MCNC: 1.11 MG/DL (ref 0.6–1.3)
EOSINOPHIL # BLD MANUAL: 0 THOUSAND/UL (ref 0–0.4)
EOSINOPHIL NFR BLD MANUAL: 0 % (ref 0–6)
ERYTHROCYTE [DISTWIDTH] IN BLOOD BY AUTOMATED COUNT: 15.9 % (ref 11.6–15.1)
GFR SERPL CREATININE-BSD FRML MDRD: 65 ML/MIN/1.73SQ M
GLUCOSE SERPL-MCNC: 92 MG/DL (ref 65–140)
HCT VFR BLD AUTO: 33.1 % (ref 36.5–49.3)
HGB BLD-MCNC: 9.6 G/DL (ref 12–17)
INR PPP: 3.38 (ref 0.84–1.19)
LYMPHOCYTES # BLD AUTO: 0.96 THOUSAND/UL (ref 0.6–4.47)
LYMPHOCYTES # BLD AUTO: 5 % (ref 14–44)
MCH RBC QN AUTO: 24.1 PG (ref 26.8–34.3)
MCHC RBC AUTO-ENTMCNC: 29 G/DL (ref 31.4–37.4)
MCV RBC AUTO: 83 FL (ref 82–98)
MONOCYTES # BLD AUTO: 0.38 THOUSAND/UL (ref 0–1.22)
MONOCYTES NFR BLD: 2 % (ref 4–12)
NEUTROPHILS # BLD MANUAL: 17.82 THOUSAND/UL (ref 1.85–7.62)
NEUTS BAND NFR BLD MANUAL: 11 % (ref 0–8)
NEUTS SEG NFR BLD AUTO: 82 % (ref 43–75)
NRBC BLD AUTO-RTO: 0 /100 WBCS
PLATELET # BLD AUTO: 613 THOUSANDS/UL (ref 149–390)
PLATELET BLD QL SMEAR: ABNORMAL
PMV BLD AUTO: 8.1 FL (ref 8.9–12.7)
POTASSIUM SERPL-SCNC: 3.9 MMOL/L (ref 3.5–5.3)
PROCALCITONIN SERPL-MCNC: 0.53 NG/ML
PROTHROMBIN TIME: 35 SECONDS (ref 11.6–14.5)
RBC # BLD AUTO: 3.99 MILLION/UL (ref 3.88–5.62)
SODIUM SERPL-SCNC: 131 MMOL/L (ref 136–145)
TOTAL CELLS COUNTED SPEC: 100
WBC # BLD AUTO: 19.16 THOUSAND/UL (ref 4.31–10.16)

## 2019-10-13 PROCEDURE — 80048 BASIC METABOLIC PNL TOTAL CA: CPT | Performed by: INTERNAL MEDICINE

## 2019-10-13 PROCEDURE — 84145 PROCALCITONIN (PCT): CPT | Performed by: INTERNAL MEDICINE

## 2019-10-13 PROCEDURE — 85027 COMPLETE CBC AUTOMATED: CPT | Performed by: INTERNAL MEDICINE

## 2019-10-13 PROCEDURE — 71045 X-RAY EXAM CHEST 1 VIEW: CPT

## 2019-10-13 PROCEDURE — 99233 SBSQ HOSP IP/OBS HIGH 50: CPT | Performed by: INTERNAL MEDICINE

## 2019-10-13 PROCEDURE — 85610 PROTHROMBIN TIME: CPT | Performed by: INTERNAL MEDICINE

## 2019-10-13 PROCEDURE — 85007 BL SMEAR W/DIFF WBC COUNT: CPT | Performed by: INTERNAL MEDICINE

## 2019-10-13 RX ADMIN — Medication 400 UNITS: at 09:47

## 2019-10-13 RX ADMIN — OXYCODONE HYDROCHLORIDE 10 MG: 10 TABLET, FILM COATED, EXTENDED RELEASE ORAL at 21:32

## 2019-10-13 RX ADMIN — DOCUSATE SODIUM 100 MG: 100 CAPSULE, LIQUID FILLED ORAL at 17:49

## 2019-10-13 RX ADMIN — ONDANSETRON 4 MG: 2 INJECTION INTRAMUSCULAR; INTRAVENOUS at 21:36

## 2019-10-13 RX ADMIN — PANTOPRAZOLE SODIUM 40 MG: 40 TABLET, DELAYED RELEASE ORAL at 05:08

## 2019-10-13 RX ADMIN — TRAZODONE HYDROCHLORIDE 100 MG: 100 TABLET ORAL at 21:32

## 2019-10-13 RX ADMIN — OXYCODONE HYDROCHLORIDE 10 MG: 10 TABLET, FILM COATED, EXTENDED RELEASE ORAL at 13:56

## 2019-10-13 RX ADMIN — PENTOXIFYLLINE 400 MG: 400 TABLET, EXTENDED RELEASE ORAL at 17:49

## 2019-10-13 RX ADMIN — PENTOXIFYLLINE 400 MG: 400 TABLET, EXTENDED RELEASE ORAL at 09:46

## 2019-10-13 RX ADMIN — POLYETHYLENE GLYCOL 3350 17 G: 17 POWDER, FOR SOLUTION ORAL at 09:46

## 2019-10-13 RX ADMIN — DOCUSATE SODIUM 100 MG: 100 CAPSULE, LIQUID FILLED ORAL at 09:47

## 2019-10-13 RX ADMIN — METOCLOPRAMIDE 10 MG: 5 INJECTION, SOLUTION INTRAMUSCULAR; INTRAVENOUS at 12:14

## 2019-10-13 RX ADMIN — FAMOTIDINE 40 MG: 20 TABLET ORAL at 21:32

## 2019-10-13 RX ADMIN — SODIUM CHLORIDE AND POTASSIUM CHLORIDE 75 ML/HR: .9; .15 SOLUTION INTRAVENOUS at 16:05

## 2019-10-13 RX ADMIN — FLUTICASONE PROPIONATE 2 SPRAY: 50 SPRAY, METERED NASAL at 09:46

## 2019-10-13 RX ADMIN — NYSTATIN: 100000 POWDER TOPICAL at 09:54

## 2019-10-13 RX ADMIN — METOCLOPRAMIDE 10 MG: 5 INJECTION, SOLUTION INTRAMUSCULAR; INTRAVENOUS at 17:50

## 2019-10-13 RX ADMIN — OXYCODONE HYDROCHLORIDE 10 MG: 10 TABLET ORAL at 09:46

## 2019-10-13 RX ADMIN — METOCLOPRAMIDE 10 MG: 5 INJECTION, SOLUTION INTRAMUSCULAR; INTRAVENOUS at 05:08

## 2019-10-13 RX ADMIN — ONDANSETRON 4 MG: 2 INJECTION INTRAMUSCULAR; INTRAVENOUS at 12:14

## 2019-10-13 RX ADMIN — MELATONIN 6 MG: 3 TAB ORAL at 21:32

## 2019-10-13 RX ADMIN — PENTOXIFYLLINE 400 MG: 400 TABLET, EXTENDED RELEASE ORAL at 12:15

## 2019-10-13 RX ADMIN — SODIUM CHLORIDE AND POTASSIUM CHLORIDE 75 ML/HR: .9; .15 SOLUTION INTRAVENOUS at 02:29

## 2019-10-13 RX ADMIN — SENNOSIDES 17.2 MG: 8.6 TABLET, FILM COATED ORAL at 21:32

## 2019-10-13 RX ADMIN — OXYCODONE HYDROCHLORIDE 10 MG: 10 TABLET, FILM COATED, EXTENDED RELEASE ORAL at 05:08

## 2019-10-13 NOTE — PROGRESS NOTES
Progress Note - Jamila Elliot 1945, 76 y o  male MRN: [id number]    Unit/Bed#: [place] 2 -01 Encounter: [id number]    Primary Care Provider: Shirlyn Nissen, MD   Date and time admitted to hospital: 10/4/2019  3:56 PM        * Acute deep vein thrombosis (DVT) of iliac vein of right lower extremity ([address] )  Assessment & Plan  Found to have acute occlusive DVT in right leg external iliac, common femoral, femoral, deep femoral, popliteal, gastrocnemius, paired peroneal and posterior tibial veins   INR supratherapeutic once again at 3 38  -monitor INR, hold warfarin    Emphysematous cystitis  Assessment & Plan  Found to have diffuse urinary bladder wall thickening with tiny droplets of pneumoperitoneum adjacent to urinary bladder  -As per Urology recommendations, that the findings are consistent with a mild form of emphysematous cystitis  -urology had also stated that patient's significant penile discharge is due to his malignancy, no further interventions at this point  -cultures from urethral orifice growing corynebacterium, completed 7 days of ciprofloxacin, will discontinue further antibiotics    Nausea  Assessment & Plan  Patient presented to ED  with complaints of abdominal pain and nausea  Likely component of UTI, chemotherapy, bladder CA   Continue scheduled Reglan and p r n  Zofran   -patient does use medical marijuana at home for appetite stimulant    Leukocytosis  Assessment & Plan  -white blood cell count increased from 8 66 yesterday to 19 16 today  -patient afebrile, chest x-ray negative for any infiltrates  -will check procalcitonin, monitor for any fevers  -will defer any antibiotics at this point  -if patient spikes fever will send for blood cultures and start empiric antibiotics    Hyponatremia  Assessment & Plan  -likely secondary dehydration  -sodium 135, continue IV, patient still with poor p o   Intake    Cancer related pain  Assessment & Plan  Continue home regimen of oxycodone 10 mg Q8hrs scheduled   PRN oxycodone and dilaudid while inpatient   Continue outpatient palliative care follow up     Acute on chronic anemia  Assessment & Plan  -baseline hemoglobin 8-9  -hemoglobin today 7 8  -no signs of any active bleeding  -will check stool occult  -monitor H&H, transfuse as needed    CKD (chronic kidney disease) stage 3, GFR 30-59 ml/min ([facility])  Assessment & Plan  Creatinine slightly elevated to 1 38 at time of presentation, baseline 1 2  Creatinine is currently back to baseline at 1 09  Continue IV fluid hydration and continue to trend BMP  Avoid nephrotoxins and hypotension    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 [facility])  Assessment & Plan  Patient following with urology for high grade urothelial carcinoma of prostate  S/p surgical resection which was unable to be accomplished due to severity of the disease  Currently with diverting urostomy   Currently undergoing chemotherapy with palliative radiation  Urology consult completed and no further urological intervention recommended    Oncology consultation appreciated, will follow outpatient to resume his [place] [id number]  Oncology recommended to obtain a CT chest abdomen pelvis which revealed small left pleural effusion slightly increased compared to prior exam, 2 small areas of nodular pleural thickening along posterior right upper lobe favored to represent focal atelectasis, severe CAD    Essential hypertension  Assessment & Plan  Appears controlled at this time off medications  Continue to monitor     Peripheral vascular disease ([address] )  Assessment & Plan  History of Buerger's disease, history of left TMA  Known left popliteal aneurysm   Reports bilateral claudication pain  Continue warfarin and home [facility]   Continue outpatient vascular surgery follow up           VTE Pharmacologic Prophylaxis:   Pharmacologic: warfarin on hold due to therapeutic    Patient Centered Rounds: I have performed bedside rounds with nursing staff today  Education and Discussions with Family / Patient: wife    Time Spent for Care: 35 minutes  More than 50% of total time spent on counseling and coordination of care as described above  Current Length of Stay: 9 day(s)    Current Patient Status: Inpatient   Certification Statement: The patient will continue to require additional inpatient hospital stay due to Supratherapeutic INR, leukocytosis    Discharge Plan / Estimated Discharge Date: TBD    Code Status: Level 1 - Full Code      Subjective:   Patient seen and examined at bedside, complaining of some generalized suprapubic abdominal pain which she has had from before denies any nausea, vomiting, cough, congestion, headache, chest pain, dyspnea    Objective:     Vitals:   Temp (24hrs), Av 6 °F (37 °C), Min:98 2 °F (36 8 °C), Max:99 °F (37 2 °C)    Temp:  [98 2 °F (36 8 °C)-99 °F (37 2 °C)] 98 2 °F (36 8 °C)  HR:  [] 114  Resp:  [16-18] 16  BP: (109-127)/(57-67) 119/67  SpO2:  [95 %-96 %] 96 %  Body mass index is 26 01 kg/m²  Input and Output Summary (last 24 hours): Intake/Output Summary (Last 24 hours) at 10/13/2019 1601  Last data filed at 10/13/2019 1339  Gross per 24 hour   Intake    Output 1425 ml   Net -1425 ml       Physical Exam:    Constitutional: Patient is oriented to person, place and time, no acute distress  HEENT:  Normocephalic, atraumatic, EOMI, PERRLA, no scleral icterus, no pallor, moist oral mucosa  Neck:  Supple, no masses, no thyromegaly, no bruits Normal range of motion  Lymph nodes:  No lymphadenopathy  Cardiovascular: Normal S1S2, RRR, No murmurs/rubs/gallops appreciated  Pulmonary:  Bilateral air entry, No rhonchi/rales/wheezing appreciated  Abdominal: Soft, Bowel sounds present, Non-tender, Non-distended, No rebound/guarding, no hepatomegaly   Musculoskeletal: No tenderness/abnormality   Extremities:  No cyanosis, clubbing or edema   Peripheral pulses palpable and equal bilaterally  Neurological: Cranial nerves II-XII grossly intact, sensation intact, otherwise no focal neurological symptoms  Right lower quadrant urostomy in place    Additional Data:     Labs:    Results from last 7 days   Lab Units 10/13/19  0534 10/12/19  0555   WBC Thousand/uL 19 16* 8 66   HEMOGLOBIN g/dL 9 6* 8 5*   HEMATOCRIT % 33 1* 29 3*   PLATELETS Thousands/uL 613* 493*   NEUTROS PCT %  --  86*   LYMPHS PCT %  --  4*   LYMPHO PCT % 5*  --    MONOS PCT %  --  9   MONO PCT % 2*  --    EOS PCT % 0 0     Results from last 7 days   Lab Units 10/13/19  0534   POTASSIUM mmol/L 3 9   CHLORIDE mmol/L 98*   CO2 mmol/L 22   BUN mg/dL 6   CREATININE mg/dL 1 11   CALCIUM mg/dL 8 7     Results from last 7 days   Lab Units 10/13/19  0534   INR  3 38*        I Have Reviewed All Lab Data Listed Above          Recent Cultures (last 7 days):           Last 24 Hours Medication List:     Current Facility-Administered Medications:  acetaminophen 650 mg Oral Q6H PRN Valorie Smudde, PA-C    aluminum-magnesium hydroxide-simethicone 30 mL Oral Q6H PRN Sherlon Po, PA-C    cholecalciferol 400 Units Oral Daily Valorie Smudde, PA-C    docusate sodium 100 mg Oral BID Priti Way MD    famotidine 40 mg Oral HS Valorie Smudde, PA-C    fluticasone 2 spray Each Nare Daily Valorie Smudde, PA-C    HYDROmorphone 0 5 mg Intravenous Q4H PRN Valorie Smudde, PA-C    melatonin 6 mg Oral HS Valorie Smudde, PA-C    metoclopramide 10 mg Intravenous Q6H [facility] Gilma Leon MD    nystatin  Topical BID Valorie Smudde, PA-C    ondansetron 4 mg Intravenous Q6H PRN Sherlon Po, PA-C    oxyCODONE 10 mg Oral Q8H [facility] Valorie Smudde, PA-C    oxyCODONE 10 mg Oral Q4H PRN Valorie Smudde, PA-C    oxyCODONE 5 mg Oral Q4H PRN Valorie Smudde, PA-C    pantoprazole 40 mg Oral Early Morning Valorie RUBENS Cline    pentoxifylline 400 mg Oral TID With Meals Valorie Cline PA-C    polyethylene glycol 17 g Oral Daily PRN Valorie SmRUBENS renee    senna 2 tablet Oral HS PRN Valorie Cline PA-C    sodium chloride 0 9 % with KCl 20 mEq/L 75 mL/hr Intravenous Continuous Valorie Cline PA-C Last Rate: 75 mL/hr (10/13/19 0229)   traZODone 100 mg Oral HS Christen Abraham PA-C         Today, Patient Was Seen By: Josue Garcia MD

## 2019-10-13 NOTE — ASSESSMENT & PLAN NOTE
-white blood cell count increased from 8 66 yesterday to 19 16 today  -patient afebrile, chest x-ray negative for any infiltrates  -will check procalcitonin, monitor for any fevers  -will defer any antibiotics at this point  -if patient spikes fever will send for blood cultures and start empiric antibiotics

## 2019-10-13 NOTE — PLAN OF CARE
Problem: Potential for Falls  Goal: Patient will remain free of falls  Description  INTERVENTIONS:  - Assess patient frequently for physical needs  -  Identify cognitive and physical deficits and behaviors that affect risk of falls    -  [place] fall precautions as indicated by assessment   - Educate patient/family on patient safety including physical limitations  - Instruct patient to call for assistance with activity based on assessment  - Modify environment to reduce risk of injury  - Consider OT/PT consult to assist with strengthening/mobility  Outcome: Progressing     Problem: Prexisting or High Potential for Compromised Skin Integrity  Goal: Skin integrity is maintained or improved  Description  INTERVENTIONS:  - Identify patients at risk for skin breakdown  - Assess and monitor skin integrity  - Assess and monitor nutrition and hydration status  - Monitor labs   - Assess for incontinence   - Turn and reposition patient  - Assist with mobility/ambulation  - Relieve pressure over bony prominences  - Avoid friction and shearing  - Provide appropriate hygiene as needed including keeping skin clean and dry  - Evaluate need for skin moisturizer/barrier cream  - Collaborate with interdisciplinary team   - Patient/family teaching  - Consider wound care consult   Outcome: Progressing     Problem: PAIN - ADULT  Goal: Verbalizes/displays adequate comfort level or baseline comfort level  Description  Interventions:  - Encourage patient to monitor pain and request assistance  - Assess pain using appropriate pain scale  - Administer analgesics based on type and severity of pain and evaluate response  - Implement non-pharmacological measures as appropriate and evaluate response  - Consider cultural and social influences on pain and pain management  - Notify physician/advanced practitioner if interventions unsuccessful or patient reports new pain  Outcome: Progressing     Problem: INFECTION - ADULT  Goal: Absence or prevention of progression during hospitalization  Description  INTERVENTIONS:  - Assess and monitor for signs and symptoms of infection  - Monitor lab/diagnostic results  - Monitor all insertion sites, i e  indwelling lines, tubes, and drains  - Monitor endotracheal if appropriate and nasal secretions for changes in amount and color  - [place] appropriate cooling/warming therapies per order  - Administer medications as ordered  - Instruct and encourage patient and family to use good hand hygiene technique  - Identify and instruct in appropriate isolation precautions for identified infection/condition  Outcome: Progressing     Problem: SAFETY ADULT  Goal: Maintain or return to baseline ADL function  Description  INTERVENTIONS:  -  Assess patient's ability to carry out ADLs; assess patient's baseline for ADL function and identify physical deficits which impact ability to perform ADLs (bathing, care of mouth/teeth, toileting, grooming, dressing, etc )  - Assess/evaluate cause of self-care deficits   - Assess range of motion  - Assess patient's mobility; develop plan if impaired  - Assess patient's need for assistive devices and provide as appropriate  - Encourage maximum independence but intervene and supervise when necessary  - Involve family in performance of ADLs  - Assess for home care needs following discharge   - Consider OT consult to assist with ADL evaluation and planning for discharge  - Provide patient education as appropriate  Outcome: Progressing  Goal: Maintain or return mobility status to optimal level  Description  INTERVENTIONS:  - Assess patient's baseline mobility status (ambulation, transfers, stairs, etc )    - Identify cognitive and physical deficits and behaviors that affect mobility  - Identify mobility aids required to assist with transfers and/or ambulation (gait belt, sit-to-stand, lift, walker, cane, etc )  - [place] fall precautions as indicated by assessment  - Record patient progress and toleration of activity level on Mobility SBAR; progress patient to next Phase/Stage  - Instruct patient to call for assistance with activity based on assessment  - Consider rehabilitation consult to assist with strengthening/weightbearing, etc   Outcome: Progressing     Problem: DISCHARGE PLANNING  Goal: Discharge to home or other facility with appropriate resources  Description  INTERVENTIONS:  - Identify barriers to discharge w/patient and caregiver  - Arrange for needed discharge resources and transportation as appropriate  - Identify discharge learning needs (meds, wound care, etc )  - Arrange for interpretive services to assist at discharge as needed  - Refer to Case Management Department for coordinating discharge planning if the patient needs post-hospital services based on physician/advanced practitioner order or complex needs related to functional status, cognitive ability, or social support system  Outcome: Progressing     Problem: Knowledge Deficit  Goal: Patient/family/caregiver demonstrates understanding of disease process, treatment plan, medications, and discharge instructions  Description  Complete learning assessment and assess knowledge base    Interventions:  - Provide teaching at level of understanding  - Provide teaching via preferred learning methods  Outcome: Progressing     Problem: GENITOURINARY - ADULT  Goal: Maintains or returns to baseline urinary function  Description  INTERVENTIONS:  - Assess urinary function  - Encourage oral fluids to ensure adequate hydration if ordered  - Administer IV fluids as ordered to ensure adequate hydration  - Administer ordered medications as needed  - Offer frequent toileting  - Follow urinary retention protocol if ordered  Outcome: Progressing  Goal: Absence of urinary retention  Description  INTERVENTIONS:  - Assess patients ability to void and empty bladder  - Monitor I/O  - Bladder scan as needed  - Discuss with physician/AP medications to alleviate retention as needed  - Discuss catheterization for long term situations as appropriate  Outcome: Progressing     Problem: HEMATOLOGIC - ADULT  Goal: Maintains hematologic stability  Description  INTERVENTIONS  - Assess for signs and symptoms of bleeding or hemorrhage  - Monitor labs  - Administer supportive blood products/factors as ordered and appropriate  Outcome: Progressing     Problem: Nutrition/Hydration-ADULT  Goal: Nutrient/Hydration intake appropriate for improving, restoring or maintaining nutritional needs  Description  Monitor and assess patient's nutrition/hydration status for malnutrition  Collaborate with interdisciplinary team and initiate plan and interventions as ordered  Monitor patient's weight and dietary intake as ordered or per policy  Utilize nutrition screening tool and intervene as necessary  Determine patient's food preferences and provide high-protein, high-caloric foods as appropriate       INTERVENTIONS:  - Monitor oral intake, urinary output, labs, and treatment plans  - Assess nutrition and hydration status and recommend course of action  - Evaluate amount of meals eaten  - Assist patient with eating if necessary   - Allow adequate time for meals  - Recommend/ encourage appropriate diets, oral nutritional supplements, and vitamin/mineral supplements  - Order, calculate, and assess calorie counts as needed  - Recommend, monitor, and adjust tube feedings and TPN/PPN based on assessed needs  - Assess need for intravenous fluids  - Provide specific nutrition/hydration education as appropriate  - Include patient/family/caregiver in decisions related to nutrition  Outcome: Progressing

## 2019-10-13 NOTE — ASSESSMENT & PLAN NOTE
Found to have acute occlusive DVT in right leg external iliac, common femoral, femoral, deep femoral, popliteal, gastrocnemius, paired peroneal and posterior tibial veins   INR supratherapeutic once again at 3 38  -monitor INR, hold warfarin

## 2019-10-13 NOTE — ASSESSMENT & PLAN NOTE
Patient following with urology for high grade urothelial carcinoma of prostate  S/p surgical resection which was unable to be accomplished due to severity of the disease  Currently with diverting urostomy   Currently undergoing chemotherapy with palliative radiation  Urology consult completed and no further urological intervention recommended    Oncology consultation appreciated, will follow outpatient to resume his [place] [id number]  Oncology recommended to obtain a CT chest abdomen pelvis which revealed small left pleural effusion slightly increased compared to prior exam, 2 small areas of nodular pleural thickening along posterior right upper lobe favored to represent focal atelectasis, severe CAD

## 2019-10-14 ENCOUNTER — APPOINTMENT (INPATIENT)
Dept: CT IMAGING | Facility: HOSPITAL | Age: 74
DRG: 300 | End: 2019-10-14
Payer: MEDICARE

## 2019-10-14 LAB
ANION GAP SERPL CALCULATED.3IONS-SCNC: 9 MMOL/L (ref 4–13)
BASOPHILS # BLD AUTO: 0.05 THOUSANDS/ΜL (ref 0–0.1)
BASOPHILS NFR BLD AUTO: 0 % (ref 0–1)
BUN SERPL-MCNC: 10 MG/DL (ref 5–25)
CALCIUM SERPL-MCNC: 8.2 MG/DL (ref 8.3–10.1)
CHLORIDE SERPL-SCNC: 101 MMOL/L (ref 100–108)
CO2 SERPL-SCNC: 22 MMOL/L (ref 21–32)
CREAT SERPL-MCNC: 1.12 MG/DL (ref 0.6–1.3)
EOSINOPHIL # BLD AUTO: 0.02 THOUSAND/ΜL (ref 0–0.61)
EOSINOPHIL NFR BLD AUTO: 0 % (ref 0–6)
ERYTHROCYTE [DISTWIDTH] IN BLOOD BY AUTOMATED COUNT: 16.1 % (ref 11.6–15.1)
GFR SERPL CREATININE-BSD FRML MDRD: 64 ML/MIN/1.73SQ M
GLUCOSE SERPL-MCNC: 101 MG/DL (ref 65–140)
HCT VFR BLD AUTO: 25.5 % (ref 36.5–49.3)
HGB BLD-MCNC: 7.7 G/DL (ref 12–17)
IMM GRANULOCYTES # BLD AUTO: 0.3 THOUSAND/UL (ref 0–0.2)
IMM GRANULOCYTES NFR BLD AUTO: 1 % (ref 0–2)
INR PPP: 4.4 (ref 0.84–1.19)
LYMPHOCYTES # BLD AUTO: 0.41 THOUSANDS/ΜL (ref 0.6–4.47)
LYMPHOCYTES NFR BLD AUTO: 2 % (ref 14–44)
MCH RBC QN AUTO: 24.8 PG (ref 26.8–34.3)
MCHC RBC AUTO-ENTMCNC: 30.2 G/DL (ref 31.4–37.4)
MCV RBC AUTO: 82 FL (ref 82–98)
MONOCYTES # BLD AUTO: 0.82 THOUSAND/ΜL (ref 0.17–1.22)
MONOCYTES NFR BLD AUTO: 4 % (ref 4–12)
NEUTROPHILS # BLD AUTO: 21.08 THOUSANDS/ΜL (ref 1.85–7.62)
NEUTS SEG NFR BLD AUTO: 93 % (ref 43–75)
NRBC BLD AUTO-RTO: 0 /100 WBCS
PLATELET # BLD AUTO: 472 THOUSANDS/UL (ref 149–390)
PMV BLD AUTO: 7.7 FL (ref 8.9–12.7)
POTASSIUM SERPL-SCNC: 4.3 MMOL/L (ref 3.5–5.3)
PROTHROMBIN TIME: 43.1 SECONDS (ref 11.6–14.5)
RBC # BLD AUTO: 3.1 MILLION/UL (ref 3.88–5.62)
SODIUM SERPL-SCNC: 132 MMOL/L (ref 136–145)
WBC # BLD AUTO: 22.68 THOUSAND/UL (ref 4.31–10.16)

## 2019-10-14 PROCEDURE — 85610 PROTHROMBIN TIME: CPT | Performed by: INTERNAL MEDICINE

## 2019-10-14 PROCEDURE — 99232 SBSQ HOSP IP/OBS MODERATE 35: CPT | Performed by: INTERNAL MEDICINE

## 2019-10-14 PROCEDURE — 80048 BASIC METABOLIC PNL TOTAL CA: CPT | Performed by: INTERNAL MEDICINE

## 2019-10-14 PROCEDURE — 74176 CT ABD & PELVIS W/O CONTRAST: CPT

## 2019-10-14 PROCEDURE — 0T9B80Z DRAINAGE OF BLADDER WITH DRAINAGE DEVICE, VIA NATURAL OR ARTIFICIAL OPENING ENDOSCOPIC: ICD-10-PCS | Performed by: INTERNAL MEDICINE

## 2019-10-14 PROCEDURE — 85025 COMPLETE CBC W/AUTO DIFF WBC: CPT | Performed by: INTERNAL MEDICINE

## 2019-10-14 PROCEDURE — 99222 1ST HOSP IP/OBS MODERATE 55: CPT | Performed by: PHYSICIAN ASSISTANT

## 2019-10-14 PROCEDURE — 87205 SMEAR GRAM STAIN: CPT | Performed by: INTERNAL MEDICINE

## 2019-10-14 PROCEDURE — G8978 MOBILITY CURRENT STATUS: HCPCS

## 2019-10-14 PROCEDURE — G8979 MOBILITY GOAL STATUS: HCPCS

## 2019-10-14 PROCEDURE — 87070 CULTURE OTHR SPECIMN AEROBIC: CPT | Performed by: INTERNAL MEDICINE

## 2019-10-14 PROCEDURE — 97163 PT EVAL HIGH COMPLEX 45 MIN: CPT

## 2019-10-14 PROCEDURE — 87185 SC STD ENZYME DETCJ PER NZM: CPT | Performed by: INTERNAL MEDICINE

## 2019-10-14 RX ORDER — OXYCODONE HCL 20 MG/1
20 TABLET, FILM COATED, EXTENDED RELEASE ORAL EVERY 8 HOURS SCHEDULED
Status: DISCONTINUED | OUTPATIENT
Start: 2019-10-14 | End: 2019-10-17 | Stop reason: HOSPADM

## 2019-10-14 RX ADMIN — OXYCODONE HYDROCHLORIDE 10 MG: 10 TABLET ORAL at 17:57

## 2019-10-14 RX ADMIN — OXYCODONE HYDROCHLORIDE 10 MG: 10 TABLET, FILM COATED, EXTENDED RELEASE ORAL at 05:49

## 2019-10-14 RX ADMIN — OXYCODONE HYDROCHLORIDE 5 MG: 5 TABLET ORAL at 09:25

## 2019-10-14 RX ADMIN — OXYCODONE HYDROCHLORIDE 20 MG: 20 TABLET, FILM COATED, EXTENDED RELEASE ORAL at 22:58

## 2019-10-14 RX ADMIN — MELATONIN 6 MG: 3 TAB ORAL at 22:58

## 2019-10-14 RX ADMIN — PENTOXIFYLLINE 400 MG: 400 TABLET, EXTENDED RELEASE ORAL at 17:58

## 2019-10-14 RX ADMIN — METOCLOPRAMIDE 10 MG: 5 INJECTION, SOLUTION INTRAMUSCULAR; INTRAVENOUS at 05:49

## 2019-10-14 RX ADMIN — OXYCODONE HYDROCHLORIDE 10 MG: 10 TABLET, FILM COATED, EXTENDED RELEASE ORAL at 13:35

## 2019-10-14 RX ADMIN — PENTOXIFYLLINE 400 MG: 400 TABLET, EXTENDED RELEASE ORAL at 13:35

## 2019-10-14 RX ADMIN — FLUTICASONE PROPIONATE 2 SPRAY: 50 SPRAY, METERED NASAL at 09:25

## 2019-10-14 RX ADMIN — CEFEPIME HYDROCHLORIDE 2000 MG: 2 INJECTION, POWDER, FOR SOLUTION INTRAVENOUS at 18:17

## 2019-10-14 RX ADMIN — DOCUSATE SODIUM 100 MG: 100 CAPSULE, LIQUID FILLED ORAL at 17:57

## 2019-10-14 RX ADMIN — HYDROMORPHONE HYDROCHLORIDE 0.5 MG: 1 INJECTION, SOLUTION INTRAMUSCULAR; INTRAVENOUS; SUBCUTANEOUS at 15:13

## 2019-10-14 RX ADMIN — SODIUM CHLORIDE AND POTASSIUM CHLORIDE 75 ML/HR: .9; .15 SOLUTION INTRAVENOUS at 17:57

## 2019-10-14 RX ADMIN — SODIUM CHLORIDE AND POTASSIUM CHLORIDE 75 ML/HR: .9; .15 SOLUTION INTRAVENOUS at 03:32

## 2019-10-14 RX ADMIN — METOCLOPRAMIDE 10 MG: 5 INJECTION, SOLUTION INTRAMUSCULAR; INTRAVENOUS at 00:36

## 2019-10-14 RX ADMIN — DOCUSATE SODIUM 100 MG: 100 CAPSULE, LIQUID FILLED ORAL at 09:25

## 2019-10-14 RX ADMIN — TRAZODONE HYDROCHLORIDE 100 MG: 100 TABLET ORAL at 22:58

## 2019-10-14 RX ADMIN — Medication 400 UNITS: at 09:25

## 2019-10-14 RX ADMIN — PANTOPRAZOLE SODIUM 40 MG: 40 TABLET, DELAYED RELEASE ORAL at 05:50

## 2019-10-14 RX ADMIN — FAMOTIDINE 40 MG: 20 TABLET ORAL at 22:58

## 2019-10-14 RX ADMIN — METOCLOPRAMIDE 10 MG: 5 INJECTION, SOLUTION INTRAMUSCULAR; INTRAVENOUS at 13:36

## 2019-10-14 RX ADMIN — PENTOXIFYLLINE 400 MG: 400 TABLET, EXTENDED RELEASE ORAL at 09:25

## 2019-10-14 RX ADMIN — METOCLOPRAMIDE 10 MG: 5 INJECTION, SOLUTION INTRAMUSCULAR; INTRAVENOUS at 17:57

## 2019-10-14 RX ADMIN — HYDROMORPHONE HYDROCHLORIDE 0.5 MG: 1 INJECTION, SOLUTION INTRAMUSCULAR; INTRAVENOUS; SUBCUTANEOUS at 00:40

## 2019-10-14 NOTE — PLAN OF CARE
Problem: Potential for Falls  Goal: Patient will remain free of falls  Description  INTERVENTIONS:  - Assess patient frequently for physical needs  -  Identify cognitive and physical deficits and behaviors that affect risk of falls    -  [place] fall precautions as indicated by assessment   - Educate patient/family on patient safety including physical limitations  - Instruct patient to call for assistance with activity based on assessment  - Modify environment to reduce risk of injury  - Consider OT/PT consult to assist with strengthening/mobility  Outcome: Progressing     Problem: Prexisting or High Potential for Compromised Skin Integrity  Goal: Skin integrity is maintained or improved  Description  INTERVENTIONS:  - Identify patients at risk for skin breakdown  - Assess and monitor skin integrity  - Assess and monitor nutrition and hydration status  - Monitor labs   - Assess for incontinence   - Turn and reposition patient  - Assist with mobility/ambulation  - Relieve pressure over bony prominences  - Avoid friction and shearing  - Provide appropriate hygiene as needed including keeping skin clean and dry  - Evaluate need for skin moisturizer/barrier cream  - Collaborate with interdisciplinary team   - Patient/family teaching  - Consider wound care consult   Outcome: Progressing     Problem: PAIN - ADULT  Goal: Verbalizes/displays adequate comfort level or baseline comfort level  Description  Interventions:  - Encourage patient to monitor pain and request assistance  - Assess pain using appropriate pain scale  - Administer analgesics based on type and severity of pain and evaluate response  - Implement non-pharmacological measures as appropriate and evaluate response  - Consider cultural and social influences on pain and pain management  - Notify physician/advanced practitioner if interventions unsuccessful or patient reports new pain  Outcome: Progressing     Problem: INFECTION - ADULT  Goal: Absence or prevention of progression during hospitalization  Description  INTERVENTIONS:  - Assess and monitor for signs and symptoms of infection  - Monitor lab/diagnostic results  - Monitor all insertion sites, i e  indwelling lines, tubes, and drains  - Monitor endotracheal if appropriate and nasal secretions for changes in amount and color  - [place] appropriate cooling/warming therapies per order  - Administer medications as ordered  - Instruct and encourage patient and family to use good hand hygiene technique  - Identify and instruct in appropriate isolation precautions for identified infection/condition  Outcome: Progressing     Problem: SAFETY ADULT  Goal: Maintain or return to baseline ADL function  Description  INTERVENTIONS:  -  Assess patient's ability to carry out ADLs; assess patient's baseline for ADL function and identify physical deficits which impact ability to perform ADLs (bathing, care of mouth/teeth, toileting, grooming, dressing, etc )  - Assess/evaluate cause of self-care deficits   - Assess range of motion  - Assess patient's mobility; develop plan if impaired  - Assess patient's need for assistive devices and provide as appropriate  - Encourage maximum independence but intervene and supervise when necessary  - Involve family in performance of ADLs  - Assess for home care needs following discharge   - Consider OT consult to assist with ADL evaluation and planning for discharge  - Provide patient education as appropriate  Outcome: Progressing  Goal: Maintain or return mobility status to optimal level  Description  INTERVENTIONS:  - Assess patient's baseline mobility status (ambulation, transfers, stairs, etc )    - Identify cognitive and physical deficits and behaviors that affect mobility  - Identify mobility aids required to assist with transfers and/or ambulation (gait belt, sit-to-stand, lift, walker, cane, etc )  - [place] fall precautions as indicated by assessment  - Record patient progress and toleration of activity level on Mobility SBAR; progress patient to next Phase/Stage  - Instruct patient to call for assistance with activity based on assessment  - Consider rehabilitation consult to assist with strengthening/weightbearing, etc   Outcome: Progressing     Problem: DISCHARGE PLANNING  Goal: Discharge to home or other facility with appropriate resources  Description  INTERVENTIONS:  - Identify barriers to discharge w/patient and caregiver  - Arrange for needed discharge resources and transportation as appropriate  - Identify discharge learning needs (meds, wound care, etc )  - Arrange for interpretive services to assist at discharge as needed  - Refer to Case Management Department for coordinating discharge planning if the patient needs post-hospital services based on physician/advanced practitioner order or complex needs related to functional status, cognitive ability, or social support system  Outcome: Progressing     Problem: Knowledge Deficit  Goal: Patient/family/caregiver demonstrates understanding of disease process, treatment plan, medications, and discharge instructions  Description  Complete learning assessment and assess knowledge base    Interventions:  - Provide teaching at level of understanding  - Provide teaching via preferred learning methods  Outcome: Progressing     Problem: GENITOURINARY - ADULT  Goal: Maintains or returns to baseline urinary function  Description  INTERVENTIONS:  - Assess urinary function  - Encourage oral fluids to ensure adequate hydration if ordered  - Administer IV fluids as ordered to ensure adequate hydration  - Administer ordered medications as needed  - Offer frequent toileting  - Follow urinary retention protocol if ordered  Outcome: Progressing  Goal: Absence of urinary retention  Description  INTERVENTIONS:  - Assess patients ability to void and empty bladder  - Monitor I/O  - Bladder scan as needed  - Discuss with physician/AP medications to alleviate retention as needed  - Discuss catheterization for long term situations as appropriate  Outcome: Progressing     Problem: HEMATOLOGIC - ADULT  Goal: Maintains hematologic stability  Description  INTERVENTIONS  - Assess for signs and symptoms of bleeding or hemorrhage  - Monitor labs  - Administer supportive blood products/factors as ordered and appropriate  Outcome: Progressing     Problem: Nutrition/Hydration-ADULT  Goal: Nutrient/Hydration intake appropriate for improving, restoring or maintaining nutritional needs  Description  Monitor and assess patient's nutrition/hydration status for malnutrition  Collaborate with interdisciplinary team and initiate plan and interventions as ordered  Monitor patient's weight and dietary intake as ordered or per policy  Utilize nutrition screening tool and intervene as necessary  Determine patient's food preferences and provide high-protein, high-caloric foods as appropriate       INTERVENTIONS:  - Monitor oral intake, urinary output, labs, and treatment plans  - Assess nutrition and hydration status and recommend course of action  - Evaluate amount of meals eaten  - Assist patient with eating if necessary   - Allow adequate time for meals  - Recommend/ encourage appropriate diets, oral nutritional supplements, and vitamin/mineral supplements  - Order, calculate, and assess calorie counts as needed  - Recommend, monitor, and adjust tube feedings and TPN/PPN based on assessed needs  - Assess need for intravenous fluids  - Provide specific nutrition/hydration education as appropriate  - Include patient/family/caregiver in decisions related to nutrition  Outcome: Progressing

## 2019-10-14 NOTE — PHYSICAL THERAPY NOTE
PHYSICAL THERAPY EVALUATION  NAME: Catalina Galvan  AGE:   76 y o  MRN:  [id number]  ADMIT DX: Nausea [R11 0]  UTI (urinary tract infection) [N39 0]  Abdominal pain [R10 9]  Acute deep vein thrombosis (DVT) of right lower extremity (HCC) [I82 401]    PMH:   Past Medical History:   Diagnosis Date    Aneurysm ([address] )     Behind left knee recently diagnosed    Back pain     Ceron disease     Ceron's disease     Bladder cancer ([address] )     BPH (benign prostatic hyperplasia)     Cancer ([address] )     melanoma    Cataract     right eye    Cataract     right eye    Confusion     DDD (degenerative disc disease), lumbar     Depression     Diverticulosis     Gallstones     GERD (gastroesophageal reflux disease)     History of cardiac murmur     Past    History of melanoma     Was on back in early 1990's    History of rheumatic fever     Childhood    History of transfusion     Nov 2018    [facility] (hard of hearing)     Hydronephrosis     Hypertension     Kidney stone     Multiple times    Neck pain     Nervous breakdown     History of due to reaction to psych med which he was on for anxiety/depression and became suicidal    Numbness and tingling in both hands     Numbness and tingling of both feet     PONV (postoperative nausea and vomiting)     Prostate cancer ([address] )     PVD (peripheral vascular disease) ([address] )     RBBB     Scoliosis     Seasonal allergies     Tinnitus     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[address] )     Wears partial dentures     Upper    Wears partial dentures     Upper     LENGTH OF STAY: 10       10/14/19 1222   Pain Assessment   Pain Assessment 0-10   Pain Score 7   Pain Type Acute pain   Pain Location Suprapubic   Hospital Pain Intervention(s) Repositioned   Response to Interventions limited tolerance/participation   Home Living   Type of [address] [place] [address] One level  (1 PRAKASH)   Home Equipment Walker;Cane;Hospital bed; Wheelchair-manual   Prior Function   Level of [place] Independent with ADLs and functional mobility; Needs assistance with ADLs and functional mobility; Needs assistance with IADLs   Lives With Spouse   Receives Help From Family   ADL Assistance Needs assistance   IADLs Needs assistance   Falls in the last 6 months 0   Vocational Retired   Restrictions/Precautions   [facility] Bearing Precautions Per Order No   Other Precautions Fall Risk;Pain;Multiple lines;Hard of hearing;Cognitive  (h/o L TMA)   General   Family/Caregiver Present No   Cognition   Overall Cognitive Status WFL   Arousal/Participation Alert   Attention Attends with cues to redirect   Orientation Level Oriented X4   Memory Within functional limits   Following Commands Follows one step commands with increased time or repetition   Comments Pt identified by name and   Agrees to PT evaluation, however declines OOB mobility after pericare completed  (Pt easily agitated )   RLE Assessment   RLE Assessment X   Strength RLE   RLE Overall Strength 4-/5  (functionally)   LLE Assessment   LLE Assessment X   Strength LLE   LLE Overall Strength 4-/5  (functionally)   Bed Mobility   Rolling R 5  Supervision   Additional items Bedrails; Increased time required;Verbal cues   Rolling L 5  Supervision   Additional items Increased time required;Verbal cues   Supine to Sit Unable to assess  (pt refused due to pain after pericare)   Endurance Deficit   Endurance Deficit Yes   Endurance Deficit Description limited by pain   Activity Tolerance   Activity Tolerance Patient limited by pain   Nurse Made Aware Pt present and assisting with pericare  Assessment   Prognosis Guarded   Problem List Decreased strength;Decreased endurance;Decreased mobility; Decreased safety awareness; Impaired judgement; Impaired hearing;Pain   Goals   Patient Goals to go home   STG Expiration Date 10/23/19   Short Term Goal #1 In order to facilitate a safe discharge home, pt will be able to: (1) perform rolling with mod I (2) perform supine to sit with mod I (3) initiate HEP (4) PT to see for further mobility assessment and establishment of goals  PT Treatment Day 0   Plan   Treatment/Interventions Functional transfer training;LE strengthening/ROM; Therapeutic exercise; Endurance training;Patient/family training;Equipment eval/education; Bed mobility   PT Frequency Other (Comment)  (3-5x/week)   Recommendation   Recommendation Other (Comment)  (TBD pending further mobility assessment)   Equipment Recommended   (pending further assessment)   Barthel Index   Feeding 10   Bathing 0   Grooming Score 5   Dressing Score 5   Bladder Score 0   Bowels Score 5   Toilet Use Score 0   Transfers (Bed/Chair) Score 0   Mobility (Level Surface) Score 0   Stairs Score 0   Barthel Index Score 25       Assessment: Pt is a 76 y o  male seen for PT evaluation s/p admit to [address] on 10/4/2019 w/ Acute deep vein thrombosis (DVT) of iliac vein of right lower extremity ([facility] )  Order placed for PT  Comorbidities affecting pt's physical performance at time of assessment listed above  Personal factors affecting pt at time of IE include: inability to perform IADLs, inability to perform ADLs, inability to ambulate household distances and limited insight into impairments  Prior to admission, pt was was independent w/ all functional mobility w/ RW, lived in one floor environment, had 1 PRAKASH unknown railing and lived with wife  Upon evaluation: Pt requires supervision for rolling  Limited mobility assessment as pt is currently declining OOB mobility due to pain after extensive pericare in bed  (Please find full objective findings from PT assessment regarding body systems outlined above)  Impairments and limitations also listed above, especially due to  weakness, decreased endurance, pain, decreased activity tolerance and impaired judgement  The following objective measures performed on IE also reveal limitations: Barthel Index 25/100   Pt's clinical presentation is currently unstable/unpredictable seen in pt's presentation of uncontrolled pain, agitation, lack of insight into deficits, and fall risk  Pt to benefit from continued skilled PT tx while in hospital and upon DC to address deficits as defined above and maximize level of functional mobility  From PT/mobility standpoint, recommendation at time of d/c would be TBD pending further mobility assessment  Recommend PT to see for further mobility assessment        Breanne Novoa, PT,DPT

## 2019-10-14 NOTE — QUICK NOTE
My review of CT scan shows likely extraperitoneal perforation of bladder, due to tumor erosion the bladder wall  Official reading pending  [place] catheter was placed to let fluid drain         Erythema on anterior abdominal wall suggest there is cellulitis  Significant pain, will increase his Oxy code own to 20 mg q 8 hours  Start cefepime 1 g q 8 hours  Reassess in the morning

## 2019-10-14 NOTE — QUICK NOTE
10/4/2019    Keith Lopes  1945  [id number]    Diagnosis  Chief Complaint     Nausea          Pre-operative Diagnosis: abdominal pain, bladder cancer, concern for pyocystis      Post-operative Diagnosis: same    Time Out: Verbal timeout performed, patient confirmed name, , procedure  No laterality applicable  Consent: Patient was agreeable and gave verbal consent before start of procedure  Plan  Place Moriera catheter for initial drainage followed by access for CT cystogram    Procedure: Moreira Catheter Placement  Procedures    Patient placed in the supine position  Area prepped and draped with Betadine in the normal sterile fashion  Sterile gel was introduced into the urethral meatus for lubrication  16F latex moreira was inserted to the hub with minimal resistance  Purulent greyish liquid returned and 4 mL was placed in the balloon  Moreira drained 300 cc of purulent malodorous grey-brown drainage  Patient tolerated the procedure well  Attached to drainage bag  Complications:  None; patient tolerated the procedure well  Condition: stable      Plan  Maintain catheter to straight drainage  Not nursing managed      Susan Jean PA-C

## 2019-10-14 NOTE — PLAN OF CARE
Problem: Nutrition/Hydration-ADULT  Goal: Nutrient/Hydration intake appropriate for improving, restoring or maintaining nutritional needs  Description  Monitor and assess patient's nutrition/hydration status for malnutrition  Collaborate with interdisciplinary team and initiate plan and interventions as ordered  Monitor patient's weight and dietary intake as ordered or per policy  Utilize nutrition screening tool and intervene as necessary  Determine patient's food preferences and provide high-protein, high-caloric foods as appropriate  INTERVENTIONS:  - Monitor oral intake, urinary output, labs, and treatment plans  - Assess nutrition and hydration status and recommend course of action  - Evaluate amount of meals eaten  - Assist patient with eating if necessary   - Allow adequate time for meals  - Recommend/ encourage appropriate diets, oral nutritional supplements, and vitamin/mineral supplements  - Order, calculate, and assess calorie counts as needed  - Recommend, monitor, and adjust tube feedings and TPN/PPN based on assessed needs  - Assess need for intravenous fluids  - Provide specific nutrition/hydration education as appropriate  - Include patient/family/caregiver in decisions related to nutrition  Outcome: Not Progressing   PT continues with minimal intake, consider initiating alternate modes of nutrition

## 2019-10-14 NOTE — MALNUTRITION/BMI
This medical record reflects one or more clinical indicators suggestive of malnutrition and/or morbid obesity  Malnutrition Findings:   Malnutrition type: Acute illness(acute moderate pro, mark malnutrition d/t CA, taste changes, GI discomfort, decreased appetite as evidence by <50% energy intake > 7 days, mild fat loss of orbitals, 2+ edema RLE)  Degree of Malnutrition: Malnutrition of moderate degree  Malnutrition Characteristics: Fluid accumulation, Fat loss, Inadequate energy(treated with liberalized diet and supplements, however PT continues to take in minimal amount, consider keofed for short term nutrition and possibly PEG for long term  Jevity 1 2 @20mL/hr advance as tolerated to goal of 70ml/hr, H20 flushes 120mL q 4hrs )    BMI Findings:  BMI Classifications: (calorie count initiated) Please check current weight     Body mass index is 26 01 kg/m²  See Nutrition note dated 10/14/19 for additional details  Completed nutrition assessment is viewable in the nutrition documentation

## 2019-10-14 NOTE — PROGRESS NOTES
Progress Note - Vladimir Chavez 76 y o  male MRN: [id number]    Unit/Bed#: [place] 2 -01 Encounter: [id number]      Subjective: The patient feels fairly well  He denies pain  He has no chest pain or shortness of breath  His appetite is poor but his nausea is somewhat better  Physical Exam:   Temp:  [98 °F (36 7 °C)-98 3 °F (36 8 °C)] 98 °F (36 7 °C)  HR:  [78-81] 81  Resp:  [17-20] 17  BP: (103-108)/(56) 108/56    Gen:  Well-developed, frail, in no distress  Neck:  Supple  No lymphadenopathy, goiter, bruit  Heart:  Regular rhythm  No murmur, gallop, or rub  Lungs:  Clear to auscultation and percussion  No wheezing, rales, rhonchi    Abd:  Soft with active bowel sounds  No mass or tenderness was present  Ureterostomy is noted  Extremities: The right leg is swollen  There is no clubbing or cyanosis  Neuro:  Alert and oriented  No focal sign  Skin:  Warm and dry  Pale      LABS:   CBC:   Lab Results   Component Value Date    WBC 22 68 (H) 10/14/2019    HGB 7 7 (L) 10/14/2019    HCT 25 5 (L) 10/14/2019    MCV 82 10/14/2019     (H) 10/14/2019    MCH 24 8 (L) 10/14/2019    MCHC 30 2 (L) 10/14/2019    RDW 16 1 (H) 10/14/2019    MPV 7 7 (L) 10/14/2019    NRBC 0 10/14/2019   , CMP:   Lab Results   Component Value Date    SODIUM 132 (L) 10/14/2019    K 4 3 10/14/2019     10/14/2019    CO2 22 10/14/2019    BUN 10 10/14/2019    CREATININE 1 12 10/14/2019    CALCIUM 8 2 (L) 10/14/2019    EGFR 64 10/14/2019             Assessment/Plan:  1  Acute DVT, right iliac  2  Advanced bladder cancer  3  Leukocytosis with purulent drainage from the bladder  4  Chronic anemia, multifactorial   5  Hyponatremia  6  Chronic kidney disease stage 3  7  Hypertension  8  Peripheral vascular disease    Because of the patient's marked leukocytosis, urology re-evaluated the patient  A Bolden catheter was placed and a large volume of purulent material was recovered    A CT scan of the abdomen and pelvis has been requested  The patient's INR is 4 40  His warfarin is on hold  His hemoglobin is low but stable  A culture of the patient's bladder drainage will be obtained      VTE Pharmacologic Prophylaxis: Warfarin (Coumadin)  VTE Mechanical Prophylaxis: reason for no mechanical VTE prophylaxis Acute DVT

## 2019-10-14 NOTE — PLAN OF CARE
Problem: PHYSICAL THERAPY ADULT  Goal: Performs mobility at highest level of function for planned discharge setting  See evaluation for individualized goals  Description  Treatment/Interventions: Functional transfer training, LE strengthening/ROM, Therapeutic exercise, Endurance training, Patient/family training, Equipment eval/education, Bed mobility  Equipment Recommended: (pending further assessment)       See flowsheet documentation for full assessment, interventions and recommendations  Note:   Prognosis: Guarded  Problem List: Decreased strength, Decreased endurance, Decreased mobility, Decreased safety awareness, Impaired judgement, Impaired hearing, Pain  Assessment: Pt is a 76 y o  male seen for PT evaluation s/p admit to [address] on 10/4/2019 w/ Acute deep vein thrombosis (DVT) of iliac vein of right lower extremity ([facility] [address] )  Order placed for PT  Comorbidities affecting pt's physical performance at time of assessment listed above  Personal factors affecting pt at time of IE include: inability to perform IADLs, inability to perform ADLs, inability to ambulate household distances and limited insight into impairments  Prior to admission, pt was was independent w/ all functional mobility w/ RW, lived in one floor environment, had 1 PRAKASH unknown railing and lived with wife  Upon evaluation: Pt requires supervision for rolling  Limited mobility assessment as pt is currently declining OOB mobility due to pain after extensive pericare in bed  (Please find full objective findings from PT assessment regarding body systems outlined above)  Impairments and limitations also listed above, especially due to  weakness, decreased endurance, pain, decreased activity tolerance and impaired judgement  The following objective measures performed on IE also reveal limitations: Barthel Index 25/100   Pt's clinical presentation is currently unstable/unpredictable seen in pt's presentation of uncontrolled pain, agitation, lack of insight into deficits, and fall risk  Pt to benefit from continued skilled PT tx while in hospital and upon DC to address deficits as defined above and maximize level of functional mobility  From PT/mobility standpoint, recommendation at time of d/c would be TBD pending further mobility assessment  Recommend PT to see for further mobility assessment  Recommendation: Other (Comment)(TBD pending further mobility assessment)          See flowsheet documentation for full assessment

## 2019-10-14 NOTE — QUICK NOTE
Have reviewed films, discussed situation with Chandan Bustillo, his significant other  Basically has unresectable necrotic tumor posterior to and involving the diverted bladder and pelvis, with infection superimposed  The CT shows this with cellulitis and gas tracking into his lower abdominal subq tissues  His INR is 4 4  I told Marc Christie that he is not an operative candidate- he has been in steady decline, he has a very aggressive tumor refractory to radiation and chemotherapy, and in my opinion the goals of care should be pain control  Dr James Gottlieb has switched him to Cefepime- will see if he has a response, but there is basically nothing else we can do for him  Surgical exploration after correction of the INR would lead to a gaping wound full of infection and cancer  I told her I recommended Hospice  She voiced good understanding and will discuss with his daughter

## 2019-10-14 NOTE — CONSULTS
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 RN  Discussed with Dr Gloria Farah    Subjective:   76 yr male hospitalized 10/4- present for acute right lower extremity DVT as well as abdominal pain with CT findings of emphysematous cystitis  Patient with a history of urothelial carcinoma of the bladder, status post diverting urostomy with ileal conduit, with unresectable native bladder  Seen during the first week of his hospitalization and had very minimal abdominal pain, and some minimal urethral discharge (grew cornyebacterium) has been present for about eight months even preceding his surgery  Clinically he was improving and had no fever or leukocytosis  He completed 7 days of ciprofloxacin  Over the past 48 hours he has declined, with increased urethral discharge and increased abdominal pain  He has not had any fevers or chills, but he has quite fatigued with less appetite last activity  Still having leg swelling and pain as well  Having hard time getting INR down to goal  Urology was reconsulted today after he passed what appear to be 3 surgical staples per urethra with some discharge  Review of Systems   Constitutional: Positive for activity change, appetite change and fatigue  Negative for chills, fever and unexpected weight change  HENT: Negative  Respiratory: Negative  Negative for shortness of breath  Cardiovascular: Negative  Negative for chest pain  Gastrointestinal: Positive for abdominal pain, constipation and nausea  Negative for diarrhea and vomiting  Endocrine: Negative  Genitourinary: Positive for discharge, hematuria (pink tinge on stoma wafer, urine itself is yellow), penile pain and urgency  Negative for decreased urine volume, dysuria, flank pain, frequency, genital sores, penile swelling, scrotal swelling and testicular pain  Musculoskeletal: Negative for back pain and gait problem  Skin: Negative  Allergic/Immunologic: Negative  Neurological: Negative  Hematological: Negative for adenopathy  Does not bruise/bleed easily  Objective:  Vitals: Blood pressure 107/56, pulse 78, temperature 98 3 °F (36 8 °C), resp  rate 18, height 5' 9" (1 753 m), weight 79 9 kg (176 lb 2 4 oz), SpO2 98 %  ,Body mass index is 26 01 kg/m²  Intake/Output Summary (Last 24 hours) at 10/14/2019 1454  Last data filed at 10/14/2019 1341  Gross per 24 hour   Intake 5036 25 ml   Output 1250 ml   Net 3786 25 ml       Invasive Devices     Peripheral Intravenous Line            Peripheral IV 10/13/19 Left;Ventral (anterior) Forearm less than 1 day          Drain            Urostomy Ileal conduit  days                Physical Exam   Constitutional: He is oriented to person, place, and time  He appears well-developed and well-nourished  No distress  Ill-appearing, in pain but not distressed   HENT:   Head: Normocephalic and atraumatic  Cardiovascular: Normal rate, regular rhythm and intact distal pulses     Pulmonary/Chest: Effort normal and breath sounds normal    Abdominal:   suprapubic fat pad is firm, edematous, with faint erythema and is exquisitely tender extending up to the umbilicus without crepitus; right lower quadrant ileal conduit-urostomy draining clear yellow urine, ostomy pink and viable   Musculoskeletal: He exhibits no edema  Neurological: He is alert and oriented to person, place, and time  Gait normal    Awake, alert, conversive- provides own history   Skin: Skin is warm and dry  He is not diaphoretic  Psychiatric: He has a normal mood and affect  His speech is normal and behavior is normal    Nursing note and vitals reviewed        History:    Past Medical History:   Diagnosis Date    Aneurysm ([address] )     Behind left knee recently diagnosed    Back pain     Ceron disease     Ceron's disease     Bladder cancer ([address] )     BPH (benign prostatic hyperplasia)     Cancer ([address] )     melanoma    Cataract     right eye    Cataract     right eye    Confusion     DDD (degenerative disc disease), lumbar     Depression     Diverticulosis     Gallstones     GERD (gastroesophageal reflux disease)     History of cardiac murmur     Past    History of melanoma     Was on back in early 1990's    History of rheumatic fever     Childhood    History of transfusion     Nov 2018    [facility] (hard of hearing)     Hydronephrosis     Hypertension     Kidney stone     Multiple times    Neck pain     Nervous breakdown     History of due to reaction to psych med which he was on for anxiety/depression and became suicidal    Numbness and tingling in both hands     Numbness and tingling of both feet     PONV (postoperative nausea and vomiting)     Prostate cancer ([address] )     PVD (peripheral vascular disease) ([address] )     RBBB     Scoliosis     Seasonal allergies     Tinnitus     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[address] )     Wears partial dentures     Upper    Wears partial dentures     Upper     Past Surgical History:   Procedure Laterality Date    ABDOMINAL SURGERY      When young had procedure for improviing circulation to left lower extremety    BACK SURGERY      Lumbar- not sure what was done   Lidia Crystal CARDIAC CATHETERIZATION      Approximately 2007- no blockages    CARPAL TUNNEL RELEASE Bilateral     wrists are fused    CATARACT EXTRACTION Left  COLONOSCOPY      CYST REMOVAL      Robotic procedure to remove benign cyst from lower left lung    CYSTOGRAM N/A 3/14/2018    Procedure: CYSTOGRAM;  Surgeon: Donna Lynch MD;  [facility];  Service: Urology    CYSTOSCOPY      ESOPHAGOGASTRODUODENOSCOPY      IR TUBE PLACEMENT NEPHROSTOMY  8/10/2018    LUNG SURGERY      cyst removed left lung    OTHER SURGICAL HISTORY Left     fistula drain in left buttock    AZ CYSTO/URETERO W/LITHOTRIPSY &INDWELL STENT INSRT Left 1/3/2018    Procedure: CYSTOSCOPY URETEROSCOPY, RETROGRADE PYELOGRAM AND INSERTION STENT URETERAL;  Surgeon: Donna Lynch MD;  [facility];  Service: Urology    AZ CYSTOTOMY,EXCIS BLADDER TIC N/A 2/14/2018    Procedure: ABDOMINAL EXPLORATION; CLOSURE OF BLADDER DIVERTICULITIS;  Surgeon: Donna Lynch MD;  [facility];  Service: Urology    AZ CYSTOURETHROSCOPY,URETER CATHETER Left 8/1/2018    Procedure: Cystoscopy, cystogram, bladder biopsies, removal of pelvic drain;  Surgeon: oDnna Lynch MD;  [facility];  Service: Urology    AZ INCISE/DRAIN BLADDER N/A 2/14/2018    Procedure: OPEN SUPRAPUBIC TUBE PLACEMENT;  Surgeon: Donna Lynch MD;  [facility];  Service: Urology    AZ RELEASE Daved Dollar FIBROSIS Left 2/14/2018    Procedure: LYSIS OF ADHESIONS; URETEROLYSIS ;  Surgeon: Donna Lynch MD;  [facility];  Service: Urology     [facility] BLADDER/NODES,ILEAL CONDUIT N/A 2/25/2019    Procedure: ATTEMPTED CYSTECTOMY RADICAL; ILEAL CONDUIT URINARY DIVERSION; BIOPSY OF PELVIC MASS; APPENDECTOMY;  Surgeon: Donna Lynch MD;  [facility];  Service: Urology    SKIN CANCER EXCISION      Melanoma removal on back in early 1990's    TOE AMPUTATION Left     All 5 toes left foot were amputated due to poor circulation     TRANSURETHRAL RESECTION OF PROSTATE N/A 3/14/2018    Procedure: TRANSURETHRAL RESECTION OF PROSTATE (TURP), LEFT URETERAL STENT REMOVAL;  Surgeon: Donna Lynch MD; [facility];  Service: Urology    TRANSURETHRAL RESECTION OF PROSTATE N/A 2018    Procedure: TRANSURETHRAL RESECTION OF PROSTATE (TURP);   Surgeon: Osmany Fuentes MD;  [facility];  Service: Urology    WRIST SURGERY Bilateral     Ulnar nerve on right arm and both wrists are fused     Family History   Problem Relation Age of Onset    Heart disease Father     Heart disease Mother     Cancer Paternal Grandfather      Social History     Socioeconomic History    Marital status: Common Law     Spouse name: None    Number of children: None    Years of education: None    Highest education level: None   Occupational History    None   Social Needs    Financial resource strain: None    Food insecurity:     Worry: None     Inability: None    Transportation needs:     Medical: None     Non-medical: None   Tobacco Use    Smoking status: Former Smoker     Packs/day: 1 00     Years: 15 00     Pack years: 15      Types: Cigarettes     Last attempt to quit: 1970     Years since quittin 7    Smokeless tobacco: Never Used    Tobacco comment: Quit 50 years   Substance and Sexual Activity    Alcohol use: Not Currently     Frequency: Never     Comment: Very rare    Drug use: Yes     Types: Prescription, Marijuana     Comment: 2x per day    Sexual activity: None   Lifestyle    Physical activity:     Days per week: None     Minutes per session: None    Stress: None   Relationships    Social connections:     Talks on phone: None     Gets together: None     Attends Orthodoxy service: None     Active member of club or organization: None     Attends meetings of clubs or organizations: None     Relationship status: None    Intimate partner violence:     Fear of current or ex partner: None     Emotionally abused: None     Physically abused: None     Forced sexual activity: None   Other Topics Concern    None   Social History Narrative    None       Imaging:    Going for stat CT a/p now for repeat imaging    Labs:  Recent Labs     10/12/19  0555 10/13/19  0534 10/14/19  0502   WBC 8 66 19 16* 22 68*     Recent Labs     10/12/19  0555 10/13/19  0534 10/14/19  0502   HGB 8 5* 9 6* 7 7*       Recent Labs     10/12/19  0555 10/13/19  0534 10/14/19  0502   CREATININE 1 08 1 11 1 12       Maribell Kumar PA-C  Date: 10/14/2019 Time: 2:54 PM

## 2019-10-14 NOTE — PROGRESS NOTES
Patient c/o increased suprapubic pain  Upon assessment,  suprapubic area has increased swelling and erythema and  hard to the touch and warm  Urostomy draining yellow urine and greenish gray discharge continues to drain from patient's urethra  After retracting penis to cleanse, 3 staples noted around head of penis  Dr Bruno Hsu notified and to consult urology for reevaluation

## 2019-10-15 LAB
BASOPHILS # BLD AUTO: 0.02 THOUSANDS/ΜL (ref 0–0.1)
BASOPHILS NFR BLD AUTO: 0 % (ref 0–1)
EOSINOPHIL # BLD AUTO: 0.05 THOUSAND/ΜL (ref 0–0.61)
EOSINOPHIL NFR BLD AUTO: 0 % (ref 0–6)
ERYTHROCYTE [DISTWIDTH] IN BLOOD BY AUTOMATED COUNT: 16.2 % (ref 11.6–15.1)
HCT VFR BLD AUTO: 25.8 % (ref 36.5–49.3)
HGB BLD-MCNC: 7.5 G/DL (ref 12–17)
IMM GRANULOCYTES # BLD AUTO: 0.27 THOUSAND/UL (ref 0–0.2)
IMM GRANULOCYTES NFR BLD AUTO: 1 % (ref 0–2)
INR PPP: 3.34 (ref 0.84–1.19)
LYMPHOCYTES # BLD AUTO: 0.48 THOUSANDS/ΜL (ref 0.6–4.47)
LYMPHOCYTES NFR BLD AUTO: 2 % (ref 14–44)
MCH RBC QN AUTO: 23.9 PG (ref 26.8–34.3)
MCHC RBC AUTO-ENTMCNC: 29.1 G/DL (ref 31.4–37.4)
MCV RBC AUTO: 82 FL (ref 82–98)
MONOCYTES # BLD AUTO: 0.72 THOUSAND/ΜL (ref 0.17–1.22)
MONOCYTES NFR BLD AUTO: 3 % (ref 4–12)
NEUTROPHILS # BLD AUTO: 20.11 THOUSANDS/ΜL (ref 1.85–7.62)
NEUTS SEG NFR BLD AUTO: 94 % (ref 43–75)
NRBC BLD AUTO-RTO: 0 /100 WBCS
PLATELET # BLD AUTO: 513 THOUSANDS/UL (ref 149–390)
PMV BLD AUTO: 8.1 FL (ref 8.9–12.7)
PROTHROMBIN TIME: 34.6 SECONDS (ref 11.6–14.5)
RBC # BLD AUTO: 3.14 MILLION/UL (ref 3.88–5.62)
WBC # BLD AUTO: 21.65 THOUSAND/UL (ref 4.31–10.16)

## 2019-10-15 PROCEDURE — 99232 SBSQ HOSP IP/OBS MODERATE 35: CPT | Performed by: UROLOGY

## 2019-10-15 PROCEDURE — 99232 SBSQ HOSP IP/OBS MODERATE 35: CPT | Performed by: INTERNAL MEDICINE

## 2019-10-15 PROCEDURE — 85025 COMPLETE CBC W/AUTO DIFF WBC: CPT | Performed by: INTERNAL MEDICINE

## 2019-10-15 PROCEDURE — 85610 PROTHROMBIN TIME: CPT | Performed by: INTERNAL MEDICINE

## 2019-10-15 RX ADMIN — DOCUSATE SODIUM 100 MG: 100 CAPSULE, LIQUID FILLED ORAL at 09:54

## 2019-10-15 RX ADMIN — PENTOXIFYLLINE 400 MG: 400 TABLET, EXTENDED RELEASE ORAL at 17:25

## 2019-10-15 RX ADMIN — PENTOXIFYLLINE 400 MG: 400 TABLET, EXTENDED RELEASE ORAL at 09:54

## 2019-10-15 RX ADMIN — DOCUSATE SODIUM 100 MG: 100 CAPSULE, LIQUID FILLED ORAL at 17:25

## 2019-10-15 RX ADMIN — HYDROMORPHONE HYDROCHLORIDE 0.5 MG: 1 INJECTION, SOLUTION INTRAMUSCULAR; INTRAVENOUS; SUBCUTANEOUS at 16:25

## 2019-10-15 RX ADMIN — OXYCODONE HYDROCHLORIDE 20 MG: 20 TABLET, FILM COATED, EXTENDED RELEASE ORAL at 06:13

## 2019-10-15 RX ADMIN — CEFEPIME HYDROCHLORIDE 2000 MG: 2 INJECTION, POWDER, FOR SOLUTION INTRAVENOUS at 05:26

## 2019-10-15 RX ADMIN — PANTOPRAZOLE SODIUM 40 MG: 40 TABLET, DELAYED RELEASE ORAL at 06:13

## 2019-10-15 RX ADMIN — METOCLOPRAMIDE 10 MG: 5 INJECTION, SOLUTION INTRAMUSCULAR; INTRAVENOUS at 06:15

## 2019-10-15 RX ADMIN — OXYCODONE HYDROCHLORIDE 5 MG: 5 TABLET ORAL at 19:50

## 2019-10-15 RX ADMIN — TRAZODONE HYDROCHLORIDE 100 MG: 100 TABLET ORAL at 21:52

## 2019-10-15 RX ADMIN — PENTOXIFYLLINE 400 MG: 400 TABLET, EXTENDED RELEASE ORAL at 12:57

## 2019-10-15 RX ADMIN — FAMOTIDINE 40 MG: 20 TABLET ORAL at 21:52

## 2019-10-15 RX ADMIN — OXYCODONE HYDROCHLORIDE 20 MG: 20 TABLET, FILM COATED, EXTENDED RELEASE ORAL at 21:52

## 2019-10-15 RX ADMIN — METOCLOPRAMIDE 10 MG: 5 INJECTION, SOLUTION INTRAMUSCULAR; INTRAVENOUS at 12:57

## 2019-10-15 RX ADMIN — MELATONIN 6 MG: 3 TAB ORAL at 21:52

## 2019-10-15 RX ADMIN — CEFEPIME HYDROCHLORIDE 2000 MG: 2 INJECTION, POWDER, FOR SOLUTION INTRAVENOUS at 17:25

## 2019-10-15 RX ADMIN — METOCLOPRAMIDE 10 MG: 5 INJECTION, SOLUTION INTRAMUSCULAR; INTRAVENOUS at 01:07

## 2019-10-15 RX ADMIN — OXYCODONE HYDROCHLORIDE 20 MG: 20 TABLET, FILM COATED, EXTENDED RELEASE ORAL at 14:21

## 2019-10-15 RX ADMIN — SODIUM CHLORIDE AND POTASSIUM CHLORIDE 75 ML/HR: .9; .15 SOLUTION INTRAVENOUS at 09:46

## 2019-10-15 RX ADMIN — FLUTICASONE PROPIONATE 2 SPRAY: 50 SPRAY, METERED NASAL at 09:54

## 2019-10-15 RX ADMIN — METOCLOPRAMIDE 10 MG: 5 INJECTION, SOLUTION INTRAMUSCULAR; INTRAVENOUS at 17:25

## 2019-10-15 NOTE — PLAN OF CARE
Problem: Potential for Falls  Goal: Patient will remain free of falls  Description  INTERVENTIONS:  - Assess patient frequently for physical needs  -  Identify cognitive and physical deficits and behaviors that affect risk of falls    -  [place] fall precautions as indicated by assessment   - Educate patient/family on patient safety including physical limitations  - Instruct patient to call for assistance with activity based on assessment  - Modify environment to reduce risk of injury  - Consider OT/PT consult to assist with strengthening/mobility  10/15/2019 1254 by Dashawn Koenig RN  Outcome: Progressing  10/15/2019 1254 by Dashawn Koenig RN  Outcome: Progressing     Problem: Prexisting or High Potential for Compromised Skin Integrity  Goal: Skin integrity is maintained or improved  Description  INTERVENTIONS:  - Identify patients at risk for skin breakdown  - Assess and monitor skin integrity  - Assess and monitor nutrition and hydration status  - Monitor labs   - Assess for incontinence   - Turn and reposition patient  - Assist with mobility/ambulation  - Relieve pressure over bony prominences  - Avoid friction and shearing  - Provide appropriate hygiene as needed including keeping skin clean and dry  - Evaluate need for skin moisturizer/barrier cream  - Collaborate with interdisciplinary team   - Patient/family teaching  - Consider wound care consult   10/15/2019 1254 by Dashawn Koenig RN  Outcome: Progressing  10/15/2019 1254 by Dashawn Koenig RN  Outcome: Progressing     Problem: PAIN - ADULT  Goal: Verbalizes/displays adequate comfort level or baseline comfort level  Description  Interventions:  - Encourage patient to monitor pain and request assistance  - Assess pain using appropriate pain scale  - Administer analgesics based on type and severity of pain and evaluate response  - Implement non-pharmacological measures as appropriate and evaluate response  - Consider cultural and social influences on pain and pain management  - Notify physician/advanced practitioner if interventions unsuccessful or patient reports new pain  10/15/2019 1254 by Kee Goldstein RN  Outcome: Progressing  10/15/2019 1254 by Kee Goldstein RN  Outcome: Progressing     Problem: INFECTION - ADULT  Goal: Absence or prevention of progression during hospitalization  Description  INTERVENTIONS:  - Assess and monitor for signs and symptoms of infection  - Monitor lab/diagnostic results  - Monitor all insertion sites, i e  indwelling lines, tubes, and drains  - Monitor endotracheal if appropriate and nasal secretions for changes in amount and color  - [place] appropriate cooling/warming therapies per order  - Administer medications as ordered  - Instruct and encourage patient and family to use good hand hygiene technique  - Identify and instruct in appropriate isolation precautions for identified infection/condition  10/15/2019 1254 by Kee Goldstein RN  Outcome: Progressing  10/15/2019 1254 by Kee Goldstein RN  Outcome: Progressing     Problem: SAFETY ADULT  Goal: Maintain or return to baseline ADL function  Description  INTERVENTIONS:  -  Assess patient's ability to carry out ADLs; assess patient's baseline for ADL function and identify physical deficits which impact ability to perform ADLs (bathing, care of mouth/teeth, toileting, grooming, dressing, etc )  - Assess/evaluate cause of self-care deficits   - Assess range of motion  - Assess patient's mobility; develop plan if impaired  - Assess patient's need for assistive devices and provide as appropriate  - Encourage maximum independence but intervene and supervise when necessary  - Involve family in performance of ADLs  - Assess for home care needs following discharge   - Consider OT consult to assist with ADL evaluation and planning for discharge  - Provide patient education as appropriate  10/15/2019 1254 by Kee Goldstein RN  Outcome: Progressing  10/15/2019 1254 by Kee Goldstein RN  Outcome: Progressing  Goal: Maintain or return mobility status to optimal level  Description  INTERVENTIONS:  - Assess patient's baseline mobility status (ambulation, transfers, stairs, etc )    - Identify cognitive and physical deficits and behaviors that affect mobility  - Identify mobility aids required to assist with transfers and/or ambulation (gait belt, sit-to-stand, lift, walker, cane, etc )  - [place] fall precautions as indicated by assessment  - Record patient progress and toleration of activity level on Mobility SBAR; progress patient to next Phase/Stage  - Instruct patient to call for assistance with activity based on assessment  - Consider rehabilitation consult to assist with strengthening/weightbearing, etc   10/15/2019 1254 by Basia Gambino RN  Outcome: Progressing  10/15/2019 1254 by Basia Gambino RN  Outcome: Progressing     Problem: DISCHARGE PLANNING  Goal: Discharge to home or other facility with appropriate resources  Description  INTERVENTIONS:  - Identify barriers to discharge w/patient and caregiver  - Arrange for needed discharge resources and transportation as appropriate  - Identify discharge learning needs (meds, wound care, etc )  - Arrange for interpretive services to assist at discharge as needed  - Refer to Case Management Department for coordinating discharge planning if the patient needs post-hospital services based on physician/advanced practitioner order or complex needs related to functional status, cognitive ability, or social support system  10/15/2019 1254 by Basia Gambino RN  Outcome: Progressing  10/15/2019 1254 by Basia Gambino RN  Outcome: Progressing     Problem: Knowledge Deficit  Goal: Patient/family/caregiver demonstrates understanding of disease process, treatment plan, medications, and discharge instructions  Description  Complete learning assessment and assess knowledge base    Interventions:  - Provide teaching at level of understanding  - Provide teaching via preferred learning methods  10/15/2019 1254 by Rober Mccarty RN  Outcome: Progressing  10/15/2019 1254 by Rober Mccarty RN  Outcome: Progressing     Problem: GENITOURINARY - ADULT  Goal: Maintains or returns to baseline urinary function  Description  INTERVENTIONS:  - Assess urinary function  - Encourage oral fluids to ensure adequate hydration if ordered  - Administer IV fluids as ordered to ensure adequate hydration  - Administer ordered medications as needed  - Offer frequent toileting  - Follow urinary retention protocol if ordered  10/15/2019 1254 by Rober Mccarty RN  Outcome: Progressing  10/15/2019 1254 by Rober Mccarty RN  Outcome: Progressing  Goal: Absence of urinary retention  Description  INTERVENTIONS:  - Assess patients ability to void and empty bladder  - Monitor I/O  - Bladder scan as needed  - Discuss with physician/AP medications to alleviate retention as needed  - Discuss catheterization for long term situations as appropriate  10/15/2019 1254 by Rober Mccarty RN  Outcome: Progressing  10/15/2019 1254 by Rober Mccarty RN  Outcome: Progressing     Problem: HEMATOLOGIC - ADULT  Goal: Maintains hematologic stability  Description  INTERVENTIONS  - Assess for signs and symptoms of bleeding or hemorrhage  - Monitor labs  - Administer supportive blood products/factors as ordered and appropriate  10/15/2019 1254 by Rober Mccarty RN  Outcome: Progressing  10/15/2019 1254 by Rober Mccarty RN  Outcome: Progressing     Problem: Nutrition/Hydration-ADULT  Goal: Nutrient/Hydration intake appropriate for improving, restoring or maintaining nutritional needs  Description  Monitor and assess patient's nutrition/hydration status for malnutrition  Collaborate with interdisciplinary team and initiate plan and interventions as ordered  Monitor patient's weight and dietary intake as ordered or per policy  Utilize nutrition screening tool and intervene as necessary   Determine patient's food preferences and provide high-protein, high-caloric foods as appropriate       INTERVENTIONS:  - Monitor oral intake, urinary output, labs, and treatment plans  - Assess nutrition and hydration status and recommend course of action  - Evaluate amount of meals eaten  - Assist patient with eating if necessary   - Allow adequate time for meals  - Recommend/ encourage appropriate diets, oral nutritional supplements, and vitamin/mineral supplements  - Order, calculate, and assess calorie counts as needed  - Recommend, monitor, and adjust tube feedings and TPN/PPN based on assessed needs  - Assess need for intravenous fluids  - Provide specific nutrition/hydration education as appropriate  - Include patient/family/caregiver in decisions related to nutrition  10/15/2019 1254 by Mary Kay Villafuerte, RN  Outcome: Progressing  10/15/2019 1254 by Mary Kay Villafuerte, RN  Outcome: Progressing

## 2019-10-15 NOTE — PROGRESS NOTES
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, with necrotic tumor in infection  Not a surgical candidate  Plan:  Comfort care, along with the cefepime since he seen to be responding to this  I recommended hospice to the patient  Continue with adequate pain medication  Subjective:   Still having pain  Has questions regarding how much time he has left  Objective:  Locally invasive urothelial carcinoma into the pelvis, with infection spreading the lower abdomen  He has been afebrile, and his exam this morning does not show erythema or induration  He still is having pain, but he is awake and alert and asks good questions  I explained him what I had discussed with Chema Flores and Dr Mana Ballesteros regarding our inability to perform any surgery due to his overall condition, risk of bleeding due to elevated INR, and the terminal nature of his condition  I recommended hospice  He SP how long he has to live and I said I do not know, but it does appear that the infection may be subsiding somewhat with the addition of cefepime  Vitals: Blood pressure 113/55, pulse 75, temperature 97 9 °F (36 6 °C), resp  rate 18, height 5' 9" (1 753 m), weight 79 9 kg (176 lb 2 4 oz), SpO2 96 %  ,Body mass index is 26 01 kg/m²  Intake/Output Summary (Last 24 hours) at 10/15/2019 1104  Last data filed at 10/15/2019 0845  Gross per 24 hour   Intake    Output 2355 ml   Net -2355 ml       Physical Exam: General appearance: alert and oriented, in no acute distress  Abdomen: soft, non-tender; bowel sounds normal; no masses,  no organomegaly and Nondistended, no induration or erythema noted    Male genitalia: normal     Invasive Devices     Peripheral Intravenous Line            Peripheral IV 10/13/19 Left;Ventral (anterior) Forearm 1 day          Drain Urostomy Ileal conduit  days    Urethral Catheter Latex 16 Fr  less than 1 day                Lab, Imaging and other studies: I have personally reviewed pertinent reports     and I have personally reviewed pertinent films in PACS  VTE Pharmacologic Prophylaxis: Warfarin (Coumadin)  VTE Mechanical Prophylaxis: sequential compression device

## 2019-10-15 NOTE — PROGRESS NOTES
Progress Note - Jhon Snyder 76 y o  male MRN: [id number]    Unit/Bed#: [place] 2 -01 Encounter: [id number]      Subjective: The patient is reasonably comfortable at the moment  He does have some lower abdominal pain when he moves  He denies chest pain or shortness of breath  He has no nausea or vomiting  Physical Exam:   Temp:  [97 9 °F (36 6 °C)-99 °F (37 2 °C)] 98 2 °F (36 8 °C)  HR:  [75-88] 78  Resp:  [16-20] 16  BP: (113)/(55-60) 113/60    Gen:  Well-developed, frail, in no distress  Neck:  Supple  No lymphadenopathy, goiter, or bruit  Heart:  Regular rhythm  No murmur, gallop, or rub  Lungs:  Clear to auscultation and percussion  No wheezing, rales, or rhonchi    Abd:  Soft with active bowel sounds  Some lower abdominal tenderness is present  There is some redness of the skin over the lower abdomen  Extremities: The right leg is swollen  Neuro:  Alert and oriented  No focal sign  Skin:  Warm and dry      LABS:   CBC:   Lab Results   Component Value Date    WBC 21 65 (H) 10/15/2019    HGB 7 5 (L) 10/15/2019    HCT 25 8 (L) 10/15/2019    MCV 82 10/15/2019     (H) 10/15/2019    MCH 23 9 (L) 10/15/2019    MCHC 29 1 (L) 10/15/2019    RDW 16 2 (H) 10/15/2019    MPV 8 1 (L) 10/15/2019    NRBC 0 10/15/2019             Assessment/Plan:  1  Acute DVT, right iliac vein  2  Advanced bladder cancer  3  Leukocytosis related to bladder infection and cellulitis  4  Chronic anemia  5  Hyponatremia  6  Chronic kidney disease stage 3  7  Hypertension  8  Peripheral vascular disease    The patient had a Bolden catheter placed yesterday and several 100 cc of purulent drainage was obtained  CT scan of the pelvis shows a probable perforation of the bladder with air in the soft tissues  This appears to be a complication of advanced bladder cancer  The situation was discussed in detail with Urology and also with the patient and his significant other  No operative intervention would be helpful  Hospice was suggested  The patient is agreeable to evaluation for hospice  He was started on cefepime yesterday because of cellulitis and the infectious process in his pelvis        VTE Pharmacologic Prophylaxis: Warfarin (Coumadin)  VTE Mechanical Prophylaxis: reason for no mechanical VTE prophylaxis Acute DVT

## 2019-10-15 NOTE — SOCIAL WORK
CM was made aware by attending and nursing that hospice is being considered  CM sent referral to -Hospice to evaluate for appropriateness       NISH Mullen  10/15/2019  [id number]

## 2019-10-16 LAB
INR PPP: 3.11 (ref 0.84–1.19)
PROTHROMBIN TIME: 32.7 SECONDS (ref 11.6–14.5)

## 2019-10-16 PROCEDURE — 99232 SBSQ HOSP IP/OBS MODERATE 35: CPT | Performed by: INTERNAL MEDICINE

## 2019-10-16 PROCEDURE — 85610 PROTHROMBIN TIME: CPT | Performed by: INTERNAL MEDICINE

## 2019-10-16 PROCEDURE — 99233 SBSQ HOSP IP/OBS HIGH 50: CPT | Performed by: PHYSICIAN ASSISTANT

## 2019-10-16 RX ADMIN — METOCLOPRAMIDE 10 MG: 5 INJECTION, SOLUTION INTRAMUSCULAR; INTRAVENOUS at 17:50

## 2019-10-16 RX ADMIN — METOCLOPRAMIDE 10 MG: 5 INJECTION, SOLUTION INTRAMUSCULAR; INTRAVENOUS at 06:26

## 2019-10-16 RX ADMIN — SODIUM CHLORIDE AND POTASSIUM CHLORIDE 75 ML/HR: .9; .15 SOLUTION INTRAVENOUS at 14:16

## 2019-10-16 RX ADMIN — OXYCODONE HYDROCHLORIDE 5 MG: 5 TABLET ORAL at 11:19

## 2019-10-16 RX ADMIN — SODIUM CHLORIDE AND POTASSIUM CHLORIDE 75 ML/HR: .9; .15 SOLUTION INTRAVENOUS at 00:23

## 2019-10-16 RX ADMIN — ONDANSETRON 4 MG: 2 INJECTION INTRAMUSCULAR; INTRAVENOUS at 14:15

## 2019-10-16 RX ADMIN — NYSTATIN: 100000 POWDER TOPICAL at 17:50

## 2019-10-16 RX ADMIN — Medication 400 UNITS: at 09:06

## 2019-10-16 RX ADMIN — CEFEPIME HYDROCHLORIDE 2000 MG: 2 INJECTION, POWDER, FOR SOLUTION INTRAVENOUS at 17:50

## 2019-10-16 RX ADMIN — ONDANSETRON 4 MG: 2 INJECTION INTRAMUSCULAR; INTRAVENOUS at 21:21

## 2019-10-16 RX ADMIN — PANTOPRAZOLE SODIUM 40 MG: 40 TABLET, DELAYED RELEASE ORAL at 06:26

## 2019-10-16 RX ADMIN — OXYCODONE HYDROCHLORIDE 5 MG: 5 TABLET ORAL at 19:58

## 2019-10-16 RX ADMIN — PENTOXIFYLLINE 400 MG: 400 TABLET, EXTENDED RELEASE ORAL at 11:59

## 2019-10-16 RX ADMIN — FLUTICASONE PROPIONATE 2 SPRAY: 50 SPRAY, METERED NASAL at 09:06

## 2019-10-16 RX ADMIN — TRAZODONE HYDROCHLORIDE 100 MG: 100 TABLET ORAL at 22:53

## 2019-10-16 RX ADMIN — DOCUSATE SODIUM 100 MG: 100 CAPSULE, LIQUID FILLED ORAL at 17:50

## 2019-10-16 RX ADMIN — OXYCODONE HYDROCHLORIDE 20 MG: 20 TABLET, FILM COATED, EXTENDED RELEASE ORAL at 22:52

## 2019-10-16 RX ADMIN — MELATONIN 6 MG: 3 TAB ORAL at 22:52

## 2019-10-16 RX ADMIN — METOCLOPRAMIDE 10 MG: 5 INJECTION, SOLUTION INTRAMUSCULAR; INTRAVENOUS at 00:23

## 2019-10-16 RX ADMIN — METOCLOPRAMIDE 10 MG: 5 INJECTION, SOLUTION INTRAMUSCULAR; INTRAVENOUS at 11:59

## 2019-10-16 RX ADMIN — DOCUSATE SODIUM 100 MG: 100 CAPSULE, LIQUID FILLED ORAL at 09:06

## 2019-10-16 RX ADMIN — OXYCODONE HYDROCHLORIDE 20 MG: 20 TABLET, FILM COATED, EXTENDED RELEASE ORAL at 06:26

## 2019-10-16 RX ADMIN — PENTOXIFYLLINE 400 MG: 400 TABLET, EXTENDED RELEASE ORAL at 09:06

## 2019-10-16 RX ADMIN — CEFEPIME HYDROCHLORIDE 2000 MG: 2 INJECTION, POWDER, FOR SOLUTION INTRAVENOUS at 06:26

## 2019-10-16 RX ADMIN — FAMOTIDINE 40 MG: 20 TABLET ORAL at 22:52

## 2019-10-16 RX ADMIN — OXYCODONE HYDROCHLORIDE 20 MG: 20 TABLET, FILM COATED, EXTENDED RELEASE ORAL at 14:15

## 2019-10-16 RX ADMIN — PENTOXIFYLLINE 400 MG: 400 TABLET, EXTENDED RELEASE ORAL at 17:51

## 2019-10-16 NOTE — PLAN OF CARE
Problem: Potential for Falls  Goal: Patient will remain free of falls  Description  INTERVENTIONS:  - Assess patient frequently for physical needs  -  Identify cognitive and physical deficits and behaviors that affect risk of falls    -  [place] fall precautions as indicated by assessment   - Educate patient/family on patient safety including physical limitations  - Instruct patient to call for assistance with activity based on assessment  - Modify environment to reduce risk of injury  - Consider OT/PT consult to assist with strengthening/mobility  Outcome: Progressing     Problem: Prexisting or High Potential for Compromised Skin Integrity  Goal: Skin integrity is maintained or improved  Description  INTERVENTIONS:  - Identify patients at risk for skin breakdown  - Assess and monitor skin integrity  - Assess and monitor nutrition and hydration status  - Monitor labs   - Assess for incontinence   - Turn and reposition patient  - Assist with mobility/ambulation  - Relieve pressure over bony prominences  - Avoid friction and shearing  - Provide appropriate hygiene as needed including keeping skin clean and dry  - Evaluate need for skin moisturizer/barrier cream  - Collaborate with interdisciplinary team   - Patient/family teaching  - Consider wound care consult   Outcome: Progressing     Problem: PAIN - ADULT  Goal: Verbalizes/displays adequate comfort level or baseline comfort level  Description  Interventions:  - Encourage patient to monitor pain and request assistance  - Assess pain using appropriate pain scale  - Administer analgesics based on type and severity of pain and evaluate response  - Implement non-pharmacological measures as appropriate and evaluate response  - Consider cultural and social influences on pain and pain management  - Notify physician/advanced practitioner if interventions unsuccessful or patient reports new pain  Outcome: Progressing     Problem: INFECTION - ADULT  Goal: Absence or prevention of progression during hospitalization  Description  INTERVENTIONS:  - Assess and monitor for signs and symptoms of infection  - Monitor lab/diagnostic results  - Monitor all insertion sites, i e  indwelling lines, tubes, and drains  - Monitor endotracheal if appropriate and nasal secretions for changes in amount and color  - [place] appropriate cooling/warming therapies per order  - Administer medications as ordered  - Instruct and encourage patient and family to use good hand hygiene technique  - Identify and instruct in appropriate isolation precautions for identified infection/condition  Outcome: Progressing     Problem: SAFETY ADULT  Goal: Maintain or return to baseline ADL function  Description  INTERVENTIONS:  -  Assess patient's ability to carry out ADLs; assess patient's baseline for ADL function and identify physical deficits which impact ability to perform ADLs (bathing, care of mouth/teeth, toileting, grooming, dressing, etc )  - Assess/evaluate cause of self-care deficits   - Assess range of motion  - Assess patient's mobility; develop plan if impaired  - Assess patient's need for assistive devices and provide as appropriate  - Encourage maximum independence but intervene and supervise when necessary  - Involve family in performance of ADLs  - Assess for home care needs following discharge   - Consider OT consult to assist with ADL evaluation and planning for discharge  - Provide patient education as appropriate  Outcome: Progressing  Goal: Maintain or return mobility status to optimal level  Description  INTERVENTIONS:  - Assess patient's baseline mobility status (ambulation, transfers, stairs, etc )    - Identify cognitive and physical deficits and behaviors that affect mobility  - Identify mobility aids required to assist with transfers and/or ambulation (gait belt, sit-to-stand, lift, walker, cane, etc )  - [place] fall precautions as indicated by assessment  - Record patient progress and toleration of activity level on Mobility SBAR; progress patient to next Phase/Stage  - Instruct patient to call for assistance with activity based on assessment  - Consider rehabilitation consult to assist with strengthening/weightbearing, etc   Outcome: Progressing     Problem: DISCHARGE PLANNING  Goal: Discharge to home or other facility with appropriate resources  Description  INTERVENTIONS:  - Identify barriers to discharge w/patient and caregiver  - Arrange for needed discharge resources and transportation as appropriate  - Identify discharge learning needs (meds, wound care, etc )  - Arrange for interpretive services to assist at discharge as needed  - Refer to Case Management Department for coordinating discharge planning if the patient needs post-hospital services based on physician/advanced practitioner order or complex needs related to functional status, cognitive ability, or social support system  Outcome: Progressing     Problem: Knowledge Deficit  Goal: Patient/family/caregiver demonstrates understanding of disease process, treatment plan, medications, and discharge instructions  Description  Complete learning assessment and assess knowledge base    Interventions:  - Provide teaching at level of understanding  - Provide teaching via preferred learning methods  Outcome: Progressing     Problem: GENITOURINARY - ADULT  Goal: Maintains or returns to baseline urinary function  Description  INTERVENTIONS:  - Assess urinary function  - Encourage oral fluids to ensure adequate hydration if ordered  - Administer IV fluids as ordered to ensure adequate hydration  - Administer ordered medications as needed  - Offer frequent toileting  - Follow urinary retention protocol if ordered  Outcome: Progressing  Goal: Absence of urinary retention  Description  INTERVENTIONS:  - Assess patients ability to void and empty bladder  - Monitor I/O  - Bladder scan as needed  - Discuss with physician/AP medications to alleviate retention as needed  - Discuss catheterization for long term situations as appropriate  Outcome: Progressing  Goal: Urinary catheter remains patent  Description  INTERVENTIONS:  - Assess patency of urinary catheter  - If patient has a chronic moreira, consider changing catheter if non-functioning  - Follow guidelines for intermittent irrigation of non-functioning urinary catheter  Outcome: Progressing     Problem: HEMATOLOGIC - ADULT  Goal: Maintains hematologic stability  Description  INTERVENTIONS  - Assess for signs and symptoms of bleeding or hemorrhage  - Monitor labs  - Administer supportive blood products/factors as ordered and appropriate  Outcome: Progressing     Problem: Nutrition/Hydration-ADULT  Goal: Nutrient/Hydration intake appropriate for improving, restoring or maintaining nutritional needs  Description  Monitor and assess patient's nutrition/hydration status for malnutrition  Collaborate with interdisciplinary team and initiate plan and interventions as ordered  Monitor patient's weight and dietary intake as ordered or per policy  Utilize nutrition screening tool and intervene as necessary  Determine patient's food preferences and provide high-protein, high-caloric foods as appropriate       INTERVENTIONS:  - Monitor oral intake, urinary output, labs, and treatment plans  - Assess nutrition and hydration status and recommend course of action  - Evaluate amount of meals eaten  - Assist patient with eating if necessary   - Allow adequate time for meals  - Recommend/ encourage appropriate diets, oral nutritional supplements, and vitamin/mineral supplements  - Order, calculate, and assess calorie counts as needed  - Recommend, monitor, and adjust tube feedings and TPN/PPN based on assessed needs  - Assess need for intravenous fluids  - Provide specific nutrition/hydration education as appropriate  - Include patient/family/caregiver in decisions related to nutrition  Outcome: Progressing

## 2019-10-16 NOTE — PROGRESS NOTES
Progress Note - Estuardo Hamilton 76 y o  male MRN: [id number]    Unit/Bed#: [place] 2 -01 Encounter: [id number]      Subjective: The patient is reasonably comfortable at the moment  He said he slept pretty well  He does not have much appetite  He denies nausea or vomiting  He has no chest pain or shortness of breath  Physical Exam:   Temp:  [98 °F (36 7 °C)-98 8 °F (37 1 °C)] 98 °F (36 7 °C)  HR:  [70-78] 74  Resp:  [16-18] 16  BP: (113-114)/(60) 114/60    Gen:  Well-developed, frail, in no distress  Neck:  Supple  No lymphadenopathy, goiter, or bruit  Heart:  Regular rhythm  No murmur, gallop, or rub  Lungs:  Clear to auscultation and percussion  No wheezing, rales, or rhonchi    Abd:  Soft with active bowel sounds  Some lower abdominal tenderness is present  The redness of the abdominal wall seem somewhat less  Extremities:  Right leg swelling persists unchanged  Neuro:  Alert and oriented  No focal sign  Skin:  Warm and dry      LABS:   PT/INR:   Lab Results   Component Value Date    INR 3 11 (H) 10/16/2019           Assessment/Plan:  1  Acute DVT right iliac vein  2  Advanced bladder cancer with bladder perforation and abdominal wall cellulitis  3  Leukocytosis secondary to 2   4  Chronic anemia  5  Hyponatremia  6  Chronic kidney disease stage 3  7  Hypertension  8  Peripheral vascular disease    The patient is on cefepime for bladder infection and abdominal wall cellulitis  He seems to have improved somewhat  This will be continued for now  His warfarin is therapeutic  He will likely need to resume this tomorrow  Evaluation by hospice has been requested        VTE Pharmacologic Prophylaxis: Warfarin (Coumadin)  VTE Mechanical Prophylaxis: reason for no mechanical VTE prophylaxis Acute DVT

## 2019-10-16 NOTE — HOSPICE NOTE
Hospice referral received  Spoke with AGUILA Nam  Will reach out to the family  Thank you  Update    Met with patient and family  Hospice services and philosophy discussed  Patient approved for home hospice and consents signed  Casey Pacheco in patient's chart  Requested scripts for pain medications to be filled at [address] [facility] prior to patient's discharge  AGUILA Nam and Dr Fer Hill made aware   Can be admitted to hospice either tomorrow or Friday at the latest  Thank you for the referral

## 2019-10-16 NOTE — PLAN OF CARE
Problem: Potential for Falls  Goal: Patient will remain free of falls  Description  INTERVENTIONS:  - Assess patient frequently for physical needs  -  Identify cognitive and physical deficits and behaviors that affect risk of falls    -  [place] fall precautions as indicated by assessment   - Educate patient/family on patient safety including physical limitations  - Instruct patient to call for assistance with activity based on assessment  - Modify environment to reduce risk of injury  - Consider OT/PT consult to assist with strengthening/mobility  Outcome: Progressing     Problem: Prexisting or High Potential for Compromised Skin Integrity  Goal: Skin integrity is maintained or improved  Description  INTERVENTIONS:  - Identify patients at risk for skin breakdown  - Assess and monitor skin integrity  - Assess and monitor nutrition and hydration status  - Monitor labs   - Assess for incontinence   - Turn and reposition patient  - Assist with mobility/ambulation  - Relieve pressure over bony prominences  - Avoid friction and shearing  - Provide appropriate hygiene as needed including keeping skin clean and dry  - Evaluate need for skin moisturizer/barrier cream  - Collaborate with interdisciplinary team   - Patient/family teaching  - Consider wound care consult   Outcome: Progressing     Problem: PAIN - ADULT  Goal: Verbalizes/displays adequate comfort level or baseline comfort level  Description  Interventions:  - Encourage patient to monitor pain and request assistance  - Assess pain using appropriate pain scale  - Administer analgesics based on type and severity of pain and evaluate response  - Implement non-pharmacological measures as appropriate and evaluate response  - Consider cultural and social influences on pain and pain management  - Notify physician/advanced practitioner if interventions unsuccessful or patient reports new pain  Outcome: Progressing     Problem: INFECTION - ADULT  Goal: Absence or prevention of progression during hospitalization  Description  INTERVENTIONS:  - Assess and monitor for signs and symptoms of infection  - Monitor lab/diagnostic results  - Monitor all insertion sites, i e  indwelling lines, tubes, and drains  - Monitor endotracheal if appropriate and nasal secretions for changes in amount and color  - [place] appropriate cooling/warming therapies per order  - Administer medications as ordered  - Instruct and encourage patient and family to use good hand hygiene technique  - Identify and instruct in appropriate isolation precautions for identified infection/condition  Outcome: Progressing     Problem: SAFETY ADULT  Goal: Maintain or return to baseline ADL function  Description  INTERVENTIONS:  -  Assess patient's ability to carry out ADLs; assess patient's baseline for ADL function and identify physical deficits which impact ability to perform ADLs (bathing, care of mouth/teeth, toileting, grooming, dressing, etc )  - Assess/evaluate cause of self-care deficits   - Assess range of motion  - Assess patient's mobility; develop plan if impaired  - Assess patient's need for assistive devices and provide as appropriate  - Encourage maximum independence but intervene and supervise when necessary  - Involve family in performance of ADLs  - Assess for home care needs following discharge   - Consider OT consult to assist with ADL evaluation and planning for discharge  - Provide patient education as appropriate  Outcome: Progressing  Goal: Maintain or return mobility status to optimal level  Description  INTERVENTIONS:  - Assess patient's baseline mobility status (ambulation, transfers, stairs, etc )    - Identify cognitive and physical deficits and behaviors that affect mobility  - Identify mobility aids required to assist with transfers and/or ambulation (gait belt, sit-to-stand, lift, walker, cane, etc )  - [place] fall precautions as indicated by assessment  - Record patient progress and toleration of activity level on Mobility SBAR; progress patient to next Phase/Stage  - Instruct patient to call for assistance with activity based on assessment  - Consider rehabilitation consult to assist with strengthening/weightbearing, etc   Outcome: Progressing     Problem: DISCHARGE PLANNING  Goal: Discharge to home or other facility with appropriate resources  Description  INTERVENTIONS:  - Identify barriers to discharge w/patient and caregiver  - Arrange for needed discharge resources and transportation as appropriate  - Identify discharge learning needs (meds, wound care, etc )  - Arrange for interpretive services to assist at discharge as needed  - Refer to Case Management Department for coordinating discharge planning if the patient needs post-hospital services based on physician/advanced practitioner order or complex needs related to functional status, cognitive ability, or social support system  Outcome: Progressing     Problem: Knowledge Deficit  Goal: Patient/family/caregiver demonstrates understanding of disease process, treatment plan, medications, and discharge instructions  Description  Complete learning assessment and assess knowledge base    Interventions:  - Provide teaching at level of understanding  - Provide teaching via preferred learning methods  Outcome: Progressing     Problem: GENITOURINARY - ADULT  Goal: Maintains or returns to baseline urinary function  Description  INTERVENTIONS:  - Assess urinary function  - Encourage oral fluids to ensure adequate hydration if ordered  - Administer IV fluids as ordered to ensure adequate hydration  - Administer ordered medications as needed  - Offer frequent toileting  - Follow urinary retention protocol if ordered  Outcome: Progressing  Goal: Absence of urinary retention  Description  INTERVENTIONS:  - Assess patients ability to void and empty bladder  - Monitor I/O  - Bladder scan as needed  - Discuss with physician/AP medications to alleviate retention as needed  - Discuss catheterization for long term situations as appropriate  Outcome: Progressing  Goal: Urinary catheter remains patent  Description  INTERVENTIONS:  - Assess patency of urinary catheter  - If patient has a chronic moreira, consider changing catheter if non-functioning  - Follow guidelines for intermittent irrigation of non-functioning urinary catheter  Outcome: Progressing     Problem: HEMATOLOGIC - ADULT  Goal: Maintains hematologic stability  Description  INTERVENTIONS  - Assess for signs and symptoms of bleeding or hemorrhage  - Monitor labs  - Administer supportive blood products/factors as ordered and appropriate  Outcome: Progressing     Problem: Nutrition/Hydration-ADULT  Goal: Nutrient/Hydration intake appropriate for improving, restoring or maintaining nutritional needs  Description  Monitor and assess patient's nutrition/hydration status for malnutrition  Collaborate with interdisciplinary team and initiate plan and interventions as ordered  Monitor patient's weight and dietary intake as ordered or per policy  Utilize nutrition screening tool and intervene as necessary  Determine patient's food preferences and provide high-protein, high-caloric foods as appropriate       INTERVENTIONS:  - Monitor oral intake, urinary output, labs, and treatment plans  - Assess nutrition and hydration status and recommend course of action  - Evaluate amount of meals eaten  - Assist patient with eating if necessary   - Allow adequate time for meals  - Recommend/ encourage appropriate diets, oral nutritional supplements, and vitamin/mineral supplements  - Order, calculate, and assess calorie counts as needed  - Recommend, monitor, and adjust tube feedings and TPN/PPN based on assessed needs  - Assess need for intravenous fluids  - Provide specific nutrition/hydration education as appropriate  - Include patient/family/caregiver in decisions related to nutrition  Outcome: Progressing

## 2019-10-16 NOTE — SOCIAL WORK
Attending reported that patient is clear for discharge once hospice has evaluated pt for services  Hospice determined pt is not appropriate for IP hospice at this time, but they will follow at d/c at pt's home  A BLS transport, d/t inability to ambulate, was scheduled with [email or web address] for 10:30 tomorow  CMN form in chart; copy made for MR snell  No numbers for report required  No IMM will be presented d/t pt d/c on hospice services  Updated: attending, RN, unit clerk, hospice liaison and family      NISH Long  10/16/2019  [id number]

## 2019-10-16 NOTE — OCCUPATIONAL THERAPY NOTE
Occupational Therapy Cancellation Note        Patient Name: Mary Merida  VYEVY'C Date: 10/16/2019      Pt is to transition to home hospice services tomorrow  Will hold OT services at this time      Olivia Couch MS, OTR/L

## 2019-10-16 NOTE — PROGRESS NOTES
Progress Note - Urology  Darin Carrera 1945, 76 y o  male MRN: [id number]    Unit/Bed#: [place] 2 -01 Encounter: [id number]    Emphysematous cystitis  Assessment & Plan  Worsening abdominal pain and leukocytosis noted 10/14  CT performed shows perforation of native diseased (malignant) bladder  Seen by MD for thorough discussion of (lack of) treatment options given advanced disease, comorbid illnesses, and grave prognosis  He seems to be responding somewhat to the IV cefepime but understands this is not curative  Leukocytosis and pain persists  Bolden continues to drain necrotic/purulent material from native bladder  Diversion urostomy draining yellow urine  Hospice is consulted for initial visit today  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 [facility])  Assessment & Plan  Routine ileal conduit ostomy management- pt wife changed appliance 10/13/19 at bedside    Bedside rounds performed with  RN  Discussed with Dr Joselyn Rodriguez  Urology will continue to follow  Subjective:   HPI:  Feeling tired today  No fevers or chills  Still not much appetite  Still no bowel movement  Abdominal pain persists, may be feels a little bit better than yesterday but still exquisitely tender  Urostomy with good output  Right leg still painful and swollen  Awaiting significant other arrival for hospice consult later today  Review of Systems   Constitutional: Positive for activity change, appetite change and fatigue  Negative for chills and fever  HENT: Negative for congestion  Respiratory: Negative for cough and shortness of breath  Cardiovascular: Positive for leg swelling  Negative for chest pain  Gastrointestinal: Positive for abdominal pain and constipation  Negative for abdominal distention, diarrhea, nausea and vomiting  Genitourinary: Positive for penile pain and urgency   Negative for decreased urine volume, difficulty urinating, dysuria, frequency, hematuria, penile swelling and scrotal swelling  Musculoskeletal: Negative for back pain  Skin: Negative for rash and wound  Hematological: Does not bruise/bleed easily  Objective:  Nursing Rounds: afebrile  Vitals: Blood pressure 114/60, pulse 74, temperature 98 °F (36 7 °C), resp  rate 16, height 5' 9" (1 753 m), weight 79 9 kg (176 lb 2 4 oz), SpO2 96 %  ,Body mass index is 26 01 kg/m²  Intake/Output Summary (Last 24 hours) at 10/16/2019 1201  Last data filed at 10/16/2019 1055  Gross per 24 hour   Intake    Output 2625 ml   Net -2625 ml     Invasive Devices     Peripheral Intravenous Line            Peripheral IV 10/13/19 Left;Ventral (anterior) Forearm 2 days          Drain            Urostomy Ileal conduit  days    Urethral Catheter Latex 16 Fr  1 day                Physical Exam   Constitutional: He is oriented to person, place, and time  Continues to appear ill, though awake, conversive and pleasant   HENT:   Head: Normocephalic and atraumatic  Cardiovascular: Normal rate, regular rhythm and normal heart sounds  No murmur heard  Pulmonary/Chest: Effort normal and breath sounds normal  He has no wheezes  He has no rales  Abdominal: He exhibits no distension  There is tenderness  There is guarding  Suprapubic and periumbilical area remains exquisitely tender to palpation, firm, mild erythema  Exam grossly unchanged from two days prior  Conduit urostomy in right lower quadrant draining clear yellow urine   Genitourinary:   Genitourinary Comments: Bolden catheter per urethra draining purulent gray/brown liquid   Musculoskeletal: Normal range of motion  He exhibits no edema  Neurological: He is alert and oriented to person, place, and time  Skin: Skin is warm and dry  Capillary refill takes less than 2 seconds  No pallor  Nursing note and vitals reviewed        History:    Past Medical History:   Diagnosis Date    Aneurysm ([address] )     Behind left knee recently diagnosed    Back pain     Jac Presser disease     Ceron's disease     Bladder cancer ([address] )     BPH (benign prostatic hyperplasia)     Cancer (HCC)     melanoma    Cataract     right eye    Cataract     right eye    Confusion     DDD (degenerative disc disease), lumbar     Depression     Diverticulosis     Gallstones     GERD (gastroesophageal reflux disease)     History of cardiac murmur     Past    History of melanoma     Was on back in early 1990's    History of rheumatic fever     Childhood    History of transfusion     Nov 2018    [facility] (hard of hearing)     Hydronephrosis     Hypertension     Kidney stone     Multiple times    Neck pain     Nervous breakdown     History of due to reaction to psych med which he was on for anxiety/depression and became suicidal    Numbness and tingling in both hands     Numbness and tingling of both feet     PONV (postoperative nausea and vomiting)     Prostate cancer ([address] )     PVD (peripheral vascular disease) ([address] )     RBBB     Scoliosis     Seasonal allergies     Tinnitus     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[address] )     Wears partial dentures     Upper    Wears partial dentures     Upper     Past Surgical History:   Procedure Laterality Date    ABDOMINAL SURGERY      When young had procedure for improviing circulation to left lower extremety    BACK SURGERY      Lumbar- not sure what was done   [facility]ak [facility] CARDIAC CATHETERIZATION      Approximately 2007- no blockages    CARPAL TUNNEL RELEASE Bilateral     wrists are fused    CATARACT EXTRACTION Left     COLONOSCOPY      CYST REMOVAL      Robotic procedure to remove benign cyst from lower left lung    CYSTOGRAM N/A 3/14/2018    Procedure: CYSTOGRAM;  Surgeon: Jamie Mobley MD;  [facility];  Service: Urology    CYSTOSCOPY      ESOPHAGOGASTRODUODENOSCOPY      IR TUBE PLACEMENT NEPHROSTOMY  8/10/2018    LUNG SURGERY      cyst removed left lung    OTHER SURGICAL HISTORY Left     fistula drain in left buttock    [place] CYSTO/URETERO W/LITHOTRIPSY &INDWELL STENT INSRT Left 1/3/2018    Procedure: CYSTOSCOPY URETEROSCOPY, RETROGRADE PYELOGRAM AND INSERTION STENT URETERAL;  Surgeon: Ida Negro MD;  [facility];  Service: Urology    LA CYSTOTOMY,EXCIS BLADDER TIC N/A 2/14/2018    Procedure: ABDOMINAL EXPLORATION; CLOSURE OF BLADDER DIVERTICULITIS;  Surgeon: Ida Negro MD;  [facility];  Service: Urology    LA CYSTOURETHROSCOPY,URETER CATHETER Left 8/1/2018    Procedure: Cystoscopy, cystogram, bladder biopsies, removal of pelvic drain;  Surgeon: Ida Negro MD;  [facility];  Service: Urology    LA INCISE/DRAIN BLADDER N/A 2/14/2018    Procedure: OPEN SUPRAPUBIC TUBE PLACEMENT;  Surgeon: Ida Negro MD;  [facility];  Service: Urology    LA RELEASE [place] FIBROSIS Left 2/14/2018    Procedure: LYSIS OF ADHESIONS; URETEROLYSIS ;  Surgeon: Ida Negro MD;  [facility];  Service: Urology     [facility] BLADDER/NODES,ILEAL CONDUIT N/A 2/25/2019    Procedure: ATTEMPTED CYSTECTOMY RADICAL; ILEAL CONDUIT URINARY DIVERSION; BIOPSY OF PELVIC MASS; APPENDECTOMY;  Surgeon: Ida Negro MD;  [facility];  Service: Urology    SKIN CANCER EXCISION      Melanoma removal on back in early 1990's    TOE AMPUTATION Left     All 5 toes left foot were amputated due to poor circulation     TRANSURETHRAL RESECTION OF PROSTATE N/A 3/14/2018    Procedure: TRANSURETHRAL RESECTION OF PROSTATE (TURP), LEFT URETERAL STENT REMOVAL;  Surgeon: Ida Negro MD;  [facility];  Service: Urology    TRANSURETHRAL RESECTION OF PROSTATE N/A 9/26/2018    Procedure: TRANSURETHRAL RESECTION OF PROSTATE (TURP);   Surgeon: Ida Negro MD;  [facility];  Service: Urology    WRIST SURGERY Bilateral     Ulnar nerve on right arm and both wrists are fused     Family History   Problem Relation Age of Onset    Heart disease Father     Heart disease Mother     Cancer Paternal Grandfather      Social History Socioeconomic History    Marital status: Common Law     Spouse name: None    Number of children: None    Years of education: None    Highest education level: None   Occupational History    None   Social Needs    Financial resource strain: None    Food insecurity:     Worry: None     Inability: None    Transportation needs:     Medical: None     Non-medical: None   Tobacco Use    Smoking status: Former Smoker     Packs/day: 1 00     Years: 15 00     Pack years: 15 00     Types: Cigarettes     Last attempt to quit: 1970     Years since quittin 7    Smokeless tobacco: Never Used    Tobacco comment: Quit 50 years   Substance and Sexual Activity    Alcohol use: Not Currently     Frequency: Never     Comment: Very rare    Drug use: Yes     Types: Prescription, Marijuana     Comment: 2x per day    Sexual activity: None   Lifestyle    Physical activity:     Days per week: None     Minutes per session: None    Stress: None   Relationships    Social connections:     Talks on phone: None     Gets together: None     Attends Congregational service: None     Active member of club or organization: None     Attends meetings of clubs or organizations: None     Relationship status: None    Intimate partner violence:     Fear of current or ex partner: None     Emotionally abused: None     Physically abused: None     Forced sexual activity: None   Other Topics Concern    None   Social History Narrative    None         Labs:  Recent Labs     10/14/19  0502 10/15/19  0452   WBC 22 68* 21 65*       Recent Labs     10/14/19  0502 10/15/19  0452   HGB 7 7* 7 5*     Recent Labs     10/14/19  0502 10/15/19  0452   HCT 25 5* 25 8*     Recent Labs     10/14/19  0502   CREATININE 1 12     Jarrett Harley PA-C  Date: 10/16/2019 Time: 12:01 PM

## 2019-10-17 VITALS
BODY MASS INDEX: 26.09 KG/M2 | HEIGHT: 69 IN | DIASTOLIC BLOOD PRESSURE: 60 MMHG | SYSTOLIC BLOOD PRESSURE: 125 MMHG | HEART RATE: 71 BPM | OXYGEN SATURATION: 95 % | WEIGHT: 176.15 LBS | TEMPERATURE: 98.8 F | RESPIRATION RATE: 18 BRPM

## 2019-10-17 LAB
INR PPP: 2.68 (ref 0.84–1.19)
PROTHROMBIN TIME: 29.1 SECONDS (ref 11.6–14.5)

## 2019-10-17 PROCEDURE — 85610 PROTHROMBIN TIME: CPT | Performed by: INTERNAL MEDICINE

## 2019-10-17 PROCEDURE — 99239 HOSP IP/OBS DSCHRG MGMT >30: CPT | Performed by: INTERNAL MEDICINE

## 2019-10-17 RX ORDER — CIPROFLOXACIN 500 MG/1
500 TABLET, FILM COATED ORAL EVERY 12 HOURS SCHEDULED
Qty: 14 TABLET | Refills: 0 | Status: SHIPPED | OUTPATIENT
Start: 2019-10-17 | End: 2019-10-24

## 2019-10-17 RX ORDER — NYSTATIN 100000 [USP'U]/G
POWDER TOPICAL 2 TIMES DAILY
Qty: 15 G | Refills: 0 | Status: SHIPPED | OUTPATIENT
Start: 2019-10-17

## 2019-10-17 RX ORDER — OXYCODONE HYDROCHLORIDE 5 MG/1
TABLET ORAL
Qty: 50 TABLET | Refills: 0 | Status: SHIPPED | OUTPATIENT
Start: 2019-10-17

## 2019-10-17 RX ADMIN — METOCLOPRAMIDE 10 MG: 5 INJECTION, SOLUTION INTRAMUSCULAR; INTRAVENOUS at 01:15

## 2019-10-17 RX ADMIN — PANTOPRAZOLE SODIUM 40 MG: 40 TABLET, DELAYED RELEASE ORAL at 06:54

## 2019-10-17 RX ADMIN — PENTOXIFYLLINE 400 MG: 400 TABLET, EXTENDED RELEASE ORAL at 08:25

## 2019-10-17 RX ADMIN — OXYCODONE HYDROCHLORIDE 20 MG: 20 TABLET, FILM COATED, EXTENDED RELEASE ORAL at 06:54

## 2019-10-17 RX ADMIN — METOCLOPRAMIDE 10 MG: 5 INJECTION, SOLUTION INTRAMUSCULAR; INTRAVENOUS at 06:55

## 2019-10-17 RX ADMIN — FLUTICASONE PROPIONATE 2 SPRAY: 50 SPRAY, METERED NASAL at 08:25

## 2019-10-17 RX ADMIN — CEFEPIME HYDROCHLORIDE 2000 MG: 2 INJECTION, POWDER, FOR SOLUTION INTRAVENOUS at 05:32

## 2019-10-17 RX ADMIN — Medication 400 UNITS: at 08:25

## 2019-10-17 RX ADMIN — DOCUSATE SODIUM 100 MG: 100 CAPSULE, LIQUID FILLED ORAL at 08:25

## 2019-10-17 NOTE — PLAN OF CARE
Problem: Potential for Falls  Goal: Patient will remain free of falls  Description  INTERVENTIONS:  - Assess patient frequently for physical needs  -  Identify cognitive and physical deficits and behaviors that affect risk of falls    -  [place] fall precautions as indicated by assessment   - Educate patient/family on patient safety including physical limitations  - Instruct patient to call for assistance with activity based on assessment  - Modify environment to reduce risk of injury  - Consider OT/PT consult to assist with strengthening/mobility  Outcome: Progressing     Problem: Prexisting or High Potential for Compromised Skin Integrity  Goal: Skin integrity is maintained or improved  Description  INTERVENTIONS:  - Identify patients at risk for skin breakdown  - Assess and monitor skin integrity  - Assess and monitor nutrition and hydration status  - Monitor labs   - Assess for incontinence   - Turn and reposition patient  - Assist with mobility/ambulation  - Relieve pressure over bony prominences  - Avoid friction and shearing  - Provide appropriate hygiene as needed including keeping skin clean and dry  - Evaluate need for skin moisturizer/barrier cream  - Collaborate with interdisciplinary team   - Patient/family teaching  - Consider wound care consult   Outcome: Progressing     Problem: PAIN - ADULT  Goal: Verbalizes/displays adequate comfort level or baseline comfort level  Description  Interventions:  - Encourage patient to monitor pain and request assistance  - Assess pain using appropriate pain scale  - Administer analgesics based on type and severity of pain and evaluate response  - Implement non-pharmacological measures as appropriate and evaluate response  - Consider cultural and social influences on pain and pain management  - Notify physician/advanced practitioner if interventions unsuccessful or patient reports new pain  Outcome: Progressing     Problem: INFECTION - ADULT  Goal: Absence or prevention of progression during hospitalization  Description  INTERVENTIONS:  - Assess and monitor for signs and symptoms of infection  - Monitor lab/diagnostic results  - Monitor all insertion sites, i e  indwelling lines, tubes, and drains  - Monitor endotracheal if appropriate and nasal secretions for changes in amount and color  - [place] appropriate cooling/warming therapies per order  - Administer medications as ordered  - Instruct and encourage patient and family to use good hand hygiene technique  - Identify and instruct in appropriate isolation precautions for identified infection/condition  Outcome: Progressing     Problem: SAFETY ADULT  Goal: Maintain or return to baseline ADL function  Description  INTERVENTIONS:  -  Assess patient's ability to carry out ADLs; assess patient's baseline for ADL function and identify physical deficits which impact ability to perform ADLs (bathing, care of mouth/teeth, toileting, grooming, dressing, etc )  - Assess/evaluate cause of self-care deficits   - Assess range of motion  - Assess patient's mobility; develop plan if impaired  - Assess patient's need for assistive devices and provide as appropriate  - Encourage maximum independence but intervene and supervise when necessary  - Involve family in performance of ADLs  - Assess for home care needs following discharge   - Consider OT consult to assist with ADL evaluation and planning for discharge  - Provide patient education as appropriate  Outcome: Progressing  Goal: Maintain or return mobility status to optimal level  Description  INTERVENTIONS:  - Assess patient's baseline mobility status (ambulation, transfers, stairs, etc )    - Identify cognitive and physical deficits and behaviors that affect mobility  - Identify mobility aids required to assist with transfers and/or ambulation (gait belt, sit-to-stand, lift, walker, cane, etc )  - [place] fall precautions as indicated by assessment  - Record patient progress and toleration of activity level on Mobility SBAR; progress patient to next Phase/Stage  - Instruct patient to call for assistance with activity based on assessment  - Consider rehabilitation consult to assist with strengthening/weightbearing, etc   Outcome: Progressing     Problem: DISCHARGE PLANNING  Goal: Discharge to home or other facility with appropriate resources  Description  INTERVENTIONS:  - Identify barriers to discharge w/patient and caregiver  - Arrange for needed discharge resources and transportation as appropriate  - Identify discharge learning needs (meds, wound care, etc )  - Arrange for interpretive services to assist at discharge as needed  - Refer to Case Management Department for coordinating discharge planning if the patient needs post-hospital services based on physician/advanced practitioner order or complex needs related to functional status, cognitive ability, or social support system  Outcome: Progressing     Problem: Knowledge Deficit  Goal: Patient/family/caregiver demonstrates understanding of disease process, treatment plan, medications, and discharge instructions  Description  Complete learning assessment and assess knowledge base    Interventions:  - Provide teaching at level of understanding  - Provide teaching via preferred learning methods  Outcome: Progressing     Problem: GENITOURINARY - ADULT  Goal: Maintains or returns to baseline urinary function  Description  INTERVENTIONS:  - Assess urinary function  - Encourage oral fluids to ensure adequate hydration if ordered  - Administer IV fluids as ordered to ensure adequate hydration  - Administer ordered medications as needed  - Offer frequent toileting  - Follow urinary retention protocol if ordered  Outcome: Progressing  Goal: Absence of urinary retention  Description  INTERVENTIONS:  - Assess patients ability to void and empty bladder  - Monitor I/O  - Bladder scan as needed  - Discuss with physician/AP medications to alleviate retention as needed  - Discuss catheterization for long term situations as appropriate  Outcome: Progressing  Goal: Urinary catheter remains patent  Description  INTERVENTIONS:  - Assess patency of urinary catheter  - If patient has a chronic moreira, consider changing catheter if non-functioning  - Follow guidelines for intermittent irrigation of non-functioning urinary catheter  Outcome: Progressing     Problem: HEMATOLOGIC - ADULT  Goal: Maintains hematologic stability  Description  INTERVENTIONS  - Assess for signs and symptoms of bleeding or hemorrhage  - Monitor labs  - Administer supportive blood products/factors as ordered and appropriate  Outcome: Progressing     Problem: Nutrition/Hydration-ADULT  Goal: Nutrient/Hydration intake appropriate for improving, restoring or maintaining nutritional needs  Description  Monitor and assess patient's nutrition/hydration status for malnutrition  Collaborate with interdisciplinary team and initiate plan and interventions as ordered  Monitor patient's weight and dietary intake as ordered or per policy  Utilize nutrition screening tool and intervene as necessary  Determine patient's food preferences and provide high-protein, high-caloric foods as appropriate       INTERVENTIONS:  - Monitor oral intake, urinary output, labs, and treatment plans  - Assess nutrition and hydration status and recommend course of action  - Evaluate amount of meals eaten  - Assist patient with eating if necessary   - Allow adequate time for meals  - Recommend/ encourage appropriate diets, oral nutritional supplements, and vitamin/mineral supplements  - Order, calculate, and assess calorie counts as needed  - Recommend, monitor, and adjust tube feedings and TPN/PPN based on assessed needs  - Assess need for intravenous fluids  - Provide specific nutrition/hydration education as appropriate  - Include patient/family/caregiver in decisions related to nutrition  Outcome: Progressing

## 2019-10-17 NOTE — PLAN OF CARE
Problem: Potential for Falls  Goal: Patient will remain free of falls  Description  INTERVENTIONS:  - Assess patient frequently for physical needs  -  Identify cognitive and physical deficits and behaviors that affect risk of falls    -  [place] fall precautions as indicated by assessment   - Educate patient/family on patient safety including physical limitations  - Instruct patient to call for assistance with activity based on assessment  - Modify environment to reduce risk of injury  - Consider OT/PT consult to assist with strengthening/mobility  10/17/2019 1033 by Nicole Vargas RN  Outcome: Completed  10/17/2019 0920 by Nicole Vargas RN  Outcome: Progressing     Problem: Prexisting or High Potential for Compromised Skin Integrity  Goal: Skin integrity is maintained or improved  Description  INTERVENTIONS:  - Identify patients at risk for skin breakdown  - Assess and monitor skin integrity  - Assess and monitor nutrition and hydration status  - Monitor labs   - Assess for incontinence   - Turn and reposition patient  - Assist with mobility/ambulation  - Relieve pressure over bony prominences  - Avoid friction and shearing  - Provide appropriate hygiene as needed including keeping skin clean and dry  - Evaluate need for skin moisturizer/barrier cream  - Collaborate with interdisciplinary team   - Patient/family teaching  - Consider wound care consult   10/17/2019 1033 by Nicole Vargas RN  Outcome: Completed  10/17/2019 0920 by Nicole Vargas RN  Outcome: Progressing     Problem: PAIN - ADULT  Goal: Verbalizes/displays adequate comfort level or baseline comfort level  Description  Interventions:  - Encourage patient to monitor pain and request assistance  - Assess pain using appropriate pain scale  - Administer analgesics based on type and severity of pain and evaluate response  - Implement non-pharmacological measures as appropriate and evaluate response  - Consider cultural and social influences on pain and pain management  - Notify physician/advanced practitioner if interventions unsuccessful or patient reports new pain  10/17/2019 1033 by Dionicio French RN  Outcome: Completed  10/17/2019 0920 by Dionicio French RN  Outcome: Progressing     Problem: INFECTION - ADULT  Goal: Absence or prevention of progression during hospitalization  Description  INTERVENTIONS:  - Assess and monitor for signs and symptoms of infection  - Monitor lab/diagnostic results  - Monitor all insertion sites, i e  indwelling lines, tubes, and drains  - Monitor endotracheal if appropriate and nasal secretions for changes in amount and color  - [place] appropriate cooling/warming therapies per order  - Administer medications as ordered  - Instruct and encourage patient and family to use good hand hygiene technique  - Identify and instruct in appropriate isolation precautions for identified infection/condition  10/17/2019 1033 by Dionicio French RN  Outcome: Completed  10/17/2019 0920 by Dionicio French RN  Outcome: Progressing     Problem: SAFETY ADULT  Goal: Maintain or return to baseline ADL function  Description  INTERVENTIONS:  -  Assess patient's ability to carry out ADLs; assess patient's baseline for ADL function and identify physical deficits which impact ability to perform ADLs (bathing, care of mouth/teeth, toileting, grooming, dressing, etc )  - Assess/evaluate cause of self-care deficits   - Assess range of motion  - Assess patient's mobility; develop plan if impaired  - Assess patient's need for assistive devices and provide as appropriate  - Encourage maximum independence but intervene and supervise when necessary  - Involve family in performance of ADLs  - Assess for home care needs following discharge   - Consider OT consult to assist with ADL evaluation and planning for discharge  - Provide patient education as appropriate  10/17/2019 1033 by Dionicio French RN  Outcome: Completed  10/17/2019 0920 by Dionicio French RN  Outcome: Progressing  Goal: Maintain or return mobility status to optimal level  Description  INTERVENTIONS:  - Assess patient's baseline mobility status (ambulation, transfers, stairs, etc )    - Identify cognitive and physical deficits and behaviors that affect mobility  - Identify mobility aids required to assist with transfers and/or ambulation (gait belt, sit-to-stand, lift, walker, cane, etc )  - [place] fall precautions as indicated by assessment  - Record patient progress and toleration of activity level on Mobility SBAR; progress patient to next Phase/Stage  - Instruct patient to call for assistance with activity based on assessment  - Consider rehabilitation consult to assist with strengthening/weightbearing, etc   10/17/2019 1033 by Bert Bocanegra RN  Outcome: Completed  10/17/2019 0920 by Bert Bocanegra RN  Outcome: Progressing     Problem: DISCHARGE PLANNING  Goal: Discharge to home or other facility with appropriate resources  Description  INTERVENTIONS:  - Identify barriers to discharge w/patient and caregiver  - Arrange for needed discharge resources and transportation as appropriate  - Identify discharge learning needs (meds, wound care, etc )  - Arrange for interpretive services to assist at discharge as needed  - Refer to Case Management Department for coordinating discharge planning if the patient needs post-hospital services based on physician/advanced practitioner order or complex needs related to functional status, cognitive ability, or social support system  10/17/2019 1033 by Bert Bocanegra RN  Outcome: Completed  10/17/2019 0920 by Bert Bocanegra RN  Outcome: Progressing     Problem: Knowledge Deficit  Goal: Patient/family/caregiver demonstrates understanding of disease process, treatment plan, medications, and discharge instructions  Description  Complete learning assessment and assess knowledge base    Interventions:  - Provide teaching at level of understanding  - Provide teaching via preferred learning methods  10/17/2019 1033 by Diego Man RN  Outcome: Completed  10/17/2019 0920 by Diego Man RN  Outcome: Progressing     Problem: GENITOURINARY - ADULT  Goal: Maintains or returns to baseline urinary function  Description  INTERVENTIONS:  - Assess urinary function  - Encourage oral fluids to ensure adequate hydration if ordered  - Administer IV fluids as ordered to ensure adequate hydration  - Administer ordered medications as needed  - Offer frequent toileting  - Follow urinary retention protocol if ordered  10/17/2019 1033 by Diego Man RN  Outcome: Completed  10/17/2019 0920 by Diego Man RN  Outcome: Progressing  Goal: Absence of urinary retention  Description  INTERVENTIONS:  - Assess patients ability to void and empty bladder  - Monitor I/O  - Bladder scan as needed  - Discuss with physician/AP medications to alleviate retention as needed  - Discuss catheterization for long term situations as appropriate  10/17/2019 1033 by Diego Man RN  Outcome: Completed  10/17/2019 0920 by Diego Man RN  Outcome: Progressing  Goal: Urinary catheter remains patent  Description  INTERVENTIONS:  - Assess patency of urinary catheter  - If patient has a chronic moreira, consider changing catheter if non-functioning  - Follow guidelines for intermittent irrigation of non-functioning urinary catheter  10/17/2019 1033 by Diego Man RN  Outcome: Completed  10/17/2019 0920 by Diego Man RN  Outcome: Progressing     Problem: HEMATOLOGIC - ADULT  Goal: Maintains hematologic stability  Description  INTERVENTIONS  - Assess for signs and symptoms of bleeding or hemorrhage  - Monitor labs  - Administer supportive blood products/factors as ordered and appropriate  10/17/2019 1033 by Diego Man RN  Outcome: Completed  10/17/2019 0920 by Diego Man RN  Outcome: Progressing     Problem: Nutrition/Hydration-ADULT  Goal: Nutrient/Hydration intake appropriate for improving, restoring or maintaining nutritional needs  Description  Monitor and assess patient's nutrition/hydration status for malnutrition  Collaborate with interdisciplinary team and initiate plan and interventions as ordered  Monitor patient's weight and dietary intake as ordered or per policy  Utilize nutrition screening tool and intervene as necessary  Determine patient's food preferences and provide high-protein, high-caloric foods as appropriate       INTERVENTIONS:  - Monitor oral intake, urinary output, labs, and treatment plans  - Assess nutrition and hydration status and recommend course of action  - Evaluate amount of meals eaten  - Assist patient with eating if necessary   - Allow adequate time for meals  - Recommend/ encourage appropriate diets, oral nutritional supplements, and vitamin/mineral supplements  - Order, calculate, and assess calorie counts as needed  - Recommend, monitor, and adjust tube feedings and TPN/PPN based on assessed needs  - Assess need for intravenous fluids  - Provide specific nutrition/hydration education as appropriate  - Include patient/family/caregiver in decisions related to nutrition  10/17/2019 1033 by Sima Moore RN  Outcome: Completed  10/17/2019 0920 by Sima Moore RN  Outcome: Progressing

## 2019-10-17 NOTE — DISCHARGE SUMMARY
Discharge Summary - Mejia Restrepo, 1945, [id number]        Admission Date: 10/4/2019  Discharge Date: 10/17/2019    Admitting Diagnosis: Nausea [R11 0]  UTI (urinary tract infection) [N39 0]  Abdominal pain [R10 9]  Acute deep vein thrombosis (DVT) of right lower extremity ([facility] [address] ) [I82 [id number]]    Discharge Diagnosis:   1  Acute DVT, right iliac vein  2  Advanced bladder cancer with bladder perforation and abdominal wall cellulitis  3  Leukocytosis secondary to 3   4  Chronic anemia  5  Hyponatremia  6  Chronic kidney disease stage 3  7  Hypertension  8  Peripheral vascular disease     Consulting Physicians:  1  Dr Henri Maldonado, urology  2  Dr Tana Pacheco, medical Oncology    Procedures Performed:   None    HPI:  The patient is a 28-year-old man with a history of advanced bladder cancer who came to the emergency room with lower abdominal pain associated with nausea and vomiting  He also noted significant swelling of his right leg for 2 days  Evaluation in the emergency room showed extensive right leg DVT  He was admitted for further care  Hospital Course: The patient was admitted to the hospital and was anticoagulated with Lovenox  He was started on warfarin     When this was therapeutic, Lovenox was stopped  The patient's leg swelling was reduced somewhat after anticoagulation  The pain in the leg diminished  The patient has advanced bladder cancer  He has a diverting ileostomy  He was recently treated for urinary tract infection  His abdominal pain seemed to improve during his hospitalization but toward the end of his hospitalization he developed a marked leukocytosis  He had some purulent drainage from his urethra  At that time he was re-evaluated by Urology  A [place] catheter was inserted and several 100 cc of purulent material was recovered  The patient passed several surgical clips from his urethra  A CT scan of the abdomen and pelvis was performed    This showed an apparent perforation of the urinary bladder with air tracking into the pelvic tissues  The patient was seen to have some cellulitis of his abdominal wall  The patient was treated briefly with cefepime  He did seem to have slight improvement of his abdominal wall cellulitis  The situation was discussed with the patient by Urology  He was told that the prognosis for his cancer was very poor  He has been extensively treated  In light of this, he elected to pursue hospice care  On the day of discharge the patient was feeling fairly well  He had adequate pain control  His appetite was poor but he had no nausea or vomiting  Vital signs were stable  Lungs were clear  Cardiac exam revealed regular rhythm  The abdomen was soft  There was some lower abdominal tenderness and some redness of the lower abdominal wall  The right leg was swollen  Disposition:  The patient was discharged home for home hospice on October 17th  Diet and activity will be as tolerated  He will be under the care of the hospice medical staff  Discharge instructions/Information to patient and family:   See after visit summary for information provided to patient and family  Provisions for Follow-Up Care:  See after visit summary for information related to follow-up care and any pertinent home health orders  Planned Readmission: No    Discharge Statement   I spent 40 minutes discharging the patient  This time was spent on the day of discharge  I had direct contact with the patient on the day of discharge  Discharge Medications:  See after visit summary for reconciled discharge medications provided to patient and family

## 2019-10-18 ENCOUNTER — TELEPHONE (OUTPATIENT)
Dept: PALLIATIVE MEDICINE | Facility: CLINIC | Age: 74
End: 2019-10-18

## 2019-10-18 NOTE — TELEPHONE ENCOUNTER
Post recent hospitalization, this pt is now admitted to home hospice with start date of 10/17/19   Verified with family

## 2019-10-19 LAB
BACTERIA SPEC BFLD CULT: ABNORMAL
BACTERIA SPEC BFLD CULT: ABNORMAL
GRAM STN SPEC: ABNORMAL

## 2019-10-21 ENCOUNTER — TELEPHONE (OUTPATIENT)
Dept: UROLOGY | Facility: CLINIC | Age: 74
End: 2019-10-21

## 2019-10-21 NOTE — TELEPHONE ENCOUNTER
Kenji Mills called  Tasia Santamaria entered [address] [place] [address] on 10/17/19  Canceled all of his pending appointments  Called infusion  Spoke to Marco  Canceled his pending infusions appointments  Moraima DESAI (Dr Nick Saldana office) aware  Called central scheduling and canceled vascular lab appointment  All pending office visits canceled

## 2019-10-24 ENCOUNTER — HOSPITAL ENCOUNTER (OUTPATIENT)
Dept: INFUSION CENTER | Facility: CLINIC | Age: 74
End: 2019-10-24

## 2019-10-29 ENCOUNTER — TELEPHONE (OUTPATIENT)
Dept: UROLOGY | Facility: AMBULATORY SURGERY CENTER | Age: 74
End: 2019-10-29

## 2019-10-31 PROBLEM — K91.89 POSTOPERATIVE ILEUS (HCC): Status: RESOLVED | Noted: 2018-02-16 | Resolved: 2019-10-31

## 2019-10-31 PROBLEM — N20.0 KIDNEY STONE: Status: RESOLVED | Noted: 2017-12-15 | Resolved: 2019-10-31

## 2019-10-31 PROBLEM — D72.829 LEUKOCYTOSIS: Status: RESOLVED | Noted: 2019-10-13 | Resolved: 2019-10-31

## 2019-10-31 PROBLEM — K56.7 POSTOPERATIVE ILEUS (HCC): Status: RESOLVED | Noted: 2018-02-16 | Resolved: 2019-10-31

## 2020-02-23 NOTE — ASSESSMENT & PLAN NOTE
Creatinine slightly elevated to 1 38 at time of presentation, baseline 1 2  Creatinine is currently back to baseline at 1 09     Continue IV fluid hydration and continue to trend BMP  Avoid nephrotoxins and hypotension Occupational Therapy   Evaluation    Name: Angelica Tomlinson  MRN: [id number]  Admitting Diagnosis:  Cholangitis      Recommendations:     Discharge Recommendations: home with home health  Discharge Equipment Recommendations:  none  Barriers to discharge:       Assessment:     Angelica Tomlinson is a 73 y.o. female with a medical diagnosis of Cholangitis.  She presents supine in bed and wiling to participate.  Pt reports independent baseline functional performance and mobility and with decreased functional performance.  Pt will benefit from skilled OT for max self care performance and safety. . Performance deficits affecting function: impaired endurance, impaired self care skills.      Rehab Prognosis: Good; patient would benefit from acute skilled OT services to address these deficits and reach maximum level of function.       Plan:     Patient to be seen 2 x/week to address the above listed problems via self-care/home management, therapeutic activities, therapeutic exercises  · Plan of Care Expires: 03/23/20  · Plan of Care Reviewed with: patient    Subjective     Chief Complaint: decreased endurance   Patient/Family Comments/goals: return to home     Occupational Profile:  Living Environment: Pt lives in 1 story house with spouse with no steps to enter   Previous level of function: Pt was indep with self care and home performance    Equipment Used at Home:  walker, rolling(reports she does not use walker )  Assistance upon Discharge: spouse able to assist as needed     Pain/Comfort:  · Pain Rating 1: 0/10    Patients cultural, spiritual, Yazdanism conflicts given the current situation: no    Objective:     Communicated with: RN prior to session.  Patient found supine with peripheral IV, telemetry upon OT entry to room.    General Precautions: Standard, fall   Orthopedic Precautions:N/A   Braces: N/A     Occupational Performance:    Bed Mobility:    · Patient completed Rolling/Turning to Left with  contact guard  assistance  · Patient completed Rolling/Turning to Right with contact guard assistance  · Patient completed Scooting/Bridging with contact guard assistance  · Patient completed Supine to Sit with minimum assistance  · Patient completed Sit to Supine with minimum assistance    Functional Mobility/Transfers:  · Patient completed Sit <> Stand Transfer with contact guard assistance  with  rolling walker   · Patient completed Toilet Transfer Step Transfer technique with contact guard assistance with  rolling walker      Activities of Daily Living:  · Grooming: stand by assistance    · Upper Body Dressing: contact guard assistance    · Lower Body Dressing: minimum assistance    · Toileting: contact guard assistance      Cognitive/Visual Perceptual:  Cognitive/Psychosocial Skills:     -       Oriented to: Person, Place, Time and Situation   -       Follows Commands/attention:Follows two-step commands  -       Communication: clear/fluent  -       Memory: No Deficits noted  -       Safety awareness/insight to disability: intact   -       Mood/Affect/Coping skills/emotional control: Appropriate to situation    Physical Exam:  Upper Extremity Range of Motion:     -       Right Upper Extremity: WFL  -       Left Upper Extremity: WFL  Upper Extremity Strength:    -       Right Upper Extremity: WFL  -       Left Upper Extremity: WFL    AMPAC 6 Click ADL:  AMPAC Total Score: 19    Treatment & Education:  Evaluation complete and goals set.  Pt educated on safety, role of OT, importance of increased participation in self care for gains , expectations for participation, expectations for gains, POC, energy conservation, caregiver strain. White board updated.   - ADL training for toileting hygiene and clothing management   Education:    Patient left supine with all lines intact    GOALS:   Multidisciplinary Problems     Occupational Therapy Goals        Problem: Occupational Therapy Goal    Goal Priority Disciplines Outcome  Interventions   Occupational Therapy Goal     OT, PT/OT Ongoing, Progressing    Description:  Goals to be met by: 3/15    Patient will increase functional independence with ADLs by performing:    UE Dressing with Dare.  LE Dressing with Dare.  Grooming while standing with Dare.  Toileting from toilet with Dare for hygiene and clothing management.                       History:     Past Medical History:   Diagnosis Date    Biliary obstruction     Cancer     Gallbladder    Cholangiocarcinoma     Constipation     Cyst of breast, left, benign solitary     Diverticulosis     Duodenal mass 5/16/2016    Duodenal stenosis     Helicobacter pylori gastritis     Hiatal hernia     Hx of colonic polyp     Hypertension     Tobacco abuse 10/9/2019       Past Surgical History:   Procedure Laterality Date    APPENDECTOMY      CHOLANGIOGRAM N/A 12/26/2019    Procedure: CHOLANGIOGRAM;  Surgeon: Michelle Surgeon;  Location: [facility];  Service: Anesthesiology;  Laterality: N/A;    colon polyps      COLONOSCOPY      ENDOSCOPIC ULTRASOUND OF UPPER GASTROINTESTINAL TRACT N/A 10/25/2019    Procedure: ULTRASOUND, UPPER GI TRACT, ENDOSCOPIC;  Surgeon: Luis Antonio Lu MD;  Location: [facility];  Service: Endoscopy;  Laterality: N/A;    ERCP N/A 10/21/2019    Procedure: ERCP (ENDOSCOPIC RETROGRADE CHOLANGIOPANCREATOGRAPHY);  Surgeon: Luis Antonio Lu MD;  Location: [facility];  Service: Endoscopy;  Laterality: N/A;    ERCP N/A 12/17/2019    Procedure: ERCP (ENDOSCOPIC RETROGRADE CHOLANGIOPANCREATOGRAPHY);  Surgeon: Monse Rayo MD;  Location: [facility];  Service: Endoscopy;  Laterality: N/A;    ERCP N/A 12/24/2019    Procedure: ERCP (ENDOSCOPIC RETROGRADE CHOLANGIOPANCREATOGRAPHY);  Surgeon: Monse Rayo MD;  Location: [facility] ([address] [place] [address]);  Service: Endoscopy;  Laterality: N/A;  Will need duodenal stent as well as permanent biliary stents (likely 6 x 8 and 6 x 10 epic stents).  Dr José  Chafi    ERCP W/ PLASTIC STENT PLACEMENT  10/21/2019    ESOPHAGOGASTRODUODENOSCOPY N/A 10/25/2019    Procedure: EGD (ESOPHAGOGASTRODUODENOSCOPY);  Surgeon: Luis Antonio Lu MD;  Location: [facility];  Service: Endoscopy;  Laterality: N/A;       Time Tracking:     OT Date of Treatment: 02/23/20  OT Start Time: 0825  OT Stop Time: 0845  OT Total Time (min): 20 min    Billable Minutes:Evaluation 10  Self Care/Home Management 10    Syl Albright, OT  2/23/2020

## 2020-07-06 NOTE — ASSESSMENT & PLAN NOTE
July 6, 2020      To Whom It May Concern:      Marly Ramires was seen in our Emergency Department today, 07/06/20.  I expect her condition to improve over the next 7 days.  She may return to work when improved.    Sincerely,        Juan Francisco Coronel MD         Continue home regimen of oxycodone 10 mg Q8hrs scheduled   PRN oxycodone and dilaudid while inpatient   Continue outpatient palliative care follow up

## 2021-02-12 DIAGNOSIS — Z23 ENCOUNTER FOR IMMUNIZATION: ICD-10-CM

## 2021-05-11 NOTE — LETTER
2019     Brock Still MD  [facility]  [address]  [address] [facility]    Patient: Kristan Katz   YOB: 1945   Date of Visit: 2019       Dear Dr Ra Avila: Thank you for referring Keenan Arrieta to me for evaluation  Below are my notes for this consultation  If you have questions, please do not hesitate to call me  I look forward to following your patient along with you  Sincerely,        Boby Carbajal MD        CC: No Recipients  Boby Carbajal MD  2019  4:00 PM  Sign at close encounter  Hematology / Oncology Outpatient Follow Up Note    Kristan Katz 68 y o  male HV VVR:[id number]         Date:  2019    Assessment / Plan:  A 25-year-old gentleman who was diagnosed in limited volume of prostate cancer with Pearblossom score 7 in 2018  Jamie Hardy has not had any treatment for this  Jamie Hardy has 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  He underwent 3 cycle of neoadjuvant chemotherapy with cisplatin and gemcitabine with significant toxicity  PET-CT scan recently showed persistent hypermetabolism in the bladder and prostate suggesting persistent disease  He is going to be seen by Dr Ra Avila next week to discuss cystectomy  Regarding his lower extremity edema, I recommended him to have Doppler ultrasound to rule out DVT  Once obtain report of Doppler ultrasound, I will contact him  Otherwise, I will see him again a month after the radical cystectomy  He is in agreement with my recommendations                                                                                                     Subjective:      HPI:  A 25-year-old gentleman who has history of Buerger disease, 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32  Jamie Hardy was briefly a smoker until 25years old  Jamie Hardy was 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, when he underwent lung cyst surgery  Approximately 1 month status post right above knee amputation  He is doing well has been using or  and is an appointment with the prosthetist in the near future  With complaining of some swelling in the left lower extremity its modest at best has excellent Doppler sounds dorsalis pedis posterior tibial and peroneal       Plan:  Continue with elevation of left lower extremity, utilize Tubigrip for mild support and continue working with prosthetist and pain management regarding discomfort he is having in the stump site likely neurogenic in etiology   Subsequently, he had difficulty of urination, for which he has been under the care of Dr Edge Marine had multiple procedure including left ureter placement for hydronephrosis as well as prostate biopsy in March 2018 which showed small amount of adenocarcinoma with Jackson score 7   He has not had any treatment for his prostate cancer   He underwent cystoscopy and biopsy of bladder   Bladder biopsy was negative for malignancy   However, repeated prostate biopsy showed high-grade urothelial carcinoma with sarcomatoid feature   Therefore, he was referred to me to discuss systemic therapy  Nnia Ugalde continued to have some discomfort in the pelvis   He does not see any gross hematuria   He has mild exertional shortness of breath   His weight has been stable   He denied fever, chills or night sweats   His recent creatinine was 1  23   He underwent PET-CT scan which showed highly hypermetabolic prostate with SUV 41 3   There was the rim hypermetabolism in the pelvis which was read as prior abscess  Edith De Leoncarlita is no evidence of distant metastasis based on the PET-CT scan   His performance status is probably 1/4 on the ECOG scale            Interval History:  A 77-year-old gentleman who was diagnosed in limited volume of prostate cancer with Angel score 7 in March 2018  Nina Ugalde has not had any treatment for this  Nina Ugalde was then diagnosed with high-grade urothelial carcinoma with sarcomatoid feature, based on the prostate biopsy    This was T4 disease with prostate invasion   Therefore, he started neoadjuvant chemotherapy with cisplatin and gemcitabine  He had quite poor tolerance to neoadjuvant chemotherapy with significant side effects including anorexia, some weight loss, severe fatigue  Therefore, dose reduction of cisplatin and gemcitabine was necessary in the 3rd cycle treatment  He completed 3 cycle of gemcitabine and cisplatin in late December 2019 followed by PET-CT scan for tumor evaluation    PET-CT scan still showed significant hypermetabolic bladder wall mass as well as prostate hypermetabolic lesion  There is no evidence of distant metastasis  He presents today for follow-up  He continued to have moderate fatigue  He has been anorexic  However, he has no active weight loss  He has no respiratory symptoms  He denied any pain  His performance status is 1/4 on the ECOG scale  In addition, he recently noticed mild swelling on bilateral extremity right greater than left  He has no complaint of pain in lower extremity  Objective:      Primary Diagnosis:     1    High-grade urothelial carcinoma with sarcomatoid feature, T4 disease is prostate invasion   Diagnosed in August 2018  2    Localized prostate cancer with Paterson score 7, diagnosed in March 2018      Cancer Staging:  Cancer Staging  No matching staging information was found for the patient         Previous Hematologic/ Oncologic Treatment:       Neoadjuvant chemotherapy with cisplatin and gemcitabine x3 cycle  Completed in December 2018      Current Hematologic/ Oncologic Treatment:       Radical cystectomy is expected      Disease Status:      Radiographically stable disease      Test Results:     Pathology:     Prostate biopsy in March 2018 showed adenocarcinoma, Angel score 7      Repeated prostate biopsy in August 1, 2018 showed high-grade urothelial carcinoma with sarcomatoid feature      Re-biopsy in October 2018 showed high-grade urothelial carcinoma in the prostate gland as well as prostatic urethra      Radiology:     PET-CT scan in January 2019 showed persistent hypermetabolic pelvic mass and prostate invasion  No evidence of distant metastasis      Laboratory:     See below      Physical Exam:        General Appearance:    Alert, oriented          Eyes:    PERRL   Ears:    Normal external ear canals, both ears   Nose:   Nares normal, septum midline   Throat:   Mucosa moist  Pharynx without injection      Neck:   Supple         Lungs:     Clear to auscultation bilaterally   Chest Wall:    No tenderness or deformity    Heart:    Regular rate and rhythm         Abdomen:     Soft, non-tender, bowel sounds +, no organomegaly               Extremities:   Extremities no cyanosis mild bilateral lower extremity edema          Skin:   no rash or icterus  Lymph nodes:   Cervical, supraclavicular, and axillary nodes normal   Neurologic:   CNII-XII intact, normal strength, sensation and reflexes     Throughout             Breast exam:   Not applicable  ROS: Review of Systems   Constitutional:        Fatigue   All other systems reviewed and are negative  Imaging: Nm Pet Ct Skull Base To Mid Thigh    Result Date: 1/7/2019  Narrative: PET/CT SCAN INDICATION:  C68 9: Malignant neoplasm of urinary organ, unspecified   , urothelial carcinoma of the bladder with prostatic gland invasion, restaging post chemotherapy for treatment management, history of melanoma 40+ years ago MODIFIER: PS COMPARISON: PET CT 8/20/2018 and priors CELL TYPE:  High-grade urothelial carcinoma TECHNIQUE:   11 889 mCi F-18-FDG administered IV  Multiplanar attenuation corrected and non attenuation corrected PET images are available for interpretation, and contiguous, low dose, axial CT sections were obtained from the skull base through the femurs   Intravenous contrast material was not utilized  This examination, like all CT scans performed in the [facility], was performed utilizing techniques to minimize radiation dose exposure, including the use of iterative reconstruction and automated exposure control  Fasting serum glucose: 98 mg/dl FINDINGS: VISUALIZED BRAIN:   Right frontal temporal encephalomalacia again noted  HEAD/NECK:   There is a physiologic distribution of FDG  No FDG avid cervical adenopathy is seen  CT images: Unremarkable  CHEST:   No FDG avid soft tissue lesions are seen  CT images: Coronary atherosclerosis  Small left pleural effusion  ABDOMEN:   No FDG avid soft tissue lesions are seen  CT images: Cholelithiasis  Left ureteral stent  Mild fullness of the left renal pelvis, with associated mild wall thickening  Stable hepatic hypodensities  Colon diverticula  Tiny punctate nonobstructing right renal calculus  PELVIS: Significant bladder wall thickening with surrounding fat stranding and infiltration again demonstrated  Distal portion of the left ureteral stent in place  Persistent left pelvic cystic mass/fluid collection, contiguous with the left posterior lateral bladder wall, mildly decreased in size, measuring 4 6 x 2 8 cm  Prior measurement 5 4 x 4 1 cm  There remains intense FDG activity within the wall, SUV 10 5, prior SUV 12 3  Intense FDG uptake again noted within the central prostate, SUV 36 4, prior SUV 41 3  Differentiation between physiologically excreted FDG activity and possible tumor would be difficult  No hypermetabolic pelvic adenopathy  CT images: Otherwise stable  OSSEOUS STRUCTURES: No FDG avid lesions are seen  CT images: Spine degenerative change  Impression: 1  Significant bladder wall thickening with surrounding fat stranding and infiltration again demonstrated  Mildly decreased size of contiguous left pelvic cystic mass/fluid collection, with persistent intense FDG activity within the wall  2   Intense FDG uptake again noted within the central prostate  Differentiation between physiologically excreted FDG activity and possible tumor would be difficult  3   No new hypermetabolic pelvic adenopathy  4   No new hypermetabolic metastases in the neck, chest, or upper abdomen  5   Left ureteral stent in place with persistent mild fullness of the left renal pelvis   Workstation performed: [id number]         Labs:   Lab Results   Component Value Date    WBC 4 34 12/24/2018    HGB 9 2 (L) 12/24/2018    HCT 28 9 (L) 12/24/2018    MCV 90 12/24/2018     12/24/2018     Lab Results   Component Value Date    K 3 7 12/24/2018    CL 98 (L) 12/24/2018    CO2 27 12/24/2018    BUN 14 12/24/2018    CREATININE 1 22 12/24/2018    GLUF 82 11/19/2018    CALCIUM 9 8 12/24/2018    AST 14 12/24/2018    ALT 10 (L) 12/24/2018    ALKPHOS 69 12/24/2018    EGFR 58 12/24/2018           Lab Results   Component Value Date    PSA 0 5 06/22/2018         Lab Results   Component Value Date    IRON 38 (L) 11/28/2018    TIBC 192 (L) 11/28/2018    FERRITIN 534 (H) 11/28/2018       Lab Results   Component Value Date    PBSLQUZP16 442 11/28/2018       Lab Results   Component Value Date    FOLATE 6 3 11/28/2018         Current Medications: Reviewed  Allergies: Reviewed  PMH/FH/SH:  Reviewed      Vital Sign:    Body surface area is 2 01 meters squared      Wt Readings from Last 3 Encounters:   01/11/19 85 3 kg (188 lb)   01/02/19 86 6 kg (191 lb)   12/27/18 85 7 kg (188 lb 15 oz)        Temp Readings from Last 3 Encounters:   01/11/19 98 5 °F (36 9 °C) (Tympanic)   01/02/19 99 2 °F (37 3 °C) (Tympanic)   12/27/18 98 6 °F (37 °C) (Tympanic)        BP Readings from Last 3 Encounters:   01/11/19 120/80   01/02/19 110/66   12/27/18 127/79         Pulse Readings from Last 3 Encounters:   01/11/19 (!) 110   12/27/18 101   12/26/18 71     @LASTSAO2(3)@

## 2022-05-19 NOTE — ASSESSMENT & PLAN NOTE
Found to have acute occlusive DVT in right leg external iliac, common femoral, femoral, deep femoral, popliteal, gastrocnemius, paired peroneal and posterior tibial veins   Started on heparin drip per DVT protocol  Denies SOB or chest pain to suggest PE- continue to monitor   Denies history of blood clots, no anticoagulation OPERATIVE NOTE    Patient Identification:  Name: Raquel Gonsales  Age: 77 y.o.  Sex: female  :  1945  MRN: [id number]                                               Preoperative diagnosis: Bilateral upper eyelid ptosis  Postoperative diagnosis: same  Procedure: bilateral upper eyelid ptosis repair  Surgeon: Radhames Scruggs MD who was present and scrubbed throughout all critical portions of the operation  Assistants: Jose Alberto Haines MD  Anesthesia: MAC  EBL: less than 50cc  Specimens:  * No orders in the log *    Description of the procedure: The patient was taken to the operating room and placed on the table in the supine position, where anesthesia was induced. 2% lidocaine with epinephrine and 0.5% marcaine in a 1:1 fashion was injected over the surgical site, and the patient was prepped and draped in the usual manner for orbitofacial surgery.     Corneal protectors were placed in both eyes.     A 15 Bard-Dru blade incision was made through the left upper eyelid crease 8 mm from the eyelash margin, across the entire horizontal extent of the right upper eyelid. A second incision was made 10 mm inferior to the junction of the brow and eyelid, and a pinch test was used to ensure the amount of skin excision was appropriate. The intervening tissue was excised with a 15 Bard-Dru blade and Ashley scissors. The orbital septum was opened horizontally, and excess fatty tissue was excised. The levator aponeurosis was identified at its attachment to the tarsal plate and was severed from the tarsus with Ashley scissors. The cut inferior edge of the levator aponeurosis was advanced to a lower point the tarsal plate and was reattached with multiple interrupted 6-0 silk sutures, partially penetrating the tarsus to avoid corneal irritation. The sutures were adjusted until the eyelid height and contour were judged to be satisfactory. The skin was then closed with 5-0 fast absorbing suture in an interrupted  and running fashion.     The exact same procedure was performed on the contralateral lid.     The corneal protectors were removed and antibiotic ophthalmic ointment was placed over the surgical site.     The patient was then awakened and taken from the operating room in good condition, having tolerated the procedure well. There were no complications, and the estimated blood loss was less than 50 cc.

## 2022-12-26 NOTE — ASSESSMENT & PLAN NOTE
-baseline hemoglobin 8-9  -hemoglobin today 8 4  -no signs of any active bleeding  -will check stool occult  -monitor H&H, transfuse as needed Procedure: Diagnostic cerebral angiogram  Doctor: Dr. Aguirre  Consent: Signed by: Cody Miller                   NAME/NUMBER if not patient:     SUBJECTIVE: Pt examined at bedside, in NAD on RA.     PMHx: as above  PSHx:   Social Hx:   Medications:  Allergies:     T(C): 36.6 (12-26-22 @ 09:07), Max: 37.1 (12-25-22 @ 14:00)  HR: 88 (12-26-22 @ 08:22) (70 - 88)  BP: 141/94 (12-26-22 @ 08:22) (126/90 - 141/94)  RR: 18 (12-26-22 @ 08:22) (18 - 18)  SpO2: 100% (12-26-22 @ 08:22) (96% - 100%)  Wt(kg): --    EXAM:  Neurological: AOx3, NAD, FC, speech coherent   CN II-XII: EOMI, PERRL, face symmetric, tongue midline   Motor: MAEx4 5/5 UE and LE B/L   SILT throughout  Warm & well perfused throughout   No pronator drift   Cardiovascular: normal rate and rhythm  Respiratory: unlabored breathing on RA   Gastrointestinal: abd soft, NT, ND   Extremities: no LE edema, DP pulses intact    12-26    137  |  103  |  14  ----------------------------<  116<H>  3.7   |  24  |  0.91    Ca    9.2      26 Dec 2022 05:03  Phos  3.5     12-26  Mg     2.1     12-26      CBC Full  -  ( 26 Dec 2022 05:03 )  WBC Count : 4.94 K/uL  RBC Count : 4.89 M/uL  Hemoglobin : 14.2 g/dL  Hematocrit : 41.7 %  Platelet Count - Automated : 224 K/uL  Mean Cell Volume : 85.3 fl  Mean Cell Hemoglobin : 29.0 pg  Mean Cell Hemoglobin Concentration : 34.1 gm/dL  Auto Neutrophil # : x  Auto Lymphocyte # : x  Auto Monocyte # : x  Auto Eosinophil # : x  Auto Basophil # : x  Auto Neutrophil % : x  Auto Lymphocyte % : x  Auto Monocyte % : x  Auto Eosinophil % : x  Auto Basophil % : x    PTT - ( 26 Dec 2022 05:03 )  PTT:60.5 sec    Pregnancy test (serum hcg for any female under 56, must be resulted day before OR): [ ] Negative Result  [ ] Positive Result  [x ] N/A : male or female>57 y/o      COVID swab (in past 72hrs): x_ Y  _N    CXR: CT chest 12/24   EKG: in chart  ECHO if available: n/a  Medical Clearances: see paper chart    Heparin drip:               If yes --> Needs to be held 2 hours prior to angiogram, order placed: [x]yes   []no, primary team aware []yes   []no   []n/a    Contrast allergy: [] yes   [x] no  If yes --> premedication ordered []y []n    Implanted Devices (pacemaker, drug pump...etc):  []YES   [x] NO                  If yes --> EPS consulted to interrogate device: [ ] YES  [ ] NO                            If yes -->  EPS called to let them know patient going for procedure: [ ] device needs to be turned off                                                                                                                                                 [ ] magnet needs to be placed for surgery                                                                                                                                                [ ] nothing to do per EP, may proceed with cautery use in OR                                       Assessment:  57yo M with PMHx of HTN, previous lumbar fusion (March 2022) who originally was BIBEMS to [facility] on 12/16/22 after an episode of extreme dizziness and gait instability who had a cerebral angiogram on 12/19/22 that demonstrated 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 Patient was transferred to [facility] for further workup for possible complex bypass surgery.     Plan:  - Consent for cerebral angiogram obtained and in chart, all risks, benefits and alternatives discussed including risk of bleeding at access site, infection, spinal headache, stroke, vessel dissection  - NPO/IVF  - Return to cath lab recovery post procedure     D/w Dr. Aguirre     Assessment:  Present when checked    []  GCS  E   V  M     Heart Failure: []Acute, [] acute on chronic , []chronic  Heart Failure:  [] Diastolic (HFpEF), [] Systolic (HFrEF), []Combined (HFpEF and HFrEF), [] RHF, [] Pulm HTN, [] Other    [] CARRILLO, [] ATN, [] AIN, [] other  [] CKD1, [] CKD2, [] CKD 3, [] CKD 4, [] CKD 5, []ESRD    Encephalopathy: [] Metabolic, [] Hepatic, [] toxic, [] Neurological, [] Other    Abnormal Nurtitional Status: [] malnurtition (see nutrition note), [ ]underweight: BMI < 19, [] morbid obesity: BMI >40, [] Cachexia    [] Sepsis  [] hypovolemic shock,[] cardiogenic shock, [] hemorrhagic shock, [] neuogenic shock  [] Acute Respiratory Failure  []Cerebral edema, [] Brain compression/ herniation,   [] Functional quadriplegia  [] Acute blood loss anemia

## 2023-02-17 NOTE — DISCHARGE SUMMARY
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]  Hydronephrosis with ureteral stricture, not elsewhere classified [N13 1]    Admitting Provider: Suni Vasquez MD    Discharging Provider: Suni Vasquez MD    Primary Care Physician at Discharge: Ramona Lance -[phone]     HPI:This is a 67 y o  old male presented with large diverticulum pressing on ureter  Surgery planned  [unfilled]    Allergies   Allergen Reactions    Iodine Shortness Of Breath, Swelling and Hives     Contrast dye causes respiratory distress  Iodine causes skin rash    Mercury Hives and Swelling    Shellfish-Derived Products Hives, Shortness Of Breath and Anaphylaxis     Anaphylaxis    Augmentin [Amoxicillin-Pot Clavulanate] GI Intolerance, Rash and Diarrhea     Diarrhea    Lidoderm [Lidocaine] Rash     Lidoderm patch caused rash    Penicillins Rash, Swelling and Hives    Sulfa Antibiotics Hives and Swelling       Consults   none          Procedures Performed: No orders of the defined types were placed in this encounter  Open bladder diverticulectomy  Hospital Course: made good post-op recovery  Slow due to chronic RT shoulder pain  Now ready for dc, SP tube in place x 2 wk    Significant Findings, Care, Treatment and Services Provided:     Complications: none    Discharge Diagnosis: same    Condition at Discharge: good     Discharge instructions/Information to patient and family:   See after visit summary for information provided to patient and family  Provisions for Follow-Up Care:  See after visit summary for information related to follow-up care and any pertinent home health orders  Disposition: Home    Planned Readmission: No    Discharge Statement   I spent 35 minutes discharging the patient  Spoke with patient. Reviewed breast biopsy procedure and reviewed instructions for breast biopsy. Patient expressed understanding and all questions were answered. Provided patient with my phone number to call for any further concerns or questions.   Patient scheduled breast biopsy at [facility] on 2/27/2023.    This time was spent on the day of discharge  I had direct contact with the patient on the day of discharge  Additional documentation is required if more than 30 minutes were spent on discharge  Discharge Medications:  See after visit summary for reconciled discharge medications provided to patient and family          ?  ?

## 2023-06-07 NOTE — TELEPHONE ENCOUNTER
----- Message from [facility] sent at 7/11/2018  8:39 AM EDT -----      ----- Message -----  From: Bennett Thorne MD  Sent: 7/10/2018   6:07 PM  To: [facility] [place] [address] [facility]    Please schedule patient to have interventional radiology drain is pelvic fluid collection  I have spoken with his wife care and she is expecting a call to schedule this  A slip is been printed  never true

## 2023-06-20 NOTE — PROGRESS NOTES
[facility]   Physical Therapy Progress Note     Name: Enoch Barr  [facility] Number: [id number]    Therapy Diagnosis:   Encounter Diagnoses   Name Primary?    History of bunionectomy of right great toe Yes    Hallux rigidus of right foot     Antalgic gait     Weakness of right foot     Muscle weakness affecting movement of foot      Physician: Shannan An DPM    Visit Date: 6/20/2023       Therapy Diagnosis:   Encounter Diagnoses  Name   Primary?             Decreased range of motion                 History of bunionectomy of right great toe                  Hallux rigidus of right foot                    Antalgic gait                 Weakness of right foot                         Muscle weakness affecting movement of foot                Physician: Shannan An DPM     Physician Orders: PT Eval and Treat   Medical Diagnosis from Referral: M25.60 (ICD-10-CM) - Decreased range of motion   Evaluation Date: 5/3/2023  Authorization Period Expiration: 12/31/23  Plan of Care Expiration: 7/3/23  Visit # / Visits authorized: 4/ 20   Progress Note Due: 7/20/23  FOTO: 1/ 1     Precautions: Standard       PTA Visit #: 2/5     Time In: 1600  Time Out: 1655    Total Billable Time: 55 minutes    Subjective     Pt reports: he is much improved. States the foot is 85% of normal.     He was compliant with home exercise program.  Response to previous treatment: decreased pain  Functional change: increase in activity    Pain: 0/10  Location:  R big toe MCP     Objective      Objective Measures updated at progress report unless specified.     Ankle Range of Motion:   Ankle   Right    Left  Dorsiflexion     12          12  Plantarflexion  55        55  Inversion         25       28  Eversion          14       15  Great toe extension    50deg PROM  60deg PROM  Great toe flexion         30deg PROM  40deg PROM      Strength:   Right Ankle                  Left Ankle          Dorsiflexion:    4+5       Postoperative day 7  Status post ileal conduit urinary diversion  Has postoperative ileus  I personally reviewed his KUB from yesterday  His magnesium is being replaced as well as his potassium  He needs TPN started   His stoma was pink and prolapse last night when I saw him, and his appliance was being change  His wound is clean dry and intact  A Lexa-Guerrero drain is out  Since I saw him last night he has opened up and is incontinent of stool into his diaper  His belly is less distended and overall he looks better  He is still nutritionally behind so I think we need to proceed with TPN  I will advance his diet to regular and I told him to go cautiously with it  Continue with ambulation  "Dorsiflexion:    5/5  Plantarflexion:             2/5       Plantarflexion: 4/5  Inversion:        4+/5      Inversion:        5/5  Eversion:         4+/5      Eversion:         5/5        Right LE                      Left LE   Hip flexion:      5/5       Hip flexion:      5/5  Hip extension:             4-/5      Hip extension: 4+/5  Hip abduction: 4+/5      Hip abduction: 4+/5  Knee extension:          4/5       Knee extension:          5/5  Knee flexion:   4+/5      Knee flexion:   4+/5       Treatment     Enoch received the treatments listed below:      therapeutic exercises to develop strength, endurance, ROM, and flexibility for 45 minutes includin way ankle GTB x 40  Seated calf raise, 3x20, 25lb KB  Standing calf raise, 3x10 SL  Gastroc Stretch on Incline 3x30"  Mini lunge with dowel 2 x 10 ea  Sidestepping 20 ft x 3  Foot doming, 3x20 w/ 5sec hold  SLS on MOBO (B 1,3 & 2,4) 3x30"  Tennis ball roll x 20    manual therapy techniques: Joint mobilizations, Myofacial release, and Soft tissue Mobilization were applied to the: RLE for 10 minutes, including:    AP glides to talocrural grade 3   Great Toe MTP AP/PA glides 3   Great Toe MTP Abd/Add glides grade 3   Metatarsal glides     Home program:   Great toe ext/flex superset, 3x20  Great toe isometric adduction, 3x20 w/ 5sec hold  Tibialis posterior towel roll up, 3x20  Toe curls, 2x10, 3" hold  Toe Yoga 2x10    Patient Education and Home Exercises       Education provided:   --HEP compliance    Written Home Exercises Provided: yes. Exercises were reviewed and Enoch was able to demonstrate them prior to the end of the session.  Enoch demonstrated good  understanding of the education provided. See EMR under Patient Instructions for exercises provided during therapy sessions    Assessment     Pt ambulates with a slight antalgic gait, decreased push off.  Demonstrates improved active great toe flex/ext. No c/o increased discomfort with prescribed " activities.  Good response to exercise progression consisting of foot/ankle ROM and stabilization activities. Enoch Is progressing well towards his goals.     Pt prognosis is Excellent.     Pt will continue to benefit from skilled outpatient physical therapy to address the deficits listed in the problem list box on initial evaluation, provide pt/family education and to maximize pt's level of independence in the home and community environment.     Pt's spiritual, cultural and educational needs considered and pt agreeable to plan of care and goals.     Anticipated barriers to physical therapy: None    Goals:  GOALS: Short Term Goals:  4 weeks Updated 6/20/23  MET  1.Report decreased  in  pain at worse less than  <   / =  4  /10  to increase tolerance for improved functional actvities. On going  2. Pt to improve great toe range of motion to 50deg extension to allow for improved functional mobility to allow for improvement in IADLs. On going  3. Increased BLE MMT 1/2 grade  to increase tolerance for ADL and work activities.On going  4. Pt to demonstrate ability to ambulate in standard shoe x15min on level ground at tolerable pace with < or = 4/10 R foot pain. Ongoing   5. Pt to tolerate HEP to improve ROM and independence with ADL's. On going     Long Term Goals: 8 weeks in progress  1.Report decreased  in  pain at worse  less than  <   / =  2  /10  to increase tolerance for improved functional actvities. On going  2.Pt to improve great toe range of motion to 60deg extension to allow for improved functional mobility to allow for improvement in IADLs. On going  3.Increased BLE MMT 1 grade  to increase tolerance for ADL and work activities.On going  4. Pt will report clinically significant improvements on  FOTO outcome assessment  to increase functional activities and mobility. On going  5. Pt to be Independent with HEP to improve ROM and independence with ADL's. On going       Plan     Plan of care Certification:  5/3/2023 to 7/3/23.     Continue with established  PT Plan of Care towards patient goals.  Will continue 1x per week progressing towards d/c planning.     Yoan Fritz, PT    06/20/2023

## 2023-12-24 NOTE — PLAN OF CARE
Problem: PHYSICAL THERAPY ADULT  Goal: Performs mobility at highest level of function for planned discharge setting  See evaluation for individualized goals  Description  Treatment/Interventions: Functional transfer training, LE strengthening/ROM, Elevations, Therapeutic exercise, Endurance training, Patient/family training, Bed mobility, Gait training, Spoke to nursing, Family  Equipment Recommended: Amanda Kang       See flowsheet documentation for full assessment, interventions and recommendations  Note:   Prognosis: Fair  Problem List: Decreased strength, Decreased endurance, Impaired balance, Decreased mobility, Impaired hearing, Pain  Assessment: Pt  73 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 admitted for radical cystoprostatectomy, bilateral lymph node dissection, appendectomy and ileal conduit formation on 2/25/19  However, per notes, unable to perform radical cystoprostatectomy due to amount of massive inflammation on the left side of the bladder and pedicle  Urinary diversion and appendectomy was performed instead  Pt referred to PT for mobility assessment & D/C planning w/ orders of up w/ assistance  PTA, pt reports being I w/o AD  On eval, pt demonstrate dec mobility, balance, endurance & amb  Pt require modAx1 for bed mobility & transfers; minAx1 for amb w/ RW + cues for techniques  Gait deviations as above, slow & shuffling but no gross LOB noted  (+) nausea upon sitting at EOB but no vomiting  No dizziness reported t/o session  Nsg staff most recent vital signs as follows: /69 (BP Location: Right arm)   Pulse 99   Temp 97 5 °F (36 4 °C) (Tympanic)   Resp 18   Ht 6' (1 829 m)   Wt 86 2 kg (190 lb)   SpO2 98%   BMI 25 77 kg/m²   At end of session, pt tolerated OOB in chair w/o issues, call bell & phone in reach, chair alarm activated  Fall precautions reinforced w/ good understanding   Pt functioning below baseline hence will continue skilled PT to improve function & safety  At this time, D/C rec: STR vs HHPT pending progress  CM to follow  Nsg staff to continue to mobilized pt (OOB in chair for all meals & ambulate in room/unit) as tolerated to prevent further decline in function  Nsg notified  Of note, this session is part PT eval & part tx session, as pt require inc time to complete tasks + inc time to get appropriate equipments for pt such as recliner + inc time to manage multiple lines  Barriers to Discharge: Decreased caregiver support  Barriers to Discharge Comments: pt's significant other works hence (+) times home alone  Recommendation: Short-term skilled PT, Home PT, Home with family support(pending progress)          See flowsheet documentation for full assessment  Yes

## 2024-01-03 NOTE — DISCHARGE INSTRUCTIONS
Drainage Tube Removal    Your drainage tube was removed today  What you need know at home:   Keep a clean dry dressing at the tube site until the small opening closes  It will take twenty four to forty eight hours  Keep the site dry until it heals  A small amount of drainage on your dressing is normal  Resume your normal diet  Small sips of flat soda will help with any nausea  Contact Interventional Radiology for any of the following: You have pain, fever greater than 101, shaking chills  If you have increased redness or swelling at the site  I the drainage from your site does not stop  If the site drains pus or has a bad odor       Contact Interventional Radiology at [phone]   [facility] PATIENTS: Contact Interventional Radiology at [phone]) Junior Serrano PATIENTS: Contact Interventional Radiology at [phone]) if:
4 (moderate pain)

## 2024-09-27 NOTE — TELEPHONE ENCOUNTER
Patient was on antibiotic for a bladder infection  He finished the pills on Monday; urine is still cloudy and patient is very nauseous  Progress Note - Nephrology   Name: Ramos Rosenthal 76 y.o. 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  100% crescents on biopsy from preliminary report, awaiting official biopsy report. Higher crescent % and dialysis requirement associated with poor renal outcomes. Daughter and patient aware after discussion regarding prelim biopsy.   UOP anuric as recorded, per patient not urinating.  Continue PLEX therapy, plan for 1 day hold on PLEX for PC placement and resume on 9/28. Critical care coordinating with [place]. Plan for PLEX until 10/5 for 2 week duration permitting improvement in antigbm levels.  Permcath placement this afternoon.   Continue to monitor for renal recovery.   Next HD on 9/27, currently on MWF schedule. As best we can, arrange HD post PLEX for volume management.   Trend antigbm antibody q 2 days--- 9/15 >8.0.   9/22 >8.0.   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  Continue with Bactrim for immunoprophylaxis and PPI/Pepcid for gastric prophylaxis.  The patient's medications are to be dosed post hemodialysis.  Cyclophosphamide dose adjusted for 1mg/kg due to dialysis requirement.   Continue 5% albumin for PLEX and FFP/cryo as appropriate per CC. Monitor daily fibrinogen.   Hyponatremia  Continue with fluid restriction and ultrafiltration as tolerated on dialysis days  Bilateral lower extremity edema  HD POST PLEX for volume management, low-salt diet  Anemia due to chronic kidney disease, on chronic dialysis (HCC)  Lab Results   Component Value Date    EGFR 12 09/27/2024    EGFR 18 09/26/2024    EGFR 12 09/25/2024    CREATININE 4.25 (H) 09/27/2024    CREATININE  3.17 (H) 09/26/2024    CREATININE 4.41 (H) 09/25/2024     1 unit PRBC planned today. Continue transfusion support per primary team.   Iron studies do not support RILEY. Ferritin>100 and t sat 27.     I have reviewed the nephrology recommendations including dialysis management , with primary and critical care team, and we are in agreement with renal plan including the information outlined above.     History of Present Illness   Brief History of Admission - 75 yo male with hx gout presented with severe LYYL with Cr 7.9 and hematuria and proteinuria. Found to have + antigbm on serologies and renal biopsy performed 9/19. Awaiting official biopsy results. Started on PLEX 9/21     Patient seen and examined today. Denies chest pain,shortness of breath,nausea,vomiting or diarrhea.  A complete 10 point review of systems was performed and is otherwise negative.     Objective      Temp:  [96.4 °F (35.8 °C)-98.4 °F (36.9 °C)] 96.7 °F (35.9 °C)  HR:  [52-80] 52  Resp:  [12-28] 20  BP: ()/(46-73) 111/55  O2 Device: None (Room air)         Vitals:    09/26/24 0600 09/27/24 0553   Weight: (!) 140 kg (308 lb 6.8 oz) (!) 137 kg (301 lb 13 oz)     I/O last 24 hours:  In: 6442 [P.O.:642; I.V.:200; IV Piggyback:5600]  Out: 0   Lines/Drains/Airways       Active Status       Name Placement date Placement time Site Days    CVC Central Lines 09/16/24 Double 09/16/24 0909  --  11    HD Temporary Double Catheter 09/16/24 0909  Other (Comment)  11                  Physical Exam  Vitals reviewed.   Constitutional:       General: He is not in acute distress.     Appearance: He is not ill-appearing.   HENT:      Head: Normocephalic and atraumatic.      Nose: Nose normal.      Mouth/Throat:      Mouth: Mucous membranes are moist.      Pharynx: Oropharynx is clear.   Eyes:      Extraocular Movements: Extraocular movements intact.      Conjunctiva/sclera: Conjunctivae normal.   Cardiovascular:      Rate and Rhythm: Normal rate and regular  rhythm.      Heart sounds:      No friction rub.   Pulmonary:      Breath sounds: No wheezing, rhonchi or rales.   Abdominal:      Palpations: Abdomen is soft.      Tenderness: There is no abdominal tenderness. There is no guarding.   Musculoskeletal:      Right lower leg: Edema present.      Left lower leg: Edema present.   Skin:     Coloration: Skin is not jaundiced.      Findings: No bruising.   Neurological:      Mental Status: He is alert and oriented to person, place, and time. Mental status is at baseline.        Medications:    Current Facility-Administered Medications:     calcium carbonate (TUMS) chewable tablet 1,000 mg, 1,000 mg, Oral, Daily PRN, BRETT Xie    carvedilol (COREG) tablet 6.25 mg, 6.25 mg, Oral, BID With Meals, Gigi Ortega, DO, 6.25 mg at 09/27/24 0746    cyclophosphamide (CYTOXAN) capsule 100 mg, 100 mg, Oral, Daily, Lisa Moore DO, 100 mg at 09/26/24 1645    famotidine (PEPCID) tablet 20 mg, 20 mg, Oral, Daily, Ramos Mclean DO, 20 mg at 09/27/24 0746    FLUoxetine (PROzac) capsule 20 mg, 20 mg, Oral, Daily, BRETT Xie, 20 mg at 09/27/24 0746    heparin (porcine) subcutaneous injection 5,000 Units, 5,000 Units, Subcutaneous, Q8H LORENZO, BRETT Xie, 5,000 Units at 09/27/24 0536    ondansetron (ZOFRAN) injection 4 mg, 4 mg, Intravenous, Q6H PRN, BRETT Xie    pantoprazole (PROTONIX) EC tablet 20 mg, 20 mg, Oral, Early Morning, Ramos Mclean DO, 20 mg at 09/27/24 0536    predniSONE tablet 60 mg, 60 mg, Oral, Daily, Lisa Moore, DO, 60 mg at 09/26/24 1642    sulfamethoxazole-trimethoprim (BACTRIM DS) 800-160 mg per tablet 1 tablet, 1 tablet, Oral, Once per day on Monday Wednesday Friday, Lisa Moore DO, 1 tablet at 09/25/24 2075      Lab Results: I have reviewed the following results:   Results from last 7 days   Lab Units 09/27/24  0429 09/26/24  0452 09/25/24  0555 09/24/24  0616  "09/23/24  0424 09/22/24  0553 09/21/24  0448   WBC Thousand/uL 25.24* 18.92* 20.08* 22.07* 22.78* 25.08* 21.23*   HEMOGLOBIN g/dL 6.7* 7.3* 8.1* 8.0* 7.7* 7.7* 8.4*   HEMATOCRIT % 20.7* 22.8* 25.2* 24.9* 24.2* 24.3* 26.3*   PLATELETS Thousands/uL 298 302 317 334 326 343 347   POTASSIUM mmol/L 4.2 4.0 4.5 4.4 4.6 4.3 4.3   CHLORIDE mmol/L 102 100 101 100 99 99 96   CO2 mmol/L 21 25 20* 19* 20* 20* 23   BUN mg/dL 34* 26* 44* 60* 75* 62* 40*   CREATININE mg/dL 4.25* 3.17* 4.41* 5.17* 6.52* 5.72* 4.75*   CALCIUM mg/dL 7.6* 7.7* 7.6* 7.3* 7.6* 7.9* 8.2*   MAGNESIUM mg/dL  --   --   --   --  2.1 2.2 2.2   PHOSPHORUS mg/dL  --   --   --   --  5.5* 5.2* 4.2*   ALBUMIN g/dL  --   --   --   --   --   --  2.6*       Administrative Statements     Portions of the record may have been created with voice recognition software. Occasional wrong word or \"sound a like\" substitutions may have occurred due to the inherent limitations of voice recognition software. Read the chart carefully and recognize, using context, where substitutions have occurred.If you have any questions, please contact the dictating provider.  "

## 2025-02-06 NOTE — PHYSICAL THERAPY NOTE
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    Aortic regurgitation    BPH with obstruction/lower urinary tract symptoms    Bladder diverticulum    Postoperative ileus (HCC)    Kidney stone    Major depressive disorder, single episode, moderate (HCC)    Peripheral vascular disease ([address] )    Essential hypertension    Acute pain of right shoulder    Benign localized hyperplasia of prostate without urinary obstruction    Urinary retention due to benign prostatic hyperplasia    Hydronephrosis of left kidney    Ureteral stricture, left    Pelvic abscess in male [facility])    Aneurysm of left popliteal artery (HCC)    Urothelial carcinoma (HCC)    Prostate cancer ([address] )    Hypokalemia    Orthostatic lightheadedness    Chemotherapy-induced neutropenia (HCC)    SIRS (systemic inflammatory response syndrome) (HCC)    CKD (chronic kidney disease) stage 3, GFR 30-59 ml/min (HCC)    Acute cystitis without hematuria    Acute on chronic anemia    Moderate protein-calorie malnutrition (HCC)    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[address] )    Malignant neoplasm of prostate ([address] )    Preop cardiovascular exam       Admitting Diagnoses:   Malignant neoplasm of prostate ([address] ) [C61]  Urothelial carcinoma ([address] ) [C68 9]    Past Medical History:   Diagnosis Date    Aneurysm ([address] )     Behind left knee recently diagnosed    Back pain     Ceron disease     Ceron's disease     BPH (benign prostatic hyperplasia)     Cancer ([address] )     melanoma    Cataract     right eye    Cataract     right eye    Confusion     DDD (degenerative disc disease), lumbar     Depression     Diverticulosis     Gallstones     GERD (gastroesophageal reflux disease)     History of cardiac murmur     Past    History of melanoma     Was on back in early 1990's    History of rheumatic fever     Childhood    History of transfusion     Nov 2018   [facility] (hard of hearing)     Hydronephrosis     Hypertension     Kidney stone     Multiple times    Neck pain     Nervous breakdown     History of due to reaction to psych med which he was on for anxiety/depression and became suicidal    Numbness and tingling in both hands     Numbness and tingling of both feet     PONV (postoperative nausea and vomiting)     PVD (peripheral vascular disease) ([address] )     RBBB     Scoliosis     Seasonal allergies     Tinnitus     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     Wears partial dentures     Upper    Wears partial dentures     Upper       Past Surgical History:   Procedure Laterality Date    ABDOMINAL SURGERY      When young had procedure for improviing circulation to left lower extremety    BACK SURGERY      Lumbar- not sure what was done   [place] CARDIAC CATHETERIZATION      Approximately 2007- no blockages    CARPAL TUNNEL RELEASE Bilateral     wrists are fused    CATARACT EXTRACTION Left     COLONOSCOPY      CYST REMOVAL      Robotic procedure to remove benign cyst from lower left lung    CYSTOGRAM N/A 3/14/2018    Procedure: CYSTOGRAM;  Surgeon: Hong Echevarria MD;  [facility];  Service: Urology    CYSTOSCOPY      ESOPHAGOGASTRODUODENOSCOPY      IR TUBE PLACEMENT NEPHROSTOMY  8/10/2018    LUNG SURGERY      cyst removed left lung    OTHER SURGICAL HISTORY Left     fistula drain in left buttock    CT CYSTO/URETERO W/LITHOTRIPSY &INDWELL STENT INSRT Left 1/3/2018    Procedure: CYSTOSCOPY URETEROSCOPY, RETROGRADE PYELOGRAM AND INSERTION STENT URETERAL;  Surgeon: Hong Echevarria MD;  [facility];  Service: Urology    CT CYSTOTOMY,EXCIS BLADDER TIC N/A 2/14/2018    Procedure: ABDOMINAL EXPLORATION; CLOSURE OF BLADDER DIVERTICULITIS;  Surgeon: Hong Echevarria MD;  [facility];  Service: Urology    CT CYSTOURETHROSCOPY,URETER CATHETER Left 8/1/2018    Procedure: Cystoscopy, cystogram, bladder biopsies, removal of pelvic drain;  Surgeon: Sherryle Alice, MD;  [facility];  Service: Urology    MO INCISE/DRAIN BLADDER N/A 2/14/2018    Procedure: OPEN SUPRAPUBIC TUBE PLACEMENT;  Surgeon: Sherryle Alice, MD;  [facility];  Service: Urology    MO RELEASE Moshe Mini FIBROSIS Left 2/14/2018    Procedure: LYSIS OF ADHESIONS; URETEROLYSIS ;  Surgeon: Sherryle Alice, MD;  [facility];  Service: Urology     [facility] BLADDER/NODES,ILEAL CONDUIT N/A 2/25/2019    Procedure: ATTEMPTED CYSTECTOMY RADICAL; ILEAL CONDUIT URINARY DIVERSION; BIOPSY OF PELVIC MASS; APPENDECTOMY;  Surgeon: Sherryle Alice, MD;  [facility];  Service: Urology    SKIN CANCER EXCISION      Melanoma removal on back in early 1990's    TOE AMPUTATION Left     All 5 toes left foot were amputated due to poor circulation     TRANSURETHRAL RESECTION OF PROSTATE N/A 3/14/2018    Procedure: TRANSURETHRAL RESECTION OF PROSTATE (TURP), LEFT URETERAL STENT REMOVAL;  Surgeon: Sherryle Alice, MD;  [facility];  Service: Urology    TRANSURETHRAL RESECTION OF PROSTATE N/A 9/26/2018    Procedure: TRANSURETHRAL RESECTION OF PROSTATE (TURP); Surgeon: Sherryle Alice, MD;  [facility];  Service: Urology    WRIST SURGERY Bilateral     Ulnar nerve on right arm and both wrists are fused       Imaging Studies:  XR chest portable   Final Result by Michael Gabriel MD (02/25 1053)      No acute cardiopulmonary disease  Workstation performed: [id number]              02/26/19 1038   Note Type   Note type Eval/Treat   Pain Assessment   Pain Score 5   Pain Type Surgical pain   Pain Location Abdomen   Hospital Pain Intervention(s) Medication (See MAR); Repositioned; Ambulation/increased activity; Emotional support; Rest   Home Living   Type of [address] One level;Stairs to enter with rails  (1STE)   Home Equipment Walker;Cane   Prior Function   Level of [place] Independent with ADLs and functional mobility  (w/o AD)   Lives With Significant other   Receives Help From Family   ADL Assistance Independent   Falls in the last 6 months 0   Restrictions/Precautions   Weight Bearing Precautions Per Order No   Other Precautions Multiple lines;O2;Fall Risk;Pain  (capnography; pain pump)   General   Family/Caregiver Present Yes  (s o )   Cognition   Overall Cognitive Status WFL   Arousal/Participation Arousable   Orientation Level Oriented to person;Oriented to place;Oriented to time   Following Commands Follows multistep commands without difficulty   RUE Assessment   RUE Assessment WFL   LUE Assessment   LUE Assessment WFL   RLE Assessment   RLE Assessment WFL   LLE Assessment   LLE Assessment WFL   Coordination   Movements are Fluid and Coordinated 1   Sensation WFL   Bed Mobility   Supine to Sit 3  Moderate assistance   Additional items Assist x 1;HOB elevated; Bedrails; Increased time required;Verbal cues;LE management   Additional Comments cues for techniques   Transfers   Sit to Stand 3  Moderate assistance   Additional items Assist x 1;Bedrails; Increased time required;Verbal cues   Stand to Sit 4  Minimal assistance   Additional items Assist x 1; Armrests; Increased time required;Verbal cues   Additional Comments cues for techniques   Ambulation/Elevation   Gait pattern Decreased foot clearance;Shuffling; Short stride; Excessively slow   Gait Assistance 4  Minimal assist   Additional items Assist x 1;Verbal cues; Tactile cues   Assistive Device Rolling walker   Distance 3'x1    Balance   Static Sitting Fair +   Static Standing Fair -   Ambulatory Poor +   Endurance Deficit   Endurance Deficit Yes   Endurance Deficit Description pain, weakness   Activity Tolerance   Activity Tolerance Patient limited by pain; Patient limited by fatigue   Nurse Made Aware Andrea Baptise   Assessment   Prognosis Fair   Problem List Decreased strength;Decreased endurance; Impaired balance;Decreased mobility; Impaired hearing;Pain Assessment Pt  73 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 admitted for radical cystoprostatectomy, bilateral lymph node dissection, appendectomy and ileal conduit formation on 2/25/19  However, per notes, unable to perform radical cystoprostatectomy due to amount of massive inflammation on the left side of the bladder and pedicle  Urinary diversion and appendectomy was performed instead  Pt referred to PT for mobility assessment & D/C planning w/ orders of up w/ assistance  PTA, pt reports being I w/o AD  On eval, pt demonstrate dec mobility, balance, endurance & amb  Pt require modAx1 for bed mobility & transfers; minAx1 for amb w/ RW + cues for techniques  Gait deviations as above, slow & shuffling but no gross LOB noted  (+) nausea upon sitting at EOB but no vomiting  No dizziness reported t/o session  Nsg staff most recent vital signs as follows: /69 (BP Location: Right arm)   Pulse 99   Temp 97 5 °F (36 4 °C) (Tympanic)   Resp 18   Ht 6' (1 829 m)   Wt 86 2 kg (190 lb)   SpO2 98%   BMI 25 77 kg/m²   At end of session, pt tolerated OOB in chair w/o issues, call bell & phone in reach, chair alarm activated  Fall precautions reinforced w/ good understanding  Pt functioning below baseline hence will continue skilled PT to improve function & safety  At this time, D/C rec: STR vs HHPT pending progress  CM to follow  Nsg staff to continue to mobilized pt (OOB in chair for all meals & ambulate in room/unit) as tolerated to prevent further decline in function  Nsg notified  Of note, this session is part PT eval (09:40-10:00) & part tx session (10:00-10:38), as pt require inc time to complete tasks + time for education + inc time to get appropriate equipments for pt such as recliner + inc time to manage multiple lines      Barriers to Discharge Decreased caregiver support   Barriers to Discharge Comments pt's significant other works hence (+) times home alone Goals   Patient Goals to get more sleep   STG Expiration Date 03/08/19   Short Term Goal #1 Goals to be met in 10 days; pt will be able to: 1) inc strength & balance by 1/2 grade to improve overall functional mobility & dec fall risk; 2) inc bed mobility to S for pt to be able to get in/OOB safely w/ proper techniques 100% of the time, to dec caregiver assistance & safely function at home; 3) inc transfers to S for pt to transition safely from one surface to another w/o % of the time, to dec caregiver assistance & safely function at home; 4) inc amb w/ RW approx  >80' w/ S for pt to ambulate household distances w/o any % of the time, to dec caregiver assistance & safely function at home; 5) inc barthel score to 60 to decrease overall risk for falls; 6) negotiate stairs w/ S for inc safety during stair mgt inside/outside of home & dec caregiver assistance; 7) pt/caregiver ed   Treatment Day 1   Plan   Treatment/Interventions Functional transfer training;LE strengthening/ROM; Elevations; Therapeutic exercise; Endurance training;Patient/family training;Bed mobility;Gait training;Spoke to nursing;Family   PT Frequency Other (Comment)  (4-5x/wk)   Recommendation   Recommendation Short-term skilled PT; Home PT; Home with family support  (pending progress)   Equipment Recommended Walker   Barthel Index   Feeding 10   Bathing 0   Grooming Score 5   Dressing Score 5   Bladder Score 0   Bowels Score 10   Toilet Use Score 5   Transfers (Bed/Chair) Score 5   Mobility (Level Surface) Score 0   Stairs Score 0   Barthel Index Score 40   Hx/personal factors: co-morbidities, inaccessible home, dec caregiver support, advanced age, mutliple lines, use of AD, pain, fall risk, assist w/ ADL's and O2  Examination: dec mobility, dec balance, dec endurance, dec amb, moderate fall risk, pain  Clinical: unpredictable (ongoing medical status, abnormal lab values, moderate fall risk and pain mgt)  Complexity: high     [place] Will PT no

## 2025-05-22 NOTE — NURSING NOTE
In an effort to ensure that our patients LiveWell, a Team Member has reviewed your chart and identified an opportunity to provide the best care possible. An attempt was made to discuss or schedule due or overdue Preventive or Chronic Condition care.Care Gaps identified: Diabetes Eye Exam.    The Outcome was Contact was made, a eye exam was scheduled.   Type of Appointment needed: called and spoke with patient, patient is scheduled for a retinavue in office for 6/26/2025     Patient discharged 10/17/2019 10:35 AM  AVS and discharge instructions to be sent with transport team to be given to family  Patient awaiting BLS transport at time for  time of 1030      Alexander Garza RN 10/17/2019 10:36 AM

## (undated) DEVICE — CONNECTOR PH 6-IN-1 Y ST: Brand: CARDINAL HEALTH

## (undated) DEVICE — SPECIMEN CONTAINER STERILE PEEL PACK

## (undated) DEVICE — SUT MONOCRYL 3-0 SH 27 IN Y416H

## (undated) DEVICE — EVACUATOR BLADDER ELLIK DISP STRL

## (undated) DEVICE — SUT PDS II 1 CTX 36 IN Z371T

## (undated) DEVICE — CYSTO TUBING TUR Y IRRIGATION

## (undated) DEVICE — TUBING SUCTION 5MM X 12 FT

## (undated) DEVICE — GLOVE SRG BIOGEL 6

## (undated) DEVICE — LUBRICANT SURGILUBE TUBE 4 OZ  FLIP TOP

## (undated) DEVICE — 3000CC GUARDIAN II: Brand: GUARDIAN

## (undated) DEVICE — SCD SEQUENTIAL COMPRESSION COMFORT SLEEVE MEDIUM KNEE LENGTH: Brand: KENDALL SCD

## (undated) DEVICE — CATH URETERAL 5FR X 70 CM FLEX TIP POLYUR BARD

## (undated) DEVICE — DRAPE EQUIPMENT RF WAND

## (undated) DEVICE — SPONGE CHERRY 1/2IN

## (undated) DEVICE — GLOVE INDICATOR PI UNDERGLOVE SZ 7.5 BLUE

## (undated) DEVICE — BULB SYRINGE,IRRIGATION WITH PROTECTIVE CAP: Brand: DOVER

## (undated) DEVICE — GLOVE SRG BIOGEL 7

## (undated) DEVICE — ENSEAL TISSUE SEALER G2 SUPER JAW CURVED FOR USE WITH GENERATOR G11 22CM SHAFT LENGTH: Brand: ENSEAL

## (undated) DEVICE — 3M™ IOBAN™ 2 ANTIMICROBIAL INCISE DRAPE 6648EZ: Brand: IOBAN™ 2

## (undated) DEVICE — LIGACLIP APPLIER MEDIUM

## (undated) DEVICE — Device: Brand: OLYMPUS

## (undated) DEVICE — MEDI-VAC YANKAUER SUCTION HANDLE W/STRAIGHT TIP & CONTROL VENT: Brand: CARDINAL HEALTH

## (undated) DEVICE — SKIN MARKER DUAL TIP WITH RULER CAP, FLEXIBLE RULER AND LABELS: Brand: DEVON

## (undated) DEVICE — VESSEL LOOP MAXI - RED

## (undated) DEVICE — BOWL: 16OZ PEELPOUCH 75/CS 16/PLT: Brand: MEDEGEN MEDICAL PRODUCTS, LLC

## (undated) DEVICE — UROCATCH BAG

## (undated) DEVICE — PROXIMATE RELOADABLE LINEAR CUTTER WITH SAFETY LOCK-OUT.  55MM LINEAR CUTTER.: Brand: PROXIMATE

## (undated) DEVICE — SUT VICRYL 0 CT-1 36 IN J946H

## (undated) DEVICE — PLUMEPEN PRO 10FT

## (undated) DEVICE — PACK TUR

## (undated) DEVICE — PROXIMATE LINEAR CUTTER RELOAD (STNADARD) , BLUE, 55MM: Brand: PROXIMATE

## (undated) DEVICE — GAUZE SPONGES,16 PLY: Brand: CURITY

## (undated) DEVICE — DRAIN SPONGES,6 PLY: Brand: EXCILON

## (undated) DEVICE — JP PERF DRN SIL FLT 10MM FULL: Brand: CARDINAL HEALTH

## (undated) DEVICE — SURGICEL 4 X 8

## (undated) DEVICE — CATH FOLEY COUNCIL 20FR 5ML 2 WAY LUBRICATH

## (undated) DEVICE — BETHLEHEM UNIVERSAL LAPAROTOMY: Brand: CARDINAL HEALTH

## (undated) DEVICE — 3M™ STERI-STRIP™ COMPOUND BENZOIN TINCTURE 40 BAGS/CARTON 4 CARTONS/CASE C1544: Brand: 3M™ STERI-STRIP™

## (undated) DEVICE — GLOVE SRG BIOGEL ECLIPSE 6.5

## (undated) DEVICE — GLOVE PI ULTRA TOUCH SZ 6

## (undated) DEVICE — CATH FOLEY HEMATURIA 24FR 30ML 3 WAY LUBRICATH

## (undated) DEVICE — SUT MONOCRYL 3-0 RB-1 27 IN Y305H

## (undated) DEVICE — Device

## (undated) DEVICE — BAG URINE DRAINAGE 2000ML ANTI RFLX LF

## (undated) DEVICE — SPONGE STICK WITH PVP-I: Brand: KENDALL

## (undated) DEVICE — SUT ETHILON 2-0 FSLX 30 IN 1674H

## (undated) DEVICE — BAG URINE DRAINAGE 4000ML CONTINUOUS IRR

## (undated) DEVICE — SUT VICRYL 2-0 SH 27 IN UNDYED J417H

## (undated) DEVICE — PROXIMATE LINEAR CUTTER RELOAD, BLUE, 75MM: Brand: PROXIMATE

## (undated) DEVICE — GUIDEWIRE STRGHT TIP 0.035 IN  SOLO PLUS

## (undated) DEVICE — SUT VICRYL 3-0 SH 27 IN J416H

## (undated) DEVICE — CHLORAPREP HI-LITE 26ML ORANGE

## (undated) DEVICE — INTENDED FOR TISSUE SEPARATION, AND OTHER PROCEDURES THAT REQUIRE A SHARP SURGICAL BLADE TO PUNCTURE OR CUT.: Brand: BARD-PARKER SAFETY BLADES SIZE 10, STERILE

## (undated) DEVICE — PENCIL ELECTROSURG E-Z CLEAN -0035H

## (undated) DEVICE — FACIAL INCISE NEEDLE-URO CART

## (undated) DEVICE — REM POLYHESIVE ADULT PATIENT RETURN ELECTRODE: Brand: VALLEYLAB

## (undated) DEVICE — STRL PENROSE DRAIN 18" X 1/2": Brand: CARDINAL HEALTH

## (undated) DEVICE — BAG DECANTER

## (undated) DEVICE — INVIEW CLEAR LEGGINGS: Brand: CONVERTORS

## (undated) DEVICE — 3M™ DURAPORE™ SURGICAL TAPE 2 INCHES X 10YARDS (5.0CM X 9.1M) 6ROLLS/CARTON 10CARTONS/CASE 1538-2: Brand: 3M™ DURAPORE™

## (undated) DEVICE — DRESSING MEPILEX BORDER 4 X 12 IN

## (undated) DEVICE — GLOVE INDICATOR PI UNDERGLOVE SZ 6.5 BLUE

## (undated) DEVICE — TRANSPOSAL ULTRAFLEX DUO/QUAD ULTRA CART MANIFOLD

## (undated) DEVICE — TRAY FOLEY 16FR URIMETER SURESTEP

## (undated) DEVICE — JP CHAN DRN SIL RND 10FR FULL W/TROCAR: Brand: JACKSON-PRATT

## (undated) DEVICE — SYRINGE CATH TIP 50ML

## (undated) DEVICE — INTENDED FOR TISSUE SEPARATION, AND OTHER PROCEDURES THAT REQUIRE A SHARP SURGICAL BLADE TO PUNCTURE OR CUT.: Brand: BARD-PARKER SAFETY BLADES SIZE 11, STERILE

## (undated) DEVICE — MEDI-VAC YANKAUER SUCTION HANDLE W/BULBOUS AND CONTROL VENT: Brand: CARDINAL HEALTH

## (undated) DEVICE — GLOVE SRG BIOGEL 7.5

## (undated) DEVICE — CATH FOLEY 16FR 5ML 2WAY LUBRICATH

## (undated) DEVICE — GLOVE INDICATOR PI UNDERGLOVE SZ 7 BLUE

## (undated) DEVICE — SUT PDS PLUS 1 CTX 36IN PDP371T

## (undated) DEVICE — BASIC SINGLE BASIN-LF: Brand: MEDLINE INDUSTRIES, INC.

## (undated) DEVICE — CATH SECURE FOLEY

## (undated) DEVICE — VESSEL LOOPS X-RAY DETECTABLE: Brand: DEROYAL

## (undated) DEVICE — SUT SILK 3-0 SH CR/8 18 IN C013D

## (undated) DEVICE — POOLE SUCTION HANDLE: Brand: CARDINAL HEALTH

## (undated) DEVICE — SPONGE LAP 18 X 18 IN STRL RFD

## (undated) DEVICE — INTENDED FOR TISSUE SEPARATION, AND OTHER PROCEDURES THAT REQUIRE A SHARP SURGICAL BLADE TO PUNCTURE OR CUT.: Brand: BARD-PARKER SAFETY BLADES SIZE 15, STERILE

## (undated) DEVICE — STRL UNIVERSAL LAPAROTOMY PACK: Brand: CARDINAL HEALTH

## (undated) DEVICE — SYRINGE 30ML LL

## (undated) DEVICE — DRAPE LAPAROTOMY W/POUCHES

## (undated) DEVICE — GLOVE SRG BIOGEL 6.5

## (undated) DEVICE — THE ECHELON FLEX POWERED PLUS ARTICULATING ENDOSCOPIC LINEAR CUTTERS ARE STERILE, SINGLE PATIENT USE INSTRUMENTS THAT SIMULTANEOUSLYCUT AND STAPLE TISSUE. THERE ARE SIX STAGGERED ROWS OF STAPLES, THREE ON EITHER SIDE OF THE CUT LINE. THE ECHELON FLEX 45 POWERED PLUSINSTRUMENTS HAVE A STAPLE LINE THAT IS APPROXIMATELY 45 MM LONG AND A CUT LINE THAT IS APPROXIMATELY 42 MM LONG. THE SHAFT CAN ROTATE FREELYIN BOTH DIRECTIONS AND AN ARTICULATION MECHANISM ENABLES THE DISTAL PORTION OF THE SHAFT TO PIVOT TO FACILITATE LATERAL ACCESS TO THE OPERATIVESITE.THE INSTRUMENTS ARE PACKAGED WITH A PRIMARY LITHIUM BATTERY PACK THAT MUST BE INSTALLED PRIOR TO USE. THERE ARE SPECIFIC REQUIREMENTS FORDISPOSING OF THE BATTERY PACK. REFER TO THE BATTERY PACK DISPOSAL SECTION.THE INSTRUMENTS ARE PACKAGED WITHOUT A RELOAD AND MUST BE LOADED PRIOR TO USE. A STAPLE RETAINING CAP ON THE RELOAD PROTECTS THE STAPLE LEGPOINTS DURING SHIPPING AND TRANSPORTATION. THE INSTRUMENTS’ LOCK-OUT FEATURE IS DESIGNED TO PREVENT A USED OR IMPROPERLY INSTALLED RELOADFROM BEING REFIRED OR AN INSTRUMENT FROM BEING FIRED WITHOUT A RELOAD.: Brand: ECHELON FLEX

## (undated) DEVICE — JACKSON-PRATT 100CC BULB RESERVOIR: Brand: CARDINAL HEALTH

## (undated) DEVICE — TELFA NON-ADHERENT ABSORBENT DRESSING: Brand: TELFA

## (undated) DEVICE — LIGACLIP APPLIER LARGE

## (undated) DEVICE — ENDOSCOPIC VALVE WITH ADAPTER.: Brand: SURSEAL® II

## (undated) DEVICE — CATH FOLEY 26FR 30ML 2 WAY SILICONE ELASTIMER

## (undated) DEVICE — SUT SILK 2-0 SH 30 IN K833H

## (undated) DEVICE — URIMETER 2500ML

## (undated) DEVICE — SUT MONOCRYL 4-0 RB-1 27 IN Y304H

## (undated) DEVICE — GLOVE INDICATOR PI UNDERGLOVE SZ 8 BLUE

## (undated) DEVICE — GUIDEWIRE AMPLATZ SS .038 180CM

## (undated) DEVICE — THE ECHELON, ECHELON ENDOPATH™ AND ECHELON FLEX™ FAMILIES OF ENDOSCOPIC LINEAR CUTTERS AND RELOADS ARE STERILE, SINGLE PATIENT USE INSTRUMENTS THAT SIMULTANEOUSLY CUT AND STAPLE TISSUE. THERE ARE SIX STAGGERED ROWS OF STAPLES, THREE ON EITHER SIDE OF THE CUT LINE. THE 45 MM INSTRUMENTS HAVE A STAPLE LINE THATIS APPROXIMATELY 45 MM LONG AND A CUT LINE THAT IS APPROXIMATELY 42 MM LONG. THE SHAFT CAN ROTATE FREELY IN BOTH DIRECTIONS AND AN ARTICULATION MECHANISM ON ARTICULATING INSTRUMENTS ENABLES BENDING THE DISTAL PORTIONOF THE SHAFT TO FACILITATE LATERAL ACCESS OF THE OPERATIVE SITE.THE INSTRUMENTS ARE SHIPPED WITHOUT A RELOAD AND MUST BE LOADED PRIOR TO USE. A STAPLE RETAINING CAP ON THE RELOAD PROTECTS THE STAPLE LEG POINTS DURING SHIPPING AND TRANSPORTATION. THE INSTRUMENTS’ LOCK-OUT FEATURE IS DESIGNED TO PREVENT A USED RELOAD FROM BEING REFIRED.: Brand: ECHELON ENDOPATH

## (undated) DEVICE — DRAPE FLUID WARMER (BIRD BATH)

## (undated) DEVICE — GLOVE SRG BIOGEL 8

## (undated) DEVICE — PROXIMATE SKIN STAPLERS (35 WIDE) CONTAINS 35 STAINLESS STEEL STAPLES (FIXED HEAD): Brand: PROXIMATE

## (undated) DEVICE — STRL UNIVERSAL MINOR VAGINAL: Brand: CARDINAL HEALTH

## (undated) DEVICE — 3M™ TEGADERM™ TRANSPARENT FILM DRESSING FRAME STYLE, 1628, 6 IN X 8 IN (15 CM X 20 CM), 10/CT 8CT/CASE: Brand: 3M™ TEGADERM™

## (undated) DEVICE — CATHETER PLUG WITH CAP: Brand: DOVER

## (undated) DEVICE — NEEDLE COUNTER LG W/RULER

## (undated) DEVICE — SUT VICRYL 0 REEL 54 IN J287G

## (undated) DEVICE — DRESSING MEPILEX AG BORDER 4 X 8 IN